# Patient Record
Sex: MALE | Race: WHITE | NOT HISPANIC OR LATINO | Employment: OTHER | URBAN - NONMETROPOLITAN AREA
[De-identification: names, ages, dates, MRNs, and addresses within clinical notes are randomized per-mention and may not be internally consistent; named-entity substitution may affect disease eponyms.]

---

## 2017-10-18 ENCOUNTER — APPOINTMENT (OUTPATIENT)
Dept: CT IMAGING | Facility: HOSPITAL | Age: 77
End: 2017-10-18

## 2017-10-18 ENCOUNTER — APPOINTMENT (OUTPATIENT)
Dept: GENERAL RADIOLOGY | Facility: HOSPITAL | Age: 77
End: 2017-10-18

## 2017-10-18 ENCOUNTER — HOSPITAL ENCOUNTER (EMERGENCY)
Facility: HOSPITAL | Age: 77
Discharge: HOME | End: 2017-10-18
Attending: EMERGENCY MEDICINE | Admitting: EMERGENCY MEDICINE

## 2017-10-18 VITALS
OXYGEN SATURATION: 97 % | DIASTOLIC BLOOD PRESSURE: 71 MMHG | HEIGHT: 71 IN | WEIGHT: 252 LBS | BODY MASS INDEX: 35.28 KG/M2 | RESPIRATION RATE: 20 BRPM | TEMPERATURE: 98.4 F | SYSTOLIC BLOOD PRESSURE: 128 MMHG | HEART RATE: 72 BPM

## 2017-10-18 DIAGNOSIS — M54.2 NECK PAIN: ICD-10-CM

## 2017-10-18 DIAGNOSIS — N39.0 ACUTE UTI (URINARY TRACT INFECTION): Primary | ICD-10-CM

## 2017-10-18 DIAGNOSIS — R10.10 PAIN OF UPPER ABDOMEN: ICD-10-CM

## 2017-10-18 LAB
ALBUMIN SERPL-MCNC: 3.2 G/DL (ref 3.5–5)
ALBUMIN/GLOB SERPL: 0.9 G/DL (ref 1.1–2.5)
ALP SERPL-CCNC: 108 U/L (ref 24–120)
ALT SERPL W P-5'-P-CCNC: 30 U/L (ref 0–54)
AMMONIA BLD-SCNC: <9 UMOL/L (ref 9–33)
AMYLASE SERPL-CCNC: 53 U/L (ref 30–110)
ANION GAP SERPL CALCULATED.3IONS-SCNC: 10 MMOL/L (ref 4–13)
AST SERPL-CCNC: 30 U/L (ref 7–45)
BACTERIA UR QL AUTO: ABNORMAL /HPF
BASOPHILS # BLD AUTO: 0.01 10*3/MM3 (ref 0–0.2)
BASOPHILS NFR BLD AUTO: 0.1 % (ref 0–2)
BILIRUB SERPL-MCNC: 2.8 MG/DL (ref 0.1–1)
BILIRUB UR QL STRIP: ABNORMAL
BUN BLD-MCNC: 13 MG/DL (ref 5–21)
BUN/CREAT SERPL: 11.6 (ref 7–25)
CALCIUM SPEC-SCNC: 8.7 MG/DL (ref 8.4–10.4)
CHLORIDE SERPL-SCNC: 105 MMOL/L (ref 98–110)
CLARITY UR: ABNORMAL
CO2 SERPL-SCNC: 24 MMOL/L (ref 24–31)
COLOR UR: ABNORMAL
CREAT BLD-MCNC: 1.12 MG/DL (ref 0.5–1.4)
DEPRECATED RDW RBC AUTO: 43.4 FL (ref 40–54)
EOSINOPHIL # BLD AUTO: 0.03 10*3/MM3 (ref 0–0.7)
EOSINOPHIL NFR BLD AUTO: 0.4 % (ref 0–4)
ERYTHROCYTE [DISTWIDTH] IN BLOOD BY AUTOMATED COUNT: 13.9 % (ref 12–15)
GFR SERPL CREATININE-BSD FRML MDRD: 64 ML/MIN/1.73
GLOBULIN UR ELPH-MCNC: 3.7 GM/DL
GLUCOSE BLD-MCNC: 124 MG/DL (ref 70–100)
GLUCOSE UR STRIP-MCNC: NEGATIVE MG/DL
HCT VFR BLD AUTO: 38.7 % (ref 40–52)
HGB BLD-MCNC: 13.3 G/DL (ref 14–18)
HGB UR QL STRIP.AUTO: ABNORMAL
IMM GRANULOCYTES # BLD: 0.02 10*3/MM3 (ref 0–0.03)
IMM GRANULOCYTES NFR BLD: 0.3 % (ref 0–5)
KETONES UR QL STRIP: NEGATIVE
LEUKOCYTE ESTERASE UR QL STRIP.AUTO: ABNORMAL
LIPASE SERPL-CCNC: 57 U/L (ref 23–203)
LYMPHOCYTES # BLD AUTO: 0.9 10*3/MM3 (ref 0.72–4.86)
LYMPHOCYTES NFR BLD AUTO: 11.9 % (ref 15–45)
MCH RBC QN AUTO: 29.6 PG (ref 28–32)
MCHC RBC AUTO-ENTMCNC: 34.4 G/DL (ref 33–36)
MCV RBC AUTO: 86.2 FL (ref 82–95)
MONOCYTES # BLD AUTO: 0.94 10*3/MM3 (ref 0.19–1.3)
MONOCYTES NFR BLD AUTO: 12.4 % (ref 4–12)
NEUTROPHILS # BLD AUTO: 5.69 10*3/MM3 (ref 1.87–8.4)
NEUTROPHILS NFR BLD AUTO: 74.9 % (ref 39–78)
NITRITE UR QL STRIP: POSITIVE
NRBC BLD MANUAL-RTO: 0 /100 WBC (ref 0–0)
PH UR STRIP.AUTO: <=5 [PH] (ref 5–8)
PLATELET # BLD AUTO: 101 10*3/MM3 (ref 130–400)
PMV BLD AUTO: 10.3 FL (ref 6–12)
POTASSIUM BLD-SCNC: 4 MMOL/L (ref 3.5–5.3)
PROT SERPL-MCNC: 6.9 G/DL (ref 6.3–8.7)
PROT UR QL STRIP: ABNORMAL
RBC # BLD AUTO: 4.49 10*6/MM3 (ref 4.8–5.9)
RBC # UR: ABNORMAL /HPF
REF LAB TEST METHOD: ABNORMAL
SODIUM BLD-SCNC: 139 MMOL/L (ref 135–145)
SP GR UR STRIP: 1.02 (ref 1–1.03)
SQUAMOUS #/AREA URNS HPF: ABNORMAL /HPF
UROBILINOGEN UR QL STRIP: ABNORMAL
WBC NRBC COR # BLD: 7.59 10*3/MM3 (ref 4.8–10.8)
WBC UR QL AUTO: ABNORMAL /HPF

## 2017-10-18 PROCEDURE — 72125 CT NECK SPINE W/O DYE: CPT

## 2017-10-18 PROCEDURE — 80053 COMPREHEN METABOLIC PANEL: CPT | Performed by: EMERGENCY MEDICINE

## 2017-10-18 PROCEDURE — 87086 URINE CULTURE/COLONY COUNT: CPT | Performed by: EMERGENCY MEDICINE

## 2017-10-18 PROCEDURE — 83690 ASSAY OF LIPASE: CPT | Performed by: EMERGENCY MEDICINE

## 2017-10-18 PROCEDURE — 25010000002 KETOROLAC TROMETHAMINE PER 15 MG: Performed by: EMERGENCY MEDICINE

## 2017-10-18 PROCEDURE — 82140 ASSAY OF AMMONIA: CPT | Performed by: EMERGENCY MEDICINE

## 2017-10-18 PROCEDURE — 96365 THER/PROPH/DIAG IV INF INIT: CPT

## 2017-10-18 PROCEDURE — 85025 COMPLETE CBC W/AUTO DIFF WBC: CPT | Performed by: EMERGENCY MEDICINE

## 2017-10-18 PROCEDURE — 70450 CT HEAD/BRAIN W/O DYE: CPT

## 2017-10-18 PROCEDURE — 74176 CT ABD & PELVIS W/O CONTRAST: CPT

## 2017-10-18 PROCEDURE — 25010000002 CEFTRIAXONE: Performed by: EMERGENCY MEDICINE

## 2017-10-18 PROCEDURE — 81001 URINALYSIS AUTO W/SCOPE: CPT | Performed by: EMERGENCY MEDICINE

## 2017-10-18 PROCEDURE — 96375 TX/PRO/DX INJ NEW DRUG ADDON: CPT

## 2017-10-18 PROCEDURE — 71010 HC CHEST PA OR AP: CPT

## 2017-10-18 PROCEDURE — 82150 ASSAY OF AMYLASE: CPT | Performed by: EMERGENCY MEDICINE

## 2017-10-18 PROCEDURE — 99284 EMERGENCY DEPT VISIT MOD MDM: CPT

## 2017-10-18 RX ORDER — SODIUM CHLORIDE 9 MG/ML
125 INJECTION, SOLUTION INTRAVENOUS CONTINUOUS
Status: DISCONTINUED | OUTPATIENT
Start: 2017-10-18 | End: 2017-10-18 | Stop reason: HOSPADM

## 2017-10-18 RX ORDER — SULFAMETHOXAZOLE AND TRIMETHOPRIM 800; 160 MG/1; MG/1
1 TABLET ORAL 2 TIMES DAILY
Qty: 20 TABLET | Refills: 0 | OUTPATIENT
Start: 2017-10-18 | End: 2017-11-08

## 2017-10-18 RX ORDER — KETOROLAC TROMETHAMINE 30 MG/ML
30 INJECTION, SOLUTION INTRAMUSCULAR; INTRAVENOUS EVERY 6 HOURS PRN
Status: DISCONTINUED | OUTPATIENT
Start: 2017-10-18 | End: 2017-10-18 | Stop reason: HOSPADM

## 2017-10-18 RX ORDER — SODIUM CHLORIDE 0.9 % (FLUSH) 0.9 %
10 SYRINGE (ML) INJECTION AS NEEDED
Status: DISCONTINUED | OUTPATIENT
Start: 2017-10-18 | End: 2017-10-18 | Stop reason: HOSPADM

## 2017-10-18 RX ORDER — HYDROCODONE BITARTRATE AND ACETAMINOPHEN 7.5; 325 MG/1; MG/1
1 TABLET ORAL EVERY 4 HOURS PRN
Qty: 20 TABLET | Refills: 0 | OUTPATIENT
Start: 2017-10-18 | End: 2017-11-08

## 2017-10-18 RX ADMIN — KETOROLAC TROMETHAMINE 30 MG: 30 INJECTION, SOLUTION INTRAMUSCULAR at 09:05

## 2017-10-18 RX ADMIN — CEFTRIAXONE 1 G: 1 INJECTION, POWDER, FOR SOLUTION INTRAMUSCULAR; INTRAVENOUS at 11:40

## 2017-10-18 NOTE — ED PROVIDER NOTES
Subjective   HPI Comments: Patient complains of diffuse pains for the past 3 days.  He specifically says the neck and low back.  He says is been unable to walk for the past 3 days or at least to walk well.  This AM got up to go to bathroom and could not walk due to pain.  Crawled to bathroom and then could not bet back and son found him there and called 911.  Patient denies fall or injury.  Says he hurts all over but then specifies neck and low back as worse spots.  Was in hospital few months ago for dehydration.    Patient is a 77 y.o. male presenting with general illness.   History provided by:  Patient   used: No    Illness   Location:  Diffuse  Quality:  Achy  Severity:  Severe  Onset quality:  Gradual  Duration:  3 days  Timing:  Constant  Progression:  Worsening  Chronicity:  New  Associated symptoms: abdominal pain, headaches and myalgias    Associated symptoms: no chest pain, no congestion, no cough, no diarrhea, no ear pain, no fatigue, no fever, no loss of consciousness, no nausea, no rash, no rhinorrhea, no shortness of breath, no sore throat, no vomiting and no wheezing        Review of Systems   Constitutional: Negative.  Negative for fatigue and fever.   HENT: Negative for congestion, ear pain, rhinorrhea and sore throat.    Respiratory: Negative.  Negative for cough, shortness of breath and wheezing.    Cardiovascular: Negative.  Negative for chest pain.   Gastrointestinal: Positive for abdominal pain. Negative for diarrhea, nausea and vomiting.   Genitourinary: Negative.    Musculoskeletal: Positive for myalgias.   Skin: Negative.  Negative for rash.   Neurological: Positive for headaches. Negative for loss of consciousness.   Hematological: Negative.    Psychiatric/Behavioral: Negative.    All other systems reviewed and are negative.      Past Medical History:   Diagnosis Date   • Dementia    • Liver disorder        Allergies   Allergen Reactions   • Contrast Dye    • Penicillins         History reviewed. No pertinent surgical history.    History reviewed. No pertinent family history.    Social History     Social History   • Marital status:      Spouse name: N/A   • Number of children: N/A   • Years of education: N/A     Social History Main Topics   • Smoking status: Never Smoker   • Smokeless tobacco: None   • Alcohol use No   • Drug use: No   • Sexual activity: No     Other Topics Concern   • None     Social History Narrative   • None       Prior to Admission medications    Not on File       Medications   sodium chloride 0.9 % flush 10 mL (not administered)   sodium chloride 0.9 % infusion (not administered)   ketorolac (TORADOL) injection 30 mg (30 mg Intravenous Given 10/18/17 0905)   cefTRIAXone (ROCEPHIN) 1 g/100 mL 0.9% NS (MBP) (1 g Intravenous New Bag 10/18/17 1140)       Vitals:    10/18/17 1140   BP:    Pulse:    Resp: 19   Temp: 98.8 °F (37.1 °C)   SpO2:          Objective   Physical Exam   Constitutional: He is oriented to person, place, and time. He appears well-developed and well-nourished.   HENT:   Head: Normocephalic and atraumatic.   Nose: Nose normal.   Mouth/Throat: Oropharynx is clear and moist.   Eyes: EOM are normal. Pupils are equal, round, and reactive to light.   Neck:   Screams with pain to any touch or movement of neck even to getting close to his neck.   Cardiovascular: Normal rate and regular rhythm.    Pulmonary/Chest: Effort normal and breath sounds normal.   Abdominal: Soft.   Tender to palpation along right side of abdomen but no rebound or percussion.   Musculoskeletal: Normal range of motion.   C/o pain with any movement of extremities or even touching but no limitation.   Neurological: He is alert and oriented to person, place, and time. He displays normal reflexes. No cranial nerve deficit. He exhibits normal muscle tone. Coordination normal.   Skin: Skin is warm and dry.   Nursing note and vitals reviewed.      Procedures         Lab Results  (last 24 hours)     Procedure Component Value Units Date/Time    CBC & Differential [[id number]] Collected:  10/18/17 0839    Specimen:  Blood Updated:  10/18/17 0904    Narrative:       The following orders were created for panel order CBC & Differential.  Procedure                               Abnormality         Status                     ---------                               -----------         ------                     CBC Auto Differential[[id number]]        Abnormal            Final result                 Please view results for these tests on the individual orders.    Comprehensive Metabolic Panel [[id number]]  (Abnormal) Collected:  10/18/17 0839    Specimen:  Blood Updated:  10/18/17 0906     Glucose 124 (H) mg/dL      BUN 13 mg/dL      Creatinine 1.12 mg/dL      Sodium 139 mmol/L      Potassium 4.0 mmol/L      Chloride 105 mmol/L      CO2 24.0 mmol/L      Calcium 8.7 mg/dL      Total Protein 6.9 g/dL      Albumin 3.20 (L) g/dL      ALT (SGPT) 30 U/L      AST (SGOT) 30 U/L      Alkaline Phosphatase 108 U/L      Total Bilirubin 2.8 (H) mg/dL      eGFR Non African Amer 64 mL/min/1.73      Globulin 3.7 gm/dL      A/G Ratio 0.9 (L) g/dL      BUN/Creatinine Ratio 11.6     Anion Gap 10.0 mmol/L     Narrative:       The MDRD GFR formula is only valid for adults with stable renal function between ages 18 and 70.    Lipase [[id number]]  (Normal) Collected:  10/18/17 0839    Specimen:  Blood Updated:  10/18/17 0906     Lipase 57 U/L     Amylase [[id number]]  (Normal) Collected:  10/18/17 0839    Specimen:  Blood Updated:  10/18/17 0906     Amylase 53 U/L     CBC Auto Differential [[id number]]  (Abnormal) Collected:  10/18/17 0839    Specimen:  Blood Updated:  10/18/17 0904     WBC 7.59 10*3/mm3      RBC 4.49 (L) 10*6/mm3      Hemoglobin 13.3 (L) g/dL      Hematocrit 38.7 (L) %      MCV 86.2 fL      MCH 29.6 pg      MCHC 34.4 g/dL      RDW 13.9 %      RDW-SD 43.4 fl      MPV 10.3 fL      Platelets 101 (L) 10*3/mm3       Neutrophil % 74.9 %      Lymphocyte % 11.9 (L) %      Monocyte % 12.4 (H) %      Eosinophil % 0.4 %      Basophil % 0.1 %      Immature Grans % 0.3 %      Neutrophils, Absolute 5.69 10*3/mm3      Lymphocytes, Absolute 0.90 10*3/mm3      Monocytes, Absolute 0.94 10*3/mm3      Eosinophils, Absolute 0.03 10*3/mm3      Basophils, Absolute 0.01 10*3/mm3      Immature Grans, Absolute 0.02 10*3/mm3      nRBC 0.0 /100 WBC     Ammonia [[id number]]  (Abnormal) Collected:  10/18/17 0839    Specimen:  Blood Updated:  10/18/17 0903     Ammonia <9 (L) umol/L     Urinalysis With / Culture If Indicated - Urine, Clean Catch [[id number]]  (Abnormal) Collected:  10/18/17 0902    Specimen:  Urine from Urine, Clean Catch Updated:  10/18/17 0949     Color, UA [place] (A)     Appearance, UA Turbid (A)     pH, UA <=5.0     Specific Gravity, UA 1.021     Glucose, UA Negative     Ketones, UA Negative     Bilirubin, UA Small (1+) (A)     Blood, UA Large (3+) (A)     Protein, UA 30 mg/dL (1+) (A)     Leuk Esterase, UA Large (3+) (A)     Nitrite, UA Positive (A)     Urobilinogen, UA 4.0 E.U./dL (A)    Narrative:       Dipstick results may be inaccurate due to color interference.    Urine Culture - Urine, Urine, Clean Catch [[id number]] Collected:  10/18/17 0902    Specimen:  Urine from Urine, Clean Catch Updated:  10/18/17 0918    Urinalysis, Microscopic Only - Urine, Clean Catch [[id number]]  (Abnormal) Collected:  10/18/17 0902    Specimen:  Urine from Urine, Clean Catch Updated:  10/18/17 0949     RBC, UA 6-12 (A) /HPF      WBC, UA Too Numerous to Count (A) /HPF      Bacteria, UA 2+ (A) /HPF      Squamous Epithelial Cells, UA 3-6 (A) /HPF      Methodology Automated Microscopy          CT Abdomen Pelvis Without Contrast   Final Result   1. No evidence of acute abdominopelvic process.    2. Cirrhosis and portal hypertension. Multiple varices.   3. Stable bladder stone.   4. Horseshoe kidney.   This report was finalized on 10/18/2017 10:38 by   Andrew Lauren MD.      CT Cervical Spine Without Contrast   Final Result   1. No evidence of acute osseous injury or malalignment in the cervical   spine.           This report was finalized on 10/18/2017 10:12 by Dr. Deonte Olivo MD.      CT Head Without Contrast   Final Result       1. Mild cerebral and cerebellar volume loss with chronic microvascular   disease but no evidence of acute intracranial process.   2. Left frontal sinus disease.           This report was finalized on 10/18/2017 10:02 by Dr. Deonte Olivo MD.      XR Chest 1 View   Final Result   1. Bibasilar atelectasis. There is elevation of the right hemidiaphragm.   2. No acute infiltrates.   This report was finalized on 10/18/2017 08:43 by Dr. Deonte Olivo MD.          ED Course  ED Course   Comment By Time   I told the family that the patient has a bad urinary tract infection but his other studies were all okay.  They ask about x-rays of his knees and I told him and he was moving them well so I did not x-ray him and he had not complained of specific pain in those to me.  There Was not getting x-rays right now.  We will treat him for the infection weeks it do see and felt like his pain is related to arthritis and the older and this infection.  He is discharged in stable condition. Jacky Ricks Jr., MD 10/18 1150          [facility]    Final diagnoses:   Acute UTI (urinary tract infection)   Neck pain   Pain of upper abdomen          Jacky Ricks Jr., MD  10/18/17 1150

## 2017-10-20 LAB
BACTERIA SPEC AEROBE CULT: ABNORMAL
BACTERIA SPEC AEROBE CULT: ABNORMAL

## 2017-11-04 ENCOUNTER — APPOINTMENT (OUTPATIENT)
Dept: GENERAL RADIOLOGY | Facility: HOSPITAL | Age: 77
End: 2017-11-04

## 2017-11-04 ENCOUNTER — HOSPITAL ENCOUNTER (EMERGENCY)
Facility: HOSPITAL | Age: 77
Discharge: HOME | End: 2017-11-04
Attending: EMERGENCY MEDICINE | Admitting: EMERGENCY MEDICINE

## 2017-11-04 VITALS
DIASTOLIC BLOOD PRESSURE: 72 MMHG | RESPIRATION RATE: 16 BRPM | TEMPERATURE: 98.7 F | OXYGEN SATURATION: 97 % | HEART RATE: 65 BPM | WEIGHT: 275 LBS | BODY MASS INDEX: 38.5 KG/M2 | HEIGHT: 71 IN | SYSTOLIC BLOOD PRESSURE: 147 MMHG

## 2017-11-04 DIAGNOSIS — M25.461 KNEE EFFUSION, RIGHT: Primary | ICD-10-CM

## 2017-11-04 LAB
ALBUMIN SERPL-MCNC: 3.2 G/DL (ref 3.5–5)
ALBUMIN/GLOB SERPL: 0.9 G/DL (ref 1.1–2.5)
ALP SERPL-CCNC: 103 U/L (ref 24–120)
ALT SERPL W P-5'-P-CCNC: 31 U/L (ref 0–54)
ANION GAP SERPL CALCULATED.3IONS-SCNC: 11 MMOL/L (ref 4–13)
AST SERPL-CCNC: 30 U/L (ref 7–45)
BASOPHILS # BLD AUTO: 0.01 10*3/MM3 (ref 0–0.2)
BASOPHILS NFR BLD AUTO: 0.2 % (ref 0–2)
BILIRUB SERPL-MCNC: 2 MG/DL (ref 0.1–1)
BUN BLD-MCNC: 11 MG/DL (ref 5–21)
BUN/CREAT SERPL: 8.8 (ref 7–25)
CALCIUM SPEC-SCNC: 8.8 MG/DL (ref 8.4–10.4)
CHLORIDE SERPL-SCNC: 107 MMOL/L (ref 98–110)
CO2 SERPL-SCNC: 24 MMOL/L (ref 24–31)
CREAT BLD-MCNC: 1.25 MG/DL (ref 0.5–1.4)
DEPRECATED RDW RBC AUTO: 45.7 FL (ref 40–54)
EOSINOPHIL # BLD AUTO: 0.08 10*3/MM3 (ref 0–0.7)
EOSINOPHIL NFR BLD AUTO: 1.8 % (ref 0–4)
ERYTHROCYTE [DISTWIDTH] IN BLOOD BY AUTOMATED COUNT: 14.4 % (ref 12–15)
GFR SERPL CREATININE-BSD FRML MDRD: 56 ML/MIN/1.73
GLOBULIN UR ELPH-MCNC: 3.5 GM/DL
GLUCOSE BLD-MCNC: 96 MG/DL (ref 70–100)
HCT VFR BLD AUTO: 37.6 % (ref 40–52)
HGB BLD-MCNC: 12.5 G/DL (ref 14–18)
IMM GRANULOCYTES # BLD: 0.01 10*3/MM3 (ref 0–0.03)
IMM GRANULOCYTES NFR BLD: 0.2 % (ref 0–5)
LYMPHOCYTES # BLD AUTO: 1.09 10*3/MM3 (ref 0.72–4.86)
LYMPHOCYTES NFR BLD AUTO: 24.5 % (ref 15–45)
MCH RBC QN AUTO: 29 PG (ref 28–32)
MCHC RBC AUTO-ENTMCNC: 33.2 G/DL (ref 33–36)
MCV RBC AUTO: 87.2 FL (ref 82–95)
MONOCYTES # BLD AUTO: 0.73 10*3/MM3 (ref 0.19–1.3)
MONOCYTES NFR BLD AUTO: 16.4 % (ref 4–12)
NEUTROPHILS # BLD AUTO: 2.52 10*3/MM3 (ref 1.87–8.4)
NEUTROPHILS NFR BLD AUTO: 56.9 % (ref 39–78)
PLATELET # BLD AUTO: 113 10*3/MM3 (ref 130–400)
PMV BLD AUTO: 10.2 FL (ref 6–12)
POTASSIUM BLD-SCNC: 4.1 MMOL/L (ref 3.5–5.3)
PROT SERPL-MCNC: 6.7 G/DL (ref 6.3–8.7)
RBC # BLD AUTO: 4.31 10*6/MM3 (ref 4.8–5.9)
SODIUM BLD-SCNC: 142 MMOL/L (ref 135–145)
URATE SERPL-MCNC: 4.9 MG/DL (ref 3.5–8.5)
WBC NRBC COR # BLD: 4.44 10*3/MM3 (ref 4.8–10.8)

## 2017-11-04 PROCEDURE — 99284 EMERGENCY DEPT VISIT MOD MDM: CPT

## 2017-11-04 PROCEDURE — 85025 COMPLETE CBC W/AUTO DIFF WBC: CPT | Performed by: EMERGENCY MEDICINE

## 2017-11-04 PROCEDURE — 96374 THER/PROPH/DIAG INJ IV PUSH: CPT

## 2017-11-04 PROCEDURE — 73560 X-RAY EXAM OF KNEE 1 OR 2: CPT

## 2017-11-04 PROCEDURE — 80053 COMPREHEN METABOLIC PANEL: CPT | Performed by: EMERGENCY MEDICINE

## 2017-11-04 PROCEDURE — 84550 ASSAY OF BLOOD/URIC ACID: CPT | Performed by: EMERGENCY MEDICINE

## 2017-11-04 PROCEDURE — 36415 COLL VENOUS BLD VENIPUNCTURE: CPT

## 2017-11-04 PROCEDURE — 25010000002 KETOROLAC TROMETHAMINE PER 15 MG: Performed by: EMERGENCY MEDICINE

## 2017-11-04 RX ORDER — INDOMETHACIN 50 MG/1
50 CAPSULE ORAL
Qty: 30 CAPSULE | Refills: 0 | Status: SHIPPED | OUTPATIENT
Start: 2017-11-04 | End: 2023-02-23

## 2017-11-04 RX ORDER — HYDROCODONE BITARTRATE AND ACETAMINOPHEN 7.5; 325 MG/1; MG/1
1 TABLET ORAL EVERY 4 HOURS PRN
Qty: 20 TABLET | Refills: 0 | Status: SHIPPED | OUTPATIENT
Start: 2017-11-04 | End: 2018-06-27

## 2017-11-04 RX ORDER — KETOROLAC TROMETHAMINE 15 MG/ML
15 INJECTION, SOLUTION INTRAMUSCULAR; INTRAVENOUS ONCE
Status: COMPLETED | OUTPATIENT
Start: 2017-11-04 | End: 2017-11-04

## 2017-11-04 RX ORDER — SODIUM CHLORIDE 0.9 % (FLUSH) 0.9 %
10 SYRINGE (ML) INJECTION AS NEEDED
Status: DISCONTINUED | OUTPATIENT
Start: 2017-11-04 | End: 2017-11-04 | Stop reason: HOSPADM

## 2017-11-04 RX ADMIN — KETOROLAC TROMETHAMINE 15 MG: 15 INJECTION, SOLUTION INTRAMUSCULAR; INTRAVENOUS at 09:34

## 2017-11-04 NOTE — ED PROVIDER NOTES
Subjective   HPI Comments: Patient complains of severe right knee pain that he says is been progressing for some time but he cannot give me an exact time frame.  He says is been going on for weeks.  He denies any injury or trauma.  He says become very painful he cannot walk on it cannot move it.  He was seen at some urgent care clinic but has not follow-up with family physician within he says he does not have one.    Patient is a 77 y.o. male presenting with knee pain.   History provided by:  Patient   used: No    Knee Pain   Location:  Knee  Injury: no    Knee location:  R knee  Pain details:     Quality:  Throbbing    Radiates to:  Does not radiate    Severity:  Severe    Onset quality:  Gradual    Timing:  Constant    Progression:  Worsening  Chronicity:  New  Dislocation: no    Foreign body present:  No foreign bodies  Tetanus status:  Unknown  Prior injury to area:  No  Relieved by:  Nothing  Worsened by:  Bearing weight  Ineffective treatments:  None tried  Associated symptoms: decreased ROM and swelling    Associated symptoms: no back pain, no fatigue, no fever, no itching, no muscle weakness, no neck pain, no numbness, no stiffness and no tingling    Risk factors: no concern for non-accidental trauma, no frequent fractures, no known bone disorder, no obesity and no recent illness        Review of Systems   Constitutional: Negative.  Negative for fatigue and fever.   Respiratory: Negative.    Cardiovascular: Negative.    Genitourinary: Negative.    Musculoskeletal: Positive for arthralgias and joint swelling. Negative for back pain, neck pain and stiffness.   Skin: Negative.  Negative for itching.   Neurological: Negative.    Hematological: Negative.    Psychiatric/Behavioral: Negative.    All other systems reviewed and are negative.      Past Medical History:   Diagnosis Date   • Dementia    • Liver disorder        Allergies   Allergen Reactions   • Contrast Dye    • Penicillins         History reviewed. No pertinent surgical history.    History reviewed. No pertinent family history.    Social History     Social History   • Marital status:      Spouse name: N/A   • Number of children: N/A   • Years of education: N/A     Social History Main Topics   • Smoking status: Never Smoker   • Smokeless tobacco: None   • Alcohol use No   • Drug use: No   • Sexual activity: No     Other Topics Concern   • None     Social History Narrative       Prior to Admission medications    Medication Sig Start Date End Date Taking? Authorizing Provider   HYDROcodone-acetaminophen (NORCO) 7.5-325 MG per tablet Take 1 tablet by mouth Every 4 (Four) Hours As Needed for Moderate Pain . 10/18/17   Jacky Ricks Jr., MD   sulfamethoxazole-trimethoprim (BACTRIM DS,SEPTRA DS) 800-160 MG per tablet Take 1 tablet by mouth 2 (Two) Times a Day. 10/18/17   Jacky Ricks Jr., MD       Medications   sodium chloride 0.9 % flush 10 mL (not administered)   ketorolac (TORADOL) injection 15 mg (15 mg Intravenous Given 11/4/17 0934)       Vitals:    11/04/17 0824   BP: 133/48   Pulse: 74   Resp: 16   Temp: 98 °F (36.7 °C)   SpO2: 97%         Objective   Physical Exam   Constitutional: He is oriented to person, place, and time. He appears well-developed and well-nourished.   HENT:   Head: Normocephalic and atraumatic.   Cardiovascular: Normal rate and regular rhythm.    Pulmonary/Chest: Effort normal and breath sounds normal.   Abdominal: Soft. Bowel sounds are normal.   Morbidly obese   Musculoskeletal: He exhibits tenderness. He exhibits no deformity.   There is significant tenderness with swelling in right knee.  No erythema but feels warm.   Neurological: He is alert and oriented to person, place, and time.   Skin: Skin is warm and dry.   Psychiatric: He has a normal mood and affect. His behavior is normal.   Nursing note and vitals reviewed.      Procedures         Lab Results (last 24 hours)     Procedure Component  Value Units Date/Time    CBC & Differential [[id number]] Collected:  11/04/17 0904    Specimen:  Blood Updated:  11/04/17 0914    Narrative:       The following orders were created for panel order CBC & Differential.  Procedure                               Abnormality         Status                     ---------                               -----------         ------                     CBC Auto Differential[[id number]]        Abnormal            Final result                 Please view results for these tests on the individual orders.    Comprehensive Metabolic Panel [[id number]]  (Abnormal) Collected:  11/04/17 0904    Specimen:  Blood Updated:  11/04/17 0926     Glucose 96 mg/dL      BUN 11 mg/dL      Creatinine 1.25 mg/dL      Sodium 142 mmol/L      Potassium 4.1 mmol/L      Chloride 107 mmol/L      CO2 24.0 mmol/L      Calcium 8.8 mg/dL      Total Protein 6.7 g/dL      Albumin 3.20 (L) g/dL      ALT (SGPT) 31 U/L      AST (SGOT) 30 U/L      Alkaline Phosphatase 103 U/L      Total Bilirubin 2.0 (H) mg/dL      eGFR Non African Amer 56 (L) mL/min/1.73      Globulin 3.5 gm/dL      A/G Ratio 0.9 (L) g/dL      BUN/Creatinine Ratio 8.8     Anion Gap 11.0 mmol/L     Narrative:       The MDRD GFR formula is only valid for adults with stable renal function between ages 18 and 70.    Uric Acid [[id number]]  (Normal) Collected:  11/04/17 0904    Specimen:  Blood Updated:  11/04/17 0926     Uric Acid 4.9 mg/dL     CBC Auto Differential [[id number]]  (Abnormal) Collected:  11/04/17 0904    Specimen:  Blood Updated:  11/04/17 0914     WBC 4.44 (L) 10*3/mm3      RBC 4.31 (L) 10*6/mm3      Hemoglobin 12.5 (L) g/dL      Hematocrit 37.6 (L) %      MCV 87.2 fL      MCH 29.0 pg      MCHC 33.2 g/dL      RDW 14.4 %      RDW-SD 45.7 fl      MPV 10.2 fL      Platelets 113 (L) 10*3/mm3      Neutrophil % 56.9 %      Lymphocyte % 24.5 %      Monocyte % 16.4 (H) %      Eosinophil % 1.8 %      Basophil % 0.2 %      Immature Grans % 0.2 %       Neutrophils, Absolute 2.52 10*3/mm3      Lymphocytes, Absolute 1.09 10*3/mm3      Monocytes, Absolute 0.73 10*3/mm3      Eosinophils, Absolute 0.08 10*3/mm3      Basophils, Absolute 0.01 10*3/mm3      Immature Grans, Absolute 0.01 10*3/mm3           XR Knee 1 or 2 View Right   Final Result   Large suprapatellar joint effusion with severe degenerative   changes of the medial compartment. No acute fractures identified.   This report was finalized on 11/04/2017 09:21 by Dr. Antonino Victor MD.          ED Course  ED Course   Comment By Time   I told the patient and his family that the x-rays revealed degenerative changes and a large effusion but no acute fractures or injury.  I told them that they need to follow-up with an orthopedist to get a clear-cut answer.  We will wrap at the day and get her some crutches and something for the pain.  He is discharged in stable condition. Jacky Ricks Jr., MD 11/04 0938   [facility] [id number] Jacky Ricks Jr., MD 11/04 0943          MDM  Number of Diagnoses or Management Options  Knee effusion, right: new and requires workup     Amount and/or Complexity of Data Reviewed  Tests in the radiology section of CPT®: ordered and reviewed    Risk of Complications, Morbidity, and/or Mortality  Presenting problems: moderate  Diagnostic procedures: moderate  Management options: moderate    Patient Progress  Patient progress: stable      Final diagnoses:   Knee effusion, right          Jacky Ricks Jr., MD  11/04/17 0932

## 2017-11-07 ENCOUNTER — TRANSCRIBE ORDERS (OUTPATIENT)
Dept: GENERAL RADIOLOGY | Facility: HOSPITAL | Age: 77
End: 2017-11-07

## 2017-11-07 ENCOUNTER — HOSPITAL ENCOUNTER (OUTPATIENT)
Dept: ULTRASOUND IMAGING | Facility: HOSPITAL | Age: 77
Discharge: HOME | End: 2017-11-07
Admitting: PHYSICIAN ASSISTANT

## 2017-11-07 DIAGNOSIS — R60.0 LOCALIZED EDEMA: Primary | ICD-10-CM

## 2017-11-07 DIAGNOSIS — R60.0 LOCALIZED EDEMA: ICD-10-CM

## 2017-11-07 PROCEDURE — 93971 EXTREMITY STUDY: CPT

## 2018-06-27 ENCOUNTER — APPOINTMENT (OUTPATIENT)
Dept: GENERAL RADIOLOGY | Facility: HOSPITAL | Age: 78
End: 2018-06-27

## 2018-06-27 ENCOUNTER — HOSPITAL ENCOUNTER (EMERGENCY)
Facility: HOSPITAL | Age: 78
Discharge: HOME | End: 2018-06-27
Admitting: EMERGENCY MEDICINE

## 2018-06-27 ENCOUNTER — APPOINTMENT (OUTPATIENT)
Dept: ULTRASOUND IMAGING | Facility: HOSPITAL | Age: 78
End: 2018-06-27

## 2018-06-27 VITALS
OXYGEN SATURATION: 98 % | HEART RATE: 70 BPM | WEIGHT: 242 LBS | RESPIRATION RATE: 18 BRPM | SYSTOLIC BLOOD PRESSURE: 144 MMHG | DIASTOLIC BLOOD PRESSURE: 76 MMHG | TEMPERATURE: 98.7 F | HEIGHT: 71 IN | BODY MASS INDEX: 33.88 KG/M2

## 2018-06-27 DIAGNOSIS — S99.921A INJURY OF RIGHT FOOT, INITIAL ENCOUNTER: ICD-10-CM

## 2018-06-27 DIAGNOSIS — S89.91XA INJURY OF RIGHT LOWER EXTREMITY, INITIAL ENCOUNTER: Primary | ICD-10-CM

## 2018-06-27 DIAGNOSIS — N39.0 ACUTE UTI: ICD-10-CM

## 2018-06-27 LAB
BACTERIA UR QL AUTO: ABNORMAL /HPF
BILIRUB UR QL STRIP: ABNORMAL
CLARITY UR: ABNORMAL
COLOR UR: ABNORMAL
GLUCOSE UR STRIP-MCNC: NEGATIVE MG/DL
HGB UR QL STRIP.AUTO: ABNORMAL
HYALINE CASTS UR QL AUTO: ABNORMAL /LPF
KETONES UR QL STRIP: ABNORMAL
LEUKOCYTE ESTERASE UR QL STRIP.AUTO: ABNORMAL
NITRITE UR QL STRIP: NEGATIVE
PH UR STRIP.AUTO: 7 [PH] (ref 5–8)
PROT UR QL STRIP: ABNORMAL
RBC # UR: ABNORMAL /HPF
REF LAB TEST METHOD: ABNORMAL
SP GR UR STRIP: 1.02 (ref 1–1.03)
SQUAMOUS #/AREA URNS HPF: ABNORMAL /HPF
STARCH GRANULES URNS QL MICRO: ABNORMAL /HPF
UROBILINOGEN UR QL STRIP: ABNORMAL
WBC UR QL AUTO: ABNORMAL /HPF

## 2018-06-27 PROCEDURE — 87186 SC STD MICRODIL/AGAR DIL: CPT | Performed by: PHYSICIAN ASSISTANT

## 2018-06-27 PROCEDURE — 93971 EXTREMITY STUDY: CPT

## 2018-06-27 PROCEDURE — 87181 SC STD AGAR DILUTION PER AGT: CPT | Performed by: PHYSICIAN ASSISTANT

## 2018-06-27 PROCEDURE — 73552 X-RAY EXAM OF FEMUR 2/>: CPT

## 2018-06-27 PROCEDURE — 96372 THER/PROPH/DIAG INJ SC/IM: CPT

## 2018-06-27 PROCEDURE — 73562 X-RAY EXAM OF KNEE 3: CPT

## 2018-06-27 PROCEDURE — 87077 CULTURE AEROBIC IDENTIFY: CPT | Performed by: PHYSICIAN ASSISTANT

## 2018-06-27 PROCEDURE — 73502 X-RAY EXAM HIP UNI 2-3 VIEWS: CPT

## 2018-06-27 PROCEDURE — 73610 X-RAY EXAM OF ANKLE: CPT

## 2018-06-27 PROCEDURE — 25010000002 CEFTRIAXONE PER 250 MG: Performed by: PHYSICIAN ASSISTANT

## 2018-06-27 PROCEDURE — 87086 URINE CULTURE/COLONY COUNT: CPT | Performed by: PHYSICIAN ASSISTANT

## 2018-06-27 PROCEDURE — 99283 EMERGENCY DEPT VISIT LOW MDM: CPT

## 2018-06-27 PROCEDURE — 93971 EXTREMITY STUDY: CPT | Performed by: SURGERY

## 2018-06-27 PROCEDURE — 81001 URINALYSIS AUTO W/SCOPE: CPT | Performed by: PHYSICIAN ASSISTANT

## 2018-06-27 PROCEDURE — 73590 X-RAY EXAM OF LOWER LEG: CPT

## 2018-06-27 PROCEDURE — 73630 X-RAY EXAM OF FOOT: CPT

## 2018-06-27 RX ORDER — LEVOFLOXACIN 500 MG/1
500 TABLET, FILM COATED ORAL DAILY
Qty: 7 TABLET | Refills: 0 | Status: SHIPPED | OUTPATIENT
Start: 2018-06-27 | End: 2023-02-23

## 2018-06-27 RX ORDER — HYDROCODONE BITARTRATE AND ACETAMINOPHEN 7.5; 325 MG/1; MG/1
1 TABLET ORAL EVERY 4 HOURS PRN
Qty: 12 TABLET | Refills: 0 | Status: SHIPPED | OUTPATIENT
Start: 2018-06-27 | End: 2023-02-23

## 2018-06-27 RX ADMIN — LIDOCAINE HYDROCHLORIDE 1000 MG: 10 INJECTION, SOLUTION INFILTRATION; PERINEURAL at 19:36

## 2018-07-02 LAB — BACTERIA SPEC AEROBE CULT: ABNORMAL

## 2019-03-26 ENCOUNTER — TRANSCRIBE ORDERS (OUTPATIENT)
Dept: ADMINISTRATIVE | Facility: HOSPITAL | Age: 79
End: 2019-03-26

## 2019-03-26 ENCOUNTER — HOSPITAL ENCOUNTER (OUTPATIENT)
Dept: GENERAL RADIOLOGY | Facility: HOSPITAL | Age: 79
Discharge: HOME | End: 2019-03-26
Admitting: NURSE PRACTITIONER

## 2019-03-26 DIAGNOSIS — R22.42 LOCALIZED SWELLING, MASS AND LUMP, LOWER LIMB, LEFT: ICD-10-CM

## 2019-03-26 DIAGNOSIS — M25.562 LEFT KNEE PAIN, UNSPECIFIED CHRONICITY: ICD-10-CM

## 2019-03-26 PROCEDURE — 73564 X-RAY EXAM KNEE 4 OR MORE: CPT

## 2019-03-29 ENCOUNTER — HOSPITAL ENCOUNTER (OUTPATIENT)
Dept: ULTRASOUND IMAGING | Facility: HOSPITAL | Age: 79
Discharge: HOME | End: 2019-03-29
Admitting: NURSE PRACTITIONER

## 2019-03-29 DIAGNOSIS — R22.42 LOCALIZED SWELLING, MASS AND LUMP, LOWER LIMB, LEFT: ICD-10-CM

## 2019-03-29 PROCEDURE — 76882 US LMTD JT/FCL EVL NVASC XTR: CPT

## 2019-04-15 ENCOUNTER — TRANSCRIBE ORDERS (OUTPATIENT)
Dept: ADMINISTRATIVE | Facility: HOSPITAL | Age: 79
End: 2019-04-15

## 2019-04-15 DIAGNOSIS — R22.42 MASS OF LOWER LEG, LEFT: Primary | ICD-10-CM

## 2019-04-19 ENCOUNTER — HOSPITAL ENCOUNTER (OUTPATIENT)
Dept: MRI IMAGING | Facility: HOSPITAL | Age: 79
End: 2019-04-19

## 2019-04-22 ENCOUNTER — HOSPITAL ENCOUNTER (OUTPATIENT)
Dept: MRI IMAGING | Facility: HOSPITAL | Age: 79
Discharge: HOME | End: 2019-04-22
Admitting: NURSE PRACTITIONER

## 2019-04-22 LAB — CREAT BLDA-MCNC: 1 MG/DL (ref 0.6–1.3)

## 2019-04-22 PROCEDURE — A9577 INJ MULTIHANCE: HCPCS | Performed by: NURSE PRACTITIONER

## 2019-04-22 PROCEDURE — C8914 MRA W/O FOL W/CONT, LWR EXT: HCPCS

## 2019-04-22 PROCEDURE — 0 GADOBENATE DIMEGLUMINE 529 MG/ML SOLUTION: Performed by: NURSE PRACTITIONER

## 2019-04-22 PROCEDURE — 82565 ASSAY OF CREATININE: CPT

## 2019-04-22 RX ADMIN — GADOBENATE DIMEGLUMINE 20 ML: 529 INJECTION, SOLUTION INTRAVENOUS at 13:46

## 2019-05-02 ENCOUNTER — HOSPITAL ENCOUNTER (OUTPATIENT)
Dept: GENERAL RADIOLOGY | Facility: HOSPITAL | Age: 79
Discharge: HOME | End: 2019-05-02
Admitting: NURSE PRACTITIONER

## 2019-05-02 ENCOUNTER — TRANSCRIBE ORDERS (OUTPATIENT)
Dept: ADMINISTRATIVE | Facility: HOSPITAL | Age: 79
End: 2019-05-02

## 2019-05-02 DIAGNOSIS — R05.9 COUGH: Primary | ICD-10-CM

## 2019-05-02 DIAGNOSIS — R06.02 SHORTNESS OF BREATH: ICD-10-CM

## 2019-05-02 PROCEDURE — 71046 X-RAY EXAM CHEST 2 VIEWS: CPT

## 2022-08-08 ENCOUNTER — APPOINTMENT (OUTPATIENT)
Dept: CT IMAGING | Facility: HOSPITAL | Age: 82
End: 2022-08-08

## 2022-08-08 ENCOUNTER — HOSPITAL ENCOUNTER (EMERGENCY)
Facility: HOSPITAL | Age: 82
Discharge: HOME | End: 2022-08-08
Attending: EMERGENCY MEDICINE | Admitting: EMERGENCY MEDICINE

## 2022-08-08 VITALS
WEIGHT: 272 LBS | OXYGEN SATURATION: 94 % | DIASTOLIC BLOOD PRESSURE: 92 MMHG | BODY MASS INDEX: 38.08 KG/M2 | HEART RATE: 68 BPM | SYSTOLIC BLOOD PRESSURE: 182 MMHG | RESPIRATION RATE: 16 BRPM | HEIGHT: 71 IN | TEMPERATURE: 98.2 F

## 2022-08-08 DIAGNOSIS — T14.8XXA ABRASION: ICD-10-CM

## 2022-08-08 DIAGNOSIS — H05.232 PERIORBITAL HEMATOMA OF LEFT EYE: Primary | ICD-10-CM

## 2022-08-08 PROCEDURE — 90715 TDAP VACCINE 7 YRS/> IM: CPT | Performed by: EMERGENCY MEDICINE

## 2022-08-08 PROCEDURE — 25010000002 TETANUS-DIPHTH-ACELL PERTUSSIS 5-2.5-18.5 LF-MCG/0.5 SUSPENSION PREFILLED SYRINGE: Performed by: EMERGENCY MEDICINE

## 2022-08-08 PROCEDURE — 99282 EMERGENCY DEPT VISIT SF MDM: CPT

## 2022-08-08 PROCEDURE — 70486 CT MAXILLOFACIAL W/O DYE: CPT

## 2022-08-08 PROCEDURE — 90471 IMMUNIZATION ADMIN: CPT | Performed by: EMERGENCY MEDICINE

## 2022-08-08 RX ADMIN — TETANUS TOXOID, REDUCED DIPHTHERIA TOXOID AND ACELLULAR PERTUSSIS VACCINE, ADSORBED 0.5 ML: 5; 2.5; 8; 8; 2.5 SUSPENSION INTRAMUSCULAR at 19:39

## 2022-08-09 NOTE — ED PROVIDER NOTES
Subjective   81yo male presents emergency department complaint of left periorbital swelling after fall out of his bed.  Patient states he fell, hit the left side of his head on a trash can.  Patient states trauma significant, feels like he nearly lost consciousness.  Complains of left periorbital pain and swelling.  No vision change.  Sustained abrasion to his nose.  Not up-to-date on tetanus.  No complaints of upper extremity pain, chest pain, abdominal pain, pelvis or lower extremity pain.  Rates pain as moderate severity with no aggravating elevating factors.      History provided by:  Patient      Review of Systems   All other systems reviewed and are negative.      Past Medical History:   Diagnosis Date   • Dementia (HCC)    • Liver disorder        Allergies   Allergen Reactions   • Contrast Dye    • Penicillins        History reviewed. No pertinent surgical history.    History reviewed. No pertinent family history.    Social History     Socioeconomic History   • Marital status:    Tobacco Use   • Smoking status: Never Smoker   Substance and Sexual Activity   • Alcohol use: No   • Drug use: No   • Sexual activity: Never           Objective   Physical Exam  Vitals and nursing note reviewed.   Constitutional:       General: He is not in acute distress.     Appearance: Normal appearance. He is normal weight.   HENT:      Head: Normocephalic.      Comments: Left periorbital swelling/tenderness     Nose:      Comments: Abrasion to nose     Mouth/Throat:      Mouth: Mucous membranes are moist.      Pharynx: Oropharynx is clear. No oropharyngeal exudate or posterior oropharyngeal erythema.   Eyes:      Extraocular Movements: Extraocular movements intact.      Conjunctiva/sclera: Conjunctivae normal.      Pupils: Pupils are equal, round, and reactive to light.   Cardiovascular:      Rate and Rhythm: Normal rate and regular rhythm.      Pulses: Normal pulses.      Heart sounds: Normal heart sounds.   Pulmonary:       Effort: Pulmonary effort is normal.      Breath sounds: Normal breath sounds. No wheezing, rhonchi or rales.   Abdominal:      General: Abdomen is flat. Bowel sounds are normal. There is no distension.      Palpations: Abdomen is soft.      Tenderness: There is no abdominal tenderness.   Musculoskeletal:         General: No tenderness. Normal range of motion.      Cervical back: Normal range of motion and neck supple. No rigidity. No muscular tenderness.      Right lower leg: No edema.      Left lower leg: No edema.   Skin:     General: Skin is warm and dry.      Capillary Refill: Capillary refill takes less than 2 seconds.      Findings: No rash.      Comments: Nose abrasion   Neurological:      General: No focal deficit present.      Mental Status: He is alert and oriented to person, place, and time. Mental status is at baseline.      Cranial Nerves: No cranial nerve deficit.      Sensory: No sensory deficit.      Motor: No weakness.   Psychiatric:         Mood and Affect: Mood normal.         Behavior: Behavior normal.         Thought Content: Thought content normal.         Procedures       Lab Results (last 24 hours)     ** No results found for the last 24 hours. **      CT Maxillofacial Without Contrast    Result Date: 8/8/2022  CT MAXILLOFACIAL WO CONT- 8/8/2022 8:20 PM CDT  HISTORY: fall, facial injury, left periorbital swelling  COMPARISON: None  DOSE LENGTH PRODUCT: 333 mGy cm. Automated exposure control was also utilized to decrease patient radiation dose.  TECHNIQUE: Serial helical tomographic images of the facial bones were obtained without contrast. Multiplanar reformatted images were provided for review.  FINDINGS: Orbits: Orbital contents are unremarkable.  Sinuses: Paranasal sinuses are clear.  Nasal septum: Left-sided spur. No fracture.  Temporal bones: No fractures. Mastoid air cells are clear.  Temporomandibular joints: Normal alignment.  Mandible: Intact.  Soft tissues: Small left  supraorbital contusion. Facial soft tissues are otherwise unremarkable. Pharyngeal airway is patent.      1. No acute facial bone fracture. Orbital contents are unremarkable.   This report was finalized on 08/08/2022 21:01 by Dr Natalio Cee, .      ED Course  ED Course as of 08/08/22 2119   Mon Aug 08, 2022   2117 CT negative for fracture.  Not on blood thinners, did not lose consciousness.  CT head not indicated. [AW]      ED Course User Index  [AW] Maykel Cr MD                                           [facility]    Final diagnoses:   Periorbital hematoma of left eye   Abrasion       ED Disposition  ED Disposition     ED Disposition   Discharge    Condition   Stable    Comment   --             Mariama Griggs, APRN  [address] [facility] [address] [phone]    Schedule an appointment as soon as possible for a visit   As needed         Medication List      No changes were made to your prescriptions during this visit.          Maykel Cr MD  08/08/22 2119

## 2023-02-23 ENCOUNTER — HOSPITAL ENCOUNTER (EMERGENCY)
Facility: HOSPITAL | Age: 83
Discharge: HOME | End: 2023-02-23
Admitting: STUDENT IN AN ORGANIZED HEALTH CARE EDUCATION/TRAINING PROGRAM
Payer: MEDICARE

## 2023-02-23 VITALS
SYSTOLIC BLOOD PRESSURE: 143 MMHG | DIASTOLIC BLOOD PRESSURE: 68 MMHG | WEIGHT: 234 LBS | OXYGEN SATURATION: 99 % | BODY MASS INDEX: 32.76 KG/M2 | TEMPERATURE: 97.7 F | RESPIRATION RATE: 19 BRPM | HEIGHT: 71 IN | HEART RATE: 70 BPM

## 2023-02-23 DIAGNOSIS — D69.3 CHRONIC IDIOPATHIC THROMBOCYTOPENIA: ICD-10-CM

## 2023-02-23 DIAGNOSIS — L03.115 CELLULITIS OF BOTH FEET: ICD-10-CM

## 2023-02-23 DIAGNOSIS — L25.3 CHEMICAL DERMATITIS: Primary | ICD-10-CM

## 2023-02-23 DIAGNOSIS — N39.0 ACUTE UTI: ICD-10-CM

## 2023-02-23 DIAGNOSIS — L03.116 CELLULITIS OF BOTH FEET: ICD-10-CM

## 2023-02-23 LAB
ANION GAP SERPL CALCULATED.3IONS-SCNC: 9 MMOL/L (ref 5–15)
BACTERIA UR QL AUTO: ABNORMAL /HPF
BASOPHILS # BLD AUTO: 0.02 10*3/MM3 (ref 0–0.2)
BASOPHILS NFR BLD AUTO: 0.5 % (ref 0–1.5)
BILIRUB UR QL STRIP: NEGATIVE
BUN SERPL-MCNC: 14 MG/DL (ref 8–23)
BUN/CREAT SERPL: 9.5 (ref 7–25)
CALCIUM SPEC-SCNC: 8.7 MG/DL (ref 8.6–10.5)
CHLORIDE SERPL-SCNC: 109 MMOL/L (ref 98–107)
CLARITY UR: ABNORMAL
CO2 SERPL-SCNC: 24 MMOL/L (ref 22–29)
COLOR UR: YELLOW
CREAT SERPL-MCNC: 1.48 MG/DL (ref 0.76–1.27)
DEPRECATED RDW RBC AUTO: 48.5 FL (ref 37–54)
EGFRCR SERPLBLD CKD-EPI 2021: 46.9 ML/MIN/1.73
EOSINOPHIL # BLD AUTO: 0.12 10*3/MM3 (ref 0–0.4)
EOSINOPHIL NFR BLD AUTO: 3.1 % (ref 0.3–6.2)
ERYTHROCYTE [DISTWIDTH] IN BLOOD BY AUTOMATED COUNT: 14.1 % (ref 12.3–15.4)
GLUCOSE SERPL-MCNC: 115 MG/DL (ref 65–99)
GLUCOSE UR STRIP-MCNC: NEGATIVE MG/DL
HCT VFR BLD AUTO: 33.9 % (ref 37.5–51)
HGB BLD-MCNC: 11.1 G/DL (ref 13–17.7)
HGB UR QL STRIP.AUTO: ABNORMAL
HOLD SPECIMEN: NORMAL
HOLD SPECIMEN: NORMAL
HYALINE CASTS UR QL AUTO: ABNORMAL /LPF
IMM GRANULOCYTES # BLD AUTO: 0.01 10*3/MM3 (ref 0–0.05)
IMM GRANULOCYTES NFR BLD AUTO: 0.3 % (ref 0–0.5)
INR PPP: 1.15 (ref 0.91–1.09)
KETONES UR QL STRIP: NEGATIVE
LEUKOCYTE ESTERASE UR QL STRIP.AUTO: ABNORMAL
LYMPHOCYTES # BLD AUTO: 1.15 10*3/MM3 (ref 0.7–3.1)
LYMPHOCYTES NFR BLD AUTO: 30.1 % (ref 19.6–45.3)
MCH RBC QN AUTO: 30.8 PG (ref 26.6–33)
MCHC RBC AUTO-ENTMCNC: 32.7 G/DL (ref 31.5–35.7)
MCV RBC AUTO: 94.2 FL (ref 79–97)
MONOCYTES # BLD AUTO: 0.24 10*3/MM3 (ref 0.1–0.9)
MONOCYTES NFR BLD AUTO: 6.3 % (ref 5–12)
NEUTROPHILS NFR BLD AUTO: 2.28 10*3/MM3 (ref 1.7–7)
NEUTROPHILS NFR BLD AUTO: 59.7 % (ref 42.7–76)
NITRITE UR QL STRIP: NEGATIVE
NRBC BLD AUTO-RTO: 0 /100 WBC (ref 0–0.2)
PH UR STRIP.AUTO: 7.5 [PH] (ref 5–8)
PLATELET # BLD AUTO: 108 10*3/MM3 (ref 140–450)
PMV BLD AUTO: 9.6 FL (ref 6–12)
POTASSIUM SERPL-SCNC: 3.9 MMOL/L (ref 3.5–5.2)
PROT UR QL STRIP: ABNORMAL
PROTHROMBIN TIME: 14.9 SECONDS (ref 11.8–14.8)
RBC # BLD AUTO: 3.6 10*6/MM3 (ref 4.14–5.8)
RBC # UR STRIP: ABNORMAL /HPF
REF LAB TEST METHOD: ABNORMAL
SODIUM SERPL-SCNC: 142 MMOL/L (ref 136–145)
SP GR UR STRIP: 1.01 (ref 1–1.03)
SQUAMOUS #/AREA URNS HPF: ABNORMAL /HPF
UROBILINOGEN UR QL STRIP: ABNORMAL
WBC # UR STRIP: ABNORMAL /HPF
WBC CLUMPS # UR AUTO: ABNORMAL /HPF
WBC NRBC COR # BLD: 3.82 10*3/MM3 (ref 3.4–10.8)
WHOLE BLOOD HOLD COAG: NORMAL
WHOLE BLOOD HOLD SPECIMEN: NORMAL

## 2023-02-23 PROCEDURE — 81001 URINALYSIS AUTO W/SCOPE: CPT | Performed by: STUDENT IN AN ORGANIZED HEALTH CARE EDUCATION/TRAINING PROGRAM

## 2023-02-23 PROCEDURE — 87086 URINE CULTURE/COLONY COUNT: CPT | Performed by: STUDENT IN AN ORGANIZED HEALTH CARE EDUCATION/TRAINING PROGRAM

## 2023-02-23 PROCEDURE — 36415 COLL VENOUS BLD VENIPUNCTURE: CPT

## 2023-02-23 PROCEDURE — 99283 EMERGENCY DEPT VISIT LOW MDM: CPT

## 2023-02-23 PROCEDURE — 85610 PROTHROMBIN TIME: CPT | Performed by: STUDENT IN AN ORGANIZED HEALTH CARE EDUCATION/TRAINING PROGRAM

## 2023-02-23 PROCEDURE — 85025 COMPLETE CBC W/AUTO DIFF WBC: CPT | Performed by: STUDENT IN AN ORGANIZED HEALTH CARE EDUCATION/TRAINING PROGRAM

## 2023-02-23 PROCEDURE — 80048 BASIC METABOLIC PNL TOTAL CA: CPT | Performed by: STUDENT IN AN ORGANIZED HEALTH CARE EDUCATION/TRAINING PROGRAM

## 2023-02-23 RX ORDER — CEFDINIR 300 MG/1
300 CAPSULE ORAL 2 TIMES DAILY
Qty: 14 CAPSULE | Refills: 0 | Status: ON HOLD | OUTPATIENT
Start: 2023-02-23 | End: 2023-03-09

## 2023-02-23 RX ORDER — SODIUM CHLORIDE 0.9 % (FLUSH) 0.9 %
10 SYRINGE (ML) INJECTION AS NEEDED
Status: DISCONTINUED | OUTPATIENT
Start: 2023-02-23 | End: 2023-02-23 | Stop reason: HOSPADM

## 2023-02-24 LAB
BACTERIA SPEC AEROBE CULT: ABNORMAL
BACTERIA SPEC AEROBE CULT: ABNORMAL

## 2023-02-25 ENCOUNTER — HOSPITAL ENCOUNTER (EMERGENCY)
Facility: HOSPITAL | Age: 83
Discharge: HOME | End: 2023-02-25
Attending: EMERGENCY MEDICINE | Admitting: EMERGENCY MEDICINE
Payer: MEDICARE

## 2023-02-25 VITALS
TEMPERATURE: 97.7 F | HEART RATE: 59 BPM | RESPIRATION RATE: 18 BRPM | OXYGEN SATURATION: 96 % | BODY MASS INDEX: 32.76 KG/M2 | DIASTOLIC BLOOD PRESSURE: 52 MMHG | SYSTOLIC BLOOD PRESSURE: 115 MMHG | WEIGHT: 234 LBS | HEIGHT: 71 IN

## 2023-02-25 DIAGNOSIS — L30.9 DERMATITIS OF BOTH FEET: Primary | ICD-10-CM

## 2023-02-25 LAB
ALBUMIN SERPL-MCNC: 2.8 G/DL (ref 3.5–5.2)
ALBUMIN/GLOB SERPL: 0.7 G/DL
ALP SERPL-CCNC: 106 U/L (ref 39–117)
ALT SERPL W P-5'-P-CCNC: 13 U/L (ref 1–41)
ANION GAP SERPL CALCULATED.3IONS-SCNC: 6 MMOL/L (ref 5–15)
AST SERPL-CCNC: 30 U/L (ref 1–40)
BASOPHILS # BLD AUTO: 0.01 10*3/MM3 (ref 0–0.2)
BASOPHILS NFR BLD AUTO: 0.3 % (ref 0–1.5)
BILIRUB SERPL-MCNC: 1.3 MG/DL (ref 0–1.2)
BUN SERPL-MCNC: 17 MG/DL (ref 8–23)
BUN/CREAT SERPL: 10.7 (ref 7–25)
CALCIUM SPEC-SCNC: 8.6 MG/DL (ref 8.6–10.5)
CHLORIDE SERPL-SCNC: 108 MMOL/L (ref 98–107)
CO2 SERPL-SCNC: 24 MMOL/L (ref 22–29)
CREAT SERPL-MCNC: 1.59 MG/DL (ref 0.76–1.27)
D-LACTATE SERPL-SCNC: 1.8 MMOL/L (ref 0.5–2)
DEPRECATED RDW RBC AUTO: 49.2 FL (ref 37–54)
EGFRCR SERPLBLD CKD-EPI 2021: 43.1 ML/MIN/1.73
EOSINOPHIL # BLD AUTO: 0.05 10*3/MM3 (ref 0–0.4)
EOSINOPHIL NFR BLD AUTO: 1.4 % (ref 0.3–6.2)
ERYTHROCYTE [DISTWIDTH] IN BLOOD BY AUTOMATED COUNT: 14.2 % (ref 12.3–15.4)
GLOBULIN UR ELPH-MCNC: 3.9 GM/DL
GLUCOSE SERPL-MCNC: 122 MG/DL (ref 65–99)
HCT VFR BLD AUTO: 32.1 % (ref 37.5–51)
HGB BLD-MCNC: 10.3 G/DL (ref 13–17.7)
IMM GRANULOCYTES # BLD AUTO: 0.02 10*3/MM3 (ref 0–0.05)
IMM GRANULOCYTES NFR BLD AUTO: 0.5 % (ref 0–0.5)
LYMPHOCYTES # BLD AUTO: 0.72 10*3/MM3 (ref 0.7–3.1)
LYMPHOCYTES NFR BLD AUTO: 19.7 % (ref 19.6–45.3)
MCH RBC QN AUTO: 30.6 PG (ref 26.6–33)
MCHC RBC AUTO-ENTMCNC: 32.1 G/DL (ref 31.5–35.7)
MCV RBC AUTO: 95.3 FL (ref 79–97)
MONOCYTES # BLD AUTO: 0.38 10*3/MM3 (ref 0.1–0.9)
MONOCYTES NFR BLD AUTO: 10.4 % (ref 5–12)
NEUTROPHILS NFR BLD AUTO: 2.48 10*3/MM3 (ref 1.7–7)
NEUTROPHILS NFR BLD AUTO: 67.7 % (ref 42.7–76)
NRBC BLD AUTO-RTO: 0 /100 WBC (ref 0–0.2)
PLATELET # BLD AUTO: 84 10*3/MM3 (ref 140–450)
PMV BLD AUTO: 10 FL (ref 6–12)
POTASSIUM SERPL-SCNC: 3.3 MMOL/L (ref 3.5–5.2)
PROT SERPL-MCNC: 6.7 G/DL (ref 6–8.5)
RBC # BLD AUTO: 3.37 10*6/MM3 (ref 4.14–5.8)
SODIUM SERPL-SCNC: 138 MMOL/L (ref 136–145)
WBC NRBC COR # BLD: 3.66 10*3/MM3 (ref 3.4–10.8)

## 2023-02-25 PROCEDURE — 80053 COMPREHEN METABOLIC PANEL: CPT | Performed by: EMERGENCY MEDICINE

## 2023-02-25 PROCEDURE — 85025 COMPLETE CBC W/AUTO DIFF WBC: CPT | Performed by: EMERGENCY MEDICINE

## 2023-02-25 PROCEDURE — 99283 EMERGENCY DEPT VISIT LOW MDM: CPT

## 2023-02-25 PROCEDURE — 83605 ASSAY OF LACTIC ACID: CPT | Performed by: EMERGENCY MEDICINE

## 2023-02-25 RX ORDER — SODIUM CHLORIDE 0.9 % (FLUSH) 0.9 %
10 SYRINGE (ML) INJECTION AS NEEDED
Status: DISCONTINUED | OUTPATIENT
Start: 2023-02-25 | End: 2023-02-25 | Stop reason: HOSPADM

## 2023-03-09 ENCOUNTER — APPOINTMENT (OUTPATIENT)
Dept: GENERAL RADIOLOGY | Facility: HOSPITAL | Age: 83
DRG: 683 | End: 2023-03-09
Payer: MEDICARE

## 2023-03-09 ENCOUNTER — HOSPITAL ENCOUNTER (INPATIENT)
Facility: HOSPITAL | Age: 83
LOS: 4 days | Discharge: HOME HEALTH | DRG: 683 | End: 2023-03-13
Attending: EMERGENCY MEDICINE | Admitting: INTERNAL MEDICINE
Payer: MEDICARE

## 2023-03-09 ENCOUNTER — APPOINTMENT (OUTPATIENT)
Dept: CT IMAGING | Facility: HOSPITAL | Age: 83
DRG: 683 | End: 2023-03-09
Payer: MEDICARE

## 2023-03-09 DIAGNOSIS — N17.9 AKI (ACUTE KIDNEY INJURY): Primary | ICD-10-CM

## 2023-03-09 DIAGNOSIS — T79.6XXA TRAUMATIC RHABDOMYOLYSIS, INITIAL ENCOUNTER: ICD-10-CM

## 2023-03-09 DIAGNOSIS — D69.6 THROMBOCYTOPENIA: ICD-10-CM

## 2023-03-09 DIAGNOSIS — Z74.09 IMPAIRED FUNCTIONAL MOBILITY AND ACTIVITY TOLERANCE: ICD-10-CM

## 2023-03-09 DIAGNOSIS — N39.0 ACUTE UTI: ICD-10-CM

## 2023-03-09 PROBLEM — N30.01 ACUTE CYSTITIS WITH HEMATURIA: Status: ACTIVE | Noted: 2023-03-09

## 2023-03-09 PROBLEM — G93.41 ENCEPHALOPATHY, METABOLIC: Status: ACTIVE | Noted: 2023-03-09

## 2023-03-09 LAB
ALBUMIN SERPL-MCNC: 3.2 G/DL (ref 3.5–5.2)
ALBUMIN/GLOB SERPL: 0.7 G/DL
ALP SERPL-CCNC: 112 U/L (ref 39–117)
ALT SERPL W P-5'-P-CCNC: 17 U/L (ref 1–41)
ANION GAP SERPL CALCULATED.3IONS-SCNC: 11 MMOL/L (ref 5–15)
AST SERPL-CCNC: 57 U/L (ref 1–40)
BACTERIA UR QL AUTO: ABNORMAL /HPF
BASOPHILS # BLD AUTO: 0.01 10*3/MM3 (ref 0–0.2)
BASOPHILS NFR BLD AUTO: 0.1 % (ref 0–1.5)
BILIRUB SERPL-MCNC: 2.2 MG/DL (ref 0–1.2)
BILIRUB UR QL STRIP: NEGATIVE
BUN SERPL-MCNC: 27 MG/DL (ref 8–23)
BUN/CREAT SERPL: 13 (ref 7–25)
CALCIUM SPEC-SCNC: 8.5 MG/DL (ref 8.6–10.5)
CHLORIDE SERPL-SCNC: 102 MMOL/L (ref 98–107)
CK SERPL-CCNC: 1560 U/L (ref 20–200)
CLARITY UR: ABNORMAL
CO2 SERPL-SCNC: 22 MMOL/L (ref 22–29)
COLOR UR: ABNORMAL
CREAT SERPL-MCNC: 2.08 MG/DL (ref 0.76–1.27)
D-LACTATE SERPL-SCNC: 1.7 MMOL/L (ref 0.5–2)
D-LACTATE SERPL-SCNC: 2.1 MMOL/L (ref 0.5–2)
DEPRECATED RDW RBC AUTO: 47.9 FL (ref 37–54)
EGFRCR SERPLBLD CKD-EPI 2021: 31.2 ML/MIN/1.73
EOSINOPHIL # BLD AUTO: 0 10*3/MM3 (ref 0–0.4)
EOSINOPHIL NFR BLD AUTO: 0 % (ref 0.3–6.2)
ERYTHROCYTE [DISTWIDTH] IN BLOOD BY AUTOMATED COUNT: 14.1 % (ref 12.3–15.4)
GLOBULIN UR ELPH-MCNC: 4.3 GM/DL
GLUCOSE SERPL-MCNC: 109 MG/DL (ref 65–99)
GLUCOSE UR STRIP-MCNC: NEGATIVE MG/DL
HCT VFR BLD AUTO: 36.2 % (ref 37.5–51)
HGB BLD-MCNC: 11.8 G/DL (ref 13–17.7)
HGB UR QL STRIP.AUTO: ABNORMAL
HYALINE CASTS UR QL AUTO: ABNORMAL /LPF
IMM GRANULOCYTES # BLD AUTO: 0.07 10*3/MM3 (ref 0–0.05)
IMM GRANULOCYTES NFR BLD AUTO: 0.7 % (ref 0–0.5)
KETONES UR QL STRIP: NEGATIVE
LEUKOCYTE ESTERASE UR QL STRIP.AUTO: ABNORMAL
LYMPHOCYTES # BLD AUTO: 0.58 10*3/MM3 (ref 0.7–3.1)
LYMPHOCYTES NFR BLD AUTO: 5.9 % (ref 19.6–45.3)
MCH RBC QN AUTO: 30.5 PG (ref 26.6–33)
MCHC RBC AUTO-ENTMCNC: 32.6 G/DL (ref 31.5–35.7)
MCV RBC AUTO: 93.5 FL (ref 79–97)
MONOCYTES # BLD AUTO: 0.57 10*3/MM3 (ref 0.1–0.9)
MONOCYTES NFR BLD AUTO: 5.8 % (ref 5–12)
NEUTROPHILS NFR BLD AUTO: 8.68 10*3/MM3 (ref 1.7–7)
NEUTROPHILS NFR BLD AUTO: 87.5 % (ref 42.7–76)
NITRITE UR QL STRIP: NEGATIVE
NRBC BLD AUTO-RTO: 0 /100 WBC (ref 0–0.2)
PH UR STRIP.AUTO: 6 [PH] (ref 5–8)
PLATELET # BLD AUTO: 72 10*3/MM3 (ref 140–450)
PMV BLD AUTO: 10.6 FL (ref 6–12)
POTASSIUM SERPL-SCNC: 3.6 MMOL/L (ref 3.5–5.2)
PROT SERPL-MCNC: 7.5 G/DL (ref 6–8.5)
PROT UR QL STRIP: ABNORMAL
RBC # BLD AUTO: 3.87 10*6/MM3 (ref 4.14–5.8)
RBC # UR STRIP: ABNORMAL /HPF
REF LAB TEST METHOD: ABNORMAL
SODIUM SERPL-SCNC: 135 MMOL/L (ref 136–145)
SP GR UR STRIP: 1.01 (ref 1–1.03)
SQUAMOUS #/AREA URNS HPF: ABNORMAL /HPF
UROBILINOGEN UR QL STRIP: ABNORMAL
WBC # UR STRIP: ABNORMAL /HPF
WBC NRBC COR # BLD: 9.91 10*3/MM3 (ref 3.4–10.8)

## 2023-03-09 PROCEDURE — 25010000002 MORPHINE PER 10 MG: Performed by: EMERGENCY MEDICINE

## 2023-03-09 PROCEDURE — 73502 X-RAY EXAM HIP UNI 2-3 VIEWS: CPT

## 2023-03-09 PROCEDURE — 87040 BLOOD CULTURE FOR BACTERIA: CPT | Performed by: EMERGENCY MEDICINE

## 2023-03-09 PROCEDURE — 82550 ASSAY OF CK (CPK): CPT | Performed by: EMERGENCY MEDICINE

## 2023-03-09 PROCEDURE — 36415 COLL VENOUS BLD VENIPUNCTURE: CPT | Performed by: EMERGENCY MEDICINE

## 2023-03-09 PROCEDURE — 81001 URINALYSIS AUTO W/SCOPE: CPT | Performed by: EMERGENCY MEDICINE

## 2023-03-09 PROCEDURE — 25010000002 ENOXAPARIN PER 10 MG: Performed by: FAMILY MEDICINE

## 2023-03-09 PROCEDURE — 73700 CT LOWER EXTREMITY W/O DYE: CPT

## 2023-03-09 PROCEDURE — 85025 COMPLETE CBC W/AUTO DIFF WBC: CPT | Performed by: EMERGENCY MEDICINE

## 2023-03-09 PROCEDURE — 99284 EMERGENCY DEPT VISIT MOD MDM: CPT

## 2023-03-09 PROCEDURE — 87077 CULTURE AEROBIC IDENTIFY: CPT | Performed by: EMERGENCY MEDICINE

## 2023-03-09 PROCEDURE — 87186 SC STD MICRODIL/AGAR DIL: CPT | Performed by: EMERGENCY MEDICINE

## 2023-03-09 PROCEDURE — 80053 COMPREHEN METABOLIC PANEL: CPT | Performed by: EMERGENCY MEDICINE

## 2023-03-09 PROCEDURE — 93005 ELECTROCARDIOGRAM TRACING: CPT | Performed by: EMERGENCY MEDICINE

## 2023-03-09 PROCEDURE — 25010000002 LEVOFLOXACIN PER 250 MG: Performed by: EMERGENCY MEDICINE

## 2023-03-09 PROCEDURE — 93010 ELECTROCARDIOGRAM REPORT: CPT | Performed by: INTERNAL MEDICINE

## 2023-03-09 PROCEDURE — 87150 DNA/RNA AMPLIFIED PROBE: CPT | Performed by: EMERGENCY MEDICINE

## 2023-03-09 PROCEDURE — 83605 ASSAY OF LACTIC ACID: CPT | Performed by: EMERGENCY MEDICINE

## 2023-03-09 PROCEDURE — 87086 URINE CULTURE/COLONY COUNT: CPT | Performed by: EMERGENCY MEDICINE

## 2023-03-09 RX ORDER — SODIUM CHLORIDE 9 MG/ML
40 INJECTION, SOLUTION INTRAVENOUS AS NEEDED
Status: DISCONTINUED | OUTPATIENT
Start: 2023-03-09 | End: 2023-03-13 | Stop reason: HOSPADM

## 2023-03-09 RX ORDER — BISACODYL 5 MG/1
5 TABLET, DELAYED RELEASE ORAL DAILY PRN
Status: DISCONTINUED | OUTPATIENT
Start: 2023-03-09 | End: 2023-03-13 | Stop reason: HOSPADM

## 2023-03-09 RX ORDER — LEVOFLOXACIN 5 MG/ML
500 INJECTION, SOLUTION INTRAVENOUS EVERY 24 HOURS
Status: DISCONTINUED | OUTPATIENT
Start: 2023-03-10 | End: 2023-03-09

## 2023-03-09 RX ORDER — ACETAMINOPHEN 325 MG/1
650 TABLET ORAL EVERY 4 HOURS PRN
Status: DISCONTINUED | OUTPATIENT
Start: 2023-03-09 | End: 2023-03-13 | Stop reason: HOSPADM

## 2023-03-09 RX ORDER — FAMOTIDINE 20 MG/1
20 TABLET, FILM COATED ORAL DAILY
Status: DISCONTINUED | OUTPATIENT
Start: 2023-03-09 | End: 2023-03-13 | Stop reason: HOSPADM

## 2023-03-09 RX ORDER — LEVOFLOXACIN 5 MG/ML
500 INJECTION, SOLUTION INTRAVENOUS ONCE
Status: COMPLETED | OUTPATIENT
Start: 2023-03-09 | End: 2023-03-09

## 2023-03-09 RX ORDER — ENOXAPARIN SODIUM 100 MG/ML
40 INJECTION SUBCUTANEOUS EVERY 24 HOURS
Status: DISCONTINUED | OUTPATIENT
Start: 2023-03-09 | End: 2023-03-09

## 2023-03-09 RX ORDER — LEVOFLOXACIN 5 MG/ML
250 INJECTION, SOLUTION INTRAVENOUS EVERY 24 HOURS
Status: DISCONTINUED | OUTPATIENT
Start: 2023-03-10 | End: 2023-03-11

## 2023-03-09 RX ORDER — ENOXAPARIN SODIUM 100 MG/ML
30 INJECTION SUBCUTANEOUS EVERY 24 HOURS
Status: DISCONTINUED | OUTPATIENT
Start: 2023-03-10 | End: 2023-03-13 | Stop reason: HOSPADM

## 2023-03-09 RX ORDER — SODIUM CHLORIDE 0.9 % (FLUSH) 0.9 %
10 SYRINGE (ML) INJECTION AS NEEDED
Status: DISCONTINUED | OUTPATIENT
Start: 2023-03-09 | End: 2023-03-13 | Stop reason: HOSPADM

## 2023-03-09 RX ORDER — SODIUM CHLORIDE 0.9 % (FLUSH) 0.9 %
10 SYRINGE (ML) INJECTION EVERY 12 HOURS SCHEDULED
Status: DISCONTINUED | OUTPATIENT
Start: 2023-03-09 | End: 2023-03-13 | Stop reason: HOSPADM

## 2023-03-09 RX ORDER — BISACODYL 5 MG/1
5 TABLET, DELAYED RELEASE ORAL DAILY PRN
COMMUNITY

## 2023-03-09 RX ORDER — FAMOTIDINE 20 MG/1
20 TABLET, FILM COATED ORAL
Status: DISCONTINUED | OUTPATIENT
Start: 2023-03-09 | End: 2023-03-09

## 2023-03-09 RX ORDER — SODIUM CHLORIDE 9 MG/ML
50 INJECTION, SOLUTION INTRAVENOUS CONTINUOUS
Status: DISCONTINUED | OUTPATIENT
Start: 2023-03-09 | End: 2023-03-11

## 2023-03-09 RX ADMIN — MORPHINE SULFATE 4 MG: 4 INJECTION, SOLUTION INTRAMUSCULAR; INTRAVENOUS at 11:48

## 2023-03-09 RX ADMIN — ENOXAPARIN SODIUM 40 MG: 100 INJECTION SUBCUTANEOUS at 14:36

## 2023-03-09 RX ADMIN — LEVOFLOXACIN 500 MG: 500 INJECTION, SOLUTION INTRAVENOUS at 11:36

## 2023-03-09 RX ADMIN — MUPIROCIN 1 APPLICATION: 20 OINTMENT TOPICAL at 22:06

## 2023-03-09 RX ADMIN — Medication 10 ML: at 14:36

## 2023-03-09 RX ADMIN — SODIUM CHLORIDE 500 ML: 9 INJECTION, SOLUTION INTRAVENOUS at 10:54

## 2023-03-09 RX ADMIN — SODIUM CHLORIDE 75 ML/HR: 9 INJECTION, SOLUTION INTRAVENOUS at 14:36

## 2023-03-09 NOTE — PROGRESS NOTES
"Pharmacy Renal Dose Conversion    Manohar Garcia is a 82 y.o. year old male  180.3 cm (71\") 106 kg (234 lb)    Estimated Creatinine Clearance: 33.9 mL/min (A) (by C-G formula based on SCr of 2.08 mg/dL (H)).   Creatinine   Date Value Ref Range Status   03/09/2023 2.08 (H) 0.76 - 1.27 mg/dL Final   02/25/2023 1.59 (H) 0.76 - 1.27 mg/dL Final   02/23/2023 1.48 (H) 0.76 - 1.27 mg/dL Final   04/22/2019 1.00 0.60 - 1.30 mg/dL Final     Comment:     Serial Number: [id number]:  [id number]       PLAN  Based on prescribing information provided by the drug , Famotidine 20mg po BIDac and Levaquin 500 mg iv q24h,  have been changed to Famotidine 20mg po daily and Levaquin 250 mg iv q24h.    Adjusted per the directives and guidelines established by [facility] Pharmacy and Therapeutics Committee and [facility] Medical Executive Committee.  Pharmacy will continue to monitor daily and make further adjustment(s) accordingly.    Armando Tomas, PharmD  03/09/2315:26 CST    "

## 2023-03-09 NOTE — PLAN OF CARE
Goal Outcome Evaluation:  Plan of Care Reviewed With: patient           Outcome Evaluation: pt admitted from ED; IVF; intermittently confused to situation; unsteady; bed check on; safety maintianed

## 2023-03-09 NOTE — H&P
[facility] Medicine Services  HISTORY AND PHYSICAL    Date of Admission: 3/9/2023  Primary Care Physician: Sunny, Nadine Known    Subjective   Primary Historian: Patient and daughter    Chief Complaint: Fall and weakness    History of Present Illness  82 year old male with PMH of dementia, GERD, liver disease, soft tissue tumor, that lives with his daughter and is able to care for himself and drive. Last night he fell and daughter found him this morning in the ground. He was to weak to get up. Blood work in the ER shows worsened renal function and UTI. He was treated recently for UTI and has just completed Omnicef. Apparently he was having urgency and frequency last night.    Review of Systems   Otherwise complete ROS reviewed and negative except as mentioned in the HPI.    Past Medical History:   Past Medical History:   Diagnosis Date   • Dementia (HCC)    • GERD (gastroesophageal reflux disease)    • Liver disorder      Past Surgical History:  Past Surgical History:   Procedure Laterality Date   • HEMORRHOIDECTOMY     • HERNIA REPAIR       Social History:  reports that he has never smoked. He does not have any smokeless tobacco history on file. He reports that he does not drink alcohol and does not use drugs.    Family History:   Patient can not provide.    Allergies:  Allergies   Allergen Reactions   • Contrast Dye (Echo Or Unknown Ct/Mr)    • Penicillins        Medications:  Prior to Admission medications    Medication Sig Start Date End Date Taking? Authorizing Provider   bisacodyl (DULCOLAX) 5 MG EC tablet Take 1 tablet by mouth Daily As Needed for Constipation.    Provider, MD Shu   cefdinir (OMNICEF) 300 MG capsule Take 1 capsule by mouth 2 (Two) Times a Day.  Patient not taking: Reported on 3/9/2023 2/23/23   Michelle Granados PA   cimetidine (TAGAMET) 200 MG tablet Take 1 tablet by mouth 2 (Two) Times a Day As Needed.    ProviderShu MD  "  mupirocin (BACTROBAN) 2 % ointment Apply 1 application topically to the appropriate area as directed 3 (Three) Times a Day. 2/23/23   Michelle Granados PA   Bisacodyl (LAXATIVE PO) Take 1 tablet by mouth Daily.  3/9/23  ProviderShu MD   Cimetidine (TAGAMET PO) Take  by mouth.  3/9/23  ProviderShu MD     I have utilized all available immediate resources to obtain, update, or review the patient's current medications (including all prescriptions, over-the-counter products, herbals, cannabis/cannabidiol products, and vitamin/mineral/dietary (nutritional) supplements).    Objective     Vital Signs: /68 (BP Location: Right arm, Patient Position: Lying)   Pulse 86   Temp 98.2 °F (36.8 °C) (Oral)   Resp 18   Ht 180.3 cm (71\")   Wt 106 kg (234 lb)   SpO2 97%   BMI 32.64 kg/m²   Physical Exam  Constitutional:       Appearance: He is well-developed. He is ill-appearing. He is not toxic-appearing or diaphoretic.   HENT:      Head: Normocephalic and atraumatic.      Right Ear: External ear normal.      Left Ear: External ear normal.      Nose: Nose normal.      Mouth/Throat:      Mouth: Mucous membranes are dry.   Eyes:      General:         Right eye: No discharge.         Left eye: No discharge.      Extraocular Movements: Extraocular movements intact.      Conjunctiva/sclera: Conjunctivae normal.      Pupils: Pupils are equal, round, and reactive to light.   Neck:      Vascular: No JVD.   Cardiovascular:      Rate and Rhythm: Regular rhythm. Tachycardia present.      Heart sounds: Normal heart sounds. No murmur heard.  Pulmonary:      Effort: Pulmonary effort is normal. No respiratory distress.      Breath sounds: Normal breath sounds. No wheezing or rales.   Chest:      Chest wall: No tenderness.   Abdominal:      General: Bowel sounds are normal. There is no distension.      Palpations: Abdomen is soft.      Tenderness: There is no abdominal tenderness. There is no guarding or " rebound.   Musculoskeletal:         General: No tenderness or deformity. Normal range of motion.      Cervical back: Normal range of motion and neck supple. No rigidity.      Right lower leg: No edema.      Left lower leg: No edema.   Skin:     General: Skin is warm and dry.      Findings: No rash.   Neurological:      General: No focal deficit present.      Mental Status: He is alert. Mental status is at baseline. He is disoriented.      Cranial Nerves: No cranial nerve deficit.      Sensory: No sensory deficit.      Motor: Weakness present. No abnormal muscle tone.      Deep Tendon Reflexes: Reflexes normal.   Psychiatric:         Mood and Affect: Mood normal.         Behavior: Behavior normal.     Results Reviewed:  Lab Results (last 24 hours)     Procedure Component Value Units Date/Time    Lactic Acid, Plasma [[id number]]  (Abnormal) Collected: 03/09/23 1134    Specimen: Blood Updated: 03/09/23 1215     Lactate 2.1 mmol/L     Blood Culture - Blood, Arm, Left [[id number]] Collected: 03/09/23 1134    Specimen: Blood from Arm, Left Updated: 03/09/23 1151    Blood Culture - Blood, Hand, Digit Right [[id number]] Collected: 03/09/23 1134    Specimen: Blood from Hand, Digit Right Updated: 03/09/23 1150    Urinalysis, Microscopic Only - Urine, Clean Catch [[id number]]  (Abnormal) Collected: 03/09/23 0941    Specimen: Urine, Clean Catch Updated: 03/09/23 1030     RBC, UA       Unable to determine due to loaded field     /HPF     WBC, UA Too Numerous to Count /HPF      Bacteria, UA 4+ /HPF      Squamous Epithelial Cells, UA 3-6 /HPF      Hyaline Casts, UA None Seen /LPF      Methodology Manual Light Microscopy    Urinalysis With Culture If Indicated - Urine, Clean Catch [[id number]]  (Abnormal) Collected: 03/09/23 0941    Specimen: Urine, Clean Catch Updated: 03/09/23 1030     Color, UA Dark Yellow     Appearance, UA Turbid     pH, UA 6.0     Specific Gravity, UA 1.014     Glucose, UA Negative     Ketones, UA Negative      Bilirubin, UA Negative     Blood, UA Large (3+)     Protein,  mg/dL (2+)     Leuk Esterase, UA Large (3+)     Nitrite, UA Negative     Urobilinogen, UA 1.0 E.U./dL    Narrative:      In absence of clinical symptoms, the presence of pyuria, bacteria, and/or nitrites on the urinalysis result does not correlate with infection.    Urine Culture - Urine, Urine, Clean Catch [[id number]] Collected: 03/09/23 0941    Specimen: Urine, Clean Catch Updated: 03/09/23 1030    Comprehensive Metabolic Panel [[id number]]  (Abnormal) Collected: 03/09/23 0900    Specimen: Blood Updated: 03/09/23 0952     Glucose 109 mg/dL      BUN 27 mg/dL      Creatinine 2.08 mg/dL      Sodium 135 mmol/L      Potassium 3.6 mmol/L      Chloride 102 mmol/L      CO2 22.0 mmol/L      Calcium 8.5 mg/dL      Total Protein 7.5 g/dL      Albumin 3.2 g/dL      ALT (SGPT) 17 U/L      AST (SGOT) 57 U/L      Alkaline Phosphatase 112 U/L      Total Bilirubin 2.2 mg/dL      Globulin 4.3 gm/dL      A/G Ratio 0.7 g/dL      BUN/Creatinine Ratio 13.0     Anion Gap 11.0 mmol/L      eGFR 31.2 mL/min/1.73     Narrative:      GFR Normal >60  Chronic Kidney Disease <60  Kidney Failure <15    The GFR formula is only valid for adults with stable renal function between ages 18 and 70.    CK [[id number]]  (Abnormal) Collected: 03/09/23 0900    Specimen: Blood Updated: 03/09/23 0951     Creatine Kinase 1,560 U/L     CBC & Differential [[id number]]  (Abnormal) Collected: 03/09/23 0900    Specimen: Blood Updated: 03/09/23 0915    Narrative:      The following orders were created for panel order CBC & Differential.  Procedure                               Abnormality         Status                     ---------                               -----------         ------                     CBC Auto Differential[[id number]]        Abnormal            Final result                 Please view results for these tests on the individual orders.    CBC Auto Differential [[id number]]   (Abnormal) Collected: 03/09/23 0900    Specimen: Blood Updated: 03/09/23 0915     WBC 9.91 10*3/mm3      RBC 3.87 10*6/mm3      Hemoglobin 11.8 g/dL      Hematocrit 36.2 %      MCV 93.5 fL      MCH 30.5 pg      MCHC 32.6 g/dL      RDW 14.1 %      RDW-SD 47.9 fl      MPV 10.6 fL      Platelets 72 10*3/mm3      Neutrophil % 87.5 %      Lymphocyte % 5.9 %      Monocyte % 5.8 %      Eosinophil % 0.0 %      Basophil % 0.1 %      Immature Grans % 0.7 %      Neutrophils, Absolute 8.68 10*3/mm3      Lymphocytes, Absolute 0.58 10*3/mm3      Monocytes, Absolute 0.57 10*3/mm3      Eosinophils, Absolute 0.00 10*3/mm3      Basophils, Absolute 0.01 10*3/mm3      Immature Grans, Absolute 0.07 10*3/mm3      nRBC 0.0 /100 WBC         Imaging Results (Last 24 Hours)     Procedure Component Value Units Date/Time    CT Lower Extremity Left Without Contrast [[id number]] Collected: 03/09/23 1123     Updated: 03/09/23 1131    Narrative:      EXAMINATION: CT of the left extremity without contrast 03/09/2023     HISTORY: Hip trauma. Fracture suspected.     DOSE: 270 mGycm. All CT scans are performed using dose optimization  techniques as appropriate to the performed exam and including at least  one of the following: Automated exposure control, adjustment of the mA  and/or kV according to size, and the use of the iterative reconstruction  technique..     FINDINGS: Multiple contiguous axial images are obtained through the left  hip per protocol without intravenous contrast-enhancement with  reformatted images obtained in the sagittal and coronal projections from  the original data set.     Facet hypertrophy as well as degenerative disc disease with disc  osteophyte complex at the lower 2 lumbar disc levels contributes to  central and foraminal stenosis. A bladder stone is noted within the  urinary bladder measuring 3.1 cm in size. No evidence of free fluid in  the pelvis. The prostate gland is mildly enlarged. No evidence of pelvic  sidewall  hematoma. A fat-containing left inguinal hernia is present.     There is mild stranding of subcutaneous fat overlying the left hip  suggesting some localized bruising. No discrete hematoma is identified.  There is arthritic change of the hip with narrowing of the joint space  and mild spurring. No fractures are present. There is no evidence of  AVN.       Impression:      1.. No evidence of acute fracture.  2. Bladder stone noted lying dependently within the bladder.  3. Disc osteophyte complex as well as facet overgrowth contributes to  central and foraminal stenosis in the lower lumbar spine.  This report was finalized on 03/09/2023 11:28 by Dr. Deonte Olivo MD.    XR Hip With or Without Pelvis 2 - 3 View Left [[id number]] Collected: 03/09/23 0919     Updated: 03/09/23 0925    Narrative:      EXAMINATION: XR HIP W OR WO PELVIS 2-3 VIEW LEFT- 3/9/2023 9:19 AM CST     HISTORY: Left hip pain.     REPORT: An AP view the pelvis, 2 separate views of the left hip were  obtained.     COMPARISON: X-rays of the pelvis and right hip 6/27/2018.     There is a 3.3 cm round bladder stone as before. Alignment of the hips  is normal. The hip joint spaces are preserved. There is narrowing of the  right SI joint with associated sclerosis, this appears stable. The right  SI joint may be partially fused. No fracture is identified. Dedicated  views of the left hip are unremarkable. The soft tissues are within  normal limits.       Impression:      No fracture, no acute osseous abnormality. 3.3 cm bladder  stone as before. Right-sided sacroiliitis, with sclerosis, this appears  stable.  This report was finalized on 03/09/2023 09:21 by Dr. Antonino Victor MD.        I have personally reviewed and interpreted the radiology studies and ECG obtained at time of admission.     Assessment / Plan   Assessment:   Active Hospital Problems    Diagnosis    • **FERNANDO (acute kidney injury) (HCC)    • Encephalopathy, metabolic    • Acute cystitis  with hematuria      Treatment Plan  The patient will be admitted to my service here at [facility].   Admit to medical floor  Vitals every 4 hours  Cardiac diet  IVF NS 75 cc/hour  Up with assistance, fall precautions  CBC/BMP daily  Continue Levaquin IVPB  Follow blood and urine cultures    Local wound care for feet dermatitis    DVT prophylaxis > Lovenox    Home medications reconciled    Medical Decision Making  Number and Complexity of problems: 3 complex problems  Differential Diagnosis: acute tubular necrosis    Conditions and Status        Condition is unchanged.     [facility] Data  External documents reviewed: Previous ER visits  Cardiac tracing (EKG, telemetry) interpretation: Sinus rhythm  Radiology interpretation: See above  Labs reviewed: See above  Any tests that were considered but not ordered: None     Decision rules/scores evaluated (example [id number], Wells, etc): N/A     Discussed with: Patient and daughter at bedside     Care Planning  Shared decision making: Patient and daughter  Code status and discussions: Full code per patient request    Disposition  Social Determinants of Health that impact treatment or disposition: Patient lives with daughter and remains independent  Estimated length of stay is over 2 midnights.     I confirmed that the patient's advanced care plan is present, code status is documented, and a surrogate decision maker is listed in the patient's medical record.     The patient's surrogate decision maker is daughter Miriam Lyle.     The patient was seen and examined by me on 03/09/2023 at 1250.    Electronically signed by Amandeep Nava MD, 03/09/23, 13:53 CST.

## 2023-03-09 NOTE — PROGRESS NOTES
"Pharmacy Dosing Service  Anticoagulant  Enoxaparin    Assessment/Action/Plan:  SCr = 2.08 estCrCl = 34 mL/min PLT = 72. Enoxaparin dose adjusted to 30 mg sq every 24h. Considering worsening renal function, advanced age and thrombocytopenia.      Subjective:  Manohar Garcia is a 82 y.o. male on Enoxaparin 30 mg SQ every 24 hours for indication of VTE prophylaxis.  Objective:  [Ht: 180.3 cm (71\"); Wt: 106 kg (234 lb); BMI: Body mass index is 32.64 kg/m².]  Estimated Creatinine Clearance: 33.9 mL/min (A) (by C-G formula based on SCr of 2.08 mg/dL (H)). No results found for: DDIMER   Lab Results   Component Value Date    INR 1.15 (H) 02/23/2023    INR 1.19 (H) 05/25/2016    PROTIME 14.9 (H) 02/23/2023    PROTIME 15.4 (H) 05/25/2016      Lab Results   Component Value Date    HGB 11.8 (L) 03/09/2023    HGB 10.3 (L) 02/25/2023    HGB 11.1 (L) 02/23/2023      Lab Results   Component Value Date    PLT 72 (L) 03/09/2023    PLT 84 (L) 02/25/2023     (L) 02/23/2023       Armando Tomas, PharmD  03/09/23 15:30 CST     "

## 2023-03-09 NOTE — ED PROVIDER NOTES
Subjective   History of Present Illness  Patient is a 82-year-old male with a history of dementia who presents to the ER with left hip pain.  Patient states he got out of his bed last night and then went to get back into the bed but could not get into the bed.  Patient states he is unsure why he could not get back into the bed.  He denied any weakness or pain.  Patient states he decided to just lay on the floor until someone found him this morning.  Patient does complain of left hip pain.  EMS states the patient fell but the patient denies falling.  He denies any fever, chest pain, shortness of air, abdominal pain, nausea vomiting diarrhea, urinary changes, neurologic changes, neck or back pain, other extremity pain, cough.        Review of Systems   Constitutional: Negative.    HENT: Negative.    Eyes: Negative.    Respiratory: Negative.    Cardiovascular: Negative.    Gastrointestinal: Negative.    Endocrine: Negative.    Genitourinary: Negative.    Musculoskeletal: Positive for arthralgias.   Skin: Negative.    Allergic/Immunologic: Negative.    Neurological: Negative.    Hematological: Negative.    Psychiatric/Behavioral: Negative.    All other systems reviewed and are negative.      Past Medical History:   Diagnosis Date   • Dementia (HCC)    • GERD (gastroesophageal reflux disease)    • Liver disorder        Allergies   Allergen Reactions   • Contrast Dye (Echo Or Unknown Ct/Mr)    • Penicillins        Past Surgical History:   Procedure Laterality Date   • HEMORRHOIDECTOMY     • HERNIA REPAIR         History reviewed. No pertinent family history.    Social History     Socioeconomic History   • Marital status:    Tobacco Use   • Smoking status: Never   Substance and Sexual Activity   • Alcohol use: No   • Drug use: No   • Sexual activity: Never           Objective   Physical Exam  Vitals and nursing note reviewed.   Constitutional:       Appearance: He is well-developed.   HENT:      Head: Normocephalic  and atraumatic.   Eyes:      Conjunctiva/sclera: Conjunctivae normal.      Pupils: Pupils are equal, round, and reactive to light.   Cardiovascular:      Rate and Rhythm: Normal rate and regular rhythm.      Heart sounds: Normal heart sounds.   Pulmonary:      Effort: Pulmonary effort is normal.      Breath sounds: Normal breath sounds.   Abdominal:      Palpations: Abdomen is soft.      Tenderness: There is no abdominal tenderness.   Musculoskeletal:         General: No deformity. Normal range of motion.      Cervical back: Normal range of motion.      Comments: Nontender to palpation throughout, normal range of motion, neurovascular intact, pulses 2+   Skin:     General: Skin is warm.   Neurological:      Mental Status: He is alert. Mental status is at baseline. He is confused.      Cranial Nerves: Cranial nerves 2-12 are intact.      Sensory: Sensation is intact.      Motor: Motor function is intact.      Coordination: Coordination is intact.   Psychiatric:         Behavior: Behavior normal.         Procedures           ED Course      EKG: Normal sinus rhythm with a rate of 70, incomplete right bundle branch block, no acute ischemia or infarction     Lab Results (last 24 hours)     Procedure Component Value Units Date/Time    CBC & Differential [[id number]]  (Abnormal) Collected: 03/09/23 0900    Specimen: Blood Updated: 03/09/23 0915    Narrative:      The following orders were created for panel order CBC & Differential.  Procedure                               Abnormality         Status                     ---------                               -----------         ------                     CBC Auto Differential[[id number]]        Abnormal            Final result                 Please view results for these tests on the individual orders.    Comprehensive Metabolic Panel [[id number]]  (Abnormal) Collected: 03/09/23 0900    Specimen: Blood Updated: 03/09/23 0952     Glucose 109 mg/dL      BUN 27 mg/dL       Creatinine 2.08 mg/dL      Sodium 135 mmol/L      Potassium 3.6 mmol/L      Chloride 102 mmol/L      CO2 22.0 mmol/L      Calcium 8.5 mg/dL      Total Protein 7.5 g/dL      Albumin 3.2 g/dL      ALT (SGPT) 17 U/L      AST (SGOT) 57 U/L      Alkaline Phosphatase 112 U/L      Total Bilirubin 2.2 mg/dL      Globulin 4.3 gm/dL      A/G Ratio 0.7 g/dL      BUN/Creatinine Ratio 13.0     Anion Gap 11.0 mmol/L      eGFR 31.2 mL/min/1.73     Narrative:      GFR Normal >60  Chronic Kidney Disease <60  Kidney Failure <15    The GFR formula is only valid for adults with stable renal function between ages 18 and 70.    CK [[id number]]  (Abnormal) Collected: 03/09/23 0900    Specimen: Blood Updated: 03/09/23 0951     Creatine Kinase 1,560 U/L     CBC Auto Differential [[id number]]  (Abnormal) Collected: 03/09/23 0900    Specimen: Blood Updated: 03/09/23 0915     WBC 9.91 10*3/mm3      RBC 3.87 10*6/mm3      Hemoglobin 11.8 g/dL      Hematocrit 36.2 %      MCV 93.5 fL      MCH 30.5 pg      MCHC 32.6 g/dL      RDW 14.1 %      RDW-SD 47.9 fl      MPV 10.6 fL      Platelets 72 10*3/mm3      Neutrophil % 87.5 %      Lymphocyte % 5.9 %      Monocyte % 5.8 %      Eosinophil % 0.0 %      Basophil % 0.1 %      Immature Grans % 0.7 %      Neutrophils, Absolute 8.68 10*3/mm3      Lymphocytes, Absolute 0.58 10*3/mm3      Monocytes, Absolute 0.57 10*3/mm3      Eosinophils, Absolute 0.00 10*3/mm3      Basophils, Absolute 0.01 10*3/mm3      Immature Grans, Absolute 0.07 10*3/mm3      nRBC 0.0 /100 WBC     Urinalysis With Culture If Indicated - Urine, Clean Catch [[id number]]  (Abnormal) Collected: 03/09/23 0941    Specimen: Urine, Clean Catch Updated: 03/09/23 1030     Color, UA Dark Yellow     Appearance, UA Turbid     pH, UA 6.0     Specific Gravity, UA 1.014     Glucose, UA Negative     Ketones, UA Negative     Bilirubin, UA Negative     Blood, UA Large (3+)     Protein,  mg/dL (2+)     Leuk Esterase, UA Large (3+)     Nitrite, UA  Negative     Urobilinogen, UA 1.0 E.U./dL    Narrative:      In absence of clinical symptoms, the presence of pyuria, bacteria, and/or nitrites on the urinalysis result does not correlate with infection.    Urinalysis, Microscopic Only - Urine, Clean Catch [[id number]]  (Abnormal) Collected: 03/09/23 0941    Specimen: Urine, Clean Catch Updated: 03/09/23 1030     RBC, UA       Unable to determine due to loaded field     /HPF     WBC, UA Too Numerous to Count /HPF      Bacteria, UA 4+ /HPF      Squamous Epithelial Cells, UA 3-6 /HPF      Hyaline Casts, UA None Seen /LPF      Methodology Manual Light Microscopy    Urine Culture - Urine, Urine, Clean Catch [[id number]] Collected: 03/09/23 0941    Specimen: Urine, Clean Catch Updated: 03/09/23 1030    Lactic Acid, Plasma [[id number]]  (Abnormal) Collected: 03/09/23 1134    Specimen: Blood Updated: 03/09/23 1215     Lactate 2.1 mmol/L     Blood Culture - Blood, Hand, Digit Right [[id number]] Collected: 03/09/23 1134    Specimen: Blood from Hand, Digit Right Updated: 03/09/23 1150    Blood Culture - Blood, Arm, Left [[id number]] Collected: 03/09/23 1134    Specimen: Blood from Arm, Left Updated: 03/09/23 1151        CT Lower Extremity Left Without Contrast   Final Result   1.. No evidence of acute fracture.   2. Bladder stone noted lying dependently within the bladder.   3. Disc osteophyte complex as well as facet overgrowth contributes to   central and foraminal stenosis in the lower lumbar spine.   This report was finalized on 03/09/2023 11:28 by Dr. Deonte Olivo MD.      XR Hip With or Without Pelvis 2 - 3 View Left   Final Result   No fracture, no acute osseous abnormality. 3.3 cm bladder   stone as before. Right-sided sacroiliitis, with sclerosis, this appears   stable.   This report was finalized on 03/09/2023 09:21 by Dr. Antonino Victor MD.                                      Medical Decision Making  Patient is a 82-year-old male with a history of dementia who  presents to the ER with left hip pain.  Patient states he got out of his bed last night and then went to get back into the bed but could not get into the bed.  Patient states he is unsure why he could not get back into the bed.  He denied any weakness or pain.  Patient states he decided to just lay on the floor until someone found him this morning.  Patient does complain of left hip pain.  EMS states the patient fell but the patient denies falling.  He denies any fever, chest pain, shortness of air, abdominal pain, nausea vomiting diarrhea, urinary changes, neurologic changes, neck or back pain, other extremity pain, cough.    Differential diagnosis: Hip fracture, hip contusion, rhabdomyolysis, sepsis    Labs showed an elevated BUN and creatinine consistent with acute kidney injury.  Also showed elevated bilirubin and AST as well as an elevated CK.  Labs also showed a UTI with hematuria and thrombocytopenia.  Cultures were obtained.  Patient was given a dose of Levaquin.  He does have a penicillin allergy.  Patient was also given IV fluids.  X-rays of the left hip showed no fracture.  Patient did have a 3.3 cm bladder stone and right-sided sacroiliitis with sclerosis.  CT of the left lower extremity was completed to rule out fracture.  There was no evidence of acute fracture.  Patient did have a bladder stone.  There is also disc osteophyte complex at the lower lumbar spine.  I then discussed the patient with Dr. Branch with the hospitalist group.  Patient will be admitted to the hospitalist group by Dr. Nava for acute kidney injury with a UTI and rhabdomyolysis.    Acute UTI: acute illness or injury  FERNANDO (acute kidney injury) (HCC): acute illness or injury  Thrombocytopenia (HCC): acute illness or injury  Traumatic rhabdomyolysis, initial encounter (HCC): acute illness or injury  Amount and/or Complexity of Data Reviewed  Labs: ordered. Decision-making details documented in ED Course.  Radiology: ordered.  Decision-making details documented in ED Course.  ECG/medicine tests: ordered. Decision-making details documented in ED Course.  Discussion of management or test interpretation with external provider(s): Dr Jose Carlos Patel  Prescription drug management.  Decision regarding hospitalization.          Final diagnoses:   FERNANDO (acute kidney injury) ([facility])   Acute UTI   Thrombocytopenia (HCC)   Traumatic rhabdomyolysis, initial encounter ([facility])       ED Disposition  ED Disposition     ED Disposition   Decision to Admit    Condition   --    Comment   Level of Care: Med/Surg [1]   Diagnosis: FERNANDO (acute kidney injury) ([facility]) [830424]   Admitting Physician: DUNCAN RING [[id number]]   Attending Physician: DUNCAN RING [[id number]]   Certification: I Certify That Inpatient Hospital Services Are Medically Necessary For Greater Than 2 Midnights               No follow-up provider specified.       Medication List      No changes were made to your prescriptions during this visit.          Erika Leary MD  03/09/23 6800

## 2023-03-10 ENCOUNTER — APPOINTMENT (OUTPATIENT)
Dept: GENERAL RADIOLOGY | Facility: HOSPITAL | Age: 83
DRG: 683 | End: 2023-03-10
Payer: MEDICARE

## 2023-03-10 ENCOUNTER — APPOINTMENT (OUTPATIENT)
Dept: CT IMAGING | Facility: HOSPITAL | Age: 83
DRG: 683 | End: 2023-03-10
Payer: MEDICARE

## 2023-03-10 PROBLEM — E87.20 HYPERLACTATEMIA: Status: ACTIVE | Noted: 2023-03-10

## 2023-03-10 PROBLEM — R78.81 GRAM-NEGATIVE BACTEREMIA: Status: ACTIVE | Noted: 2023-03-10

## 2023-03-10 PROBLEM — D69.6 THROMBOCYTOPENIA (HCC): Status: ACTIVE | Noted: 2023-03-10

## 2023-03-10 PROBLEM — K74.60 LIVER CIRRHOSIS SECONDARY TO NASH (HCC): Status: ACTIVE | Noted: 2023-03-10

## 2023-03-10 PROBLEM — N18.31 STAGE 3A CHRONIC KIDNEY DISEASE: Status: ACTIVE | Noted: 2023-03-10

## 2023-03-10 PROBLEM — E66.9 OBESITY (BMI 30-39.9): Status: ACTIVE | Noted: 2023-03-10

## 2023-03-10 PROBLEM — K75.81 LIVER CIRRHOSIS SECONDARY TO NASH: Status: ACTIVE | Noted: 2023-03-10

## 2023-03-10 LAB
ANION GAP SERPL CALCULATED.3IONS-SCNC: 10 MMOL/L (ref 5–15)
BACTERIA BLD CULT: ABNORMAL
BASOPHILS # BLD AUTO: 0.01 10*3/MM3 (ref 0–0.2)
BASOPHILS NFR BLD AUTO: 0.2 % (ref 0–1.5)
BOTTLE TYPE: ABNORMAL
BUN SERPL-MCNC: 36 MG/DL (ref 8–23)
BUN/CREAT SERPL: 16 (ref 7–25)
CALCIUM SPEC-SCNC: 8.1 MG/DL (ref 8.6–10.5)
CHLORIDE SERPL-SCNC: 102 MMOL/L (ref 98–107)
CK SERPL-CCNC: 1242 U/L (ref 20–200)
CO2 SERPL-SCNC: 20 MMOL/L (ref 22–29)
CREAT SERPL-MCNC: 2.25 MG/DL (ref 0.76–1.27)
DEPRECATED RDW RBC AUTO: 48.6 FL (ref 37–54)
EGFRCR SERPLBLD CKD-EPI 2021: 28.4 ML/MIN/1.73
EOSINOPHIL # BLD AUTO: 0.01 10*3/MM3 (ref 0–0.4)
EOSINOPHIL NFR BLD AUTO: 0.2 % (ref 0.3–6.2)
ERYTHROCYTE [DISTWIDTH] IN BLOOD BY AUTOMATED COUNT: 14.2 % (ref 12.3–15.4)
GLUCOSE SERPL-MCNC: 126 MG/DL (ref 65–99)
HCT VFR BLD AUTO: 31.7 % (ref 37.5–51)
HGB BLD-MCNC: 10.5 G/DL (ref 13–17.7)
IMM GRANULOCYTES # BLD AUTO: 0.03 10*3/MM3 (ref 0–0.05)
IMM GRANULOCYTES NFR BLD AUTO: 0.6 % (ref 0–0.5)
LYMPHOCYTES # BLD AUTO: 0.61 10*3/MM3 (ref 0.7–3.1)
LYMPHOCYTES NFR BLD AUTO: 11.4 % (ref 19.6–45.3)
MCH RBC QN AUTO: 30.7 PG (ref 26.6–33)
MCHC RBC AUTO-ENTMCNC: 33.1 G/DL (ref 31.5–35.7)
MCV RBC AUTO: 92.7 FL (ref 79–97)
MONOCYTES # BLD AUTO: 0.53 10*3/MM3 (ref 0.1–0.9)
MONOCYTES NFR BLD AUTO: 9.9 % (ref 5–12)
NEUTROPHILS NFR BLD AUTO: 4.15 10*3/MM3 (ref 1.7–7)
NEUTROPHILS NFR BLD AUTO: 77.7 % (ref 42.7–76)
NRBC BLD AUTO-RTO: 0 /100 WBC (ref 0–0.2)
PLATELET # BLD AUTO: 65 10*3/MM3 (ref 140–450)
PMV BLD AUTO: 10.7 FL (ref 6–12)
POTASSIUM SERPL-SCNC: 3.4 MMOL/L (ref 3.5–5.2)
RBC # BLD AUTO: 3.42 10*6/MM3 (ref 4.14–5.8)
SODIUM SERPL-SCNC: 132 MMOL/L (ref 136–145)
WBC NRBC COR # BLD: 5.34 10*3/MM3 (ref 3.4–10.8)

## 2023-03-10 PROCEDURE — 73560 X-RAY EXAM OF KNEE 1 OR 2: CPT

## 2023-03-10 PROCEDURE — 74176 CT ABD & PELVIS W/O CONTRAST: CPT

## 2023-03-10 PROCEDURE — 25010000002 ENOXAPARIN PER 10 MG: Performed by: FAMILY MEDICINE

## 2023-03-10 PROCEDURE — 80048 BASIC METABOLIC PNL TOTAL CA: CPT | Performed by: FAMILY MEDICINE

## 2023-03-10 PROCEDURE — 25010000002 LEVOFLOXACIN PER 250 MG: Performed by: FAMILY MEDICINE

## 2023-03-10 PROCEDURE — 85025 COMPLETE CBC W/AUTO DIFF WBC: CPT | Performed by: FAMILY MEDICINE

## 2023-03-10 PROCEDURE — 82550 ASSAY OF CK (CPK): CPT | Performed by: INTERNAL MEDICINE

## 2023-03-10 RX ORDER — ONDANSETRON 2 MG/ML
4 INJECTION INTRAMUSCULAR; INTRAVENOUS EVERY 6 HOURS PRN
Status: DISCONTINUED | OUTPATIENT
Start: 2023-03-10 | End: 2023-03-13 | Stop reason: HOSPADM

## 2023-03-10 RX ADMIN — SODIUM CHLORIDE 100 ML/HR: 9 INJECTION, SOLUTION INTRAVENOUS at 20:28

## 2023-03-10 RX ADMIN — MUPIROCIN 1 APPLICATION: 20 OINTMENT TOPICAL at 20:28

## 2023-03-10 RX ADMIN — ACETAMINOPHEN 325 MG: 325 TABLET, FILM COATED ORAL at 12:57

## 2023-03-10 RX ADMIN — LEVOFLOXACIN 250 MG: 250 INJECTION, SOLUTION INTRAVENOUS at 12:02

## 2023-03-10 RX ADMIN — FAMOTIDINE 20 MG: 20 TABLET, FILM COATED ORAL at 08:22

## 2023-03-10 RX ADMIN — ENOXAPARIN SODIUM 30 MG: 100 INJECTION SUBCUTANEOUS at 15:06

## 2023-03-10 RX ADMIN — MUPIROCIN 1 APPLICATION: 20 OINTMENT TOPICAL at 08:23

## 2023-03-10 NOTE — CASE MANAGEMENT/SOCIAL WORK
Discharge Planning Assessment  [facility]     Patient Name: Manohar Garcia  MRN: [id number]  Today's Date: 3/10/2023    Admit Date: 3/9/2023        Discharge Needs Assessment     Row Name 03/10/23 1551       Living Environment    People in Home child(hayley), adult    Name(s) of People in Home Miriam    Current Living Arrangements home    Primary Care Provided by self    Provides Primary Care For no one    Family Caregiver if Needed child(hayley), adult    Quality of Family Relationships helpful;involved;supportive       Resource/Environmental Concerns    Resource/Environmental Concerns none       Transition Planning    Patient/Family Anticipates Transition to home with help/services;inpatient rehabilitation facility    Patient/Family Anticipated Services at Transition home health care;skilled nursing    Transportation Anticipated family or friend will provide       Discharge Needs Assessment    Readmission Within the Last 30 Days no previous admission in last 30 days    Equipment Currently Used at Home none    Concerns to be Addressed adjustment to diagnosis/illness;care coordination/care conferences;discharge planning    Anticipated Changes Related to Illness inability to care for self    Outpatient/Agency/Support Group Needs homecare agency;skilled nursing facility    Discharge Facility/Level of Care Needs nursing facility, skilled;home with home health    Discharge Coordination/Progress Spoke with pt's daughter Miriam about d/c plans. Pt lives at home with her. She would like to take him home at d/c if possible. However, pt feels he will not be able to walk and take care of himself at home. Therapy has been ordered so will follow to see how he does with therapy to better determine if pt will need snf vs home health.               Discharge Plan    No documentation.               Continued Care and Services - Admitted Since 3/9/2023    Coordination has not been started for this encounter.       Expected Discharge  Date and Time     Expected Discharge Date Expected Discharge Time    Mar 12, 2023          Demographic Summary    No documentation.                Functional Status    No documentation.                Psychosocial    No documentation.                Abuse/Neglect    No documentation.                Legal    No documentation.                Substance Abuse    No documentation.                Patient Forms    No documentation.                   DIANNA Lr

## 2023-03-10 NOTE — PLAN OF CARE
Goal Outcome Evaluation:  Plan of Care Reviewed With: patient        Progress: no change  Outcome Evaluation: Pt c/o right knee pain, PRN meds given see MAR. IVF, IV abx, tolerating diet. Lovenox for VTE, voiding per urinal. VSS, safety maintained

## 2023-03-10 NOTE — PROGRESS NOTES
[facility] Medicine Services  INPATIENT PROGRESS NOTE    Patient Name: Manohar Garcia  Date of Admission: 3/9/2023  Today's Date: 03/10/23  Length of Stay: 1  Primary Care Physician: Provider, No Known    Subjective   Chief Complaint: Urinary tract infection.  HPI   He states that he does not have a regular doctor.  He states that he lives with his daughter.    He is admitted for a urinary tract infection and FERNANDO.  I am concerned for a possible urinary obstruction so going to do CT stone protocol this afternoon.    His main and only complaint is problems with right knee pain.  I asked him if he injured that in his recent fall and he told me that he does not think so.  Dr. Leary in the ER imaged his left lower extremity with CT imaging yesterday with no evidence of fracture.  No imaging has been done of his right knee thus far.    Review of Systems   All pertinent negatives and positives are as above. All other systems have been reviewed and are negative unless otherwise stated.     Objective    Temp:  [97.8 °F (36.6 °C)-99.4 °F (37.4 °C)] 98.4 °F (36.9 °C)  Heart Rate:  [67-87] 71  Resp:  [18] 18  BP: (104-151)/(54-93) 128/64  Physical Exam  Constitutional:       Comments: Up in bed.  No distress.  No family present.  Discussed with his nurse, Leana.   HENT:      Head: Normocephalic and atraumatic.   Eyes:      Conjunctiva/sclera: Conjunctivae normal.      Pupils: Pupils are equal, round, and reactive to light.   Neck:      Vascular: No JVD.   Cardiovascular:      Rate and Rhythm: Normal rate and regular rhythm.      Heart sounds: Normal heart sounds.   Pulmonary:      Effort: Pulmonary effort is normal. No respiratory distress.      Breath sounds: Normal breath sounds. No wheezing or rales.   Chest:      Chest wall: No tenderness.   Abdominal:      General: Bowel sounds are normal. There is distension.      Palpations: Abdomen is soft.      Tenderness: There is no abdominal  tenderness.   Musculoskeletal:         General: Tenderness (right knee) present. No deformity.      Cervical back: Neck supple.      Comments: Small abrasion to his right knee.  No effusion or discernible injury.   Skin:     General: Skin is warm and dry.      Findings: No rash.   Neurological:      General: No focal deficit present.      Mental Status: He is alert and oriented to person, place, and time.      Cranial Nerves: No cranial nerve deficit.      Motor: Weakness present. No abnormal muscle tone.      Deep Tendon Reflexes: Reflexes normal.      Comments: He is oriented, but also pleasantly confused.  Unclear baseline.  He makes some movements with his mouth and tongue that are reminiscent of tardive dyskinesia.   Psychiatric:      Comments: Flat affect, but conversational.       Results Review:  I have reviewed the labs, radiology results, and diagnostic studies.    Laboratory Data:   Results from last 7 days   Lab Units 03/10/23  0426 03/09/23  0900   WBC 10*3/mm3 5.34 9.91   HEMOGLOBIN g/dL 10.5* 11.8*   HEMATOCRIT % 31.7* 36.2*   PLATELETS 10*3/mm3 65* 72*        Results from last 7 days   Lab Units 03/10/23  0426 03/09/23  0900   SODIUM mmol/L 132* 135*   POTASSIUM mmol/L 3.4* 3.6   CHLORIDE mmol/L 102 102   CO2 mmol/L 20.0* 22.0   BUN mg/dL 36* 27*   CREATININE mg/dL 2.25* 2.08*   CALCIUM mg/dL 8.1* 8.5*   BILIRUBIN mg/dL  --  2.2*   ALK PHOS U/L  --  112   ALT (SGPT) U/L  --  17   AST (SGOT) U/L  --  57*   GLUCOSE mg/dL 126* 109*     Culture Data:   Blood Culture   Date Value Ref Range Status   03/09/2023 Abnormal Stain (C)  Preliminary   03/09/2023 No growth at 24 hours  Preliminary     Urine Culture   Date Value Ref Range Status   03/09/2023 >100,000 CFU/mL Gram Negative Bacilli (A)  Preliminary     I have reviewed the patient's current medications.     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 Nava.  He was evaluated in the emergency department after a fall, worsening weakness above his baseline, mild confusion, frequency/dysuria.  He lives with his daughter.  He typically is independent of ADLs.  Work-up in the emergency department was consistent with a urinary tract infection.  He had a recent diagnosis of a urinary tract infection as an outpatient and recently completed a course of Omnicef.    His outpatient urine culture from 2/23 grew alphahemolytic Streptococcus.  A urine culture done in the emergency department on 3/9 is growing gram-negative bacilli.  He also has gram-negative bacilli on 1/4 bottles of blood cultures drawn in the ER.  He is currently receiving levofloxacin.    He had a slight hyperlactatemia that resolved.    He has a large blood on his urinalysis.  He may have a urinary obstruction. His renal function is worse today.  Creatinine has gone from 2.08 at presentation and is now 2.25.  He does have a history of chronic kidney disease, stage IIIa.  His baseline serum creatinine appears to be on the order of 1.4.  I would like to obtain a CT of the abdomen and pelvis without contrast today.  Continue IV fluids and supportive care.  Avoid nephrotoxins.    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    Reconciled and resumed home medications as appropriate.  He does not routinely see a physician and has multiple chronic medical problems which are likely not managed appropriately at this point in time.    We will image his right knee and then consider PT/OT.    Lovenox for DVT prophylaxis.    Medical Decision Making  Number and Complexity of problems: 1 acute, moderately complex problem in the  form of his acute cystitis with hematuria.  He also has an FERNANDO superimposed on CKD.  Concerned that these issues are linked with a possible urinary obstruction.  Differential Diagnosis: Possible urinary obstruction.    Conditions and Status        Condition is worsening.     [facility] Data  External documents reviewed: Reviewed prior [facility] records.  Cardiac tracing (EKG, telemetry) interpretation: NSR.  Radiology interpretation: Already interpreted by radiology.  Labs reviewed: As above.  Any tests that were considered but not ordered: None considered at present.     Decision rules/scores evaluated (example [id number], Wells, etc): None considered at present.     Discussed with: The patient and his nurse, Leana.     Care Planning  Shared decision making: Patient consents to ongoing admission for further work-up and treatment.  Code status and discussions: Full with full interventions.     Disposition  Social Determinants of Health that impact treatment or disposition: Nothing negative identified at present.   I expect the patient to be discharged to home in 2-3 days.     Electronically signed by Javier Paulson DO, 03/10/23, 12:17 CST.

## 2023-03-10 NOTE — PLAN OF CARE
Goal Outcome Evaluation:           Progress: no change       IVF. Bladder scan 437, in and out cath drained 450 mls. R foot abrasion MAMADOU. No distress noted.

## 2023-03-11 PROBLEM — B96.5 BACTEREMIA DUE TO PSEUDOMONAS: Status: ACTIVE | Noted: 2023-03-10

## 2023-03-11 LAB
ALBUMIN SERPL-MCNC: 2.4 G/DL (ref 3.5–5.2)
ALBUMIN/GLOB SERPL: 0.8 G/DL
ALP SERPL-CCNC: 85 U/L (ref 39–117)
ALT SERPL W P-5'-P-CCNC: 22 U/L (ref 1–41)
AMMONIA BLD-SCNC: 52 UMOL/L (ref 16–60)
ANION GAP SERPL CALCULATED.3IONS-SCNC: 9 MMOL/L (ref 5–15)
AST SERPL-CCNC: 72 U/L (ref 1–40)
BACTERIA SPEC AEROBE CULT: ABNORMAL
BILIRUB SERPL-MCNC: 1.2 MG/DL (ref 0–1.2)
BUN SERPL-MCNC: 32 MG/DL (ref 8–23)
BUN/CREAT SERPL: 18.1 (ref 7–25)
CALCIUM SPEC-SCNC: 7.7 MG/DL (ref 8.6–10.5)
CHLORIDE SERPL-SCNC: 107 MMOL/L (ref 98–107)
CO2 SERPL-SCNC: 19 MMOL/L (ref 22–29)
CREAT SERPL-MCNC: 1.77 MG/DL (ref 0.76–1.27)
DEPRECATED RDW RBC AUTO: 48.5 FL (ref 37–54)
EGFRCR SERPLBLD CKD-EPI 2021: 37.9 ML/MIN/1.73
ERYTHROCYTE [DISTWIDTH] IN BLOOD BY AUTOMATED COUNT: 14 % (ref 12.3–15.4)
GLOBULIN UR ELPH-MCNC: 2.9 GM/DL
GLUCOSE SERPL-MCNC: 100 MG/DL (ref 65–99)
HCT VFR BLD AUTO: 31.9 % (ref 37.5–51)
HGB BLD-MCNC: 10.1 G/DL (ref 13–17.7)
MCH RBC QN AUTO: 29.9 PG (ref 26.6–33)
MCHC RBC AUTO-ENTMCNC: 31.7 G/DL (ref 31.5–35.7)
MCV RBC AUTO: 94.4 FL (ref 79–97)
PLATELET # BLD AUTO: 65 10*3/MM3 (ref 140–450)
PMV BLD AUTO: 10.9 FL (ref 6–12)
POTASSIUM SERPL-SCNC: 3.3 MMOL/L (ref 3.5–5.2)
PROT SERPL-MCNC: 5.3 G/DL (ref 6–8.5)
QT INTERVAL: 446 MS
QTC INTERVAL: 481 MS
RBC # BLD AUTO: 3.38 10*6/MM3 (ref 4.14–5.8)
SODIUM SERPL-SCNC: 135 MMOL/L (ref 136–145)
WBC NRBC COR # BLD: 3.7 10*3/MM3 (ref 3.4–10.8)

## 2023-03-11 PROCEDURE — 82140 ASSAY OF AMMONIA: CPT | Performed by: INTERNAL MEDICINE

## 2023-03-11 PROCEDURE — 25010000002 ENOXAPARIN PER 10 MG: Performed by: FAMILY MEDICINE

## 2023-03-11 PROCEDURE — 25010000002 LEVOFLOXACIN PER 250 MG: Performed by: INTERNAL MEDICINE

## 2023-03-11 PROCEDURE — 80053 COMPREHEN METABOLIC PANEL: CPT | Performed by: INTERNAL MEDICINE

## 2023-03-11 PROCEDURE — 85027 COMPLETE CBC AUTOMATED: CPT | Performed by: INTERNAL MEDICINE

## 2023-03-11 RX ORDER — LEVOFLOXACIN 250 MG/1
250 TABLET ORAL EVERY 24 HOURS
Status: DISCONTINUED | OUTPATIENT
Start: 2023-03-12 | End: 2023-03-13 | Stop reason: HOSPADM

## 2023-03-11 RX ORDER — TAMSULOSIN HYDROCHLORIDE 0.4 MG/1
0.4 CAPSULE ORAL DAILY
Status: DISCONTINUED | OUTPATIENT
Start: 2023-03-11 | End: 2023-03-13 | Stop reason: HOSPADM

## 2023-03-11 RX ORDER — POTASSIUM CHLORIDE 750 MG/1
40 CAPSULE, EXTENDED RELEASE ORAL ONCE
Status: COMPLETED | OUTPATIENT
Start: 2023-03-11 | End: 2023-03-11

## 2023-03-11 RX ADMIN — LEVOFLOXACIN 250 MG: 250 INJECTION, SOLUTION INTRAVENOUS at 11:30

## 2023-03-11 RX ADMIN — ENOXAPARIN SODIUM 30 MG: 100 INJECTION SUBCUTANEOUS at 13:29

## 2023-03-11 RX ADMIN — TAMSULOSIN HYDROCHLORIDE 0.4 MG: 0.4 CAPSULE ORAL at 11:31

## 2023-03-11 RX ADMIN — POTASSIUM CHLORIDE 40 MEQ: 10 CAPSULE, COATED, EXTENDED RELEASE ORAL at 10:27

## 2023-03-11 RX ADMIN — SODIUM CHLORIDE 100 ML/HR: 9 INJECTION, SOLUTION INTRAVENOUS at 08:31

## 2023-03-11 RX ADMIN — FAMOTIDINE 20 MG: 20 TABLET, FILM COATED ORAL at 08:32

## 2023-03-11 RX ADMIN — SODIUM BICARBONATE: 84 INJECTION INTRAVENOUS at 10:27

## 2023-03-11 RX ADMIN — MUPIROCIN 1 APPLICATION: 20 OINTMENT TOPICAL at 08:32

## 2023-03-11 RX ADMIN — MUPIROCIN 1 APPLICATION: 20 OINTMENT TOPICAL at 22:27

## 2023-03-11 NOTE — PLAN OF CARE
Goal Outcome Evaluation:  Plan of Care Reviewed With: patient        Progress: no change  Outcome Evaluation: Pt resting well. IVF changed to sodium bicarb, IV abx changed to PO. Tolerating diet. Voiding per urinal. VSS, safety maintained

## 2023-03-11 NOTE — PROGRESS NOTES
Pharmacy Dosing Service  Automatic IV to PO Conversion  Levofloxacin    Assessment/Action/Plan:  Patient meets criteria listed below. levofloxacin 250 mg IV every 24 hours has been changed to 250 mg PO every 24 hours.     Subjective:  Manohar Garcia is a 82 y.o. male who meets the following criteria for IV to PO therapy conversion     Policy Criteria:  Tolerating oral fluids or 40ml/hour of enteral nutrition and oral route not otherwise compromised  Receiving other oral medications on a scheduled basis  Afebrile (temperature less than 100.4 degrees F) for at least 24 hours  WBC within/decreasing toward normal range    Additional Factors Considered:  Anti-emetic usage  Patient disposition per documentation  Disease states or conditions contraindicating oral conversion    Objective:  Lab Results   Component Value Date    WBC 3.70 03/11/2023     Temp Readings from Last 1 Encounters:   03/11/23 98.1 °F (36.7 °C) (Oral)     Diet Order   Procedures    Diet: Cardiac Diets; Healthy Heart (2-3 Na+); Texture: Regular Texture (IDDSI 7); Fluid Consistency: Thin (IDDSI 0)       Active Medications    Current Facility-Administered Medications:     acetaminophen (TYLENOL) tablet 650 mg, 650 mg, Oral, Q4H PRN, Amandeep Nava MD, 325 mg at 03/10/23 1257    bisacodyl (DULCOLAX) EC tablet 5 mg, 5 mg, Oral, Daily PRN, Amandeep Nava MD    Enoxaparin Sodium (LOVENOX) syringe 30 mg, 30 mg, Subcutaneous, Q24H, Amandeep Nava MD, 30 mg at 03/11/23 1329    famotidine (PEPCID) tablet 20 mg, 20 mg, Oral, Daily, Amandeep Nava MD, 20 mg at 03/11/23 0832    [START ON 3/12/2023] levoFLOXacin (LEVAQUIN) tablet 250 mg, 250 mg, Oral, Q24H, Javier Paulson DO    mupirocin (BACTROBAN) 2 % ointment 1 application, 1 application, Topical, BID, Amandeep Nava MD, 1 application at 03/11/23 0832    ondansetron (ZOFRAN) injection 4 mg, 4 mg, Intravenous, Q6H PRN, Paulson, Javier C, DO    sodium chloride  0.45 % 950 mL with sodium bicarbonate 8.4 % 50 mEq infusion, , Intravenous, Continuous, Javier Paulson DO, Last Rate: 75 mL/hr at 03/11/23 1027, New Bag at 03/11/23 1027    sodium chloride 0.9 % flush 10 mL, 10 mL, Intravenous, Q12H, Amandeep Nvaa MD, 10 mL at 03/09/23 1436    sodium chloride 0.9 % flush 10 mL, 10 mL, Intravenous, PRN, Amandeep Nava MD    sodium chloride 0.9 % infusion 40 mL, 40 mL, Intravenous, PRN, Amandeep Nava MD    tamsulosin (FLOMAX) 24 hr capsule 0.4 mg, 0.4 mg, Oral, Daily, Javier Paulson DO, 0.4 mg at 03/11/23 1131    Carlos Jim, PharmD  03/11/2316:23 CST

## 2023-03-11 NOTE — PLAN OF CARE
Problem: Adult Inpatient Plan of Care  Goal: Plan of Care Review  Outcome: Ongoing, Progressing  Flowsheets (Taken 3/11/2023 0625)  Progress: no change  Plan of Care Reviewed With: patient  Goal: Patient-Specific Goal (Individualized)  Outcome: Ongoing, Progressing  Goal: Absence of Hospital-Acquired Illness or Injury  Outcome: Ongoing, Progressing  Intervention: Identify and Manage Fall Risk  Recent Flowsheet Documentation  Taken 3/11/2023 0600 by Andrew Aguilar RN  Safety Promotion/Fall Prevention: safety round/check completed  Taken 3/11/2023 0400 by Andrew Aguilar RN  Safety Promotion/Fall Prevention: safety round/check completed  Taken 3/11/2023 0200 by Andrew Aguilar RN  Safety Promotion/Fall Prevention: safety round/check completed  Taken 3/11/2023 0000 by Andrew Aguilar RN  Safety Promotion/Fall Prevention: safety round/check completed  Taken 3/10/2023 2200 by Andrew Aguilar RN  Safety Promotion/Fall Prevention: safety round/check completed  Taken 3/10/2023 2000 by Andrew Aguilar RN  Safety Promotion/Fall Prevention: safety round/check completed  Intervention: Prevent Skin Injury  Recent Flowsheet Documentation  Taken 3/11/2023 0600 by Andrew Aguilar RN  Body Position: position changed independently  Taken 3/11/2023 0400 by Andrew Aguilar RN  Body Position: position changed independently  Taken 3/11/2023 0200 by Andrew Aguilar RN  Body Position: position changed independently  Taken 3/11/2023 0000 by Andrew Aguilar RN  Body Position: position changed independently  Taken 3/10/2023 2200 by Andrew Aguilar RN  Body Position: position changed independently  Taken 3/10/2023 2000 by Andrew Aguilar RN  Body Position: position changed independently  Intervention: Prevent and Manage VTE (Venous Thromboembolism) Risk  Recent Flowsheet Documentation  Taken 3/10/2023 2000 by Andrew Aguilar RN  Activity Management: activity adjusted per tolerance  VTE Prevention/Management: (Rx prophylaxis)  other (see comments)  Range of Motion: active ROM (range of motion) encouraged  Intervention: Prevent Infection  Recent Flowsheet Documentation  Taken 3/10/2023 2000 by Andrew Aguilar RN  Infection Prevention: rest/sleep promoted  Goal: Optimal Comfort and Wellbeing  Outcome: Ongoing, Progressing  Intervention: Monitor Pain and Promote Comfort  Recent Flowsheet Documentation  Taken 3/10/2023 2000 by Andrew Aguilar RN  Pain Management Interventions:   pain management plan reviewed with patient/caregiver   position adjusted   relaxation techniques promoted  Intervention: Provide Person-Centered Care  Recent Flowsheet Documentation  Taken 3/10/2023 2000 by Andrew Aguilar RN  Trust Relationship/Rapport:   questions encouraged   questions answered   care explained  Goal: Readiness for Transition of Care  Outcome: Ongoing, Progressing     Problem: Fall Injury Risk  Goal: Absence of Fall and Fall-Related Injury  Outcome: Ongoing, Progressing  Intervention: Identify and Manage Contributors  Recent Flowsheet Documentation  Taken 3/10/2023 2000 by Andrew Aguilar RN  Medication Review/Management: medications reviewed  Intervention: Promote Injury-Free Environment  Recent Flowsheet Documentation  Taken 3/11/2023 0600 by Andrew Aguilar RN  Safety Promotion/Fall Prevention: safety round/check completed  Taken 3/11/2023 0400 by Andrew Aguilar RN  Safety Promotion/Fall Prevention: safety round/check completed  Taken 3/11/2023 0200 by Andrew Aguilar RN  Safety Promotion/Fall Prevention: safety round/check completed  Taken 3/11/2023 0000 by Andrew Aguilar RN  Safety Promotion/Fall Prevention: safety round/check completed  Taken 3/10/2023 2200 by Andrew Aguilar RN  Safety Promotion/Fall Prevention: safety round/check completed  Taken 3/10/2023 2000 by Andrew Aguilar RN  Safety Promotion/Fall Prevention: safety round/check completed     Problem: Renal Function Impairment (Acute Kidney Injury/Impairment)  Goal:  Effective Renal Function  Outcome: Ongoing, Progressing  Intervention: Monitor and Support Renal Function  Recent Flowsheet Documentation  Taken 3/10/2023 2000 by Andrew Aguilar RN  Medication Review/Management: medications reviewed     Problem: Urinary Retention  Goal: Effective Urinary Elimination  Outcome: Ongoing, Progressing   Goal Outcome Evaluation:  Plan of Care Reviewed With: patient        Progress: no change

## 2023-03-11 NOTE — PROGRESS NOTES
[facility] Medicine Services  INPATIENT PROGRESS NOTE    Patient Name: Manohar Garcia  Date of Admission: 3/9/2023  Today's Date: 03/11/23  Length of Stay: 2  Primary Care Physician: Provider, No Known    Subjective   Chief Complaint: Urinary tract infection.  HPI   Ordered a Perkins catheter be placed yesterday evening based on the results of his CT.  However, the patient refused to have this placed.  He urinated better last night according to the nursing staff.  His serum creatinine is 1.77 this morning after being 2.25 yesterday.    Urine and blood cultures identify the pathogen as pan susceptible Pseudomonas.  He is already on levofloxacin.    No acute findings on plain films of the right knee.  Severe osteoarthritis.  No nonsteroidals given his renal problems.    He ate all of his breakfast this morning.  He is inquiring about when he might go home.  He wants a walker to take home.    Review of Systems   All pertinent negatives and positives are as above. All other systems have been reviewed and are negative unless otherwise stated.     Objective    Temp:  [98 °F (36.7 °C)-98.4 °F (36.9 °C)] 98.2 °F (36.8 °C)  Heart Rate:  [70-75] 73  Resp:  [18] 18  BP: (110-130)/(53-70) 130/70  Physical Exam  Constitutional:       Comments: Up in bed.  No distress.  No family present.  Discussed with his nurse, Leana.   HENT:      Head: Normocephalic and atraumatic.   Eyes:      Conjunctiva/sclera: Conjunctivae normal.      Pupils: Pupils are equal, round, and reactive to light.   Neck:      Vascular: No JVD.   Cardiovascular:      Rate and Rhythm: Normal rate and regular rhythm.      Heart sounds: Normal heart sounds.   Pulmonary:      Effort: Pulmonary effort is normal. No respiratory distress.      Breath sounds: Normal breath sounds. No wheezing or rales.   Chest:      Chest wall: No tenderness.   Abdominal:      General: Bowel sounds are normal. There is distension.       Palpations: Abdomen is soft.      Tenderness: There is no abdominal tenderness.   Musculoskeletal:         General: Tenderness (right knee) present. No deformity.      Cervical back: Neck supple.      Comments: Small abrasion to his right knee.  No effusion or discernible injury.   Skin:     General: Skin is warm and dry.      Findings: No rash.   Neurological:      General: No focal deficit present.      Mental Status: He is alert and oriented to person, place, and time.      Cranial Nerves: No cranial nerve deficit.      Motor: Weakness present. No abnormal muscle tone.      Deep Tendon Reflexes: Reflexes normal.      Comments: He is oriented, but also pleasantly confused.  Unclear baseline.  He makes some movements with his mouth and tongue that are reminiscent of tardive dyskinesia.   Psychiatric:      Comments: Flat affect, but conversational.         Intake/Output Summary (Last 24 hours) at 3/11/2023 0905  Last data filed at 3/11/2023 0831  Gross per 24 hour   Intake 3373.5 ml   Output 2095 ml   Net 1278.5 ml     Results Review:  I have reviewed the labs, radiology results, and diagnostic studies.    Laboratory Data:   Results from last 7 days   Lab Units 03/11/23  0504 03/10/23  0426 03/09/23  0900   WBC 10*3/mm3 3.70 5.34 9.91   HEMOGLOBIN g/dL 10.1* 10.5* 11.8*   HEMATOCRIT % 31.9* 31.7* 36.2*   PLATELETS 10*3/mm3 65* 65* 72*      Results from last 7 days   Lab Units 03/11/23  0504 03/10/23  0426 03/09/23  0900   SODIUM mmol/L 135* 132* 135*   POTASSIUM mmol/L 3.3* 3.4* 3.6   CHLORIDE mmol/L 107 102 102   CO2 mmol/L 19.0* 20.0* 22.0   BUN mg/dL 32* 36* 27*   CREATININE mg/dL 1.77* 2.25* 2.08*   CALCIUM mg/dL 7.7* 8.1* 8.5*   BILIRUBIN mg/dL 1.2  --  2.2*   ALK PHOS U/L 85  --  112   ALT (SGPT) U/L 22  --  17   AST (SGOT) U/L 72*  --  57*   GLUCOSE mg/dL 100* 126* 109*     Culture Data:   Blood Culture   Date Value Ref Range Status   03/09/2023 Pseudomonas species (C)  Preliminary   03/09/2023 No growth at  24 hours  Preliminary     Urine Culture   Date Value Ref Range Status   03/09/2023 >100,000 CFU/mL Pseudomonas aeruginosa (A)  Final     Imaging Results (Last 24 Hours)     Procedure Component Value Units Date/Time    CT Abdomen Pelvis Stone Protocol [[id number]] Collected: 03/10/23 1520     Updated: 03/10/23 1549    Narrative:      EXAMINATION: CT ABDOMEN PELVIS STONE PROTOCOL- 3/10/2023 3:15 PM CST     HISTORY: Sepsis due to UTI, hematuria, rising creatinine; N17.9-Acute  kidney failure, unspecified; N39.0-Urinary tract infection, site not  specified; D69.6-Thrombocytopenia, unspecified; T79.6XXA-Traumatic  ischemia of muscle, initial encounter     DOSE: 641 mGycm (Automatic exposure control technique was implemented in  an effort to keep the radiation dose as low as possible without  compromising image quality)     REPORT: Spiral CT of the abdomen and pelvis was performed without  contrast from the lung bases through the pubic symphysis. Reconstructed  coronal and sagittal images are also reviewed.     Comparison: CT abdomen pelvis without contrast 10/18/2017.     There is bilateral gynecomastia, which may be asymmetric and greater on  the right, though the asymmetry may be due to positioning and incomplete  visualization of the left breast. Review of lung windows demonstrates  mild respiratory motion artifact, there is moderate bibasilar  atelectasis, greater on the right. No pneumothorax is identified. There  appear to be trace bilateral pleural effusions, greater on the right.  Focal calcified plaque is noted within the proximal LAD coronary artery.  The liver has a macronodular surface as before compatible with  cirrhosis. The spleen is enlarged measuring 15 x 12 cm, compatible with  portal hypertension and there are collateral vessels in the central  abdomen, left upper quadrant. There is accessory splenule medial to the  spleen that measures 35 mm. The stomach is distended with fluid, without  obvious wall  thickening. This mildly increased attenuation of central  mesenteric fat planes and fat planes around the pancreas, which may be  due to vascular congestion versus acute pancreatitis. The adrenal glands  appear within normal limits. 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 There is mild  distention of the urinary bladder with associated mild wall thickening.  There is a bladder stone dependently that measures 3 cm. Moderate  prostate enlargement is present. No evidence of ascites. There is mild  sigmoid diverticulosis without diverticulitis. The transverse colon and  hepatic flexure are filled with gas, there appears to be mild mucosal  thickening involving the hepatic flexure along the posterior wall. The  adjacent pericolonic fat planes are preserved in this region and this  may be an artifact of under distention. There are fat-containing  bilateral inguinal hernias. There is increased attenuation of fat planes  over the flanks and linear regions compatible with dependent edema and  mild volume overload. There is atrophy of the abdominal wall  musculature. Review of bone windows shows no acute abnormality.       Impression:      1. Findings compatible with cirrhosis and portal hypertension, there is  vascular congestion within the central mesentery and mildly increased  attenuation of fat planes around the pancreas, which could be due to  either vascular congestion or mild acute pancreatitis. Clinical  correlation is recommended.  2. Mild bilateral hydronephrosis and hydroureter to the level of the  bladder, with evidence of urinary retention. Mild bladder wall  thickening is noted probably due to chronic bladder outlet obstruction  and prostamegaly. There is a 3 cm bladder stone, which currently rests  at the left UVJ level.  3. No intra-abdominal free fluid, free air or abscess. Mild sigmoid  diverticulosis. Mild mucosal prominence involving the posterior wall  of  the hepatic flexure of colon probably due to underdistention artifact.  This report was finalized on 03/10/2023 15:46 by Dr. Antonino Victor MD.    XR Knee 1 or 2 View Right [[id number]] Collected: 03/10/23 1351     Updated: 03/10/23 1356    Narrative:      EXAMINATION: XR KNEE 1 OR 2 VW RIGHT- 3/10/2023 1:51 PM CST     HISTORY: Knee pain; N17.9-Acute kidney failure, unspecified;  N39.0-Urinary tract infection, site not specified;  D69.6-Thrombocytopenia, unspecified; T79.6XXA-Traumatic ischemia of  muscle, initial encounter     REPORT: AP and lateral views of the right knee were obtained.     COMPARISON: Right knee x-rays 6/27/2018.     There is severe narrowing of the medial compartment with a bone-on-bone  appearance, mild articular surface sclerosis and joint line spurring.  There is mild increase in chondrocalcinosis within the lateral  compartment. No fracture is identified. There is moderate narrowing of  the patellofemoral compartment with associated patellar spurring. No  definite joint effusion is identified. There appear to be small loose  bodies within the anterior joint on the lateral view.       Impression:      Severe medial compartment osteoarthritis with a bone-on-bone  appearance, increased chondrocalcinosis of the lateral compartment  moderate narrowing of the patellofemoral compartment with patellar  spurring. Small loose bodies within the anterior joint, the largest  measures approximately 5 mm.  This report was finalized on 03/10/2023 13:53 by Dr. Antonino Victor MD.        I have reviewed the patient's current medications.     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 Nava.  He was evaluated in the emergency department after a fall, worsening weakness above his baseline, mild confusion, frequency/dysuria.  He lives with his daughter.  He typically is independent of ADLs.  Work-up in the emergency department was consistent with a urinary tract infection.  He had a recent diagnosis of a urinary tract infection as an outpatient and recently completed a course of Omnicef.    His outpatient urine culture from 2/23 grew alpha-hemolytic Streptococcus.  A urine culture done in the emergency department on 3/9 is growing pan susceptible Pseudomonas.  He also has Pseudomonas in 1/4 bottles of blood cultures drawn in the ER.  He is allergic to penicillins.  He was started on levofloxacin by Dr. Nava when he first presented.      He had a slight hyperlactatemia that resolved.    He had large blood on his urinalysis.  I became worried about a possible urinary obstruction.  I obtained a CT of the abdomen and pelvis stone protocol on 3/10.  This showed mild bilateral hydronephrosis and hydroureter to the level of the bladder with evidence of urinary retention.  Mild bladder wall thickening noted probably due to chronic bladder outlet obstruction and prostatomegaly.  3 cm bladder stone which currently at rest at the level of the left UVJ.  After seeing this, I ordered a Perkins catheter to be placed.  However, the patient refused this.  He has voided better overnight according to the nursing staff.  His serum creatinine has improved to 1.77 this morning after being 2.25 yesterday.  He does have a history of chronic kidney disease, stage IIIa.  His baseline serum creatinine appears to be on the order of 1.4.  Start Flomax.  Consider urology consultation if his renal function does not continue to improve or he has further difficulty urinating.    Change the composition of his IV fluids today.  Continue supportive  care.  Avoid nephrotoxins.    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    Reconciled and resumed home medications as appropriate.  He does not routinely see a physician and has multiple chronic medical problems which are likely not managed appropriately at this point in time.    Nothing acute on imaging of his right knee. PT/OT to be consulted.    Lovenox for DVT prophylaxis.    Medical Decision Making  Number and Complexity of problems: 1 acute, moderately complex problem in the form of his acute cystitis with hematuria.  He also has an FERNANDO superimposed on CKD.  Concerned that these issues are linked with a possible urinary obstruction.  Differential Diagnosis: Possible urinary obstruction.    Conditions and Status        Condition is improving.     [facility] Data  External documents reviewed: Reviewed prior Epic records.  Cardiac tracing (EKG, telemetry) interpretation: NSR.  Radiology interpretation: Already interpreted by radiology.  Labs reviewed: As above.  Any tests that were considered but not ordered: None considered at present.     Decision rules/scores evaluated (example [id number], Wells, etc): None considered at present.     Discussed with: The patient and his nurse, Leana.     Care Planning  Shared decision making: Patient consents to ongoing admission for further work-up and treatment.  Code status and discussions: Full with full interventions.     Disposition  Social Determinants of Health that impact treatment or disposition: Nothing negative identified at present.   I expect the patient to be discharged to home with home health versus SNF in 2-3 days.     Electronically signed by Javier Paulson DO, 03/11/23, 08:54 CST.

## 2023-03-12 LAB
ANION GAP SERPL CALCULATED.3IONS-SCNC: 8 MMOL/L (ref 5–15)
BACTERIA SPEC AEROBE CULT: ABNORMAL
BUN SERPL-MCNC: 25 MG/DL (ref 8–23)
BUN/CREAT SERPL: 17.5 (ref 7–25)
CALCIUM SPEC-SCNC: 7.7 MG/DL (ref 8.6–10.5)
CHLORIDE SERPL-SCNC: 110 MMOL/L (ref 98–107)
CO2 SERPL-SCNC: 21 MMOL/L (ref 22–29)
CREAT SERPL-MCNC: 1.43 MG/DL (ref 0.76–1.27)
DEPRECATED RDW RBC AUTO: 47.8 FL (ref 37–54)
EGFRCR SERPLBLD CKD-EPI 2021: 48.9 ML/MIN/1.73
ERYTHROCYTE [DISTWIDTH] IN BLOOD BY AUTOMATED COUNT: 13.9 % (ref 12.3–15.4)
GLUCOSE SERPL-MCNC: 95 MG/DL (ref 65–99)
GRAM STN SPEC: ABNORMAL
HCT VFR BLD AUTO: 29.2 % (ref 37.5–51)
HGB BLD-MCNC: 9.4 G/DL (ref 13–17.7)
ISOLATED FROM: ABNORMAL
MCH RBC QN AUTO: 29.8 PG (ref 26.6–33)
MCHC RBC AUTO-ENTMCNC: 32.2 G/DL (ref 31.5–35.7)
MCV RBC AUTO: 92.7 FL (ref 79–97)
PLATELET # BLD AUTO: 64 10*3/MM3 (ref 140–450)
PMV BLD AUTO: 10.6 FL (ref 6–12)
POTASSIUM SERPL-SCNC: 3.4 MMOL/L (ref 3.5–5.2)
RBC # BLD AUTO: 3.15 10*6/MM3 (ref 4.14–5.8)
SODIUM SERPL-SCNC: 139 MMOL/L (ref 136–145)
WBC NRBC COR # BLD: 3.43 10*3/MM3 (ref 3.4–10.8)

## 2023-03-12 PROCEDURE — 97165 OT EVAL LOW COMPLEX 30 MIN: CPT | Performed by: OCCUPATIONAL THERAPIST

## 2023-03-12 PROCEDURE — 85027 COMPLETE CBC AUTOMATED: CPT | Performed by: INTERNAL MEDICINE

## 2023-03-12 PROCEDURE — 97161 PT EVAL LOW COMPLEX 20 MIN: CPT

## 2023-03-12 PROCEDURE — 25010000002 ENOXAPARIN PER 10 MG: Performed by: FAMILY MEDICINE

## 2023-03-12 PROCEDURE — 80048 BASIC METABOLIC PNL TOTAL CA: CPT | Performed by: INTERNAL MEDICINE

## 2023-03-12 RX ADMIN — ENOXAPARIN SODIUM 30 MG: 100 INJECTION SUBCUTANEOUS at 13:51

## 2023-03-12 RX ADMIN — SODIUM BICARBONATE: 84 INJECTION INTRAVENOUS at 04:17

## 2023-03-12 RX ADMIN — TAMSULOSIN HYDROCHLORIDE 0.4 MG: 0.4 CAPSULE ORAL at 08:27

## 2023-03-12 RX ADMIN — LEVOFLOXACIN 250 MG: 250 TABLET, FILM COATED ORAL at 08:27

## 2023-03-12 RX ADMIN — MUPIROCIN 1 APPLICATION: 20 OINTMENT TOPICAL at 08:28

## 2023-03-12 RX ADMIN — FAMOTIDINE 20 MG: 20 TABLET, FILM COATED ORAL at 08:27

## 2023-03-12 NOTE — PLAN OF CARE
Goal Outcome Evaluation:  Plan of Care Reviewed With: patient           Outcome Evaluation: OT eval completed. Pt presents alert and oriented x3 with vcs for year. He reports at baseline being independent with all adls and mobility, residing at home with daughter. Today he was able to bring self to sitting at EOB with SBA. Donned socks while seated at EOB with set-up and SBA. Sup/CGA for sit <> stand t/f and all functional mobility in hallway with use of rwx. He was able to complete all aspects of toileting with SBA. He would benefit from skilled OT services to address these deficits. Recommend d/c home with daughter and HH, if needed.

## 2023-03-12 NOTE — THERAPY EVALUATION
Patient Name: Manohar Garcia  : 1940    MRN: [id number]                              Today's Date: 3/12/2023       Admit Date: 3/9/2023    Visit Dx:     ICD-10-CM ICD-9-CM   1. FERNANDO (acute kidney injury) (HCC)  N17.9 584.9   2. Acute UTI  N39.0 599.0   3. Thrombocytopenia (HCC)  D69.6 287.5   4. 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 Dx: acute cystits with hematuria, bacteremia d/t pseudomonas, metabolic encephalopathy.  -JJ     Mode of Treatment occupational therapy  -     Row Name 23 1112          General Information    Patient Profile Reviewed yes  -JJ     Prior Level of Function independent:;all household mobility;transfer;bed mobility;ADL's  per pt report  -JJ     Existing Precautions/Restrictions fall  -J     Barriers to Rehab cognitive status;medically complex  -J     Row Name 23 1112          Living Environment    People in Home child(hayley), adult  with daughter  -     Row Name 23 1112          Home Main Entrance    Number of Stairs, Main Entrance none  -JJ     Row Name 23 1112          Stairs Within Home, Primary    Stair Railings, Within Home, Primary none  -     Row Name 23 1112          Cognition    Orientation Status  (Cognition) oriented to;person;place;time;verbal cues/prompts needed for orientation  -     Row Name 03/12/23 1112          Safety Issues, Functional Mobility    Safety Issues Affecting Function (Mobility) awareness of need for assistance;insight into deficits/self-awareness  -     Impairments Affecting Function (Mobility) balance;strength  -           User Key  (r) = Recorded By, (t) = Taken By, (c) = Cosigned By    Initials Name Provider Type    Hien Guadalupe OTR/L, CSRS Occupational Therapist                 Mobility/ADL's     Row Name 03/12/23 1112          Bed Mobility    Bed Mobility supine-sit  -     Supine-Sit [place] (Bed Mobility) contact guard  -[facility] Name 03/12/23 1112          Transfers    Transfers sit-stand transfer;stand-sit transfer  -[facility] Name 03/12/23 1112          Sit-Stand Transfer    Sit-Stand [place] (Transfers) contact guard  -[facility] Name 03/12/23 1112          Stand-Sit Transfer    Stand-Sit [place] (Transfers) contact guard  -[facility] Name 03/12/23 1112          Functional Mobility    Functional Mobility- Ind. Level supervision required  -     Functional Mobility- Device walker, front-wheeled  -     Functional Mobility- Comment in hallway  -[facility] Name 03/12/23 1112          Activities of Daily Living    BADL Assessment/Intervention lower body dressing;toileting  -JJ     Row Name 03/12/23 [facility]          Lower Body Dressing Assessment/Training    [place] Level (Lower Body Dressing) don;socks;set up;standby assist  -     Position (Lower Body Dressing) edge of bed sitting;unsupported sitting  -JJ     Row Name 03/12/23 1112          Toileting Assessment/Training    [place] Level (Toileting) adjust/manage clothing;standby assist  -     Position (Toileting) supported standing  -           User Key  (r) = Recorded By, (t) = Taken By, (c) = Cosigned By    Initials Name Provider Type    Hien Guadalupe OTR/TUCKER, AKASHS  Occupational Therapist               Obj/Interventions     Row Name 03/12/23 1112          Sensory Assessment (Somatosensory)    Sensory Assessment (Somatosensory) UE sensation intact  -     Row Name 03/12/23 1112          Vision Assessment/Intervention    Visual Impairment/Limitations WFL  -     Row Name 03/12/23 1112          Range of Motion Comprehensive    General Range of Motion bilateral upper extremity ROM L  -[facility] Name 03/12/23 1112          Strength Comprehensive (MMT)    Comment, General Manual Muscle Testing (MMT) Assessment B UE strength grossly 4/5  -     Row Name 03/12/23 1112          Balance    Balance Assessment sitting static balance;sitting dynamic balance;standing dynamic balance;standing static balance  -     Static Sitting Balance independent  -     Dynamic Sitting Balance standby assist  -     Position, Sitting Balance unsupported;sitting edge of bed;supported  -     Static Standing Balance supervision  -     Dynamic Standing Balance supervision  -     Position/Device Used, Standing Balance supported;walker, front-wheeled  -           User Key  (r) = Recorded By, (t) = Taken By, (c) = Cosigned By    Initials Name Provider Type    Hien Guadalupe, OTR/L, CSRS Occupational Therapist               Goals/Plan     Row Name 03/12/23 1112          Bathing Goal 1 (OT)    Activity/Device (Bathing Goal 1, OT) bathing skills, all  -JJ     [place] Level/Cues Needed (Bathing Goal 1, OT) independent  -JJ     Time Frame (Bathing Goal 1, OT) long term goal (LTG);by discharge  -JJ     Progress/Outcomes (Bathing Goal 1, OT) new goal  -     Row Name 03/12/23 1112          Dressing Goal 1 (OT)    Activity/Device (Dressing Goal 1, OT) dressing skills, all  -JJ     [place]/Cues Needed (Dressing Goal 1, OT) independent  -JJ     Time Frame (Dressing Goal 1, OT) long term goal (LTG);by discharge  -JJ     Progress/Outcome (Dressing Goal 1, OT) new goal  -     Row Name  03/12/23 1112          Toileting Goal 1 (OT)    Activity/Device (Toileting Goal 1, OT) toileting skills, all  -JJ     [place] Level/Cues Needed (Toileting Goal 1, OT) independent  -JJ     Time Frame (Toileting Goal 1, OT) long term goal (LTG);by discharge  -JJ     Progress/Outcome (Toileting Goal 1, OT) new goal  -JJ     Row Name 03/12/23 1112          Therapy Assessment/Plan (OT)    Planned Therapy Interventions (OT) activity tolerance training;adaptive equipment training;BADL retraining;functional balance retraining;transfer/mobility retraining;strengthening exercise;occupation/activity based interventions;patient/caregiver education/training  -JJ           User Key  (r) = Recorded By, (t) = Taken By, (c) = Cosigned By    Initials Name Provider Type    Hien Guadalupe, OTR/L, CSRS Occupational Therapist               Clinical Impression     Row Name 03/12/23 1112          Pain Assessment    Pretreatment Pain Rating 0/10 - no pain  -JJ     Posttreatment Pain Rating 0/10 - no pain  -JJ     Row Name 03/12/23 1112          Plan of Care Review    Plan of Care Reviewed With patient  -JJ     Outcome Evaluation OT eval completed. Pt presents alert and oriented x3 with vcs for year. He reports at baseline being independent with all adls and mobility, residing at home with daughter. Today he was able to bring self to sitting at EOB with SBA. Donned socks while seated at EOB with set-up and SBA. Sup/CGA for sit <> stand t/f and all functional mobility in hallway with use of rwx. He was able to complete all aspects of toileting with SBA. He would benefit from skilled OT services to address these deficits. Recommend d/c home with daughter and HH, if needed.  -JJ     Row Name 03/12/23 1112          Therapy Assessment/Plan (OT)    Rehab Potential (OT) good, to achieve stated therapy goals  -JJ     Criteria for Skilled Therapeutic Interventions Met (OT) yes;skilled treatment is necessary  -JJ     Therapy Frequency (OT)  3 times/wk  -AMRIK     Predicted Duration of Therapy Intervention (OT) 10 days  -JC     Row Name 03/12/23 1112          Therapy Plan Review/Discharge Plan (OT)    Anticipated Discharge Disposition (OT) home with assist;home with home health  -     Row Name 03/12/23 1112          Positioning and Restraints    Pre-Treatment Position in bed  -     Post Treatment Position chair  -     In Chair notified nsg;sitting;call light within reach;encouraged to call for assist  -           User Key  (r) = Recorded By, (t) = Taken By, (c) = Cosigned By    Initials Name Provider Type    Hien Guadalupe OTR/L, MIREYA Occupational Therapist               Outcome Measures     Row Name 03/12/23 1112          How much help from another is currently needed...    Putting on and taking off regular lower body clothing? 3  -JJ     Bathing (including washing, rinsing, and drying) 3  -JJ     Toileting (which includes using toilet bed pan or urinal) 3  -JJ     Putting on and taking off regular upper body clothing 4  -JJ     Taking care of personal grooming (such as brushing teeth) 4  -JJ     Eating meals 4  -     AM-PAC 6 Clicks Score (OT) 21  -     Row Name 03/12/23 1112          Functional Assessment    Outcome Measure Options AM-PAC 6 Clicks Daily Activity (OT)  -           User Key  (r) = Recorded By, (t) = Taken By, (c) = Cosigned By    Initials Name Provider Type    Hien Guadalupe OTR/L, MIREYA Occupational Therapist                Occupational Therapy Education     Title: PT OT SLP Therapies (In Progress)     Topic: Occupational Therapy (In Progress)     Point: ADL training (Done)     Description:   Instruct learner(s) on proper safety adaptation and remediation techniques during self care or transfers.   Instruct in proper use of assistive devices.              Learning Progress Summary           Patient Acceptance, E, VU by AMRIK at 3/12/2023 1152                   Point: Home exercise program (Not Started)      Description:   Instruct learner(s) on appropriate technique for monitoring, assisting and/or progressing therapeutic exercises/activities.              Learner Progress:  Not documented in this visit.          Point: Precautions (Done)     Description:   Instruct learner(s) on prescribed precautions during self-care and functional transfers.              Learning Progress Summary           Patient Cal, LANEY, VU by AMRIK at 3/12/2023 1152                   Point: Body mechanics (Not Started)     Description:   Instruct learner(s) on proper positioning and spine alignment during self-care, functional mobility activities and/or exercises.              Learner Progress:  Not documented in this visit.                      User Key     Initials Effective Dates Name Provider Type Discipline    AMRIK 11/10/21 -  Hien Panchal, OTR/L, CSRS Occupational Therapist OT              OT Recommendation and Plan  Planned Therapy Interventions (OT): activity tolerance training, adaptive equipment training, BADL retraining, functional balance retraining, transfer/mobility retraining, strengthening exercise, occupation/activity based interventions, patient/caregiver education/training  Therapy Frequency (OT): 3 times/wk  Plan of Care Review  Plan of Care Reviewed With: patient  Outcome Evaluation: OT eval completed. Pt presents alert and oriented x3 with vcs for year. He reports at baseline being independent with all adls and mobility, residing at home with daughter. Today he was able to bring self to sitting at EOB with SBA. Donned socks while seated at EOB with set-up and SBA. Sup/CGA for sit <> stand t/f and all functional mobility in hallway with use of rwx. He was able to complete all aspects of toileting with SBA. He would benefit from skilled OT services to address these deficits. Recommend d/c home with daughter and HH, if needed.     Time Calculation:    Time Calculation- OT     Row Name 03/12/23 1112             Time  Calculation- OT    OT Start Time 1112  add 7 minutes for chart review  -      OT Stop Time 1145  -      OT Time Calculation (min) 33 min  -      OT Received On 03/12/23  -      OT Goal Re-Cert Due Date 03/22/23  -            User Key  (r) = Recorded By, (t) = Taken By, (c) = Cosigned By    Initials Name Provider Type    Hien Guadalupe OTR/L, CSRS Occupational Therapist              Therapy Charges for Today     Code Description Service Date Service Provider Modifiers Qty    [id number] HC OT EVAL LOW COMPLEXITY 3 3/12/2023 Hien Panchal OTR/L, CSRS GO 1               Hien Panchal, OTR/L, AKASHS  3/12/2023

## 2023-03-12 NOTE — THERAPY EVALUATION
Patient Name: Manohar Garcia  : 1940    MRN: [id number]                              Today's Date: 3/12/2023       Admit Date: 3/9/2023    Visit Dx:     ICD-10-CM ICD-9-CM   1. FERNANDO (acute kidney injury) (HCC)  N17.9 584.9   2. Acute UTI  N39.0 599.0   3. Thrombocytopenia (HCC)  D69.6 287.5   4. Traumatic rhabdomyolysis, initial encounter (HCC)  T79.6XXA 958.6   5. 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)   • Thrombocytopenia (HCC)   • Hyperlactatemia     Past Medical History:   Diagnosis Date   • Dementia (HCC)    • GERD (gastroesophageal reflux disease)    • Liver disorder      Past Surgical History:   Procedure Laterality Date   • HEMORRHOIDECTOMY     • HERNIA REPAIR        General Information     Row Name 23          Physical Therapy Time and Intention    Document Type evaluation;other (see comments)  see MAR  -JE     Mode of Treatment physical therapy  -     Row Name 23          General Information    Patient Profile Reviewed yes  -JE     Prior Level of Function independent:;all household mobility;community mobility;driving;using stairs  -JE     Existing Precautions/Restrictions fall  -JE     Barriers to Rehab medically complex;hearing deficit  -JE     Row Name 23          Living Environment    People in Home child(hayley), adult  -JE     Name(s) of People in Home dtr - Larenda  -     Row Name 23 170          Home Main Entrance    Number of Stairs, Main Entrance five  -JE     Stair Railings, Main Entrance other (see comments)  unclear  -JE     Row Name 23 170          Stairs Within Home, Primary    Stairs, Within Home, Primary stairs to the  basement, reports this is wear his laundry is and he does go down there  -     Row Name 03/12/23 1702          Cognition    Orientation Status (Cognition) oriented to;person;place;time;situation  -     Row Name 03/12/23 1702          Safety Issues, Functional Mobility    Safety Issues Affecting Function (Mobility) safety precaution awareness  -     Impairments Affecting Function (Mobility) endurance/activity tolerance;balance  -           User Key  (r) = Recorded By, (t) = Taken By, (c) = Cosigned By    Initials Name Provider Type    Kennedi Brito, PT Physical Therapist               Mobility     Row Name 03/12/23 1702          Bed Mobility    Bed Mobility supine-sit  -     Supine-Sit Bascom (Bed Mobility) independent  -     Row Name 03/12/23 1702          Sit-Stand Transfer    Sit-Stand Bascom (Transfers) contact guard  -     Row Name 03/12/23 1702          Gait/Stairs (Locomotion)    [place] Level (Gait) contact guard  -     Assistive Device (Gait) walker, front-wheeled  -     Distance in Feet (Gait) ~ 200 ft  -     Bilateral Gait Deviations forward flexed posture  -           User Key  (r) = Recorded By, (t) = Taken By, (c) = Cosigned By    Initials Name Provider Type    Kennedi Brito, PT Physical Therapist               Obj/Interventions     Row Name 03/12/23 1702          Range of Motion Comprehensive    Comment, General Range of Motion AROM all 4 extremities WFLS  -     Row Name 03/12/23 1702          Strength Comprehensive (MMT)    Comment, General Manual Muscle Testing (MMT) Assessment Grossly 4+/5 throughout B U/LEs  -     Row Name 03/12/23 1702          Motor Skills    Motor Skills other (see comments)  intact to finger opposition  -     Row Name 03/12/23 1702          Balance    Balance Assessment sitting static balance;sitting dynamic balance;sit to stand dynamic balance;standing static balance;standing dynamic balance  -     Static Sitting  Balance independent  -     Dynamic Sitting Balance independent  -     Position, Sitting Balance supported;unsupported;sitting edge of bed  -     Sit to Stand Dynamic Balance contact guard;verbal cues  -     Static Standing Balance contact guard  -     Dynamic Standing Balance contact guard  -     Position/Device Used, Standing Balance supported;walker, rolling  -VALARIE     Row Name 03/12/23 1702          Sensory Assessment (Somatosensory)    Sensory Assessment (Somatosensory) sensation intact  -           User Key  (r) = Recorded By, (t) = Taken By, (c) = Cosigned By    Initials Name Provider Type    Kennedi Brito, PT Physical Therapist               Goals/Plan     Row Name 03/12/23 1702          Bed Mobility Goal 1 (PT)    Activity/Assistive Device (Bed Mobility Goal 1, PT) bed mobility activities, all  -     [place] Level/Cues Needed (Bed Mobility Goal 1, PT) independent  -     Time Frame (Bed Mobility Goal 1, PT) long term goal (LTG);10 days  -     Progress/Outcomes (Bed Mobility Goal 1, PT) goal ongoing  -     Row Name 03/12/23 1702          Transfer Goal 1 (PT)    Activity/Assistive Device (Transfer Goal 1, PT) sit-to-stand/stand-to-sit;bed-to-chair/chair-to-bed  -     [place] Level/Cues Needed (Transfer Goal 1, PT) standby assist;modified independence  -JE     Time Frame (Transfer Goal 1, PT) long term goal (LTG);10 days  -JE     Progress/Outcome (Transfer Goal 1, PT) goal ongoing  -     Row Name 03/12/23 1702          Gait Training Goal 1 (PT)    Activity/Assistive Device (Gait Training Goal 1, PT) gait (walking locomotion);assistive device use;decrease fall risk;improve balance and speed;increase endurance/gait distance;increase energy conservation;walker, rolling  -     [place] Level (Gait Training Goal 1, PT) standby assist;modified independence  -     Distance (Gait Training Goal 1, PT) 250+ ft demonstrating awareness of energy conservation techniques  -      Time Frame (Gait Training Goal 1, PT) long term goal (LTG);10 days  -     Progress/Outcome (Gait Training Goal 1, PT) goal ongoing  -     Row Name 03/12/23 1702          Stairs Goal 1 (PT)    Activity/Assistive Device (Stairs Goal 1, PT) ascending stairs;descending stairs;step-to-step;decrease fall risk;improve balance and speed  -     [place] Level/Cues Needed (Stairs Goal 1, PT) contact guard required  -     Number of Stairs (Stairs Goal 1, PT) 5  -JE     Time Frame (Stairs Goal 1, PT) long term goal (LTG);10 days  -     Progress/Outcome (Stairs Goal 1, PT) goal ongoing  -     Row Name 03/12/23 1702          Patient Education Goal (PT)    Activity (Patient Education Goal, PT) HEP for strengthening, postural and breathing ex  -     [place]/Cues/Accuracy (Memory Goal 2, PT) demonstrates adequately;independent;verbalizes understanding  -     Time Frame (Patient Education Goal, PT) long term goal (LTG);10 days  -     Progress/Outcome (Patient Education Goal, PT) goal ongoing  -     Row Name 03/12/23 1702          Therapy Assessment/Plan (PT)    Planned Therapy Interventions (PT) balance training;bed mobility training;gait training;home exercise program;postural re-education;patient/family education;ROM (range of motion);strengthening;stair training;transfer training;other (see comments)  safety/falls prevention, breathing ex, energy conservation training  -           User Key  (r) = Recorded By, (t) = Taken By, (c) = Cosigned By    Initials Name Provider Type    Kennedi Brito, PT Physical Therapist               Clinical Impression     Row Name 03/12/23 1702          Pain    Pretreatment Pain Rating 0/10 - no pain  -     Posttreatment Pain Rating 0/10 - no pain  -     Pre/Posttreatment Pain Comment noted cyst on L upper leg just superior to knee - reports chronic maybe 5 yrs  -     Row Name 03/12/23 1702          Plan of Care Review    Plan of Care Reviewed With patient   -     Progress improving  -     Outcome Evaluation PT eval completed.  Pt pleasant and motivated, agreeable to therapy and oriented X 4.  Pt is [place] requiring staff to repeat themselves often.  Pt performs supine to sit I, tfers sit to/from standing w/ CGA, gait ~ 200 ft w/ rwx w/ CGA w/ noted flexed posture.  Pt w/ noted shortness of breath w/ education to pace himself encouraging pt to rest w/ mobility.  Pt reported more than once he was more tired due to just returning from the bathroom prior to therapist arriving to room.  However, denied need to stop and rest multiple times w/ gait activity.  Pt will benefit from continued PT services to improve activity tolerance, safety awareness, to improve overall strength, knowledge of energy conservation, to improve balance, knowledge of HEP for breathing ex, postural and strengthening ex and to improve functional I w/ out of bed activity reducing fall risk.  Recommend home w/ assist from dtr and HH.  Will follow for progress and needs.  -     Row Name 03/12/23 1702          Therapy Assessment/Plan (PT)    Patient/Family Therapy Goals Statement (PT) return home  -     Rehab Potential (PT) good, to achieve stated therapy goals  -     Criteria for Skilled Interventions Met (PT) yes;meets criteria;skilled treatment is necessary  -     Therapy Frequency (PT) 2 times/day  -     Predicted Duration of Therapy Intervention (PT) until discharge or goals achieved  -     Row Name 03/12/23 1702          Vital Signs    O2 Delivery Pre Treatment room air  -     O2 Delivery Intra Treatment room air  -     O2 Delivery Post Treatment room air  -     Pre Patient Position Supine  -     Intra Patient Position Standing  -     Post Patient Position Sitting  -     Row Name 03/12/23 1702          Positioning and Restraints    Pre-Treatment Position in bed  -JE     Post Treatment Position bed  -     In Bed sitting EOB;call light within reach;encouraged to call for  assist;side rails up x2  eating supper  -           User Key  (r) = Recorded By, (t) = Taken By, (c) = Cosigned By    Initials Name Provider Type    Kennedi Brito, PT Physical Therapist               Outcome Measures     Row Name 03/12/23 1702          How much help from another person do you currently need...    Turning from your back to your side while in flat bed without using bedrails? 4  -JE     Moving from lying on back to sitting on the side of a flat bed without bedrails? 4  -JE     Moving to and from a bed to a chair (including a wheelchair)? 3  -JE     Standing up from a chair using your arms (e.g., wheelchair, bedside chair)? 3  -JE     Climbing 3-5 steps with a railing? 3  -JE     To walk in hospital room? 3  -     AM-PAC 6 Clicks Score (PT) 20  -     Highest level of mobility 6 --> Walked 10 steps or more  -     Row Name 03/12/23 1702 03/12/23 1112       Functional Assessment    Outcome Measure Options AM-PAC 6 Clicks Basic Mobility (PT)  - AM-PAC 6 Clicks Daily Activity (OT)  -          User Key  (r) = Recorded By, (t) = Taken By, (c) = Cosigned By    Initials Name Provider Type    Kennedi Brito, PT Physical Therapist    Hien Guadalupe, OTR/L, CSRS Occupational Therapist                             Physical Therapy Education     Title: PT OT SLP Therapies (In Progress)     Topic: Physical Therapy (In Progress)     Point: Mobility training (Done)     Learning Progress Summary           Patient Acceptance, E,TB,D, ARIEL,CONNIE,NR by  at 3/12/2023 1731    Comment: Education re: purpose of PT/importance of activity, for safety/falls prevention, to call for assist to be up and to pace his activity improving tolerance to out of bed tasks                   Point: Home exercise program (Not Started)     Learner Progress:  Not documented in this visit.          Point: Precautions (Done)     Learning Progress Summary           Patient Acceptance, E,TB,D, ARIEL,CONNIE,NR by  at 3/12/2023 1731     Comment: Education re: purpose of PT/importance of activity, for safety/falls prevention, to call for assist to be up and to pace his activity improving tolerance to out of bed tasks                               User Key     Initials Effective Dates Name Provider Type Discipline     08/02/18 -  Kennedi Crane, PT Physical Therapist PT              PT Recommendation and Plan  Planned Therapy Interventions (PT): balance training, bed mobility training, gait training, home exercise program, postural re-education, patient/family education, ROM (range of motion), strengthening, stair training, transfer training, other (see comments) (safety/falls prevention, breathing ex, energy conservation training)  Plan of Care Reviewed With: patient  Progress: improving  Outcome Evaluation: PT eval completed.  Pt pleasant and motivated, agreeable to therapy and oriented X 4.  Pt is [place] requiring staff to repeat themselves often.  Pt performs supine to sit I, tfers sit to/from standing w/ CGA, gait ~ 200 ft w/ rwx w/ CGA w/ noted flexed posture.  Pt w/ noted shortness of breath w/ education to pace himself encouraging pt to rest w/ mobility.  Pt reported more than once he was more tired due to just returning from the bathroom prior to therapist arriving to room.  However, denied need to stop and rest multiple times w/ gait activity.  Pt will benefit from continued PT services to improve activity tolerance, safety awareness, to improve overall strength, knowledge of energy conservation, to improve balance, knowledge of HEP for breathing ex, postural and strengthening ex and to improve functional I w/ out of bed activity reducing fall risk.  Recommend home w/ assist from dtr and HH.  Will follow for progress and needs.     Time Calculation:    PT Charges     Row Name 03/12/23 3253             Time Calculation    Start Time 1702  -      Stop Time 1729  -      Time Calculation (min) 27 min  -      PT Received On 03/12/23   -VALARIE      PT Goal Re-Cert Due Date 03/22/23  -VALARIE            User Key  (r) = Recorded By, (t) = Taken By, (c) = Cosigned By    Initials Name Provider Type    Kennedi Brito, PT Physical Therapist              Therapy Charges for Today     Code Description Service Date Service Provider Modifiers Qty    [id number] HC PT EVAL LOW COMPLEXITY 2 3/12/2023 Kennedi Crane, PT GP 1          PT G-Codes  Outcome Measure Options: AM-PAC 6 Clicks Basic Mobility (PT)  AM-PAC 6 Clicks Score (PT): 20  AM-PAC 6 Clicks Score (OT): 21  PT Discharge Summary  Anticipated Discharge Disposition (PT): home with assist    Kennedi Crane, PT  3/12/2023

## 2023-03-12 NOTE — PLAN OF CARE
Goal Outcome Evaluation:  Plan of Care Reviewed With: patient           Outcome Evaluation: Patient has rested well thoughout the night. Tolerating IV fluids well. Patient continues to void spontaneously and without complication. VSS

## 2023-03-12 NOTE — PLAN OF CARE
Goal Outcome Evaluation:  Plan of Care Reviewed With: patient        Progress: improving  Outcome Evaluation: PT eval completed.  Pt pleasant and motivated, agreeable to therapy and oriented X 4.  Pt is [place] requiring staff to repeat themselves often.  Pt performs supine to sit I, tfers sit to/from standing w/ CGA, gait ~ 200 ft w/ rwx w/ CGA w/ noted flexed posture.  Pt w/ noted shortness of breath w/ education to pace himself encouraging pt to rest w/ mobility.  Pt reported more than once he was more tired due to just returning from the bathroom prior to therapist arriving to room.  However, denied need to stop and rest multiple times w/ gait activity.  Pt will benefit from continued PT services to improve activity tolerance, safety awareness, to improve overall strength, knowledge of energy conservation, to improve balance, knowledge of HEP for breathing ex, postural and strengthening ex and to improve functional I w/ out of bed activity reducing fall risk.  Recommend home w/ assist from dtr and HH.  Will follow for progress and needs.

## 2023-03-12 NOTE — PROGRESS NOTES
[facility]  INPATIENT PROGRESS NOTE    Patient Name: Manohar Garcia  Date of Admission: 3/9/2023  Today's Date: 03/12/23  Length of Stay: 3  Primary Care Physician: Provider, No Known    Subjective   Chief Complaint: Urinary tract infection.  HPI  No events overnight.    Remains pleasantly confused, which is likely his baseline.  He is also very hard of hearing, which may make him seem more confused at times and what he really is.  I think he just lacks insight into his medical problems in general.    Serum creatinine continues to improve and he continues to void spontaneously.  Stop fluids today.     Still in need of therapy evaluations.    Review of Systems   All pertinent negatives and positives are as above. All other systems have been reviewed and are negative unless otherwise stated.     Objective    Temp:  [97.7 °F (36.5 °C)-98.6 °F (37 °C)] 98 °F (36.7 °C)  Heart Rate:  [71-81] 75  Resp:  [18] 18  BP: (106-140)/(47-75) 124/54  Physical Exam  Constitutional:       Comments: Up in bed.  No distress.  No family present.  Discussed with his nurse, Sue.   HENT:      Head: Normocephalic and atraumatic.      Ears:      Comments: [place].  Eyes:      Conjunctiva/sclera: Conjunctivae normal.      Pupils: Pupils are equal, round, and reactive to light.   Neck:      Vascular: No JVD.   Cardiovascular:      Rate and Rhythm: Normal rate and regular rhythm.      Heart sounds: Normal heart sounds.   Pulmonary:      Effort: Pulmonary effort is normal. No respiratory distress.      Breath sounds: Normal breath sounds. No wheezing or rales.   Chest:      Chest wall: No tenderness.   Abdominal:      General: Bowel sounds are normal. There is distension.      Palpations: Abdomen is soft.      Tenderness: There is no abdominal tenderness.   Musculoskeletal:         General: Tenderness (right knee) present. No deformity.      Cervical back: Neck supple.      Comments: Small  abrasion to his right knee.  No effusion or discernible injury.   Skin:     General: Skin is warm and dry.      Findings: No rash.   Neurological:      General: No focal deficit present.      Mental Status: He is alert and oriented to person, place, and time.      Cranial Nerves: No cranial nerve deficit.      Motor: Weakness present. No abnormal muscle tone.      Deep Tendon Reflexes: Reflexes normal.      Comments: He is oriented, but also pleasantly confused.  Unclear baseline.  He makes some movements with his mouth and tongue that are reminiscent of tardive dyskinesia.   Psychiatric:      Comments: Flat affect, but conversational.         Intake/Output Summary (Last 24 hours) at 3/12/2023 0923  Last data filed at 3/12/2023 0816  Gross per 24 hour   Intake 360 ml   Output 3000 ml   Net -2640 ml     Results Review:  I have reviewed the labs, radiology results, and diagnostic studies.    Laboratory Data:   Results from last 7 days   Lab Units 03/12/23  0520 03/11/23  0504 03/10/23  0426   WBC 10*3/mm3 3.43 3.70 5.34   HEMOGLOBIN g/dL 9.4* 10.1* 10.5*   HEMATOCRIT % 29.2* 31.9* 31.7*   PLATELETS 10*3/mm3 64* 65* 65*      Results from last 7 days   Lab Units 03/12/23  0520 03/11/23  0504 03/10/23  0426 03/09/23  0900   SODIUM mmol/L 139 135* 132* 135*   POTASSIUM mmol/L 3.4* 3.3* 3.4* 3.6   CHLORIDE mmol/L 110* 107 102 102   CO2 mmol/L 21.0* 19.0* 20.0* 22.0   BUN mg/dL 25* 32* 36* 27*   CREATININE mg/dL 1.43* 1.77* 2.25* 2.08*   CALCIUM mg/dL 7.7* 7.7* 8.1* 8.5*   BILIRUBIN mg/dL  --  1.2  --  2.2*   ALK PHOS U/L  --  85  --  112   ALT (SGPT) U/L  --  22  --  17   AST (SGOT) U/L  --  72*  --  57*   GLUCOSE mg/dL 95 100* 126* 109*     Culture Data:   Blood Culture   Date Value Ref Range Status   03/09/2023 Pseudomonas aeruginosa (C)  Final   03/09/2023 No growth at 2 days  Preliminary     Urine Culture   Date Value Ref Range Status   03/09/2023 >100,000 CFU/mL Pseudomonas aeruginosa (A)  Final     I have reviewed  the patient's current medications.     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 Nava.  He was evaluated in the emergency department after a fall, worsening weakness above his baseline, mild confusion, frequency/dysuria.  He lives with his daughter.  He typically is independent of ADLs.  Work-up in the emergency department was consistent with a urinary tract infection.  He had a recent diagnosis of a urinary tract infection as an outpatient and recently completed a course of Omnicef.    His outpatient urine culture from 2/23 grew alpha-hemolytic Streptococcus.  A urine culture done in the emergency department on 3/9 is growing pan susceptible Pseudomonas.  He also has Pseudomonas in 1/4 bottles of blood cultures drawn in the ER.  He is allergic to penicillins.  He was started on levofloxacin by Dr. Nava when he first presented.      He had a slight hyperlactatemia that resolved.    He had large blood on his urinalysis.  I became worried about a possible urinary obstruction.  I obtained a CT of the abdomen and pelvis stone protocol on 3/10.  This showed mild bilateral hydronephrosis and hydroureter to the level of the bladder with evidence of urinary retention.  Mild bladder wall thickening noted probably due to chronic bladder outlet obstruction and prostatomegaly.  3 cm bladder stone which currently at rest at the level of the left UVJ.  After seeing this, I ordered a Perkins catheter to be placed.  However, the patient refused this.  He has voided better overnight according to the nursing staff.  His serum creatinine has  improved to 1.43 (1.77) this morning after being 2.25 on 3/10.  He does have a history of chronic kidney disease, stage IIIa.  His baseline serum creatinine appears to be on the order of 1.4.  Started Flomax on 3/11.  Consider outpatient urology consultation if he has recurrent issues.     Changed the composition of his IV fluids on 3/11 and will discontinue today.  Continue supportive care.  Avoid nephrotoxins.    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    Reconciled and resumed home medications as appropriate.  He does not routinely see a physician and has multiple chronic medical problems which are likely not managed appropriately at this point in time.    Nothing acute on imaging of his right knee. PT/OT consulted.    Lovenox for DVT prophylaxis.    Medical Decision Making  Number and Complexity of problems: 1 acute, moderately complex problem in the form of his acute cystitis with hematuria.  He also has an FERNANDO superimposed on CKD.  Concerned that these issues are linked with a possible urinary obstruction.  Differential Diagnosis: Possible urinary obstruction.    Conditions and Status        Condition is improving.     MDM Data  External documents reviewed: Reviewed prior [facility] records.  Cardiac tracing (EKG, telemetry) interpretation: NSR.  Radiology interpretation: Already interpreted by radiology.  Labs reviewed: As above.  Any tests that were considered but not ordered: None considered at present.     Decision rules/scores evaluated (example [id number], [place], etc): None considered at present.     Discussed with: The patient and his nurse, Sue.     Care Planning  Shared decision making: Patient consents to ongoing admission for further work-up and treatment.  Code status and discussions: Full with full interventions.     Disposition  Social Determinants of Health that impact treatment or disposition: Nothing negative identified at present.   I expect the patient to be  discharged to home with home health versus SNF in 1-2 days.     Electronically signed by Javier Paulson DO, 03/12/23, 09:23 CDT.

## 2023-03-13 ENCOUNTER — READMISSION MANAGEMENT (OUTPATIENT)
Dept: CALL CENTER | Facility: HOSPITAL | Age: 83
End: 2023-03-13
Payer: MEDICARE

## 2023-03-13 VITALS
TEMPERATURE: 97.9 F | WEIGHT: 234 LBS | BODY MASS INDEX: 32.76 KG/M2 | OXYGEN SATURATION: 95 % | HEIGHT: 71 IN | RESPIRATION RATE: 18 BRPM | DIASTOLIC BLOOD PRESSURE: 62 MMHG | HEART RATE: 74 BPM | SYSTOLIC BLOOD PRESSURE: 123 MMHG

## 2023-03-13 LAB
ANION GAP SERPL CALCULATED.3IONS-SCNC: 7 MMOL/L (ref 5–15)
BUN SERPL-MCNC: 23 MG/DL (ref 8–23)
BUN/CREAT SERPL: 15.6 (ref 7–25)
CALCIUM SPEC-SCNC: 8.2 MG/DL (ref 8.6–10.5)
CHLORIDE SERPL-SCNC: 108 MMOL/L (ref 98–107)
CO2 SERPL-SCNC: 24 MMOL/L (ref 22–29)
CREAT SERPL-MCNC: 1.47 MG/DL (ref 0.76–1.27)
EGFRCR SERPLBLD CKD-EPI 2021: 47.3 ML/MIN/1.73
GLUCOSE SERPL-MCNC: 100 MG/DL (ref 65–99)
POTASSIUM SERPL-SCNC: 3.3 MMOL/L (ref 3.5–5.2)
SODIUM SERPL-SCNC: 139 MMOL/L (ref 136–145)

## 2023-03-13 PROCEDURE — 97116 GAIT TRAINING THERAPY: CPT

## 2023-03-13 PROCEDURE — 80048 BASIC METABOLIC PNL TOTAL CA: CPT | Performed by: INTERNAL MEDICINE

## 2023-03-13 RX ORDER — POTASSIUM CHLORIDE 750 MG/1
40 CAPSULE, EXTENDED RELEASE ORAL ONCE
Status: COMPLETED | OUTPATIENT
Start: 2023-03-13 | End: 2023-03-13

## 2023-03-13 RX ORDER — TAMSULOSIN HYDROCHLORIDE 0.4 MG/1
0.4 CAPSULE ORAL DAILY
Qty: 90 CAPSULE | Refills: 0 | Status: SHIPPED | OUTPATIENT
Start: 2023-03-14 | End: 2023-06-12

## 2023-03-13 RX ORDER — LEVOFLOXACIN 250 MG/1
250 TABLET ORAL DAILY
Qty: 9 TABLET | Refills: 0 | Status: SHIPPED | OUTPATIENT
Start: 2023-03-13 | End: 2023-03-22

## 2023-03-13 RX ORDER — POTASSIUM CHLORIDE 750 MG/1
10 TABLET, FILM COATED, EXTENDED RELEASE ORAL DAILY
Qty: 30 TABLET | Refills: 0 | Status: SHIPPED | OUTPATIENT
Start: 2023-03-13 | End: 2023-04-12

## 2023-03-13 RX ADMIN — TAMSULOSIN HYDROCHLORIDE 0.4 MG: 0.4 CAPSULE ORAL at 08:25

## 2023-03-13 RX ADMIN — FAMOTIDINE 20 MG: 20 TABLET, FILM COATED ORAL at 08:25

## 2023-03-13 RX ADMIN — POTASSIUM CHLORIDE 40 MEQ: 10 CAPSULE, COATED, EXTENDED RELEASE ORAL at 11:36

## 2023-03-13 RX ADMIN — MUPIROCIN 1 APPLICATION: 20 OINTMENT TOPICAL at 08:25

## 2023-03-13 RX ADMIN — LEVOFLOXACIN 250 MG: 250 TABLET, FILM COATED ORAL at 08:25

## 2023-03-13 NOTE — THERAPY DISCHARGE NOTE
Acute Care - Occupational Therapy Discharge Summary  [facility]     Patient Name: Manohar Garcia  : 1940  MRN: [id number]    Today's Date: 3/13/2023                 Admit Date: 3/9/2023        OT Recommendation and Plan    Visit Dx:    ICD-10-CM ICD-9-CM   1. FERNANDO (acute kidney injury) (Formerly [facility])  N17.9 584.9   2. Acute UTI  N39.0 599.0   3. Thrombocytopenia (Formerly [facility])  D69.6 287.5   4. Traumatic rhabdomyolysis, initial encounter (Formerly [facility])  T79.6XXA 958.6   5. Impaired functional mobility and activity tolerance  Z74.09 V49.89          Time Calculation- OT     Row Name 23 0852             Timed Charges    50566 - Gait Training Minutes  14  -LY         Total Minutes    Timed Charges Total Minutes 14  -LY       Total Minutes 14  -LY            User Key  (r) = Recorded By, (t) = Taken By, (c) = Cosigned By    Initials Name Provider Type    Lauren De La Cruz, PTA Physical Therapist Assistant                   OT Rehab Goals     Row Name 23 1300             Bathing Goal 1 (OT)    Activity/Device (Bathing Goal 1, OT) bathing skills, all  -CH      [place] Level/Cues Needed (Bathing Goal 1, OT) independent  -CH      Time Frame (Bathing Goal 1, OT) long term goal (LTG);by discharge  -CH      Progress/Outcomes (Bathing Goal 1, OT) goal not met  -CH         Dressing Goal 1 (OT)    Activity/Device (Dressing Goal 1, OT) dressing skills, all  -CH      [place]/Cues Needed (Dressing Goal 1, OT) independent  -CH      Time Frame (Dressing Goal 1, OT) long term goal (LTG);by discharge  -CH      Progress/Outcome (Dressing Goal 1, OT) goal not met  -CH         Toileting Goal 1 (OT)    Activity/Device (Toileting Goal 1, OT) toileting skills, all  -CH      [place] Level/Cues Needed (Toileting Goal 1, OT) independent  -CH      Time Frame (Toileting Goal 1, OT) long term goal (LTG);by discharge  -CH      Progress/Outcome (Toileting Goal 1, OT) goal not met  -CH            User Key  (r) = Recorded By, (t) = Taken By,  (c) = Cosigned By    Initials Name Provider Type Discipline    CH Erika Mann, OTR/L Occupational Therapist OT                 Outcome Measures     Row Name 03/13/23 0808             How much help from another person do you currently need...    Turning from your back to your side while in flat bed without using bedrails? 4  -LY      Moving from lying on back to sitting on the side of a flat bed without bedrails? 4  -LY      Moving to and from a bed to a chair (including a wheelchair)? 3  -LY      Standing up from a chair using your arms (e.g., wheelchair, bedside chair)? 3  -LY      Climbing 3-5 steps with a railing? 3  -LY      To walk in hospital room? 3  -LY      AM-PAC 6 Clicks Score (PT) 20  -LY         Functional Assessment    Outcome Measure Options AM-PAC 6 Clicks Basic Mobility (PT)  -LY            User Key  (r) = Recorded By, (t) = Taken By, (c) = Cosigned By    Initials Name Provider Type    LY Lauren Auguste, PTA Physical Therapist Assistant                        OT Discharge Summary  Anticipated Discharge Disposition (OT): home with assist, home with home health  Reason for Discharge: Discharge from facility  Outcomes Achieved: Refer to plan of care for updates on goals achieved  Discharge Destination: Home with assist, Home with home health      Erika Mann, CARMENR/L  3/13/2023

## 2023-03-13 NOTE — PLAN OF CARE
Goal Outcome Evaluation:  Plan of Care Reviewed With: patient        Progress: improving  Outcome Evaluation: PT tx completed. Pt in bed, t/f to EOB I. Stands with RWX and SBA. Able to amb up to 200' with RWX and CGA. Occasional cues for posture and safe gait mechanics. Pt encouraged to sit up for meals. Pt agreeable. Will cont to follow for improved safety and strength.

## 2023-03-13 NOTE — DISCHARGE SUMMARY
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          Procedures Performed:                 Pertinent Test Results:   Procedure Component Value Units Date/Time    CT Abdomen Pelvis Stone Protocol [[id number]] Marin as Reviewed   Order Status: Completed Collected: 03/10/23 1520    Updated: 03/10/23 1549   Narrative:     EXAMINATION: CT ABDOMEN PELVIS STONE PROTOCOL- 3/10/2023 3:15 PM CST       HISTORY: Sepsis due to UTI, hematuria, rising creatinine; N17.9-Acute   kidney failure, unspecified; N39.0-Urinary tract infection, site not   specified; D69.6-Thrombocytopenia, unspecified; T79.6XXA-Traumatic   ischemia of muscle, initial encounter       DOSE: 641 mGycm (Automatic exposure control technique was implemented in   an effort to keep the radiation dose as low as possible without   compromising image quality)       REPORT: Spiral CT of the abdomen and pelvis was performed without   contrast from the lung bases through the pubic symphysis. Reconstructed   coronal and sagittal images are also reviewed.       Comparison: CT abdomen pelvis without contrast 10/18/2017.       There is bilateral gynecomastia, which may be asymmetric and greater on   the right, though the asymmetry may be due to positioning and incomplete   visualization of the left  breast. Review of lung windows demonstrates   mild respiratory motion artifact, there is moderate bibasilar   atelectasis, greater on the right. No pneumothorax is identified. There   appear to be trace bilateral pleural effusions, greater on the right.   Focal calcified plaque is noted within the proximal LAD coronary artery.   The liver has a macronodular surface as before compatible with   cirrhosis. The spleen is enlarged measuring 15 x 12 cm, compatible with   portal hypertension and there are collateral vessels in the central   abdomen, left upper quadrant. There is accessory splenule medial to the   spleen that measures 35 mm. The stomach is distended with fluid, without   obvious wall thickening. This mildly increased attenuation of central   mesenteric fat planes and fat planes around the pancreas, which may be   due to vascular congestion versus acute pancreatitis. The adrenal glands   appear within normal limits. 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 There is mild   distention of the urinary bladder with associated mild wall thickening.   There is a bladder stone dependently that measures 3 cm. Moderate   prostate enlargement is present. No evidence of ascites. There is mild   sigmoid diverticulosis without diverticulitis. The transverse colon and   hepatic flexure are filled with gas, there appears to be mild mucosal   thickening involving the hepatic flexure along the posterior wall. The   adjacent pericolonic fat planes are preserved in this region and this   may be an artifact of under distention. There are fat-containing   bilateral inguinal hernias. There is increased attenuation of fat planes   over the flanks and linear regions compatible with dependent edema and   mild volume overload. There is atrophy of the abdominal wall   musculature. Review of bone windows shows no acute abnormality.       Impression:     1. Findings compatible with  cirrhosis and portal hypertension, there is   vascular congestion within the central mesentery and mildly increased   attenuation of fat planes around the pancreas, which could be due to   either vascular congestion or mild acute pancreatitis. Clinical   correlation is recommended.   2. Mild bilateral hydronephrosis and hydroureter to the level of the   bladder, with evidence of urinary retention. Mild bladder wall   thickening is noted probably due to chronic bladder outlet obstruction   and prostamegaly. There is a 3 cm bladder stone, which currently rests   at the left UVJ level.   3. No intra-abdominal free fluid, free air or abscess. Mild sigmoid   diverticulosis. Mild mucosal prominence involving the posterior wall of   the hepatic flexure of colon probably due to underdistention artifact.   This report was finalized on 03/10/2023 15:46 by Dr. Antoinno Victor MD.         Chief Complaint on Day of Discharge: None    Hospital Course:  The patient is a 82 y.o. male who presented to [facility] after complaining of a fall and worsening weakness above his baseline, mild confusion, frequency/dysuria. He lives with his daughter.  He typically is independent of ADLs.  Work-up in the emergency department was consistent with a urinary tract infection.  He had a recent diagnosis of a urinary tract infection as an outpatient and recently completed a course of Omnicef.     His outpatient urine culture from 2/23 grew alpha-hemolytic Streptococcus.  A urine culture done in the emergency department on 3/9 grew pan susceptible Pseudomonas.  He also has Pseudomonas in 1/4 bottles of blood cultures drawn in the ER. He is allergic to penicillins.  He was started on levofloxacin. He had large blood on his urinalysis. A CT of the abdomen and pelvis stone protocol was obtained on 3/10. This showed mild bilateral hydronephrosis and hydroureter to the level of the bladder with evidence of urinary retention.  Mild bladder  "wall thickening noted probably due to chronic bladder outlet obstruction and prostatomegaly. There was a 3 cm bladder stone which currently at rest at the level of the left UVJ.  A Perkins catheter was ordered, but the patient refused this.  He is voiding improved and his serum creatinine also improved from 2.25 on 3/10-1.43 prior to discharge.  He was started on Flomax and given a referral to see urologist on discharge.  He was eventually discharged in stable condition home with home health for physical and Occupational Therapy.    Condition on Discharge: Stable    Physical Exam on Discharge:  /62 (BP Location: Right arm, Patient Position: Lying)   Pulse 74   Temp 97.9 °F (36.6 °C) (Oral)   Resp 18   Ht 180.3 cm (71\")   Wt 106 kg (234 lb)   SpO2 95%   BMI 32.64 kg/m²   Physical Exam    Constitutional:       Comments: Up in bed.  No distress.  No family present.  Discussed with his nurse, Sue.   HENT:      Head: Normocephalic and atraumatic.      Ears:      Comments: Elim IRA.  Eyes:      Conjunctiva/sclera: Conjunctivae normal.      Pupils: Pupils are equal, round, and reactive to light.   Neck:      Vascular: No JVD.   Cardiovascular:      Rate and Rhythm: Normal rate and regular rhythm.      Heart sounds: Normal heart sounds.   Pulmonary:      Effort: Pulmonary effort is normal. No respiratory distress.      Breath sounds: Normal breath sounds. No wheezing or rales.   Chest:      Chest wall: No tenderness.   Abdominal:      General: Bowel sounds are normal. There is  mild distension.      Palpations: Abdomen is soft.      Tenderness: There is no abdominal tenderness.   Musculoskeletal:         General: Tenderness (right knee) present. No deformity.      Cervical back: Neck supple.      Comments: Small abrasion to his right knee.  No effusion or discernible injury.   Skin:     General: Skin is warm and dry.      Findings: No rash.   Neurological:      General: No focal deficit present.      Mental Status: " He is alert and oriented to person, place, and time.      Cranial Nerves: No cranial nerve deficit.      Motor: Weakness present. No abnormal muscle tone.      Deep Tendon Reflexes: Reflexes normal.      Comments: He is oriented, but also pleasantly confused-appears to be baseline.   Psychiatric:      Comments: Flat affect, but conversational.     Discharge Disposition:  [place] [facility]    Discharge Medications:     Discharge Medications      New Medications      Instructions Start Date   levoFLOXacin 250 MG tablet  Commonly known as: Levaquin   250 mg, Oral, Daily      potassium chloride 10 MEQ CR tablet   10 mEq, Oral, Daily      tamsulosin 0.4 MG capsule 24 hr capsule  Commonly known as: FLOMAX   0.4 mg, Oral, Daily   Start Date: March 14, 2023        Continue These Medications      Instructions Start Date   bisacodyl 5 MG EC tablet  Commonly known as: DULCOLAX   5 mg, Oral, Daily PRN      cimetidine 200 MG tablet  Commonly known as: TAGAMET   200 mg, Oral, 2 Times Daily PRN      mupirocin 2 % ointment  Commonly known as: BACTROBAN   1 application, Topical, 3 Times Daily             Discharge Diet:   Diet Instructions     Diet: Cardiac      Discharge Diet: Cardiac          Activity at Discharge:   Activity Instructions     Activity as Tolerated            Discharge Care Plan/Instructions:   1.  Establish care with a primary care provider and follow-up within 1 week of discharge.   2.  Follow-up with urologist Dr. Arias within 2 weeks of discharge.    Follow-up Appointments:   No future appointments.    Test Results Pending at Discharge:   Pending Labs     Order Current Status    Blood Culture - Blood, Arm, Left Preliminary result          Mark Parker MD  03/13/23  11:38 CDT    Time: 35 minutes of time was spent evaluating patient and planning discharge.      Part of this note may be an electronic transcription/translation of spoken language to printed text using the Dragon Dictation  system.

## 2023-03-13 NOTE — THERAPY TREATMENT NOTE
Acute Care - Physical Therapy Treatment Note  [facility]     Patient Name: Manohar Garcia  : 1940  MRN: [id number]  Today's Date: 3/13/2023      Visit Dx:     ICD-10-CM ICD-9-CM   1. FERNANDO (acute kidney injury) (HCC)  N17.9 584.9   2. Acute UTI  N39.0 599.0   3. Thrombocytopenia (HCC)  D69.6 287.5   4. Traumatic rhabdomyolysis, initial encounter (HCC)  T79.6XXA 958.6   5. 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)   • Thrombocytopenia (HCC)   • Hyperlactatemia     Past Medical History:   Diagnosis Date   • Dementia (HCC)    • GERD (gastroesophageal reflux disease)    • Liver disorder      Past Surgical History:   Procedure Laterality Date   • HEMORRHOIDECTOMY     • HERNIA REPAIR       PT Assessment (last 12 hours)     PT Evaluation and Treatment     Row Name 23 0808          Physical Therapy Time and Intention    Subjective Information no complaints  -LY     Document Type therapy note (daily note)  -LY     Mode of Treatment physical therapy  -LY     Row Name 23 0808          General Information    Existing Precautions/Restrictions fall  -LY     Row Name 23 0808          Pain    Pretreatment Pain Rating 0/10 - no pain  -LY     Posttreatment Pain Rating 0/10 - no pain  -LY     Row Name 23 0808          Bed Mobility    Supine-Sit [place] (Bed Mobility) independent  -LY     Row Name 23 0808          Sit-Stand Transfer    Sit-Stand [place] (Transfers) standby assist  -LY     Row Name 23 0808          Stand-Sit Transfer    Stand-Sit [place] (Transfers) contact guard  -LY     Row Name 23 0808          Gait/Stairs (Locomotion)    [place] Level  (Gait) verbal cues;contact guard  -LY     Assistive Device (Gait) walker, front-wheeled  -LY     Distance in Feet (Gait) 200'  -LY     Pattern (Gait) step-through  -LY     Deviations/Abnormal Patterns (Gait) gait speed decreased;stride length decreased  -LY     Bilateral Gait Deviations forward flexed posture  -LY     Row Name             Wound 03/09/23 2207 Left anterior plantar Abrasion    Wound - Properties Group Placement Date: 03/09/23 -LL Placement Time: 2207 -LL Side: Left  -LL Orientation: anterior  -LL Location: plantar  -LL Primary Wound Type: Abrasion  -LL    Retired Wound - Properties Group Placement Date: 03/09/23 -LL Placement Time: 2207 -LL Side: Left  -LL Orientation: anterior  -LL Location: plantar  -LL Primary Wound Type: Abrasion  -LL    Retired Wound - Properties Group Date first assessed: 03/09/23 -LL Time first assessed: 2207 -LL Side: Left  -LL Location: plantar  -LL Primary Wound Type: Abrasion  -LL    Row Name 03/13/23 0808          Plan of Care Review    Plan of Care Reviewed With patient  -LY     Progress improving  -LY     Outcome Evaluation PT tx completed. Pt in bed, t/f to EOB I. Stands with RWX and SBA. Able to amb up to 200' with RWX and CGA. Occasional cues for posture and safe gait mechanics. Pt encouraged to sit up for meals. Pt agreeable. Will cont to follow for improved safety and strength.  -LY     Row Name 03/13/23 0808          Positioning and Restraints    Pre-Treatment Position in bed  -LY     Post Treatment Position chair  -LY     In Chair notified nsg;sitting;call light within reach;encouraged to call for assist  -LY           User Key  (r) = Recorded By, (t) = Taken By, (c) = Cosigned By    Initials Name Provider Type    LY Lauren Auguste, PTA Physical Therapist Assistant    Brisa Crum, RN Registered Nurse                Physical Therapy Education     Title: PT OT SLP Therapies (In Progress)     Topic: Physical Therapy (In Progress)     Point: Mobility  training (Done)     Learning Progress Summary           Patient Acceptance, E,TB,D, VU,DU,NR by  at 3/12/2023 1731    Comment: Education re: purpose of PT/importance of activity, for safety/falls prevention, to call for assist to be up and to pace his activity improving tolerance to out of bed tasks                   Point: Home exercise program (Not Started)     Learner Progress:  Not documented in this visit.          Point: Precautions (Done)     Learning Progress Summary           Patient Acceptance, E,TB,D, VU,DU,NR by  at 3/12/2023 1731    Comment: Education re: purpose of PT/importance of activity, for safety/falls prevention, to call for assist to be up and to pace his activity improving tolerance to out of bed tasks                               User Key     Initials Effective Dates Name Provider Type Discipline     08/02/18 -  Kennedi Crane, PT Physical Therapist PT              PT Recommendation and Plan     Plan of Care Reviewed With: patient  Progress: improving  Outcome Evaluation: PT tx completed. Pt in bed, t/f to EOB I. Stands with RWX and SBA. Able to amb up to 200' with RWX and CGA. Occasional cues for posture and safe gait mechanics. Pt encouraged to sit up for meals. Pt agreeable. Will cont to follow for improved safety and strength.   Outcome Measures     Row Name 03/13/23 0808             How much help from another person do you currently need...    Turning from your back to your side while in flat bed without using bedrails? 4  -LY      Moving from lying on back to sitting on the side of a flat bed without bedrails? 4  -LY      Moving to and from a bed to a chair (including a wheelchair)? 3  -LY      Standing up from a chair using your arms (e.g., wheelchair, bedside chair)? 3  -LY      Climbing 3-5 steps with a railing? 3  -LY      To walk in hospital room? 3  -LY      AM-PAC 6 Clicks Score (PT) 20  -LY         Functional Assessment    Outcome Measure Options AM-PAC 6 Clicks Basic  Mobility (PT)  -LY            User Key  (r) = Recorded By, (t) = Taken By, (c) = Cosigned By    Initials Name Provider Type    Lauren De La Cruz PTA Physical Therapist Assistant                 Time Calculation:    PT Charges     Row Name 03/13/23 0852             Time Calculation    Start Time 0808  -LY      Stop Time 0822  -LY      Time Calculation (min) 14 min  -LY      PT Received On 03/13/23  -LY         Time Calculation- PT    Total Timed Code Minutes- PT 14 minute(s)  -LY         Timed Charges    40801 - Gait Training Minutes  14  -LY         Total Minutes    Timed Charges Total Minutes 14  -LY       Total Minutes 14  -LY            User Key  (r) = Recorded By, (t) = Taken By, (c) = Cosigned By    Initials Name Provider Type    Lauren De La Cruz PTA Physical Therapist Assistant              Therapy Charges for Today     Code Description Service Date Service Provider Modifiers Qty    [id number] HC GAIT TRAINING EA 15 MIN 3/13/2023 Lauren Auguste PTA GP 1          PT G-Codes  Outcome Measure Options: AM-PAC 6 Clicks Basic Mobility (PT)  AM-PAC 6 Clicks Score (PT): 20  AM-PAC 6 Clicks Score (OT): 21    Lauren Auguste PTA  3/13/2023

## 2023-03-13 NOTE — PLAN OF CARE
Goal Outcome Evaluation:  Plan of Care Reviewed With: patient        Progress: no change  Outcome Evaluation: Patient rested well throughout the shift. He did accidentally remove IV in his sleep, tip was intact. VSS.

## 2023-03-13 NOTE — THERAPY DISCHARGE NOTE
Acute Care - Physical Therapy Discharge Summary  [facility]       Patient Name: Manohar Garcia  : 1940  MRN: [id number]    Today's Date: 3/13/2023                 Admit Date: 3/9/2023      PT Recommendation and Plan    Visit Dx:    ICD-10-CM ICD-9-CM   1. FERNANDO (acute kidney injury) ([facility])  N17.9 584.9   2. Acute UTI  N39.0 599.0   3. Thrombocytopenia ([facility])  D69.6 287.5   4. Traumatic rhabdomyolysis, initial encounter ([facility])  T79.6XXA 958.6   5. Impaired functional mobility and activity tolerance  Z74.09 V49.89        Outcome Measures     Row Name 23 0808             How much help from another person do you currently need...    Turning from your back to your side while in flat bed without using bedrails? 4  -LY      Moving from lying on back to sitting on the side of a flat bed without bedrails? 4  -LY      Moving to and from a bed to a chair (including a wheelchair)? 3  -LY      Standing up from a chair using your arms (e.g., wheelchair, bedside chair)? 3  -LY      Climbing 3-5 steps with a railing? 3  -LY      To walk in hospital room? 3  -LY      AM-PAC 6 Clicks Score (PT) 20  -LY         Functional Assessment    Outcome Measure Options AM-PAC 6 Clicks Basic Mobility (PT)  -LY            User Key  (r) = Recorded By, (t) = Taken By, (c) = Cosigned By    Initials Name Provider Type    Lauren De La Cruz PTA Physical Therapist Assistant                 PT Charges     Row Name 23 0852             Time Calculation    Start Time 0808  -LY      Stop Time 0822  -LY      Time Calculation (min) 14 min  -LY      PT Received On 23  -LY         Time Calculation- PT    Total Timed Code Minutes- PT 14 minute(s)  -LY         Timed Charges    04417 - Gait Training Minutes  14  -LY         Total Minutes    Timed Charges Total Minutes 14  -LY       Total Minutes 14  -LY            User Key  (r) = Recorded By, (t) = Taken By, (c) = Cosigned By    Initials Name Provider Type    Lauren De La Cruz PTA  Physical Therapist Assistant                 PT Rehab Goals     Row Name 03/13/23 1300             Bed Mobility Goal 1 (PT)    Activity/Assistive Device (Bed Mobility Goal 1, PT) bed mobility activities, all  -AB      [place] Level/Cues Needed (Bed Mobility Goal 1, PT) independent  -AB      Time Frame (Bed Mobility Goal 1, PT) long term goal (LTG);10 days  -AB      Progress/Outcomes (Bed Mobility Goal 1, PT) goal met  -AB         Transfer Goal 1 (PT)    Activity/Assistive Device (Transfer Goal 1, PT) sit-to-stand/stand-to-sit;bed-to-chair/chair-to-bed  -AB      [place] Level/Cues Needed (Transfer Goal 1, PT) standby assist;modified independence  -AB      Time Frame (Transfer Goal 1, PT) long term goal (LTG);10 days  -AB      Progress/Outcome (Transfer Goal 1, PT) goal not met  -AB         Gait Training Goal 1 (PT)    Activity/Assistive Device (Gait Training Goal 1, PT) gait (walking locomotion);assistive device use;decrease fall risk;improve balance and speed;increase endurance/gait distance;increase energy conservation;walker, rolling  -AB      [place] Level (Gait Training Goal 1, PT) standby assist;modified independence  -AB      Distance (Gait Training Goal 1, PT) 250+ ft demonstrating awareness of energy conservation techniques  -AB      Time Frame (Gait Training Goal 1, PT) long term goal (LTG);10 days  -AB      Progress/Outcome (Gait Training Goal 1, PT) goal partially met  -AB         Stairs Goal 1 (PT)    Activity/Assistive Device (Stairs Goal 1, PT) ascending stairs;descending stairs;step-to-step;decrease fall risk;improve balance and speed  -AB      [place] Level/Cues Needed (Stairs Goal 1, PT) contact guard required  -AB      Number of Stairs (Stairs Goal 1, PT) 5  -AB      Time Frame (Stairs Goal 1, PT) long term goal (LTG);10 days  -AB      Progress/Outcome (Stairs Goal 1, PT) goal not met  -AB         Patient Education Goal (PT)    Activity (Patient Education Goal, PT) HEP for  strengthening, postural and breathing ex  -AB      [place]/Cues/Accuracy (Memory Goal 2, PT) demonstrates adequately;independent;verbalizes understanding  -AB      Time Frame (Patient Education Goal, PT) long term goal (LTG);10 days  -AB      Progress/Outcome (Patient Education Goal, PT) goal partially met  -AB            User Key  (r) = Recorded By, (t) = Taken By, (c) = Cosigned By    Initials Name Provider Type Discipline    Cait Villalba, PTA Physical Therapist Assistant PT                    PT Discharge Summary  Anticipated Discharge Disposition (PT): home with assist  Reason for Discharge: Discharge from facility  Outcomes Achieved: Refer to plan of care for updates on goals achieved  Discharge Destination: Home with assist      Cait Montenegro PTA   3/13/2023

## 2023-03-14 ENCOUNTER — TRANSITIONAL CARE MANAGEMENT TELEPHONE ENCOUNTER (OUTPATIENT)
Dept: CALL CENTER | Facility: HOSPITAL | Age: 83
End: 2023-03-14
Payer: MEDICARE

## 2023-03-14 LAB — BACTERIA SPEC AEROBE CULT: NORMAL

## 2023-03-14 NOTE — OUTREACH NOTE
Prep Survey    Flowsheet Row Responses   [facility] facility patient discharged from? [place]   Is LACE score < 7 ? No   Eligibility [facility]   Date of Admission 03/09/23   Date of Discharge 03/13/23   Discharge Disposition Home-Health Care Sv   Discharge diagnosis Cystitis    Does the patient have one of the following disease processes/diagnoses(primary or secondary)? Other   Does the patient have Home health ordered? No   Is there a DME ordered? No   Comments regarding appointments no PCP appt.   Prep survey completed? Yes          CARMEN COOPER - Registered Nurse

## 2023-03-14 NOTE — PROGRESS NOTES
Enter Query Response Below      Query Response:     No    Electronically signed by Javier Paulson, DO, 23, 7:05 PM CDT.         If applicable, please update the problem list.   Patient: Manohar Garcia        : 1940  Account: [id number]           Admit Date: 3/9/23        How to Respond to this query:       a. Click New Note     b. Answer query within the yellow box.                c. Update the Problem List, if applicable.      If you have any questions about this query contact me at: [email or web address]    Dr. Paulson,  Patient has a diagnosis of metabolic encephalopathy and also has a history of dementia. He was described as oriented x3 throughout the admission. Progress notes stated Remains pleasantly confused, which is likely his baseline.    After study, was metabolic encephalopathy clinically supported during this admission?    -Yes, please include additional clinical indicators:____________  -No  -Other- specify________  -Unable to determine      By submitting this query, we are merely seeking further clarification of documentation to accurately reflect all conditions that you are monitoring, evaluating, treating or that extend the hospitalization or utilize additional resources of care. Please utilize your independent clinical judgment when addressing the question(s) above.     This query and your response, once completed, will be entered into the legal medical record.    Sincerely,  Kathy Foster  Clinical Documentation Integrity Program

## 2023-03-14 NOTE — OUTREACH NOTE
Call Center TCM Note    Flowsheet Row Responses   [facility] [place] [facility] from? [place]   Does the patient have one of the following disease processes/diagnoses(primary or secondary)? Other   TCM attempt successful? No   Unsuccessful attempts Attempt 1          Fani Lyle RN    3/14/2023, 12:01 CDT

## 2023-03-14 NOTE — OUTREACH NOTE
Call Center TCM Note    Flowsheet Row Responses   [facility] patient discharged from? [place]   Does the patient have one of the following disease processes/diagnoses(primary or secondary)? Other   TCM attempt successful? No   Unsuccessful attempts Attempt 2          Fani Lyle RN    3/14/2023, 12:45 CDT

## 2023-03-15 ENCOUNTER — TRANSITIONAL CARE MANAGEMENT TELEPHONE ENCOUNTER (OUTPATIENT)
Dept: CALL CENTER | Facility: HOSPITAL | Age: 83
End: 2023-03-15
Payer: MEDICARE

## 2023-03-15 NOTE — OUTREACH NOTE
Call Center TCM Note    Flowsheet Row Responses   [facility] patient discharged from? [place]   Does the patient have one of the following disease processes/diagnoses(primary or secondary)? Other   TCM attempt successful? No   Unsuccessful attempts Attempt 3          Em Jones RN    3/15/2023, 11:41 CDT

## 2023-03-27 ENCOUNTER — HOME HEALTH ADMISSION (OUTPATIENT)
Dept: HOME HEALTH SERVICES | Facility: HOME HEALTHCARE | Age: 83
End: 2023-03-27
Payer: MEDICARE

## 2024-08-29 ENCOUNTER — APPOINTMENT (OUTPATIENT)
Dept: GENERAL RADIOLOGY | Facility: HOSPITAL | Age: 84
End: 2024-08-29
Payer: MEDICARE

## 2024-08-29 ENCOUNTER — APPOINTMENT (OUTPATIENT)
Dept: CT IMAGING | Facility: HOSPITAL | Age: 84
End: 2024-08-29
Payer: MEDICARE

## 2024-08-29 ENCOUNTER — HOSPITAL ENCOUNTER (INPATIENT)
Facility: HOSPITAL | Age: 84
LOS: 6 days | Discharge: NURSING, REHAB OR LONG-TERM CARE | End: 2024-09-04
Attending: EMERGENCY MEDICINE | Admitting: FAMILY MEDICINE
Payer: MEDICARE

## 2024-08-29 DIAGNOSIS — Z74.09 IMPAIRED MOBILITY: ICD-10-CM

## 2024-08-29 DIAGNOSIS — S72.001A CLOSED FRACTURE OF RIGHT HIP, INITIAL ENCOUNTER: ICD-10-CM

## 2024-08-29 DIAGNOSIS — N21.0 BLADDER STONE: ICD-10-CM

## 2024-08-29 DIAGNOSIS — N18.9 ACUTE ON CHRONIC RENAL INSUFFICIENCY: ICD-10-CM

## 2024-08-29 DIAGNOSIS — N13.30 HYDRONEPHROSIS, UNSPECIFIED HYDRONEPHROSIS TYPE: ICD-10-CM

## 2024-08-29 DIAGNOSIS — N28.9 ACUTE ON CHRONIC RENAL INSUFFICIENCY: ICD-10-CM

## 2024-08-29 DIAGNOSIS — S51.012A SKIN TEAR OF LEFT ELBOW WITHOUT COMPLICATION, INITIAL ENCOUNTER: ICD-10-CM

## 2024-08-29 DIAGNOSIS — S72.141A CLOSED 2-PART INTERTROCHANTERIC FRACTURE OF RIGHT FEMUR, INITIAL ENCOUNTER: ICD-10-CM
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
## 2024-08-29 PROBLEM — S72.009A HIP FRACTURE: Status: ACTIVE | Noted: 2024-08-29

## 2024-08-29 LAB
ALBUMIN SERPL-MCNC: 3.1 G/DL (ref 3.5–5.2)
ALBUMIN/GLOB SERPL: 0.9 G/DL
ALP SERPL-CCNC: 110 U/L (ref 39–117)
ALT SERPL W P-5'-P-CCNC: 14 U/L (ref 1–41)
ANION GAP SERPL CALCULATED.3IONS-SCNC: 9 MMOL/L (ref 5–15)
APTT PPP: 33.5 SECONDS (ref 24.5–36)
AST SERPL-CCNC: 25 U/L (ref 1–40)
BACTERIA UR QL AUTO: ABNORMAL /HPF
BASOPHILS # BLD AUTO: 0.01 10*3/MM3 (ref 0–0.2)
BASOPHILS NFR BLD AUTO: 0.2 % (ref 0–1.5)
BILIRUB SERPL-MCNC: 1 MG/DL (ref 0–1.2)
BILIRUB UR QL STRIP: NEGATIVE
BUN SERPL-MCNC: 26 MG/DL (ref 8–23)
BUN/CREAT SERPL: 11.1 (ref 7–25)
CALCIUM SPEC-SCNC: 8.8 MG/DL (ref 8.6–10.5)
CHLORIDE SERPL-SCNC: 108 MMOL/L (ref 98–107)
CLARITY UR: ABNORMAL
CO2 SERPL-SCNC: 22 MMOL/L (ref 22–29)
COD CRY URNS QL: ABNORMAL /HPF
COLOR UR: ABNORMAL
CREAT SERPL-MCNC: 2.34 MG/DL (ref 0.76–1.27)
D-LACTATE SERPL-SCNC: 2.1 MMOL/L (ref 0.5–2)
DEPRECATED RDW RBC AUTO: 46.9 FL (ref 37–54)
EGFRCR SERPLBLD CKD-EPI 2021: 26.8 ML/MIN/1.73
EOSINOPHIL # BLD AUTO: 0.19 10*3/MM3 (ref 0–0.4)
EOSINOPHIL NFR BLD AUTO: 3.9 % (ref 0.3–6.2)
ERYTHROCYTE [DISTWIDTH] IN BLOOD BY AUTOMATED COUNT: 14.2 % (ref 12.3–15.4)
GLOBULIN UR ELPH-MCNC: 3.3 GM/DL
GLUCOSE SERPL-MCNC: 102 MG/DL (ref 65–99)
GLUCOSE UR STRIP-MCNC: NEGATIVE MG/DL
HCT VFR BLD AUTO: 36.8 % (ref 37.5–51)
HGB BLD-MCNC: 12.4 G/DL (ref 13–17.7)
HGB UR QL STRIP.AUTO: ABNORMAL
IMM GRANULOCYTES # BLD AUTO: 0.02 10*3/MM3 (ref 0–0.05)
IMM GRANULOCYTES NFR BLD AUTO: 0.4 % (ref 0–0.5)
INR PPP: 1.12 (ref 0.91–1.09)
KETONES UR QL STRIP: NEGATIVE
LEUKOCYTE ESTERASE UR QL STRIP.AUTO: ABNORMAL
LYMPHOCYTES # BLD AUTO: 1.89 10*3/MM3 (ref 0.7–3.1)
LYMPHOCYTES NFR BLD AUTO: 38.7 % (ref 19.6–45.3)
MCH RBC QN AUTO: 30.4 PG (ref 26.6–33)
MCHC RBC AUTO-ENTMCNC: 33.7 G/DL (ref 31.5–35.7)
MCV RBC AUTO: 90.2 FL (ref 79–97)
MONOCYTES # BLD AUTO: 0.47 10*3/MM3 (ref 0.1–0.9)
MONOCYTES NFR BLD AUTO: 9.6 % (ref 5–12)
NEUTROPHILS NFR BLD AUTO: 2.3 10*3/MM3 (ref 1.7–7)
NEUTROPHILS NFR BLD AUTO: 47.2 % (ref 42.7–76)
NITRITE UR QL STRIP: NEGATIVE
PH UR STRIP.AUTO: 7.5 [PH] (ref 5–8)
PLATELET # BLD AUTO: 121 10*3/MM3 (ref 140–450)
PMV BLD AUTO: 10.5 FL (ref 6–12)
POTASSIUM SERPL-SCNC: 4.1 MMOL/L (ref 3.5–5.2)
PROT SERPL-MCNC: 6.4 G/DL (ref 6–8.5)
PROT UR QL STRIP: ABNORMAL
PROTHROMBIN TIME: 14.9 SECONDS (ref 11.8–14.8)
RBC # BLD AUTO: 4.08 10*6/MM3 (ref 4.14–5.8)
RBC # UR STRIP: ABNORMAL /HPF
REF LAB TEST METHOD: ABNORMAL
SODIUM SERPL-SCNC: 139 MMOL/L (ref 136–145)
SP GR UR STRIP: 1.02 (ref 1–1.03)
SQUAMOUS #/AREA URNS HPF: ABNORMAL /HPF
UROBILINOGEN UR QL STRIP: ABNORMAL
WBC # UR STRIP: ABNORMAL /HPF
WBC NRBC COR # BLD AUTO: 4.88 10*3/MM3 (ref 3.4–10.8)

## 2024-08-29 PROCEDURE — 25010000002 MORPHINE PER 10 MG: Performed by: EMERGENCY MEDICINE

## 2024-08-29 PROCEDURE — 85610 PROTHROMBIN TIME: CPT | Performed by: EMERGENCY MEDICINE

## 2024-08-29 PROCEDURE — 25810000003 LACTATED RINGERS PER 1000 ML: Performed by: INTERNAL MEDICINE

## 2024-08-29 PROCEDURE — 73502 X-RAY EXAM HIP UNI 2-3 VIEWS: CPT

## 2024-08-29 PROCEDURE — 90715 TDAP VACCINE 7 YRS/> IM: CPT | Performed by: EMERGENCY MEDICINE

## 2024-08-29 PROCEDURE — 80053 COMPREHEN METABOLIC PANEL: CPT | Performed by: EMERGENCY MEDICINE

## 2024-08-29 PROCEDURE — 85025 COMPLETE CBC W/AUTO DIFF WBC: CPT | Performed by: EMERGENCY MEDICINE

## 2024-08-29 PROCEDURE — 73700 CT LOWER EXTREMITY W/O DYE: CPT

## 2024-08-29 PROCEDURE — 70450 CT HEAD/BRAIN W/O DYE: CPT

## 2024-08-29 PROCEDURE — 71045 X-RAY EXAM CHEST 1 VIEW: CPT

## 2024-08-29 PROCEDURE — 81001 URINALYSIS AUTO W/SCOPE: CPT | Performed by: EMERGENCY MEDICINE

## 2024-08-29 PROCEDURE — 99285 EMERGENCY DEPT VISIT HI MDM: CPT

## 2024-08-29 PROCEDURE — 85730 THROMBOPLASTIN TIME PARTIAL: CPT | Performed by: EMERGENCY MEDICINE

## 2024-08-29 PROCEDURE — 72128 CT CHEST SPINE W/O DYE: CPT

## 2024-08-29 PROCEDURE — 36415 COLL VENOUS BLD VENIPUNCTURE: CPT | Performed by: INTERNAL MEDICINE

## 2024-08-29 PROCEDURE — 90471 IMMUNIZATION ADMIN: CPT | Performed by: EMERGENCY MEDICINE

## 2024-08-29 PROCEDURE — 72131 CT LUMBAR SPINE W/O DYE: CPT

## 2024-08-29 PROCEDURE — 25010000002 ONDANSETRON PER 1 MG: Performed by: EMERGENCY MEDICINE

## 2024-08-29 PROCEDURE — 25010000002 CEFTRIAXONE PER 250 MG: Performed by: INTERNAL MEDICINE

## 2024-08-29 PROCEDURE — 83605 ASSAY OF LACTIC ACID: CPT | Performed by: INTERNAL MEDICINE

## 2024-08-29 PROCEDURE — 72125 CT NECK SPINE W/O DYE: CPT

## 2024-08-29 PROCEDURE — 25010000002 TETANUS-DIPHTH-ACELL PERTUSSIS 5-2.5-18.5 LF-MCG/0.5 SUSPENSION PREFILLED SYRINGE: Performed by: EMERGENCY MEDICINE

## 2024-08-29 RX ORDER — SODIUM CHLORIDE 0.9 % (FLUSH) 0.9 %
10 SYRINGE (ML) INJECTION AS NEEDED
Status: DISCONTINUED | OUTPATIENT
Start: 2024-08-29 | End: 2024-09-04 | Stop reason: HOSPADM

## 2024-08-29 RX ORDER — BISACODYL 5 MG/1
5 TABLET, DELAYED RELEASE ORAL DAILY PRN
Status: DISCONTINUED | OUTPATIENT
Start: 2024-08-29 | End: 2024-09-04 | Stop reason: HOSPADM

## 2024-08-29 RX ORDER — FAMOTIDINE 10 MG
10 TABLET ORAL 2 TIMES DAILY
COMMUNITY
End: 2024-08-29

## 2024-08-29 RX ORDER — OXYCODONE AND ACETAMINOPHEN 7.5; 325 MG/1; MG/1
1 TABLET ORAL EVERY 6 HOURS PRN
Status: DISCONTINUED | OUTPATIENT
Start: 2024-08-29 | End: 2024-09-03 | Stop reason: SDUPTHER

## 2024-08-29 RX ORDER — ONDANSETRON 2 MG/ML
4 INJECTION INTRAMUSCULAR; INTRAVENOUS EVERY 6 HOURS PRN
Status: DISCONTINUED | OUTPATIENT
Start: 2024-08-29 | End: 2024-09-04 | Stop reason: HOSPADM

## 2024-08-29 RX ORDER — FAMOTIDINE 10 MG/ML
20 INJECTION, SOLUTION INTRAVENOUS DAILY
Status: DISCONTINUED | OUTPATIENT
Start: 2024-08-30 | End: 2024-08-31

## 2024-08-29 RX ORDER — NYSTATIN 100000/ML
5 SUSPENSION, ORAL (FINAL DOSE FORM) ORAL 4 TIMES DAILY
Status: DISPENSED | OUTPATIENT
Start: 2024-08-29 | End: 2024-08-31

## 2024-08-29 RX ORDER — BISACODYL 10 MG
10 SUPPOSITORY, RECTAL RECTAL DAILY PRN
Status: DISCONTINUED | OUTPATIENT
Start: 2024-08-29 | End: 2024-09-04 | Stop reason: HOSPADM

## 2024-08-29 RX ORDER — HYDROMORPHONE HYDROCHLORIDE 1 MG/ML
0.5 INJECTION, SOLUTION INTRAMUSCULAR; INTRAVENOUS; SUBCUTANEOUS
Status: DISCONTINUED | OUTPATIENT
Start: 2024-08-29 | End: 2024-09-03 | Stop reason: ALTCHOICE

## 2024-08-29 RX ORDER — SODIUM CHLORIDE 0.9 % (FLUSH) 0.9 %
10 SYRINGE (ML) INJECTION EVERY 12 HOURS SCHEDULED
Status: DISCONTINUED | OUTPATIENT
Start: 2024-08-29 | End: 2024-09-04 | Stop reason: HOSPADM

## 2024-08-29 RX ORDER — SODIUM CHLORIDE, SODIUM LACTATE, POTASSIUM CHLORIDE, CALCIUM CHLORIDE 600; 310; 30; 20 MG/100ML; MG/100ML; MG/100ML; MG/100ML
75 INJECTION, SOLUTION INTRAVENOUS CONTINUOUS
Status: DISCONTINUED | OUTPATIENT
Start: 2024-08-29 | End: 2024-08-31

## 2024-08-29 RX ORDER — POLYETHYLENE GLYCOL 3350 17 G/17G
17 POWDER, FOR SOLUTION ORAL DAILY PRN
Status: DISCONTINUED | OUTPATIENT
Start: 2024-08-29 | End: 2024-09-04 | Stop reason: HOSPADM

## 2024-08-29 RX ORDER — MULTIPLE VITAMINS W/ MINERALS TAB 9MG-400MCG
1 TAB ORAL DAILY
COMMUNITY
End: 2024-08-29

## 2024-08-29 RX ORDER — ONDANSETRON 2 MG/ML
4 INJECTION INTRAMUSCULAR; INTRAVENOUS ONCE
Status: COMPLETED | OUTPATIENT
Start: 2024-08-29 | End: 2024-08-29

## 2024-08-29 RX ORDER — SODIUM CHLORIDE 9 MG/ML
40 INJECTION, SOLUTION INTRAVENOUS AS NEEDED
Status: DISCONTINUED | OUTPATIENT
Start: 2024-08-29 | End: 2024-09-04 | Stop reason: HOSPADM

## 2024-08-29 RX ORDER — AMOXICILLIN 250 MG
2 CAPSULE ORAL 2 TIMES DAILY PRN
Status: DISCONTINUED | OUTPATIENT
Start: 2024-08-29 | End: 2024-09-04 | Stop reason: HOSPADM

## 2024-08-29 RX ADMIN — Medication 10 ML: at 22:33

## 2024-08-29 RX ADMIN — MORPHINE SULFATE 4 MG: 4 INJECTION, SOLUTION INTRAMUSCULAR; INTRAVENOUS at 21:12

## 2024-08-29 RX ADMIN — SODIUM CHLORIDE, POTASSIUM CHLORIDE, SODIUM LACTATE AND CALCIUM CHLORIDE 75 ML/HR: 600; 310; 30; 20 INJECTION, SOLUTION INTRAVENOUS at 22:33

## 2024-08-29 RX ADMIN — NYSTATIN 500000 UNITS: 100000 SUSPENSION ORAL at 22:33

## 2024-08-29 RX ADMIN — TETANUS TOXOID, REDUCED DIPHTHERIA TOXOID AND ACELLULAR PERTUSSIS VACCINE, ADSORBED 0.5 ML: 5; 2.5; 8; 8; 2.5 SUSPENSION INTRAMUSCULAR at 18:31

## 2024-08-29 RX ADMIN — MORPHINE SULFATE 4 MG: 4 INJECTION, SOLUTION INTRAMUSCULAR; INTRAVENOUS at 18:31

## 2024-08-29 RX ADMIN — SODIUM CHLORIDE 1000 MG: 900 INJECTION INTRAVENOUS at 21:18

## 2024-08-29 RX ADMIN — ONDANSETRON 4 MG: 2 INJECTION INTRAMUSCULAR; INTRAVENOUS at 18:31

## 2024-08-29 NOTE — ED PROVIDER NOTES
Subjective   History of Present Illness  Patient is an 84-year-old male with a history of GERD who presents to the ER status post fall.  Patient states he tripped going down some stairs and landed on his right hip.  He denies hitting his head or have any loss of consciousness.  He does not take blood thinners.  Patient complains of right hip pain.  He is unsure of his last tetanus immunization.  He denies any fever, chest pain, shortness of air, abdominal pain, nausea vomiting diarrhea, urinary changes, neurologic changes, neck or back pain, other extremity pain.      Review of Systems   Constitutional: Negative.    HENT: Negative.     Eyes: Negative.    Respiratory: Negative.     Cardiovascular: Negative.    Gastrointestinal: Negative.    Endocrine: Negative.    Genitourinary: Negative.    Musculoskeletal:  Positive for arthralgias and myalgias.   Skin: Negative.    Allergic/Immunologic: Negative.    Neurological: Negative.    Hematological: Negative.    Psychiatric/Behavioral: Negative.     All other systems reviewed and are negative.      Past Medical History:   Diagnosis Date    Dementia     GERD (gastroesophageal reflux disease)     Liver disorder        Allergies   Allergen Reactions    Contrast Dye (Echo Or Unknown Ct/Mr)     Penicillins        Past Surgical History:   Procedure Laterality Date    HEMORRHOIDECTOMY      HERNIA REPAIR         History reviewed. No pertinent family history.    Social History     Socioeconomic History    Marital status:    Tobacco Use    Smoking status: Never   Vaping Use    Vaping status: Unknown   Substance and Sexual Activity    Alcohol use: No    Drug use: No    Sexual activity: Never           Objective   Physical Exam  Vitals and nursing note reviewed.   Constitutional:       Appearance: He is well-developed.   HENT:      Head: Normocephalic and atraumatic.      Nose: Nose normal.   Eyes:      Conjunctiva/sclera: Conjunctivae normal.      Pupils: Pupils are equal,  round, and reactive to light.   Cardiovascular:      Rate and Rhythm: Normal rate and regular rhythm.      Heart sounds: Normal heart sounds.   Pulmonary:      Effort: Pulmonary effort is normal.      Breath sounds: Normal breath sounds.   Abdominal:      Palpations: Abdomen is soft.      Tenderness: There is no abdominal tenderness.   Musculoskeletal:         General: No deformity. Normal range of motion.      Cervical back: Normal range of motion.      Comments: Right lower extremity is shortened and externally rotated, tender to palpation right lateral hip with decreased range of motion, nontender to palpation elsewhere including CT and L-spine's and other extremities, normal range of motion elsewhere, neurovascular intact, pulses 2+   Skin:     General: Skin is warm.      Comments: Skin tears to the bilateral elbows   Neurological:      Mental Status: He is alert and oriented to person, place, and time.      Cranial Nerves: Cranial nerves 2-12 are intact.      Sensory: Sensation is intact.      Motor: Motor function is intact.   Psychiatric:         Behavior: Behavior normal.         Procedures           ED Course                               Lab Results (last 24 hours)       Procedure Component Value Units Date/Time    CBC & Differential [[id number]]  (Abnormal) Collected: 08/29/24 1754    Specimen: Blood Updated: 08/29/24 1812    Narrative:      The following orders were created for panel order CBC & Differential.  Procedure                               Abnormality         Status                     ---------                               -----------         ------                     CBC Auto Differential[[id number]]        Abnormal            Final result                 Please view results for these tests on the individual orders.    Comprehensive Metabolic Panel [[id number]]  (Abnormal) Collected: 08/29/24 1754    Specimen: Blood Updated: 08/29/24 1824     Glucose 102 mg/dL      BUN 26 mg/dL      Creatinine  2.34 mg/dL      Sodium 139 mmol/L      Potassium 4.1 mmol/L      Chloride 108 mmol/L      CO2 22.0 mmol/L      Calcium 8.8 mg/dL      Total Protein 6.4 g/dL      Albumin 3.1 g/dL      ALT (SGPT) 14 U/L      AST (SGOT) 25 U/L      Alkaline Phosphatase 110 U/L      Total Bilirubin 1.0 mg/dL      Globulin 3.3 gm/dL      A/G Ratio 0.9 g/dL      BUN/Creatinine Ratio 11.1     Anion Gap 9.0 mmol/L      eGFR 26.8 mL/min/1.73     Narrative:      GFR Normal >60  Chronic Kidney Disease <60  Kidney Failure <15    The GFR formula is only valid for adults with stable renal function between ages 18 and 70.    Protime-INR [[id number]]  (Abnormal) Collected: 08/29/24 1754    Specimen: Blood Updated: 08/29/24 1815     Protime 14.9 Seconds      INR 1.12    aPTT [[id number]]  (Normal) Collected: 08/29/24 1754    Specimen: Blood Updated: 08/29/24 1815     PTT 33.5 seconds     CBC Auto Differential [[id number]]  (Abnormal) Collected: 08/29/24 1754    Specimen: Blood Updated: 08/29/24 1812     WBC 4.88 10*3/mm3      RBC 4.08 10*6/mm3      Hemoglobin 12.4 g/dL      Hematocrit 36.8 %      MCV 90.2 fL      MCH 30.4 pg      MCHC 33.7 g/dL      RDW 14.2 %      RDW-SD 46.9 fl      MPV 10.5 fL      Platelets 121 10*3/mm3      Neutrophil % 47.2 %      Lymphocyte % 38.7 %      Monocyte % 9.6 %      Eosinophil % 3.9 %      Basophil % 0.2 %      Immature Grans % 0.4 %      Neutrophils, Absolute 2.30 10*3/mm3      Lymphocytes, Absolute 1.89 10*3/mm3      Monocytes, Absolute 0.47 10*3/mm3      Eosinophils, Absolute 0.19 10*3/mm3      Basophils, Absolute 0.01 10*3/mm3      Immature Grans, Absolute 0.02 10*3/mm3     Urinalysis With Microscopic If Indicated (No Culture) - Urine, Clean Catch [[id number]] Collected: 08/29/24 2037    Specimen: Urine, Clean Catch Updated: 08/29/24 2045    Urinalysis, Microscopic Only - Urine, Clean Catch [[id number]] Collected: 08/29/24 2037    Specimen: Urine, Clean Catch Updated: 08/29/24 2050           CT Lower Extremity Right  Without Contrast   Final Result       1.  Acute comminuted mildly displaced and varus angulated fracture   involving the base of the right femoral neck with fracture extension   into the greater trochanter.       2.  2.9 cm bladder stone located near the right ureterovesical junction.   Partially imaged right hydronephrosis.       This report was signed and finalized on 8/29/2024 8:20 PM by Dr. Armando Gimenez MD.          CT Head Without Contrast   Final Result   1.  No acute intracranial findings.   2.  Global cerebral volume loss and presumed chronic microvascular   ischemic white matter change.               This report was signed and finalized on 8/29/2024 7:13 PM by Dr. Armando Gimenez MD.          CT Cervical Spine Without Contrast   Final Result   No acute fracture or subluxation.               This report was signed and finalized on 8/29/2024 7:06 PM by Dr. Armando Gimenez MD.          CT Thoracic Spine Without Contrast   Final Result   1.  No acute fracture or subluxation.   2.  Mild multilevel thoracic spine degenerative change.   3.  Partially imaged right-sided hydronephrosis.           This report was signed and finalized on 8/29/2024 7:10 PM by Dr. Armando Gimenez MD.          CT Lumbar Spine Without Contrast   Final Result   1.  No acute fracture or subluxation.   2.  Multilevel degenerative change, as described above.   3.  Horseshoe kidney with severe right-sided hydronephrosis.       This report was signed and finalized on 8/29/2024 7:17 PM by Dr. Armando Gimenez MD.          XR Chest 1 View   Final Result       Chronic mild right hemidiaphragm elevation. Bibasilar atelectasis.       This report was signed and finalized on 8/29/2024 5:49 PM by Dr. Armando Gimenez MD.          XR Hip With or Without Pelvis 2 - 3 View Right   Final Result       Acute mildly displaced fracture involving the right femur greater   trochanter. Extent of fracture is not well delineated and extension  into   the femoral neck is not excluded. Recommend CT to better characterize   fracture.       This report was signed and finalized on 8/29/2024 5:51 PM by Dr. Armando Gimenez MD.                       Medical Decision Making  Patient is an 84-year-old male with a history of GERD who presents to the ER status post fall.  Patient states he tripped going down some stairs and landed on his right hip.  He denies hitting his head or have any loss of consciousness.  He does not take blood thinners.  Patient complains of right hip pain.  He is unsure of his last tetanus immunization.  He denies any fever, chest pain, shortness of air, abdominal pain, nausea vomiting diarrhea, urinary changes, neurologic changes, neck or back pain, other extremity pain.    Differential diagnosis: Right hip fracture, right hip contusion    Patient was given morphine, Zofran and a Tdap.  Labs showed an elevated BUN and creatinine consistent with acute on chronic renal insufficiency.  CT scan of the head showed no acute findings.  CT scan of the C-spine showed no fracture.  CT scan of the T-spine showed no acute findings.  Patient did have some degenerative changes.  He also had some partially imaged right-sided hydronephrosis.  CT scan of the L-spine showed no acute fractures or dislocations.  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  Patient also has some degenerative changes.  Chest x-ray showed bibasilar atelectasis and chronic mild right hemidiaphragm elevation but no acute findings.  X-rays of the right hip showed acute mildly displaced fracture involving the right femur greater trochanter.  CT scan of the right hip was then ordered and showed an acute comminuted mildly displaced and varus angulated fracture involving the base of the right femoral neck with fracture extension into the greater trochanter.  Patient also had a 2.9 cm your bladder stone located near the right UVJ with right  hydronephrosis.  Dr. Chandra with orthopedic surgery was consulted.  He recommended the patient be n.p.o. after midnight and will take the patient to the OR tomorrow.  Dr. Posey with the hospitalist service was then consulted and has admitted the patient to her team for further treatment.      Problems Addressed:  Acute on chronic renal insufficiency: complicated acute illness or injury  Bladder stone: complicated acute illness or injury  Closed fracture of right hip, initial encounter: complicated acute illness or injury  Fall, initial encounter: complicated acute illness or injury  Hydronephrosis, unspecified hydronephrosis type: complicated acute illness or injury  Skin tear of left elbow without complication, initial encounter: complicated acute illness or injury    Amount and/or Complexity of Data Reviewed  Labs: ordered. Decision-making details documented in ED Course.  Radiology: ordered. Decision-making details documented in ED Course.  Discussion of management or test interpretation with external provider(s): Dr. Chandra with orthopedic surgery  Dr. Posey with the hospitalist service    Risk  Prescription drug management.  Decision regarding hospitalization.        Final diagnoses:   Fall, initial encounter   Closed fracture of right hip, initial encounter   Acute on chronic renal insufficiency   Skin tear of left elbow without complication, initial encounter   Bladder stone   Hydronephrosis, unspecified hydronephrosis type       ED Disposition  ED Disposition       ED Disposition   Decision to Admit    Condition   --    Comment   Level of Care: Med/Surg [1]   Diagnosis: Hip fracture [051792]   Admitting Physician: TELMA GOMEZ [[id number]]   Attending Physician: TELMA GOMEZ [[id number]]   Certification: I Certify That Inpatient Hospital Services Are Medically Necessary For Greater Than 2 Midnights                 No follow-up provider specified.       Medication List      No changes were made to your  prescriptions during this visit.            Erika Leary MD  08/29/24 0063

## 2024-08-30 ENCOUNTER — APPOINTMENT (OUTPATIENT)
Dept: GENERAL RADIOLOGY | Facility: HOSPITAL | Age: 84
End: 2024-08-30
Payer: MEDICARE

## 2024-08-30 ENCOUNTER — ANESTHESIA (OUTPATIENT)
Dept: PERIOP | Facility: HOSPITAL | Age: 84
End: 2024-08-30
Payer: MEDICARE

## 2024-08-30 ENCOUNTER — ANESTHESIA EVENT (OUTPATIENT)
Dept: PERIOP | Facility: HOSPITAL | Age: 84
End: 2024-08-30
Payer: MEDICARE

## 2024-08-30 PROBLEM — S72.141A CLOSED 2-PART INTERTROCHANTERIC FRACTURE OF PROXIMAL END OF RIGHT FEMUR: Status: ACTIVE | Noted: 2024-08-30

## 2024-08-30 LAB
ALBUMIN SERPL-MCNC: 3.1 G/DL (ref 3.5–5.2)
ALBUMIN/GLOB SERPL: 1 G/DL
ALP SERPL-CCNC: 102 U/L (ref 39–117)
ALT SERPL W P-5'-P-CCNC: 11 U/L (ref 1–41)
ANION GAP SERPL CALCULATED.3IONS-SCNC: 8 MMOL/L (ref 5–15)
AST SERPL-CCNC: 24 U/L (ref 1–40)
BASOPHILS # BLD AUTO: 0.01 10*3/MM3 (ref 0–0.2)
BASOPHILS NFR BLD AUTO: 0.2 % (ref 0–1.5)
BILIRUB SERPL-MCNC: 1.2 MG/DL (ref 0–1.2)
BUN SERPL-MCNC: 25 MG/DL (ref 8–23)
BUN/CREAT SERPL: 11 (ref 7–25)
CALCIUM SPEC-SCNC: 8.2 MG/DL (ref 8.6–10.5)
CHLORIDE SERPL-SCNC: 110 MMOL/L (ref 98–107)
CO2 SERPL-SCNC: 20 MMOL/L (ref 22–29)
CREAT SERPL-MCNC: 2.27 MG/DL (ref 0.76–1.27)
D-LACTATE SERPL-SCNC: 1.4 MMOL/L (ref 0.5–2)
DEPRECATED RDW RBC AUTO: 48.2 FL (ref 37–54)
EGFRCR SERPLBLD CKD-EPI 2021: 27.8 ML/MIN/1.73
EOSINOPHIL # BLD AUTO: 0.12 10*3/MM3 (ref 0–0.4)
EOSINOPHIL NFR BLD AUTO: 2 % (ref 0.3–6.2)
ERYTHROCYTE [DISTWIDTH] IN BLOOD BY AUTOMATED COUNT: 13.9 % (ref 12.3–15.4)
GLOBULIN UR ELPH-MCNC: 3.2 GM/DL
GLUCOSE SERPL-MCNC: 115 MG/DL (ref 65–99)
HCT VFR BLD AUTO: 37.8 % (ref 37.5–51)
HGB BLD-MCNC: 12.3 G/DL (ref 13–17.7)
IMM GRANULOCYTES # BLD AUTO: 0.03 10*3/MM3 (ref 0–0.05)
IMM GRANULOCYTES NFR BLD AUTO: 0.5 % (ref 0–0.5)
LYMPHOCYTES # BLD AUTO: 1.02 10*3/MM3 (ref 0.7–3.1)
LYMPHOCYTES NFR BLD AUTO: 17.1 % (ref 19.6–45.3)
MCH RBC QN AUTO: 30.1 PG (ref 26.6–33)
MCHC RBC AUTO-ENTMCNC: 32.5 G/DL (ref 31.5–35.7)
MCV RBC AUTO: 92.6 FL (ref 79–97)
MONOCYTES # BLD AUTO: 0.54 10*3/MM3 (ref 0.1–0.9)
MONOCYTES NFR BLD AUTO: 9 % (ref 5–12)
NEUTROPHILS NFR BLD AUTO: 4.25 10*3/MM3 (ref 1.7–7)
NEUTROPHILS NFR BLD AUTO: 71.2 % (ref 42.7–76)
PLATELET # BLD AUTO: 86 10*3/MM3 (ref 140–450)
PMV BLD AUTO: 10.8 FL (ref 6–12)
POTASSIUM SERPL-SCNC: 4.7 MMOL/L (ref 3.5–5.2)
PROT SERPL-MCNC: 6.3 G/DL (ref 6–8.5)
RBC # BLD AUTO: 4.08 10*6/MM3 (ref 4.14–5.8)
SODIUM SERPL-SCNC: 138 MMOL/L (ref 136–145)
WBC NRBC COR # BLD AUTO: 5.97 10*3/MM3 (ref 3.4–10.8)

## 2024-08-30 PROCEDURE — C1713 ANCHOR/SCREW BN/BN,TIS/BN: HCPCS | Performed by: ORTHOPAEDIC SURGERY

## 2024-08-30 PROCEDURE — 25010000002 FENTANYL CITRATE (PF) 50 MCG/ML SOLUTION: Performed by: ANESTHESIOLOGY

## 2024-08-30 PROCEDURE — 25810000003 LACTATED RINGERS PER 1000 ML: Performed by: ANESTHESIOLOGY

## 2024-08-30 PROCEDURE — 25010000002 ONDANSETRON PER 1 MG

## 2024-08-30 PROCEDURE — 25010000002 HYDROMORPHONE PER 4 MG: Performed by: INTERNAL MEDICINE

## 2024-08-30 PROCEDURE — 73502 X-RAY EXAM HIP UNI 2-3 VIEWS: CPT

## 2024-08-30 PROCEDURE — 25010000002 CEFAZOLIN PER 500 MG: Performed by: ORTHOPAEDIC SURGERY

## 2024-08-30 PROCEDURE — 25810000003 SODIUM CHLORIDE 0.9 % SOLUTION: Performed by: ORTHOPAEDIC SURGERY

## 2024-08-30 PROCEDURE — 25010000002 FENTANYL CITRATE (PF) 100 MCG/2ML SOLUTION

## 2024-08-30 PROCEDURE — 80053 COMPREHEN METABOLIC PANEL: CPT | Performed by: INTERNAL MEDICINE

## 2024-08-30 PROCEDURE — 25010000002 SUGAMMADEX 200 MG/2ML SOLUTION

## 2024-08-30 PROCEDURE — 25010000002 DEXAMETHASONE PER 1 MG

## 2024-08-30 PROCEDURE — 85025 COMPLETE CBC W/AUTO DIFF WBC: CPT | Performed by: INTERNAL MEDICINE

## 2024-08-30 PROCEDURE — 25010000002 PROPOFOL 10 MG/ML EMULSION

## 2024-08-30 PROCEDURE — 76000 FLUOROSCOPY <1 HR PHYS/QHP: CPT

## 2024-08-30 PROCEDURE — 0QS636Z REPOSITION RIGHT UPPER FEMUR WITH INTRAMEDULLARY INTERNAL FIXATION DEVICE, PERCUTANEOUS APPROACH: ICD-10-PCS | Performed by: ORTHOPAEDIC SURGERY

## 2024-08-30 PROCEDURE — C1769 GUIDE WIRE: HCPCS | Performed by: ORTHOPAEDIC SURGERY

## 2024-08-30 PROCEDURE — 94799 UNLISTED PULMONARY SVC/PX: CPT

## 2024-08-30 PROCEDURE — 25010000002 CEFAZOLIN PER 500 MG

## 2024-08-30 PROCEDURE — 25010000002 CEFTRIAXONE PER 250 MG: Performed by: INTERNAL MEDICINE

## 2024-08-30 PROCEDURE — 25010000002 VASOPRESSIN 20 UNIT/ML SOLUTION

## 2024-08-30 DEVICE — SCRW LK STRDRV TI 5X38M STRL: Type: IMPLANTABLE DEVICE | Site: HIP | Status: FUNCTIONAL

## 2024-08-30 DEVICE — NAIL FEM TFN ADV PROX 130D SHT 11X170MM STRL: Type: IMPLANTABLE DEVICE | Site: HIP | Status: FUNCTIONAL

## 2024-08-30 DEVICE — BLD FEM FIX HELI TFN ADV PERF 95MM STRL: Type: IMPLANTABLE DEVICE | Site: HIP | Status: FUNCTIONAL

## 2024-08-30 RX ORDER — ONDANSETRON 2 MG/ML
4 INJECTION INTRAMUSCULAR; INTRAVENOUS EVERY 6 HOURS PRN
Status: DISCONTINUED | OUTPATIENT
Start: 2024-08-30 | End: 2024-09-04 | Stop reason: HOSPADM

## 2024-08-30 RX ORDER — ONDANSETRON 2 MG/ML
INJECTION INTRAMUSCULAR; INTRAVENOUS AS NEEDED
Status: DISCONTINUED | OUTPATIENT
Start: 2024-08-30 | End: 2024-08-30 | Stop reason: SURG

## 2024-08-30 RX ORDER — FENTANYL CITRATE 50 UG/ML
50 INJECTION, SOLUTION INTRAMUSCULAR; INTRAVENOUS ONCE
Status: COMPLETED | OUTPATIENT
Start: 2024-08-30 | End: 2024-08-30

## 2024-08-30 RX ORDER — OXYCODONE AND ACETAMINOPHEN 10; 325 MG/1; MG/1
1 TABLET ORAL EVERY 4 HOURS PRN
Status: DISCONTINUED | OUTPATIENT
Start: 2024-08-30 | End: 2024-08-30 | Stop reason: HOSPADM

## 2024-08-30 RX ORDER — ACETAMINOPHEN 325 MG/1
650 TABLET ORAL EVERY 4 HOURS PRN
Status: DISCONTINUED | OUTPATIENT
Start: 2024-08-30 | End: 2024-09-04 | Stop reason: HOSPADM

## 2024-08-30 RX ORDER — BISACODYL 10 MG
10 SUPPOSITORY, RECTAL RECTAL DAILY PRN
Status: DISCONTINUED | OUTPATIENT
Start: 2024-08-30 | End: 2024-09-04 | Stop reason: HOSPADM

## 2024-08-30 RX ORDER — DROPERIDOL 2.5 MG/ML
0.62 INJECTION, SOLUTION INTRAMUSCULAR; INTRAVENOUS ONCE AS NEEDED
Status: DISCONTINUED | OUTPATIENT
Start: 2024-08-30 | End: 2024-08-30 | Stop reason: HOSPADM

## 2024-08-30 RX ORDER — SODIUM CHLORIDE 0.9 % (FLUSH) 0.9 %
3 SYRINGE (ML) INJECTION EVERY 12 HOURS SCHEDULED
Status: DISCONTINUED | OUTPATIENT
Start: 2024-08-30 | End: 2024-08-30 | Stop reason: HOSPADM

## 2024-08-30 RX ORDER — FENTANYL CITRATE 50 UG/ML
50 INJECTION, SOLUTION INTRAMUSCULAR; INTRAVENOUS
Status: DISCONTINUED | OUTPATIENT
Start: 2024-08-30 | End: 2024-08-30 | Stop reason: HOSPADM

## 2024-08-30 RX ORDER — SODIUM CHLORIDE 0.9 % (FLUSH) 0.9 %
3-10 SYRINGE (ML) INJECTION AS NEEDED
Status: DISCONTINUED | OUTPATIENT
Start: 2024-08-30 | End: 2024-08-30 | Stop reason: HOSPADM

## 2024-08-30 RX ORDER — SODIUM CHLORIDE 9 MG/ML
100 INJECTION, SOLUTION INTRAVENOUS CONTINUOUS
Status: DISCONTINUED | OUTPATIENT
Start: 2024-08-30 | End: 2024-08-31

## 2024-08-30 RX ORDER — NALOXONE HCL 0.4 MG/ML
0.04 VIAL (ML) INJECTION AS NEEDED
Status: DISCONTINUED | OUTPATIENT
Start: 2024-08-30 | End: 2024-08-30 | Stop reason: HOSPADM

## 2024-08-30 RX ORDER — LABETALOL HYDROCHLORIDE 5 MG/ML
5 INJECTION, SOLUTION INTRAVENOUS
Status: DISCONTINUED | OUTPATIENT
Start: 2024-08-30 | End: 2024-08-30 | Stop reason: HOSPADM

## 2024-08-30 RX ORDER — ROCURONIUM BROMIDE 10 MG/ML
INJECTION, SOLUTION INTRAVENOUS AS NEEDED
Status: DISCONTINUED | OUTPATIENT
Start: 2024-08-30 | End: 2024-08-30 | Stop reason: SURG

## 2024-08-30 RX ORDER — ONDANSETRON 2 MG/ML
4 INJECTION INTRAMUSCULAR; INTRAVENOUS
Status: DISCONTINUED | OUTPATIENT
Start: 2024-08-30 | End: 2024-08-30 | Stop reason: HOSPADM

## 2024-08-30 RX ORDER — FLUMAZENIL 0.1 MG/ML
0.2 INJECTION INTRAVENOUS AS NEEDED
Status: DISCONTINUED | OUTPATIENT
Start: 2024-08-30 | End: 2024-08-30 | Stop reason: HOSPADM

## 2024-08-30 RX ORDER — HYDROCODONE BITARTRATE AND ACETAMINOPHEN 7.5; 325 MG/1; MG/1
2 TABLET ORAL EVERY 4 HOURS PRN
Status: DISCONTINUED | OUTPATIENT
Start: 2024-08-30 | End: 2024-09-04 | Stop reason: HOSPADM

## 2024-08-30 RX ORDER — FENTANYL CITRATE 50 UG/ML
INJECTION, SOLUTION INTRAMUSCULAR; INTRAVENOUS AS NEEDED
Status: DISCONTINUED | OUTPATIENT
Start: 2024-08-30 | End: 2024-08-30 | Stop reason: SURG

## 2024-08-30 RX ORDER — MORPHINE SULFATE 2 MG/ML
1 INJECTION, SOLUTION INTRAMUSCULAR; INTRAVENOUS EVERY 4 HOURS PRN
Status: DISCONTINUED | OUTPATIENT
Start: 2024-08-30 | End: 2024-09-03 | Stop reason: ALTCHOICE

## 2024-08-30 RX ORDER — HYDROCODONE BITARTRATE AND ACETAMINOPHEN 7.5; 325 MG/1; MG/1
1 TABLET ORAL EVERY 4 HOURS PRN
Status: DISCONTINUED | OUTPATIENT
Start: 2024-08-30 | End: 2024-09-04 | Stop reason: HOSPADM

## 2024-08-30 RX ORDER — POLYETHYLENE GLYCOL 3350 17 G/17G
17 POWDER, FOR SOLUTION ORAL DAILY
Status: DISCONTINUED | OUTPATIENT
Start: 2024-08-30 | End: 2024-09-04 | Stop reason: HOSPADM

## 2024-08-30 RX ORDER — NALOXONE HCL 0.4 MG/ML
0.4 VIAL (ML) INJECTION
Status: DISCONTINUED | OUTPATIENT
Start: 2024-08-30 | End: 2024-09-03 | Stop reason: ALTCHOICE

## 2024-08-30 RX ORDER — LIDOCAINE HYDROCHLORIDE 20 MG/ML
INJECTION, SOLUTION EPIDURAL; INFILTRATION; INTRACAUDAL; PERINEURAL AS NEEDED
Status: DISCONTINUED | OUTPATIENT
Start: 2024-08-30 | End: 2024-08-30 | Stop reason: SURG

## 2024-08-30 RX ORDER — PROPOFOL 10 MG/ML
VIAL (ML) INTRAVENOUS AS NEEDED
Status: DISCONTINUED | OUTPATIENT
Start: 2024-08-30 | End: 2024-08-30 | Stop reason: SURG

## 2024-08-30 RX ORDER — ONDANSETRON 4 MG/1
4 TABLET, ORALLY DISINTEGRATING ORAL EVERY 6 HOURS PRN
Status: DISCONTINUED | OUTPATIENT
Start: 2024-08-30 | End: 2024-09-04 | Stop reason: HOSPADM

## 2024-08-30 RX ORDER — SODIUM CHLORIDE 9 MG/ML
40 INJECTION, SOLUTION INTRAVENOUS AS NEEDED
Status: DISCONTINUED | OUTPATIENT
Start: 2024-08-30 | End: 2024-08-30 | Stop reason: HOSPADM

## 2024-08-30 RX ORDER — BUPIVACAINE HCL/0.9 % NACL/PF 0.125 %
PLASTIC BAG, INJECTION (ML) EPIDURAL AS NEEDED
Status: DISCONTINUED | OUTPATIENT
Start: 2024-08-30 | End: 2024-08-30 | Stop reason: SURG

## 2024-08-30 RX ORDER — SODIUM CHLORIDE, SODIUM LACTATE, POTASSIUM CHLORIDE, CALCIUM CHLORIDE 600; 310; 30; 20 MG/100ML; MG/100ML; MG/100ML; MG/100ML
100 INJECTION, SOLUTION INTRAVENOUS CONTINUOUS
Status: DISCONTINUED | OUTPATIENT
Start: 2024-08-30 | End: 2024-08-31

## 2024-08-30 RX ORDER — CEFAZOLIN SODIUM 1 G/3ML
INJECTION, POWDER, FOR SOLUTION INTRAMUSCULAR; INTRAVENOUS AS NEEDED
Status: DISCONTINUED | OUTPATIENT
Start: 2024-08-30 | End: 2024-08-30 | Stop reason: SURG

## 2024-08-30 RX ORDER — HYDROMORPHONE HYDROCHLORIDE 1 MG/ML
0.5 INJECTION, SOLUTION INTRAMUSCULAR; INTRAVENOUS; SUBCUTANEOUS
Status: DISCONTINUED | OUTPATIENT
Start: 2024-08-30 | End: 2024-08-30 | Stop reason: HOSPADM

## 2024-08-30 RX ORDER — DEXAMETHASONE SODIUM PHOSPHATE 4 MG/ML
INJECTION, SOLUTION INTRA-ARTICULAR; INTRALESIONAL; INTRAMUSCULAR; INTRAVENOUS; SOFT TISSUE AS NEEDED
Status: DISCONTINUED | OUTPATIENT
Start: 2024-08-30 | End: 2024-08-30 | Stop reason: SURG

## 2024-08-30 RX ADMIN — Medication 100 MCG: at 14:53

## 2024-08-30 RX ADMIN — NYSTATIN 500000 UNITS: 100000 SUSPENSION ORAL at 20:11

## 2024-08-30 RX ADMIN — Medication 100 MCG: at 14:01

## 2024-08-30 RX ADMIN — Medication 200 MCG: at 14:13

## 2024-08-30 RX ADMIN — Medication 150 MCG: at 14:04

## 2024-08-30 RX ADMIN — SUGAMMADEX 200 MG: 100 INJECTION, SOLUTION INTRAVENOUS at 14:57

## 2024-08-30 RX ADMIN — CEFAZOLIN 2 G: 330 INJECTION, POWDER, FOR SOLUTION INTRAMUSCULAR; INTRAVENOUS at 14:22

## 2024-08-30 RX ADMIN — ONDANSETRON 4 MG: 2 INJECTION INTRAMUSCULAR; INTRAVENOUS at 14:51

## 2024-08-30 RX ADMIN — OXYCODONE HYDROCHLORIDE AND ACETAMINOPHEN 1 TABLET: 7.5; 325 TABLET ORAL at 16:14

## 2024-08-30 RX ADMIN — SODIUM CHLORIDE, POTASSIUM CHLORIDE, SODIUM LACTATE AND CALCIUM CHLORIDE 100 ML/HR: 600; 310; 30; 20 INJECTION, SOLUTION INTRAVENOUS at 12:49

## 2024-08-30 RX ADMIN — FENTANYL CITRATE 50 MCG: 50 INJECTION, SOLUTION INTRAMUSCULAR; INTRAVENOUS at 12:54

## 2024-08-30 RX ADMIN — FENTANYL CITRATE 100 MCG: 50 INJECTION, SOLUTION INTRAMUSCULAR; INTRAVENOUS at 14:00

## 2024-08-30 RX ADMIN — ROCURONIUM 50 MG: 50 INJECTION, SOLUTION INTRAVENOUS at 14:01

## 2024-08-30 RX ADMIN — PROPOFOL 80 MG: 10 INJECTION, EMULSION INTRAVENOUS at 14:01

## 2024-08-30 RX ADMIN — HYDROMORPHONE HYDROCHLORIDE 0.5 MG: 1 INJECTION, SOLUTION INTRAMUSCULAR; INTRAVENOUS; SUBCUTANEOUS at 02:51

## 2024-08-30 RX ADMIN — SODIUM CHLORIDE 100 ML/HR: 9 INJECTION, SOLUTION INTRAVENOUS at 16:21

## 2024-08-30 RX ADMIN — HYDROMORPHONE HYDROCHLORIDE 0.5 MG: 1 INJECTION, SOLUTION INTRAMUSCULAR; INTRAVENOUS; SUBCUTANEOUS at 08:04

## 2024-08-30 RX ADMIN — LIDOCAINE HYDROCHLORIDE 100 MG: 20 INJECTION, SOLUTION EPIDURAL; INFILTRATION; INTRACAUDAL; PERINEURAL at 14:01

## 2024-08-30 RX ADMIN — OXYCODONE HYDROCHLORIDE AND ACETAMINOPHEN 1 TABLET: 7.5; 325 TABLET ORAL at 10:29

## 2024-08-30 RX ADMIN — Medication 150 MCG: at 14:25

## 2024-08-30 RX ADMIN — CEFAZOLIN 2000 MG: 2 INJECTION, POWDER, FOR SOLUTION INTRAMUSCULAR; INTRAVENOUS at 17:24

## 2024-08-30 RX ADMIN — DEXAMETHASONE SODIUM PHOSPHATE 4 MG: 4 INJECTION, SOLUTION INTRA-ARTICULAR; INTRALESIONAL; INTRAMUSCULAR; INTRAVENOUS; SOFT TISSUE at 14:22

## 2024-08-30 RX ADMIN — Medication 150 MCG: at 14:35

## 2024-08-30 RX ADMIN — SODIUM CHLORIDE, POTASSIUM CHLORIDE, SODIUM LACTATE AND CALCIUM CHLORIDE: 600; 310; 30; 20 INJECTION, SOLUTION INTRAVENOUS at 14:57

## 2024-08-30 RX ADMIN — FAMOTIDINE 20 MG: 10 INJECTION INTRAVENOUS at 08:04

## 2024-08-30 RX ADMIN — Medication 150 MCG: at 14:08

## 2024-08-30 RX ADMIN — SODIUM CHLORIDE 1000 MG: 900 INJECTION INTRAVENOUS at 20:11

## 2024-08-30 RX ADMIN — Medication 10 ML: at 08:05

## 2024-08-30 NOTE — OP NOTE
Patient Name: Deejay  : 1940  MRN: [id number]    DATE of SURGERY: 2024     SURGEON: Arcenio Chandra MD     ASSISTANT: NONE      PREOPERATIVE DIAGNOSIS: Acute traumatic minimally-displaced basocervical/intertrochanteric fracture of the right femur, initial encounter for closed fracture     POSTOPERATIVE DIAGNOSIS: Acute traumatic minimally-displaced basocervical/intertrochanteric fracture of the right femur, initial encounter for closed fracture     PROCEDURE PERFORMED: Cephalomedullary Nailing right closed minimally-displaced basicervical/intertrochanteric hip fracture       IMPLANTS: Synthes TFN size 11 mm diameter     ANESTHESIA USED: General endotrachial anesthesia     OPERATIVE INDICATIONS:  84 y.o. male status post fall with the above named diagnosis.  Surgical indications include fracture displacement, stabilization of fracture, and mobilization of the patient.  Risks include, but are not limited to, anesthesia, bleeding, infection, pain, damage to local structures, need for further surgery, malunion, nonunion, fracture displacement, failure of hardware, intraoperative death.  Risks, benefits, and alternative were discussed and the patient wishes to proceed with surgery.     ESTIMATED BLOOD LOSS: < 50 mL     DRAINS: none     COMPLICATIONS: none     SPECIMENS: none     FINDINGS: see op note     PROCEDURE in DETAIL:  The patient was seen in the preoperative holding room, the informed consent was reviewed and signed, and the correct operative extremity marked with the patient's agreement.  The patient was transported to the operating room, where a timeout was performed identifying the correct patient and operative site.  Perioperative antibiotics were administered prior to incision.     Once anesthetized, the operative foot was placed into a well-padded boot and the well leg was placed in a well leg aplomo with both the hip and knee each flexed to approximately 90 degrees as the patient was  positioned on the [place] table.  Traction and internal rotation were applied to the operative extremity and the fracture was noted to adequately align. A sterile prep and drape was then performed.     A guidepin was placed at the tip of the greater trochanter on the AP plane and in line with the intramedullary canal on the lateral view.  The wire was advanced to the level of the lesser trochanter followed by introduction of the starting reamer.       The nail of choice was then placed into the intramedullary canal.  Utilizing the attachment jig, a guidepin was then placed into the central aspect of the femoral head on both the AP and lateral views.  Length was measured for the spiral blade and the path of the screw was drilled to the appropriate depth.  The spiral blade was placed without complication and the set screw was placed proximally, loosened a 1/4 turn to allow for compression of the fracture.     Again utilizing the attachment jig, a distal interlocking screw was placed into the static portion of the nail gaining excellent purchase. C-arm images were used in multiple planes showing adequate alignment of the fracture without hardware complication.     Incisions were irrigated, closed in layers, and the skin was sealed with an adhesive skin dressing.  A sterile dressing was applied, the patient awakened, transported the recovery room in stable condition.     POSTOPERATIVE PLAN:  1) TTWB RLE x 6 weeks  2) DVT prophylaxis x 6 weeks  3) PT/OT    Arcenio Chandra MD     Date: 8/30/2024  Time: 14:10 CDT    Please note that portions of this note were completed with a voice recognition program.

## 2024-08-30 NOTE — BRIEF OP NOTE
HIP TROCHANTERIC NAILING SHORT WITH INTRAMEDULLARY HIP SCREW  Progress Note    Manohar Spencer Jose  8/30/2024    Pre-op Diagnosis:   Minimally displaced basocervical/intertrochanteric right femur fracture, initial encounter for closed fracture       Post-Op Diagnosis Codes:  Same    Procedure/CPT® Codes:      Procedure(s):  HIP TROCHANTERIC NAILING SHORT WITH INTRAMEDULLARY HIP SCREW      Surgeon(s):  Arcenio Chandra MD    Anesthesia: Choice    Staff:   Circulator: Andrew Alan RN  Scrub Person: Abram Armijo Trystyn L  Vendor Representative: Unique Clinton       Estimated Blood Loss:  < 50 mL    Urine Voided: * No values recorded between 8/30/2024  1:57 PM and 8/30/2024  3:00 PM *    Specimens:                None      Drains:   Urethral Catheter 16 Fr. (Active)   Output (mL) 250 mL 08/30/24 0600       Findings: Minimally displaced basicervical/intertrochanteric right femur fracture      Complications: None    Arcenio Chandra MD     Date: 8/30/2024  Time: 15:01 CDT

## 2024-08-30 NOTE — DISCHARGE INSTRUCTIONS
Lower Extremity Post-op Instructions    Dr. Chandra      POST-OP CARE: Please follow these instructions closely!    Weight Bearing: Your surgery requires that you have the following weight bearing restrictions:   __X__ Touch-Toe Weight-bearing     IMPORTANT PHONE NUMBERS:  For emergencies, please call [phone]  You may reach Dr. Chandra or his this medical staff at [phone] EXT: [phone]  M-F 8:00am-5:00pm  After 5pm or on the weekends, please call [phone] to reach the doctor on call.  Call immediately if you have any of the following symptoms:  Elevated temperature above 101 degrees for more than 48hours after surgery  Persistent drainage from wound  Severe pain around surgical site  Calf pain  Any signs of infection    Dressings:   Do NOT remove dressing/splint unless noted below. SOME DRAINAGE IS NORMAL!  DO NOT touch, remove, or apply ointment to the incision and/or steri strips  Signs of infection that warrant a phone call to our clinical line:  Excessive drainage or redness  Red streaking coming away from the incision  Increased pain  Increased temperature above 101 degrees    Sutures:  If your physician uses sutures in your knee, they will dissolve on their own and will not need to be removed.  Some black sutures occasionally used will need to be removed 10-14 days after surgery.    Bathing:    _X__ Keep your adhesive dressing in place until follow up.  You begin showering on the 3rd day following surgery.  Please check the border of the dressing(s) to ensure there is a good seal before each showering session.  Water may cascade over your dressing(s), but do not submerge your dressing(s) and incision(s) in water.    Elevate: Place 2 pillows under your ankle to get the incision area above the level of the heart to help in swelling (a recliner is not elevated!!!)    Ice: Ice your surgery site 5-6 times per day for 20 minutes at a time with dressing in place. You should wait at least 30 minutes before icing again to  avoid ice irritation. It may be difficult at first to ice the surgery area due to the amount of dressing, but continue to be diligent with icing.  Your dressings will be taken down at your first post-op appointment.     Range of motion:   For the knee- It is important to keep the knee as straight as possible following surgery. NO PILLOWS UNDER THE KNEE, ONLY under the ankle  For the foot/ankle- range of motion restrictions will be given to you at your first post-op appointment. Until that time, avoid any unnecessary range o f motion  Physical therapy- Your physical therapy status will be discussed with you at your first post-op appointment.   **Achieving range of motion goals and decreasing swelling/inflammation are the primary focus for the first two (2) weeks following surgery. **    Medications: You will be discharged with the appropriate medications following your surgery. Fill these at the pharmacy and take them as directed on the label.   Possible medications that will be prescribed are below.  You may or may not receive all of these. Occasionally, additional medications may be given with specific instructions.    Percocet/Lortab (oxycodone/hydrocodone with tylenol) - Pain Medication.  Take one tablet every 4-6 hours. DO NOT EXCEED 4,000mg of Tylenol in 24 hours.  **DO NOT MIX WITH ALCOHOL, DRIVE WHILE TAKING, OR TAKE EXTRA TYLENOL*     Zofran - Anti-nausea medication to help prevent nausea and vomiting after surgery.     Eliquis 2.5 mg - all patients with lower extremity surgery should take one 2.5 mg tablet every 12 hours (twice daily) for 42 days after surgery    **If you are running low on pain medications, please notify us if you need a refill 24-48 hours prior to when you run out, so we can make arrangements to refill the prescription for you if we determine it is necessary**           Avoid nephrotoxic medications such as NSAIDS

## 2024-08-30 NOTE — H&P
[facility] Medicine Services  HISTORY AND PHYSICAL    Date of Admission: 8/29/2024  Primary Care Physician: Provider, No Known    Subjective   Primary Historian: Patient    Chief Complaint: Fall    Fall    Hip Pain     84-year-old male presents emergency department status post fall.  He states that he fell down some steps.  He was able to give his own history.  He states he was home at his daughter's house and he missed a step inside the basement.  Unsure of how many steps he fell down.  The patient has chronic urinary difficulty/prostate trouble.  He had a bowel movement prior to arrival.  He states he has a tendency towards constipation.  He does not currently wear oxygen.  On my exam he is 94% on room air.  He has a left elbow abrasion status post fall.  He is missing his right index finger.  The patient states that he tripped on the stairs, missed a step.  He had no dizziness or chest pain.  He has no shortness of breath.  The patient does have dementia noted on past medical history.        Review of Systems   Musculoskeletal:  Positive for arthralgias.      Otherwise complete ROS reviewed and negative except as mentioned in the HPI.    Past Medical History:   Past Medical History:   Diagnosis Date    Dementia     GERD (gastroesophageal reflux disease)     Liver disorder      Past Surgical History:  Past Surgical History:   Procedure Laterality Date    HEMORRHOIDECTOMY      HERNIA REPAIR       Social History:  reports that he has never smoked. He does not have any smokeless tobacco history on file. He reports that he does not drink alcohol and does not use drugs.    Family History: None    Allergies:  Allergies   Allergen Reactions    Contrast Dye (Echo Or Unknown Ct/Mr)     Penicillins        Medications:  Prior to Admission medications    Medication Sig Start Date End Date Taking? Authorizing Provider   bisacodyl (DULCOLAX) 5 MG EC tablet Take 1 tablet by mouth Daily As  "Needed for Constipation.    Shu Correa MD   cimetidine (TAGAMET) 200 MG tablet Take 1 tablet by mouth 2 (Two) Times a Day As Needed (heartburn).    Shu Correa MD   famotidine (Pepcid AC) 10 MG tablet Take 1 tablet by mouth 2 (Two) Times a Day.    Shu Correa MD   multivitamin with minerals tablet tablet Take 1 tablet by mouth Daily.    Shu Correa MD   mupirocin (BACTROBAN) 2 % ointment Apply 1 application topically to the appropriate area as directed 3 (Three) Times a Day. 2/23/23   Michelle Granados PA     I have utilized all available immediate resources to obtain, update, or review the patient's current medications (including all prescriptions, over-the-counter products, herbals, cannabis/cannabidiol products, and vitamin/mineral/dietary (nutritional) supplements).    Objective     Vital Signs: /78   Pulse 78   Temp 97.8 °F (36.6 °C) (Oral)   Resp 18   Ht 180.3 cm (71\")   Wt 111 kg (245 lb 11.2 oz)   SpO2 95%   BMI 34.27 kg/m²   Physical Exam  Vitals reviewed.   Constitutional:       Appearance: Normal appearance.   HENT:      Head: Normocephalic and atraumatic.      Right Ear: External ear normal.      Left Ear: External ear normal.      Nose: Nose normal.      Mouth/Throat:      Comments: Thrush  Eyes:      General: No scleral icterus.     Conjunctiva/sclera: Conjunctivae normal.   Cardiovascular:      Rate and Rhythm: Normal rate and regular rhythm.      Heart sounds: Normal heart sounds.   Pulmonary:      Effort: Pulmonary effort is normal.      Breath sounds: Normal breath sounds.   Abdominal:      General: There is no distension.      Palpations: Abdomen is soft.      Tenderness: There is abdominal tenderness.   Musculoskeletal:         General: Tenderness present.      Cervical back: Normal range of motion and neck supple.      Comments: Left elbow wound. Hip pain.    Skin:     General: Skin is warm and dry.   Neurological:      General: No " focal deficit present.      Mental Status: He is alert.      Cranial Nerves: No cranial nerve deficit.   Psychiatric:         Mood and Affect: Mood normal.         Behavior: Behavior normal.          Results Reviewed:  Lab Results (last 24 hours)       Procedure Component Value Units Date/Time    Urinalysis With Microscopic If Indicated (No Culture) - Urine, Clean Catch [[id number]]  (Abnormal) Collected: 08/29/24 2037    Specimen: Urine, Clean Catch Updated: 08/29/24 2103     Color, UA Dark Yellow     Appearance, UA Turbid     pH, UA 7.5     Specific Gravity, UA 1.017     Glucose, UA Negative     Ketones, UA Negative     Bilirubin, UA Negative     Blood, UA Small (1+)     Protein,  mg/dL (2+)     Leuk Esterase, UA Large (3+)     Nitrite, UA Negative     Urobilinogen, UA 1.0 E.U./dL    Urinalysis, Microscopic Only - Urine, Clean Catch [[id number]]  (Abnormal) Collected: 08/29/24 2037    Specimen: Urine, Clean Catch Updated: 08/29/24 2103     RBC, UA 3-5 /HPF      WBC, UA 21-50 /HPF      Bacteria, UA 2+ /HPF      Squamous Epithelial Cells, UA 3-6 /HPF      Calcium Oxalate Crystals, UA Small/1+ /HPF      Methodology Manual Light Microscopy    Comprehensive Metabolic Panel [[id number]]  (Abnormal) Collected: 08/29/24 1754    Specimen: Blood Updated: 08/29/24 1824     Glucose 102 mg/dL      BUN 26 mg/dL      Creatinine 2.34 mg/dL      Sodium 139 mmol/L      Potassium 4.1 mmol/L      Chloride 108 mmol/L      CO2 22.0 mmol/L      Calcium 8.8 mg/dL      Total Protein 6.4 g/dL      Albumin 3.1 g/dL      ALT (SGPT) 14 U/L      AST (SGOT) 25 U/L      Alkaline Phosphatase 110 U/L      Total Bilirubin 1.0 mg/dL      Globulin 3.3 gm/dL      A/G Ratio 0.9 g/dL      BUN/Creatinine Ratio 11.1     Anion Gap 9.0 mmol/L      eGFR 26.8 mL/min/1.73     Narrative:      GFR Normal >60  Chronic Kidney Disease <60  Kidney Failure <15    The GFR formula is only valid for adults with stable renal function between ages 18 and 70.     Protime-INR [[id number]]  (Abnormal) Collected: 08/29/24 1754    Specimen: Blood Updated: 08/29/24 1815     Protime 14.9 Seconds      INR 1.12    aPTT [[id number]]  (Normal) Collected: 08/29/24 1754    Specimen: Blood Updated: 08/29/24 1815     PTT 33.5 seconds     CBC & Differential [[id number]]  (Abnormal) Collected: 08/29/24 1754    Specimen: Blood Updated: 08/29/24 1812    Narrative:      The following orders were created for panel order CBC & Differential.  Procedure                               Abnormality         Status                     ---------                               -----------         ------                     CBC Auto Differential[[id number]]        Abnormal            Final result                 Please view results for these tests on the individual orders.    CBC Auto Differential [[id number]]  (Abnormal) Collected: 08/29/24 1754    Specimen: Blood Updated: 08/29/24 1812     WBC 4.88 10*3/mm3      RBC 4.08 10*6/mm3      Hemoglobin 12.4 g/dL      Hematocrit 36.8 %      MCV 90.2 fL      MCH 30.4 pg      MCHC 33.7 g/dL      RDW 14.2 %      RDW-SD 46.9 fl      MPV 10.5 fL      Platelets 121 10*3/mm3      Neutrophil % 47.2 %      Lymphocyte % 38.7 %      Monocyte % 9.6 %      Eosinophil % 3.9 %      Basophil % 0.2 %      Immature Grans % 0.4 %      Neutrophils, Absolute 2.30 10*3/mm3      Lymphocytes, Absolute 1.89 10*3/mm3      Monocytes, Absolute 0.47 10*3/mm3      Eosinophils, Absolute 0.19 10*3/mm3      Basophils, Absolute 0.01 10*3/mm3      Immature Grans, Absolute 0.02 10*3/mm3           Imaging Results (Last 24 Hours)       Procedure Component Value Units Date/Time    CT Lower Extremity Right Without Contrast [[id number]] Collected: 08/29/24 2016     Updated: 08/29/24 2023    Narrative:      EXAM/TECHNIQUE: CT of the right hip without contrast     INDICATION: Pain, fall     COMPARISON: None available.     FINDINGS:     Acute comminuted mildly displaced and varus angulated fracture extending  to  the base of the right femoral neck, with fracture extension into the  intertrochanteric region and greater trochanter. No hip dislocation. No  pelvic fracture in the visualized portion of the right pelvis.     2.9 cm bladder stone. Partially imaged right-sided hydroureteronephrosis  extending to the ureterovesical junction where the large bladder  calculus is present. Prostate is enlarged. Visualized portion of the  colon and small bowel are unremarkable.       Impression:         1.  Acute comminuted mildly displaced and varus angulated fracture  involving the base of the right femoral neck with fracture extension  into the greater trochanter.     2.  2.9 cm bladder stone located near the right ureterovesical junction.  Partially imaged right hydronephrosis.     This report was signed and finalized on 8/29/2024 8:20 PM by Dr. Armando Gimenez MD.       CT Lumbar Spine Without Contrast [[id number]] Collected: 08/29/24 1913     Updated: 08/29/24 1920    Narrative:      EXAM/TECHNIQUE: CT lumbar spine without contrast     INDICATION: fall     COMPARISON: None available.     DLP: 5276.45 mGy.cm. Automated exposure control was also utilized to  decrease patient radiation dose.     FINDINGS:     Mild lumbar spine levocurvature. Lumbar lordosis is maintained. No  subluxations. Vertebral body heights are maintained. No acute fracture.  Multilevel degenerative changes described level by level below.  Horseshoe kidney with severe right-sided hydronephrosis.     L1-L2: Central canal and neural foramina are widely patent.     L2-L3: Facet arthropathy mildly narrows the central canal and bilateral  neural foramina.     L3-L4: Disc and facet arthropathy causes moderate central canal  stenosis, moderate right neural foraminal stenosis, and mild left neural  foraminal stenosis.     L4-L5: Disc bulge and facet arthropathy causes moderate to severe  central canal stenosis. There is also moderate bilateral neural  foraminal  stenosis.     L5-S1: Disc bulge and facet arthropathy causes mild central canal  stenosis. There is moderate to severe left-sided neural foraminal  stenosis and moderate right-sided neuroforaminal stenosis.       Impression:      1.  No acute fracture or subluxation.  2.  Multilevel degenerative change, as described above.  3.  Horseshoe kidney with severe right-sided hydronephrosis.     This report was signed and finalized on 8/29/2024 7:17 PM by Dr. Armando Gimenez MD.       CT Head Without Contrast [[id number]] Collected: 08/29/24 1858     Updated: 08/29/24 1916    Narrative:      EXAM/TECHNIQUE: CT head without contrast     INDICATION: fall     COMPARISON: 10/18/2017     DLP: 5276.45 mGy.cm. Automated exposure control was also utilized to  decrease patient radiation dose.     FINDINGS:     No evidence of intracranial hemorrhage. Gray-white differentiation is  maintained. No midline shift or mass effect. Lateral ventricles are  nondilated. Basilar cisterns are patent. Mild presumed chronic  microvascular ischemic white matter change. Moderate global cerebral  volume loss. No acute orbital finding. Mastoid air cells are clear.  Visualized portion of the paranasal sinuses are clear. No acute osseous  finding.       Impression:      1.  No acute intracranial findings.  2.  Global cerebral volume loss and presumed chronic microvascular  ischemic white matter change.           This report was signed and finalized on 8/29/2024 7:13 PM by Dr. Armando Gimenez MD.       CT Thoracic Spine Without Contrast [[id number]] Collected: 08/29/24 1906     Updated: 08/29/24 1913    Narrative:      EXAM/TECHNIQUE: CT thoracic spine without contrast     INDICATION: fall     COMPARISON: None available.     DLP: 5276.45 mGy.cm. Automated exposure control was also utilized to  decrease patient radiation dose.     FINDINGS:     Thoracic kyphosis and alignment are maintained. Vertebral body heights  are maintained. No acute fracture or  subluxation. Mild multilevel  thoracic spine degenerative change without evidence of high-grade  central canal or neural foraminal stenosis. Bibasilar atelectasis. No  visible pneumothorax. Partially imaged right-sided hydronephrosis. Right  hemidiaphragm elevation is noted.       Impression:      1.  No acute fracture or subluxation.  2.  Mild multilevel thoracic spine degenerative change.  3.  Partially imaged right-sided hydronephrosis.        This report was signed and finalized on 8/29/2024 7:10 PM by Dr. Armando Gimenez MD.       CT Cervical Spine Without Contrast [[id number]] Collected: 08/29/24 1902     Updated: 08/29/24 1909    Narrative:      EXAM/TECHNIQUE: CT cervical spine without contrast     INDICATION: fall     COMPARISON: None available.     DLP: 5276.45 mGy.cm. Automated exposure control was also utilized to  decrease patient radiation dose.     FINDINGS:     Craniocervical relationships are maintained. The odontoid process is  intact. Cervical spine alignment is anatomic. Vertebral body heights are  maintained. No acute fracture or subluxation. Mild multilevel cervical  spine degenerative change.       Impression:      No acute fracture or subluxation.           This report was signed and finalized on 8/29/2024 7:06 PM by Dr. Armando Gimenez MD.       XR Hip With or Without Pelvis 2 - 3 View Right [[id number]] Collected: 08/29/24 1749     Updated: 08/29/24 1754    Narrative:      EXAM/TECHNIQUE: XR HIP W OR WO PELVIS 2-3 VIEW RIGHT-     INDICATION: fall     COMPARISON: None available.     FINDINGS:     Acute mild displaced fracture involving the right femur greater  trochanter. Extent of fracture is not well delineated and extension into  the femoral neck is not excluded. No hip dislocation.     No acute pelvic fracture. Pubic symphysis and SI joints are intact.  Dense radiopaque foreign body projects over the midline pelvis. No acute  fracture involving the left hip on single AP view.        Impression:         Acute mildly displaced fracture involving the right femur greater  trochanter. Extent of fracture is not well delineated and extension into  the femoral neck is not excluded. Recommend CT to better characterize  fracture.     This report was signed and finalized on 8/29/2024 5:51 PM by Dr. Armando Gimenez MD.       XR Chest 1 View [[id number]] Collected: 08/29/24 1747     Updated: 08/29/24 1752    Narrative:      EXAM/TECHNIQUE: XR CHEST 1 VW-     INDICATION: fall     COMPARISON: 5/2/2019     FINDINGS:     Cardiac silhouette is within normal limits and stable.     Chronic mild right hemidiaphragm elevation. Mild bibasilar atelectasis.  Mild diffuse interstitial coarsening, likely chronic change. No focal  consolidation. No pleural effusion or visible pneumothorax.     No acute osseous finding.       Impression:         Chronic mild right hemidiaphragm elevation. Bibasilar atelectasis.     This report was signed and finalized on 8/29/2024 5:49 PM by Dr. Armando Gimenez MD.             I have personally reviewed and interpreted the radiology studies and ECG obtained at time of admission.     Assessment / Plan   Assessment:   Active Hospital Problems    Diagnosis     **Hip fracture      Impression:  Right hip fracture  FERNANDO with previous creatinine was 1.47  Thrush  GERD  UTI  Horseshoe kidney    Treatment Plan  Admit to the hospital  Nystatin swish and swallow  Pepcid IV daily  Rocephin  Neurovascular checks  02 support with capnography  Pain control  Bowel program PRN  IVF  FU labs in the am    The patient will be admitted to my service here at [facility].  Primary team to take over the morning    Medical Decision Making  Number and Complexity of problems: 4, complex  Differential Diagnosis: Musculoskeletal strain    Conditions and Status        Condition is unchanged.     [facility] Data  External documents reviewed: None  Cardiac tracing (EKG, telemetry) interpretation: None  Radiology  interpretation: None  Labs reviewed: Reviewed  Any tests that were considered but not ordered: None      Decision rules/scores evaluated (example [id number], [place], etc): None     Discussed with: Patient and family at bedside     Care Planning  Shared decision making: Patient, family, and ED staff  Code status and discussions: Full    Disposition  Social Determinants of Health that impact treatment or disposition: None  Estimated length of stay is 2 to 3 days.     I confirmed that the patient's advanced care plan is present, code status is documented, and a surrogate decision maker is listed in the patient's medical record.     The patient's surrogate decision maker is family.     The patient was seen and examined by me on 8/29/2024 at 8.    Electronically signed by Fernando Rehman DO, 08/29/24, 21:09 CDT.

## 2024-08-30 NOTE — CONSULTS
Orthopaedic Surgery Consult Note      8/30/2024   14:05 CDT    Name:  Manohar Garcia  MRN:    [id number]     Acct:     [id number]   Room:  hospitals OR/MAIN OR  IP Day: 1     Admit Date: 8/29/2024  PCP: Provider, No Known        Subjective:     C/C: Right hip fracture. We are seeing this patient in consultation for an orthopaedic evaluation.  The patient is a pleasant 84-year-old gentleman who fell down steps and due to the acute onset of right hip pain was evaluated at [facility] emergency department.  X-rays were initially obtained demonstrating a greater trochanteric fracture.  Based on the mechanism of injury, I ultimately recommended CT evaluation of the right hip which did demonstrate a nondisplaced basocervical/intertrochanteric right femur fracture.  Orthopedics was formally consulted thereafter.    Pain: 7 /10    Code Status:    Code Status and Medical Interventions: CPR (Attempt to Resuscitate); Full Support   Ordered at: 08/29/24 2109     Level Of Support Discussed With:    Patient     Code Status (Patient has no pulse and is not breathing):    CPR (Attempt to Resuscitate)     Medical Interventions (Patient has pulse or is breathing):    Full Support         Past Medical History:    Past Medical History:   Diagnosis Date    Dementia     GERD (gastroesophageal reflux disease)     Liver disorder        Past Surgical History:    Past Surgical History:   Procedure Laterality Date    HEMORRHOIDECTOMY      HERNIA REPAIR         Current Medications:   Prior to Admission medications    Medication Sig Start Date End Date Taking? Authorizing Provider   bisacodyl (DULCOLAX) 5 MG EC tablet Take 1 tablet by mouth Daily As Needed for Constipation.   Yes ProviderShu MD   cimetidine (TAGAMET) 200 MG tablet Take 1 tablet by mouth 2 (Two) Times a Day As Needed (heartburn).   Yes Provider, MD Shu       Allergies:  Contrast dye (echo or unknown ct/mr) and Penicillins    Social History:   Social History  "    Socioeconomic History    Marital status:    Tobacco Use    Smoking status: Former     Types: Cigarettes, Pipe    Smokeless tobacco: Never   Vaping Use    Vaping status: Never Used   Substance and Sexual Activity    Alcohol use: No    Drug use: Yes     Types: Amphetamines     Comment: Quit 40 years ago    Sexual activity: Defer       Family History:   History reviewed. No pertinent family history.        REVIEW OF SYSTEMS:    Review of systems from admission H&P are discussed with the patient and his family member at bedside and noted to be unchanged.    Vitals:  /55 (BP Location: Right arm, Patient Position: Lying)   Pulse 80   Temp 98 °F (36.7 °C) (Oral)   Resp 18   Ht 180.3 cm (71\")   Wt 107 kg (235 lb)   SpO2 91%   BMI 32.78 kg/m²   Temp (24hrs), Av.1 °F (36.7 °C), Min:97.8 °F (36.6 °C), Max:98.6 °F (37 °C)      I/O (24Hr):    Intake/Output Summary (Last 24 hours) at 2024 1405  Last data filed at 2024 0600  Gross per 24 hour   Intake --   Output 400 ml   Net -400 ml       Labs:  Lab Results (last 72 hours)       Procedure Component Value Units Date/Time    Comprehensive Metabolic Panel [[id number]]  (Abnormal) Collected: 24 0423    Specimen: Blood Updated: 24     Glucose 115 mg/dL      BUN 25 mg/dL      Creatinine 2.27 mg/dL      Sodium 138 mmol/L      Potassium 4.7 mmol/L      Comment: Slight hemolysis detected by analyzer. Result may be falsely elevated.        Chloride 110 mmol/L      CO2 20.0 mmol/L      Calcium 8.2 mg/dL      Total Protein 6.3 g/dL      Albumin 3.1 g/dL      ALT (SGPT) 11 U/L      AST (SGOT) 24 U/L      Alkaline Phosphatase 102 U/L      Total Bilirubin 1.2 mg/dL      Globulin 3.2 gm/dL      A/G Ratio 1.0 g/dL      BUN/Creatinine Ratio 11.0     Anion Gap 8.0 mmol/L      eGFR 27.8 mL/min/1.73     Narrative:      GFR Normal >60  Chronic Kidney Disease <60  Kidney Failure <15    The GFR formula is only valid for adults with stable renal " function between ages 18 and 70.    CBC Auto Differential [[id number]]  (Abnormal) Collected: 08/30/24 0423    Specimen: Blood Updated: 08/30/24 0526     WBC 5.97 10*3/mm3      RBC 4.08 10*6/mm3      Hemoglobin 12.3 g/dL      Hematocrit 37.8 %      MCV 92.6 fL      MCH 30.1 pg      MCHC 32.5 g/dL      RDW 13.9 %      RDW-SD 48.2 fl      MPV 10.8 fL      Platelets 86 10*3/mm3      Neutrophil % 71.2 %      Lymphocyte % 17.1 %      Monocyte % 9.0 %      Eosinophil % 2.0 %      Basophil % 0.2 %      Immature Grans % 0.5 %      Neutrophils, Absolute 4.25 10*3/mm3      Lymphocytes, Absolute 1.02 10*3/mm3      Monocytes, Absolute 0.54 10*3/mm3      Eosinophils, Absolute 0.12 10*3/mm3      Basophils, Absolute 0.01 10*3/mm3      Immature Grans, Absolute 0.03 10*3/mm3     STAT Lactic Acid, Reflex [[id number]]  (Normal) Collected: 08/29/24 2357    Specimen: Blood Updated: 08/30/24 0017     Lactate 1.4 mmol/L     Lactic Acid, Plasma [[id number]]  (Abnormal) Collected: 08/29/24 2116    Specimen: Blood Updated: 08/29/24 2140     Lactate 2.1 mmol/L     Urinalysis With Microscopic If Indicated (No Culture) - Urine, Clean Catch [[id number]]  (Abnormal) Collected: 08/29/24 2037    Specimen: Urine, Clean Catch Updated: 08/29/24 2103     Color, UA Dark Yellow     Appearance, UA Turbid     pH, UA 7.5     Specific Gravity, UA 1.017     Glucose, UA Negative     Ketones, UA Negative     Bilirubin, UA Negative     Blood, UA Small (1+)     Protein,  mg/dL (2+)     Leuk Esterase, UA Large (3+)     Nitrite, UA Negative     Urobilinogen, UA 1.0 E.U./dL    Urinalysis, Microscopic Only - Urine, Clean Catch [[id number]]  (Abnormal) Collected: 08/29/24 2037    Specimen: Urine, Clean Catch Updated: 08/29/24 2103     RBC, UA 3-5 /HPF      WBC, UA 21-50 /HPF      Bacteria, UA 2+ /HPF      Squamous Epithelial Cells, UA 3-6 /HPF      Calcium Oxalate Crystals, UA Small/1+ /HPF      Methodology Manual Light Microscopy    Comprehensive Metabolic Panel  [[id number]]  (Abnormal) Collected: 08/29/24 1754    Specimen: Blood Updated: 08/29/24 1824     Glucose 102 mg/dL      BUN 26 mg/dL      Creatinine 2.34 mg/dL      Sodium 139 mmol/L      Potassium 4.1 mmol/L      Chloride 108 mmol/L      CO2 22.0 mmol/L      Calcium 8.8 mg/dL      Total Protein 6.4 g/dL      Albumin 3.1 g/dL      ALT (SGPT) 14 U/L      AST (SGOT) 25 U/L      Alkaline Phosphatase 110 U/L      Total Bilirubin 1.0 mg/dL      Globulin 3.3 gm/dL      A/G Ratio 0.9 g/dL      BUN/Creatinine Ratio 11.1     Anion Gap 9.0 mmol/L      eGFR 26.8 mL/min/1.73     Narrative:      GFR Normal >60  Chronic Kidney Disease <60  Kidney Failure <15    The GFR formula is only valid for adults with stable renal function between ages 18 and 70.    Protime-INR [[id number]]  (Abnormal) Collected: 08/29/24 1754    Specimen: Blood Updated: 08/29/24 1815     Protime 14.9 Seconds      INR 1.12    aPTT [[id number]]  (Normal) Collected: 08/29/24 1754    Specimen: Blood Updated: 08/29/24 1815     PTT 33.5 seconds     CBC & Differential [[id number]]  (Abnormal) Collected: 08/29/24 1754    Specimen: Blood Updated: 08/29/24 1812    Narrative:      The following orders were created for panel order CBC & Differential.  Procedure                               Abnormality         Status                     ---------                               -----------         ------                     CBC Auto Differential[[id number]]        Abnormal            Final result                 Please view results for these tests on the individual orders.    CBC Auto Differential [[id number]]  (Abnormal) Collected: 08/29/24 1754    Specimen: Blood Updated: 08/29/24 1812     WBC 4.88 10*3/mm3      RBC 4.08 10*6/mm3      Hemoglobin 12.4 g/dL      Hematocrit 36.8 %      MCV 90.2 fL      MCH 30.4 pg      MCHC 33.7 g/dL      RDW 14.2 %      RDW-SD 46.9 fl      MPV 10.5 fL      Platelets 121 10*3/mm3      Neutrophil % 47.2 %      Lymphocyte % 38.7 %      Monocyte %  9.6 %      Eosinophil % 3.9 %      Basophil % 0.2 %      Immature Grans % 0.4 %      Neutrophils, Absolute 2.30 10*3/mm3      Lymphocytes, Absolute 1.89 10*3/mm3      Monocytes, Absolute 0.47 10*3/mm3      Eosinophils, Absolute 0.19 10*3/mm3      Basophils, Absolute 0.01 10*3/mm3      Immature Grans, Absolute 0.02 10*3/mm3             Radiology:  Imaging Results (Last 72 Hours)       Procedure Component Value Units Date/Time    CT Lower Extremity Right Without Contrast [[id number]] Collected: 08/29/24 2016     Updated: 08/29/24 2023    Narrative:      EXAM/TECHNIQUE: CT of the right hip without contrast     INDICATION: Pain, fall     COMPARISON: None available.     FINDINGS:     Acute comminuted mildly displaced and varus angulated fracture extending  to the base of the right femoral neck, with fracture extension into the  intertrochanteric region and greater trochanter. No hip dislocation. No  pelvic fracture in the visualized portion of the right pelvis.     2.9 cm bladder stone. Partially imaged right-sided hydroureteronephrosis  extending to the ureterovesical junction where the large bladder  calculus is present. Prostate is enlarged. Visualized portion of the  colon and small bowel are unremarkable.       Impression:         1.  Acute comminuted mildly displaced and varus angulated fracture  involving the base of the right femoral neck with fracture extension  into the greater trochanter.     2.  2.9 cm bladder stone located near the right ureterovesical junction.  Partially imaged right hydronephrosis.     This report was signed and finalized on 8/29/2024 8:20 PM by Dr. Armando Gimenez MD.       CT Lumbar Spine Without Contrast [[id number]] Collected: 08/29/24 1913     Updated: 08/29/24 1920    Narrative:      EXAM/TECHNIQUE: CT lumbar spine without contrast     INDICATION: fall     COMPARISON: None available.     DLP: 5276.45 mGy.cm. Automated exposure control was also utilized to  decrease patient radiation  dose.     FINDINGS:     Mild lumbar spine levocurvature. Lumbar lordosis is maintained. No  subluxations. Vertebral body heights are maintained. No acute fracture.  Multilevel degenerative changes described level by level below.  Horseshoe kidney with severe right-sided hydronephrosis.     L1-L2: Central canal and neural foramina are widely patent.     L2-L3: Facet arthropathy mildly narrows the central canal and bilateral  neural foramina.     L3-L4: Disc and facet arthropathy causes moderate central canal  stenosis, moderate right neural foraminal stenosis, and mild left neural  foraminal stenosis.     L4-L5: Disc bulge and facet arthropathy causes moderate to severe  central canal stenosis. There is also moderate bilateral neural  foraminal stenosis.     L5-S1: Disc bulge and facet arthropathy causes mild central canal  stenosis. There is moderate to severe left-sided neural foraminal  stenosis and moderate right-sided neuroforaminal stenosis.       Impression:      1.  No acute fracture or subluxation.  2.  Multilevel degenerative change, as described above.  3.  Horseshoe kidney with severe right-sided hydronephrosis.     This report was signed and finalized on 8/29/2024 7:17 PM by Dr. Armando Gimenez MD.       CT Head Without Contrast [[id number]] Collected: 08/29/24 1858     Updated: 08/29/24 1916    Narrative:      EXAM/TECHNIQUE: CT head without contrast     INDICATION: fall     COMPARISON: 10/18/2017     DLP: 5276.45 mGy.cm. Automated exposure control was also utilized to  decrease patient radiation dose.     FINDINGS:     No evidence of intracranial hemorrhage. Gray-white differentiation is  maintained. No midline shift or mass effect. Lateral ventricles are  nondilated. Basilar cisterns are patent. Mild presumed chronic  microvascular ischemic white matter change. Moderate global cerebral  volume loss. No acute orbital finding. Mastoid air cells are clear.  Visualized portion of the paranasal sinuses  are clear. No acute osseous  finding.       Impression:      1.  No acute intracranial findings.  2.  Global cerebral volume loss and presumed chronic microvascular  ischemic white matter change.           This report was signed and finalized on 8/29/2024 7:13 PM by Dr. Armando Gimenez MD.       CT Thoracic Spine Without Contrast [[id number]] Collected: 08/29/24 1906     Updated: 08/29/24 1913    Narrative:      EXAM/TECHNIQUE: CT thoracic spine without contrast     INDICATION: fall     COMPARISON: None available.     DLP: 5276.45 mGy.cm. Automated exposure control was also utilized to  decrease patient radiation dose.     FINDINGS:     Thoracic kyphosis and alignment are maintained. Vertebral body heights  are maintained. No acute fracture or subluxation. Mild multilevel  thoracic spine degenerative change without evidence of high-grade  central canal or neural foraminal stenosis. Bibasilar atelectasis. No  visible pneumothorax. Partially imaged right-sided hydronephrosis. Right  hemidiaphragm elevation is noted.       Impression:      1.  No acute fracture or subluxation.  2.  Mild multilevel thoracic spine degenerative change.  3.  Partially imaged right-sided hydronephrosis.        This report was signed and finalized on 8/29/2024 7:10 PM by Dr. Armando Gimenez MD.       CT Cervical Spine Without Contrast [[id number]] Collected: 08/29/24 1902     Updated: 08/29/24 1909    Narrative:      EXAM/TECHNIQUE: CT cervical spine without contrast     INDICATION: fall     COMPARISON: None available.     DLP: 5276.45 mGy.cm. Automated exposure control was also utilized to  decrease patient radiation dose.     FINDINGS:     Craniocervical relationships are maintained. The odontoid process is  intact. Cervical spine alignment is anatomic. Vertebral body heights are  maintained. No acute fracture or subluxation. Mild multilevel cervical  spine degenerative change.       Impression:      No acute fracture or subluxation.            This report was signed and finalized on 8/29/2024 7:06 PM by Dr. Armando Gimenez MD.       XR Hip With or Without Pelvis 2 - 3 View Right [[id number]] Collected: 08/29/24 1749     Updated: 08/29/24 1754    Narrative:      EXAM/TECHNIQUE: XR HIP W OR WO PELVIS 2-3 VIEW RIGHT-     INDICATION: fall     COMPARISON: None available.     FINDINGS:     Acute mild displaced fracture involving the right femur greater  trochanter. Extent of fracture is not well delineated and extension into  the femoral neck is not excluded. No hip dislocation.     No acute pelvic fracture. Pubic symphysis and SI joints are intact.  Dense radiopaque foreign body projects over the midline pelvis. No acute  fracture involving the left hip on single AP view.       Impression:         Acute mildly displaced fracture involving the right femur greater  trochanter. Extent of fracture is not well delineated and extension into  the femoral neck is not excluded. Recommend CT to better characterize  fracture.     This report was signed and finalized on 8/29/2024 5:51 PM by Dr. Armando Gimenez MD.       XR Chest 1 View [[id number]] Collected: 08/29/24 1747     Updated: 08/29/24 1752    Narrative:      EXAM/TECHNIQUE: XR CHEST 1 VW-     INDICATION: fall     COMPARISON: 5/2/2019     FINDINGS:     Cardiac silhouette is within normal limits and stable.     Chronic mild right hemidiaphragm elevation. Mild bibasilar atelectasis.  Mild diffuse interstitial coarsening, likely chronic change. No focal  consolidation. No pleural effusion or visible pneumothorax.     No acute osseous finding.       Impression:         Chronic mild right hemidiaphragm elevation. Bibasilar atelectasis.     This report was signed and finalized on 8/29/2024 5:49 PM by Dr. Armando Gimenez MD.               Physical Exam:     PHYSICAL EXAM:      Physical Examination:  Vitals:   Vitals:    08/30/24 0259 08/30/24 0735 08/30/24 1144 08/30/24 1224   BP: 136/78 121/96 118/55    BP  Location: Right arm Right arm Right arm    Patient Position: Lying Lying Lying    Pulse: 82 77 74 80   Resp: 18 18 18    Temp: 98.6 °F (37 °C) 98.4 °F (36.9 °C) 98 °F (36.7 °C)    TempSrc: Oral Oral Oral    SpO2: 91% 92% 90% 91%   Weight:       Height:         General:  Appears stated age, no distress.  Orientation:  Alert and oriented to time, place, and person.  Mood and Affect:  Cooperative and pleasant.  Gait:  Resting comfortably in bed.  Cardiovascular:  Symmetric 1-2 plus pulses in upper and lower extremities.  Lymph:  No cervical or inguinal lymphadenopathy noted.  Sensation:  Grossly intact to light touch.  DTR:  Normal, no pathologic reflexes.  Coordination/balance:  Normal    Musculoskeletal:  Right lower extremity exam:  There is no tenderness to palpation about the knee, ankle or foot, but there is notable pain with palpation of the right hip.  The right lower extremity is not obviously shortened or malrotated.  The overlying skin is intact.  Muscle compartments are soft and compressible.  Sensation is intact distally to his baseline.     Radiology Review  My review of patient's x-rays and CT shows the patient to have a nondisplaced right proximal femur basocervical/intertrochanteric femur fracture    Assessment:     Primary Problem  Hip fracture - a nondisplaced right proximal femur basocervical/intertrochanteric femur fracture    Plan:   A prolonged conversations were had with the patient and family members at bedside regarding the fracture location and morphology.  After reviewing treatment options, they have ultimately elected to move forward with a right hip trochanteric injury nailing due to the nondisplaced basocervical fracture morphology.  The risks and benefits of the procedure were discussed with the patient and family including the risk for infection, nerve and artery damage, continued pain, continued decreased range of motion, paresthesias, paralysis, loss of limb, loss of life, fracture  malunion, fracture nonunion, hardware failure, screw cut out, leg length inequality, blood clot and the need for further surgery.  They conveyed their understanding and provided written and verbal consent to proceed.  2.   Patient has been n.p.o. since midnight.  3.   Patient will be toe-touch weightbearing to the operative right lower extremity postoperatively.  4.   Eliquis 2.5 mg p.o. twice daily x 6 weeks postoperatively for DVT prophylaxis

## 2024-08-30 NOTE — PLAN OF CARE
"Goal Outcome Evaluation:  Plan of Care Reviewed With: family           Outcome Evaluation: Nutrition assessment for difficulty chewing and swallowing completed. Pt was asleep, but pt’s son and daughter answered most of the questions. Pt is reported to be edentulous, which sometimes causes problems with chewing, but he is reported to eat \"everything\" and has a good appetite at home. Diet advice was provided for heartburn, as pt has a past medical history of this condition. The dietetic graduate encouraged pt's family to follow up with any questions or food preferences once the pt's diet is advanced. Will monitor.                               "

## 2024-08-30 NOTE — ANESTHESIA PROCEDURE NOTES
Airway  Urgency: elective    Date/Time: 8/30/2024 2:03 PM  Airway not difficult    General Information and Staff    Patient location during procedure: OR  CRNA/CAA: Ramon Sanchez CRNA    Indications and Patient Condition  Indications for airway management: airway protection    Preoxygenated: yes  Mask difficulty assessment: 2 - vent by mask + OA or adjuvant +/- NMBA    Final Airway Details  Final airway type: endotracheal airway      Successful airway: ETT  Cuffed: yes   Successful intubation technique: video laryngoscopy  Facilitating devices/methods: intubating stylet  Endotracheal tube insertion site: oral  Blade: Bourne  Blade size: 4  ETT size (mm): 7.5  Cormack-Lehane Classification: grade I - full view of glottis  Placement verified by: capnometry   Cuff volume (mL): 10  Measured from: lips  ETT/EBT  to lips (cm): 22  Number of attempts at approach: 1  Assessment: lips, teeth, and gum same as pre-op and atraumatic intubation

## 2024-08-30 NOTE — PROGRESS NOTES
1         [facility]  INPATIENT PROGRESS NOTE    Patient Name: Manohar Garcia  Date of Admission: 8/29/2024  Today's Date: 08/30/24  Length of Stay: 1  Primary Care Physician: Provider, No Known    Subjective   Chief Complaint: f/u  HPI   84-year-old man admitted yesterday by Dr. Posey status post fall.  Patient reportedly missed the step inside the basement.  Patient sustained comminuted mildly displaced and varus angulated fracture of the base of right femoral neck with fracture extension into the greater trochanter.  Patient underwent cephalomedullary nailing today by Dr. Chandra    Patient is hemodynamically stable    Daughter at bedside  Patient very hard of hearing    Review of Systems   All pertinent negatives and positives are as above. All other systems have been reviewed and are negative unless otherwise stated.     Objective    Temp:  [97.8 °F (36.6 °C)-98.6 °F (37 °C)] 97.8 °F (36.6 °C)  Heart Rate:  [64-82] 80  Resp:  [14-18] 18  BP: ()/() 127/61  Physical Exam  Had supper.  Ate probably at least 50% of served meal  Open mouth breathing  O2 saturation is 92% with mouth closed at the oxygen  GEN: Awake, alert, interactive, in NAD  HEENT: Atraumatic, PERRLA, EOMI, Anicteric, Trachea midline  Lungs: CTAB, no wheezing/rales/rhonchi  Heart: RRR, +S1/s2, no rub  ABD: soft, protuberant, nt/nd, +BS, no guarding/rebound  Extremities: atraumatic, no cyanosis, no edema  Skin: no rashes or lesions  Neuro: AAOx3, no focal deficits  Psych: normal mood & affect      Results Review:  I have reviewed the labs, radiology results, and diagnostic studies.    Laboratory Data:   Results from last 7 days   Lab Units 08/30/24  0423 08/29/24  1754   WBC 10*3/mm3 5.97 4.88   HEMOGLOBIN g/dL 12.3* 12.4*   HEMATOCRIT % 37.8 36.8*   PLATELETS 10*3/mm3 86* 121*        Results from last 7 days   Lab Units 08/30/24  0423 08/29/24  1754   SODIUM mmol/L 138 139   POTASSIUM  "mmol/L 4.7 4.1   CHLORIDE mmol/L 110* 108*   CO2 mmol/L 20.0* 22.0   BUN mg/dL 25* 26*   CREATININE mg/dL 2.27* 2.34*   CALCIUM mg/dL 8.2* 8.8   BILIRUBIN mg/dL 1.2 1.0   ALK PHOS U/L 102 110   ALT (SGPT) U/L 11 14   AST (SGOT) U/L 24 25   GLUCOSE mg/dL 115* 102*       Culture Data:   No results found for: \"BLOODCX\", \"URINECX\", \"WOUNDCX\", \"MRSACX\", \"RESPCX\", \"STOOLCX\"    Radiology Data:   Imaging Results (Last 24 Hours)       Procedure Component Value Units Date/Time    XR Hip With or Without Pelvis 2 - 3 View Right [[id number]] Collected: 08/30/24 1535     Updated: 08/30/24 1540    Narrative:      XR HIP W OR WO PELVIS 2-3 VIEW RIGHT- 8/30/2024 1:07 PM     HISTORY: nailing; W19.XXXA-Unspecified fall, initial encounter;  S72.001A-Fracture of unspecified part of neck of right femur, initial  encounter for closed fracture; N28.9-Disorder of kidney and ureter,  unspecified; N18.9-Chronic kidney disease, unspecified;  S51.012A-Laceration without foreign body of left elbow, initial  encounter; N21.0-Calculus in bladder; N13.30-Unspecified hydronephrosis     COMPARISON: None     FLUOROSCOPY TIME: 0.478 minutes     FLUOROSCOPY DOSE: 12.278 mGy     NUMBER OF IMAGES: 5.0       Impression:         Intraoperative fluoroscopic images during ORIF proximal right femur.     Please refer to the operative note for more details.     This report was signed and finalized on 8/30/2024 3:37 PM by Nima Villatoro.       FL C Arm During Surgery [[id number]] Resulted: 08/30/24 1510     Updated: 08/30/24 1510    Narrative:      This procedure was auto-finalized with no dictation required.    CT Lower Extremity Right Without Contrast [[id number]] Collected: 08/29/24 2016     Updated: 08/29/24 2023    Narrative:      EXAM/TECHNIQUE: CT of the right hip without contrast     INDICATION: Pain, fall     COMPARISON: None available.     FINDINGS:     Acute comminuted mildly displaced and varus angulated fracture extending  to the base of the right " femoral neck, with fracture extension into the  intertrochanteric region and greater trochanter. No hip dislocation. No  pelvic fracture in the visualized portion of the right pelvis.     2.9 cm bladder stone. Partially imaged right-sided hydroureteronephrosis  extending to the ureterovesical junction where the large bladder  calculus is present. Prostate is enlarged. Visualized portion of the  colon and small bowel are unremarkable.       Impression:         1.  Acute comminuted mildly displaced and varus angulated fracture  involving the base of the right femoral neck with fracture extension  into the greater trochanter.     2.  2.9 cm bladder stone located near the right ureterovesical junction.  Partially imaged right hydronephrosis.     This report was signed and finalized on 8/29/2024 8:20 PM by Dr. Armando Gimenez MD.       CT Lumbar Spine Without Contrast [[id number]] Collected: 08/29/24 1913     Updated: 08/29/24 1920    Narrative:      EXAM/TECHNIQUE: CT lumbar spine without contrast     INDICATION: fall     COMPARISON: None available.     DLP: 5276.45 mGy.cm. Automated exposure control was also utilized to  decrease patient radiation dose.     FINDINGS:     Mild lumbar spine levocurvature. Lumbar lordosis is maintained. No  subluxations. Vertebral body heights are maintained. No acute fracture.  Multilevel degenerative changes described level by level below.  Horseshoe kidney with severe right-sided hydronephrosis.     L1-L2: Central canal and neural foramina are widely patent.     L2-L3: Facet arthropathy mildly narrows the central canal and bilateral  neural foramina.     L3-L4: Disc and facet arthropathy causes moderate central canal  stenosis, moderate right neural foraminal stenosis, and mild left neural  foraminal stenosis.     L4-L5: Disc bulge and facet arthropathy causes moderate to severe  central canal stenosis. There is also moderate bilateral neural  foraminal stenosis.     L5-S1: Disc bulge  and facet arthropathy causes mild central canal  stenosis. There is moderate to severe left-sided neural foraminal  stenosis and moderate right-sided neuroforaminal stenosis.       Impression:      1.  No acute fracture or subluxation.  2.  Multilevel degenerative change, as described above.  3.  Horseshoe kidney with severe right-sided hydronephrosis.     This report was signed and finalized on 8/29/2024 7:17 PM by Dr. Armando Gimenez MD.       CT Head Without Contrast [[id number]] Collected: 08/29/24 1858     Updated: 08/29/24 1916    Narrative:      EXAM/TECHNIQUE: CT head without contrast     INDICATION: fall     COMPARISON: 10/18/2017     DLP: 5276.45 mGy.cm. Automated exposure control was also utilized to  decrease patient radiation dose.     FINDINGS:     No evidence of intracranial hemorrhage. Gray-white differentiation is  maintained. No midline shift or mass effect. Lateral ventricles are  nondilated. Basilar cisterns are patent. Mild presumed chronic  microvascular ischemic white matter change. Moderate global cerebral  volume loss. No acute orbital finding. Mastoid air cells are clear.  Visualized portion of the paranasal sinuses are clear. No acute osseous  finding.       Impression:      1.  No acute intracranial findings.  2.  Global cerebral volume loss and presumed chronic microvascular  ischemic white matter change.           This report was signed and finalized on 8/29/2024 7:13 PM by Dr. Armando Gimenez MD.       CT Thoracic Spine Without Contrast [[id number]] Collected: 08/29/24 1906     Updated: 08/29/24 1913    Narrative:      EXAM/TECHNIQUE: CT thoracic spine without contrast     INDICATION: fall     COMPARISON: None available.     DLP: 5276.45 mGy.cm. Automated exposure control was also utilized to  decrease patient radiation dose.     FINDINGS:     Thoracic kyphosis and alignment are maintained. Vertebral body heights  are maintained. No acute fracture or subluxation. Mild  multilevel  thoracic spine degenerative change without evidence of high-grade  central canal or neural foraminal stenosis. Bibasilar atelectasis. No  visible pneumothorax. Partially imaged right-sided hydronephrosis. Right  hemidiaphragm elevation is noted.       Impression:      1.  No acute fracture or subluxation.  2.  Mild multilevel thoracic spine degenerative change.  3.  Partially imaged right-sided hydronephrosis.        This report was signed and finalized on 8/29/2024 7:10 PM by Dr. Armando Gimenez MD.       CT Cervical Spine Without Contrast [[id number]] Collected: 08/29/24 1902     Updated: 08/29/24 1909    Narrative:      EXAM/TECHNIQUE: CT cervical spine without contrast     INDICATION: fall     COMPARISON: None available.     DLP: 5276.45 mGy.cm. Automated exposure control was also utilized to  decrease patient radiation dose.     FINDINGS:     Craniocervical relationships are maintained. The odontoid process is  intact. Cervical spine alignment is anatomic. Vertebral body heights are  maintained. No acute fracture or subluxation. Mild multilevel cervical  spine degenerative change.       Impression:      No acute fracture or subluxation.           This report was signed and finalized on 8/29/2024 7:06 PM by Dr. Armando Gimenez MD.       XR Hip With or Without Pelvis 2 - 3 View Right [[id number]] Collected: 08/29/24 1749     Updated: 08/29/24 1754    Narrative:      EXAM/TECHNIQUE: XR HIP W OR WO PELVIS 2-3 VIEW RIGHT-     INDICATION: fall     COMPARISON: None available.     FINDINGS:     Acute mild displaced fracture involving the right femur greater  trochanter. Extent of fracture is not well delineated and extension into  the femoral neck is not excluded. No hip dislocation.     No acute pelvic fracture. Pubic symphysis and SI joints are intact.  Dense radiopaque foreign body projects over the midline pelvis. No acute  fracture involving the left hip on single AP view.       Impression:          Acute mildly displaced fracture involving the right femur greater  trochanter. Extent of fracture is not well delineated and extension into  the femoral neck is not excluded. Recommend CT to better characterize  fracture.     This report was signed and finalized on 8/29/2024 5:51 PM by Dr. Armando Gimenez MD.       XR Chest 1 View [[id number]] Collected: 08/29/24 1747     Updated: 08/29/24 1752    Narrative:      EXAM/TECHNIQUE: XR CHEST 1 VW-     INDICATION: fall     COMPARISON: 5/2/2019     FINDINGS:     Cardiac silhouette is within normal limits and stable.     Chronic mild right hemidiaphragm elevation. Mild bibasilar atelectasis.  Mild diffuse interstitial coarsening, likely chronic change. No focal  consolidation. No pleural effusion or visible pneumothorax.     No acute osseous finding.       Impression:         Chronic mild right hemidiaphragm elevation. Bibasilar atelectasis.     This report was signed and finalized on 8/29/2024 5:49 PM by Dr. Armando Gimenez MD.               I have reviewed the patient's current medications.     Assessment/Plan   Assessment  Active Hospital Problems    Diagnosis     **Hip fracture     Closed 2-part intertrochanteric fracture of proximal end of right femur          Medical Decision Making  Number and Complexity of problems:   Problem list:  Right hip fracture status post cephalomedullary nailing August 30  Acute kidney injury on chronic kidney disease stage IIIa.  Baseline creatinine 1.47.  Came in with creatinine of 2.34  Thrush  Concern for urinary tract inspection  Thrombocytopenia    Treatment Plan  Continue empiric antibiotic  Follow culture  Follow renal function recovery  Continue nystatin when able  Postoperative care per Ortho  DVT pharmacologic prophylaxis when appropriate from Ortho service  Recheck labs in the morning -Eliquis to be started tomorrow  Continue IV fluid.      [START ON 8/31/2024] apixaban, 2.5 mg, Oral, Q12H  ceFAZolin, 2,000 mg,  Intravenous, Q8H  cefTRIAXone, 1,000 mg, Intravenous, Q24H  famotidine, 20 mg, Intravenous, Daily  [Transfer Hold] nystatin, 5 mL, Oral, 4x Daily  polyethylene glycol, 17 g, Oral, Daily  sodium chloride, 10 mL, Intravenous, Q12H        Conditions and Status        Fair     [facility] Data  External documents reviewed: Reviewed echocardiogram  Cardiac tracing (EKG, telemetry) interpretation: Sinus rhythm on telemetry  Radiology interpretation: Reviewed  Labs reviewed: Yes  Any tests that were considered but not ordered: None     Decision rules/scores evaluated (example [id number], Wells, etc): None     Discussed with: Nursing staff, daughter     Care Planning  Shared decision making: Patient and family/consultant  Code status and discussions: Full code    Disposition  Social Determinants of Health that impact treatment or disposition: None identified at this time  I expect the patient to be discharged to (TBD).     Electronically signed by Nicolas Brooke MD, 08/30/24, 17:39 CDT.

## 2024-08-30 NOTE — CASE MANAGEMENT/SOCIAL WORK
Discharge Planning Assessment  [facility]     Patient Name: Manohar Garcia  MRN: [id number]  Today's Date: 8/30/2024    Admit Date: 8/29/2024        Discharge Needs Assessment       Row Name 08/30/24 1055       Living Environment    People in Home child(hayley), adult    Current Living Arrangements home    Potentially Unsafe Housing Conditions none    In the past 12 months has the electric, gas, oil, or water company threatened to shut off services in your home? No    Primary Care Provided by self    Provides Primary Care For no one    Family Caregiver if Needed child(hayley), adult    Quality of Family Relationships helpful;involved;supportive    Able to Return to Prior Arrangements yes       Resource/Environmental Concerns    Resource/Environmental Concerns none    Transportation Concerns none       Transportation Needs    In the past 12 months, has lack of transportation kept you from medical appointments or from getting medications? no    In the past 12 months, has lack of transportation kept you from meetings, work, or from getting things needed for daily living? No       Food Insecurity    Within the past 12 months, you worried that your food would run out before you got the money to buy more. Never true    Within the past 12 months, the food you bought just didn't last and you didn't have money to get more. Never true       Transition Planning    Patient/Family Anticipates Transition to home with family    Patient/Family Anticipated Services at Transition home health care;    Transportation Anticipated car, drives self;family or friend will provide       Discharge Needs Assessment    Equipment Currently Used at Home walker, standard;cane, straight;cane, quad tip    Concerns to be Addressed no discharge needs identified    Anticipated Changes Related to Illness none    Equipment Needed After Discharge none    Provided Post Acute Provider List? N/A    Provided Post Acute Provider Quality & Resource  List? N/A    Discharge Coordination/Progress Pt was slightly drowsy R/T pain medication at time of assessment. Information obtained from pt and daughter who was at bedside. Lives in home with his daughter. Drives some but daughter provides most of his transportation. Has a walker and cane at home but doesnt use them often. Has PCP and Rx coverage. Daughter states she hopes to take pt home at discharge. Instructed pt and daughter to let staff know if D/C needs arise.                   Discharge Plan    No documentation.                 Continued Care and Services - Admitted Since 8/29/2024    No active coordination exists for this encounter.          Demographic Summary    No documentation.                  Functional Status    No documentation.                  Psychosocial    No documentation.                  Abuse/Neglect    No documentation.                  Legal    No documentation.                  Substance Abuse    No documentation.                  Patient Forms    No documentation.                     Martinez Hoyt RN

## 2024-08-30 NOTE — PLAN OF CARE
Goal Outcome Evaluation:  Plan of Care Reviewed With: patient        Progress: no change  Outcome Evaluation: Pt was admitted to our floor last night. He was given pain medication before he got to the floor, but he was still having 5/10 pain in his right hip. He stated he was okay and did not need more pain medication. NPO at midnight. Genna contacted Dr. Rehman last night about placing a Perkins because he was in so much pain when we changed his gown and chucks. Dr. Rehman said we could place one, so I placed a 16 Fr Perkins in the pt. S 75-84 w/ BBB, 4 m 45 sec Afl, HR up to 170s w/ PVC. I gave him 0.5 mg Dilaudid at 0251 because he stated he was having pain, and his HR was in the 160s. Pt had 5 m 21 sec run Afl at 0630. Safety maintained.

## 2024-08-30 NOTE — ED NOTES
"Nursing report ED to floor  Manohar Garcia  84 y.o.  male    HPI:   Chief Complaint   Patient presents with    Fall    Hip Pain       Admitting doctor:   Fernando Rehman DO    Consulting provider(s):  Consults       Date and Time Order Name Status Description    8/29/2024  9:09 PM Inpatient Orthopedic Surgery Consult               Admitting diagnosis:   The primary encounter diagnosis was Fall, initial encounter. Diagnoses of Closed fracture of right hip, initial encounter, Acute on chronic renal insufficiency, Skin tear of left elbow without complication, initial encounter, Bladder stone, and Hydronephrosis, unspecified hydronephrosis type were also pertinent to this visit.    Code status:   Current Code Status       Date Active Code Status Order ID Comments User Context       8/29/2024 2109 CPR (Attempt to Resuscitate) [id number]  Fernando Rehman DO ED        Question Answer    Code Status (Patient has no pulse and is not breathing) CPR (Attempt to Resuscitate)    Medical Interventions (Patient has pulse or is breathing) Full Support    Level Of Support Discussed With Patient                    Allergies:   Contrast dye (echo or unknown ct/mr) and Penicillins    Intake and Output  No intake or output data in the 24 hours ending 08/29/24 2120    Weight:       08/29/24 1756   Weight: 111 kg (245 lb 11.2 oz)       Most recent vitals:   Vitals:    08/29/24 1756 08/29/24 1830 08/29/24 2032 08/29/24 2034   BP: 132/70 152/82 (!) 154/129 127/78   BP Location: Right arm Right arm     Patient Position: Lying Lying     Pulse: 65 68 81 78   Resp: 18 18  18   Temp:  97.8 °F (36.6 °C)     TempSrc:  Oral     SpO2: 93% 94% 94% 95%   Weight: 111 kg (245 lb 11.2 oz)      Height: 180.3 cm (71\")        Oxygen Therapy: .    Active LDAs/IV Access:   Lines, Drains & Airways       Active LDAs       Name Placement date Placement time Site Days    Peripheral IV 08/29/24 1737 Left;Posterior Hand 08/29/24 1737  Hand  less than 1 "    Peripheral IV 08/29/24 1829 Posterior;Right Hand 08/29/24  1829  Hand  less than 1                    Labs (abnormal labs have a star):   Labs Reviewed   COMPREHENSIVE METABOLIC PANEL - Abnormal; Notable for the following components:       Result Value    Glucose 102 (*)     BUN 26 (*)     Creatinine 2.34 (*)     Chloride 108 (*)     Albumin 3.1 (*)     eGFR 26.8 (*)     All other components within normal limits    Narrative:     GFR Normal >60  Chronic Kidney Disease <60  Kidney Failure <15    The GFR formula is only valid for adults with stable renal function between ages 18 and 70.   PROTIME-INR - Abnormal; Notable for the following components:    Protime 14.9 (*)     INR 1.12 (*)     All other components within normal limits   CBC WITH AUTO DIFFERENTIAL - Abnormal; Notable for the following components:    RBC 4.08 (*)     Hemoglobin 12.4 (*)     Hematocrit 36.8 (*)     Platelets 121 (*)     All other components within normal limits   URINALYSIS W/ MICROSCOPIC IF INDICATED (NO CULTURE) - Abnormal; Notable for the following components:    Color, UA Dark Yellow (*)     Appearance, UA Turbid (*)     Blood, UA Small (1+) (*)     Protein,  mg/dL (2+) (*)     Leuk Esterase, UA Large (3+) (*)     All other components within normal limits   URINALYSIS, MICROSCOPIC ONLY - Abnormal; Notable for the following components:    RBC, UA 3-5 (*)     WBC, UA 21-50 (*)     Bacteria, UA 2+ (*)     Squamous Epithelial Cells, UA 3-6 (*)     All other components within normal limits   APTT - Normal   LACTIC ACID, PLASMA   CBC AND DIFFERENTIAL    Narrative:     The following orders were created for panel order CBC & Differential.  Procedure                               Abnormality         Status                     ---------                               -----------         ------                     CBC Auto Differential[[id number]]        Abnormal            Final result                 Please view results for these tests on  the individual orders.       Meds given in ED:   Medications   sodium chloride 0.9 % flush 10 mL (has no administration in time range)   sodium chloride 0.9 % flush 10 mL (has no administration in time range)   sodium chloride 0.9 % flush 10 mL (has no administration in time range)   sodium chloride 0.9 % infusion 40 mL (has no administration in time range)   HYDROmorphone (DILAUDID) injection 0.5 mg (has no administration in time range)   lactated ringers infusion (has no administration in time range)   sennosides-docusate (PERICOLACE) 8.6-50 MG per tablet 2 tablet (has no administration in time range)     And   polyethylene glycol (MIRALAX) packet 17 g (has no administration in time range)     And   bisacodyl (DULCOLAX) EC tablet 5 mg (has no administration in time range)     And   bisacodyl (DULCOLAX) suppository 10 mg (has no administration in time range)   ondansetron (ZOFRAN) injection 4 mg (has no administration in time range)   oxyCODONE-acetaminophen (PERCOCET) 7.5-325 MG per tablet 1 tablet (has no administration in time range)   famotidine (PEPCID) injection 20 mg (has no administration in time range)   nystatin (MYCOSTATIN) 100,000 unit/mL suspension 500,000 Units (has no administration in time range)   cefTRIAXone (ROCEPHIN) 1,000 mg in sodium chloride 0.9 % 100 mL MBP (1,000 mg Intravenous New Bag 8/29/24 2118)   morphine injection 4 mg (4 mg Intravenous Given 8/29/24 1831)   ondansetron (ZOFRAN) injection 4 mg (4 mg Intravenous Given 8/29/24 1831)   Tetanus-Diphth-Acell Pertussis (BOOSTRIX) injection 0.5 mL (0.5 mL Intramuscular Given 8/29/24 1831)   morphine injection 4 mg (4 mg Intravenous Given 8/29/24 2112)     lactated ringers, 75 mL/hr         NIH Stroke Scale:       Isolation/Infection(s):  No active isolations   No active infections     COVID Testing  Collected .  Resulted .    Nursing report ED to floor:  Mental status: .a/ox4  Ambulatory status: .bedrest  Precautions: fall.    ED nurse phone  extentsion- ..

## 2024-08-30 NOTE — ANESTHESIA PREPROCEDURE EVALUATION
Anesthesia Evaluation     Patient summary reviewed and Nursing notes reviewed                Airway   Dental    (+) edentulous    Pulmonary    (+) a smoker Former,  Cardiovascular   Exercise tolerance: poor (<4 METS)        Neuro/Psych  GI/Hepatic/Renal/Endo    (+) obesity, GERD, liver disease cirrhosis, renal disease- CRI and ARF    Musculoskeletal     Abdominal    Substance History   (+) drug use     OB/GYN          Other                          Anesthesia Plan    ASA 3 - emergent     general     (Discussed with daughter )  intravenous induction     Anesthetic plan, risks, benefits, and alternatives have been provided, discussed and informed consent has been obtained with: patient.        CODE STATUS:    Level Of Support Discussed With: Patient  Code Status (Patient has no pulse and is not breathing): CPR (Attempt to Resuscitate)  Medical Interventions (Patient has pulse or is breathing): Full Support

## 2024-08-31 LAB
ANION GAP SERPL CALCULATED.3IONS-SCNC: 9 MMOL/L (ref 5–15)
BUN SERPL-MCNC: 33 MG/DL (ref 8–23)
BUN/CREAT SERPL: 14.8 (ref 7–25)
CALCIUM SPEC-SCNC: 8.4 MG/DL (ref 8.6–10.5)
CHLORIDE SERPL-SCNC: 107 MMOL/L (ref 98–107)
CO2 SERPL-SCNC: 22 MMOL/L (ref 22–29)
CREAT SERPL-MCNC: 2.23 MG/DL (ref 0.76–1.27)
DEPRECATED RDW RBC AUTO: 47.6 FL (ref 37–54)
EGFRCR SERPLBLD CKD-EPI 2021: 28.3 ML/MIN/1.73
ERYTHROCYTE [DISTWIDTH] IN BLOOD BY AUTOMATED COUNT: 13.9 % (ref 12.3–15.4)
GLUCOSE SERPL-MCNC: 138 MG/DL (ref 65–99)
HCT VFR BLD AUTO: 33.7 % (ref 37.5–51)
HGB BLD-MCNC: 11.1 G/DL (ref 13–17.7)
MCH RBC QN AUTO: 30.6 PG (ref 26.6–33)
MCHC RBC AUTO-ENTMCNC: 32.9 G/DL (ref 31.5–35.7)
MCV RBC AUTO: 92.8 FL (ref 79–97)
PLATELET # BLD AUTO: 90 10*3/MM3 (ref 140–450)
PMV BLD AUTO: 10.9 FL (ref 6–12)
POTASSIUM SERPL-SCNC: 4.9 MMOL/L (ref 3.5–5.2)
RBC # BLD AUTO: 3.63 10*6/MM3 (ref 4.14–5.8)
SODIUM SERPL-SCNC: 138 MMOL/L (ref 136–145)
WBC NRBC COR # BLD AUTO: 6.78 10*3/MM3 (ref 3.4–10.8)

## 2024-08-31 PROCEDURE — 25010000002 CEFAZOLIN PER 500 MG: Performed by: ORTHOPAEDIC SURGERY

## 2024-08-31 PROCEDURE — 25010000002 HYDROMORPHONE PER 4 MG: Performed by: ORTHOPAEDIC SURGERY

## 2024-08-31 PROCEDURE — 25010000002 CEFTRIAXONE PER 250 MG: Performed by: INTERNAL MEDICINE

## 2024-08-31 PROCEDURE — 97110 THERAPEUTIC EXERCISES: CPT

## 2024-08-31 PROCEDURE — 85027 COMPLETE CBC AUTOMATED: CPT | Performed by: INTERNAL MEDICINE

## 2024-08-31 PROCEDURE — 25810000003 SODIUM CHLORIDE 0.9 % SOLUTION: Performed by: ORTHOPAEDIC SURGERY

## 2024-08-31 PROCEDURE — 97166 OT EVAL MOD COMPLEX 45 MIN: CPT | Performed by: OCCUPATIONAL THERAPIST

## 2024-08-31 PROCEDURE — 25810000003 LACTATED RINGERS PER 1000 ML: Performed by: INTERNAL MEDICINE

## 2024-08-31 PROCEDURE — 97162 PT EVAL MOD COMPLEX 30 MIN: CPT | Performed by: PHYSICAL THERAPIST

## 2024-08-31 PROCEDURE — 80048 BASIC METABOLIC PNL TOTAL CA: CPT | Performed by: ORTHOPAEDIC SURGERY

## 2024-08-31 RX ORDER — SODIUM CHLORIDE, SODIUM LACTATE, POTASSIUM CHLORIDE, CALCIUM CHLORIDE 600; 310; 30; 20 MG/100ML; MG/100ML; MG/100ML; MG/100ML
75 INJECTION, SOLUTION INTRAVENOUS CONTINUOUS
Status: DISCONTINUED | OUTPATIENT
Start: 2024-08-31 | End: 2024-09-04 | Stop reason: HOSPADM

## 2024-08-31 RX ORDER — FAMOTIDINE 20 MG/1
20 TABLET, FILM COATED ORAL DAILY
Status: DISCONTINUED | OUTPATIENT
Start: 2024-09-01 | End: 2024-09-04 | Stop reason: HOSPADM

## 2024-08-31 RX ADMIN — APIXABAN 2.5 MG: 2.5 TABLET, FILM COATED ORAL at 08:34

## 2024-08-31 RX ADMIN — HYDROMORPHONE HYDROCHLORIDE 0.5 MG: 1 INJECTION, SOLUTION INTRAMUSCULAR; INTRAVENOUS; SUBCUTANEOUS at 13:24

## 2024-08-31 RX ADMIN — FAMOTIDINE 20 MG: 10 INJECTION INTRAVENOUS at 08:34

## 2024-08-31 RX ADMIN — NYSTATIN 500000 UNITS: 100000 SUSPENSION ORAL at 08:34

## 2024-08-31 RX ADMIN — SODIUM CHLORIDE 1000 MG: 900 INJECTION INTRAVENOUS at 21:39

## 2024-08-31 RX ADMIN — POLYETHYLENE GLYCOL 3350 17 G: 17 POWDER, FOR SOLUTION ORAL at 08:34

## 2024-08-31 RX ADMIN — SODIUM CHLORIDE, POTASSIUM CHLORIDE, SODIUM LACTATE AND CALCIUM CHLORIDE 100 ML/HR: 600; 310; 30; 20 INJECTION, SOLUTION INTRAVENOUS at 15:39

## 2024-08-31 RX ADMIN — SODIUM CHLORIDE 100 ML/HR: 9 INJECTION, SOLUTION INTRAVENOUS at 03:29

## 2024-08-31 RX ADMIN — CEFAZOLIN 2000 MG: 2 INJECTION, POWDER, FOR SOLUTION INTRAMUSCULAR; INTRAVENOUS at 00:33

## 2024-08-31 RX ADMIN — Medication 10 ML: at 21:40

## 2024-08-31 RX ADMIN — APIXABAN 2.5 MG: 2.5 TABLET, FILM COATED ORAL at 21:39

## 2024-08-31 NOTE — PROGRESS NOTES
Manohar Garcia is a 84 y.o. male who qualifies for IV to PO therapy conversion.    Famotidine 20 mg daily has been changed from IV to PO per System Policy.       Neri Waldrop, PharmD

## 2024-08-31 NOTE — PLAN OF CARE
Goal Outcome Evaluation:  Plan of Care Reviewed With: patient        Progress: no change  Outcome Evaluation: VSS. C/o right hip pain, medicated per MAR with relief obtained. LR @ 100mL/hr infusing. NSR 75-89 on tele. PT/OT worked with pt this shift. Safety maintained. Plan of care ongoing.

## 2024-08-31 NOTE — PROGRESS NOTES
1         [facility]  INPATIENT PROGRESS NOTE    Patient Name: Manohar Garcia  Date of Admission: 8/29/2024  Today's Date: 08/31/24  Length of Stay: 2  Primary Care Physician: Provider, No Known    Subjective   Chief Complaint: f/u  HPI   84-year-old man admitted on August 29 by Dr. Posey status post fall.  Patient reportedly missed the step inside the basement.  Patient sustained comminuted mildly displaced and varus angulated fracture of the base of right femoral neck with fracture extension into the greater trochanter.  Patient underwent cephalomedullary nailing on August 30 by Dr. Chandra    He remains hemodynamically stable  He worked with therapist who recommends skilled nursing facility placement.    Daughter at bedside  Patient very hard of hearing    Review of Systems   All pertinent negatives and positives are as above. All other systems have been reviewed and are negative unless otherwise stated.     Objective    Temp:  [97.5 °F (36.4 °C)-98 °F (36.7 °C)] 98 °F (36.7 °C)  Heart Rate:  [68-87] 82  Resp:  [14-18] 16  BP: ()/(61-84) 130/66  Physical Exam      He is not on any oxygen at time of visit.  His oxygen saturation was in the mid 90s  GEN: Awake, alert, interactive, in NAD  HEENT: Atraumatic, PERRLA, EOMI, Anicteric, Trachea midline  Lungs: CTAB, no wheezing/rales/rhonchi  Heart: RRR, +S1/s2,   ABD: soft, protuberant, nt/nd, +BS, no guarding/rebound  Extremities: atraumatic, no cyanosis, no edema  Skin: no rashes or lesions  Neuro: AAOx3, no focal deficits  Psych: normal mood & affect  No significant change from yesterday's exam    Results Review:  I have reviewed the labs, radiology results, and diagnostic studies.    Laboratory Data:   Results from last 7 days   Lab Units 08/31/24  0415 08/30/24  0423 08/29/24  1754   WBC 10*3/mm3 6.78 5.97 4.88   HEMOGLOBIN g/dL 11.1* 12.3* 12.4*   HEMATOCRIT % 33.7* 37.8 36.8*   PLATELETS 10*3/mm3 90* 86*  "121*        Results from last 7 days   Lab Units 08/31/24  0415 08/30/24  0423 08/29/24  1754   SODIUM mmol/L 138 138 139   POTASSIUM mmol/L 4.9 4.7 4.1   CHLORIDE mmol/L 107 110* 108*   CO2 mmol/L 22.0 20.0* 22.0   BUN mg/dL 33* 25* 26*   CREATININE mg/dL 2.23* 2.27* 2.34*   CALCIUM mg/dL 8.4* 8.2* 8.8   BILIRUBIN mg/dL  --  1.2 1.0   ALK PHOS U/L  --  102 110   ALT (SGPT) U/L  --  11 14   AST (SGOT) U/L  --  24 25   GLUCOSE mg/dL 138* 115* 102*       Culture Data:   No results found for: \"BLOODCX\", \"URINECX\", \"WOUNDCX\", \"MRSACX\", \"RESPCX\", \"STOOLCX\"    Radiology Data:   Imaging Results (Last 24 Hours)       Procedure Component Value Units Date/Time    XR Hip With or Without Pelvis 2 - 3 View Right [[id number]] Collected: 08/30/24 1535     Updated: 08/30/24 1540    Narrative:      XR HIP W OR WO PELVIS 2-3 VIEW RIGHT- 8/30/2024 1:07 PM     HISTORY: nailing; W19.XXXA-Unspecified fall, initial encounter;  S72.001A-Fracture of unspecified part of neck of right femur, initial  encounter for closed fracture; N28.9-Disorder of kidney and ureter,  unspecified; N18.9-Chronic kidney disease, unspecified;  S51.012A-Laceration without foreign body of left elbow, initial  encounter; N21.0-Calculus in bladder; N13.30-Unspecified hydronephrosis     COMPARISON: None     FLUOROSCOPY TIME: 0.478 minutes     FLUOROSCOPY DOSE: 12.278 mGy     NUMBER OF IMAGES: 5.0       Impression:         Intraoperative fluoroscopic images during ORIF proximal right femur.     Please refer to the operative note for more details.     This report was signed and finalized on 8/30/2024 3:37 PM by Nima Villatoro.               I have reviewed the patient's current medications.     Assessment/Plan   Assessment  Active Hospital Problems    Diagnosis     **Hip fracture     Closed 2-part intertrochanteric fracture of proximal end of right femur          Medical Decision Making  Number and Complexity of problems:   Problem list:  Right hip fracture status " post cephalomedullary nailing August 30  Acute kidney injury on chronic kidney disease stage IIIa.  Baseline creatinine 1.47.  Came in with creatinine of 2.34.  No significant change in creatinine  Thrush-oral cavity looks somewhat better compared to yesterday  Concern for urinary tract inspection  Thrombocytopenia    Treatment Plan  Continue empiric antibiotic  Follow culture pending, I had not seen any urine culture  Follow renal function recovery  Continue nystatin when able  Postoperative care per Ortho  DVT pharmacologic prophylaxis   Recheck labs in the morning   Continue IV fluid.      Medications reviewed  apixaban, 2.5 mg, Oral, Q12H  cefTRIAXone, 1,000 mg, Intravenous, Q24H  [START ON 9/1/2024] famotidine, 20 mg, Oral, Daily  polyethylene glycol, 17 g, Oral, Daily  sodium chloride, 10 mL, Intravenous, Q12H        Conditions and Status        Fair     MDM Data  External documents reviewed: Reviewed echocardiogram  Cardiac tracing (EKG, telemetry) interpretation: Sinus rhythm on telemetry  Radiology interpretation: Reviewed  Labs reviewed: Yes  Any tests that were considered but not ordered: None     Decision rules/scores evaluated (example [id number], Wells, etc): None     Discussed with: Nursing staff, daughter and patient  Care Planning  Shared decision making: Patient and family/consultant  Code status and discussions: Full code    Disposition  Social Determinants of Health that impact treatment or disposition: None identified at this time  I expect the patient to be discharged to (TBD).     Electronically signed by Nicolas Brooke MD, 08/31/24, 15:23 CDT.

## 2024-08-31 NOTE — THERAPY TREATMENT NOTE
Acute Care - Physical Therapy Treatment Note  [facility]     Patient Name: Manohar Garcia  : 1940  MRN: [id number]  Today's Date: 2024      Visit Dx:     ICD-10-CM ICD-9-CM   1. Fall, initial encounter  W19.XXXA E888.9   2. Closed fracture of right hip, initial encounter  S72.001A 820.8   3. Acute on chronic renal insufficiency  N28.9 593.9    N18.9 585.9   4. Skin tear of left elbow without complication, initial encounter  S51.012A 881.01   5. Bladder stone  N21.0 594.1   6. Hydronephrosis, unspecified hydronephrosis type  N13.30 591   7. 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    Thrombocytopenia    Hyperlactatemia    Hip fracture    Closed 2-part intertrochanteric fracture of proximal end of right femur     Past Medical History:   Diagnosis Date    Dementia     GERD (gastroesophageal reflux disease)     Liver disorder      Past Surgical History:   Procedure Laterality Date    HEMORRHOIDECTOMY      HERNIA REPAIR       PT Assessment (Last 12 Hours)       PT Evaluation and Treatment       Row Name 24 1352          Physical Therapy Time and Intention    Subjective Information complains of;pain  -WK     Document Type therapy note (daily note)  -WK     Mode of Treatment physical therapy  -WK       Row Name 24 1355          General Information    Existing Precautions/Restrictions fall;partial weight bearing;other (see comments)  TTWB RLE  -WK       Row Name 24 1351          Pain    Pretreatment Pain Rating 3/10  -WK     Posttreatment Pain Rating 2/10  -WK     Pain Location - Side/Orientation Right  -WK     Pain Location lower  -WK     Pain Location - extremity  -WK       Row Name 24 1353          Motor Skills     Comments, Therapeutic Exercise AROM LLE and AAROM RLE 20 reps with rest breaks. Educated on POC and safety.  -WK     Additional Documentation Comments, Therapeutic Exercise (Row)  -WK       Row Name             Wound 08/29/24 2200 Left posterior elbow Abrasion    Wound - Properties Group Placement Date: 08/29/24  -BR Placement Time: 2200 -BR Present on Original Admission: Y  -BR Side: Left  -BR Orientation: posterior  -BR Location: elbow  -BR Primary Wound Type: Abrasion  -BR    Retired Wound - Properties Group Placement Date: 08/29/24  -BR Placement Time: 2200  -BR Present on Original Admission: Y  -BR Side: Left  -BR Orientation: posterior  -BR Location: elbow  -BR Primary Wound Type: Abrasion  -BR    Retired Wound - Properties Group Date first assessed: 08/29/24  -BR Time first assessed: 2200  -BR Present on Original Admission: Y  -BR Side: Left  -BR Location: elbow  -BR Primary Wound Type: Abrasion  -BR      Row Name             Wound 08/29/24 2200 Right posterior elbow Abrasion    Wound - Properties Group Placement Date: 08/29/24  -BR Placement Time: 2200  -BR Present on Original Admission: Y  -BR Side: Right  -BR Orientation: posterior  -BR Location: elbow  -BR Primary Wound Type: Abrasion  -BR    Retired Wound - Properties Group Placement Date: 08/29/24  -BR Placement Time: 2200 -BR Present on Original Admission: Y  -BR Side: Right  -BR Orientation: posterior  -BR Location: elbow  -BR Primary Wound Type: Abrasion  -BR    Retired Wound - Properties Group Date first assessed: 08/29/24  -BR Time first assessed: 2200  -BR Present on Original Admission: Y  -BR Side: Right  -BR Location: elbow  -BR Primary Wound Type: Abrasion  -BR      Row Name             Wound 08/30/24 1437 Right anterior hip Incision    Wound - Properties Group Placement Date: 08/30/24  -EP Placement Time: 1437  -EP Present on Original Admission: N  -EP Side: Right  -EP Orientation: anterior  -EP Location: hip  -EP Primary Wound Type:  Incision  -EP    Retired Wound - Properties Group Placement Date: 08/30/24  -EP Placement Time: 1437  -EP Present on Original Admission: N  -EP Side: Right  -EP Orientation: anterior  -EP Location: hip  -EP Primary Wound Type: Incision  -EP    Retired Wound - Properties Group Date first assessed: 08/30/24  -EP Time first assessed: 1437  -EP Present on Original Admission: N  -EP Side: Right  -EP Location: hip  -EP Primary Wound Type: Incision  -EP      Row Name             [REMOVED] Wound 03/09/23 2207 Left anterior plantar Abrasion    Wound - Properties Group Placement Date: 03/09/23  -LL Placement Time: 2207 -LL Side: Left  -LL Orientation: anterior  -LL Location: plantar  -LL Primary Wound Type: Abrasion  -LL Removal Date: 08/31/24  -AC Removal Time: 1207 -AC Wound Outcome: Healed  -AC    Retired Wound - Properties Group Placement Date: 03/09/23  -LL Placement Time: 2207  -LL Side: Left  -LL Orientation: anterior  -LL Location: plantar  -LL Primary Wound Type: Abrasion  -LL Removal Date: 08/31/24  -AC Removal Time: 1207  -AC Wound Outcome: Healed  -AC    Retired Wound - Properties Group Date first assessed: 03/09/23  -LL Time first assessed: 2207  -LL Side: Left  -LL Location: plantar  -LL Primary Wound Type: Abrasion  -LL Resolution Date: 08/31/24  -AC Resolution Time: 1207  -AC Wound Outcome: Healed  -AC      Row Name 08/31/24 1357          Positioning and Restraints    Pre-Treatment Position in bed  -WK     Post Treatment Position bed  -WK     In Bed fowlers;call light within reach;encouraged to call for assist;with other staff  -WK               User Key  (r) = Recorded By, (t) = Taken By, (c) = Cosigned By      Initials Name Provider Type    AC Foreign Castaneda RN Registered Nurse    Kellen Palomino PTA Physical Therapist Assistant    Genna Mendez RN Registered Nurse    Brisa Crum RN Registered Nurse    Andrew Garcia, RN Registered Nurse                    Physical Therapy Education        Title: PT OT SLP Therapies (In Progress)       Topic: Physical Therapy (In Progress)       Point: Mobility training (In Progress)       Learning Progress Summary             Patient Acceptance, E, NR by SB at 8/31/2024 0913    Comment: pt edu on POC, benefits of act, wt bearing status, use of RW, d/c plans                         Point: Home exercise program (Not Started)       Learner Progress:  Not documented in this visit.              Point: Body mechanics (In Progress)       Learning Progress Summary             Patient Acceptance, E, NR by SB at 8/31/2024 0913    Comment: pt edu on POC, benefits of act, wt bearing status, use of RW, d/c plans                         Point: Precautions (In Progress)       Learning Progress Summary             Patient Acceptance, E, NR by SB at 8/31/2024 0913    Comment: pt edu on POC, benefits of act, wt bearing status, use of RW, d/c plans                                         User Key       Initials Effective Dates Name Provider Type Discipline    SB 07/11/23 -  Agustina Glover, PT DPT Physical Therapist PT                  PT Recommendation and Plan             Time Calculation:    PT Charges       Row Name 08/31/24 1411 08/31/24 1019          Time Calculation    Start Time 1357  -WK 0903  -SB     Stop Time 1409  -WK 0945  -SB     Time Calculation (min) 12 min  -WK 42 min  -SB     PT Received On 08/31/24  -WK 08/31/24  -SB     PT Goal Re-Cert Due Date -- 09/10/24  -SB        Time Calculation- PT    Total Timed Code Minutes- PT 12 minute(s)  -WK --        Timed Charges    76899 - PT Therapeutic Exercise Minutes 12  -WK --        Untimed Charges    PT Eval/Re-eval Minutes -- 42  -SB        Total Minutes    Timed Charges Total Minutes 12  -WK --     Untimed Charges Total Minutes -- 42  -SB      Total Minutes 12  -WK 42  -SB               User Key  (r) = Recorded By, (t) = Taken By, (c) = Cosigned By      Initials Name Provider Type    Kellen Palomino PTA Physical  Therapist Assistant    Agustina Wilson, PT DPT Physical Therapist                  Therapy Charges for Today       Code Description Service Date Service Provider Modifiers Qty    90413388937 HC PT THER PROC EA 15 MIN 8/31/2024 Kellen Block, PTA GP 1            PT G-Codes  Outcome Measure Options: AM-PAC 6 Clicks Basic Mobility (PT)  AM-PAC 6 Clicks Score (PT): 12  AM-PAC 6 Clicks Score (OT): 15    Kellen Block, [place]  8/31/2024

## 2024-08-31 NOTE — PLAN OF CARE
Problem: Adult Inpatient Plan of Care  Goal: Plan of Care Review  Outcome: Ongoing, Progressing  Goal: Patient-Specific Goal (Individualized)  Outcome: Ongoing, Progressing  Goal: Absence of Hospital-Acquired Illness or Injury  Outcome: Ongoing, Progressing  Intervention: Identify and Manage Fall Risk  Recent Flowsheet Documentation  Taken 8/31/2024 0200 by Alfredo David RN  Safety Promotion/Fall Prevention: safety round/check completed  Taken 8/31/2024 0000 by Alfredo David RN  Safety Promotion/Fall Prevention: safety round/check completed  Taken 8/30/2024 2200 by Alfredo David RN  Safety Promotion/Fall Prevention: safety round/check completed  Taken 8/30/2024 2100 by Alfredo David RN  Safety Promotion/Fall Prevention: safety round/check completed  Taken 8/30/2024 2000 by Alfredo David RN  Safety Promotion/Fall Prevention: safety round/check completed  Intervention: Prevent Skin Injury  Recent Flowsheet Documentation  Taken 8/30/2024 2000 by Alfredo David RN  Body Position: position changed independently  Intervention: Prevent and Manage VTE (Venous Thromboembolism) Risk  Recent Flowsheet Documentation  Taken 8/30/2024 2000 by Alfredo David RN  VTE Prevention/Management: (see mar) other (see comments)  Goal: Optimal Comfort and Wellbeing  Outcome: Ongoing, Progressing  Goal: Readiness for Transition of Care  Outcome: Ongoing, Progressing     Problem: Pain Acute  Goal: Acceptable Pain Control and Functional Ability  Outcome: Ongoing, Progressing     Problem: Bleeding (Orthopaedic Fracture)  Goal: Absence of Bleeding  Outcome: Ongoing, Progressing     Problem: Embolism (Orthopaedic Fracture)  Goal: Absence of Embolism Signs and Symptoms  Outcome: Ongoing, Progressing  Intervention: Prevent or Manage Embolism Risk  Recent Flowsheet Documentation  Taken 8/30/2024 2000 by Alfredo David RN  VTE Prevention/Management: (see mar) other (see comments)     Problem: Fracture Stabilization and Management (Orthopaedic  Fracture)  Goal: Fracture Stability  Outcome: Ongoing, Progressing     Problem: Functional Ability Impaired (Orthopaedic Fracture)  Goal: Optimal Functional Ability  Outcome: Ongoing, Progressing     Problem: Infection (Orthopaedic Fracture)  Goal: Absence of Infection Signs and Symptoms  Outcome: Ongoing, Progressing     Problem: Neurovascular Compromise (Orthopaedic Fracture)  Goal: Effective Tissue Perfusion  Outcome: Ongoing, Progressing     Problem: Pain (Orthopaedic Fracture)  Goal: Acceptable Pain Control  Outcome: Ongoing, Progressing     Problem: Respiratory Compromise (Orthopaedic Fracture)  Goal: Effective Oxygenation and Ventilation  Outcome: Ongoing, Progressing  Intervention: Promote Airway Secretion Clearance  Recent Flowsheet Documentation  Taken 8/30/2024 2000 by Alfredo David RN  Cough And Deep Breathing: done with encouragement     Problem: Fall Injury Risk  Goal: Absence of Fall and Fall-Related Injury  Outcome: Ongoing, Progressing  Intervention: Promote Injury-Free Environment  Recent Flowsheet Documentation  Taken 8/31/2024 0200 by Alfredo David RN  Safety Promotion/Fall Prevention: safety round/check completed  Taken 8/31/2024 0000 by Alfredo David RN  Safety Promotion/Fall Prevention: safety round/check completed  Taken 8/30/2024 2200 by Alfredo David RN  Safety Promotion/Fall Prevention: safety round/check completed  Taken 8/30/2024 2100 by Alfredo David RN  Safety Promotion/Fall Prevention: safety round/check completed  Taken 8/30/2024 2000 by Alfredo David RN  Safety Promotion/Fall Prevention: safety round/check completed   Goal Outcome Evaluation:            S 65-75 Big, quad, pvcs, fpvcs per tele. No c/o pain. Vss. Will continue to monitor.

## 2024-08-31 NOTE — THERAPY EVALUATION
Patient Name: Manohar Garcia  : 1940    MRN: [id number]                              Today's Date: 2024       Admit Date: 2024    Visit Dx:     ICD-10-CM ICD-9-CM   1. Fall, initial encounter  W19.XXXA E888.9   2. Closed fracture of right hip, initial encounter  S72.001A 820.8   3. Acute on chronic renal insufficiency  N28.9 593.9    N18.9 585.9   4. Skin tear of left elbow without complication, initial encounter  S51.012A 881.01   5. Bladder stone  N21.0 594.1   6. Hydronephrosis, unspecified hydronephrosis type  N13.30 591   7. 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    Thrombocytopenia    Hyperlactatemia    Hip fracture    Closed 2-part intertrochanteric fracture of proximal end of right femur     Past Medical History:   Diagnosis Date    Dementia     GERD (gastroesophageal reflux disease)     Liver disorder      Past Surgical History:   Procedure Laterality Date    HEMORRHOIDECTOMY      HERNIA REPAIR        General Information       Row Name 24 0903          Physical Therapy Time and Intention    Document Type evaluation  presents after fall down stairs; s/p Cephalomedullary Nailing right closed minimally-displaced basicervical/intertrochanteric hip fracture   -SB     Mode of Treatment physical therapy  -SB       Row Name 24 0903          General Information    Patient Profile Reviewed yes  -SB     Prior Level of Function independent:;all household mobility;bed mobility;driving;bathing  RW  -SB     Existing Precautions/Restrictions fall;other (see comments)  TTWB RLE  -SB     Barriers to Rehab medically complex;hearing deficit  -SB       Row Name 24 0956          Living Environment    People in Home  child(hayley), adult;grandchild(hayley)  -SB       Row Name 08/31/24 0903          Home Main Entrance    Number of Stairs, Main Entrance two  -SB     Stair Railings, Main Entrance none  -SB       Row Name 08/31/24 0903          Stairs Within Home, Primary    Stairs, Within Home, Primary has basement but doesn't have to use it  -SB     Number of Stairs, Within Home, Primary none  -SB       Row Name 08/31/24 0903          Cognition    Orientation Status (Cognition) oriented x 4  -SB       Row Name 08/31/24 0903          Safety Issues, Functional Mobility    Safety Issues Affecting Function (Mobility) safety precaution awareness;safety precautions follow-through/compliance;friction/shear risk;at risk behavior observed;awareness of need for assistance;insight into deficits/self-awareness;judgment  -SB     Impairments Affecting Function (Mobility) pain;endurance/activity tolerance;strength;range of motion (ROM);balance;cognition  -SB     Cognitive Impairments, Mobility Safety/Performance awareness, need for assistance;insight into deficits/self-awareness;judgment;safety precaution awareness;safety precaution follow-through  -SB               User Key  (r) = Recorded By, (t) = Taken By, (c) = Cosigned By      Initials Name Provider Type    Agustina Wilson PT DPT Physical Therapist                   Mobility       Row Name 08/31/24 0903          Bed Mobility    Bed Mobility supine-sit;sit-supine;scooting/bridging  -SB     Scooting/Bridging [place] (Bed Mobility) verbal cues;minimum assist (75% patient effort)  -SB     Supine-Sit [place] (Bed Mobility) minimum assist (75% patient effort);2 person assist;verbal cues  -SB     Sit-Supine [place] (Bed Mobility) minimum assist (75% patient effort);2 person assist;verbal cues  -SB     Assistive Device (Bed Mobility) bed rails;head of bed elevated;draw sheet  -SB       Row Name 08/31/24 0903          Transfers    Comment, (Transfers) attempted x4 with success on last  rep but patient unable to obtain full upright position  -SB       Row Name 08/31/24 0903          Sit-Stand Transfer    Sit-Stand [place] (Transfers) moderate assist (50% patient effort);maximum assist (25% patient effort);2 person assist;verbal cues  -SB     Assistive Device (Sit-Stand Transfers) walker, front-wheeled  -SB               User Key  (r) = Recorded By, (t) = Taken By, (c) = Cosigned By      Initials Name Provider Type    Agustina Wilson PT DPT Physical Therapist                   Obj/Interventions       Row Name 08/31/24 0903          Range of Motion Comprehensive    General Range of Motion lower extremity range of motion deficits identified  -SB     Comment, General Range of Motion R hip and knee imp 50%  -SB       Row Name 08/31/24 0903          Strength Comprehensive (MMT)    General Manual Muscle Testing (MMT) Assessment lower extremity strength deficits identified  -SB     Comment, General Manual Muscle Testing (MMT) Assessment LLE 4/5; RLE hip flex and knee ext 3-/5, DF 4/5  -SB       Row Name 08/31/24 0903          Balance    Balance Assessment sitting static balance;sitting dynamic balance;standing static balance  -SB     Static Sitting Balance standby assist  -SB     Dynamic Sitting Balance contact guard  -SB     Position, Sitting Balance sitting edge of bed;unsupported  -SB     Static Standing Balance minimal assist;moderate assist;verbal cues;2-person assist  -SB     Position/Device Used, Standing Balance supported;walker, front-wheeled  -SB       Row Name 08/31/24 0903          Sensory Assessment (Somatosensory)    Sensory Assessment (Somatosensory) LE sensation intact  -SB               User Key  (r) = Recorded By, (t) = Taken By, (c) = Cosigned By      Initials Name Provider Type    Agustina Wilson PT DPT Physical Therapist                   Goals/Plan       Row Name 08/31/24 0903          Bed Mobility Goal 1 (PT)    Activity/Assistive Device (Bed Mobility Goal 1, PT) sit to  supine;supine to sit;rolling to left;rolling to right  -SB     [place] Level/Cues Needed (Bed Mobility Goal 1, PT) contact guard required  -SB     Time Frame (Bed Mobility Goal 1, PT) long term goal (LTG)  -SB     Progress/Outcomes (Bed Mobility Goal 1, PT) new goal  -SB       Row Name 08/31/24 0903          Transfer Goal 1 (PT)    Activity/Assistive Device (Transfer Goal 1, PT) sit-to-stand/stand-to-sit;bed-to-chair/chair-to-bed;walker, rolling  -SB     [place] Level/Cues Needed (Transfer Goal 1, PT) minimum assist (75% or more patient effort)  -SB     Time Frame (Transfer Goal 1, PT) long term goal (LTG)  -SB     Progress/Outcome (Transfer Goal 1, PT) new goal  -SB       Row Name 08/31/24 0903          Gait Training Goal 1 (PT)    Activity/Assistive Device (Gait Training Goal 1, PT) gait (walking locomotion);decrease fall risk;maintain weight-bearing status;walker, rolling  -SB     [place] Level (Gait Training Goal 1, PT) minimum assist (75% or more patient effort)  -SB     Distance (Gait Training Goal 1, PT) 10  -SB     Time Frame (Gait Training Goal 1, PT) long term goal (LTG)  -SB     Progress/Outcome (Gait Training Goal 1, PT) new goal  -SB       Row Name 08/31/24 0903          Therapy Assessment/Plan (PT)    Planned Therapy Interventions (PT) balance training;strengthening;bed mobility training;gait training;patient/family education;transfer training;ROM (range of motion);wheelchair management/propulsion training  -SB               User Key  (r) = Recorded By, (t) = Taken By, (c) = Cosigned By      Initials Name Provider Type    Agustina Wilson, PT DPT Physical Therapist                   Clinical Impression       Row Name 08/31/24 0903          Pain    Pretreatment Pain Rating --  -SB     Posttreatment Pain Rating 10/10  -SB     Pain Location - Side/Orientation Right;Bilateral  -SB     Pain Location - hip;knee  -SB     Pre/Posttreatment Pain Comment B knee and R hip  -SB     Pain  Intervention(s) Medication (See MAR);Repositioned;Ambulation/increased activity  -SB     Additional Documentation Pain Scale: Numbers Pre/Post-Treatment (Group);Pain Scale: FACES Pre/Post-Treatment (Group)  -SB       Row Name 08/31/24 0903          Pain Scale: FACES Pre/Post-Treatment    Pain: FACES Scale, Pretreatment 4-->hurts little more  -SB     Pain Location - Side/Orientation Right  -SB     Pain Location - hip  -SB       Row Name 08/31/24 0903          Plan of Care Review    Plan of Care Reviewed With patient  -SB     Progress no change  -SB     Outcome Evaluation PT eval completed. Pt alert and oriented x4 with c/o R hip pain and B knee pain. Pt edu on TTWB RLE status. Pt reports independence at home prior to arrival with use of RW. Today, pt performs sup to sit with min Ax2 and demos decreased strength and ROM in RLE due to pain and weakness. Pt performs sit to stand t/f attempts x4 reps and eventually stands with mod-max Ax2, but unable to gain upright posture. Pt reutrns to supine with min A x2 and scoots up in bed with min A and use of BUE and LLE. Pt will benefit from skilled PT to improve fxl mob and strength. Rec d/c SNF  -SB       Row Name 08/31/24 0903          Therapy Assessment/Plan (PT)    Patient/Family Therapy Goals Statement (PT) improve function  -SB     Rehab Potential (PT) good, to achieve stated therapy goals  -SB     Criteria for Skilled Interventions Met (PT) yes;meets criteria;skilled treatment is necessary  -SB     Therapy Frequency (PT) 2 times/day  -SB     Predicted Duration of Therapy Intervention (PT) until d/c or goals met  -SB       Row Name 08/31/24 0903          Vital Signs    Pre SpO2 (%) 94  -SB     O2 Delivery Pre Treatment room air  -SB       Row Name 08/31/24 0903          Positioning and Restraints    Pre-Treatment Position in bed  -SB     Post Treatment Position bed  -SB     In Bed notified nsg;fowlers;call light within reach;encouraged to call for assist;exit alarm  on;side rails up x3;RLE elevated;with OT  -SB               User Key  (r) = Recorded By, (t) = Taken By, (c) = Cosigned By      Initials Name Provider Type    Agustina Wilson PT DPT Physical Therapist                   Outcome Measures       Row Name 08/31/24 0903          How much help from another person do you currently need...    Turning from your back to your side while in flat bed without using bedrails? 3  -SB     Moving from lying on back to sitting on the side of a flat bed without bedrails? 2  -SB     Moving to and from a bed to a chair (including a wheelchair)? 2  -SB     Standing up from a chair using your arms (e.g., wheelchair, bedside chair)? 2  -SB     Climbing 3-5 steps with a railing? 1  -SB     To walk in hospital room? 2  -SB     AM-PAC 6 Clicks Score (PT) 12  -SB     Highest Level of Mobility Goal 4 --> Transfer to chair/commode  -SB       Row Name 08/31/24 0903 08/31/24 0902       Functional Assessment    Outcome Measure Options AM-PAC 6 Clicks Basic Mobility (PT)  -SB AM-PAC 6 Clicks Daily Activity (OT)  -MM              User Key  (r) = Recorded By, (t) = Taken By, (c) = Cosigned By      Initials Name Provider Type    Edis Villafuerte, OTR/L Occupational Therapist    Agustina Wilson PT DPT Physical Therapist                                 Physical Therapy Education       Title: PT OT SLP Therapies (In Progress)       Topic: Physical Therapy (In Progress)       Point: Mobility training (In Progress)       Learning Progress Summary             Patient Acceptance, E, NR by SB at 8/31/2024 0913    Comment: pt edu on POC, benefits of act, wt bearing status, use of RW, d/c plans                         Point: Home exercise program (Not Started)       Learner Progress:  Not documented in this visit.              Point: Body mechanics (In Progress)       Learning Progress Summary             Patient Acceptance, E, NR by SB at 8/31/2024 0913    Comment: pt edu on POC, benefits of act, wt  bearing status, use of RW, d/c plans                         Point: Precautions (In Progress)       Learning Progress Summary             Patient Acceptance, E, NR by SB at 8/31/2024 0913    Comment: pt edu on POC, benefits of act, wt bearing status, use of RW, d/c plans                                         User Key       Initials Effective Dates Name Provider Type Discipline    SB 07/11/23 -  Agustina Glover PT DPT Physical Therapist PT                  PT Recommendation and Plan  Planned Therapy Interventions (PT): balance training, strengthening, bed mobility training, gait training, patient/family education, transfer training, ROM (range of motion), wheelchair management/propulsion training  Plan of Care Reviewed With: patient  Progress: no change  Outcome Evaluation: PT eval completed. Pt alert and oriented x4 with c/o R hip pain and B knee pain. Pt edu on TTWB RLE status. Pt reports independence at home prior to arrival with use of RW. Today, pt performs sup to sit with min Ax2 and demos decreased strength and ROM in RLE due to pain and weakness. Pt performs sit to stand t/f attempts x4 reps and eventually stands with mod-max Ax2, but unable to gain upright posture. Pt reutrns to supine with min A x2 and scoots up in bed with min A and use of BUE and LLE. Pt will benefit from skilled PT to improve fxl mob and strength. Rec d/c SNF     Time Calculation:         PT Charges       Row Name 08/31/24 1019             Time Calculation    Start Time 0903  -SB      Stop Time 0945  -SB      Time Calculation (min) 42 min  -SB      PT Received On 08/31/24  -SB      PT Goal Re-Cert Due Date 09/10/24  -SB         Untimed Charges    PT Eval/Re-eval Minutes 42  -SB         Total Minutes    Untimed Charges Total Minutes 42  -SB       Total Minutes 42  -SB                User Key  (r) = Recorded By, (t) = Taken By, (c) = Cosigned By      Initials Name Provider Type    Agustina Wilson PT DPT Physical Therapist                   Therapy Charges for Today       Code Description Service Date Service Provider Modifiers Qty    55763942006 HC PT EVAL MOD COMPLEXITY 3 8/31/2024 Agustina Glover PT DPT GP 1            PT G-Codes  Outcome Measure Options: AM-PAC 6 Clicks Basic Mobility (PT)  AM-PAC 6 Clicks Score (PT): 12  AM-PAC 6 Clicks Score (OT): 15  PT Discharge Summary  Anticipated Discharge Disposition (PT): skilled nursing facility    Agustina Glover PT DPT  8/31/2024

## 2024-08-31 NOTE — PLAN OF CARE
Goal Outcome Evaluation:  Plan of Care Reviewed With: patient        Progress: no change  Outcome Evaluation: OT evaluation completed. Pt is A&Ox4, conversationally confused. Pt is [place]. Pt reports pain in B knees and R hip. Pt with difficulty rating pain pre tx doherty hdez 4, pt reports pain as 10/10 post tx. Pt vocalizes understanding of TTWB RLE precautions but requires verbal and tactile cues to maintain. Pt was min A x2 for bed mobility. Pt was mod-max A x2 for sit to stand t/f with RW. T/f attempted 4x, pt able to stand mostly upright on 4th attempt only. Pt was dependent for donning socks. Pt was mod I with urinal for toileting but unable to void. Skilled OT recommended to address adls, functional mobility and education. Recommended d/c SNF.      Anticipated Discharge Disposition (OT): skilled nursing facility

## 2024-08-31 NOTE — PLAN OF CARE
Goal Outcome Evaluation:  Plan of Care Reviewed With: patient        Progress: no change  Outcome Evaluation: PT eval completed. Pt alert and oriented x4 with c/o R hip pain and B knee pain. Pt edu on TTWB RLE status. Pt reports independence at home prior to arrival with use of RW. Today, pt performs sup to sit with min Ax2 and demos decreased strength and ROM in RLE due to pain and weakness. Pt performs sit to stand t/f attempts x4 reps and eventually stands with mod-max Ax2, but unable to gain upright posture. Pt reutrns to supine with min A x2 and scoots up in bed with min A and use of BUE and LLE. Pt will benefit from skilled PT to improve fxl mob and strength. Rec d/c SNF      Anticipated Discharge Disposition (PT): skilled nursing facility

## 2024-08-31 NOTE — THERAPY EVALUATION
Patient Name: Manohar Garcia  : 1940    MRN: [id number]                              Today's Date: 2024       Admit Date: 2024    Visit Dx:     ICD-10-CM ICD-9-CM   1. Fall, initial encounter  W19.XXXA E888.9   2. Closed fracture of right hip, initial encounter  S72.001A 820.8   3. Acute on chronic renal insufficiency  N28.9 593.9    N18.9 585.9   4. Skin tear of left elbow without complication, initial encounter  S51.012A 881.01   5. Bladder stone  N21.0 594.1   6. 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 Dx: A/C renal insufficiency, skin tear L elbow, hydronephrosis, bladder stone.  Cephalomedullary Nailing right closed minimally-displaced basicervical/intertrochanteric hip fracture. H/o dementia.  -MM     Mode of Treatment occupational therapy  -MM       Row Name 24 0912          General Information    Patient Profile Reviewed yes  -MM     Prior Level of Function independent:;all household mobility;community mobility;ADL's;driving  -MM     Existing Precautions/Restrictions fall;partial weight bearing;other (see comments)  TTWB RLE  -MM     Barriers to Rehab medically complex;hearing deficit  -MM       Row Name  08/31/24 0912          Living Environment    People in Home child(hayley), adult;grandchild(hayley)  tub/shower combo with shower chair.  -MM       Row Name 08/31/24 0912          Home Main Entrance    Number of Stairs, Main Entrance two  at back entrance that pt primarily uses.  -MM     Stair Railings, Main Entrance none  -MM       Row Name 08/31/24 0912          Stairs Within Home, Primary    Stairs, Within Home, Primary has basement but does not have to use, these are the steps the pt reports he fell on.  -MM     Number of Stairs, Within Home, Primary none  -MM       Row Name 08/31/24 0912          Cognition    Orientation Status (Cognition) oriented x 4  -MM       Row Name 08/31/24 0912          Safety Issues, Functional Mobility    Safety Issues Affecting Function (Mobility) at risk behavior observed;awareness of need for assistance;insight into deficits/self-awareness;judgment;safety precaution awareness;safety precautions follow-through/compliance  -MM     Impairments Affecting Function (Mobility) pain;endurance/activity tolerance;strength;range of motion (ROM);balance;cognition  -MM     Cognitive Impairments, Mobility Safety/Performance awareness, need for assistance;insight into deficits/self-awareness;judgment;safety precaution awareness;safety precaution follow-through  -MM               User Key  (r) = Recorded By, (t) = Taken By, (c) = Cosigned By      Initials Name Provider Type    MM Edis Quintanilla, OTR/L Occupational Therapist                     Mobility/ADL's       Row Name 08/31/24 0912          Bed Mobility    Bed Mobility supine-sit;sit-supine;scooting/bridging  -MM     Scooting/Bridging [place] (Bed Mobility) minimum assist (75% patient effort);verbal cues;1 person assist  -MM     Supine-Sit [place] (Bed Mobility) minimum assist (75% patient effort);2 person assist;verbal cues  -MM     Sit-Supine [place] (Bed Mobility) minimum assist (75% patient effort);2 person assist;verbal cues   -MM     Assistive Device (Bed Mobility) head of bed elevated;bed rails;draw sheet  -MM       Row Name 08/31/24 0912          Transfers    Transfers sit-stand transfer;stand-sit transfer  -MM     Comment, (Transfers) attempted 4x, stood mostly upright on 4th attempt. further functional mobility deferred d/t pt assist level and weakness.  -MM       Row Name 08/31/24 0912          Sit-Stand Transfer    Sit-Stand [place] (Transfers) moderate assist (50% patient effort);maximum assist (25% patient effort);2 person assist;verbal cues  -MM     Assistive Device (Sit-Stand Transfers) walker, front-wheeled  -MM       Row Name 08/31/24 0912          Stand-Sit Transfer    Stand-Sit Esmeralda (Transfers) moderate assist (50% patient effort);verbal cues;2 person assist  -MM     Assistive Device (Stand-Sit Transfers) walker, front-wheeled  -MM       Row Name 08/31/24 0912          Activities of Daily Living    BADL Assessment/Intervention lower body dressing;toileting  -MM       Row Name 08/31/24 0912          Lower Body Dressing Assessment/Training    [place] Level (Lower Body Dressing) don;socks;dependent (less than 25% patient effort);verbal cues  -MM     Position (Lower Body Dressing) edge of bed sitting  -MM       Row Name 08/31/24 0912          Toileting Assessment/Training    [place] Level (Toileting) toileting skills;modified independence  pt unable to void  -MM     Assistive Devices (Toileting) urinal  -MM     Position (Toileting) sitting up in bed  -MM               User Key  (r) = Recorded By, (t) = Taken By, (c) = Cosigned By      Initials Name Provider Type    Edis Villafuerte, OTR/L Occupational Therapist                   Obj/Interventions       Row Name 08/31/24 0912          Sensory Assessment (Somatosensory)    Sensory Assessment (Somatosensory) UE sensation intact  -MM       Row Name 08/31/24 0912          Range of Motion Comprehensive    General Range of Motion bilateral upper extremity  ROM WFL  -MM       Row Name 08/31/24 0912          Strength Comprehensive (MMT)    Comment, General Manual Muscle Testing (MMT) Assessment BUE strength: 4/5  -MM       Row Name 08/31/24 0912          Motor Skills    Motor Skills coordination  -MM     Coordination WFL;bilateral;upper extremity  -MM       Row Name 08/31/24 0912          Balance    Balance Assessment sitting static balance;sitting dynamic balance;standing static balance;standing dynamic balance  -MM     Static Sitting Balance standby assist  -MM     Dynamic Sitting Balance contact guard;verbal cues  -MM     Position, Sitting Balance sitting edge of bed;unsupported  -MM     Static Standing Balance minimal assist;moderate assist;verbal cues;2-person assist  -MM     Position/Device Used, Standing Balance supported;walker, front-wheeled  -MM               User Key  (r) = Recorded By, (t) = Taken By, (c) = Cosigned By      Initials Name Provider Type    MM Edis Quintanilla, OTR/L Occupational Therapist                   Goals/Plan       [facility] Name 08/31/24 0912          Transfer Goal 1 (OT)    Activity/Assistive Device (Transfer Goal 1, OT) toilet;bed-to-chair/chair-to-bed;shower chair;tub  -MM     [place] Level/Cues Needed (Transfer Goal 1, OT) minimum assist (75% or more patient effort);verbal cues required;set-up required  -MM     Time Frame (Transfer Goal 1, OT) long term goal (LTG);by discharge  -MM     Progress/Outcome (Transfer Goal 1, OT) new goal  -MM       Row Name 08/31/24 0912          Dressing Goal 1 (OT)    Activity/Device (Dressing Goal 1, OT) dressing skills, all  -MM     Lane/Cues Needed (Dressing Goal 1, OT) minimum assist (75% or more patient effort);verbal cues required;set-up required  -MM     Time Frame (Dressing Goal 1, OT) long term goal (LTG);by discharge  -MM     Progress/Outcome (Dressing Goal 1, OT) new goal  -MM       Row Name 08/31/24 0912          Toileting Goal 1 (OT)    Activity/Device (Toileting Goal 1, OT)  toileting skills, all  -MM     [place] Level/Cues Needed (Toileting Goal 1, OT) minimum assist (75% or more patient effort);verbal cues required;set-up required  -MM     Time Frame (Toileting Goal 1, OT) long term goal (LTG);by discharge  -MM     Progress/Outcome (Toileting Goal 1, OT) new goal  -MM       Row Name 08/31/24 0912          Problem Specific Goal 1 (OT)    Problem Specific Goal 1 (OT) Pt will independently implement one pain management technique to decrease pain and improve functional performance.  -MM     Time Frame (Problem Specific Goal 1, OT) long term goal (LTG);by discharge  -MM     Progress/Outcome (Problem Specific Goal 1, OT) new goal  -MM       Row Name 08/31/24 0912          Therapy Assessment/Plan (OT)    Planned Therapy Interventions (OT) activity tolerance training;BADL retraining;functional balance retraining;occupation/activity based interventions;patient/caregiver education/training;ROM/therapeutic exercise;strengthening exercise;transfer/mobility retraining;adaptive equipment training;cognitive/visual perception retraining;passive ROM/stretching  -MM               User Key  (r) = Recorded By, (t) = Taken By, (c) = Cosigned By      Initials Name Provider Type    MM Edis Quintanilla, OTR/L Occupational Therapist                   Clinical Impression       Row Name 08/31/24 0912          Pain Assessment    Posttreatment Pain Rating 10/10  -MM     Pain Location - Side/Orientation Right;Bilateral  -MM     Pain Location lower  -MM     Pain Location - knee  -MM     Pre/Posttreatment Pain Comment pt reports pain in B knees and R hip.  -MM     Pain Intervention(s) Medication (See MAR);Repositioned;Ambulation/increased activity  -MM     Additional Documentation Pain Scale: FACES Pre/Post-Treatment (Group)  -MM       Row Name 08/31/24 0912          Pain Scale: FACES Pre/Post-Treatment    Pain: FACES Scale, Pretreatment 4-->hurts little more  pt unable to rate pre tx  -MM     Pain Location -  Side/Orientation Right;Bilateral  -MM     Pain Location - hip;knee  -MM     Pre/Posttreatment Pain Comment pt reports pain in B knees and R hip.  -MM       Row Name 08/31/24 0912          Plan of Care Review    Plan of Care Reviewed With patient  -MM     Progress no change  -MM     Outcome Evaluation OT evaluation completed. Pt is A&Ox4, conversationally confused. Pt is [place]. Pt reports pain in B knees and R hip. Pt with difficulty rating pain pre tx doherty hdez 4, pt reports pain as 10/10 post tx. Pt vocalizes understanding of TTWB RLE precautions but requires verbal and tactile cues to maintain. Pt was min A x2 for bed mobility. Pt was mod-max A x2 for sit to stand t/f with RW. T/f attempted 4x, pt able to stand mostly upright on 4th attempt only. Pt was dependent for donning socks. Pt was mod I with urinal for toileting but unable to void. Skilled OT recommended to address adls, functional mobility and education. Recommended d/c SNF.  -MM       Row Name 08/31/24 0912          Therapy Assessment/Plan (OT)    Patient/Family Therapy Goal Statement (OT) to go home  -MM     Rehab Potential (OT) good, to achieve stated therapy goals  -MM     Criteria for Skilled Therapeutic Interventions Met (OT) yes;meets criteria;skilled treatment is necessary  -MM     Therapy Frequency (OT) 5 times/wk  -MM     Predicted Duration of Therapy Intervention (OT) until hospital discharge  -MM       Row Name 08/31/24 0912          Therapy Plan Review/Discharge Plan (OT)    Anticipated Discharge Disposition (OT) skilled nursing facility  -MM       Row Name 08/31/24 0912          Vital Signs    Pre SpO2 (%) 94  -MM     O2 Delivery Pre Treatment room air  -MM     O2 Delivery Intra Treatment room air  -MM     Post SpO2 (%) 94  -MM     O2 Delivery Post Treatment room air  -MM     Pre Patient Position Supine  -MM     Intra Patient Position Sitting  -MM     Post Patient Position Supine  -MM       Row Name 08/31/24 0912          Positioning and  Restraints    Pre-Treatment Position in bed  -MM     Post Treatment Position bed  -MM     In Bed notified nsg;fowlers;call light within reach;encouraged to call for assist;exit alarm on;side rails up x3;RLE elevated;R heel elevated  -MM               User Key  (r) = Recorded By, (t) = Taken By, (c) = Cosigned By      Initials Name Provider Type    MM Edis Quintanilla, OTR/L Occupational Therapist                   Outcome Measures       Row Name 08/31/24 0902          How much help from another is currently needed...    Putting on and taking off regular lower body clothing? 1  -MM     Bathing (including washing, rinsing, and drying) 2  -MM     Toileting (which includes using toilet bed pan or urinal) 4  hand held urinal  -MM     Putting on and taking off regular upper body clothing 2  -MM     Taking care of personal grooming (such as brushing teeth) 3  -MM     Eating meals 3  -MM     AM-PAC 6 Clicks Score (OT) 15  -MM       Row Name 08/31/24 0903 08/31/24 0902       Functional Assessment    Outcome Measure Options AM-PAC 6 Clicks Basic Mobility (PT)  -SB AM-PAC 6 Clicks Daily Activity (OT)  -MM              User Key  (r) = Recorded By, (t) = Taken By, (c) = Cosigned By      Initials Name Provider Type    Edis Villafuerte, OTR/L Occupational Therapist    SB Agustina Glover, PT DPT Physical Therapist                    Occupational Therapy Education       Title: PT OT SLP Therapies (In Progress)       Topic: Occupational Therapy (In Progress)       Point: ADL training (Done)       Description:   Instruct learner(s) on proper safety adaptation and remediation techniques during self care or transfers.   Instruct in proper use of assistive devices.                  Learning Progress Summary             Patient Acceptance, E, VU,NR by DEMETRIUS at 8/31/2024 1012                         Point: Home exercise program (Not Started)       Description:   Instruct learner(s) on appropriate technique for monitoring, assisting and/or  progressing therapeutic exercises/activities.                  Learner Progress:  Not documented in this visit.              Point: Precautions (Done)       Description:   Instruct learner(s) on prescribed precautions during self-care and functional transfers.                  Learning Progress Summary             Patient Acceptance, E, VU,NR by  at 8/31/2024 1012                         Point: Body mechanics (Done)       Description:   Instruct learner(s) on proper positioning and spine alignment during self-care, functional mobility activities and/or exercises.                  Learning Progress Summary             Patient Acceptance, E, VU,NR by  at 8/31/2024 1012                                         User Key       Initials Effective Dates Name Provider Type Discipline     07/11/23 -  Edis Quintanilla E, OTR/L Occupational Therapist OT                  OT Recommendation and Plan  Planned Therapy Interventions (OT): activity tolerance training, BADL retraining, functional balance retraining, occupation/activity based interventions, patient/caregiver education/training, ROM/therapeutic exercise, strengthening exercise, transfer/mobility retraining, adaptive equipment training, cognitive/visual perception retraining, passive ROM/stretching  Therapy Frequency (OT): 5 times/wk  Plan of Care Review  Plan of Care Reviewed With: patient  Progress: no change  Outcome Evaluation: OT evaluation completed. Pt is A&Ox4, conversationally confused. Pt is [place]. Pt reports pain in B knees and R hip. Pt with difficulty rating pain pre tx doherty hdez 4, pt reports pain as 10/10 post tx. Pt vocalizes understanding of TTWB RLE precautions but requires verbal and tactile cues to maintain. Pt was min A x2 for bed mobility. Pt was mod-max A x2 for sit to stand t/f with RW. T/f attempted 4x, pt able to stand mostly upright on 4th attempt only. Pt was dependent for donning socks. Pt was mod I with urinal for toileting but unable to  void. Skilled OT recommended to address adls, functional mobility and education. Recommended d/c SNF.     Time Calculation:         Time Calculation- OT       Row Name 08/31/24 0902             Time Calculation- OT    OT Start Time 0902  +9 minutes chart review  -MM      OT Stop Time 0952  -MM      OT Time Calculation (min) 50 min  -MM      OT Received On 08/31/24  -MM      OT Goal Re-Cert Due Date 09/10/24  -MM                User Key  (r) = Recorded By, (t) = Taken By, (c) = Cosigned By      Initials Name Provider Type    Edis Villafuerte, OTR/L Occupational Therapist                  Therapy Charges for Today       Code Description Service Date Service Provider Modifiers Qty    55688933605 HC OT EVAL MOD COMPLEXITY 4 8/31/2024 Edis Quintanilla OTR/L GO 1                 LINUS Serrano/TUCKER  8/31/2024

## 2024-09-01 LAB
ANION GAP SERPL CALCULATED.3IONS-SCNC: 7 MMOL/L (ref 5–15)
BUN SERPL-MCNC: 44 MG/DL (ref 8–23)
BUN/CREAT SERPL: 20.6 (ref 7–25)
CALCIUM SPEC-SCNC: 7.8 MG/DL (ref 8.6–10.5)
CHLORIDE SERPL-SCNC: 106 MMOL/L (ref 98–107)
CO2 SERPL-SCNC: 20 MMOL/L (ref 22–29)
CREAT SERPL-MCNC: 2.14 MG/DL (ref 0.76–1.27)
DEPRECATED RDW RBC AUTO: 47.1 FL (ref 37–54)
EGFRCR SERPLBLD CKD-EPI 2021: 29.8 ML/MIN/1.73
ERYTHROCYTE [DISTWIDTH] IN BLOOD BY AUTOMATED COUNT: 14.1 % (ref 12.3–15.4)
GLUCOSE SERPL-MCNC: 114 MG/DL (ref 65–99)
HCT VFR BLD AUTO: 28.3 % (ref 37.5–51)
HGB BLD-MCNC: 9.4 G/DL (ref 13–17.7)
MCH RBC QN AUTO: 30.3 PG (ref 26.6–33)
MCHC RBC AUTO-ENTMCNC: 33.2 G/DL (ref 31.5–35.7)
MCV RBC AUTO: 91.3 FL (ref 79–97)
PLATELET # BLD AUTO: 78 10*3/MM3 (ref 140–450)
PMV BLD AUTO: 10.7 FL (ref 6–12)
POTASSIUM SERPL-SCNC: 4.6 MMOL/L (ref 3.5–5.2)
RBC # BLD AUTO: 3.1 10*6/MM3 (ref 4.14–5.8)
SODIUM SERPL-SCNC: 133 MMOL/L (ref 136–145)
WBC NRBC COR # BLD AUTO: 3.46 10*3/MM3 (ref 3.4–10.8)

## 2024-09-01 PROCEDURE — 97530 THERAPEUTIC ACTIVITIES: CPT

## 2024-09-01 PROCEDURE — 25810000003 LACTATED RINGERS PER 1000 ML: Performed by: INTERNAL MEDICINE

## 2024-09-01 PROCEDURE — 97110 THERAPEUTIC EXERCISES: CPT

## 2024-09-01 PROCEDURE — 80048 BASIC METABOLIC PNL TOTAL CA: CPT | Performed by: INTERNAL MEDICINE

## 2024-09-01 PROCEDURE — 85027 COMPLETE CBC AUTOMATED: CPT | Performed by: INTERNAL MEDICINE

## 2024-09-01 RX ADMIN — APIXABAN 2.5 MG: 2.5 TABLET, FILM COATED ORAL at 20:56

## 2024-09-01 RX ADMIN — APIXABAN 2.5 MG: 2.5 TABLET, FILM COATED ORAL at 08:54

## 2024-09-01 RX ADMIN — FAMOTIDINE 20 MG: 20 TABLET, FILM COATED ORAL at 08:54

## 2024-09-01 RX ADMIN — SODIUM CHLORIDE, POTASSIUM CHLORIDE, SODIUM LACTATE AND CALCIUM CHLORIDE 100 ML/HR: 600; 310; 30; 20 INJECTION, SOLUTION INTRAVENOUS at 12:12

## 2024-09-01 RX ADMIN — POLYETHYLENE GLYCOL 3350 17 G: 17 POWDER, FOR SOLUTION ORAL at 08:54

## 2024-09-01 RX ADMIN — HYDROCODONE BITARTRATE AND ACETAMINOPHEN 2 TABLET: 7.5; 325 TABLET ORAL at 20:55

## 2024-09-01 RX ADMIN — HYDROCODONE BITARTRATE AND ACETAMINOPHEN 2 TABLET: 7.5; 325 TABLET ORAL at 16:08

## 2024-09-01 RX ADMIN — HYDROCODONE BITARTRATE AND ACETAMINOPHEN 2 TABLET: 7.5; 325 TABLET ORAL at 02:06

## 2024-09-01 RX ADMIN — SODIUM CHLORIDE, POTASSIUM CHLORIDE, SODIUM LACTATE AND CALCIUM CHLORIDE 75 ML/HR: 600; 310; 30; 20 INJECTION, SOLUTION INTRAVENOUS at 23:15

## 2024-09-01 NOTE — PLAN OF CARE
Problem: Adult Inpatient Plan of Care  Goal: Plan of Care Review  Outcome: Ongoing, Not Progressing  Goal: Patient-Specific Goal (Individualized)  Outcome: Ongoing, Not Progressing  Goal: Absence of Hospital-Acquired Illness or Injury  Outcome: Ongoing, Not Progressing  Intervention: Identify and Manage Fall Risk  Recent Flowsheet Documentation  Taken 9/1/2024 0224 by Mala Villatoro RN  Safety Promotion/Fall Prevention: safety round/check completed  Taken 9/1/2024 0000 by Mala Villatoro RN  Safety Promotion/Fall Prevention: safety round/check completed  Taken 8/31/2024 2100 by Mala Villatoro RN  Safety Promotion/Fall Prevention: safety round/check completed  Goal: Optimal Comfort and Wellbeing  Outcome: Ongoing, Not Progressing  Goal: Readiness for Transition of Care  Outcome: Ongoing, Not Progressing     Problem: Pain Acute  Goal: Acceptable Pain Control and Functional Ability  Outcome: Ongoing, Not Progressing     Problem: Bleeding (Orthopaedic Fracture)  Goal: Absence of Bleeding  Outcome: Ongoing, Not Progressing     Problem: Embolism (Orthopaedic Fracture)  Goal: Absence of Embolism Signs and Symptoms  Outcome: Ongoing, Not Progressing     Problem: Fracture Stabilization and Management (Orthopaedic Fracture)  Goal: Fracture Stability  Outcome: Ongoing, Not Progressing     Problem: Functional Ability Impaired (Orthopaedic Fracture)  Goal: Optimal Functional Ability  Outcome: Ongoing, Not Progressing     Problem: Infection (Orthopaedic Fracture)  Goal: Absence of Infection Signs and Symptoms  Outcome: Ongoing, Not Progressing     Problem: Neurovascular Compromise (Orthopaedic Fracture)  Goal: Effective Tissue Perfusion  Outcome: Ongoing, Not Progressing     Problem: Pain (Orthopaedic Fracture)  Goal: Acceptable Pain Control  Outcome: Ongoing, Not Progressing     Problem: Respiratory Compromise (Orthopaedic Fracture)  Goal: Effective Oxygenation and Ventilation  Outcome: Ongoing, Not  Progressing  Intervention: Promote Airway Secretion Clearance  Recent Flowsheet Documentation  Taken 8/31/2024 2100 by Mala Villatoro, RN  Cough And Deep Breathing: done independently per patient     Problem: Fall Injury Risk  Goal: Absence of Fall and Fall-Related Injury  Outcome: Ongoing, Not Progressing  Intervention: Promote Injury-Free Environment  Recent Flowsheet Documentation  Taken 9/1/2024 0224 by Mala Villatoro, RN  Safety Promotion/Fall Prevention: safety round/check completed  Taken 9/1/2024 0000 by Mala Villatoro, RN  Safety Promotion/Fall Prevention: safety round/check completed  Taken 8/31/2024 2100 by Mala Villatoro, RN  Safety Promotion/Fall Prevention: safety round/check completed   Goal Outcome Evaluation:

## 2024-09-01 NOTE — PLAN OF CARE
Goal Outcome Evaluation:  Plan of Care Reviewed With: patient        Progress: improving  Outcome Evaluation: VSS. C/o right hip incisional pain this shift, medicated per MAR with relief obtained. LR decreased to 75cc/hr. NSR 69-73. DC planning cont, plans to DC to SNF. Safety maintained. Plan of care ongoing.

## 2024-09-01 NOTE — PROGRESS NOTES
1         [facility] Medicine Services  INPATIENT PROGRESS NOTE    Patient Name: Manohar Garcia  Date of Admission: 8/29/2024  Today's Date: 09/01/24  Length of Stay: 3  Primary Care Physician: Provider, No Known    Subjective   Chief Complaint: Follow-up  HPI   No new complaints.  No new events  Discussed with nursing staff.  Doing well.  Looking towards placement.        Review of Systems   All pertinent negatives and positives are as above. All other systems have been reviewed and are negative unless otherwise stated.     Objective    Temp:  [97.9 °F (36.6 °C)-98.3 °F (36.8 °C)] 98.1 °F (36.7 °C)  Heart Rate:  [69-82] 69  Resp:  [16-18] 18  BP: (112-138)/(50-67) 125/61  Physical Exam  He is not on any oxygen at time of visit.  His oxygen saturation was in the mid 90s  Hard of hearing  GEN: Awake, alert, interactive, in NAD  HEENT: Atraumatic, PERRLA, EOMI, Anicteric, Trachea midline  Lungs: CTAB, no wheezing/rales/rhonchi  Heart: RRR, +S1/s2,   ABD: soft, protuberant, nt/nd, +BS, no guarding/rebound  Extremities: atraumatic, no cyanosis, no edema  Skin: no rashes or lesions  Neuro: AAOx3, no focal deficits  Psych: normal mood & affect  No significant change from yesterday's exam    Results Review:  I have reviewed the labs, radiology results, and diagnostic studies.    Laboratory Data:   Results from last 7 days   Lab Units 09/01/24 0444 08/31/24 0415 08/30/24 0423   WBC 10*3/mm3 3.46 6.78 5.97   HEMOGLOBIN g/dL 9.4* 11.1* 12.3*   HEMATOCRIT % 28.3* 33.7* 37.8   PLATELETS 10*3/mm3 78* 90* 86*        Results from last 7 days   Lab Units 09/01/24 0444 08/31/24 0415 08/30/24 0423 08/29/24  1754   SODIUM mmol/L 133* 138 138 139   POTASSIUM mmol/L 4.6 4.9 4.7 4.1   CHLORIDE mmol/L 106 107 110* 108*   CO2 mmol/L 20.0* 22.0 20.0* 22.0   BUN mg/dL 44* 33* 25* 26*   CREATININE mg/dL 2.14* 2.23* 2.27* 2.34*   CALCIUM mg/dL 7.8* 8.4* 8.2* 8.8   BILIRUBIN mg/dL  --   --  1.2 1.0  "  ALK PHOS U/L  --   --  102 110   ALT (SGPT) U/L  --   --  11 14   AST (SGOT) U/L  --   --  24 25   GLUCOSE mg/dL 114* 138* 115* 102*       Culture Data:   No results found for: \"BLOODCX\", \"URINECX\", \"WOUNDCX\", \"MRSACX\", \"RESPCX\", \"STOOLCX\"    Radiology Data:   Imaging Results (Last 24 Hours)       ** No results found for the last 24 hours. **            I have reviewed the patient's current medications.     Assessment/Plan   Assessment  Active Hospital Problems    Diagnosis     **Hip fracture     Closed 2-part intertrochanteric fracture of proximal end of right femur        Medical Decision Making  Number and Complexity of problems:   Problem list:  Right hip fracture status post cephalomedullary nailing August 30  Acute kidney injury on chronic kidney disease stage IIIa.  Baseline creatinine 1.47 (this was in March 2023).  Came in with creatinine of 2.34.  No significant change in creatinine  Thrush-oral cavity looks somewhat better compared to yesterday  Concern for urinary tract inspection-completed antibiotic.  Unfortunately there was no culture that was sent from sample drawn  Thrombocytopenia-no bleeding.  Continue to monitor; transfuse as needed if platelet count less than 10 or platelet count less than 20 with bleeding.       Treatment Plan      Follow renal function recovery.  Patient may be closer to baseline on creatinine.  No prior creatinine available for comparison besides that of 13 of 2023    Postoperative care per Ortho  DVT pharmacologic prophylaxis   Recheck labs in the morning   Continue IV fluid.        Medications reviewed    Scheduled Medication   apixaban, 2.5 mg, Oral, Q12H  famotidine, 20 mg, Oral, Daily  polyethylene glycol, 17 g, Oral, Daily  sodium chloride, 10 mL, Intravenous, Q12H                  Conditions and Status        Fair     [facility] Data  External documents reviewed: Reviewed echocardiogram  Cardiac tracing (EKG, telemetry) interpretation: Sinus rhythm on telemetry  Radiology " interpretation: Reviewed  Labs reviewed: Yes  Any tests that were considered but not ordered: None     Decision rules/scores evaluated (example [id number], Wells, etc): None     Discussed with: Nursing staff, daughter and patient  Care Planning  Shared decision making: Patient and family/consultant  Code status and discussions: Full code     Disposition  Social Determinants of Health that impact treatment or disposition: None identified at this time  I expect the patient to be discharged to (TBD).   Awaiting placement    Electronically signed by Nicolas Brooke MD, 09/01/24, 14:35 CDT.

## 2024-09-01 NOTE — PLAN OF CARE
Goal Outcome Evaluation:  Plan of Care Reviewed With: patient        Progress: improving  Outcome Evaluation: Bed mobility min A. Sit to stand mod A x 2 RW with cues for TTWB on LLE. Pt required cues  for posture. Pt very forward flexed while in standing. Unable to attempt again due fatigue. Pt performed scooting to HOB while sitting EOB with SBA and cues.

## 2024-09-01 NOTE — THERAPY TREATMENT NOTE
Acute Care - Physical Therapy Treatment Note  [facility]     Patient Name: Manohar Garcia  : 1940  MRN: [id number]  Today's Date: 2024      Visit Dx:     ICD-10-CM ICD-9-CM   1. Fall, initial encounter  W19.XXXA E888.9   2. Closed fracture of right hip, initial encounter  S72.001A 820.8   3. Acute on chronic renal insufficiency  N28.9 593.9    N18.9 585.9   4. Skin tear of left elbow without complication, initial encounter  S51.012A 881.01   5. Bladder stone  N21.0 594.1   6. Hydronephrosis, unspecified hydronephrosis type  N13.30 591   7. 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    Thrombocytopenia    Hyperlactatemia    Hip fracture    Closed 2-part intertrochanteric fracture of proximal end of right femur     Past Medical History:   Diagnosis Date    Dementia     GERD (gastroesophageal reflux disease)     Liver disorder      Past Surgical History:   Procedure Laterality Date    HEMORRHOIDECTOMY      HERNIA REPAIR       PT Assessment (Last 12 Hours)       PT Evaluation and Treatment       Row Name 24 1542          Physical Therapy Time and Intention    Subjective Information complains of;pain  -WK     Document Type therapy note (daily note)  -WK     Mode of Treatment physical therapy  -WK       Row Name 24 1542          General Information    Existing Precautions/Restrictions fall;partial weight bearing;other (see comments)  TTWB RLE  -WK     Limitations/Impairments hearing  -WK       Row Name 24 1542          Pain    Pretreatment Pain Rating 8/10  -WK     Posttreatment Pain Rating 8/10  -WK     Pain Location - Side/Orientation Right  -WK     Pain Location lower  -WK     Pain Location - extremity  -WK     Pain  Intervention(s) Medication (See MAR);Repositioned  notified nsg pt requested pain medicine  -WK       Row Name 09/01/24 1542          Bed Mobility    Supine-Sit [place] (Bed Mobility) verbal cues;minimum assist (75% patient effort)  -WK     Sit-Supine [place] (Bed Mobility) verbal cues;minimum assist (75% patient effort)  -WK       Row Name 09/01/24 1542          Sit-Stand Transfer    Sit-Stand [place] (Transfers) moderate assist (50% patient effort);2 person assist  cues for weight bearing  -WK     Assistive Device (Sit-Stand Transfers) walker, front-wheeled  -WK       Row Name 09/01/24 1542          Stand-Sit Transfer    Stand-Sit [place] (Transfers) verbal cues;moderate assist (50% patient effort)  -WK     Assistive Device (Stand-Sit Transfers) walker, front-wheeled  -WK       Row Name 09/01/24 1542          Balance    Static Sitting Balance verbal cues;moderate assist;minimal assist  -WK     Dynamic Sitting Balance verbal cues;moderate assist  -WK       Row Name 09/01/24 1542          Motor Skills    Comments, Therapeutic Exercise AROM BLE 10-15 reps  -WK       Row Name             Wound 08/29/24 2200 Left posterior elbow Abrasion    Wound - Properties Group Placement Date: 08/29/24  -BR Placement Time: 2200  -BR Present on Original Admission: Y  -BR Side: Left  -BR Orientation: posterior  -BR Location: elbow  -BR Primary Wound Type: Abrasion  -BR    Retired Wound - Properties Group Placement Date: 08/29/24  -BR Placement Time: 2200  -BR Present on Original Admission: Y  -BR Side: Left  -BR Orientation: posterior  -BR Location: elbow  -BR Primary Wound Type: Abrasion  -BR    Retired Wound - Properties Group Date first assessed: 08/29/24  -BR Time first assessed: 2200  -BR Present on Original Admission: Y  -BR Side: Left  -BR Location: elbow  -BR Primary Wound Type: Abrasion  -BR      Row Name             Wound 08/29/24 2200 Right posterior elbow Abrasion    Wound - Properties Group Placement  Date: 08/29/24  -BR Placement Time: 2200 -BR Present on Original Admission: Y  -BR Side: Right  -BR Orientation: posterior  -BR Location: elbow  -BR Primary Wound Type: Abrasion  -BR    Retired Wound - Properties Group Placement Date: 08/29/24  -BR Placement Time: 2200  -BR Present on Original Admission: Y  -BR Side: Right  -BR Orientation: posterior  -BR Location: elbow  -BR Primary Wound Type: Abrasion  -BR    Retired Wound - Properties Group Date first assessed: 08/29/24  -BR Time first assessed: 2200  -BR Present on Original Admission: Y  -BR Side: Right  -BR Location: elbow  -BR Primary Wound Type: Abrasion  -BR      Row Name             Wound 08/30/24 1437 Right anterior hip Incision    Wound - Properties Group Placement Date: 08/30/24  -EP Placement Time: 1437  -EP Present on Original Admission: N  -EP Side: Right  -EP Orientation: anterior  -EP Location: hip  -EP Primary Wound Type: Incision  -EP    Retired Wound - Properties Group Placement Date: 08/30/24  -EP Placement Time: 1437  -EP Present on Original Admission: N  -EP Side: Right  -EP Orientation: anterior  -EP Location: hip  -EP Primary Wound Type: Incision  -EP    Retired Wound - Properties Group Date first assessed: 08/30/24  -EP Time first assessed: 1437  -EP Present on Original Admission: N  -EP Side: Right  -EP Location: hip  -EP Primary Wound Type: Incision  -EP      Row Name 09/01/24 1542          Plan of Care Review    Plan of Care Reviewed With patient  -WK     Progress improving  -WK     Outcome Evaluation Bed mobility min A. Sit to stand mod A x 2 RW with cues for TTWB on LLE. Pt required cues  for posture. Pt very forward flexed while in standing. Unable to attempt again due fatigue. Pt performed scooting to HOB while sitting EOB with SBA and cues.  -WK       Row Name 09/01/24 1542          Positioning and Restraints    Pre-Treatment Position in bed  -WK     Post Treatment Position bed  -WK     In Bed side lying left;call light within  reach;encouraged to call for assist;with nsg;with family/caregiver;exit alarm on  -WK               User Key  (r) = Recorded By, (t) = Taken By, (c) = Cosigned By      Initials Name Provider Type    WK Kellen Block PTA Physical Therapist Assistant    Genna Mendez, RN Registered Nurse    Andrew Garcia, RN Registered Nurse                    Physical Therapy Education       Title: PT OT SLP Therapies (In Progress)       Topic: Physical Therapy (In Progress)       Point: Mobility training (In Progress)       Learning Progress Summary             Patient Acceptance, E, NR by SB at 8/31/2024 0913    Comment: pt edu on POC, benefits of act, wt bearing status, use of RW, d/c plans                         Point: Home exercise program (Not Started)       Learner Progress:  Not documented in this visit.              Point: Body mechanics (In Progress)       Learning Progress Summary             Patient Acceptance, E, NR by SB at 8/31/2024 0913    Comment: pt edu on POC, benefits of act, wt bearing status, use of RW, d/c plans                         Point: Precautions (In Progress)       Learning Progress Summary             Patient Acceptance, E, NR by SB at 8/31/2024 0913    Comment: pt edu on POC, benefits of act, wt bearing status, use of RW, d/c plans                                         User Key       Initials Effective Dates Name Provider Type Discipline    SB 07/11/23 -  Agustina Glover, PT DPT Physical Therapist PT                  PT Recommendation and Plan     Plan of Care Reviewed With: patient  Progress: improving  Outcome Evaluation: Bed mobility min A. Sit to stand mod A x 2 RW with cues for TTWB on LLE. Pt required cues  for posture. Pt very forward flexed while in standing. Unable to attempt again due fatigue. Pt performed scooting to HOB while sitting EOB with SBA and cues.       Time Calculation:    PT Charges       Row Name 09/01/24 1613             Time Calculation    Start Time 1542  -WK       Stop Time 1609  -WK      Time Calculation (min) 27 min  -WK      PT Received On 09/01/24  -WK         Time Calculation- PT    Total Timed Code Minutes- PT 27 minute(s)  -WK         Timed Charges    74170 - PT Therapeutic Exercise Minutes 10  -WK      69196 - PT Therapeutic Activity Minutes 17  -WK         Total Minutes    Timed Charges Total Minutes 27  -WK       Total Minutes 27  -WK                User Key  (r) = Recorded By, (t) = Taken By, (c) = Cosigned By      Initials Name Provider Type    WK Kellen Block PTA Physical Therapist Assistant                  Therapy Charges for Today       Code Description Service Date Service Provider Modifiers Qty    16322865391 HC PT THER PROC EA 15 MIN 8/31/2024 Kellen Block PTA GP 1    32988603344 HC PT THER PROC EA 15 MIN 9/1/2024 Kellen Block, [place] GP 1    77537009530 HC PT THERAPEUTIC ACT EA 15 MIN 9/1/2024 Kellen Block PTA GP 1            PT G-Codes  Outcome Measure Options: AM-PAC 6 Clicks Basic Mobility (PT)  AM-PAC 6 Clicks Score (PT): 12  AM-PAC 6 Clicks Score (OT): 15    Kellen Block PTA  9/1/2024

## 2024-09-01 NOTE — PLAN OF CARE
Problem: Adult Inpatient Plan of Care  Goal: Plan of Care Review  9/1/2024 0629 by Mala Villatoro RN  Outcome: Ongoing, Progressing  9/1/2024 0620 by Mala Villatoro RN  Outcome: Ongoing, Not Progressing  Goal: Patient-Specific Goal (Individualized)  9/1/2024 0629 by Mala Villatoro RN  Outcome: Ongoing, Progressing  9/1/2024 0620 by Mala Villatoro RN  Outcome: Ongoing, Not Progressing  Goal: Absence of Hospital-Acquired Illness or Injury  9/1/2024 0629 by Mala Villatoro RN  Outcome: Ongoing, Progressing  9/1/2024 0620 by Mala Villatoro RN  Outcome: Ongoing, Not Progressing  Intervention: Identify and Manage Fall Risk  Recent Flowsheet Documentation  Taken 9/1/2024 0224 by Mala Villatoro RN  Safety Promotion/Fall Prevention: safety round/check completed  Taken 9/1/2024 0000 by Mala Villatoro RN  Safety Promotion/Fall Prevention: safety round/check completed  Taken 8/31/2024 2100 by Mala Villatoro RN  Safety Promotion/Fall Prevention: safety round/check completed  Goal: Optimal Comfort and Wellbeing  9/1/2024 0629 by Mala Villatoro RN  Outcome: Ongoing, Progressing  9/1/2024 0620 by Mala Villatoro RN  Outcome: Ongoing, Not Progressing  Goal: Readiness for Transition of Care  9/1/2024 0629 by Mala Villatoro RN  Outcome: Ongoing, Progressing  9/1/2024 0620 by Mala Villatoro RN  Outcome: Ongoing, Not Progressing     Problem: Pain Acute  Goal: Acceptable Pain Control and Functional Ability  9/1/2024 0629 by Mala Villatoro RN  Outcome: Ongoing, Progressing  9/1/2024 0620 by Mala Villatoro RN  Outcome: Ongoing, Not Progressing     Problem: Bleeding (Orthopaedic Fracture)  Goal: Absence of Bleeding  9/1/2024 0629 by Mala Villatoro RN  Outcome: Ongoing, Progressing  9/1/2024 0620 by Mala Villatoro RN  Outcome: Ongoing, Not Progressing     Problem: Embolism (Orthopaedic Fracture)  Goal:  Absence of Embolism Signs and Symptoms  9/1/2024 0629 by Mala Villatoro RN  Outcome: Ongoing, Progressing  9/1/2024 0620 by Mala Villatoro RN  Outcome: Ongoing, Not Progressing     Problem: Fracture Stabilization and Management (Orthopaedic Fracture)  Goal: Fracture Stability  9/1/2024 0629 by Mala Villatoro RN  Outcome: Ongoing, Progressing  9/1/2024 0620 by Mala Villatoro RN  Outcome: Ongoing, Not Progressing     Problem: Functional Ability Impaired (Orthopaedic Fracture)  Goal: Optimal Functional Ability  9/1/2024 0629 by Mala Villatoro RN  Outcome: Ongoing, Progressing  9/1/2024 0620 by Mala Villatoro RN  Outcome: Ongoing, Not Progressing     Problem: Infection (Orthopaedic Fracture)  Goal: Absence of Infection Signs and Symptoms  9/1/2024 0629 by Mala Villatoro RN  Outcome: Ongoing, Progressing  9/1/2024 0620 by Mala Villatoro RN  Outcome: Ongoing, Not Progressing     Problem: Neurovascular Compromise (Orthopaedic Fracture)  Goal: Effective Tissue Perfusion  9/1/2024 0629 by Mala Villatoro RN  Outcome: Ongoing, Progressing  9/1/2024 0620 by Mala Villatoro RN  Outcome: Ongoing, Not Progressing     Problem: Pain (Orthopaedic Fracture)  Goal: Acceptable Pain Control  9/1/2024 0629 by Mala Villatoro RN  Outcome: Ongoing, Progressing  9/1/2024 0620 by Mala Villatoro RN  Outcome: Ongoing, Not Progressing     Problem: Respiratory Compromise (Orthopaedic Fracture)  Goal: Effective Oxygenation and Ventilation  9/1/2024 0629 by Mala Villatoro RN  Outcome: Ongoing, Progressing  9/1/2024 0620 by Mala Villatoro RN  Outcome: Ongoing, Not Progressing  Intervention: Promote Airway Secretion Clearance  Recent Flowsheet Documentation  Taken 8/31/2024 2100 by Maal Villatoro RN  Cough And Deep Breathing: done independently per patient     Problem: Fall Injury Risk  Goal: Absence of Fall and Fall-Related  Injury  9/1/2024 0629 by Mala Villatoro, RN  Outcome: Ongoing, Progressing  9/1/2024 0620 by Mala Villatoro, ELEANOR  Outcome: Ongoing, Not Progressing  Intervention: Promote Injury-Free Environment  Recent Flowsheet Documentation  Taken 9/1/2024 0224 by Mala Villatoro, RN  Safety Promotion/Fall Prevention: safety round/check completed  Taken 9/1/2024 0000 by Mala Villatoro, RN  Safety Promotion/Fall Prevention: safety round/check completed  Taken 8/31/2024 2100 by Mala Villatoro, RN  Safety Promotion/Fall Prevention: safety round/check completed   Goal Outcome Evaluation:

## 2024-09-02 PROBLEM — D62 ACUTE BLOOD LOSS ANEMIA: Status: ACTIVE | Noted: 2024-09-02

## 2024-09-02 LAB
ANION GAP SERPL CALCULATED.3IONS-SCNC: 8 MMOL/L (ref 5–15)
BUN SERPL-MCNC: 41 MG/DL (ref 8–23)
BUN/CREAT SERPL: 22.2 (ref 7–25)
CALCIUM SPEC-SCNC: 8.2 MG/DL (ref 8.6–10.5)
CHLORIDE SERPL-SCNC: 108 MMOL/L (ref 98–107)
CO2 SERPL-SCNC: 22 MMOL/L (ref 22–29)
CREAT SERPL-MCNC: 1.85 MG/DL (ref 0.76–1.27)
DEPRECATED RDW RBC AUTO: 47.3 FL (ref 37–54)
EGFRCR SERPLBLD CKD-EPI 2021: 35.5 ML/MIN/1.73
ERYTHROCYTE [DISTWIDTH] IN BLOOD BY AUTOMATED COUNT: 14.1 % (ref 12.3–15.4)
GLUCOSE SERPL-MCNC: 102 MG/DL (ref 65–99)
HCT VFR BLD AUTO: 29 % (ref 37.5–51)
HGB BLD-MCNC: 9.5 G/DL (ref 13–17.7)
MCH RBC QN AUTO: 30.4 PG (ref 26.6–33)
MCHC RBC AUTO-ENTMCNC: 32.8 G/DL (ref 31.5–35.7)
MCV RBC AUTO: 92.7 FL (ref 79–97)
PLATELET # BLD AUTO: 78 10*3/MM3 (ref 140–450)
PMV BLD AUTO: 10.6 FL (ref 6–12)
POTASSIUM SERPL-SCNC: 4.4 MMOL/L (ref 3.5–5.2)
RBC # BLD AUTO: 3.13 10*6/MM3 (ref 4.14–5.8)
SODIUM SERPL-SCNC: 138 MMOL/L (ref 136–145)
WBC NRBC COR # BLD AUTO: 4.38 10*3/MM3 (ref 3.4–10.8)

## 2024-09-02 PROCEDURE — 97530 THERAPEUTIC ACTIVITIES: CPT

## 2024-09-02 PROCEDURE — 97110 THERAPEUTIC EXERCISES: CPT

## 2024-09-02 PROCEDURE — 85027 COMPLETE CBC AUTOMATED: CPT | Performed by: INTERNAL MEDICINE

## 2024-09-02 PROCEDURE — 80048 BASIC METABOLIC PNL TOTAL CA: CPT | Performed by: INTERNAL MEDICINE

## 2024-09-02 RX ADMIN — FAMOTIDINE 20 MG: 20 TABLET, FILM COATED ORAL at 08:40

## 2024-09-02 RX ADMIN — HYDROCODONE BITARTRATE AND ACETAMINOPHEN 1 TABLET: 7.5; 325 TABLET ORAL at 16:58

## 2024-09-02 RX ADMIN — Medication 10 ML: at 08:41

## 2024-09-02 RX ADMIN — APIXABAN 2.5 MG: 2.5 TABLET, FILM COATED ORAL at 20:25

## 2024-09-02 RX ADMIN — POLYETHYLENE GLYCOL 3350 17 G: 17 POWDER, FOR SOLUTION ORAL at 08:40

## 2024-09-02 RX ADMIN — APIXABAN 2.5 MG: 2.5 TABLET, FILM COATED ORAL at 08:40

## 2024-09-02 RX ADMIN — Medication 10 ML: at 20:25

## 2024-09-02 RX ADMIN — HYDROCODONE BITARTRATE AND ACETAMINOPHEN 1 TABLET: 7.5; 325 TABLET ORAL at 09:07

## 2024-09-02 NOTE — PROGRESS NOTES
[facility] Medicine Services  INPATIENT PROGRESS NOTE    Patient Name: Manohar Garcia  Date of Admission: 8/29/2024  Today's Date: 09/02/24  Length of Stay: 4  Primary Care Physician: Provider, No Known    Subjective   Chief Complaint: right hip fracture   POD 3    HPI   Patient feeling ok except for pain in hip.  Has been working with therapy.   No new problems.  Denies shortness of breath.         Review of Systems   All pertinent negatives and positives are as above. All other systems have been reviewed and are negative unless otherwise stated.     Objective    Temp:  [97.9 °F (36.6 °C)-98.4 °F (36.9 °C)] 98.4 °F (36.9 °C)  Heart Rate:  [69-77] 77  Resp:  [18] 18  BP: (119-142)/(55-66) 128/57  Physical Exam  Vitals and nursing note reviewed.   Constitutional:       Appearance: Normal appearance.   HENT:      Head: Normocephalic and atraumatic.      Right Ear: External ear normal.      Left Ear: External ear normal.      Nose: Nose normal.      Mouth/Throat:      Mouth: Mucous membranes are moist.   Eyes:      Extraocular Movements: Extraocular movements intact.      Conjunctiva/sclera: Conjunctivae normal.      Pupils: Pupils are equal, round, and reactive to light.   Cardiovascular:      Rate and Rhythm: Normal rate and regular rhythm.      Pulses: Normal pulses.      Heart sounds: No murmur heard.     No friction rub. No gallop.   Pulmonary:      Effort: Pulmonary effort is normal.      Breath sounds: Normal breath sounds.   Abdominal:      General: Bowel sounds are normal.      Palpations: Abdomen is soft.   Musculoskeletal:      Cervical back: Normal range of motion and neck supple.      Comments: Incision clean and dry  Bruising noted right hip.   Skin:     General: Skin is warm and dry.      Capillary Refill: Capillary refill takes less than 2 seconds.   Neurological:      General: No focal deficit present.      Mental Status: He is alert and oriented to person,  "place, and time.      Cranial Nerves: No cranial nerve deficit.   Psychiatric:         Mood and Affect: Mood normal.         Behavior: Behavior normal.             Results Review:  I have reviewed the labs, radiology results, and diagnostic studies.    Laboratory Data:   Results from last 7 days   Lab Units 09/02/24 0349 09/01/24 0444 08/31/24 0415   WBC 10*3/mm3 4.38 3.46 6.78   HEMOGLOBIN g/dL 9.5* 9.4* 11.1*   HEMATOCRIT % 29.0* 28.3* 33.7*   PLATELETS 10*3/mm3 78* 78* 90*        Results from last 7 days   Lab Units 09/02/24 0349 09/01/24 0444 08/31/24 0415 08/30/24 0423 08/29/24  1754   SODIUM mmol/L 138 133* 138 138 139   POTASSIUM mmol/L 4.4 4.6 4.9 4.7 4.1   CHLORIDE mmol/L 108* 106 107 110* 108*   CO2 mmol/L 22.0 20.0* 22.0 20.0* 22.0   BUN mg/dL 41* 44* 33* 25* 26*   CREATININE mg/dL 1.85* 2.14* 2.23* 2.27* 2.34*   CALCIUM mg/dL 8.2* 7.8* 8.4* 8.2* 8.8   BILIRUBIN mg/dL  --   --   --  1.2 1.0   ALK PHOS U/L  --   --   --  102 110   ALT (SGPT) U/L  --   --   --  11 14   AST (SGOT) U/L  --   --   --  24 25   GLUCOSE mg/dL 102* 114* 138* 115* 102*       Culture Data:   No results found for: \"BLOODCX\", \"URINECX\", \"WOUNDCX\", \"MRSACX\", \"RESPCX\", \"STOOLCX\"    Radiology Data:   Imaging Results (Last 24 Hours)       ** No results found for the last 24 hours. **            I have reviewed the patient's current medications.     Assessment/Plan   Assessment  Active Hospital Problems    Diagnosis     **Closed 2-part intertrochanteric fracture of proximal end of right femur     Acute blood loss anemia     Stage 3a chronic kidney disease        Treatment Plan  Pain control  Continue PT OT  CBC BMP in AM  Social service for placement    Medical Decision Making  Number and Complexity of problems:   Closed 2 part intertrochanteric fracture of proximal end of femur high complexity  Acute blood loss anemia moderate complexity  Stage IIIa chronic kidney disease moderate complexity    Differential Diagnosis: "     Conditions and Status        Condition is improving.     [facility] Data  External documents reviewed: Reviewed  Cardiac tracing (EKG, telemetry) interpretation: Reviewed  Radiology interpretation: Reviewed  Labs reviewed: Reviewed  Any tests that were considered but not ordered: None     Decision rules/scores evaluated (example [id number], Wells, etc): None     Discussed with: Patient     Care Planning  Shared decision making: Orthopedics  Code status and discussions: Full    Disposition  Social Determinants of Health that impact treatment or disposition: None  I expect the patient to be discharged to SNF in 1-3 days.     Electronically signed by Mirta Branch DO, 09/02/24, 10:07 CDT.

## 2024-09-02 NOTE — PLAN OF CARE
Problem: Adult Inpatient Plan of Care  Goal: Plan of Care Review  Outcome: Ongoing, Progressing  Goal: Patient-Specific Goal (Individualized)  Outcome: Ongoing, Progressing  Goal: Absence of Hospital-Acquired Illness or Injury  Outcome: Ongoing, Progressing  Intervention: Identify and Manage Fall Risk  Recent Flowsheet Documentation  Taken 9/2/2024 0200 by Alfredo David RN  Safety Promotion/Fall Prevention: safety round/check completed  Taken 9/2/2024 0000 by Alfredo David RN  Safety Promotion/Fall Prevention: safety round/check completed  Taken 9/1/2024 2200 by Alfredo David RN  Safety Promotion/Fall Prevention: safety round/check completed  Taken 9/1/2024 2100 by Alfredo David RN  Safety Promotion/Fall Prevention: safety round/check completed  Taken 9/1/2024 2000 by Alfredo David RN  Safety Promotion/Fall Prevention: safety round/check completed  Intervention: Prevent Skin Injury  Recent Flowsheet Documentation  Taken 9/1/2024 2200 by Alfredo David RN  Body Position:   turned   right   side-lying  Taken 9/1/2024 2020 by Alfredo David RN  Body Position:   turned   left   side-lying  Intervention: Prevent and Manage VTE (Venous Thromboembolism) Risk  Recent Flowsheet Documentation  Taken 9/1/2024 2000 by Alfredo David RN  VTE Prevention/Management: (see mar) other (see comments)  Goal: Optimal Comfort and Wellbeing  Outcome: Ongoing, Progressing  Intervention: Monitor Pain and Promote Comfort  Recent Flowsheet Documentation  Taken 9/1/2024 2055 by Alfredo David RN  Pain Management Interventions: see MAR  Goal: Readiness for Transition of Care  Outcome: Ongoing, Progressing     Problem: Pain Acute  Goal: Acceptable Pain Control and Functional Ability  Outcome: Ongoing, Progressing  Intervention: Develop Pain Management Plan  Recent Flowsheet Documentation  Taken 9/1/2024 2055 by Alfredo David RN  Pain Management Interventions: see MAR     Problem: Bleeding (Orthopaedic Fracture)  Goal: Absence of  Bleeding  Outcome: Ongoing, Progressing     Problem: Embolism (Orthopaedic Fracture)  Goal: Absence of Embolism Signs and Symptoms  Outcome: Ongoing, Progressing  Intervention: Prevent or Manage Embolism Risk  Recent Flowsheet Documentation  Taken 9/1/2024 2000 by Alfredo David RN  VTE Prevention/Management: (see mar) other (see comments)     Problem: Fracture Stabilization and Management (Orthopaedic Fracture)  Goal: Fracture Stability  Outcome: Ongoing, Progressing     Problem: Functional Ability Impaired (Orthopaedic Fracture)  Goal: Optimal Functional Ability  Outcome: Ongoing, Progressing  Intervention: Optimize Functional Ability  Recent Flowsheet Documentation  Taken 9/1/2024 2200 by Alfredo David, RN  Positioning/Transfer Devices:   in use   pillows  Taken 9/1/2024 2020 by Alfredo David, ELEANOR  Positioning/Transfer Devices:   pillows   in use     Problem: Infection (Orthopaedic Fracture)  Goal: Absence of Infection Signs and Symptoms  Outcome: Ongoing, Progressing     Problem: Neurovascular Compromise (Orthopaedic Fracture)  Goal: Effective Tissue Perfusion  Outcome: Ongoing, Progressing     Problem: Pain (Orthopaedic Fracture)  Goal: Acceptable Pain Control  Outcome: Ongoing, Progressing  Intervention: Manage Acute Orthopaedic-Related Pain  Recent Flowsheet Documentation  Taken 9/1/2024 2055 by Alfredo David, RN  Pain Management Interventions: see MAR     Problem: Respiratory Compromise (Orthopaedic Fracture)  Goal: Effective Oxygenation and Ventilation  Outcome: Ongoing, Progressing  Intervention: Promote Airway Secretion Clearance  Recent Flowsheet Documentation  Taken 9/1/2024 2000 by Alfredo David RN  Cough And Deep Breathing: done independently per patient     Problem: Fall Injury Risk  Goal: Absence of Fall and Fall-Related Injury  Outcome: Ongoing, Progressing  Intervention: Promote Injury-Free Environment  Recent Flowsheet Documentation  Taken 9/2/2024 0200 by Alfredo David, RN  Safety Promotion/Fall  Prevention: safety round/check completed  Taken 9/2/2024 0000 by Alfredo David, RN  Safety Promotion/Fall Prevention: safety round/check completed  Taken 9/1/2024 2200 by Alfredo David RN  Safety Promotion/Fall Prevention: safety round/check completed  Taken 9/1/2024 2100 by Alfredo David RN  Safety Promotion/Fall Prevention: safety round/check completed  Taken 9/1/2024 2000 by Alfredo David RN  Safety Promotion/Fall Prevention: safety round/check completed     Problem: Skin Injury Risk Increased  Goal: Skin Health and Integrity  Outcome: Ongoing, Progressing  Intervention: Optimize Skin Protection  Recent Flowsheet Documentation  Taken 9/1/2024 2200 by Alfredo David RN  Head of Bed (HOB) Positioning: HOB at 30 degrees  Taken 9/1/2024 2020 by Alfredo David RN  Head of Bed (HOB) Positioning: HOB at 30 degrees   Goal Outcome Evaluation:

## 2024-09-02 NOTE — THERAPY TREATMENT NOTE
"Acute Care - Physical Therapy Treatment Note  [facility]     Patient Name: Manohar Garcia  : 1940  MRN: [id number]  Today's Date: 2024      Visit Dx:     ICD-10-CM ICD-9-CM   1. Fall, initial encounter  W19.XXXA E888.9   2. Closed fracture of right hip, initial encounter  S72.001A 820.8   3. Acute on chronic renal insufficiency  N28.9 593.9    N18.9 585.9   4. Skin tear of left elbow without complication, initial encounter  S51.012A 881.01   5. Bladder stone  N21.0 594.1   6. Hydronephrosis, unspecified hydronephrosis type  N13.30 591   7. 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    Thrombocytopenia    Hyperlactatemia    Hip fracture    Closed 2-part intertrochanteric fracture of proximal end of right femur    Acute blood loss anemia     Past Medical History:   Diagnosis Date    Dementia     GERD (gastroesophageal reflux disease)     Liver disorder      Past Surgical History:   Procedure Laterality Date    HEMORRHOIDECTOMY      HERNIA REPAIR       PT Assessment (Last 12 Hours)       PT Evaluation and Treatment       Row Name 24 1023          Physical Therapy Time and Intention    Subjective Information complains of;pain  -WK     Document Type therapy note (daily note)  -WK     Mode of Treatment physical therapy  -WK       Row Name 24 1023          General Information    Existing Precautions/Restrictions fall;partial weight bearing;other (see comments)  TTWB RLE  -WK       Row Name 24 1023          Pain    Pretreatment Pain Rating --  \"i don't know it just hurts a little\"  -WK     Pain Intervention(s) Medication (See MAR);Repositioned;Ambulation/increased activity  -WK       Row Name 24 1023          Bed Mobility    " Supine-Sit [place] (Bed Mobility) verbal cues;minimum assist (75% patient effort)  -WK     Sit-Supine [place] (Bed Mobility) verbal cues;minimum assist (75% patient effort);moderate assist (50% patient effort)  -WK       Row Name 09/02/24 1023          Sit-Stand Transfer    Sit-Stand [place] (Transfers) verbal cues;moderate assist (50% patient effort);2 person assist  cues for Weight bearing and position for RLE  -WK     Assistive Device (Sit-Stand Transfers) walker, front-wheeled  stood x 3 able to stand 75-80% upright posture  -WK       Row Name 09/02/24 1023          Stand-Sit Transfer    Stand-Sit [place] (Transfers) verbal cues;moderate assist (50% patient effort)  -WK     Assistive Device (Stand-Sit Transfers) walker, front-wheeled  -WK       Row Name 09/02/24 1023          Balance    Static Sitting Balance verbal cues;minimal assist;standby assist  -WK     Dynamic Sitting Balance contact guard  -WK     Static Standing Balance verbal cues;minimal assist  -WK     Position/Device Used, Standing Balance supported;walker, front-wheeled  -WK       Row Name 09/02/24 1023          Motor Skills    Comments, Therapeutic Exercise AROM-AAROM RLE, AROM LLE 10-15 reps  -WK       Row Name             Wound 08/29/24 2200 Left posterior elbow Abrasion    Wound - Properties Group Placement Date: 08/29/24  -BR Placement Time: 2200  -BR Present on Original Admission: Y  -BR Side: Left  -BR Orientation: posterior  -BR Location: elbow  -BR Primary Wound Type: Abrasion  -BR    Retired Wound - Properties Group Placement Date: 08/29/24  -BR Placement Time: 2200  -BR Present on Original Admission: Y  -BR Side: Left  -BR Orientation: posterior  -BR Location: elbow  -BR Primary Wound Type: Abrasion  -BR    Retired Wound - Properties Group Date first assessed: 08/29/24  -BR Time first assessed: 2200  -BR Present on Original Admission: Y  -BR Side: Left  -BR Location: elbow  -BR Primary Wound Type: Abrasion  -BR       Row Name             Wound 08/29/24 2200 Right posterior elbow Abrasion    Wound - Properties Group Placement Date: 08/29/24  -BR Placement Time: 2200  -BR Present on Original Admission: Y  -BR Side: Right  -BR Orientation: posterior  -BR Location: elbow  -BR Primary Wound Type: Abrasion  -BR    Retired Wound - Properties Group Placement Date: 08/29/24  -BR Placement Time: 2200  -BR Present on Original Admission: Y  -BR Side: Right  -BR Orientation: posterior  -BR Location: elbow  -BR Primary Wound Type: Abrasion  -BR    Retired Wound - Properties Group Date first assessed: 08/29/24  -BR Time first assessed: 2200  -BR Present on Original Admission: Y  -BR Side: Right  -BR Location: elbow  -BR Primary Wound Type: Abrasion  -BR      Row Name             Wound 08/30/24 1437 Right anterior hip Incision    Wound - Properties Group Placement Date: 08/30/24  -EP Placement Time: 1437  -EP Present on Original Admission: N  -EP Side: Right  -EP Orientation: anterior  -EP Location: hip  -EP Primary Wound Type: Incision  -EP    Retired Wound - Properties Group Placement Date: 08/30/24  -EP Placement Time: 1437  -EP Present on Original Admission: N  -EP Side: Right  -EP Orientation: anterior  -EP Location: hip  -EP Primary Wound Type: Incision  -EP    Retired Wound - Properties Group Date first assessed: 08/30/24  -EP Time first assessed: 1437  -EP Present on Original Admission: N  -EP Side: Right  -EP Location: hip  -EP Primary Wound Type: Incision  -EP      Row Name             Wound 09/01/24 1816 coccyx Pressure Injury    Wound - Properties Group Placement Date: 09/01/24  -AC Placement Time: 1816  -AC Present on Original Admission: N  -AC Location: coccyx  -AC Primary Wound Type: Pressure inj  -AC    Retired Wound - Properties Group Placement Date: 09/01/24  -AC Placement Time: 1816  -AC Present on Original Admission: N  -AC Location: coccyx  -AC Primary Wound Type: Pressure inj  -AC    Retired Wound - Properties Group Date  first assessed: 09/01/24  -AC Time first assessed: 1816  -AC Present on Original Admission: N  -AC Location: coccyx  -AC Primary Wound Type: Pressure inj  -AC      Row Name 09/02/24 1023          Plan of Care Review    Plan of Care Reviewed With patient  -WK     Progress improving  -WK     Outcome Evaluation Supine to sit min A and sit to supine min- A -mod A.  Sitting EOB CGA-SBA. Pt performed sit to stand mod a x2 with cues for TTWB RLE. Pt required cues for weight bearing each time he was going to stand. Pt was able to improve his posture today with verbal/tactile cues. Educated on POC and safety.  -WK       Row Name 09/02/24 1023          Positioning and Restraints    Pre-Treatment Position in bed  -WK     Post Treatment Position bed  -WK     In Bed side lying left;call light within reach;encouraged to call for assist;with nsg;exit alarm on  -WK               User Key  (r) = Recorded By, (t) = Taken By, (c) = Cosigned By      Initials Name Provider Type    AC Foreign Castaneda RN Registered Nurse    Kellen Palomino PTA Physical Therapist Assistant    Genna Mendez RN Registered Nurse    Andrew Garcia, RN Registered Nurse                    Physical Therapy Education       Title: PT OT SLP Therapies (In Progress)       Topic: Physical Therapy (In Progress)       Point: Mobility training (In Progress)       Learning Progress Summary             Patient Acceptance, E, NR by SB at 8/31/2024 0913    Comment: pt edu on POC, benefits of act, wt bearing status, use of RW, d/c plans                         Point: Home exercise program (Not Started)       Learner Progress:  Not documented in this visit.              Point: Body mechanics (In Progress)       Learning Progress Summary             Patient Acceptance, E, NR by SB at 8/31/2024 0913    Comment: pt edu on POC, benefits of act, wt bearing status, use of RW, d/c plans                         Point: Precautions (In Progress)       Learning Progress  Summary             Patient Acceptance, E, NR by SB at 8/31/2024 0913    Comment: pt edu on POC, benefits of act, wt bearing status, use of RW, d/c plans                                         User Key       Initials Effective Dates Name Provider Type Discipline    SB 07/11/23 -  Agustina Glover, PT DPT Physical Therapist PT                  PT Recommendation and Plan     Plan of Care Reviewed With: patient  Progress: improving  Outcome Evaluation: Supine to sit min A and sit to supine min- A -mod A.  Sitting EOB CGA-SBA. Pt performed sit to stand mod a x2 with cues for TTWB RLE. Pt required cues for weight bearing each time he was going to stand. Pt was able to improve his posture today with verbal/tactile cues. Educated on POC and safety.   Outcome Measures       Row Name 09/02/24 1023             How much help from another person do you currently need...    Turning from your back to your side while in flat bed without using bedrails? 2  -WK      Moving from lying on back to sitting on the side of a flat bed without bedrails? 2  -WK      Moving to and from a bed to a chair (including a wheelchair)? 2  -WK      Standing up from a chair using your arms (e.g., wheelchair, bedside chair)? 2  -WK      Climbing 3-5 steps with a railing? 1  -WK      To walk in hospital room? 1  -WK      AM-PAC 6 Clicks Score (PT) 10  -WK      Highest Level of Mobility Goal 4 --> Transfer to chair/commode  -WK         Functional Assessment    Outcome Measure Options AM-PAC 6 Clicks Basic Mobility (PT)  -WK                User Key  (r) = Recorded By, (t) = Taken By, (c) = Cosigned By      Initials Name Provider Type    WK Kellen Block PTA Physical Therapist Assistant                     Time Calculation:    PT Charges       Row Name 09/02/24 1135             Time Calculation    Start Time 1023  -WK      Stop Time 1046  -WK      Time Calculation (min) 23 min  -WK      PT Received On 09/02/24  -WK         Time Calculation- PT    Total  Timed Code Minutes- PT 23 minute(s)  -WK         Timed Charges    68693 - PT Therapeutic Exercise Minutes 10  -WK      96767 - PT Therapeutic Activity Minutes 13  -WK         Total Minutes    Timed Charges Total Minutes 23  -WK       Total Minutes 23  -WK                User Key  (r) = Recorded By, (t) = Taken By, (c) = Cosigned By      Initials Name Provider Type    WK Kellen Block PTA Physical Therapist Assistant                  Therapy Charges for Today       Code Description Service Date Service Provider Modifiers Qty    36640848240 HC PT THER PROC EA 15 MIN 9/1/2024 Kellen Block, PTA GP 1    03696718690 HC PT THERAPEUTIC ACT EA 15 MIN 9/1/2024 Kellen Block, PTA GP 1    23022557803 HC PT THER PROC EA 15 MIN 9/2/2024 Kellen Block, [place] GP 1    56157370744 HC PT THERAPEUTIC ACT EA 15 MIN 9/2/2024 Kellen Block, PTA GP 1            PT G-Codes  Outcome Measure Options: AM-PAC 6 Clicks Basic Mobility (PT)  AM-PAC 6 Clicks Score (PT): 10  AM-PAC 6 Clicks Score (OT): 15    Kellen Block PTA  9/2/2024

## 2024-09-02 NOTE — DISCHARGE PLACEMENT REQUEST
"From:  Queta Flowers, CHANDAN  [phone]    Manohar Lazaro (84 y.o. Male)       Date of Birth   1940    Social Security Number       Address   [address] [address]    Home Phone   [phone]    MRN   [id number]       Jew   Non-Tenriism    Marital Status                               Admission Date   8/29/24    Admission Type   Emergency    Admitting Provider   Mirta Branch DO    Attending Provider   Mirta Branch DO    Department, Room/Bed   [facility] 4B, 442/1       Discharge Date       Discharge Disposition       Discharge Destination                                 Attending Provider: Mirta Branch DO    Allergies: Contrast Dye (Echo Or Unknown Ct/mr), Penicillins    Isolation: None   Infection: None   Code Status: CPR    Ht: 180.3 cm (71\")   Wt: 107 kg (235 lb)    Admission Cmt: None   Principal Problem: Closed 2-part intertrochanteric fracture of proximal end of right femur [S72.141A]                   Active Insurance as of 8/29/2024       Primary Coverage       Payor Plan Insurance Group Employer/Plan Group    MEDICARE MEDICARE A & B        Payor Plan Address Payor Plan Phone Number Payor Plan Fax Number Effective Dates    [address] [phone]  5/1/1987 - None Entered    [place] [address]         Subscriber Name Subscriber Birth Date Member ID       MANOHAR LAZARO 1940 [id number]                     Emergency Contacts        (Rel.) Home Phone Work Phone Mobile Phone    Miriam Lyle (Daughter) [phone] -- --                "

## 2024-09-02 NOTE — PROGRESS NOTES
"  Orthopedic Surgery Right [facility] Progress Note        Manohar Garcia  2024  POD# 3 Days Post-Op    Subjective:     Interval: Patient resting comfortably in bed this afternoon at time of exam.  Meal at bedside.  Currently without complaints.    Objective:     General: A&O x3, NAD, No signs or symptoms of PE.   Wound: clean, dry, intact             Dressing: Clean, Dry, Intact   Extremity: Distal NVI, Soft throughout           DVT Exam: No evidence of DVT seen on physical exam.                   Data Review:  Vitals:  /51 (BP Location: Left arm, Patient Position: Lying)   Pulse 80   Temp 98.9 °F (37.2 °C) (Axillary)   Resp 18   Ht 180.3 cm (71\")   Wt 107 kg (235 lb)   SpO2 94%   BMI 32.78 kg/m²   Temp (24hrs), Av.2 °F (36.8 °C), Min:97.9 °F (36.6 °C), Max:98.9 °F (37.2 °C)      I/O (24Hr):    Intake/Output Summary (Last 24 hours) at 2024 1759  Last data filed at 2024 2346  Gross per 24 hour   Intake --   Output 450 ml   Net -450 ml       Labs:  Lab Results (last 72 hours)       Procedure Component Value Units Date/Time    Basic Metabolic Panel [[id number]]  (Abnormal) Collected: 24    Specimen: Blood Updated: 24     Glucose 102 mg/dL      BUN 41 mg/dL      Creatinine 1.85 mg/dL      Sodium 138 mmol/L      Potassium 4.4 mmol/L      Chloride 108 mmol/L      CO2 22.0 mmol/L      Calcium 8.2 mg/dL      BUN/Creatinine Ratio 22.2     Anion Gap 8.0 mmol/L      eGFR 35.5 mL/min/1.73     Narrative:      GFR Normal >60  Chronic Kidney Disease <60  Kidney Failure <15    The GFR formula is only valid for adults with stable renal function between ages 18 and 70.    CBC (No Diff) [[id number]]  (Abnormal) Collected: 24    Specimen: Blood Updated: 24 0432     WBC 4.38 10*3/mm3      RBC 3.13 10*6/mm3      Hemoglobin 9.5 g/dL      Hematocrit 29.0 %      MCV 92.7 fL      MCH 30.4 pg      MCHC 32.8 g/dL      RDW 14.1 %      RDW-SD 47.3 fl      MPV 10.6 fL      Platelets " 78 10*3/mm3     Basic Metabolic Panel [[id number]]  (Abnormal) Collected: 09/01/24 0444    Specimen: Blood Updated: 09/01/24 0530     Glucose 114 mg/dL      BUN 44 mg/dL      Creatinine 2.14 mg/dL      Sodium 133 mmol/L      Potassium 4.6 mmol/L      Chloride 106 mmol/L      CO2 20.0 mmol/L      Calcium 7.8 mg/dL      BUN/Creatinine Ratio 20.6     Anion Gap 7.0 mmol/L      eGFR 29.8 mL/min/1.73     Narrative:      GFR Normal >60  Chronic Kidney Disease <60  Kidney Failure <15    The GFR formula is only valid for adults with stable renal function between ages 18 and 70.    CBC (No Diff) [[id number]]  (Abnormal) Collected: 09/01/24 0444    Specimen: Blood Updated: 09/01/24 0514     WBC 3.46 10*3/mm3      RBC 3.10 10*6/mm3      Hemoglobin 9.4 g/dL      Hematocrit 28.3 %      MCV 91.3 fL      MCH 30.3 pg      MCHC 33.2 g/dL      RDW 14.1 %      RDW-SD 47.1 fl      MPV 10.7 fL      Platelets 78 10*3/mm3     Basic Metabolic Panel [[id number]]  (Abnormal) Collected: 08/31/24 0415    Specimen: Blood Updated: 08/31/24 0504     Glucose 138 mg/dL      BUN 33 mg/dL      Creatinine 2.23 mg/dL      Sodium 138 mmol/L      Potassium 4.9 mmol/L      Chloride 107 mmol/L      CO2 22.0 mmol/L      Calcium 8.4 mg/dL      BUN/Creatinine Ratio 14.8     Anion Gap 9.0 mmol/L      eGFR 28.3 mL/min/1.73     Narrative:      GFR Normal >60  Chronic Kidney Disease <60  Kidney Failure <15    The GFR formula is only valid for adults with stable renal function between ages 18 and 70.    CBC (No Diff) [[id number]]  (Abnormal) Collected: 08/31/24 0415    Specimen: Blood Updated: 08/31/24 0448     WBC 6.78 10*3/mm3      RBC 3.63 10*6/mm3      Hemoglobin 11.1 g/dL      Hematocrit 33.7 %      MCV 92.8 fL      MCH 30.6 pg      MCHC 32.9 g/dL      RDW 13.9 %      RDW-SD 47.6 fl      MPV 10.9 fL      Platelets 90 10*3/mm3             Assessment:     Closed 2-part intertrochanteric fracture of proximal end of right femur  POD 3 Days Post-Op HIP TROCHANTERIC  NAILING SHORT WITH INTRAMEDULLARY HIP SCREW (Right)    Plan:      1:  DVT prophylaxis, ICE, elevate  2:  Pain control  3:  Physical therapy/Occupation therapy  4:  Anticipate discharge once discharge arrangements confirmed and if pain well controlled  5:  TTWB operative RL extremity    Electronically signed by Arcenio Chandra MD on 9/2/2024 at 17:59 CDT

## 2024-09-02 NOTE — CASE MANAGEMENT/SOCIAL WORK
Continued Stay Note  [facility]     Patient Name: Manohar Garcia  MRN: [id number]  Today's Date: 9/2/2024    Admit Date: 8/29/2024    Plan: SNF/Rehab   Discharge Plan       Row Name 09/02/24 1111       Plan    Plan SNF/Rehab    Patient/Family in Agreement with Plan yes    Provided Post Acute Provider List? Yes    Post Acute Provider List Nursing Home;Inpatient Rehab    Delivered To Support Person    Support Person Spouse- Larenda    Method of Delivery Telephone    Plan Comments SW spoke to pt's spouse, Miriam at [phone].  She has requested referrals be made to [facility], [facility], [facility] and [facility].  SW will await responses.                   Discharge Codes    No documentation.                       WESLEY FitzgeraldW

## 2024-09-02 NOTE — PLAN OF CARE
Goal Outcome Evaluation:  Plan of Care Reviewed With: patient        Progress: improving  Outcome Evaluation: Supine to sit min A and sit to supine min- A -mod A.  Sitting EOB CGA-SBA. Pt performed sit to stand mod a x2 with cues for TTWB RLE. Pt required cues for weight bearing each time he was going to stand. Pt was able to improve his posture today with verbal/tactile cues. Educated on POC and safety.

## 2024-09-02 NOTE — PLAN OF CARE
Goal Outcome Evaluation:  Plan of Care Reviewed With: patient           Outcome Evaluation: VSS A&OX4 Medicated for pain X2 today. Worked with therapy. Right hip with drsg's intact some old drainage noted. Mepilex to coccyx. Turned pt when tolerated. Very stiff with movement. Encouraged pt to take pain med for comfort with movement. Verbalized understanding. Incont. of urine. Sinus 67-83 f/pvc's

## 2024-09-03 PROCEDURE — 97530 THERAPEUTIC ACTIVITIES: CPT

## 2024-09-03 PROCEDURE — 97110 THERAPEUTIC EXERCISES: CPT

## 2024-09-03 RX ADMIN — APIXABAN 2.5 MG: 2.5 TABLET, FILM COATED ORAL at 20:35

## 2024-09-03 RX ADMIN — HYDROCODONE BITARTRATE AND ACETAMINOPHEN 2 TABLET: 7.5; 325 TABLET ORAL at 03:23

## 2024-09-03 RX ADMIN — Medication 10 ML: at 20:35

## 2024-09-03 RX ADMIN — APIXABAN 2.5 MG: 2.5 TABLET, FILM COATED ORAL at 08:45

## 2024-09-03 RX ADMIN — Medication 10 ML: at 08:45

## 2024-09-03 RX ADMIN — POLYETHYLENE GLYCOL 3350 17 G: 17 POWDER, FOR SOLUTION ORAL at 08:45

## 2024-09-03 RX ADMIN — HYDROCODONE BITARTRATE AND ACETAMINOPHEN 1 TABLET: 7.5; 325 TABLET ORAL at 08:45

## 2024-09-03 RX ADMIN — FAMOTIDINE 20 MG: 20 TABLET, FILM COATED ORAL at 08:45

## 2024-09-03 RX ADMIN — HYDROCODONE BITARTRATE AND ACETAMINOPHEN 1 TABLET: 7.5; 325 TABLET ORAL at 15:18

## 2024-09-03 NOTE — PLAN OF CARE
Problem: Adult Inpatient Plan of Care  Goal: Plan of Care Review  Outcome: Ongoing, Progressing  Goal: Patient-Specific Goal (Individualized)  Outcome: Ongoing, Progressing  Goal: Absence of Hospital-Acquired Illness or Injury  Outcome: Ongoing, Progressing  Intervention: Identify and Manage Fall Risk  Recent Flowsheet Documentation  Taken 9/3/2024 0200 by Alfredo David RN  Safety Promotion/Fall Prevention: safety round/check completed  Taken 9/3/2024 0000 by Alfredo David RN  Safety Promotion/Fall Prevention: safety round/check completed  Taken 9/2/2024 2200 by Alfredo David RN  Safety Promotion/Fall Prevention: safety round/check completed  Taken 9/2/2024 2100 by Alfredo David RN  Safety Promotion/Fall Prevention: safety round/check completed  Taken 9/2/2024 2000 by Alfredo David RN  Safety Promotion/Fall Prevention: safety round/check completed  Intervention: Prevent Skin Injury  Recent Flowsheet Documentation  Taken 9/3/2024 0323 by Alfredo David RN  Body Position:   turned   left  Taken 9/3/2024 0000 by Alfredo David RN  Body Position: patient/family refused  Taken 9/2/2024 2200 by Alfredo David RN  Body Position: patient/family refused  Taken 9/2/2024 2000 by Alfredo David RN  Body Position:   turned   right  Intervention: Prevent and Manage VTE (Venous Thromboembolism) Risk  Recent Flowsheet Documentation  Taken 9/2/2024 2000 by Alfredo David RN  VTE Prevention/Management: (see mar) other (see comments)  Goal: Optimal Comfort and Wellbeing  Outcome: Ongoing, Progressing  Goal: Readiness for Transition of Care  Outcome: Ongoing, Progressing     Problem: Pain Acute  Goal: Acceptable Pain Control and Functional Ability  Outcome: Ongoing, Progressing     Problem: Bleeding (Orthopaedic Fracture)  Goal: Absence of Bleeding  Outcome: Ongoing, Progressing     Problem: Embolism (Orthopaedic Fracture)  Goal: Absence of Embolism Signs and Symptoms  Outcome: Ongoing, Progressing  Intervention: Prevent or  Manage Embolism Risk  Recent Flowsheet Documentation  Taken 9/2/2024 2000 by Alfredo David RN  VTE Prevention/Management: (see mar) other (see comments)     Problem: Fracture Stabilization and Management (Orthopaedic Fracture)  Goal: Fracture Stability  Outcome: Ongoing, Progressing     Problem: Functional Ability Impaired (Orthopaedic Fracture)  Goal: Optimal Functional Ability  Outcome: Ongoing, Progressing  Intervention: Optimize Functional Ability  Recent Flowsheet Documentation  Taken 9/3/2024 0323 by Alfredo David RN  Positioning/Transfer Devices:   pillows   in use   wedge  Taken 9/2/2024 2000 by Alfredo David RN  Positioning/Transfer Devices:   in use   pillows     Problem: Infection (Orthopaedic Fracture)  Goal: Absence of Infection Signs and Symptoms  Outcome: Ongoing, Progressing     Problem: Neurovascular Compromise (Orthopaedic Fracture)  Goal: Effective Tissue Perfusion  Outcome: Ongoing, Progressing     Problem: Pain (Orthopaedic Fracture)  Goal: Acceptable Pain Control  Outcome: Ongoing, Progressing     Problem: Respiratory Compromise (Orthopaedic Fracture)  Goal: Effective Oxygenation and Ventilation  Outcome: Ongoing, Progressing     Problem: Fall Injury Risk  Goal: Absence of Fall and Fall-Related Injury  Outcome: Ongoing, Progressing  Intervention: Promote Injury-Free Environment  Recent Flowsheet Documentation  Taken 9/3/2024 0200 by Alfredo David RN  Safety Promotion/Fall Prevention: safety round/check completed  Taken 9/3/2024 0000 by Alfredo David RN  Safety Promotion/Fall Prevention: safety round/check completed  Taken 9/2/2024 2200 by Alfredo David RN  Safety Promotion/Fall Prevention: safety round/check completed  Taken 9/2/2024 2100 by Alfredo David RN  Safety Promotion/Fall Prevention: safety round/check completed  Taken 9/2/2024 2000 by Alfredo aDvid RN  Safety Promotion/Fall Prevention: safety round/check completed     Problem: Skin Injury Risk Increased  Goal: Skin Health and  Integrity  Outcome: Ongoing, Progressing  Intervention: Optimize Skin Protection  Recent Flowsheet Documentation  Taken 9/3/2024 0323 by Alfredo David, RN  Head of Bed (HOB) Positioning: HOB at 30 degrees  Taken 9/2/2024 2000 by Alfredo David, RN  Head of Bed (HOB) Positioning: HOB at 30 degrees   Goal Outcome Evaluation:

## 2024-09-03 NOTE — ANESTHESIA POSTPROCEDURE EVALUATION
"Patient: Manohar Garcia    Procedure Summary       Date: 08/30/24 Room / Location:  [facility] 09 /  [facility]    Anesthesia Start: 1357 Anesthesia Stop: 1514    Procedure: HIP TROCHANTERIC NAILING SHORT WITH INTRAMEDULLARY HIP SCREW (Right: Hip) Diagnosis:     Surgeons: Arcenio Chandra MD Provider: Ramon Sanchez CRNA    Anesthesia Type: general ASA Status: 3 - Emergent            Anesthesia Type: general    Vitals  Vitals Value Taken Time   /84 08/30/24 1530   Temp 98 °F (36.7 °C) 08/30/24 1535   Pulse 75 08/30/24 1540   Resp 18 08/30/24 1540   SpO2 95 % 08/30/24 1540           Post Anesthesia Care and Evaluation    Patient location during evaluation: PACU  Patient participation: complete - patient participated  Level of consciousness: awake and alert  Pain management: adequate    Airway patency: patent  Anesthetic complications: No anesthetic complications    Cardiovascular status: acceptable  Respiratory status: acceptable  Hydration status: acceptable    Comments: Blood pressure 138/75, pulse 79, temperature 98.2 °F (36.8 °C), temperature source Oral, resp. rate 19, height 180.3 cm (71\"), weight 107 kg (235 lb), SpO2 95%.    Pt discharged from PACU based on fredy score >8    "

## 2024-09-03 NOTE — PLAN OF CARE
Goal Outcome Evaluation:  Plan of Care Reviewed With: patient           Outcome Evaluation: VSS A&OX4 Medicated for right hip pain. Drsg's intact. Foam drsg's intact to RA and LA. Foam drsg to coccyx. Incont. of urine at times. Up with therapy today to SOB and stood up short period. Sinus 67-94 pvc's per tele.

## 2024-09-03 NOTE — CASE MANAGEMENT/SOCIAL WORK
Continued Stay Note  [facility]     Patient Name: Manohar Garcia  MRN: [id number]  Today's Date: 9/3/2024    Admit Date: 8/29/2024    Plan: SNF   Discharge Plan       Row Name 09/03/24 0842       Plan    Plan SNF    Patient/Family in Agreement with Plan yes    Plan Comments [facility] cannot accept pt.  Awaiting responses from [facility] and [facility].                   Discharge Codes    No documentation.                 Expected Discharge Date and Time       Expected Discharge Date Expected Discharge Time    Sep 4, 2024               CHANDAN Fitzgerald

## 2024-09-03 NOTE — THERAPY TREATMENT NOTE
Acute Care - Physical Therapy Treatment Note  [facility]     Patient Name: Manohar Garcia  : 1940  MRN: [id number]  Today's Date: 9/3/2024      Visit Dx:     ICD-10-CM ICD-9-CM   1. Fall, initial encounter  W19.XXXA E888.9   2. Closed fracture of right hip, initial encounter  S72.001A 820.8   3. Acute on chronic renal insufficiency  N28.9 593.9    N18.9 585.9   4. Skin tear of left elbow without complication, initial encounter  S51.012A 881.01   5. Bladder stone  N21.0 594.1   6. Hydronephrosis, unspecified hydronephrosis type  N13.30 591   7. 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    Thrombocytopenia    Hyperlactatemia    Hip fracture    Closed 2-part intertrochanteric fracture of proximal end of right femur    Acute blood loss anemia     Past Medical History:   Diagnosis Date    Dementia     GERD (gastroesophageal reflux disease)     Liver disorder      Past Surgical History:   Procedure Laterality Date    HEMORRHOIDECTOMY      HERNIA REPAIR      HIP TROCHANTERIC NAILING WITH INTRAMEDULLARY HIP SCREW Right 2024    Procedure: HIP TROCHANTERIC NAILING SHORT WITH INTRAMEDULLARY HIP SCREW;  Surgeon: Arcenio Chandra MD;  Location: [facility];  Service: Orthopedics;  Laterality: Right;     PT Assessment (Last 12 Hours)       PT Evaluation and Treatment       Row Name 24 1120          Physical Therapy Time and Intention    Subjective Information complains of;pain  -AE     Document Type therapy note (daily note)  -AE     Mode of Treatment physical therapy  -AE       Row Name 24 1120          General Information    Existing Precautions/Restrictions fall;partial weight bearing  TTWB R LE  -AE     Limitations/Impairments hearing   -AE       Row Name 09/03/24 1120          Pain    Pretreatment Pain Rating 0/10 - no pain  -AE     Posttreatment Pain Rating 8/10  WITH MOVEMENT  -AE     Pain Location - Side/Orientation Right  -AE     Pain Location lower  -AE     Pain Location - extremity  -AE     Pain Intervention(s) Medication (See MAR)  -AE       Row Name 09/03/24 1120          Bed Mobility    Scooting/Bridging [place] (Bed Mobility) maximum assist (25% patient effort);2 person assist  -AE     Supine-Sit [place] (Bed Mobility) moderate assist (50% patient effort);verbal cues  -AE     Sit-Supine [place] (Bed Mobility) moderate assist (50% patient effort);2 person assist  -AE       Row Name 09/03/24 1120          Sit-Stand Transfer    Sit-Stand [place] (Transfers) minimum assist (75% patient effort);2 person assist  cues for partial weight bearing  -AE       Row Name 09/03/24 1120          Stand-Sit Transfer    Stand-Sit [place] (Transfers) moderate assist (50% patient effort);verbal cues  -AE       Row Name 09/03/24 1120          Balance    Static Sitting Balance standby assist  -AE       Row Name             Wound 08/29/24 2200 Left posterior elbow Abrasion    Wound - Properties Group Placement Date: 08/29/24  -BR Placement Time: 2200  -BR Present on Original Admission: Y  -BR Side: Left  -BR Orientation: posterior  -BR Location: elbow  -BR Primary Wound Type: Abrasion  -BR    Retired Wound - Properties Group Placement Date: 08/29/24  -BR Placement Time: 2200  -BR Present on Original Admission: Y  -BR Side: Left  -BR Orientation: posterior  -BR Location: elbow  -BR Primary Wound Type: Abrasion  -BR    Retired Wound - Properties Group Date first assessed: 08/29/24  -BR Time first assessed: 2200  -BR Present on Original Admission: Y  -BR Side: Left  -BR Location: elbow  -BR Primary Wound Type: Abrasion  -BR      Row Name             Wound 08/29/24 2200 Right posterior elbow Abrasion    Wound - Properties Group Placement  Date: 08/29/24  -BR Placement Time: 2200  -BR Present on Original Admission: Y  -BR Side: Right  -BR Orientation: posterior  -BR Location: elbow  -BR Primary Wound Type: Abrasion  -BR    Retired Wound - Properties Group Placement Date: 08/29/24  -BR Placement Time: 2200 -BR Present on Original Admission: Y  -BR Side: Right  -BR Orientation: posterior  -BR Location: elbow  -BR Primary Wound Type: Abrasion  -BR    Retired Wound - Properties Group Date first assessed: 08/29/24  -BR Time first assessed: 2200  -BR Present on Original Admission: Y  -BR Side: Right  -BR Location: elbow  -BR Primary Wound Type: Abrasion  -BR      Row Name             Wound 08/30/24 1437 Right anterior hip Incision    Wound - Properties Group Placement Date: 08/30/24  -EP Placement Time: 1437  -EP Present on Original Admission: N  -EP Side: Right  -EP Orientation: anterior  -EP Location: hip  -EP Primary Wound Type: Incision  -EP    Retired Wound - Properties Group Placement Date: 08/30/24  -EP Placement Time: 1437  -EP Present on Original Admission: N  -EP Side: Right  -EP Orientation: anterior  -EP Location: hip  -EP Primary Wound Type: Incision  -EP    Retired Wound - Properties Group Date first assessed: 08/30/24  -EP Time first assessed: 1437  -EP Present on Original Admission: N  -EP Side: Right  -EP Location: hip  -EP Primary Wound Type: Incision  -EP      Row Name             Wound 09/01/24 1816 coccyx Pressure Injury    Wound - Properties Group Placement Date: 09/01/24  -AC Placement Time: 1816  -AC Present on Original Admission: N  -AC Location: coccyx  -AC Primary Wound Type: Pressure inj  -AC    Retired Wound - Properties Group Placement Date: 09/01/24  -AC Placement Time: 1816  -AC Present on Original Admission: N  -AC Location: coccyx  -AC Primary Wound Type: Pressure inj  -AC    Retired Wound - Properties Group Date first assessed: 09/01/24  -AC Time first assessed: 1816  -AC Present on Original Admission: N  -AC Location:  coccyx  -AC Primary Wound Type: Pressure inj  -AC      Row Name 09/03/24 1120          Plan of Care Review    Plan of Care Reviewed With patient  -AE     Progress improving  -AE     Outcome Evaluation Pt needed time to wake up and was pre-medicated before tx.He cried out in pain when being assisted to EOB mod x 1.Pt was SBA for seated balance and with vc's stood min x2 with RW and cues for PWB R LE.Pt would benefit from continued PT at SNF.  -AE       Row Name 09/03/24 1120          Vital Signs    O2 Delivery Pre Treatment room air  -AE     O2 Delivery Intra Treatment room air  -AE     Post SpO2 (%) 95  -AE     O2 Delivery Post Treatment room air  -AE       Row Name 09/03/24 1120          Positioning and Restraints    Pre-Treatment Position in bed  -AE     Post Treatment Position bed  -AE     In Bed fowlers;call light within reach;exit alarm on  -AE               User Key  (r) = Recorded By, (t) = Taken By, (c) = Cosigned By      Initials Name Provider Type    AE Kathia Hansen, PTA Physical Therapist Assistant    Foreign Salas, RN Registered Nurse    Genna Mendez, RN Registered Nurse    Andrew Garcia, RN Registered Nurse                    Physical Therapy Education       Title: PT OT SLP Therapies (In Progress)       Topic: Physical Therapy (In Progress)       Point: Mobility training (In Progress)       Learning Progress Summary             Patient Acceptance, E, NR by SB at 8/31/2024 0913    Comment: pt edu on POC, benefits of act, wt bearing status, use of RW, d/c plans                         Point: Home exercise program (Not Started)       Learner Progress:  Not documented in this visit.              Point: Body mechanics (In Progress)       Learning Progress Summary             Patient Acceptance, E, NR by SB at 8/31/2024 0913    Comment: pt edu on POC, benefits of act, wt bearing status, use of RW, d/c plans                         Point: Precautions (Done)       Learning Progress Summary              Patient Acceptance, E, VU,NR by AE at 9/3/2024 1209    Comment: PWB R LE    Acceptance, E, NR by SB at 8/31/2024 0913    Comment: pt edu on POC, benefits of act, wt bearing status, use of RW, d/c plans                                         User Key       Initials Effective Dates Name Provider Type Discipline    AE 02/03/23 -  Kathia Hansen PTA Physical Therapist Assistant PT    SB 07/11/23 -  Agustina Glover, PT DPT Physical Therapist PT                  PT Recommendation and Plan     Plan of Care Reviewed With: patient  Progress: improving  Outcome Evaluation: Pt needed time to wake up and was pre-medicated before tx.He cried out in pain when being assisted to EOB mod x 1.Pt was SBA for seated balance and with vc's stood min x2 with RW and cues for PWB R LE.Pt would benefit from continued PT at SNF.   Outcome Measures       Row Name 09/03/24 1200 09/02/24 1023          How much help from another person do you currently need...    Turning from your back to your side while in flat bed without using bedrails? 2  -AE 2  -WK     Moving from lying on back to sitting on the side of a flat bed without bedrails? 2  -AE 2  -WK     Moving to and from a bed to a chair (including a wheelchair)? 2  -AE 2  -WK     Standing up from a chair using your arms (e.g., wheelchair, bedside chair)? 2  -AE 2  -WK     Climbing 3-5 steps with a railing? 1  -AE 1  -WK     To walk in hospital room? 1  -AE 1  -WK     AM-PAC 6 Clicks Score (PT) 10  -AE 10  -WK     Highest Level of Mobility Goal 4 --> Transfer to chair/commode  -AE 4 --> Transfer to chair/commode  -WK        Functional Assessment    Outcome Measure Options AM-PAC 6 Clicks Basic Mobility (PT)  -AE AM-PAC 6 Clicks Basic Mobility (PT)  -WK               User Key  (r) = Recorded By, (t) = Taken By, (c) = Cosigned By      Initials Name Provider Type    AE Kathia Hansen PTA Physical Therapist Assistant    Kellen Palomino PTA Physical Therapist Assistant                      Time Calculation:    PT Charges       Row Name 09/03/24 1210             Time Calculation    Start Time 1120  -AE      Stop Time 1200  -AE      Time Calculation (min) 40 min  -AE      PT Received On 09/03/24  -AE      PT Goal Re-Cert Due Date 09/10/24  -AE         Time Calculation- PT    Total Timed Code Minutes- PT 40 minute(s)  -AE         Timed Charges    14439 - PT Therapeutic Activity Minutes 40  -AE         Total Minutes    Timed Charges Total Minutes 40  -AE       Total Minutes 40  -AE                User Key  (r) = Recorded By, (t) = Taken By, (c) = Cosigned By      Initials Name Provider Type    AE Kathia Hansen PTA Physical Therapist Assistant                  Therapy Charges for Today       Code Description Service Date Service Provider Modifiers Qty    71356178257 HC PT THERAPEUTIC ACT EA 15 MIN 9/3/2024 Kathia Hansen PTA GP 3            PT G-Codes  Outcome Measure Options: AM-PAC 6 Clicks Basic Mobility (PT)  AM-PAC 6 Clicks Score (PT): 10  AM-PAC 6 Clicks Score (OT): 14    Kathia Hansen PTA  9/3/2024

## 2024-09-03 NOTE — NURSING NOTE
[facility]  INPATIENT WOUND & OSTOMY CARE    Today's Date: 09/03/24    Patient Name: Manohar Garcia  MRN: [id number]  [facility]: [id number]  PCP: Provider, No Known  Attending Provider: Mirta Branch DO  Length of Stay: 5    Rounded with ANAT Soto for initial consult. See her consult note for detail. No family at bedside.     This patient presents with a hospital-acquired pressure injury.  The admit date is 8/29/2024 and the wound was first assessed on 9/1/2024.  The pressure injury is a stage 2 located on the sacral spine. Updated picture of wound taken and uploaded to the chart. Wound care orders placed.     Pressure injury prevention measure ordered per protocol due to patient being at risk for skin breakdown.    Apply silicone foam border dressing per protocol to sacral spine/bilateral heels for protection.  Nursing to change dressing every 3 days and PRN if soiled. Nursing is to peel back dressing with every assessment to assess skin underneath dressing. No barrier cream under dressing.     Patient educated on importance of turning and repositioning at frequent intervals to prevent skin breakdown.     Inpatient wound care will continue to follow during hospital stay.  Please contact if any issues or concerns arise.     This document has been electronically signed by Olive De La Garza RN on 9/3/2024 14:49 CDT

## 2024-09-03 NOTE — PROGRESS NOTES
[facility]  INPATIENT PROGRESS NOTE    Patient Name: Manohar Garcia  Date of Admission: 8/29/2024  Today's Date: 09/03/24  Length of Stay: 5  Primary Care Physician: Provider, No Known    Subjective   Chief Complaint: Right femur fracture  HPI   Patient is sleeping at time of evaluation.  He was aroused from sleep with gentle touch and vocal command to awake.  Patient notes he slept well last night.  He notes his pain is controlled.  Family member at bedside notes that they would like patient to go to acute rehab at [facility].        Review of Systems   All pertinent negatives and positives are as above. All other systems have been reviewed and are negative unless otherwise stated.     Objective    Temp:  [97.5 °F (36.4 °C)-98.9 °F (37.2 °C)] 97.6 °F (36.4 °C)  Heart Rate:  [72-89] 74  Resp:  [18-19] 18  BP: (125-138)/(51-75) 127/60  Physical Exam  Vitals and nursing note reviewed.   Constitutional:       Appearance: Normal appearance.   HENT:      Head: Normocephalic and atraumatic.      Right Ear: External ear normal.      Left Ear: External ear normal.      Ears:      Comments: Hard of hearing     Nose: Nose normal.      Mouth/Throat:      Mouth: Mucous membranes are moist.   Eyes:      Extraocular Movements: Extraocular movements intact.      Conjunctiva/sclera: Conjunctivae normal.      Pupils: Pupils are equal, round, and reactive to light.   Cardiovascular:      Rate and Rhythm: Normal rate and regular rhythm.      Pulses: Normal pulses.      Heart sounds: Normal heart sounds.      No friction rub. No gallop.   Pulmonary:      Effort: Pulmonary effort is normal.      Breath sounds: Normal breath sounds.   Abdominal:      General: Bowel sounds are normal.      Palpations: Abdomen is soft.   Musculoskeletal:      Cervical back: Normal range of motion and neck supple.      Comments: Patient moves extremities to command   Skin:     General: Skin is warm and  "dry.      Capillary Refill: Capillary refill takes less than 2 seconds.      Comments: Dressing right hip intact     Neurological:      General: No focal deficit present.      Mental Status: He is oriented to person, place, and time.      Cranial Nerves: No cranial nerve deficit.   Psychiatric:         Mood and Affect: Mood normal.         Behavior: Behavior normal.             Results Review:  I have reviewed the labs, radiology results, and diagnostic studies.    Laboratory Data:   Results from last 7 days   Lab Units 09/02/24 0349 09/01/24 0444 08/31/24  0415   WBC 10*3/mm3 4.38 3.46 6.78   HEMOGLOBIN g/dL 9.5* 9.4* 11.1*   HEMATOCRIT % 29.0* 28.3* 33.7*   PLATELETS 10*3/mm3 78* 78* 90*        Results from last 7 days   Lab Units 09/02/24 0349 09/01/24 0444 08/31/24 0415 08/30/24 0423 08/29/24  1754   SODIUM mmol/L 138 133* 138 138 139   POTASSIUM mmol/L 4.4 4.6 4.9 4.7 4.1   CHLORIDE mmol/L 108* 106 107 110* 108*   CO2 mmol/L 22.0 20.0* 22.0 20.0* 22.0   BUN mg/dL 41* 44* 33* 25* 26*   CREATININE mg/dL 1.85* 2.14* 2.23* 2.27* 2.34*   CALCIUM mg/dL 8.2* 7.8* 8.4* 8.2* 8.8   BILIRUBIN mg/dL  --   --   --  1.2 1.0   ALK PHOS U/L  --   --   --  102 110   ALT (SGPT) U/L  --   --   --  11 14   AST (SGOT) U/L  --   --   --  24 25   GLUCOSE mg/dL 102* 114* 138* 115* 102*       Culture Data:   No results found for: \"BLOODCX\", \"URINECX\", \"WOUNDCX\", \"MRSACX\", \"RESPCX\", \"STOOLCX\"    Radiology Data:   Imaging Results (Last 24 Hours)       ** No results found for the last 24 hours. **            I have reviewed the patient's current medications.     Assessment/Plan   Assessment  Active Hospital Problems    Diagnosis     **Closed 2-part intertrochanteric fracture of proximal end of right femur     Acute blood loss anemia     Stage 3a chronic kidney disease     Thrombocytopenia        Treatment Plan  Continue PT and OT  Social service consult for discharge placement  Chair for meals  Encourage oral fluids    Medical " Decision Making  Number and Complexity of problems:   Number and Complexity of problems:   Closed 2 part intertrochanteric fracture of proximal end of femur high complexity  Acute blood loss anemia moderate complexity  Stage IIIa chronic kidney disease moderate complexity  Chronic thrombocytopenia moderate complexity    Differential Diagnosis:      Conditions and Status        Condition is improving.     [facility] Data  External documents reviewed: Reviewed  Cardiac tracing (EKG, telemetry) interpretation: Reviewed  Radiology interpretation: Reviewed  Labs reviewed: Reviewed  Any tests that were considered but not ordered: None     Decision rules/scores evaluated (example [id number], [place], etc): None     Discussed with: Patient     Care Planning  Shared decision making: Orthopedics  Code status and discussions: Full     Disposition  Social Determinants of Health that impact treatment or disposition: None  I expect the patient to be discharged to Rehab v SNF in 1-2 days.         Electronically signed by Mirta Branch DO, 09/03/24, 09:05 CDT.

## 2024-09-03 NOTE — CONSULTS
[facility]  INPATIENT WOUND & OSTOMY CONSULTATION    Today's Date: 09/03/24    Patient Name: Manohar Garcia  MRN: [id number]  [facility]: [id number]  PCP: Provider, No Known  Referring Provider:   Consulting Provider (From admission, onward)      Start Ordered     Status Ordering Provider    09/01/24 1857 09/01/24 1857  Inpatient Wound Care MD Consult Pressure Injury  Once        Specialty:  Wound Care  Provider:  Angela Salcedo APRN Acknowledged PUERTOLLANO, GLENN RIEGO           Attending Provider: Mirta Branch DO  Length of Stay: 5    SUBJECTIVE   Chief Complaint: ***    HPI: Manohar Garcia, a 84 y.o.male, presents with a past medical history of ***.  A full past medical history is listed below.  Inpatient wound care consulted due to ***      Visit Dx:    ICD-10-CM ICD-9-CM   1. Fall, initial encounter  W19.XXXA E888.9   2. Closed fracture of right hip, initial encounter  S72.001A 820.8   3. Acute on chronic renal insufficiency  N28.9 593.9    N18.9 585.9   4. Skin tear of left elbow without complication, initial encounter  S51.012A 881.01   5. Bladder stone  N21.0 594.1   6. Hydronephrosis, unspecified hydronephrosis type  N13.30 591   7. Impaired mobility [Z74.09]  Z74.09 799.89       Hospital Problem List:     Closed 2-part intertrochanteric fracture of proximal end of right femur    Stage 3a chronic kidney disease    Thrombocytopenia    Acute blood loss anemia      History:   Past Medical History:   Diagnosis Date    Dementia     GERD (gastroesophageal reflux disease)     Liver disorder      Past Surgical History:   Procedure Laterality Date    HEMORRHOIDECTOMY      HERNIA REPAIR      HIP TROCHANTERIC NAILING WITH INTRAMEDULLARY HIP SCREW Right 8/30/2024    Procedure: HIP TROCHANTERIC NAILING SHORT WITH INTRAMEDULLARY HIP SCREW;  Surgeon: Arcenio Chandra MD;  Location: [facility];  Service: Orthopedics;  Laterality: Right;     Social History     Socioeconomic History     Marital status:    Tobacco Use    Smoking status: Former     Types: Cigarettes, Pipe    Smokeless tobacco: Never   Vaping Use    Vaping status: Never Used   Substance and Sexual Activity    Alcohol use: No    Drug use: Yes     Types: Amphetamines     Comment: Quit 40 years ago    Sexual activity: Defer     History reviewed. No pertinent family history.    Allergies:  Allergies   Allergen Reactions    Contrast Dye (Echo Or Unknown Ct/Mr)     Penicillins        Medications:    Current Facility-Administered Medications:     acetaminophen (TYLENOL) tablet 650 mg, 650 mg, Oral, Q4H PRN **OR** acetaminophen (TYLENOL) suppository 505 mg, 505 mg, Rectal, Q4H PRN, Arcenio Chandra MD    apixaban (ELIQUIS) tablet 2.5 mg, 2.5 mg, Oral, Q12H, Arcenio Chandra MD, 2.5 mg at 09/03/24 0845    sennosides-docusate (PERICOLACE) 8.6-50 MG per tablet 2 tablet, 2 tablet, Oral, BID PRN **AND** polyethylene glycol (MIRALAX) packet 17 g, 17 g, Oral, Daily PRN **AND** bisacodyl (DULCOLAX) EC tablet 5 mg, 5 mg, Oral, Daily PRN **AND** bisacodyl (DULCOLAX) suppository 10 mg, 10 mg, Rectal, Daily PRN, Arcenio Chandra MD    bisacodyl (DULCOLAX) suppository 10 mg, 10 mg, Rectal, Daily PRN, Arcenio Chandra MD    famotidine (PEPCID) tablet 20 mg, 20 mg, Oral, Daily, Nicolas Brooke MD, 20 mg at 09/03/24 0845    HYDROcodone-acetaminophen (NORCO) 7.5-325 MG per tablet 1 tablet, 1 tablet, Oral, Q4H PRN, Arcenio Chandra MD, 1 tablet at 09/03/24 0845    HYDROcodone-acetaminophen (NORCO) 7.5-325 MG per tablet 2 tablet, 2 tablet, Oral, Q4H PRN, Arcenio Chandra MD, 2 tablet at 09/03/24 0323    HYDROmorphone (DILAUDID) injection 0.5 mg, 0.5 mg, Intravenous, Q3H PRN, Arcenio Chandra MD, 0.5 mg at 08/31/24 1324    lactated ringers infusion, 75 mL/hr, Intravenous, Continuous, Nicolas Brooke MD, Last Rate: 75 mL/hr at 09/01/24 2315, 75 mL/hr at 09/01/24 2315    magnesium hydroxide (MILK OF MAGNESIA) suspension 10 mL, 10  mL, Oral, Daily PRN, Arcenio Chandra MD    Morphine sulfate (PF) injection 1 mg, 1 mg, Intravenous, Q4H PRN **AND** naloxone (NARCAN) injection 0.4 mg, 0.4 mg, Intravenous, Q5 Min PRN, Arcenio Chandra MD    ondansetron (ZOFRAN) injection 4 mg, 4 mg, Intravenous, Q6H PRN, Arcenio Chandra MD    ondansetron (ZOFRAN) injection 4 mg, 4 mg, Intravenous, Q6H PRN, Arcenio Chandra MD    ondansetron ODT (ZOFRAN-ODT) disintegrating tablet 4 mg, 4 mg, Oral, Q6H PRN, Arcenio Chandra MD    oxyCODONE-acetaminophen (PERCOCET) 7.5-325 MG per tablet 1 tablet, 1 tablet, Oral, Q6H PRN, Arcenio Chandra MD, 1 tablet at 08/30/24 1614    polyethylene glycol (MIRALAX) packet 17 g, 17 g, Oral, Daily, Arcenio Chandra MD, 17 g at 09/03/24 0845    [COMPLETED] Insert Peripheral IV, , , Once **AND** sodium chloride 0.9 % flush 10 mL, 10 mL, Intravenous, PRN, Arcenio Chandra MD    sodium chloride 0.9 % flush 10 mL, 10 mL, Intravenous, Q12H, Arcenio Chandra MD, 10 mL at 09/03/24 0845    sodium chloride 0.9 % flush 10 mL, 10 mL, Intravenous, PRN, Arcenio Chandra MD    sodium chloride 0.9 % infusion 40 mL, 40 mL, Intravenous, PRN, Arcenio Chandra MD    OBJECTIVE     Vitals:    09/03/24 1115   BP: 122/56   Pulse: 70   Resp: 18   Temp: 97.8 °F (36.6 °C)   SpO2: 94%       PHYSICAL EXAM: ***  Physical Exam               Results Review:  Lab Results (last 48 hours)       Procedure Component Value Units Date/Time    Basic Metabolic Panel [[id number]]  (Abnormal) Collected: 09/02/24 0349    Specimen: Blood Updated: 09/02/24 0447     Glucose 102 mg/dL      BUN 41 mg/dL      Creatinine 1.85 mg/dL      Sodium 138 mmol/L      Potassium 4.4 mmol/L      Chloride 108 mmol/L      CO2 22.0 mmol/L      Calcium 8.2 mg/dL      BUN/Creatinine Ratio 22.2     Anion Gap 8.0 mmol/L      eGFR 35.5 mL/min/1.73     Narrative:      GFR Normal >60  Chronic Kidney Disease <60  Kidney Failure <15    The GFR formula is only valid for adults with stable renal  function between ages 18 and 70.    CBC (No Diff) [[id number]]  (Abnormal) Collected: 09/02/24 0349    Specimen: Blood Updated: 09/02/24 0432     WBC 4.38 10*3/mm3      RBC 3.13 10*6/mm3      Hemoglobin 9.5 g/dL      Hematocrit 29.0 %      MCV 92.7 fL      MCH 30.4 pg      MCHC 32.8 g/dL      RDW 14.1 %      RDW-SD 47.3 fl      MPV 10.6 fL      Platelets 78 10*3/mm3           Imaging Results (Last 72 Hours)       ** No results found for the last 72 hours. **               ASSESSMENT/PLAN       Examination and evaluation of wound(s) was performed.    DIAGNOSIS:   Pressure injury of sacral region, stage 2  Type 3 Skin Tear of right elbow  Hospital acquired pressure injury      Closed 2-part intertrochanteric fracture of proximal end of right femur    Stage 3a chronic kidney disease    Thrombocytopenia    Acute blood loss anemia       PLAN:   Orders placed for wound care and pressure/moisture management as listed below.       Start     Ordered    09/03/24 1400  Wound Care Pressure Injury  Daily      Question Answer Comment   Wound Locations Sacrococcygeal region    Wound Care Instructions Clean with NS.  Apply Vaseline gauze over wound. Cover with silicone border foam dressing.    Cleanse Normal Saline    Intervention Vaseline Gauze    Dressing: Silicone Border Dressing        09/03/24 1359    09/03/24 1358  Wound Care  Daily      Question Answer Comment   Wound Locations Bilateral elbows    Wound Care Instructions Clean with NS.  Apply thin layer of therahoney to wound and cover with Vaseline gauze. Secure with silicone border foam dressing. Change daily.    Cleanse Normal Saline    Intervention Thera Honey    Intervention Vaseline Gauze    Dressing: Silicone Border Dressing        09/03/24 1357    09/03/24 0845  Turn Patient  Now Then Every 2 Hours         09/03/24 0844    09/03/24 0845  Elevate Heels Off of Bed  Until Discontinued         09/03/24 0844    09/03/24 0845  Use Seat Cushion When Up In Chair  Continuous          09/03/24 0844    09/03/24 0845  Silicone Border Dressing to Bony Prominences  Per Order Details        Comments: Apply silicone foam border dressing per protocol to sacral spine/bilateral heels for protection.  Nursing to change dressing every 3 days and PRN if soiled. Nursing is to peel back dressing with every assessment to assess skin underneath dressing. No barrier cream under dressing.    09/03/24 0844    09/03/24 0845  Use Repositioning Wedge to Position Patient  Continuous        Comments: Use Comfort Glide repositioning sheet and wedges to position patient.    09/03/24 0844    Unscheduled  Wound Care  As Needed      Question:  Wound Care Instructions  Answer:  Apply Moisture Barrier After Any Incontinence    09/03/24 0844           Discussed findings and treatment plan including risks, benefits, and treatment options with *** in detail. Patient agreed with treatment plan.      This document has been electronically signed by ANAT Hudson on 9/3/2024 13:51 CDT     spine/bilateral heels for protection.  Nursing to change dressing every 3 days and PRN if soiled. Nursing is to peel back dressing with every assessment to assess skin underneath dressing. No barrier cream under dressing.    09/03/24 0844 09/03/24 0845  Use Repositioning Wedge to Position Patient  Continuous        Comments: Use Comfort Glide repositioning sheet and wedges to position patient.    09/03/24 0844    Unscheduled  Wound Care  As Needed      Question:  Wound Care Instructions  Answer:  Apply Moisture Barrier After Any Incontinence    09/03/24 0844            This document has been electronically signed by ANAT Hudson on 9/3/2024 13:51 CDT

## 2024-09-03 NOTE — CASE MANAGEMENT/SOCIAL WORK
Continued Stay Note  [facility]     Patient Name: Manohar Garcia  MRN: [id number]  Today's Date: 9/3/2024    Admit Date: 8/29/2024    Plan: [facility]   Discharge Plan       Row Name 09/03/24 1431       Plan    Plan Comments [facility] and [facility] do not have any openings.  [facility] has offered a bed.  JEMIMA spoke to pt's daughter, Miriam at [phone]; she has accepted the bed offer.  SW has informed Dr. Branch; planned dc for tomorrow.  Phone:  [phone] Fax: [phone].    Final Discharge Disposition Code 03 - skilled nursing facility (SNF)      Row Name 09/03/24 1422       [facility]    Plan Comments [facility] not have any openings.  [facility] has offered a bed.  JEMIMA spoke to pt's daughter,Miriam at [phone] she has accepted the bed offer.  Pt can dc tomorrow; Dr. Branch aware.  Phone:      Row Name 09/03/24 3663       Plan    Plan [facility]    Patient/Family in Agreement with Plan yes    Plan Comments SW has left a voicemail for pt's spouse, Miriam at [phone].  [facility] are both full and cannot accept pt.  The only bed offer is from [facility].                   Discharge Codes    No documentation.                 Expected Discharge Date and Time       Expected Discharge Date Expected Discharge Time    Sep 4, 2024               CHANDAN Fitzgerald

## 2024-09-03 NOTE — NURSING NOTE
[facility]  INPATIENT WOUND & OSTOMY CARE    Today's Date: 09/03/24    Patient Name: Manohar Garcia  MRN: [id number]  [facility]: [id number]  PCP: Provider, No Known  Attending Provider: Mirta Branch DO  Length of Stay: 5    Pressure injury prevention measure ordered per protocol due to patient being at risk for skin breakdown.    Apply silicone foam border dressing per protocol to sacral spine/bilateral heels for protection.  Nursing to change dressing every 3 days and PRN if soiled. Nursing is to peel back dressing with every assessment to assess skin underneath dressing. No barrier cream under dressing.     Please reach out to wound care nurse if any skin issues arise.     This document has been electronically signed by Olive De La Garza RN on 9/3/2024 08:44 CDT

## 2024-09-03 NOTE — THERAPY TREATMENT NOTE
Patient Name: Manohar Garcia  : 1940    MRN: [id number]                              Today's Date: 9/3/2024       Admit Date: 2024    Visit Dx:     ICD-10-CM ICD-9-CM   1. Fall, initial encounter  W19.XXXA E888.9   2. Closed fracture of right hip, initial encounter  S72.001A 820.8   3. Acute on chronic renal insufficiency  N28.9 593.9    N18.9 585.9   4. Skin tear of left elbow without complication, initial encounter  S51.012A 881.01   5. Bladder stone  N21.0 594.1   6. Hydronephrosis, unspecified hydronephrosis type  N13.30 591   7. 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    Thrombocytopenia    Hyperlactatemia    Hip fracture    Closed 2-part intertrochanteric fracture of proximal end of right femur    Acute blood loss anemia     Past Medical History:   Diagnosis Date    Dementia     GERD (gastroesophageal reflux disease)     Liver disorder      Past Surgical History:   Procedure Laterality Date    HEMORRHOIDECTOMY      HERNIA REPAIR      HIP TROCHANTERIC NAILING WITH INTRAMEDULLARY HIP SCREW Right 2024    Procedure: HIP TROCHANTERIC NAILING SHORT WITH INTRAMEDULLARY HIP SCREW;  Surgeon: Arcenio Chandra MD;  Location: [facility];  Service: Orthopedics;  Laterality: Right;      General Information       Row Name 24 1005          OT Time and Intention    Document Type therapy note (daily note)  -MT       Row Name 24 1005          General Information    Patient Profile Reviewed yes  -MT     Existing Precautions/Restrictions fall;partial weight bearing;other (see comments)  TTWB RLE  -MT       Row Name 24 1005          Safety Issues, Functional Mobility    Impairments Affecting Function (Mobility)  "pain;endurance/activity tolerance;strength;range of motion (ROM);balance;cognition  -MT     Comment, Safety Issues/Impairments (Mobility) patient very sleepy during OT tx. Was initially easy to arrouse and keep on task but while EOB with decreased attention to task and eyes closing. For pt safety assisted to supine Nyla. Pt had pain meds at 8:45, notified NSG of pt drowsiness  -MT               User Key  (r) = Recorded By, (t) = Taken By, (c) = Cosigned By      Initials Name Provider Type    Unique Rincon COTA Occupational Therapist Assistant                     Mobility/ADL's       Row Name 09/03/24 1005          Bed Mobility    Supine-Sit [place] (Bed Mobility) moderate assist (50% patient effort)  -MT     Sit-Supine [place] (Bed Mobility) minimum assist (75% patient effort)  -MT     Assistive Device (Bed Mobility) head of bed elevated;bed rails;draw sheet  -MT               User Key  (r) = Recorded By, (t) = Taken By, (c) = Cosigned By      Initials Name Provider Type    Unique Rincon COTA Occupational Therapist Assistant                   Obj/Interventions    No documentation.                  Goals/Plan    No documentation.                  Clinical Impression       Row Name 09/03/24 1005          Pain Scale: FACES Pre/Post-Treatment    Pain: FACES Scale, Pretreatment 0-->no hurt  \"I'm doing alright\"  -MT     Posttreatment Pain Rating 6-->hurts even more  with RLE movement  -MT       Row Name 09/03/24 1005          Therapy Plan Review/Discharge Plan (OT)    Anticipated Discharge Disposition (OT) skilled nursing facility  -MT       Row Name 09/03/24 1005          Positioning and Restraints    Pre-Treatment Position in bed  -MT     Post Treatment Position bed  -MT     In Bed fowlers;call light within reach;encouraged to call for assist;exit alarm on;LUE elevated  -MT               User Key  (r) = Recorded By, (t) = Taken By, (c) = Cosigned By      Initials Name Provider Type    KYARA Christie" EMERALD Calhoun Occupational Therapist Assistant                   Outcome Measures       Row Name 09/03/24 1005          How much help from another is currently needed...    Putting on and taking off regular lower body clothing? 1  -MT     Bathing (including washing, rinsing, and drying) 2  -MT     Toileting (which includes using toilet bed pan or urinal) 2  -MT     Putting on and taking off regular upper body clothing 3  -MT     Taking care of personal grooming (such as brushing teeth) 3  -MT     Eating meals 3  -MT     AM-PAC 6 Clicks Score (OT) 14  -MT       Row Name 09/03/24 0845          How much help from another person do you currently need...    Turning from your back to your side while in flat bed without using bedrails? 2  -CH     Moving from lying on back to sitting on the side of a flat bed without bedrails? 2  -CH     Moving to and from a bed to a chair (including a wheelchair)? 2  -CH     Standing up from a chair using your arms (e.g., wheelchair, bedside chair)? 2  -CH     Climbing 3-5 steps with a railing? 1  -CH     To walk in hospital room? 2  -CH     AM-PAC 6 Clicks Score (PT) 11  -CH     Highest Level of Mobility Goal 4 --> Transfer to chair/commode  -CH               User Key  (r) = Recorded By, (t) = Taken By, (c) = Cosigned By      Initials Name Provider Type    CH Jazmin Barajas LPN Licensed Nurse    Unique Rincon COTA Occupational Therapist Assistant                    Occupational Therapy Education       Title: PT OT SLP Therapies (In Progress)       Topic: Occupational Therapy (In Progress)       Point: ADL training (Done)       Description:   Instruct learner(s) on proper safety adaptation and remediation techniques during self care or transfers.   Instruct in proper use of assistive devices.                  Learning Progress Summary             Patient Acceptance, E, VU,NR by MM at 8/31/2024 1012                         Point: Home exercise program (Not Started)        Description:   Instruct learner(s) on appropriate technique for monitoring, assisting and/or progressing therapeutic exercises/activities.                  Learner Progress:  Not documented in this visit.              Point: Precautions (Done)       Description:   Instruct learner(s) on prescribed precautions during self-care and functional transfers.                  Learning Progress Summary             Patient Acceptance, E, VU,NR by MM at 8/31/2024 1012                         Point: Body mechanics (Done)       Description:   Instruct learner(s) on proper positioning and spine alignment during self-care, functional mobility activities and/or exercises.                  Learning Progress Summary             Patient Acceptance, E, VU,NR by MM at 8/31/2024 1012                                         User Key       Initials Effective Dates Name Provider Type Discipline     07/11/23 -  Edis Quintanilla, OTR/L Occupational Therapist OT                  OT Recommendation and Plan     Plan of Care Review  Plan of Care Reviewed With: patient  Progress: no change  Outcome Evaluation: Pt bed mobility Pt needed ModA for bed mobility to assist RLE advancement and then bringing trunk upright. Reports increased pain and slightly yells out with RLE movement. Pt EOB sit balance SBA. Able to scoot self forward and feet on floor. Performed multiple planes/ranges x10 reps for UE strengthening and dynamic sit balance for t/f advancement. Pt EOB approx 20 mins. Patient very sleepy during OT tx. Was initially easy to arrouse and keep on task but while EOB mid UE ther ex pt with decreased attention to task and eyes closing. For pt safety assisted to supine Nyla and did not attempt stand. Utilized glide pad depx2 scooting up in bed. Pt also very [place] needing cues repeated. Pt fell asleep mid repositioning UEs for comfort. Continue OT POC recommend rehab at d/c.     Time Calculation:         Time Calculation- OT       Row Name  09/03/24 1005             Time Calculation- OT    OT Start Time 1005  -MT      OT Stop Time 1044  -MT      OT Time Calculation (min) 39 min  -MT      Total Timed Code Minutes- OT 39 minute(s)  -MT      OT Received On 09/03/24  -MT         Timed Charges    54977 - OT Therapeutic Exercise Minutes 20  -MT      29243 - OT Therapeutic Activity Minutes 19  -MT         Total Minutes    Timed Charges Total Minutes 39  -MT       Total Minutes 39  -MT                User Key  (r) = Recorded By, (t) = Taken By, (c) = Cosigned By      Initials Name Provider Type    MT Unique Christie COTA Occupational Therapist Assistant                  Therapy Charges for Today       Code Description Service Date Service Provider Modifiers Qty    93891564272 HC OT THER PROC EA 15 MIN 9/3/2024 Unique Christie COTA GO 2    08600298477 HC OT THERAPEUTIC ACT EA 15 MIN 9/3/2024 Unique Christie COTA GO 1                 EMERALD Deluca  9/3/2024

## 2024-09-03 NOTE — PLAN OF CARE
Goal Outcome Evaluation:  Plan of Care Reviewed With: patient        Progress: no change  Outcome Evaluation: Pt bed mobility Pt needed ModA for bed mobility to assist RLE advancement and then bringing trunk upright. Reports increased pain and slightly yells out with RLE movement. Pt EOB sit balance SBA. Able to scoot self forward and feet on floor. Performed multiple planes/ranges x10 reps for UE strengthening and dynamic sit balance for t/f advancement. Pt EOB approx 20 mins. Patient very sleepy during OT tx. Was initially easy to arrouse and keep on task but while EOB mid UE ther ex pt with decreased attention to task and eyes closing. For pt safety assisted to supine Nyla and did not attempt stand. Utilized glide pad depx2 scooting up in bed. Pt also very [place] needing cues repeated. Pt fell asleep mid repositioning UEs for comfort. Continue OT POC recommend rehab at d/c.

## 2024-09-04 VITALS
HEIGHT: 71 IN | TEMPERATURE: 98.3 F | SYSTOLIC BLOOD PRESSURE: 122 MMHG | WEIGHT: 235 LBS | OXYGEN SATURATION: 96 % | BODY MASS INDEX: 32.9 KG/M2 | HEART RATE: 87 BPM | DIASTOLIC BLOOD PRESSURE: 46 MMHG | RESPIRATION RATE: 18 BRPM

## 2024-09-04 PROCEDURE — 97530 THERAPEUTIC ACTIVITIES: CPT

## 2024-09-04 RX ORDER — BISACODYL 10 MG
10 SUPPOSITORY, RECTAL RECTAL DAILY PRN
Start: 2024-09-04

## 2024-09-04 RX ORDER — HYDROCODONE BITARTRATE AND ACETAMINOPHEN 7.5; 325 MG/1; MG/1
1 TABLET ORAL EVERY 4 HOURS PRN
Qty: 18 TABLET | Refills: 0 | Status: SHIPPED | OUTPATIENT
Start: 2024-09-04

## 2024-09-04 RX ORDER — FAMOTIDINE 20 MG/1
20 TABLET, FILM COATED ORAL DAILY
Start: 2024-09-05

## 2024-09-04 RX ORDER — ACETAMINOPHEN 325 MG/1
650 TABLET ORAL EVERY 4 HOURS PRN
Start: 2024-09-04

## 2024-09-04 RX ADMIN — HYDROCODONE BITARTRATE AND ACETAMINOPHEN 1 TABLET: 7.5; 325 TABLET ORAL at 00:13

## 2024-09-04 RX ADMIN — HYDROCODONE BITARTRATE AND ACETAMINOPHEN 1 TABLET: 7.5; 325 TABLET ORAL at 15:12

## 2024-09-04 RX ADMIN — Medication 10 ML: at 09:10

## 2024-09-04 RX ADMIN — APIXABAN 2.5 MG: 2.5 TABLET, FILM COATED ORAL at 09:09

## 2024-09-04 RX ADMIN — FAMOTIDINE 20 MG: 20 TABLET, FILM COATED ORAL at 09:09

## 2024-09-04 NOTE — THERAPY DISCHARGE NOTE
Acute Care - Occupational Therapy Discharge Summary  [facility]     Patient Name: Manohar Garcia  : 1940  MRN: [id number]    Today's Date: 2024                 Admit Date: 2024        OT Recommendation and Plan    Visit Dx:    ICD-10-CM ICD-9-CM   1. Fall, initial encounter  W19.XXXA E888.9   2. Closed fracture of right hip, initial encounter  S72.001A 820.8   3. Acute on chronic renal insufficiency  N28.9 593.9    N18.9 585.9   4. Skin tear of left elbow without complication, initial encounter  S51.012A 881.01   5. Bladder stone  N21.0 594.1   6. Hydronephrosis, unspecified hydronephrosis type  N13.30 591   7. Impaired mobility [Z74.09]  Z74.09 799.89   8. Closed 2-part intertrochanteric fracture of right femur, initial encounter  S72.141A 820.21                OT Rehab Goals       Row Name 24 1500             Transfer Goal 1 (OT)    Activity/Assistive Device (Transfer Goal 1, OT) toilet;bed-to-chair/chair-to-bed;shower chair;tub  -JJ      [place] Level/Cues Needed (Transfer Goal 1, OT) minimum assist (75% or more patient effort);verbal cues required;set-up required  -JJ      Time Frame (Transfer Goal 1, OT) long term goal (LTG);by discharge  -JJ      Progress/Outcome (Transfer Goal 1, OT) goal not met;discharged from facility  -JJ         Dressing Goal 1 (OT)    Activity/Device (Dressing Goal 1, OT) dressing skills, all  -JJ      [place]/Cues Needed (Dressing Goal 1, OT) minimum assist (75% or more patient effort);verbal cues required;set-up required  -JJ      Time Frame (Dressing Goal 1, OT) long term goal (LTG);by discharge  -JJ      Progress/Outcome (Dressing Goal 1, OT) goal not met;discharged from facility  -JJ         Toileting Goal 1 (OT)    Activity/Device (Toileting Goal 1, OT) toileting skills, all  -JJ      [place] Level/Cues Needed (Toileting Goal 1, OT) minimum assist (75% or more patient effort);verbal cues required;set-up required  -JJ      Time Frame  (Toileting Goal 1, OT) long term goal (LTG);by discharge  -      Progress/Outcome (Toileting Goal 1, OT) goal not met;discharged from facility  -         Problem Specific Goal 1 (OT)    Problem Specific Goal 1 (OT) Pt will independently implement one pain management technique to decrease pain and improve functional performance.  -      Time Frame (Problem Specific Goal 1, OT) long term goal (LTG);by discharge  -      Progress/Outcome (Problem Specific Goal 1, OT) goal not met;discharged from facility  -                User Key  (r) = Recorded By, (t) = Taken By, (c) = Cosigned By      Initials Name Provider Type Discipline     Hien Panchal, OTR/L, CSRS Occupational Therapist OT                     Outcome Measures       Row Name 09/03/24 1200 09/02/24 1023          How much help from another person do you currently need...    Turning from your back to your side while in flat bed without using bedrails? 2  -AE 2  -WK     Moving from lying on back to sitting on the side of a flat bed without bedrails? 2  -AE 2  -WK     Moving to and from a bed to a chair (including a wheelchair)? 2  -AE 2  -WK     Standing up from a chair using your arms (e.g., wheelchair, bedside chair)? 2  -AE 2  -WK     Climbing 3-5 steps with a railing? 1  -AE 1  -WK     To walk in hospital room? 1  -AE 1  -WK     AM-PAC 6 Clicks Score (PT) 10  -AE 10  -WK     Highest Level of Mobility Goal 4 --> Transfer to chair/commode  -AE 4 --> Transfer to chair/commode  -WK        Functional Assessment    Outcome Measure Options AM-PAC 6 Clicks Basic Mobility (PT)  -AE AM-PAC 6 Clicks Basic Mobility (PT)  -WK               User Key  (r) = Recorded By, (t) = Taken By, (c) = Cosigned By      Initials Name Provider Type    AE Kathia Hansen PTA Physical Therapist Assistant    WK Kellen Block PTA Physical Therapist Assistant                    Timed Therapy Charges  Total Units: 3      Suggested Charges  Total Units: 3      Procedure  Name Documented Minutes Units Code    HC OT THER PROC EA 15 MIN 20 2   89735 (CPT®)      HC OT THERAPEUTIC ACT EA 15 MIN 19 1   07266 (CPT®)                 Documented Minutes  Total Minutes: 39      Therapy Provided Minutes    82553 - OT Therapeutic Exercise Minutes 20    54139 - OT Therapeutic Activity Minutes 19                        OT Discharge Summary  Anticipated Discharge Disposition (OT): skilled nursing facility  Reason for Discharge: Discharge from facility  Outcomes Achieved: Refer to plan of care for updates on goals achieved  Discharge Destination: SNF      LINUS Wylie/L, CSRS  9/4/2024

## 2024-09-04 NOTE — THERAPY TREATMENT NOTE
Acute Care - Physical Therapy Treatment Note  [facility]     Patient Name: Manohar Garcia  : 1940  MRN: [id number]  Today's Date: 2024      Visit Dx:     ICD-10-CM ICD-9-CM   1. Fall, initial encounter  W19.XXXA E888.9   2. Closed fracture of right hip, initial encounter  S72.001A 820.8   3. Acute on chronic renal insufficiency  N28.9 593.9    N18.9 585.9   4. Skin tear of left elbow without complication, initial encounter  S51.012A 881.01   5. Bladder stone  N21.0 594.1   6. Hydronephrosis, unspecified hydronephrosis type  N13.30 591   7. Impaired mobility [Z74.09]  Z74.09 799.89   8. 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    Thrombocytopenia    Hyperlactatemia    Hip fracture    Closed 2-part intertrochanteric fracture of proximal end of right femur    Acute blood loss anemia     Past Medical History:   Diagnosis Date    Dementia     GERD (gastroesophageal reflux disease)     Liver disorder      Past Surgical History:   Procedure Laterality Date    HEMORRHOIDECTOMY      HERNIA REPAIR      HIP TROCHANTERIC NAILING WITH INTRAMEDULLARY HIP SCREW Right 2024    Procedure: HIP TROCHANTERIC NAILING SHORT WITH INTRAMEDULLARY HIP SCREW;  Surgeon: Arcenio Chandra MD;  Location: [facility];  Service: Orthopedics;  Laterality: Right;     PT Assessment (Last 12 Hours)       PT Evaluation and Treatment       Row Name 24 0934          Physical Therapy Time and Intention    Subjective Information complains of;pain  -AE     Document Type therapy note (daily note)  -AE     Mode of Treatment physical therapy  -AE       Row Name 24 0934          General Information    Existing  Precautions/Restrictions fall;weight bearing  TTWB R LE  -AE       Row Name 09/04/24 0934          Pain    Pretreatment Pain Rating 0/10 - no pain  -AE     Posttreatment Pain Rating 7/10  -AE     Pain Location - Side/Orientation Right  -AE     Pain Location lower  -AE     Pain Location - extremity  -AE     Pain Intervention(s) Medication (See MAR)  -AE       Row Name 09/04/24 0934          Bed Mobility    Scooting/Bridging [place] (Bed Mobility) minimum assist (75% patient effort);verbal cues  -AE     Supine-Sit [place] (Bed Mobility) minimum assist (75% patient effort);verbal cues  -AE     Sit-Supine [place] (Bed Mobility) minimum assist (75% patient effort);verbal cues  -AE     Assistive Device (Bed Mobility) head of bed elevated;bed rails  -AE       Row Name 09/04/24 0934          Sit-Stand Transfer    Sit-Stand [place] (Transfers) minimum assist (75% patient effort);2 person assist  stood x 2  -AE       Row Name 09/04/24 0934          Balance    Dynamic Sitting Balance standby assist  pt able to scoot to HOB in seated position  -AE       Row Name 09/04/24 0934          Hip (Therapeutic Exercise)    Hip (Therapeutic Exercise) AROM (active range of motion)  -AE     Hip AROM (Therapeutic Exercise) left;20 repititions;flexion  -AE       Row Name 09/04/24 0934          Knee (Therapeutic Exercise)    Knee (Therapeutic Exercise) AROM (active range of motion)  -AE     Knee AROM (Therapeutic Exercise) left;LAQ (long arc quad)  -AE     Knee AAROM (Therapeutic Exercise) right  LAQ's  -AE       Row Name 09/04/24 0934          Ankle (Therapeutic Exercise)    Ankle (Therapeutic Exercise) AROM (active range of motion)  -AE     Ankle AROM (Therapeutic Exercise) 20 repititions;dorsiflexion;plantarflexion  -AE       Row Name             Wound 08/29/24 2200 Left posterior elbow Abrasion    Wound - Properties Group Placement Date: 08/29/24  -BR Placement Time: 2200  -BR Present on Original Admission: Y  -BR  Side: Left  -BR Orientation: posterior  -BR Location: elbow  -BR Primary Wound Type: Abrasion  -BR    Retired Wound - Properties Group Placement Date: 08/29/24  -BR Placement Time: 2200  -BR Present on Original Admission: Y  -BR Side: Left  -BR Orientation: posterior  -BR Location: elbow  -BR Primary Wound Type: Abrasion  -BR    Retired Wound - Properties Group Date first assessed: 08/29/24  -BR Time first assessed: 2200  -BR Present on Original Admission: Y  -BR Side: Left  -BR Location: elbow  -BR Primary Wound Type: Abrasion  -BR      Row Name             Wound 08/29/24 2200 Right posterior elbow Abrasion    Wound - Properties Group Placement Date: 08/29/24  -BR Placement Time: 2200  -BR Present on Original Admission: Y  -BR Side: Right  -BR Orientation: posterior  -BR Location: elbow  -BR Primary Wound Type: Abrasion  -BR    Retired Wound - Properties Group Placement Date: 08/29/24  -BR Placement Time: 2200  -BR Present on Original Admission: Y  -BR Side: Right  -BR Orientation: posterior  -BR Location: elbow  -BR Primary Wound Type: Abrasion  -BR    Retired Wound - Properties Group Date first assessed: 08/29/24  -BR Time first assessed: 2200  -BR Present on Original Admission: Y  -BR Side: Right  -BR Location: elbow  -BR Primary Wound Type: Abrasion  -BR      Row Name             Wound 08/30/24 1437 Right anterior hip Incision    Wound - Properties Group Placement Date: 08/30/24  -EP Placement Time: 1437  -EP Present on Original Admission: N  -EP Side: Right  -EP Orientation: anterior  -EP Location: hip  -EP Primary Wound Type: Incision  -EP    Retired Wound - Properties Group Placement Date: 08/30/24  -EP Placement Time: 1437  -EP Present on Original Admission: N  -EP Side: Right  -EP Orientation: anterior  -EP Location: hip  -EP Primary Wound Type: Incision  -EP    Retired Wound - Properties Group Date first assessed: 08/30/24  -EP Time first assessed: 1437  -EP Present on Original Admission: N  -EP Side:  Right  -EP Location: hip  -EP Primary Wound Type: Incision  -EP      Row Name             Wound 09/01/24 1816 coccyx Pressure Injury    Wound - Properties Group Placement Date: 09/01/24  -AC Placement Time: 1816 -AC Present on Original Admission: N  -AC Location: coccyx  -AC Primary Wound Type: Pressure inj  -AC    Retired Wound - Properties Group Placement Date: 09/01/24  -AC Placement Time: 1816 -AC Present on Original Admission: N  -AC Location: coccyx  -AC Primary Wound Type: Pressure inj  -AC    Retired Wound - Properties Group Date first assessed: 09/01/24  -AC Time first assessed: 1816  -AC Present on Original Admission: N  -AC Location: coccyx  -AC Primary Wound Type: Pressure inj  -AC      Row Name 09/04/24 0934          Positioning and Restraints    Pre-Treatment Position in bed  -AE     Post Treatment Position bed  -AE     In Bed fowlers;call light within reach;with family/caregiver  -AE               User Key  (r) = Recorded By, (t) = Taken By, (c) = Cosigned By      Initials Name Provider Type    AE Kathia Hansen, PTA Physical Therapist Assistant    Foreign Salas, RN Registered Nurse    Genna Mendez, RN Registered Nurse    Andrew Garcia, RN Registered Nurse                    Physical Therapy Education       Title: PT OT SLP Therapies (In Progress)       Topic: Physical Therapy (In Progress)       Point: Mobility training (In Progress)       Learning Progress Summary             Patient Acceptance, E, NR by SB at 8/31/2024 0913    Comment: pt edu on POC, benefits of act, wt bearing status, use of RW, d/c plans                         Point: Home exercise program (Not Started)       Learner Progress:  Not documented in this visit.              Point: Body mechanics (In Progress)       Learning Progress Summary             Patient Acceptance, E, NR by SB at 8/31/2024 0913    Comment: pt edu on POC, benefits of act, wt bearing status, use of RW, d/c plans                         Point:  Precautions (Done)       Learning Progress Summary             Patient Acceptance, E, VU,NR by AE at 9/3/2024 1209    Comment: PWB R LE    Acceptance, E, NR by SB at 8/31/2024 0913    Comment: pt edu on POC, benefits of act, wt bearing status, use of RW, d/c plans                                         User Key       Initials Effective Dates Name Provider Type Discipline    AE 02/03/23 -  Kathia Hansen PTA Physical Therapist Assistant PT    SB 07/11/23 -  Agustina Glover, PT DPT Physical Therapist PT                  PT Recommendation and Plan     Plan of Care Reviewed With: patient  Progress: improving  Outcome Evaluation: He moaned in pain when being assisted to EOB min x 1.Pt was SBA for seated balance and with vc's stood min x2 with RW and cues for PWB R LE x 2 reps.He was able to scoot in seated positon towards HOBwith vc's for PWB.Pt would benefit from continued PT at SNF.   Outcome Measures       Row Name 09/03/24 1200 09/02/24 1023          How much help from another person do you currently need...    Turning from your back to your side while in flat bed without using bedrails? 2  -AE 2  -WK     Moving from lying on back to sitting on the side of a flat bed without bedrails? 2  -AE 2  -WK     Moving to and from a bed to a chair (including a wheelchair)? 2  -AE 2  -WK     Standing up from a chair using your arms (e.g., wheelchair, bedside chair)? 2  -AE 2  -WK     Climbing 3-5 steps with a railing? 1  -AE 1  -WK     To walk in hospital room? 1  -AE 1  -WK     AM-PAC 6 Clicks Score (PT) 10  -AE 10  -WK     Highest Level of Mobility Goal 4 --> Transfer to chair/commode  -AE 4 --> Transfer to chair/commode  -WK        Functional Assessment    Outcome Measure Options AM-PAC 6 Clicks Basic Mobility (PT)  -AE AM-PAC 6 Clicks Basic Mobility (PT)  -WK               User Key  (r) = Recorded By, (t) = Taken By, (c) = Cosigned By      Initials Name Provider Type    AE Kathia Hansen PTA Physical Therapist Assistant     Kellen Palomino PTA Physical Therapist Assistant                     Time Calculation:    PT Charges       Row Name 09/04/24 1029             Time Calculation    Start Time 0934  -AE      Stop Time 1014  -AE      Time Calculation (min) 40 min  -AE      PT Received On 09/04/24  -AE      PT Goal Re-Cert Due Date 09/10/24  -AE         Time Calculation- PT    Total Timed Code Minutes- PT 40 minute(s)  -AE         Timed Charges    53965 - PT Therapeutic Activity Minutes 40  -AE         Total Minutes    Timed Charges Total Minutes 40  -AE       Total Minutes 40  -AE                User Key  (r) = Recorded By, (t) = Taken By, (c) = Cosigned By      Initials Name Provider Type    AE Kathia Hansen PTA Physical Therapist Assistant                  Therapy Charges for Today       Code Description Service Date Service Provider Modifiers Qty    08808148106 HC PT THERAPEUTIC ACT EA 15 MIN 9/3/2024 Kathia Hansen PTA GP 3    32228033993 HC PT THERAPEUTIC ACT EA 15 MIN 9/4/2024 Kathia Hansen PTA GP 3            PT G-Codes  Outcome Measure Options: AM-PAC 6 Clicks Basic Mobility (PT)  AM-PAC 6 Clicks Score (PT): 10  AM-PAC 6 Clicks Score (OT): 14    Kathia Hansen PTA  9/4/2024

## 2024-09-04 NOTE — DISCHARGE SUMMARY
[facility] Medicine Services  DISCHARGE SUMMARY       Date of Admission: 8/29/2024  Date of Discharge:  9/4/2024  Primary Care Physician: Provider, No Known    Presenting Problem/History of Present Illness:  Fall at home    Final Discharge Diagnoses:  Closed 2 part intertrochanteric fracture of the proximal end of the right femur  Acute blood loss anemia  Chronic kidney disease 3A  Thrombocytopenia    Consults:   Orthopedics Dr. Chandra    Procedures Performed:   Cephalomedullary Nailing right closed minimally-displaced basicervical/intertrochanteric hip fracture       Pertinent Test Results:   Imaging Results (Last 7 Days)       Procedure Component Value Units Date/Time    XR Hip With or Without Pelvis 2 - 3 View Right [[id number]] Collected: 08/30/24 1535     Updated: 08/30/24 1540    Narrative:      XR HIP W OR WO PELVIS 2-3 VIEW RIGHT- 8/30/2024 1:07 PM     HISTORY: nailing; W19.XXXA-Unspecified fall, initial encounter;  S72.001A-Fracture of unspecified part of neck of right femur, initial  encounter for closed fracture; N28.9-Disorder of kidney and ureter,  unspecified; N18.9-Chronic kidney disease, unspecified;  S51.012A-Laceration without foreign body of left elbow, initial  encounter; N21.0-Calculus in bladder; N13.30-Unspecified hydronephrosis     COMPARISON: None     FLUOROSCOPY TIME: 0.478 minutes     FLUOROSCOPY DOSE: 12.278 mGy     NUMBER OF IMAGES: 5.0       Impression:         Intraoperative fluoroscopic images during ORIF proximal right femur.     Please refer to the operative note for more details.     This report was signed and finalized on 8/30/2024 3:37 PM by Nima Vilaltoro.       FL C Arm During Surgery [[id number]] Resulted: 08/30/24 1510     Updated: 08/30/24 1510    Narrative:      This procedure was auto-finalized with no dictation required.    CT Lower Extremity Right Without Contrast [[id number]] Collected: 08/29/24 2016     Updated: 08/29/24 2023     Narrative:      EXAM/TECHNIQUE: CT of the right hip without contrast     INDICATION: Pain, fall     COMPARISON: None available.     FINDINGS:     Acute comminuted mildly displaced and varus angulated fracture extending  to the base of the right femoral neck, with fracture extension into the  intertrochanteric region and greater trochanter. No hip dislocation. No  pelvic fracture in the visualized portion of the right pelvis.     2.9 cm bladder stone. Partially imaged right-sided hydroureteronephrosis  extending to the ureterovesical junction where the large bladder  calculus is present. Prostate is enlarged. Visualized portion of the  colon and small bowel are unremarkable.       Impression:         1.  Acute comminuted mildly displaced and varus angulated fracture  involving the base of the right femoral neck with fracture extension  into the greater trochanter.     2.  2.9 cm bladder stone located near the right ureterovesical junction.  Partially imaged right hydronephrosis.     This report was signed and finalized on 8/29/2024 8:20 PM by Dr. Armando Gimenez MD.       CT Lumbar Spine Without Contrast [[id number]] Collected: 08/29/24 1913     Updated: 08/29/24 1920    Narrative:      EXAM/TECHNIQUE: CT lumbar spine without contrast     INDICATION: fall     COMPARISON: None available.     DLP: 5276.45 mGy.cm. Automated exposure control was also utilized to  decrease patient radiation dose.     FINDINGS:     Mild lumbar spine levocurvature. Lumbar lordosis is maintained. No  subluxations. Vertebral body heights are maintained. No acute fracture.  Multilevel degenerative changes described level by level below.  Horseshoe kidney with severe right-sided hydronephrosis.     L1-L2: Central canal and neural foramina are widely patent.     L2-L3: Facet arthropathy mildly narrows the central canal and bilateral  neural foramina.     L3-L4: Disc and facet arthropathy causes moderate central canal  stenosis, moderate right  neural foraminal stenosis, and mild left neural  foraminal stenosis.     L4-L5: Disc bulge and facet arthropathy causes moderate to severe  central canal stenosis. There is also moderate bilateral neural  foraminal stenosis.     L5-S1: Disc bulge and facet arthropathy causes mild central canal  stenosis. There is moderate to severe left-sided neural foraminal  stenosis and moderate right-sided neuroforaminal stenosis.       Impression:      1.  No acute fracture or subluxation.  2.  Multilevel degenerative change, as described above.  3.  Horseshoe kidney with severe right-sided hydronephrosis.     This report was signed and finalized on 8/29/2024 7:17 PM by Dr. Armando Gimenez MD.       CT Head Without Contrast [[id number]] Collected: 08/29/24 1858     Updated: 08/29/24 1916    Narrative:      EXAM/TECHNIQUE: CT head without contrast     INDICATION: fall     COMPARISON: 10/18/2017     DLP: 5276.45 mGy.cm. Automated exposure control was also utilized to  decrease patient radiation dose.     FINDINGS:     No evidence of intracranial hemorrhage. Gray-white differentiation is  maintained. No midline shift or mass effect. Lateral ventricles are  nondilated. Basilar cisterns are patent. Mild presumed chronic  microvascular ischemic white matter change. Moderate global cerebral  volume loss. No acute orbital finding. Mastoid air cells are clear.  Visualized portion of the paranasal sinuses are clear. No acute osseous  finding.       Impression:      1.  No acute intracranial findings.  2.  Global cerebral volume loss and presumed chronic microvascular  ischemic white matter change.           This report was signed and finalized on 8/29/2024 7:13 PM by Dr. Armando Gimenez MD.       CT Thoracic Spine Without Contrast [[id number]] Collected: 08/29/24 1906     Updated: 08/29/24 1913    Narrative:      EXAM/TECHNIQUE: CT thoracic spine without contrast     INDICATION: fall     COMPARISON: None available.     DLP: 5276.45  mGy.cm. Automated exposure control was also utilized to  decrease patient radiation dose.     FINDINGS:     Thoracic kyphosis and alignment are maintained. Vertebral body heights  are maintained. No acute fracture or subluxation. Mild multilevel  thoracic spine degenerative change without evidence of high-grade  central canal or neural foraminal stenosis. Bibasilar atelectasis. No  visible pneumothorax. Partially imaged right-sided hydronephrosis. Right  hemidiaphragm elevation is noted.       Impression:      1.  No acute fracture or subluxation.  2.  Mild multilevel thoracic spine degenerative change.  3.  Partially imaged right-sided hydronephrosis.        This report was signed and finalized on 8/29/2024 7:10 PM by Dr. Armando Gimenez MD.       CT Cervical Spine Without Contrast [[id number]] Collected: 08/29/24 1902     Updated: 08/29/24 1909    Narrative:      EXAM/TECHNIQUE: CT cervical spine without contrast     INDICATION: fall     COMPARISON: None available.     DLP: 5276.45 mGy.cm. Automated exposure control was also utilized to  decrease patient radiation dose.     FINDINGS:     Craniocervical relationships are maintained. The odontoid process is  intact. Cervical spine alignment is anatomic. Vertebral body heights are  maintained. No acute fracture or subluxation. Mild multilevel cervical  spine degenerative change.       Impression:      No acute fracture or subluxation.           This report was signed and finalized on 8/29/2024 7:06 PM by Dr. Armando Gimenez MD.       XR Hip With or Without Pelvis 2 - 3 View Right [[id number]] Collected: 08/29/24 1749     Updated: 08/29/24 6724    Narrative:      EXAM/TECHNIQUE: XR HIP W OR WO PELVIS 2-3 VIEW RIGHT-     INDICATION: fall     COMPARISON: None available.     FINDINGS:     Acute mild displaced fracture involving the right femur greater  trochanter. Extent of fracture is not well delineated and extension into  the femoral neck is not excluded. No hip  dislocation.     No acute pelvic fracture. Pubic symphysis and SI joints are intact.  Dense radiopaque foreign body projects over the midline pelvis. No acute  fracture involving the left hip on single AP view.       Impression:         Acute mildly displaced fracture involving the right femur greater  trochanter. Extent of fracture is not well delineated and extension into  the femoral neck is not excluded. Recommend CT to better characterize  fracture.     This report was signed and finalized on 8/29/2024 5:51 PM by Dr. Armando Gimenez MD.       XR Chest 1 View [[id number]] Collected: 08/29/24 1747     Updated: 08/29/24 1752    Narrative:      EXAM/TECHNIQUE: XR CHEST 1 VW-     INDICATION: fall     COMPARISON: 5/2/2019     FINDINGS:     Cardiac silhouette is within normal limits and stable.     Chronic mild right hemidiaphragm elevation. Mild bibasilar atelectasis.  Mild diffuse interstitial coarsening, likely chronic change. No focal  consolidation. No pleural effusion or visible pneumothorax.     No acute osseous finding.       Impression:         Chronic mild right hemidiaphragm elevation. Bibasilar atelectasis.     This report was signed and finalized on 8/29/2024 5:49 PM by Dr. Armando Gimenez MD.             Lab Results (last 7 days)       Procedure Component Value Units Date/Time    Basic Metabolic Panel [[id number]]  (Abnormal) Collected: 09/02/24 0349    Specimen: Blood Updated: 09/02/24 0447     Glucose 102 mg/dL      BUN 41 mg/dL      Creatinine 1.85 mg/dL      Sodium 138 mmol/L      Potassium 4.4 mmol/L      Chloride 108 mmol/L      CO2 22.0 mmol/L      Calcium 8.2 mg/dL      BUN/Creatinine Ratio 22.2     Anion Gap 8.0 mmol/L      eGFR 35.5 mL/min/1.73     Narrative:      GFR Normal >60  Chronic Kidney Disease <60  Kidney Failure <15    The GFR formula is only valid for adults with stable renal function between ages 18 and 70.    CBC (No Diff) [[id number]]  (Abnormal) Collected: 09/02/24 0349     Specimen: Blood Updated: 09/02/24 0432     WBC 4.38 10*3/mm3      RBC 3.13 10*6/mm3      Hemoglobin 9.5 g/dL      Hematocrit 29.0 %      MCV 92.7 fL      MCH 30.4 pg      MCHC 32.8 g/dL      RDW 14.1 %      RDW-SD 47.3 fl      MPV 10.6 fL      Platelets 78 10*3/mm3     Basic Metabolic Panel [[id number]]  (Abnormal) Collected: 09/01/24 0444    Specimen: Blood Updated: 09/01/24 0530     Glucose 114 mg/dL      BUN 44 mg/dL      Creatinine 2.14 mg/dL      Sodium 133 mmol/L      Potassium 4.6 mmol/L      Chloride 106 mmol/L      CO2 20.0 mmol/L      Calcium 7.8 mg/dL      BUN/Creatinine Ratio 20.6     Anion Gap 7.0 mmol/L      eGFR 29.8 mL/min/1.73     Narrative:      GFR Normal >60  Chronic Kidney Disease <60  Kidney Failure <15    The GFR formula is only valid for adults with stable renal function between ages 18 and 70.    CBC (No Diff) [[id number]]  (Abnormal) Collected: 09/01/24 0444    Specimen: Blood Updated: 09/01/24 0514     WBC 3.46 10*3/mm3      RBC 3.10 10*6/mm3      Hemoglobin 9.4 g/dL      Hematocrit 28.3 %      MCV 91.3 fL      MCH 30.3 pg      MCHC 33.2 g/dL      RDW 14.1 %      RDW-SD 47.1 fl      MPV 10.7 fL      Platelets 78 10*3/mm3     Basic Metabolic Panel [[id number]]  (Abnormal) Collected: 08/31/24 0415    Specimen: Blood Updated: 08/31/24 0504     Glucose 138 mg/dL      BUN 33 mg/dL      Creatinine 2.23 mg/dL      Sodium 138 mmol/L      Potassium 4.9 mmol/L      Chloride 107 mmol/L      CO2 22.0 mmol/L      Calcium 8.4 mg/dL      BUN/Creatinine Ratio 14.8     Anion Gap 9.0 mmol/L      eGFR 28.3 mL/min/1.73     Narrative:      GFR Normal >60  Chronic Kidney Disease <60  Kidney Failure <15    The GFR formula is only valid for adults with stable renal function between ages 18 and 70.    CBC (No Diff) [[id number]]  (Abnormal) Collected: 08/31/24 0415    Specimen: Blood Updated: 08/31/24 0448     WBC 6.78 10*3/mm3      RBC 3.63 10*6/mm3      Hemoglobin 11.1 g/dL      Hematocrit 33.7 %      MCV 92.8 fL       MCH 30.6 pg      MCHC 32.9 g/dL      RDW 13.9 %      RDW-SD 47.6 fl      MPV 10.9 fL      Platelets 90 10*3/mm3     Comprehensive Metabolic Panel [[id number]]  (Abnormal) Collected: 08/30/24 0423    Specimen: Blood Updated: 08/30/24 0543     Glucose 115 mg/dL      BUN 25 mg/dL      Creatinine 2.27 mg/dL      Sodium 138 mmol/L      Potassium 4.7 mmol/L      Comment: Slight hemolysis detected by analyzer. Result may be falsely elevated.        Chloride 110 mmol/L      CO2 20.0 mmol/L      Calcium 8.2 mg/dL      Total Protein 6.3 g/dL      Albumin 3.1 g/dL      ALT (SGPT) 11 U/L      AST (SGOT) 24 U/L      Alkaline Phosphatase 102 U/L      Total Bilirubin 1.2 mg/dL      Globulin 3.2 gm/dL      A/G Ratio 1.0 g/dL      BUN/Creatinine Ratio 11.0     Anion Gap 8.0 mmol/L      eGFR 27.8 mL/min/1.73     Narrative:      GFR Normal >60  Chronic Kidney Disease <60  Kidney Failure <15    The GFR formula is only valid for adults with stable renal function between ages 18 and 70.    CBC Auto Differential [[id number]]  (Abnormal) Collected: 08/30/24 0423    Specimen: Blood Updated: 08/30/24 0526     WBC 5.97 10*3/mm3      RBC 4.08 10*6/mm3      Hemoglobin 12.3 g/dL      Hematocrit 37.8 %      MCV 92.6 fL      MCH 30.1 pg      MCHC 32.5 g/dL      RDW 13.9 %      RDW-SD 48.2 fl      MPV 10.8 fL      Platelets 86 10*3/mm3      Neutrophil % 71.2 %      Lymphocyte % 17.1 %      Monocyte % 9.0 %      Eosinophil % 2.0 %      Basophil % 0.2 %      Immature Grans % 0.5 %      Neutrophils, Absolute 4.25 10*3/mm3      Lymphocytes, Absolute 1.02 10*3/mm3      Monocytes, Absolute 0.54 10*3/mm3      Eosinophils, Absolute 0.12 10*3/mm3      Basophils, Absolute 0.01 10*3/mm3      Immature Grans, Absolute 0.03 10*3/mm3     STAT Lactic Acid, Reflex [[id number]]  (Normal) Collected: 08/29/24 0227    Specimen: Blood Updated: 08/30/24 0017     Lactate 1.4 mmol/L     Lactic Acid, Plasma [[id number]]  (Abnormal) Collected: 08/29/24 4606    Specimen: Blood  Updated: 08/29/24 2140     Lactate 2.1 mmol/L     Urinalysis With Microscopic If Indicated (No Culture) - Urine, Clean Catch [[id number]]  (Abnormal) Collected: 08/29/24 2037    Specimen: Urine, Clean Catch Updated: 08/29/24 2103     Color, UA Dark Yellow     Appearance, UA Turbid     pH, UA 7.5     Specific Gravity, UA 1.017     Glucose, UA Negative     Ketones, UA Negative     Bilirubin, UA Negative     Blood, UA Small (1+)     Protein,  mg/dL (2+)     Leuk Esterase, UA Large (3+)     Nitrite, UA Negative     Urobilinogen, UA 1.0 E.U./dL    Urinalysis, Microscopic Only - Urine, Clean Catch [[id number]]  (Abnormal) Collected: 08/29/24 2037    Specimen: Urine, Clean Catch Updated: 08/29/24 2103     RBC, UA 3-5 /HPF      WBC, UA 21-50 /HPF      Bacteria, UA 2+ /HPF      Squamous Epithelial Cells, UA 3-6 /HPF      Calcium Oxalate Crystals, UA Small/1+ /HPF      Methodology Manual Light Microscopy    Comprehensive Metabolic Panel [[id number]]  (Abnormal) Collected: 08/29/24 1754    Specimen: Blood Updated: 08/29/24 1824     Glucose 102 mg/dL      BUN 26 mg/dL      Creatinine 2.34 mg/dL      Sodium 139 mmol/L      Potassium 4.1 mmol/L      Chloride 108 mmol/L      CO2 22.0 mmol/L      Calcium 8.8 mg/dL      Total Protein 6.4 g/dL      Albumin 3.1 g/dL      ALT (SGPT) 14 U/L      AST (SGOT) 25 U/L      Alkaline Phosphatase 110 U/L      Total Bilirubin 1.0 mg/dL      Globulin 3.3 gm/dL      A/G Ratio 0.9 g/dL      BUN/Creatinine Ratio 11.1     Anion Gap 9.0 mmol/L      eGFR 26.8 mL/min/1.73     Narrative:      GFR Normal >60  Chronic Kidney Disease <60  Kidney Failure <15    The GFR formula is only valid for adults with stable renal function between ages 18 and 70.    Protime-INR [[id number]]  (Abnormal) Collected: 08/29/24 1754    Specimen: Blood Updated: 08/29/24 1815     Protime 14.9 Seconds      INR 1.12    aPTT [[id number]]  (Normal) Collected: 08/29/24 1754    Specimen: Blood Updated: 08/29/24 1815     PTT 33.5  seconds     CBC & Differential [[id number]]  (Abnormal) Collected: 08/29/24 1754    Specimen: Blood Updated: 08/29/24 1812    Narrative:      The following orders were created for panel order CBC & Differential.  Procedure                               Abnormality         Status                     ---------                               -----------         ------                     CBC Auto Differential[[id number]]        Abnormal            Final result                 Please view results for these tests on the individual orders.    CBC Auto Differential [[id number]]  (Abnormal) Collected: 08/29/24 1754    Specimen: Blood Updated: 08/29/24 1812     WBC 4.88 10*3/mm3      RBC 4.08 10*6/mm3      Hemoglobin 12.4 g/dL      Hematocrit 36.8 %      MCV 90.2 fL      MCH 30.4 pg      MCHC 33.7 g/dL      RDW 14.2 %      RDW-SD 46.9 fl      MPV 10.5 fL      Platelets 121 10*3/mm3      Neutrophil % 47.2 %      Lymphocyte % 38.7 %      Monocyte % 9.6 %      Eosinophil % 3.9 %      Basophil % 0.2 %      Immature Grans % 0.4 %      Neutrophils, Absolute 2.30 10*3/mm3      Lymphocytes, Absolute 1.89 10*3/mm3      Monocytes, Absolute 0.47 10*3/mm3      Eosinophils, Absolute 0.19 10*3/mm3      Basophils, Absolute 0.01 10*3/mm3      Immature Grans, Absolute 0.02 10*3/mm3           Hospital Course:  The patient is a 84 y.o. male who presented to [facility] after a fall at home.  He was found to have a fracture of the right proximal femur.  Patient admitted to the hospital.  Dr. Chandra was consulted.  Patient was taken to the OR for Cephalomedullary Nailing right closed minimally-displaced basicervical/intertrochanteric hip fracture.  Patient tolerated the procedure well.  PT and OT were consulted.  Patient's lab data was followed while he was in the hospital.  Patient does have an underlying history of anemia.  His initial hemoglobin 12.3 with a decrease postoperatively to 9.5.  Patient also has a chronic thrombocytopenia.   "He remains stable in the 78-90 range.  Patient also has chronic kidney disease stage IIIa.  His creatinine was initially a little higher.  But it did come down to his chronic wounds.  Patient's daughter works in this lab.  She did discuss her father's creatinines with ANAT Francois who works with nephrology.    Physical Exam on Discharge:  /46 (BP Location: Left arm, Patient Position: Lying) Comment: RN notified  Pulse 87   Temp 98.3 °F (36.8 °C) (Oral)   Resp 18   Ht 180.3 cm (71\")   Wt 107 kg (235 lb)   SpO2 96%   BMI 32.78 kg/m²   Physical Exam  Vitals and nursing note reviewed.   Constitutional:       Appearance: Normal appearance.   HENT:      Head: Normocephalic and atraumatic.      Right Ear: External ear normal.      Left Ear: External ear normal.      Ears:      Comments: Hard of hearing     Nose: Nose normal.      Mouth/Throat:      Mouth: Mucous membranes are moist.   Eyes:      Extraocular Movements: Extraocular movements intact.      Pupils: Pupils are equal, round, and reactive to light.   Cardiovascular:      Rate and Rhythm: Normal rate and regular rhythm.      Pulses: Normal pulses.      Heart sounds: Normal heart sounds.   Pulmonary:      Effort: Pulmonary effort is normal.      Breath sounds: Normal breath sounds.   Abdominal:      General: Bowel sounds are normal. There is no distension.      Palpations: Abdomen is soft.      Tenderness: There is no abdominal tenderness.   Musculoskeletal:      Cervical back: No rigidity or tenderness.      Right lower leg: No edema.      Left lower leg: No edema.   Skin:     General: Skin is warm and dry.      Capillary Refill: Capillary refill takes less than 2 seconds.   Neurological:      General: No focal deficit present.      Mental Status: He is alert and oriented to person, place, and time.   Psychiatric:         Mood and Affect: Mood normal.         Behavior: Behavior normal.           Condition on Discharge:   Stable " improved    Discharge Disposition:  Skilled Nursing Facility (DC - External)    Discharge Medications:     Discharge Medications        New Medications        Instructions Start Date   acetaminophen 325 MG tablet  Commonly known as: TYLENOL   650 mg, Oral, Every 4 Hours PRN      apixaban 2.5 MG tablet tablet  Commonly known as: ELIQUIS   2.5 mg, Oral, Every 12 Hours Scheduled      famotidine 20 MG tablet  Commonly known as: PEPCID   20 mg, Oral, Daily   Start Date: September 5, 2024     HYDROcodone-acetaminophen 7.5-325 MG per tablet  Commonly known as: NORCO   1 tablet, Oral, Every 4 Hours PRN             Changes to Medications        Instructions Start Date   bisacodyl 5 MG EC tablet  Commonly known as: DULCOLAX  What changed: Another medication with the same name was added. Make sure you understand how and when to take each.   5 mg, Oral, Daily PRN      bisacodyl 10 MG suppository  Commonly known as: DULCOLAX  What changed: You were already taking a medication with the same name, and this prescription was added. Make sure you understand how and when to take each.   10 mg, Rectal, Daily PRN             Stop These Medications      cimetidine 200 MG tablet  Commonly known as: TAGAMET            Discharge Diet:   Regular      Activity at Discharge:   As tolerated toe-touch allowed by orthopedics    Follow-up Appointments:   Orthopedics 4 weeks  PCP 5 to 7 days    Test Results Pending at Discharge: None    Mirta Branch DO  09/04/24  09:54 CDT    Time: 35 minutes.

## 2024-09-04 NOTE — THERAPY DISCHARGE NOTE
Acute Care - Physical Therapy Discharge Summary  [facility]       Patient Name: Manohar Garcia  : 1940  MRN: [id number]    Today's Date: 2024                 Admit Date: 2024      PT Recommendation and Plan    Visit Dx:    ICD-10-CM ICD-9-CM   1. Fall, initial encounter  W19.XXXA E888.9   2. Closed fracture of right hip, initial encounter  S72.001A 820.8   3. Acute on chronic renal insufficiency  N28.9 593.9    N18.9 585.9   4. Skin tear of left elbow without complication, initial encounter  S51.012A 881.01   5. Bladder stone  N21.0 594.1   6. Hydronephrosis, unspecified hydronephrosis type  N13.30 591   7. Impaired mobility [Z74.09]  Z74.09 799.89   8. Closed 2-part intertrochanteric fracture of right femur, initial encounter  S72.141A 820.21        Outcome Measures       Row Name 24 1200 24 1023          How much help from another person do you currently need...    Turning from your back to your side while in flat bed without using bedrails? 2  -AE 2  -WK     Moving from lying on back to sitting on the side of a flat bed without bedrails? 2  -AE 2  -WK     Moving to and from a bed to a chair (including a wheelchair)? 2  -AE 2  -WK     Standing up from a chair using your arms (e.g., wheelchair, bedside chair)? 2  -AE 2  -WK     Climbing 3-5 steps with a railing? 1  -AE 1  -WK     To walk in hospital room? 1  -AE 1  -WK     AM-PAC 6 Clicks Score (PT) 10  -AE 10  -WK     Highest Level of Mobility Goal 4 --> Transfer to chair/commode  -AE 4 --> Transfer to chair/commode  -WK        Functional Assessment    Outcome Measure Options AM-PAC 6 Clicks Basic Mobility (PT)  -AE AM-PAC 6 Clicks Basic Mobility (PT)  -WK               User Key  (r) = Recorded By, (t) = Taken By, (c) = Cosigned By      Initials Name Provider Type    Kathia Arboleda, [place] Physical Therapist Assistant    WK Mckayla, Kellen A, PTA Physical Therapist Assistant                     PT Charges       Row Name 24  1029             Time Calculation    Start Time 0934  -AE      Stop Time 1014  -AE      Time Calculation (min) 40 min  -AE      PT Received On 09/04/24  -AE      PT Goal Re-Cert Due Date 09/10/24  -AE         Time Calculation- PT    Total Timed Code Minutes- PT 40 minute(s)  -AE         Timed Charges    06950 - PT Therapeutic Activity Minutes 40  -AE         Total Minutes    Timed Charges Total Minutes 40  -AE       Total Minutes 40  -AE                User Key  (r) = Recorded By, (t) = Taken By, (c) = Cosigned By      Initials Name Provider Type    AE Kathia Hansen, PTA Physical Therapist Assistant                     PT Rehab Goals       Row Name 09/04/24 1539             Bed Mobility Goal 1 (PT)    Activity/Assistive Device (Bed Mobility Goal 1, PT) sit to supine;supine to sit;rolling to left;rolling to right  -AE      [place] Level/Cues Needed (Bed Mobility Goal 1, PT) contact guard required  -AE      Time Frame (Bed Mobility Goal 1, PT) long term goal (LTG)  -AE      Progress/Outcomes (Bed Mobility Goal 1, PT) goal met  -AE         Transfer Goal 1 (PT)    Activity/Assistive Device (Transfer Goal 1, PT) sit-to-stand/stand-to-sit;bed-to-chair/chair-to-bed;walker, rolling  -AE      [place] Level/Cues Needed (Transfer Goal 1, PT) minimum assist (75% or more patient effort)  -AE      Time Frame (Transfer Goal 1, PT) long term goal (LTG)  -AE      Progress/Outcome (Transfer Goal 1, PT) goal not met  -AE         Gait Training Goal 1 (PT)    Activity/Assistive Device (Gait Training Goal 1, PT) gait (walking locomotion);decrease fall risk;maintain weight-bearing status;walker, rolling  -AE      [place] Level (Gait Training Goal 1, PT) minimum assist (75% or more patient effort)  -AE      Distance (Gait Training Goal 1, PT) 10  -AE      Time Frame (Gait Training Goal 1, PT) long term goal (LTG)  -AE      Progress/Outcome (Gait Training Goal 1, PT) goal not met  -AE                User Key  (r) = Recorded  By, (t) = Taken By, (c) = Cosigned By      Initials Name Provider Type Discipline    AE Kathia Hansen PTA Physical Therapist Assistant PT                    Therapy Charges for Today       Code Description Service Date Service Provider Modifiers Qty    04552597825 HC PT THERAPEUTIC ACT EA 15 MIN 9/3/2024 Kathia Hansen PTA GP 3    89771491931 HC PT THERAPEUTIC ACT EA 15 MIN 9/4/2024 Kathia Hansen PTA GP 3            PT Discharge Summary  Anticipated Discharge Disposition (PT): skilled nursing facility  Reason for Discharge: Discharge from facility  Outcomes Achieved: Patient able to partially acheive established goals  Discharge Destination: SNF      Kathia Hansen PTA   9/4/2024

## 2024-09-04 NOTE — PLAN OF CARE
Goal Outcome Evaluation:        Problem: Adult Inpatient Plan of Care  Goal: Plan of Care Review  Outcome: Ongoing, Progressing  Flowsheets (Taken 9/4/2024 1017)  Progress: improving  Outcome Evaluation: AAOx4, VSS, breathing unlabored on RA, drgs intact to elbows, heels, coccyx. incontinent or urine and bowel, Pt medicated for pain with relief, pt turned q 2, SR 72-99 with pvc's on tele. safety maintained, care plan ongoing.

## 2024-09-04 NOTE — PLAN OF CARE
Problem: Adult Inpatient Plan of Care  Goal: Plan of Care Review  Outcome: Met  Goal: Patient-Specific Goal (Individualized)  Outcome: Met  Goal: Absence of Hospital-Acquired Illness or Injury  Outcome: Met  Intervention: Identify and Manage Fall Risk  Recent Flowsheet Documentation  Taken 9/4/2024 1200 by Toma Holland RN  Safety Promotion/Fall Prevention: safety round/check completed  Taken 9/4/2024 1100 by Toma Holland RN  Safety Promotion/Fall Prevention: safety round/check completed  Taken 9/4/2024 1000 by Toma Holland RN  Safety Promotion/Fall Prevention: safety round/check completed  Taken 9/4/2024 0900 by Toma Holland RN  Safety Promotion/Fall Prevention: safety round/check completed  Taken 9/4/2024 0800 by Toma Holland RN  Safety Promotion/Fall Prevention: safety round/check completed  Goal: Optimal Comfort and Wellbeing  Outcome: Met  Goal: Readiness for Transition of Care  Outcome: Met   Goal Outcome Evaluation:

## 2024-09-04 NOTE — PLAN OF CARE
Goal Outcome Evaluation:  Plan of Care Reviewed With: patient        Progress: improving  Outcome Evaluation: He moaned in pain when being assisted to EOB min x 1.Pt was SBA for seated balance and with vc's stood min x2 with RW and cues for PWB R LE x 2 reps.He was able to scoot in seated positon towards HOBwith vc's for PWB.Pt would benefit from continued PT at SNF.

## 2024-09-28 DIAGNOSIS — M89.8X5 PAIN IN RIGHT FEMUR: Primary | ICD-10-CM

## 2024-10-01 ENCOUNTER — OFFICE VISIT (OUTPATIENT)
Age: 84
End: 2024-10-01

## 2024-10-01 VITALS — WEIGHT: 240 LBS | HEIGHT: 69 IN | BODY MASS INDEX: 35.55 KG/M2

## 2024-10-01 DIAGNOSIS — S72.141D DISPLACED INTERTROCHANTERIC FRACTURE OF RIGHT FEMUR, SUBSEQUENT ENCOUNTER FOR CLOSED FRACTURE WITH ROUTINE HEALING: Primary | ICD-10-CM

## 2024-10-01 PROBLEM — S72.141A CLOSED 2-PART INTERTROCHANTERIC FRACTURE OF PROXIMAL END OF RIGHT FEMUR (HCC): Status: ACTIVE | Noted: 2024-08-30

## 2024-10-01 PROBLEM — D69.6 THROMBOCYTOPENIA (HCC): Status: ACTIVE | Noted: 2023-03-10

## 2024-10-01 PROBLEM — E87.20 HYPERLACTATEMIA: Status: ACTIVE | Noted: 2023-03-10

## 2024-10-01 PROBLEM — N18.31 STAGE 3A CHRONIC KIDNEY DISEASE (HCC): Status: ACTIVE | Noted: 2023-03-10

## 2024-10-01 PROBLEM — R78.81 BACTEREMIA DUE TO PSEUDOMONAS: Status: ACTIVE | Noted: 2023-03-10

## 2024-10-01 PROBLEM — K74.60 LIVER CIRRHOSIS SECONDARY TO NASH (HCC): Status: ACTIVE | Noted: 2023-03-10

## 2024-10-01 PROBLEM — B96.5 BACTEREMIA DUE TO PSEUDOMONAS: Status: ACTIVE | Noted: 2023-03-10

## 2024-10-01 PROBLEM — K75.81 LIVER CIRRHOSIS SECONDARY TO NASH (HCC): Status: ACTIVE | Noted: 2023-03-10

## 2024-10-01 PROBLEM — S72.009A HIP FRACTURE (HCC): Status: ACTIVE | Noted: 2024-08-29

## 2024-10-01 PROBLEM — N17.9 AKI (ACUTE KIDNEY INJURY) (HCC): Status: ACTIVE | Noted: 2023-03-09

## 2024-10-01 PROCEDURE — 99024 POSTOP FOLLOW-UP VISIT: CPT | Performed by: ORTHOPAEDIC SURGERY

## 2024-10-01 RX ORDER — BISACODYL 10 MG
10 SUPPOSITORY, RECTAL RECTAL DAILY PRN
COMMUNITY
Start: 2024-09-04

## 2024-10-01 RX ORDER — ACETAMINOPHEN 325 MG/1
650 TABLET ORAL EVERY 4 HOURS PRN
COMMUNITY
Start: 2024-09-04

## 2024-10-01 RX ORDER — FAMOTIDINE 20 MG/1
20 TABLET, FILM COATED ORAL DAILY
COMMUNITY
Start: 2024-09-05

## 2024-10-01 RX ORDER — BISACODYL 5 MG/1
5 TABLET, DELAYED RELEASE ORAL DAILY PRN
COMMUNITY

## 2024-10-01 ASSESSMENT — ENCOUNTER SYMPTOMS
ALLERGIC/IMMUNOLOGIC NEGATIVE: 1
EYES NEGATIVE: 1
GASTROINTESTINAL NEGATIVE: 1
RESPIRATORY NEGATIVE: 1

## 2024-10-01 NOTE — PROGRESS NOTES
Ammon Abad (:  1940) is a 84 y.o. male,Established patient, here for evaluation of the following chief complaint(s):  Hip Pain (Po Rt femur nailing)         Assessment & Plan  Displaced intertrochanteric fracture of right femur, subsequent encounter for closed fracture with routine healing     At this point, I encouraged the patient to continue protecting his weightbearing to his right lower extremity limiting it to toe-touch.  He will continue working with formal physical therapy in his skilled nursing facility.    PLAN:  I will see the patient back in 4 weeks.  At that time we will likely allow him to advance his weightbearing as he tolerates.         Return in about 4 weeks (around 10/29/2024).       Subjective   The patient is a pleasant 84-year-old gentleman who presents for his first postoperative visit 2 weeks following cephalomedullary nailing of a right intertrochanteric/basicervical femoral neck fracture.  He states that he is doing \"very well\" and denies any discomfort currently.  He has been reportedly limiting his weightbearing to toe-touch per his family's report who accompanies him at today's visit.  He is currently residing in a skilled nursing facility.      Review of Systems   Constitutional: Negative.    HENT: Negative.     Eyes: Negative.    Respiratory: Negative.     Cardiovascular: Negative.    Gastrointestinal: Negative.    Skin: Negative.    Allergic/Immunologic: Negative.    Neurological: Negative.    Psychiatric/Behavioral: Negative.            Objective   Physical Exam   On physical examination, the patient's postoperative dressings are removed.  His incisions are inspected and noted to be benign in appearance and well-approximated with no associated redness, streaking, dehiscence or discharge.  Muscle compartments are soft and compressible.  Sensation is intact distally.  Brisk capillary refill is noted.  The right lower extremity is warm and well-perfused.          An

## 2024-10-02 ENCOUNTER — TELEPHONE (OUTPATIENT)
Age: 84
End: 2024-10-02

## 2024-10-02 NOTE — TELEPHONE ENCOUNTER
Courtney with [facility] called to request results of xrays performed yesterday at patient's visit. She stated the patient reported for physical therapy today and is unable to stand, has pitting edema in his legs and is experiencing a lot of pain with palpation. She asked for a copy of the xray report to be faxed to [phone]. Report faxed to ATTN: Courtney. Please advise.

## 2024-10-28 ENCOUNTER — LAB REQUISITION (OUTPATIENT)
Dept: LAB | Facility: HOSPITAL | Age: 84
End: 2024-10-28
Payer: MEDICARE

## 2024-10-28 DIAGNOSIS — M25.551 PAIN IN RIGHT HIP: Primary | ICD-10-CM

## 2024-10-28 DIAGNOSIS — N39.0 URINARY TRACT INFECTION, SITE NOT SPECIFIED: ICD-10-CM

## 2024-10-28 LAB
BACTERIA UR QL AUTO: ABNORMAL /HPF
BILIRUB UR QL STRIP: NEGATIVE
CLARITY UR: ABNORMAL
COLOR UR: YELLOW
GLUCOSE UR STRIP-MCNC: NEGATIVE MG/DL
HGB UR QL STRIP.AUTO: ABNORMAL
HYALINE CASTS UR QL AUTO: ABNORMAL /LPF
KETONES UR QL STRIP: NEGATIVE
LEUKOCYTE ESTERASE UR QL STRIP.AUTO: ABNORMAL
NITRITE UR QL STRIP: NEGATIVE
PH UR STRIP.AUTO: 8.5 [PH] (ref 5–8)
PROT UR QL STRIP: ABNORMAL
RBC # UR STRIP: ABNORMAL /HPF
REF LAB TEST METHOD: ABNORMAL
SP GR UR STRIP: 1.02 (ref 1–1.03)
SQUAMOUS #/AREA URNS HPF: ABNORMAL /HPF
TRI-PHOS CRY URNS QL MICRO: ABNORMAL /HPF
UROBILINOGEN UR QL STRIP: ABNORMAL
WBC # UR STRIP: ABNORMAL /HPF

## 2024-10-28 PROCEDURE — 81001 URINALYSIS AUTO W/SCOPE: CPT | Performed by: GENERAL PRACTICE

## 2024-10-28 PROCEDURE — 87086 URINE CULTURE/COLONY COUNT: CPT | Performed by: GENERAL PRACTICE

## 2024-10-28 ASSESSMENT — ENCOUNTER SYMPTOMS
EYES NEGATIVE: 1
GASTROINTESTINAL NEGATIVE: 1
ALLERGIC/IMMUNOLOGIC NEGATIVE: 1
RESPIRATORY NEGATIVE: 1

## 2024-10-28 NOTE — PROGRESS NOTES
Ammon Abad (:  1940) is a 84 y.o. male,Established patient, here for evaluation of the following chief complaint(s):  rt hip         Assessment & Plan  Displaced intertrochanteric fracture of right femur, subsequent encounter for closed fracture with routine healing     The patient is doing well from a rehab standpoint per his family members discussion.  I would like him to continue advancing his activity now advancing his weightbearing at this point.  He will work with physical therapy within his current skilled nursing facility.     PLAN:  I will see the patient back after the first of the year for interval evaluation    Return in about 10 weeks (around 2025).       Subjective   The patient is a pleasant 84-year-old gentleman who presents 6 weeks following trochanteric injury right hip nailing secondary to a displaced intertrochanteric right femur fracture.  He states that he has no hip discomfort currently        Review of Systems   Constitutional: Negative.    HENT: Negative.     Eyes: Negative.    Respiratory: Negative.     Cardiovascular: Negative.    Gastrointestinal: Negative.    Skin: Negative.    Allergic/Immunologic: Negative.    Neurological: Negative.    Psychiatric/Behavioral: Negative.            Objective   Physical Exam   On physical examination, the patient's incisions are benign in appearance and well-healed.  Muscle arms are soft compressible.  He demonstrates intact digit range of motion with intact knee and hip range of motion.  He has preserved sensation distally to his baseline.  The right lower extremity is warm well-perfused.          An electronic signature was used to authenticate this note.    --Polo Smith MD

## 2024-10-29 ENCOUNTER — OFFICE VISIT (OUTPATIENT)
Age: 84
End: 2024-10-29

## 2024-10-29 ENCOUNTER — TELEPHONE (OUTPATIENT)
Age: 84
End: 2024-10-29

## 2024-10-29 VITALS — HEIGHT: 71 IN | BODY MASS INDEX: 29.54 KG/M2 | WEIGHT: 211 LBS

## 2024-10-29 DIAGNOSIS — S72.141D DISPLACED INTERTROCHANTERIC FRACTURE OF RIGHT FEMUR, SUBSEQUENT ENCOUNTER FOR CLOSED FRACTURE WITH ROUTINE HEALING: Primary | ICD-10-CM

## 2024-10-29 PROBLEM — D62 ACUTE BLOOD LOSS ANEMIA: Status: ACTIVE | Noted: 2024-09-02

## 2024-10-29 PROBLEM — G93.41 ENCEPHALOPATHY, METABOLIC: Status: ACTIVE | Noted: 2023-03-09

## 2024-10-29 PROBLEM — E66.9 OBESITY (BMI 30-39.9): Status: ACTIVE | Noted: 2023-03-10

## 2024-10-29 PROBLEM — N30.01 ACUTE CYSTITIS WITH HEMATURIA: Status: ACTIVE | Noted: 2023-03-09

## 2024-10-29 LAB — BACTERIA SPEC AEROBE CULT: NORMAL

## 2024-10-29 PROCEDURE — 99024 POSTOP FOLLOW-UP VISIT: CPT | Performed by: ORTHOPAEDIC SURGERY

## 2024-10-29 NOTE — ASSESSMENT & PLAN NOTE
The patient is doing well from a rehab standpoint per his family members discussion.  I would like him to continue advancing his activity now advancing his weightbearing at this point.  He will work with physical therapy within his current skilled nursing facility.     PLAN:  I will see the patient back after the first of the year for interval evaluation

## 2024-10-29 NOTE — TELEPHONE ENCOUNTER
[facility] is calling on behalf of pt, they need appt notes and orders faxed to them.   Fax: [phone]

## 2024-10-30 ENCOUNTER — TELEPHONE (OUTPATIENT)
Age: 84
End: 2024-10-30

## 2024-11-01 ENCOUNTER — TELEPHONE (OUTPATIENT)
Age: 84
End: 2024-11-01

## 2024-11-01 DIAGNOSIS — S72.141D DISPLACED INTERTROCHANTERIC FRACTURE OF RIGHT FEMUR, SUBSEQUENT ENCOUNTER FOR CLOSED FRACTURE WITH ROUTINE HEALING: Primary | ICD-10-CM

## 2024-11-07 ENCOUNTER — TELEPHONE (OUTPATIENT)
Age: 84
End: 2024-11-07

## 2024-12-26 NOTE — PROGRESS NOTES
Subjective    Mr. Garcia is 84 y.o. male    Chief Complaint: Frequent UTI    History of Present Illness  Patient is a 34-year-old gentleman accompanied by his son patient is a resident of nursing home he has had recent recurrent urinary tract infections.  He is not currently on an antibiotic.  He has some burning with urination which she currently has states that his primary UTI symptoms.  He also has a feeling he does not empty his bladder well.  He is not on any urologic medications.  States he does have a history of kidney stones although he is not had any recent imaging.  He does not have a catheter in place    The following portions of the patient's history were reviewed and updated as appropriate: allergies, current medications, past family history, past medical history, past social history, past surgical history and problem list.    Review of Systems   Genitourinary:  Positive for difficulty urinating, dysuria, frequency, hematuria and urgency.         Current Outpatient Medications:     acetaminophen (TYLENOL) 325 MG tablet, Take 2 tablets by mouth Every 4 (Four) Hours As Needed for Mild Pain., Disp: , Rfl:     apixaban (ELIQUIS) 2.5 MG tablet tablet, Take 1 tablet by mouth Every 12 (Twelve) Hours. Indications: Prevention of Unwanted Clot in Veins, Disp: , Rfl:     bisacodyl (DULCOLAX) 10 MG suppository, Insert 1 suppository into the rectum Daily As Needed for Constipation., Disp: , Rfl:     bisacodyl (DULCOLAX) 5 MG EC tablet, Take 1 tablet by mouth Daily As Needed for Constipation., Disp: , Rfl:     famotidine (PEPCID) 20 MG tablet, Take 1 tablet by mouth Daily., Disp: , Rfl:     HYDROcodone-acetaminophen (NORCO) 7.5-325 MG per tablet, Take 1 tablet by mouth Every 4 (Four) Hours As Needed for Moderate Pain., Disp: 18 tablet, Rfl: 0    tamsulosin (FLOMAX) 0.4 MG capsule 24 hr capsule, Take 1 capsule by mouth Every Night for 30 days., Disp: , Rfl:     Past Medical History:   Diagnosis Date    Dementia      "GERD (gastroesophageal reflux disease)     Liver disorder        Past Surgical History:   Procedure Laterality Date    HEMORRHOIDECTOMY      HERNIA REPAIR      HIP TROCHANTERIC NAILING WITH INTRAMEDULLARY HIP SCREW Right 8/30/2024    Procedure: HIP TROCHANTERIC NAILING SHORT WITH INTRAMEDULLARY HIP SCREW;  Surgeon: Arcenio Chandra MD;  Location: [facility] OR;  Service: Orthopedics;  Laterality: Right;       Social History     Socioeconomic History    Marital status:    Tobacco Use    Smoking status: Former     Types: Cigarettes, Pipe    Smokeless tobacco: Never   Vaping Use    Vaping status: Never Used   Substance and Sexual Activity    Alcohol use: No    Drug use: Yes     Types: Amphetamines     Comment: Quit 40 years ago    Sexual activity: Defer       History reviewed. No pertinent family history.    Objective    Temp 97 °F (36.1 °C)   Ht 180.3 cm (71\")   Wt 97.5 kg (215 lb)   BMI 29.99 kg/m²     Physical Exam  Constitutional:       Appearance: Normal appearance.   HENT:      Head: Normocephalic and atraumatic.   Pulmonary:      Effort: Pulmonary effort is normal.   Neurological:      Mental Status: He is alert.   Psychiatric:         Mood and Affect: Mood normal.         Behavior: Behavior normal.             Estimation of residual urine via abdominal ultrasound  Residual Urine: 105 ml  Indication: Recurrent UTI  Position: Supine  Examination: Incremental scanning of the suprapubic area using 3 MHz transducer using copious amounts of acoustic gel.   Findings: An anechoic area was demonstrated which represented the bladder, with measurement of residual urine as noted. I inspected this myself. In that the residual urine was stable or insignificant, no treatment will be necessary at this time.       Assessment and Plan    Diagnoses and all orders for this visit:    1. Frequent UTI (Primary)  -     Cancel: POC Urinalysis Dipstick, Multipro  -     CT Abdomen Pelvis Without Contrast; Future  -     tamsulosin " (FLOMAX) 0.4 MG capsule 24 hr capsule; Take 1 capsule by mouth Every Night for 30 days.    2. History of kidney stones  -     CT Abdomen Pelvis Without Contrast; Future      Patient with frequent UTIs last few months.  Will send his urine for culture and sensitivity I sent his urine to [facility].     Also I feel he may not be emptying his bladder well which could be a risk factor for developing UTIs I started him on tamsulosin 1 daily.    Also needs CT scan to evaluate for recurrent infections and also for any evidence of kidney stones.    Return in 2 weeks with CT.

## 2025-01-02 ENCOUNTER — PATIENT ROUNDING (BHMG ONLY) (OUTPATIENT)
Dept: UROLOGY | Facility: CLINIC | Age: 85
End: 2025-01-02
Payer: COMMERCIAL

## 2025-01-02 ENCOUNTER — OFFICE VISIT (OUTPATIENT)
Dept: UROLOGY | Facility: CLINIC | Age: 85
End: 2025-01-02
Payer: MEDICARE

## 2025-01-02 VITALS — HEIGHT: 71 IN | TEMPERATURE: 97 F | WEIGHT: 215 LBS | BODY MASS INDEX: 30.1 KG/M2

## 2025-01-02 DIAGNOSIS — N39.0 FREQUENT UTI: Primary | ICD-10-CM

## 2025-01-02 DIAGNOSIS — Z87.442 HISTORY OF KIDNEY STONES: ICD-10-CM

## 2025-01-02 RX ORDER — TAMSULOSIN HYDROCHLORIDE 0.4 MG/1
1 CAPSULE ORAL NIGHTLY
Start: 2025-01-02 | End: 2025-02-01

## 2025-01-02 NOTE — PROGRESS NOTES
January 2, 2025    Hello, may I speak with Manohar Garcia?    My name is Liliana    I am  with [facility]  [address], [address]  [place] [id number] [phone].    Before we get started may I verify your date of birth? 1940    I am calling to officially welcome you to our practice and ask about your recent visit. Is this a good time to talk? yes    Tell me about your visit with us. What things went well?  It was a good first visit       We're always looking for ways to make our patients' experiences even better. Do you have recommendations on ways we may improve?  no    Overall were you satisfied with your first visit to our practice? yes       I appreciate you taking the time to speak with me today. Is there anything else I can do for you? no      Thank you, and have a great day.

## 2025-01-06 ENCOUNTER — TELEPHONE (OUTPATIENT)
Dept: UROLOGY | Facility: CLINIC | Age: 85
End: 2025-01-06
Payer: COMMERCIAL

## 2025-01-06 DIAGNOSIS — N39.0 URINARY TRACT INFECTION ASSOCIATED WITH CATHETERIZATION OF URINARY TRACT, UNSPECIFIED INDWELLING URINARY CATHETER TYPE, SEQUELA: Primary | ICD-10-CM

## 2025-01-06 DIAGNOSIS — T83.511S URINARY TRACT INFECTION ASSOCIATED WITH CATHETERIZATION OF URINARY TRACT, UNSPECIFIED INDWELLING URINARY CATHETER TYPE, SEQUELA: Primary | ICD-10-CM

## 2025-01-06 RX ORDER — CEFDINIR 300 MG/1
300 CAPSULE ORAL 2 TIMES DAILY
Qty: 20 CAPSULE | Refills: 0 | Status: SHIPPED | OUTPATIENT
Start: 2025-01-06 | End: 2025-01-16

## 2025-01-06 NOTE — TELEPHONE ENCOUNTER
----- Message from Abelardo Panchal sent at 1/6/2025 12:19 PM CST -----  Regarding: Urine culture  Urine culture was positive for E. coli based on the sensitivities I will place him on [employer]mn[employer].  ----- Message -----  From: Iris, Jensen Incoming  Sent: 1/6/2025  10:10 AM CST  To: KEARA Finney

## 2025-01-08 ENCOUNTER — OFFICE VISIT (OUTPATIENT)
Age: 85
End: 2025-01-08

## 2025-01-08 VITALS — BODY MASS INDEX: 29.43 KG/M2 | HEIGHT: 71 IN

## 2025-01-08 DIAGNOSIS — S72.141D DISPLACED INTERTROCHANTERIC FRACTURE OF RIGHT FEMUR, SUBSEQUENT ENCOUNTER FOR CLOSED FRACTURE WITH ROUTINE HEALING: ICD-10-CM

## 2025-01-08 DIAGNOSIS — M17.11 PRIMARY OSTEOARTHRITIS OF RIGHT KNEE: Primary | ICD-10-CM

## 2025-01-08 RX ORDER — BUPIVACAINE HYDROCHLORIDE 5 MG/ML
4 INJECTION, SOLUTION PERINEURAL ONCE
Status: SHIPPED | OUTPATIENT
Start: 2025-01-08

## 2025-01-08 RX ORDER — CEFDINIR 300 MG/1
300 CAPSULE ORAL 2 TIMES DAILY
COMMUNITY
Start: 2025-01-06 | End: 2025-01-17

## 2025-01-08 RX ORDER — TRIAMCINOLONE ACETONIDE 40 MG/ML
40 INJECTION, SUSPENSION INTRA-ARTICULAR; INTRAMUSCULAR ONCE
Status: SHIPPED | OUTPATIENT
Start: 2025-01-08

## 2025-01-08 ASSESSMENT — ENCOUNTER SYMPTOMS
EYES NEGATIVE: 1
RESPIRATORY NEGATIVE: 1
ALLERGIC/IMMUNOLOGIC NEGATIVE: 1
GASTROINTESTINAL NEGATIVE: 1

## 2025-01-08 NOTE — PROGRESS NOTES
Ammon Abad (:  1940) is a 84 y.o. male,Established patient, here for evaluation of the following chief complaint(s):  Follow-up (Right hip)         Assessment & Plan  Primary osteoarthritis of right knee     The patient states at today's visit that his single most prevalent limiting factor is his right knee pain.  He does have degenerative changes in his knee based on imaging from an outside facility.  As a result, we discussed treatment options and he would like to pursue injection therapy of his right knee.  I think that is reasonable.  The risks and benefits of the procedure were explained to the patient including the risk for infection, nerve and artery damage, continued pain, continued decreased range of motion, paresthesias, paralysis, loss of limb, loss of life and the need for surgery, and the patient conveyed their understanding and willingness to proceed.  Under aseptic technique, the right knee was injected using a 21-gauge needle with a solution of 40 mg / 1 cc of Kenalog corticosteroid and 4 cc of half percent bupivacaine local anesthetic.  The patient tolerated the procedure very well.  It is explained to the patient that this injection may be repeated as frequently as every 3 months or less frequently if their symptoms will allow.  They convey their understanding.  Orders:    BUPivacaine (MARCAINE) 0.5 % injection 20 mg    triamcinolone acetonide (KENALOG-40) injection 40 mg    DRAIN/INJECT LARGE JOINT/BURSA    Displaced intertrochanteric fracture of right femur, subsequent encounter for closed fracture with routine healing     With respect to the patient's right intertrochanteric femur fracture, he is encouraged to continue advancing his activity and weightbearing as he tolerates.     PLAN:  The patient will follow-up on an as-needed basis.    Return in about 3 months (around 2025).       Subjective   The patient is a pleasant 84-year-old gentleman who presents 16 weeks following

## 2025-01-08 NOTE — ASSESSMENT & PLAN NOTE
The patient states at today's visit that his single most prevalent limiting factor is his right knee pain.  He does have degenerative changes in his knee based on imaging from an outside facility.  As a result, we discussed treatment options and he would like to pursue injection therapy of his right knee.  I think that is reasonable.  The risks and benefits of the procedure were explained to the patient including the risk for infection, nerve and artery damage, continued pain, continued decreased range of motion, paresthesias, paralysis, loss of limb, loss of life and the need for surgery, and the patient conveyed their understanding and willingness to proceed.  Under aseptic technique, the right knee was injected using a 21-gauge needle with a solution of 40 mg / 1 cc of Kenalog corticosteroid and 4 cc of half percent bupivacaine local anesthetic.  The patient tolerated the procedure very well.  It is explained to the patient that this injection may be repeated as frequently as every 3 months or less frequently if their symptoms will allow.  They convey their understanding.  Orders:    BUPivacaine (MARCAINE) 0.5 % injection 20 mg    triamcinolone acetonide (KENALOG-40) injection 40 mg    DRAIN/INJECT LARGE JOINT/BURSA

## 2025-01-09 NOTE — PROGRESS NOTES
Subjective    Mr. Garcia is 84 y.o. male    Chief Complaint: Renal stone    History of Present Illness  Patient presents for follow-up to discuss CT scan abdomen pelvis without contrast.  Patient has been having recurrent urinary tract infections and has burning with urination and frequency.  Patient also had history of kidney stones.  CT scan shows horseshoe kidney he has two 4 mm left-sided renal calculi that are nonobstructing.  There is moderate right sided hydronephrosis down to the UVJ but no stone in the right ureter.  There is a large 3.3 cm circular stone within the bladder.  Prostate is enlarged.  Bladder scan at last visit was 105 ml.  The following portions of the patient's history were reviewed and updated as appropriate: allergies, current medications, past family history, past medical history, past social history, past surgical history and problem list.    Review of Systems      Current Outpatient Medications:     acetaminophen (TYLENOL) 325 MG tablet, Take 2 tablets by mouth Every 4 (Four) Hours As Needed for Mild Pain., Disp: , Rfl:     apixaban (ELIQUIS) 2.5 MG tablet tablet, Take 1 tablet by mouth Every 12 (Twelve) Hours. Indications: Prevention of Unwanted Clot in Veins, Disp: , Rfl:     bisacodyl (DULCOLAX) 10 MG suppository, Insert 1 suppository into the rectum Daily As Needed for Constipation., Disp: , Rfl:     bisacodyl (DULCOLAX) 5 MG EC tablet, Take 1 tablet by mouth Daily As Needed for Constipation., Disp: , Rfl:     famotidine (PEPCID) 20 MG tablet, Take 1 tablet by mouth Daily., Disp: , Rfl:     HYDROcodone-acetaminophen (NORCO) 7.5-325 MG per tablet, Take 1 tablet by mouth Every 4 (Four) Hours As Needed for Moderate Pain., Disp: 18 tablet, Rfl: 0    tamsulosin (FLOMAX) 0.4 MG capsule 24 hr capsule, Take 1 capsule by mouth Every Night for 30 days., Disp: , Rfl:     phenazopyridine (PYRIDIUM) 100 MG tablet, Take 1 tablet by mouth 3 (Three) Times a Day As Needed for Bladder Spasms., Disp:  "20 tablet, Rfl: 0    Vibegron (Gemtesa) 75 MG tablet, Take 1 tablet by mouth Daily for 42 days., Disp: 42 tablet, Rfl: 0    Past Medical History:   Diagnosis Date    Dementia     GERD (gastroesophageal reflux disease)     Liver disorder        Past Surgical History:   Procedure Laterality Date    HEMORRHOIDECTOMY      HERNIA REPAIR      HIP TROCHANTERIC NAILING WITH INTRAMEDULLARY HIP SCREW Right 8/30/2024    Procedure: HIP TROCHANTERIC NAILING SHORT WITH INTRAMEDULLARY HIP SCREW;  Surgeon: Arcenio Chandra MD;  Location: [facility] OR;  Service: Orthopedics;  Laterality: Right;       Social History     Socioeconomic History    Marital status:    Tobacco Use    Smoking status: Former     Types: Cigarettes, Pipe    Smokeless tobacco: Never   Vaping Use    Vaping status: Never Used   Substance and Sexual Activity    Alcohol use: No    Drug use: Yes     Types: Amphetamines     Comment: Quit 40 years ago    Sexual activity: Defer       History reviewed. No pertinent family history.    Objective    Temp 98.2 °F (36.8 °C)   Ht 180.3 cm (71\")   Wt 97.5 kg (215 lb)   BMI 29.99 kg/m²     Physical Exam        Assessment and Plan    Diagnoses and all orders for this visit:    1. Renal stone (Primary)  -     Cancel: POC Urinalysis Dipstick, Multipro  -     CT Abdomen Pelvis Without Contrast; Future    2. Bladder stone  -     CT Abdomen Pelvis Without Contrast; Future    3. Recurrent UTI  -     CT Abdomen Pelvis Without Contrast; Future  -     phenazopyridine (PYRIDIUM) 100 MG tablet; Take 1 tablet by mouth 3 (Three) Times a Day As Needed for Bladder Spasms.  Dispense: 20 tablet; Refill: 0    4. Frequency of urination  -     Vibegron (Gemtesa) 75 MG tablet; Take 1 tablet by mouth Daily for 42 days.  Dispense: 42 tablet; Refill: 0      Voided in the lobby prior to being seen so was not able to give his urine specimen.  Treat burning with Pyridium.  He can take as needed.    Also has worsening urgency and frequency I gave " him samples of Gemtesa if they are effective he will call we can call in a prescription.    Regarding the CT scan findings of the large bladder stone I discussed with Dr. Ramírez and given the fact he has had the acute right femur fracture that required surgery.  This would make treating the bladder stone risk for further injury of the right femur this close to surgery as he would have to be put in lithotomy position.    Also he is obese and it makes access to into the bladder difficult.    For now we will just hold off on any intervention he will follow-up in 3 months with CT scan to assess for any interval change.

## 2025-01-16 ENCOUNTER — HOSPITAL ENCOUNTER (OUTPATIENT)
Dept: CT IMAGING | Facility: HOSPITAL | Age: 85
Discharge: HOME | End: 2025-01-16
Admitting: PHYSICIAN ASSISTANT
Payer: MEDICARE

## 2025-01-16 DIAGNOSIS — Z87.442 HISTORY OF KIDNEY STONES: ICD-10-CM

## 2025-01-16 DIAGNOSIS — N39.0 FREQUENT UTI: ICD-10-CM

## 2025-01-16 PROCEDURE — 74176 CT ABD & PELVIS W/O CONTRAST: CPT

## 2025-01-17 ENCOUNTER — OFFICE VISIT (OUTPATIENT)
Dept: UROLOGY | Facility: CLINIC | Age: 85
End: 2025-01-17
Payer: MEDICARE

## 2025-01-17 VITALS — HEIGHT: 71 IN | BODY MASS INDEX: 30.1 KG/M2 | TEMPERATURE: 98.2 F | WEIGHT: 215 LBS

## 2025-01-17 DIAGNOSIS — N21.0 BLADDER STONE: ICD-10-CM

## 2025-01-17 DIAGNOSIS — N39.0 RECURRENT UTI: ICD-10-CM

## 2025-01-17 DIAGNOSIS — N20.0 RENAL STONE: Primary | ICD-10-CM

## 2025-01-17 DIAGNOSIS — R35.0 FREQUENCY OF URINATION: ICD-10-CM

## 2025-01-17 RX ORDER — VIBEGRON 75 MG/1
1 TABLET, FILM COATED ORAL DAILY
Qty: 42 TABLET | Refills: 0 | COMMUNITY
Start: 2025-01-17 | End: 2025-02-28

## 2025-01-17 RX ORDER — PHENAZOPYRIDINE HYDROCHLORIDE 100 MG/1
100 TABLET, FILM COATED ORAL 3 TIMES DAILY PRN
Qty: 20 TABLET | Refills: 0 | Status: SHIPPED | OUTPATIENT
Start: 2025-01-17

## 2025-01-22 ENCOUNTER — APPOINTMENT (OUTPATIENT)
Dept: GENERAL RADIOLOGY | Facility: HOSPITAL | Age: 85
DRG: 178 | End: 2025-01-22
Payer: MEDICARE

## 2025-01-22 ENCOUNTER — HOSPITAL ENCOUNTER (INPATIENT)
Facility: HOSPITAL | Age: 85
LOS: 6 days | Discharge: NURSING, REHAB OR LONG-TERM CARE | DRG: 178 | End: 2025-01-28
Attending: FAMILY MEDICINE | Admitting: FAMILY MEDICINE
Payer: MEDICARE

## 2025-01-22 DIAGNOSIS — R06.02 SHORTNESS OF BREATH: Primary | ICD-10-CM

## 2025-01-22 DIAGNOSIS — J11.1 INFLUENZA: ICD-10-CM

## 2025-01-22 DIAGNOSIS — N39.0 FREQUENT UTI: ICD-10-CM

## 2025-01-22 DIAGNOSIS — R68.89 DECREASED FUNCTIONAL ACTIVITY TOLERANCE: ICD-10-CM

## 2025-01-22 DIAGNOSIS — U07.1 COVID-19: ICD-10-CM

## 2025-01-22 PROBLEM — N18.32 STAGE 3B CHRONIC KIDNEY DISEASE: Status: ACTIVE | Noted: 2025-01-22

## 2025-01-22 PROBLEM — J10.1 INFLUENZA A: Status: ACTIVE | Noted: 2025-01-22

## 2025-01-22 PROBLEM — R09.02 HYPOXIA: Status: ACTIVE | Noted: 2025-01-22

## 2025-01-22 LAB
ANION GAP SERPL CALCULATED.3IONS-SCNC: 14 MMOL/L (ref 5–15)
ARTERIAL PATENCY WRIST A: POSITIVE
ATMOSPHERIC PRESS: 758 MMHG
B PARAPERT DNA SPEC QL NAA+PROBE: NOT DETECTED
B PERT DNA SPEC QL NAA+PROBE: NOT DETECTED
BASE EXCESS BLDA CALC-SCNC: -7.8 MMOL/L (ref 0–2)
BASOPHILS # BLD MANUAL: 0 10*3/MM3 (ref 0–0.2)
BASOPHILS NFR BLD MANUAL: 0 % (ref 0–1.5)
BDY SITE: ABNORMAL
BODY TEMPERATURE: 37
BUN SERPL-MCNC: 31 MG/DL (ref 8–23)
BUN/CREAT SERPL: 17.3 (ref 7–25)
BURR CELLS BLD QL SMEAR: ABNORMAL
C PNEUM DNA NPH QL NAA+NON-PROBE: NOT DETECTED
CALCIUM SPEC-SCNC: 8.6 MG/DL (ref 8.6–10.5)
CHLORIDE SERPL-SCNC: 108 MMOL/L (ref 98–107)
CO2 SERPL-SCNC: 17 MMOL/L (ref 22–29)
COHGB MFR BLD: 0.7 % (ref 0–5)
CREAT SERPL-MCNC: 1.79 MG/DL (ref 0.76–1.27)
CRP SERPL-MCNC: 11.77 MG/DL (ref 0–0.5)
D-LACTATE SERPL-SCNC: 2 MMOL/L (ref 0.5–2)
D-LACTATE SERPL-SCNC: 2.9 MMOL/L (ref 0.5–2)
DEPRECATED RDW RBC AUTO: 49.3 FL (ref 37–54)
EGFRCR SERPLBLD CKD-EPI 2021: 36.9 ML/MIN/1.73
EOSINOPHIL # BLD MANUAL: 0 10*3/MM3 (ref 0–0.4)
EOSINOPHIL NFR BLD MANUAL: 0 % (ref 0.3–6.2)
ERYTHROCYTE [DISTWIDTH] IN BLOOD BY AUTOMATED COUNT: 14.6 % (ref 12.3–15.4)
FLUAV H3 RNA NPH QL NAA+PROBE: DETECTED
FLUBV RNA ISLT QL NAA+PROBE: NOT DETECTED
GAS FLOW AIRWAY: 1 LPM
GEN 5 1HR TROPONIN T REFLEX: 26 NG/L
GLUCOSE SERPL-MCNC: 146 MG/DL (ref 65–99)
HADV DNA SPEC NAA+PROBE: NOT DETECTED
HCO3 BLDA-SCNC: 16.7 MMOL/L (ref 20–26)
HCOV 229E RNA SPEC QL NAA+PROBE: NOT DETECTED
HCOV HKU1 RNA SPEC QL NAA+PROBE: NOT DETECTED
HCOV NL63 RNA SPEC QL NAA+PROBE: NOT DETECTED
HCOV OC43 RNA SPEC QL NAA+PROBE: NOT DETECTED
HCT VFR BLD AUTO: 33.9 % (ref 37.5–51)
HCT VFR BLD CALC: 31 % (ref 38–51)
HGB BLD-MCNC: 10.9 G/DL (ref 13–17.7)
HGB BLDA-MCNC: 10.1 G/DL (ref 14–18)
HMPV RNA NPH QL NAA+NON-PROBE: NOT DETECTED
HPIV1 RNA ISLT QL NAA+PROBE: NOT DETECTED
HPIV2 RNA SPEC QL NAA+PROBE: NOT DETECTED
HPIV3 RNA NPH QL NAA+PROBE: NOT DETECTED
HPIV4 P GENE NPH QL NAA+PROBE: NOT DETECTED
L PNEUMO1 AG UR QL IA: NEGATIVE
LYMPHOCYTES # BLD MANUAL: 0.18 10*3/MM3 (ref 0.7–3.1)
LYMPHOCYTES NFR BLD MANUAL: 3 % (ref 5–12)
Lab: ABNORMAL
M PNEUMO IGG SER IA-ACNC: NOT DETECTED
MAGNESIUM SERPL-MCNC: 2 MG/DL (ref 1.6–2.4)
MCH RBC QN AUTO: 29.4 PG (ref 26.6–33)
MCHC RBC AUTO-ENTMCNC: 32.2 G/DL (ref 31.5–35.7)
MCV RBC AUTO: 91.4 FL (ref 79–97)
METHGB BLD QL: 0.4 % (ref 0–3)
MODALITY: ABNORMAL
MONOCYTES # BLD: 0.11 10*3/MM3 (ref 0.1–0.9)
NEUTROPHILS # BLD AUTO: 3.37 10*3/MM3 (ref 1.7–7)
NEUTROPHILS NFR BLD MANUAL: 71.7 % (ref 42.7–76)
NEUTS BAND NFR BLD MANUAL: 20.2 % (ref 0–5)
NEUTS VAC BLD QL SMEAR: ABNORMAL
NT-PROBNP SERPL-MCNC: 483.7 PG/ML (ref 0–1800)
OVALOCYTES BLD QL SMEAR: ABNORMAL
OXYHGB MFR BLDV: 97.1 % (ref 94–99)
PCO2 BLDA: 30 MM HG (ref 35–45)
PCO2 TEMP ADJ BLD: 30 MM HG (ref 35–45)
PH BLDA: 7.36 PH UNITS (ref 7.35–7.45)
PH, TEMP CORRECTED: 7.36 PH UNITS (ref 7.35–7.45)
PLATELET # BLD AUTO: 99 10*3/MM3 (ref 140–450)
PMV BLD AUTO: 10.7 FL (ref 6–12)
PO2 BLDA: 102 MM HG (ref 83–108)
PO2 TEMP ADJ BLD: 102 MM HG (ref 83–108)
POIKILOCYTOSIS BLD QL SMEAR: ABNORMAL
POLYCHROMASIA BLD QL SMEAR: ABNORMAL
POTASSIUM BLDA-SCNC: 3.5 MMOL/L (ref 3.5–5.2)
POTASSIUM SERPL-SCNC: 3.6 MMOL/L (ref 3.5–5.2)
PROCALCITONIN SERPL-MCNC: 0.28 NG/ML (ref 0–0.25)
RBC # BLD AUTO: 3.71 10*6/MM3 (ref 4.14–5.8)
RHINOVIRUS RNA SPEC NAA+PROBE: NOT DETECTED
RSV RNA NPH QL NAA+NON-PROBE: NOT DETECTED
S PNEUM AG SPEC QL LA: NEGATIVE
SAO2 % BLDCOA: 98.2 % (ref 94–99)
SARS-COV-2 RNA RESP QL NAA+PROBE: DETECTED
SCHISTOCYTES BLD QL SMEAR: ABNORMAL
SMALL PLATELETS BLD QL SMEAR: ABNORMAL
SODIUM BLDA-SCNC: 141 MMOL/L (ref 136–145)
SODIUM SERPL-SCNC: 139 MMOL/L (ref 136–145)
TROPONIN T % DELTA: -4
TROPONIN T NUMERIC DELTA: -1 NG/L
TROPONIN T SERPL HS-MCNC: 27 NG/L
VARIANT LYMPHS NFR BLD MANUAL: 1 % (ref 0–5)
VARIANT LYMPHS NFR BLD MANUAL: 4 % (ref 19.6–45.3)
VENTILATOR MODE: ABNORMAL
WBC NRBC COR # BLD AUTO: 3.67 10*3/MM3 (ref 3.4–10.8)

## 2025-01-22 PROCEDURE — 94761 N-INVAS EAR/PLS OXIMETRY MLT: CPT

## 2025-01-22 PROCEDURE — 36600 WITHDRAWAL OF ARTERIAL BLOOD: CPT

## 2025-01-22 PROCEDURE — 84145 PROCALCITONIN (PCT): CPT | Performed by: NURSE PRACTITIONER

## 2025-01-22 PROCEDURE — 94799 UNLISTED PULMONARY SVC/PX: CPT

## 2025-01-22 PROCEDURE — 82805 BLOOD GASES W/O2 SATURATION: CPT

## 2025-01-22 PROCEDURE — 71045 X-RAY EXAM CHEST 1 VIEW: CPT

## 2025-01-22 PROCEDURE — 94640 AIRWAY INHALATION TREATMENT: CPT

## 2025-01-22 PROCEDURE — 93010 ELECTROCARDIOGRAM REPORT: CPT | Performed by: INTERNAL MEDICINE

## 2025-01-22 PROCEDURE — 99285 EMERGENCY DEPT VISIT HI MDM: CPT

## 2025-01-22 PROCEDURE — 25010000002 DEXAMETHASONE PER 1 MG: Performed by: NURSE PRACTITIONER

## 2025-01-22 PROCEDURE — 87205 SMEAR GRAM STAIN: CPT | Performed by: NURSE PRACTITIONER

## 2025-01-22 PROCEDURE — 83880 ASSAY OF NATRIURETIC PEPTIDE: CPT | Performed by: FAMILY MEDICINE

## 2025-01-22 PROCEDURE — 87070 CULTURE OTHR SPECIMN AEROBIC: CPT | Performed by: NURSE PRACTITIONER

## 2025-01-22 PROCEDURE — 84484 ASSAY OF TROPONIN QUANT: CPT | Performed by: FAMILY MEDICINE

## 2025-01-22 PROCEDURE — 25010000002 METHYLPREDNISOLONE PER 125 MG: Performed by: FAMILY MEDICINE

## 2025-01-22 PROCEDURE — 25010000002 ENOXAPARIN PER 10 MG: Performed by: NURSE PRACTITIONER

## 2025-01-22 PROCEDURE — 36415 COLL VENOUS BLD VENIPUNCTURE: CPT | Performed by: FAMILY MEDICINE

## 2025-01-22 PROCEDURE — 83735 ASSAY OF MAGNESIUM: CPT | Performed by: FAMILY MEDICINE

## 2025-01-22 PROCEDURE — 80048 BASIC METABOLIC PNL TOTAL CA: CPT | Performed by: FAMILY MEDICINE

## 2025-01-22 PROCEDURE — 93005 ELECTROCARDIOGRAM TRACING: CPT | Performed by: FAMILY MEDICINE

## 2025-01-22 PROCEDURE — 87449 NOS EACH ORGANISM AG IA: CPT | Performed by: NURSE PRACTITIONER

## 2025-01-22 PROCEDURE — 85025 COMPLETE CBC W/AUTO DIFF WBC: CPT | Performed by: FAMILY MEDICINE

## 2025-01-22 PROCEDURE — 25010000002 CEFTRIAXONE PER 250 MG: Performed by: STUDENT IN AN ORGANIZED HEALTH CARE EDUCATION/TRAINING PROGRAM

## 2025-01-22 PROCEDURE — 86140 C-REACTIVE PROTEIN: CPT | Performed by: NURSE PRACTITIONER

## 2025-01-22 PROCEDURE — 85007 BL SMEAR W/DIFF WBC COUNT: CPT | Performed by: FAMILY MEDICINE

## 2025-01-22 PROCEDURE — 82375 ASSAY CARBOXYHB QUANT: CPT

## 2025-01-22 PROCEDURE — 0202U NFCT DS 22 TRGT SARS-COV-2: CPT | Performed by: FAMILY MEDICINE

## 2025-01-22 PROCEDURE — 94664 DEMO&/EVAL PT USE INHALER: CPT

## 2025-01-22 PROCEDURE — 83050 HGB METHEMOGLOBIN QUAN: CPT

## 2025-01-22 PROCEDURE — 83605 ASSAY OF LACTIC ACID: CPT | Performed by: FAMILY MEDICINE

## 2025-01-22 PROCEDURE — 87641 MR-STAPH DNA AMP PROBE: CPT | Performed by: STUDENT IN AN ORGANIZED HEALTH CARE EDUCATION/TRAINING PROGRAM

## 2025-01-22 RX ORDER — ACETAMINOPHEN 160 MG/5ML
650 SOLUTION ORAL EVERY 4 HOURS PRN
Status: DISCONTINUED | OUTPATIENT
Start: 2025-01-22 | End: 2025-01-28 | Stop reason: HOSPADM

## 2025-01-22 RX ORDER — IPRATROPIUM BROMIDE AND ALBUTEROL SULFATE 2.5; .5 MG/3ML; MG/3ML
3 SOLUTION RESPIRATORY (INHALATION) ONCE
Status: COMPLETED | OUTPATIENT
Start: 2025-01-22 | End: 2025-01-22

## 2025-01-22 RX ORDER — SODIUM CHLORIDE 0.9 % (FLUSH) 0.9 %
10 SYRINGE (ML) INJECTION EVERY 12 HOURS SCHEDULED
Status: DISCONTINUED | OUTPATIENT
Start: 2025-01-22 | End: 2025-01-28 | Stop reason: HOSPADM

## 2025-01-22 RX ORDER — IPRATROPIUM BROMIDE AND ALBUTEROL SULFATE 2.5; .5 MG/3ML; MG/3ML
3 SOLUTION RESPIRATORY (INHALATION) 4 TIMES DAILY
COMMUNITY
End: 2025-01-28 | Stop reason: HOSPADM

## 2025-01-22 RX ORDER — DEXAMETHASONE SODIUM PHOSPHATE 10 MG/ML
6 INJECTION INTRAMUSCULAR; INTRAVENOUS DAILY
Status: DISCONTINUED | OUTPATIENT
Start: 2025-01-22 | End: 2025-01-28 | Stop reason: HOSPADM

## 2025-01-22 RX ORDER — SODIUM CHLORIDE 9 MG/ML
40 INJECTION, SOLUTION INTRAVENOUS AS NEEDED
Status: DISCONTINUED | OUTPATIENT
Start: 2025-01-22 | End: 2025-01-28 | Stop reason: HOSPADM

## 2025-01-22 RX ORDER — BISACODYL 10 MG
10 SUPPOSITORY, RECTAL RECTAL DAILY PRN
Status: DISCONTINUED | OUTPATIENT
Start: 2025-01-22 | End: 2025-01-28 | Stop reason: HOSPADM

## 2025-01-22 RX ORDER — ONDANSETRON 2 MG/ML
4 INJECTION INTRAMUSCULAR; INTRAVENOUS EVERY 6 HOURS PRN
Status: DISCONTINUED | OUTPATIENT
Start: 2025-01-22 | End: 2025-01-28 | Stop reason: HOSPADM

## 2025-01-22 RX ORDER — CARVEDILOL 3.12 MG/1
3.12 TABLET ORAL 2 TIMES DAILY WITH MEALS
Status: DISCONTINUED | OUTPATIENT
Start: 2025-01-22 | End: 2025-01-28 | Stop reason: HOSPADM

## 2025-01-22 RX ORDER — POLYETHYLENE GLYCOL 3350 17 G/17G
17 POWDER, FOR SOLUTION ORAL DAILY PRN
Status: DISCONTINUED | OUTPATIENT
Start: 2025-01-22 | End: 2025-01-28 | Stop reason: HOSPADM

## 2025-01-22 RX ORDER — TAMSULOSIN HYDROCHLORIDE 0.4 MG/1
0.4 CAPSULE ORAL NIGHTLY
Status: DISCONTINUED | OUTPATIENT
Start: 2025-01-22 | End: 2025-01-28 | Stop reason: HOSPADM

## 2025-01-22 RX ORDER — ENOXAPARIN SODIUM 100 MG/ML
40 INJECTION SUBCUTANEOUS EVERY 24 HOURS
Status: DISCONTINUED | OUTPATIENT
Start: 2025-01-22 | End: 2025-01-28 | Stop reason: HOSPADM

## 2025-01-22 RX ORDER — SODIUM CHLORIDE 0.9 % (FLUSH) 0.9 %
10 SYRINGE (ML) INJECTION AS NEEDED
Status: DISCONTINUED | OUTPATIENT
Start: 2025-01-22 | End: 2025-01-28 | Stop reason: HOSPADM

## 2025-01-22 RX ORDER — AZITHROMYCIN 250 MG/1
500 TABLET, FILM COATED ORAL
Status: COMPLETED | OUTPATIENT
Start: 2025-01-22 | End: 2025-01-24

## 2025-01-22 RX ORDER — NYSTATIN 100000 [USP'U]/G
100000 POWDER TOPICAL 3 TIMES DAILY
Status: ON HOLD | COMMUNITY
End: 2025-01-28

## 2025-01-22 RX ORDER — ACETAMINOPHEN 325 MG/1
650 TABLET ORAL EVERY 4 HOURS PRN
Status: DISCONTINUED | OUTPATIENT
Start: 2025-01-22 | End: 2025-01-28 | Stop reason: HOSPADM

## 2025-01-22 RX ORDER — AMOXICILLIN 250 MG
2 CAPSULE ORAL 2 TIMES DAILY PRN
Status: DISCONTINUED | OUTPATIENT
Start: 2025-01-22 | End: 2025-01-28 | Stop reason: HOSPADM

## 2025-01-22 RX ORDER — SODIUM CHLORIDE 0.9 % (FLUSH) 0.9 %
10 SYRINGE (ML) INJECTION AS NEEDED
Status: DISCONTINUED | OUTPATIENT
Start: 2025-01-22 | End: 2025-01-23

## 2025-01-22 RX ORDER — ONDANSETRON 4 MG/1
4 TABLET, ORALLY DISINTEGRATING ORAL EVERY 6 HOURS PRN
Status: DISCONTINUED | OUTPATIENT
Start: 2025-01-22 | End: 2025-01-28 | Stop reason: HOSPADM

## 2025-01-22 RX ORDER — METHYLPREDNISOLONE SODIUM SUCCINATE 125 MG/2ML
125 INJECTION, POWDER, LYOPHILIZED, FOR SOLUTION INTRAMUSCULAR; INTRAVENOUS ONCE
Status: COMPLETED | OUTPATIENT
Start: 2025-01-22 | End: 2025-01-22

## 2025-01-22 RX ORDER — BISACODYL 5 MG/1
5 TABLET, DELAYED RELEASE ORAL DAILY PRN
Status: DISCONTINUED | OUTPATIENT
Start: 2025-01-22 | End: 2025-01-28 | Stop reason: HOSPADM

## 2025-01-22 RX ADMIN — AZITHROMYCIN 500 MG: 250 TABLET, FILM COATED ORAL at 22:24

## 2025-01-22 RX ADMIN — ACETAMINOPHEN 650 MG: 325 TABLET ORAL at 18:17

## 2025-01-22 RX ADMIN — SODIUM CHLORIDE 1000 MG: 900 INJECTION INTRAVENOUS at 22:24

## 2025-01-22 RX ADMIN — DEXAMETHASONE SODIUM PHOSPHATE 6 MG: 10 INJECTION INTRAMUSCULAR; INTRAVENOUS at 18:12

## 2025-01-22 RX ADMIN — CARVEDILOL 3.12 MG: 3.12 TABLET, FILM COATED ORAL at 18:12

## 2025-01-22 RX ADMIN — Medication 10 ML: at 21:21

## 2025-01-22 RX ADMIN — TAMSULOSIN HYDROCHLORIDE 0.4 MG: 0.4 CAPSULE ORAL at 21:21

## 2025-01-22 RX ADMIN — IPRATROPIUM BROMIDE 0.5 MG: 0.5 SOLUTION RESPIRATORY (INHALATION) at 20:07

## 2025-01-22 RX ADMIN — ENOXAPARIN SODIUM 40 MG: 100 INJECTION SUBCUTANEOUS at 18:12

## 2025-01-22 RX ADMIN — DOCUSATE SODIUM 50 MG AND SENNOSIDES 8.6 MG 2 TABLET: 8.6; 5 TABLET, FILM COATED ORAL at 18:12

## 2025-01-22 RX ADMIN — METHYLPREDNISOLONE SODIUM SUCCINATE 125 MG: 125 INJECTION, POWDER, FOR SOLUTION INTRAMUSCULAR; INTRAVENOUS at 12:09

## 2025-01-22 RX ADMIN — IPRATROPIUM BROMIDE AND ALBUTEROL SULFATE 3 ML: .5; 3 SOLUTION RESPIRATORY (INHALATION) at 11:37

## 2025-01-22 NOTE — H&P
[facility] Medicine Services  HISTORY AND PHYSICAL    Date of Admission: 1/22/2025  Primary Care Physician: Dani Farfan MD    Subjective   Primary Historian: patient    Chief Complaint: shortness of breath    History of Present Illness  Mr. Garcia is an 84-year-old male who presented to [facility] with 1 week history of worsening shortness of breath and cough.  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  He has resided at [place] [facility] since September 2024.  He is wheelchair-bound at baseline and can transfer.  His daughter and batool are at bedside and assisted in history of present illness.  They tell me that sometime last week he began feeling unwell with cough, shortness of breath and some nausea. He has had a productive cough with yellow sputum. No fever or chills.  He does not wear oxygen normally.  He has had some nausea and lower generalized abdominal pain. In the ED, chest x-ray revealed rounded area of consolidation in the medial right lung base may represent pneumonia or round atelectasis. This appears similar to the previous abdominal CT dated on 1/16.  WBC normal.  Comprehensive respiratory panel positive for COVID-19 and influenza A.  He was given breathing treatment and Solu-Medrol 125 mg IV x 1.  He will be admitted to the hospitalist for further evaluation and management.    Review of Systems   Otherwise complete ROS reviewed and negative except as mentioned in the HPI.    Past Medical History:   Past Medical History:   Diagnosis Date    Dementia     GERD (gastroesophageal reflux disease)     Liver disorder      Past Surgical History:  Past Surgical History:   Procedure Laterality Date    HEMORRHOIDECTOMY      HERNIA REPAIR      HIP TROCHANTERIC NAILING WITH INTRAMEDULLARY HIP SCREW Right 8/30/2024    Procedure: HIP TROCHANTERIC NAILING SHORT WITH INTRAMEDULLARY HIP  ROMERO;  Surgeon: Arcenio Chandra MD;  Location: [facility];  Service: Orthopedics;  Laterality: Right;     Social History:  reports that he has quit smoking. His smoking use included cigarettes and pipe. He has never used smokeless tobacco. He reports that he does not currently use drugs after having used the following drugs: Amphetamines. He reports that he does not drink alcohol.    Family History: family history is not on file.       Allergies:  Allergies   Allergen Reactions    Contrast Dye (Echo Or Unknown Ct/Mr)     Penicillins        Medications:  Prior to Admission medications    Medication Sig Start Date End Date Taking? Authorizing Provider   apixaban (ELIQUIS) 2.5 MG tablet tablet Take 1 tablet by mouth Every 12 (Twelve) Hours. Indications: Prevention of Unwanted Clot in Veins 9/4/24  Yes Mirta Branch DO   bisacodyl (DULCOLAX) 10 MG suppository Insert 1 suppository into the rectum Daily As Needed for Constipation. 9/4/24  Yes Mirta Branch DO   bisacodyl (DULCOLAX) 5 MG EC tablet Take 1 tablet by mouth Daily As Needed for Constipation.   Yes Shu Correa MD   famotidine (PEPCID) 20 MG tablet Take 1 tablet by mouth Daily. 9/5/24  Yes Mirta Branch DO   HYDROcodone-acetaminophen (NORCO) 7.5-325 MG per tablet Take 1 tablet by mouth Every 4 (Four) Hours As Needed for Moderate Pain. 9/4/24  Yes Mirta Branch DO   ipratropium-albuterol (DUO-NEB) 0.5-2.5 mg/3 ml nebulizer Take 3 mL by nebulization Every 6 (Six) Hours As Needed for Wheezing or Shortness of Air.   Yes ProviderShu MD   nystatin (MYCOSTATIN) 374251 UNIT/GM powder Apply 100,000 Applications topically to the appropriate area as directed 3 (Three) Times a Day.   Yes Shu Correa MD   tamsulosin (FLOMAX) 0.4 MG capsule 24 hr capsule Take 1 capsule by mouth Every Night for 30 days. 1/2/25 2/1/25 Yes Abelardo Panchal PA   Vibegron (Gemtesa) 75 MG tablet Take 1 tablet by mouth Daily for 42 days.  "1/17/25 2/28/25 Yes Abelardo Panchal PA   acetaminophen (TYLENOL) 325 MG tablet Take 2 tablets by mouth Every 4 (Four) Hours As Needed for Mild Pain. 9/4/24   Mirta Branch,    phenazopyridine (PYRIDIUM) 100 MG tablet Take 1 tablet by mouth 3 (Three) Times a Day As Needed for Bladder Spasms. 1/17/25   Abelardo Panchal PA     I have utilized all available immediate resources to obtain, update, or review the patient's current medications (including all prescriptions, over-the-counter products, herbals, cannabis/cannabidiol products, and vitamin/mineral/dietary (nutritional) supplements).    Objective     Vital Signs: /72   Pulse 118   Temp 98 °F (36.7 °C) (Oral)   Resp (!) 31   Ht 180.3 cm (70.98\")   Wt 87.2 kg (192 lb 3.2 oz)   SpO2 97%   BMI 26.82 kg/m²   Physical Exam  Vitals reviewed.   Constitutional:       General: He is not in acute distress.     Appearance: He is ill-appearing. He is not toxic-appearing.   HENT:      Head: Normocephalic and atraumatic.      Mouth/Throat:      Mouth: Mucous membranes are moist.      Pharynx: Oropharynx is clear.   Eyes:      Extraocular Movements: Extraocular movements intact.      Conjunctiva/sclera: Conjunctivae normal.      Pupils: Pupils are equal, round, and reactive to light.   Cardiovascular:      Rate and Rhythm: Regular rhythm. Tachycardia present.      Pulses: Normal pulses.   Pulmonary:      Effort: Pulmonary effort is normal. No respiratory distress.      Breath sounds: Normal breath sounds.   Abdominal:      General: Bowel sounds are normal. There is no distension.      Palpations: Abdomen is soft.      Tenderness: There is no abdominal tenderness.   Musculoskeletal:         General: No swelling or tenderness. Normal range of motion.      Cervical back: Normal range of motion and neck supple. No muscular tenderness.   Skin:     General: Skin is warm and dry.      Findings: No erythema or rash.   Neurological:      General: No focal deficit " "present.      Mental Status: He is alert and oriented to person, place, and time.      Cranial Nerves: No cranial nerve deficit.      Motor: No weakness.   Psychiatric:         Mood and Affect: Mood normal.         Behavior: Behavior normal.        Results Reviewed:  Lab Results (last 24 hours)       Procedure Component Value Units Date/Time    Procalcitonin [[id number]]  (Abnormal) Collected: 01/22/25 1247    Specimen: Blood Updated: 01/22/25 1442     Procalcitonin 0.28 ng/mL     Narrative:      As a Marker for Sepsis (Non-Neonates):    1. <0.5 ng/mL represents a low risk of severe sepsis and/or septic shock.  2. >2 ng/mL represents a high risk of severe sepsis and/or septic shock.    As a Marker for Lower Respiratory Tract Infections that require antibiotic therapy:    PCT on Admission    Antibiotic Therapy       6-12 Hrs later    >0.5                Strongly Recommended  >0.25 - <0.5        Recommended   0.1 - 0.25          Discouraged              Remeasure/reassess PCT  <0.1                Strongly Discouraged     Remeasure/reassess PCT    As 28 day mortality risk marker: \"Change in Procalcitonin Result\" (>80% or <=80%) if Day 0 (or Day 1) and Day 4 values are available. Refer to http://www.[employer]-pct-calculator.com    Change in PCT <=80%  A decrease of PCT levels below or equal to 80% defines a positive change in PCT test result representing a higher risk for 28-day all-cause mortality of patients diagnosed with severe sepsis for septic shock.    Change in PCT >80%  A decrease of PCT levels of more than 80% defines a negative change in PCT result representing a lower risk for 28-day all-cause mortality of patients diagnosed with severe sepsis or septic shock.       High Sensitivity Troponin T 1Hr [[id number]]  (Abnormal) Collected: 01/22/25 1247    Specimen: Blood Updated: 01/22/25 1309     HS Troponin T 26 ng/L      Troponin T Numeric Delta -1 ng/L      Troponin T % Delta -4    Narrative:      High Sensitive " Troponin T Reference Range:  <14.0 ng/L- Negative Female for AMI  <22.0 ng/L- Negative Male for AMI  >=14 - Abnormal Female indicating possible myocardial injury.  >=22 - Abnormal Male indicating possible myocardial injury.   Clinicians would have to utilize clinical acumen, EKG, Troponin, and serial changes to determine if it is an Acute Myocardial Infarction or myocardial injury due to an underlying chronic condition.         Respiratory Panel PCR w/COVID-19(SARS-CoV-2) EDU/EDITH/YUDELKA/PAD/COR/DEJON In-House, NP Swab in UTM/VTM, 2 HR TAT - Swab, Nasopharynx [[id number]]  (Abnormal) Collected: 01/22/25 1136    Specimen: Swab from Nasopharynx Updated: 01/22/25 1258     ADENOVIRUS, PCR Not Detected     Coronavirus 229E Not Detected     Coronavirus HKU1 Not Detected     Coronavirus NL63 Not Detected     Coronavirus OC43 Not Detected     COVID19 Detected     Human Metapneumovirus Not Detected     Human Rhinovirus/Enterovirus Not Detected     Influenza A H3 Detected     Influenza B PCR Not Detected     Parainfluenza Virus 1 Not Detected     Parainfluenza Virus 2 Not Detected     Parainfluenza Virus 3 Not Detected     Parainfluenza Virus 4 Not Detected     RSV, PCR Not Detected     Bordetella pertussis pcr Not Detected     Bordetella parapertussis PCR Not Detected     Chlamydophila pneumoniae PCR Not Detected     Mycoplasma pneumo by PCR Not Detected    Narrative:      In the setting of a positive respiratory panel with a viral infection PLUS a negative procalcitonin without other underlying concern for bacterial infection, consider observing off antibiotics or discontinuation of antibiotics and continue supportive care. If the respiratory panel is positive for atypical bacterial infection (Bordetella pertussis, Chlamydophila pneumoniae, or Mycoplasma pneumoniae), consider antibiotic de-escalation to target atypical bacterial infection.    CBC & Differential [[id number]]  (Abnormal) Collected: 01/22/25 1145    Specimen: Blood  Updated: 01/22/25 1222    Narrative:      The following orders were created for panel order CBC & Differential.  Procedure                               Abnormality         Status                     ---------                               -----------         ------                     CBC Auto Differential[[id number]]        Abnormal            Final result                 Please view results for these tests on the individual orders.    CBC Auto Differential [[id number]]  (Abnormal) Collected: 01/22/25 1145    Specimen: Blood Updated: 01/22/25 1222     WBC 3.67 10*3/mm3      RBC 3.71 10*6/mm3      Hemoglobin 10.9 g/dL      Hematocrit 33.9 %      MCV 91.4 fL      MCH 29.4 pg      MCHC 32.2 g/dL      RDW 14.6 %      RDW-SD 49.3 fl      MPV 10.7 fL      Platelets 99 10*3/mm3     Manual Differential [[id number]]  (Abnormal) Collected: 01/22/25 1145    Specimen: Blood Updated: 01/22/25 1222     Neutrophil % 71.7 %      Lymphocyte % 4.0 %      Monocyte % 3.0 %      Eosinophil % 0.0 %      Basophil % 0.0 %      Bands %  20.2 %      Atypical Lymphocyte % 1.0 %      Neutrophils Absolute 3.37 10*3/mm3      Lymphocytes Absolute 0.18 10*3/mm3      Monocytes Absolute 0.11 10*3/mm3      Eosinophils Absolute 0.00 10*3/mm3      Basophils Absolute 0.00 10*3/mm3      Crenated RBC's Mod/2+     Ovalocytes Slight/1+     Poikilocytes Mod/2+     Polychromasia Slight/1+     Schistocytes Slight/1+     Vacuolated Neutrophils Slight/1+     Platelet Estimate Decreased    Basic Metabolic Panel [[id number]]  (Abnormal) Collected: 01/22/25 1145    Specimen: Blood Updated: 01/22/25 1219     Glucose 146 mg/dL      BUN 31 mg/dL      Creatinine 1.79 mg/dL      Sodium 139 mmol/L      Potassium 3.6 mmol/L      Chloride 108 mmol/L      CO2 17.0 mmol/L      Calcium 8.6 mg/dL      BUN/Creatinine Ratio 17.3     Anion Gap 14.0 mmol/L      eGFR 36.9 mL/min/1.73     Narrative:      GFR Categories in Chronic Kidney Disease (CKD)      GFR Category          GFR  (mL/min/1.73)    Interpretation  G1                     90 or greater         Normal or high (1)  G2                      60-89                Mild decrease (1)  G3a                   45-59                Mild to moderate decrease  G3b                   30-44                Moderate to severe decrease  G4                    15-29                Severe decrease  G5                    14 or less           Kidney failure          (1)In the absence of evidence of kidney disease, neither GFR category G1 or G2 fulfill the criteria for CKD.    eGFR calculation 2021 CKD-EPI creatinine equation, which does not include race as a factor    Magnesium [[id number]]  (Normal) Collected: 01/22/25 1145    Specimen: Blood Updated: 01/22/25 1219     Magnesium 2.0 mg/dL     Lactic Acid, Plasma [[id number]]  (Abnormal) Collected: 01/22/25 1145    Specimen: Blood Updated: 01/22/25 1218     Lactate 2.9 mmol/L     BNP [[id number]]  (Normal) Collected: 01/22/25 1145    Specimen: Blood Updated: 01/22/25 1216     proBNP 483.7 pg/mL     Narrative:      This assay is used as an aid in the diagnosis of individuals suspected of having heart failure. It can be used as an aid in the diagnosis of acute decompensated heart failure (ADHF) in patients presenting with signs and symptoms of ADHF to the emergency department (ED). In addition, NT-proBNP of <300 pg/mL indicates ADHF is not likely.    Age Range Result Interpretation  NT-proBNP Concentration (pg/mL:      <50             Positive            >450                   Gray                 300-450                    Negative             <300    50-75           Positive            >900                  Gray                300-900                  Negative            <300      >75             Positive            >1800                  Gray                300-1800                  Negative            <300    High Sensitivity Troponin T [[id number]]  (Abnormal) Collected: 01/22/25 1145    Specimen: Blood  Updated: 01/22/25 1216     HS Troponin T 27 ng/L     Narrative:      High Sensitive Troponin T Reference Range:  <14.0 ng/L- Negative Female for AMI  <22.0 ng/L- Negative Male for AMI  >=14 - Abnormal Female indicating possible myocardial injury.  >=22 - Abnormal Male indicating possible myocardial injury.   Clinicians would have to utilize clinical acumen, EKG, Troponin, and serial changes to determine if it is an Acute Myocardial Infarction or myocardial injury due to an underlying chronic condition.               Imaging Results (Last 24 Hours)       Procedure Component Value Units Date/Time    XR Chest 1 View [[id number]] Collected: 01/22/25 1150     Updated: 01/22/25 1157    Narrative:      EXAMINATION: XR CHEST 1 VW- 1/22/2025 11:50 AM     HISTORY: Shortness of breath.     REPORT: A frontal view of the chest was obtained.     COMPARISON: Chest x-ray 8/29/2024. CT abdomen and pelvis 1/16/2025.     The lungs are hypoaerated, there is mild central basilar vascular  crowding, normal heart size. Thoracic aortic ectasia is present as  before. There is mild elevation of the right hemidiaphragm. Rounded area  of consolidation in the medial right lung base persists, may represent  rounded atelectasis or pneumonia. There is no pneumothorax or pleural  effusion. No acute osseous abnormality.       Impression:      Rounded area of consolidation in the medial right lung base  may represent pneumonia or round atelectasis. This appears similar to  the previous abdominal CT. Clinical correlation is needed. The lungs are  hypoaerated.     This report was signed and finalized on 1/22/2025 11:54 AM by Dr. Antonino Victor MD.             I have personally reviewed and interpreted the radiology studies and ECG obtained at time of admission.     Assessment / Plan   Assessment:   Active Hospital Problems    Diagnosis     **Hypoxia     Stage 3b chronic kidney disease     COVID-19 virus detected     Influenza A      Treatment  Plan  The patient will be admitted to Dr. Trujillo's service at [facility].     Hypoxia in the setting of COVID-19/influenza A. Chest x-ray revealed rounded area of consolidation in the medial right lung base may represent pneumonia or round atelectasis. This appears similar to the previous abdominal CT dated on 1/16.  WBC normal. Procalcitonin 0.28. Supportive care. IV decadron. Atrovent. Obtain CT chest.     Consult PT/OT.    Lovenox for VTE prophylaxis.    Will need to review and resume home medications as appropriate once medication list updated.    Workup ongoing.    Medical Decision Making  Number and Complexity of problems: 3 acute problems  Differential Diagnosis: As above    Conditions and Status        Condition is worsening.     [facility] Data  External documents reviewed: Prior epic records  Cardiac tracing (EKG, telemetry) interpretation: EKG ordered  Radiology interpretation: interpreted by radiology  Labs reviewed: As above  Any tests that were considered but not ordered: none considered at present     Decision rules/scores evaluated (example [id number], Wells, etc): none considered at present     Discussed with: patient, Dr. Kahn     Care Planning  Shared decision making: Patient is agreeable to ongoing workup and treatment  Code status and discussions: Full code with full intervention    Disposition  Social Determinants of Health that impact treatment or disposition: None  Estimated length of stay is 2-3 days.     I confirmed that the patient's advanced care plan is present, code status is documented, and a surrogate decision maker is listed in the patient's medical record.     The patient's surrogate decision maker is her Miriam Lyle.     The patient was seen and examined by me on 1/22/2025 at 14:30.    Electronically signed by ANAT Barillas, 01/22/25, 15:33 CST.

## 2025-01-22 NOTE — Clinical Note
Level of Care: Remote Telemetry [26]   Diagnosis: Shortness of breath [786.05.ICD-9-CM]   Admitting Physician: MELIDA MYERS [[id number]]   Attending Physician: MELIDA MYERS [[id number]]  
The patient is a 33y Male complaining of nausea.

## 2025-01-22 NOTE — ED PROVIDER NOTES
Subjective   History of Present Illness  84-year-old male presents emergency room complaints of shortness of breath and cough.  Is been going on for few days now.  Patient denies any fevers.  Patient denies any other symptoms at this time.  Patient was sent here from the nursing home.      Review of Systems   Respiratory:  Positive for cough and shortness of breath.    All other systems reviewed and are negative.      Past Medical History:   Diagnosis Date    Dementia     GERD (gastroesophageal reflux disease)     Liver disorder        Allergies   Allergen Reactions    Contrast Dye (Echo Or Unknown Ct/Mr)     Penicillins        Past Surgical History:   Procedure Laterality Date    HEMORRHOIDECTOMY      HERNIA REPAIR      HIP TROCHANTERIC NAILING WITH INTRAMEDULLARY HIP SCREW Right 8/30/2024    Procedure: HIP TROCHANTERIC NAILING SHORT WITH INTRAMEDULLARY HIP SCREW;  Surgeon: Arcenio Chandra MD;  Location: [facility] OR;  Service: Orthopedics;  Laterality: Right;       History reviewed. No pertinent family history.    Social History     Socioeconomic History    Marital status:    Tobacco Use    Smoking status: Former     Types: Cigarettes, Pipe    Smokeless tobacco: Never   Vaping Use    Vaping status: Never Used   Substance and Sexual Activity    Alcohol use: No    Drug use: Not Currently     Types: Amphetamines     Comment: Quit 40 years ago    Sexual activity: Defer           Objective   Physical Exam  Vitals and nursing note reviewed.   Constitutional:       Appearance: He is well-developed.   HENT:      Head: Normocephalic and atraumatic.      Mouth/Throat:      Mouth: Mucous membranes are moist.   Eyes:      Extraocular Movements: Extraocular movements intact.      Pupils: Pupils are equal, round, and reactive to light.   Cardiovascular:      Rate and Rhythm: Normal rate and regular rhythm.      Heart sounds: Normal heart sounds.   Pulmonary:      Effort: Pulmonary effort is normal.      Breath sounds:  Rhonchi present.   Skin:     General: Skin is warm and dry.   Neurological:      General: No focal deficit present.      Mental Status: He is alert and oriented to person, place, and time.   Psychiatric:         Mood and Affect: Mood normal.         Behavior: Behavior normal.         Procedures           ED Course  ED Course as of 01/22/25 1427   Wed Jan 22, 2025   1426 EKG rate 109  Sinus tachycardia [RP]      ED Course User Index  [RP] Bryson Mcnulty MD                                                       Lab Results (last 24 hours)       Procedure Component Value Units Date/Time    Respiratory Panel PCR w/COVID-19(SARS-CoV-2) EDU/EDITH/YUDELKA/PAD/COR/DEJON In-House, NP Swab in UTM/VTM, 2 HR TAT - Swab, Nasopharynx [[id number]]  (Abnormal) Collected: 01/22/25 1136    Specimen: Swab from Nasopharynx Updated: 01/22/25 1258     ADENOVIRUS, PCR Not Detected     Coronavirus 229E Not Detected     Coronavirus HKU1 Not Detected     Coronavirus NL63 Not Detected     Coronavirus OC43 Not Detected     COVID19 Detected     Human Metapneumovirus Not Detected     Human Rhinovirus/Enterovirus Not Detected     Influenza A H3 Detected     Influenza B PCR Not Detected     Parainfluenza Virus 1 Not Detected     Parainfluenza Virus 2 Not Detected     Parainfluenza Virus 3 Not Detected     Parainfluenza Virus 4 Not Detected     RSV, PCR Not Detected     Bordetella pertussis pcr Not Detected     Bordetella parapertussis PCR Not Detected     Chlamydophila pneumoniae PCR Not Detected     Mycoplasma pneumo by PCR Not Detected    Narrative:      In the setting of a positive respiratory panel with a viral infection PLUS a negative procalcitonin without other underlying concern for bacterial infection, consider observing off antibiotics or discontinuation of antibiotics and continue supportive care. If the respiratory panel is positive for atypical bacterial infection (Bordetella pertussis, Chlamydophila pneumoniae, or Mycoplasma pneumoniae),  consider antibiotic de-escalation to target atypical bacterial infection.    CBC & Differential [[id number]]  (Abnormal) Collected: 01/22/25 1145    Specimen: Blood Updated: 01/22/25 1222    Narrative:      The following orders were created for panel order CBC & Differential.  Procedure                               Abnormality         Status                     ---------                               -----------         ------                     CBC Auto Differential[[id number]]        Abnormal            Final result                 Please view results for these tests on the individual orders.    Basic Metabolic Panel [[id number]]  (Abnormal) Collected: 01/22/25 1145    Specimen: Blood Updated: 01/22/25 1219     Glucose 146 mg/dL      BUN 31 mg/dL      Creatinine 1.79 mg/dL      Sodium 139 mmol/L      Potassium 3.6 mmol/L      Chloride 108 mmol/L      CO2 17.0 mmol/L      Calcium 8.6 mg/dL      BUN/Creatinine Ratio 17.3     Anion Gap 14.0 mmol/L      eGFR 36.9 mL/min/1.73     Narrative:      GFR Categories in Chronic Kidney Disease (CKD)      GFR Category          GFR (mL/min/1.73)    Interpretation  G1                     90 or greater         Normal or high (1)  G2                      60-89                Mild decrease (1)  G3a                   45-59                Mild to moderate decrease  G3b                   30-44                Moderate to severe decrease  G4                    15-29                Severe decrease  G5                    14 or less           Kidney failure          (1)In the absence of evidence of kidney disease, neither GFR category G1 or G2 fulfill the criteria for CKD.    eGFR calculation 2021 CKD-EPI creatinine equation, which does not include race as a factor    BNP [[id number]]  (Normal) Collected: 01/22/25 1145    Specimen: Blood Updated: 01/22/25 1216     proBNP 483.7 pg/mL     Narrative:      This assay is used as an aid in the diagnosis of individuals suspected of having heart  failure. It can be used as an aid in the diagnosis of acute decompensated heart failure (ADHF) in patients presenting with signs and symptoms of ADHF to the emergency department (ED). In addition, NT-proBNP of <300 pg/mL indicates ADHF is not likely.    Age Range Result Interpretation  NT-proBNP Concentration (pg/mL:      <50             Positive            >450                   Gray                 300-450                    Negative             <300    50-75           Positive            >900                  Gray                300-900                  Negative            <300      >75             Positive            >1800                  Gray                300-1800                  Negative            <300    High Sensitivity Troponin T [[id number]]  (Abnormal) Collected: 01/22/25 1145    Specimen: Blood Updated: 01/22/25 1216     HS Troponin T 27 ng/L     Narrative:      High Sensitive Troponin T Reference Range:  <14.0 ng/L- Negative Female for AMI  <22.0 ng/L- Negative Male for AMI  >=14 - Abnormal Female indicating possible myocardial injury.  >=22 - Abnormal Male indicating possible myocardial injury.   Clinicians would have to utilize clinical acumen, EKG, Troponin, and serial changes to determine if it is an Acute Myocardial Infarction or myocardial injury due to an underlying chronic condition.         Lactic Acid, Plasma [[id number]]  (Abnormal) Collected: 01/22/25 1145    Specimen: Blood Updated: 01/22/25 1218     Lactate 2.9 mmol/L     Magnesium [[id number]]  (Normal) Collected: 01/22/25 1145    Specimen: Blood Updated: 01/22/25 1219     Magnesium 2.0 mg/dL     CBC Auto Differential [[id number]]  (Abnormal) Collected: 01/22/25 1145    Specimen: Blood Updated: 01/22/25 1222     WBC 3.67 10*3/mm3      RBC 3.71 10*6/mm3      Hemoglobin 10.9 g/dL      Hematocrit 33.9 %      MCV 91.4 fL      MCH 29.4 pg      MCHC 32.2 g/dL      RDW 14.6 %      RDW-SD 49.3 fl      MPV 10.7 fL      Platelets 99 10*3/mm3      Manual Differential [[id number]]  (Abnormal) Collected: 01/22/25 1145    Specimen: Blood Updated: 01/22/25 1222     Neutrophil % 71.7 %      Lymphocyte % 4.0 %      Monocyte % 3.0 %      Eosinophil % 0.0 %      Basophil % 0.0 %      Bands %  20.2 %      Atypical Lymphocyte % 1.0 %      Neutrophils Absolute 3.37 10*3/mm3      Lymphocytes Absolute 0.18 10*3/mm3      Monocytes Absolute 0.11 10*3/mm3      Eosinophils Absolute 0.00 10*3/mm3      Basophils Absolute 0.00 10*3/mm3      Crenated RBC's Mod/2+     Ovalocytes Slight/1+     Poikilocytes Mod/2+     Polychromasia Slight/1+     Schistocytes Slight/1+     Vacuolated Neutrophils Slight/1+     Platelet Estimate Decreased    High Sensitivity Troponin T 1Hr [[id number]]  (Abnormal) Collected: 01/22/25 1247    Specimen: Blood Updated: 01/22/25 1309     HS Troponin T 26 ng/L      Troponin T Numeric Delta -1 ng/L      Troponin T % Delta -4    Narrative:      High Sensitive Troponin T Reference Range:  <14.0 ng/L- Negative Female for AMI  <22.0 ng/L- Negative Male for AMI  >=14 - Abnormal Female indicating possible myocardial injury.  >=22 - Abnormal Male indicating possible myocardial injury.   Clinicians would have to utilize clinical acumen, EKG, Troponin, and serial changes to determine if it is an Acute Myocardial Infarction or myocardial injury due to an underlying chronic condition.         Procalcitonin [[id number]] Collected: 01/22/25 1247    Specimen: Blood Updated: 01/22/25 1420          XR Chest 1 View   Final Result   Rounded area of consolidation in the medial right lung base   may represent pneumonia or round atelectasis. This appears similar to   the previous abdominal CT. Clinical correlation is needed. The lungs are   hypoaerated.       This report was signed and finalized on 1/22/2025 11:54 AM by Dr. Antonino Victor MD.            Medications   sodium chloride 0.9 % flush 10 mL (has no administration in time range)   ipratropium-albuterol (DUO-NEB)  nebulizer solution 3 mL (3 mL Nebulization Given 1/22/25 1450)   methylPREDNISolone sodium succinate (SOLU-Medrol) injection 125 mg (125 mg Intravenous Given 1/22/25 1209)       Medical Decision Making  84-year-old male came in with shortness of breath.  Found positive for COVID-19 and influenza.  Was requiring oxygen.  Case was discussed with [facility] hospitalist on-call.  She has accepted patient under their groups service.    Problems Addressed:  COVID-19: complicated acute illness or injury  Influenza: complicated acute illness or injury  Shortness of breath: complicated acute illness or injury    Amount and/or Complexity of Data Reviewed  Labs: ordered.  Radiology: ordered.  ECG/medicine tests: ordered.    Risk  Prescription drug management.  Decision regarding hospitalization.        Final diagnoses:   Shortness of breath   COVID-19   Influenza       ED Disposition  ED Disposition       ED Disposition   Decision to Admit    Condition   --    Comment   Level of Care: Remote Telemetry [26]   Diagnosis: Shortness of breath [786.05.ICD-9-CM]   Admitting Physician: MELIDA MYERS [[id number]]   Attending Physician: MELIDA MYERS [[id number]]   Certification: I Certify That Inpatient Hospital Services Are Medically Necessary For Greater Than 2 Midnights                 No follow-up provider specified.       Medication List      No changes were made to your prescriptions during this visit.            Bryson Mcnulty MD  01/22/25 1745

## 2025-01-23 ENCOUNTER — APPOINTMENT (OUTPATIENT)
Dept: CT IMAGING | Facility: HOSPITAL | Age: 85
DRG: 178 | End: 2025-01-23
Payer: MEDICARE

## 2025-01-23 LAB
ALBUMIN SERPL-MCNC: 2.4 G/DL (ref 3.5–5.2)
ALBUMIN/GLOB SERPL: 0.7 G/DL
ALP SERPL-CCNC: 116 U/L (ref 39–117)
ALT SERPL W P-5'-P-CCNC: 10 U/L (ref 1–41)
ANION GAP SERPL CALCULATED.3IONS-SCNC: 9 MMOL/L (ref 5–15)
AST SERPL-CCNC: 19 U/L (ref 1–40)
BASOPHILS # BLD MANUAL: 0 10*3/MM3 (ref 0–0.2)
BASOPHILS NFR BLD MANUAL: 0 % (ref 0–1.5)
BILIRUB SERPL-MCNC: 0.4 MG/DL (ref 0–1.2)
BUN SERPL-MCNC: 34 MG/DL (ref 8–23)
BUN/CREAT SERPL: 21.7 (ref 7–25)
BURR CELLS BLD QL SMEAR: ABNORMAL
CALCIUM SPEC-SCNC: 8.2 MG/DL (ref 8.6–10.5)
CHLORIDE SERPL-SCNC: 113 MMOL/L (ref 98–107)
CO2 SERPL-SCNC: 17 MMOL/L (ref 22–29)
CREAT SERPL-MCNC: 1.57 MG/DL (ref 0.76–1.27)
DACRYOCYTES BLD QL SMEAR: ABNORMAL
DEPRECATED RDW RBC AUTO: 49.3 FL (ref 37–54)
EGFRCR SERPLBLD CKD-EPI 2021: 43.2 ML/MIN/1.73
EOSINOPHIL # BLD MANUAL: 0 10*3/MM3 (ref 0–0.4)
EOSINOPHIL NFR BLD MANUAL: 0 % (ref 0.3–6.2)
ERYTHROCYTE [DISTWIDTH] IN BLOOD BY AUTOMATED COUNT: 14.6 % (ref 12.3–15.4)
GLOBULIN UR ELPH-MCNC: 3.5 GM/DL
GLUCOSE SERPL-MCNC: 130 MG/DL (ref 65–99)
HCT VFR BLD AUTO: 28.8 % (ref 37.5–51)
HGB BLD-MCNC: 9 G/DL (ref 13–17.7)
LYMPHOCYTES # BLD MANUAL: 0.08 10*3/MM3 (ref 0.7–3.1)
LYMPHOCYTES NFR BLD MANUAL: 0 % (ref 5–12)
MCH RBC QN AUTO: 28.9 PG (ref 26.6–33)
MCHC RBC AUTO-ENTMCNC: 31.3 G/DL (ref 31.5–35.7)
MCV RBC AUTO: 92.6 FL (ref 79–97)
METAMYELOCYTES NFR BLD MANUAL: 1 % (ref 0–0)
MONOCYTES # BLD: 0 10*3/MM3 (ref 0.1–0.9)
MRSA DNA SPEC QL NAA+PROBE: NORMAL
NEUTROPHILS # BLD AUTO: 2.72 10*3/MM3 (ref 1.7–7)
NEUTROPHILS NFR BLD MANUAL: 93.9 % (ref 42.7–76)
NEUTS BAND NFR BLD MANUAL: 2 % (ref 0–5)
OVALOCYTES BLD QL SMEAR: ABNORMAL
PLATELET # BLD AUTO: 79 10*3/MM3 (ref 140–450)
PMV BLD AUTO: 10.8 FL (ref 6–12)
POIKILOCYTOSIS BLD QL SMEAR: ABNORMAL
POTASSIUM SERPL-SCNC: 3.9 MMOL/L (ref 3.5–5.2)
PROT SERPL-MCNC: 5.9 G/DL (ref 6–8.5)
QT INTERVAL: 360 MS
QTC INTERVAL: 484 MS
RBC # BLD AUTO: 3.11 10*6/MM3 (ref 4.14–5.8)
SMALL PLATELETS BLD QL SMEAR: ABNORMAL
SODIUM SERPL-SCNC: 139 MMOL/L (ref 136–145)
VARIANT LYMPHS NFR BLD MANUAL: 1 % (ref 0–5)
VARIANT LYMPHS NFR BLD MANUAL: 2 % (ref 19.6–45.3)
WBC MORPH BLD: NORMAL
WBC NRBC COR # BLD AUTO: 2.83 10*3/MM3 (ref 3.4–10.8)

## 2025-01-23 PROCEDURE — 85007 BL SMEAR W/DIFF WBC COUNT: CPT | Performed by: NURSE PRACTITIONER

## 2025-01-23 PROCEDURE — 25010000002 DEXAMETHASONE PER 1 MG: Performed by: NURSE PRACTITIONER

## 2025-01-23 PROCEDURE — 80053 COMPREHEN METABOLIC PANEL: CPT | Performed by: NURSE PRACTITIONER

## 2025-01-23 PROCEDURE — 97165 OT EVAL LOW COMPLEX 30 MIN: CPT | Performed by: OCCUPATIONAL THERAPIST

## 2025-01-23 PROCEDURE — 94664 DEMO&/EVAL PT USE INHALER: CPT

## 2025-01-23 PROCEDURE — 94799 UNLISTED PULMONARY SVC/PX: CPT

## 2025-01-23 PROCEDURE — 25010000002 ENOXAPARIN PER 10 MG: Performed by: NURSE PRACTITIONER

## 2025-01-23 PROCEDURE — 71250 CT THORAX DX C-: CPT

## 2025-01-23 PROCEDURE — 97161 PT EVAL LOW COMPLEX 20 MIN: CPT | Performed by: PHYSICAL THERAPIST

## 2025-01-23 PROCEDURE — 25010000002 CEFTRIAXONE PER 250 MG: Performed by: STUDENT IN AN ORGANIZED HEALTH CARE EDUCATION/TRAINING PROGRAM

## 2025-01-23 PROCEDURE — 85025 COMPLETE CBC W/AUTO DIFF WBC: CPT | Performed by: NURSE PRACTITIONER

## 2025-01-23 PROCEDURE — 25010000002 ONDANSETRON PER 1 MG: Performed by: NURSE PRACTITIONER

## 2025-01-23 RX ORDER — HYDROCODONE BITARTRATE AND ACETAMINOPHEN 5; 325 MG/1; MG/1
1 TABLET ORAL EVERY 4 HOURS PRN
COMMUNITY
End: 2025-01-28 | Stop reason: HOSPADM

## 2025-01-23 RX ORDER — BUDESONIDE AND FORMOTEROL FUMARATE DIHYDRATE 160; 4.5 UG/1; UG/1
2 AEROSOL RESPIRATORY (INHALATION)
Status: DISCONTINUED | OUTPATIENT
Start: 2025-01-23 | End: 2025-01-28 | Stop reason: HOSPADM

## 2025-01-23 RX ORDER — ACETYLCYSTEINE 200 MG/ML
1.5 SOLUTION ORAL; RESPIRATORY (INHALATION)
Status: DISCONTINUED | OUTPATIENT
Start: 2025-01-23 | End: 2025-01-23

## 2025-01-23 RX ORDER — GUAIFENESIN 200 MG/10ML
200 LIQUID ORAL EVERY 4 HOURS PRN
Status: DISCONTINUED | OUTPATIENT
Start: 2025-01-23 | End: 2025-01-28 | Stop reason: HOSPADM

## 2025-01-23 RX ADMIN — ONDANSETRON 4 MG: 2 INJECTION INTRAMUSCULAR; INTRAVENOUS at 20:02

## 2025-01-23 RX ADMIN — IPRATROPIUM BROMIDE 2 PUFF: 17 AEROSOL, METERED RESPIRATORY (INHALATION) at 11:27

## 2025-01-23 RX ADMIN — GUAIFENESIN 200 MG: 200 SOLUTION ORAL at 17:46

## 2025-01-23 RX ADMIN — BUDESONIDE AND FORMOTEROL FUMARATE DIHYDRATE 2 PUFF: 160; 4.5 AEROSOL RESPIRATORY (INHALATION) at 20:25

## 2025-01-23 RX ADMIN — CARVEDILOL 3.12 MG: 3.12 TABLET, FILM COATED ORAL at 09:38

## 2025-01-23 RX ADMIN — Medication 10 ML: at 22:18

## 2025-01-23 RX ADMIN — IPRATROPIUM BROMIDE 2 PUFF: 17 AEROSOL, METERED RESPIRATORY (INHALATION) at 20:25

## 2025-01-23 RX ADMIN — CARVEDILOL 3.12 MG: 3.12 TABLET, FILM COATED ORAL at 17:46

## 2025-01-23 RX ADMIN — AZITHROMYCIN 500 MG: 250 TABLET, FILM COATED ORAL at 20:02

## 2025-01-23 RX ADMIN — ENOXAPARIN SODIUM 40 MG: 100 INJECTION SUBCUTANEOUS at 17:46

## 2025-01-23 RX ADMIN — IPRATROPIUM BROMIDE 2 PUFF: 17 AEROSOL, METERED RESPIRATORY (INHALATION) at 15:16

## 2025-01-23 RX ADMIN — TAMSULOSIN HYDROCHLORIDE 0.4 MG: 0.4 CAPSULE ORAL at 20:02

## 2025-01-23 RX ADMIN — SODIUM CHLORIDE 1000 MG: 900 INJECTION INTRAVENOUS at 20:02

## 2025-01-23 RX ADMIN — DEXAMETHASONE SODIUM PHOSPHATE 6 MG: 10 INJECTION INTRAMUSCULAR; INTRAVENOUS at 09:38

## 2025-01-23 RX ADMIN — ACETAMINOPHEN 650 MG: 325 TABLET ORAL at 01:51

## 2025-01-23 RX ADMIN — Medication 10 ML: at 09:48

## 2025-01-23 NOTE — THERAPY EVALUATION
Patient Name: Manohar Garcia  : 1940    MRN: [id number]                              Today's Date: 2025       Admit Date: 2025    Visit Dx:     ICD-10-CM ICD-9-CM   1. Shortness of breath  R06.02 786.05   2. COVID-19  U07.1 079.89   3. Influenza  J11.1 487.1   4. 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    Thrombocytopenia    Hyperlactatemia    Hip fracture    Closed 2-part intertrochanteric fracture of proximal end of right femur    Acute blood loss anemia    Stage 3b chronic kidney disease    COVID-19 virus detected    Influenza A    Hypoxia     Past Medical History:   Diagnosis Date    Dementia     GERD (gastroesophageal reflux disease)     Liver disorder      Past Surgical History:   Procedure Laterality Date    HEMORRHOIDECTOMY      HERNIA REPAIR      HIP TROCHANTERIC NAILING WITH INTRAMEDULLARY HIP SCREW Right 2024    Procedure: HIP TROCHANTERIC NAILING SHORT WITH INTRAMEDULLARY HIP SCREW;  Surgeon: Arcenio Chandra MD;  Location: [facility] OR;  Service: Orthopedics;  Laterality: Right;      General Information       Row Name 25          OT Time and Intention    Document Type evaluation  -CH     Mode of Treatment occupational therapy  -CH     Patient Effort excellent  -CH       Row Name 25          General Information    Patient Profile Reviewed yes  -CH     Prior Level of Function --  pt not able to provide PLOF  -CH     Existing Precautions/Restrictions fall  -CH     Barriers to Rehab medically complex;previous functional deficit;hearing deficit  -       Row Name 25          Occupational Profile    Reason for Services/Referral (Occupational Profile) Flu, Covid  -CH        Row Name 01/23/25 0920          Living Environment    People in Home facility resident  -       Row Name 01/23/25 0920          Home Main Entrance    Number of Stairs, Main Entrance none  -     Stair Railings, Main Entrance none  -       Row Name 01/23/25 0920          Stairs Within Home, Primary    Number of Stairs, Within Home, Primary none  -     Stair Railings, Within Home, Primary none  -       Row Name 01/23/25 0920          Cognition    Orientation Status (Cognition) oriented to;person;place;situation  -       Row Name 01/23/25 0920          Safety Issues/Impairments Affecting Functional Mobility    Safety Issues Affecting Function (Mobility) at risk behavior observed;friction/shear risk;insight into deficits/self-awareness  -     Impairments Affecting Function (Mobility) endurance/activity tolerance;shortness of breath;balance;strength  -               User Key  (r) = Recorded By, (t) = Taken By, (c) = Cosigned By      Initials Name Provider Type     Erika Mann, OTR/L Occupational Therapist                     Mobility/ADL's       Row Name 01/23/25 0920          Bed Mobility    Bed Mobility supine-sit;sit-supine  -     Supine-Sit Soso (Bed Mobility) contact guard  -     Sit-Supine Soso (Bed Mobility) contact guard  -       Row Name 01/23/25 0920          Transfers    Transfers sit-stand transfer;stand-sit transfer  -       Row Name 01/23/25 0920          Sit-Stand Transfer    Sit-Stand Soso (Transfers) contact guard;minimum assist (75% patient effort)  -       Row Name 01/23/25 0920          Stand-Sit Transfer    Stand-Sit Soso (Transfers) contact guard  -       Row Name 01/23/25 0920          Functional Mobility    Functional Mobility- Ind. Level contact guard assist;minimum assist (75% patient effort)  -     Functional Mobility- Comment side steps up to HOB  -       Row Name 01/23/25 0920          Activities of Daily Living    BADL  Assessment/Intervention toileting;upper body dressing  -       Row Name 01/23/25 0920          Toileting Assessment/Training    [place] Level (Toileting) adjust/manage clothing;change pad/brief;perform perineal hygiene  -     Position (Toileting) unsupported sitting;supported standing  -     Comment, (Toileting) REBEKAH brief - Mod A, Max A for hygiene with CGA while in standing, CGA to REJI new brief  -       Row Name 01/23/25 0920          Upper Body Dressing Assessment/Training    [place] Level (Upper Body Dressing) moderate assist (50% patient effort);don  -     Comment, (Upper Body Dressing) [facility] gown  -               User Key  (r) = Recorded By, (t) = Taken By, (c) = Cosigned By      Initials Name Provider Type    Erika Garvin, OTR/L Occupational Therapist                   Obj/Interventions       Row Name 01/23/25 0920          Vision Assessment/Intervention    Visual Impairment/Limitations WFL  -       Row Name 01/23/25 0920          Range of Motion Comprehensive    General Range of Motion bilateral upper extremity ROM WFL  -       Row Name 01/23/25 0920          Strength Comprehensive (MMT)    Comment, General Manual Muscle Testing (MMT) Assessment grossly 4/5  -       Row Name 01/23/25 0920          Balance    Balance Assessment sitting static balance;sitting dynamic balance;sit to stand dynamic balance;standing static balance;standing dynamic balance  -     Static Sitting Balance supervision  -     Dynamic Sitting Balance contact guard  -     Position, Sitting Balance unsupported  -     Sit to Stand Dynamic Balance contact guard;minimal assist  -     Static Standing Balance contact guard  -CH     Dynamic Standing Balance contact guard  -               User Key  (r) = Recorded By, (t) = Taken By, (c) = Cosigned By      Initials Name Provider Type    Erika Garvin, OTR/L Occupational Therapist                   Goals/Plan       Row Name 01/23/25 0920           Transfer Goal 1 (OT)    Activity/Assistive Device (Transfer Goal 1, OT) sit-to-stand/stand-to-sit;bed-to-chair/chair-to-bed;commode, 3-in-1  -     [place] Level/Cues Needed (Transfer Goal 1, OT) supervision required  -     Time Frame (Transfer Goal 1, OT) long term goal (LTG);by discharge  -     Progress/Outcome (Transfer Goal 1, OT) new goal  -       Row Name 01/23/25 0920          Dressing Goal 1 (OT)    Activity/Device (Dressing Goal 1, OT) lower body dressing  -     [place]/Cues Needed (Dressing Goal 1, OT) minimum assist (75% or more patient effort)  -     Time Frame (Dressing Goal 1, OT) long term goal (LTG);by discharge  -     Progress/Outcome (Dressing Goal 1, OT) new goal  -       Row Name 01/23/25 0920          Toileting Goal 1 (OT)    Activity/Device (Toileting Goal 1, OT) toileting skills, all;adjust/manage clothing;perform perineal hygiene;commode, 3-in-1  -CH     [place] Level/Cues Needed (Toileting Goal 1, OT) moderate assist (50-74% patient effort)  -     Time Frame (Toileting Goal 1, OT) long term goal (LTG);by discharge  -     Progress/Outcome (Toileting Goal 1, OT) new [facility]  -       Row Name 01/23/25 0920          Therapy Assessment/Plan (OT)    Planned Therapy Interventions (OT) activity tolerance training;adaptive equipment training;BADL retraining;functional balance retraining;occupation/activity based interventions;patient/caregiver education/training;strengthening exercise;transfer/mobility retraining;wheelchair assessment/training  -               User Key  (r) = Recorded By, (t) = Taken By, (c) = Cosigned By      Initials Name Provider Type    CH Erika Mann, OTR/L Occupational Therapist                   Clinical Impression       Row Name 01/23/25 0920          Plan of Care Review    Plan of Care Reviewed With patient  -     Progress no change  -     Outcome Evaluation OT eval complete.  Pt. is pleasant and [place].  Mr. Garcia is a res of SNF  per the chart.  The pt is participatory and able to follow commands when he is able to hear.  He came to EOB at S and sat EOB with good balance.  CGA to stand to remove soiled brief at Max A.  Max A for toilet hygiene 2' to SOA and decreased act leonid.  Pt. attempted hgyiene but SOB increased with effort during standing task.  Min A to REJI new clean brief.  Mr. Garcia is quickly fatigued and would benefit from cont'd OT tx to improve his fxl capacity.  He is very pleasant and a good rehab candidate.  Anticipate DC back to SNF.  -       Row Name 01/23/25 0920          Therapy Assessment/Plan (OT)    Patient/Family Therapy Goal Statement (OT) improve fxn  -     Rehab Potential (OT) good  -     Criteria for Skilled Therapeutic Interventions Met (OT) yes;skilled treatment is necessary  -     Therapy Frequency (OT) 5 times/wk  -     Predicted Duration of Therapy Intervention (OT) 10 days  -       Row Name 01/23/25 0920          Therapy Plan Review/Discharge Plan (OT)    Anticipated Discharge Disposition (OT) skilled nursing facility  -       Row Name 01/23/25 0920          Positioning and Restraints    Pre-Treatment Position in bed  -     Post Treatment Position bed  -     In Bed fowlers;call light within reach;encouraged to call for assist;exit alarm on;with nsg;side rails up x3  -               User Key  (r) = Recorded By, (t) = Taken By, (c) = Cosigned By      Initials Name Provider Type     Erika Mann, OTR/L Occupational Therapist                   Outcome Measures       Row Name 01/23/25 0920          How much help from another is currently needed...    Putting on and taking off regular lower body clothing? 3  -CH     Bathing (including washing, rinsing, and drying) 3  -CH     Toileting (which includes using toilet bed pan or urinal) 2  -CH     Putting on and taking off regular upper body clothing 3  -CH     Taking care of personal grooming (such as brushing teeth) 3  -CH     Eating meals 4   -     AM-PAC 6 Clicks Score (OT) 18  -CH       Row Name 01/23/25 1400 01/23/25 0850       How much help from another person do you currently need...    Turning from your back to your side while in flat bed without using bedrails? 3  -MS 3  -CC    Moving from lying on back to sitting on the side of a flat bed without bedrails? 3  -MS 3  -CC    Moving to and from a bed to a chair (including a wheelchair)? 3  -MS 2  -CC    Standing up from a chair using your arms (e.g., wheelchair, bedside chair)? 3  -MS 2  -CC    Climbing 3-5 steps with a railing? 3  -MS 2  -CC    To walk in hospital room? 3  -MS 2  -CC    AM-PAC 6 Clicks Score (PT) 18  -MS 14  -CC    Highest Level of Mobility Goal 6 --> Walk 10 steps or more  -MS 4 --> Transfer to chair/commode  -CC      Row Name 01/23/25 1400 01/23/25 0920       Functional Assessment    Outcome Measure Options AM-PAC 6 Clicks Basic Mobility (PT)  -MS AM-PAC 6 Clicks Daily Activity (OT)  -              User Key  (r) = Recorded By, (t) = Taken By, (c) = Cosigned By      Initials Name Provider Type     Erika Mann, OTR/L Occupational Therapist    Tamra Rodriges, PT, DPT, NCS Physical Therapist    CC Arlet Figueroa, RN Registered Nurse                    Occupational Therapy Education       Title: PT OT SLP Therapies (In Progress)       Topic: Occupational Therapy (In Progress)       Point: ADL training (Done)       Description:   Instruct learner(s) on proper safety adaptation and remediation techniques during self care or transfers.   Instruct in proper use of assistive devices.                  Learning Progress Summary            Patient Acceptance, E, VU by  at 1/23/2025 1510                      Point: Home exercise program (Not Started)       Description:   Instruct learner(s) on appropriate technique for monitoring, assisting and/or progressing therapeutic exercises/activities.                  Learner Progress:  Not documented in this visit.               Point: Precautions (Done)       Description:   Instruct learner(s) on prescribed precautions during self-care and functional transfers.                  Learning Progress Summary            Patient Acceptance, E, VU by  at 1/23/2025 1510                      Point: Body mechanics (Done)       Description:   Instruct learner(s) on proper positioning and spine alignment during self-care, functional mobility activities and/or exercises.                  Learning Progress Summary            Patient Acceptance, E, VU by  at 1/23/2025 1510                                      User Key       Initials Effective Dates Name Provider Type Discipline     07/11/23 -  Erika Mann, OTR/L Occupational Therapist OT                  OT Recommendation and Plan  Planned Therapy Interventions (OT): activity tolerance training, adaptive equipment training, BADL retraining, functional balance retraining, occupation/activity based interventions, patient/caregiver education/training, strengthening exercise, transfer/mobility retraining, wheelchair assessment/training  Therapy Frequency (OT): 5 times/wk  Plan of Care Review  Plan of Care Reviewed With: patient  Progress: no change  Outcome Evaluation: OT eval complete.  Pt. is pleasant and [place].  Mr. Garcia is a res of SNF per the chart.  The pt is participatory and able to follow commands when he is able to hear.  He came to EOB at S and sat EOB with good balance.  CGA to stand to remove soiled brief at Max A.  Max A for toilet hygiene 2' to SOA and decreased act leonid.  Pt. attempted hgyiene but SOB increased with effort during standing task.  Min A to REJI new clean brief.  Mr. Garcia is quickly fatigued and would benefit from cont'd OT tx to improve his fxl capacity.  He is very pleasant and a good rehab candidate.  Anticipate DC back to SNF.     Time Calculation:         Time Calculation- OT       Row Name 01/23/25 0920             Time Calculation- OT    OT Start Time 0920  -       OT Stop Time 0958  -      OT Time Calculation (min) 38 min  -CH      OT Received On 01/23/25  -      OT Goal Re-Cert Due Date 02/02/25  -         Untimed Charges    OT Eval/Re-eval Minutes 38  -CH         Total Minutes    Untimed Charges Total Minutes 38  -CH       Total Minutes 38  -CH                User Key  (r) = Recorded By, (t) = Taken By, (c) = Cosigned By      Initials Name Provider Type     Erika Mann, OTR/L Occupational Therapist                  Therapy Charges for Today       Code Description Service Date Service Provider Modifiers Qty    [id number]  OT EVAL LOW COMPLEXITY 3 1/23/2025 Erika Mann OTR/L GO 1                 Erika Mann OTR/TUCKER  1/23/2025

## 2025-01-23 NOTE — CASE MANAGEMENT/SOCIAL WORK
Discharge Planning Assessment  [facility]     Patient Name: Manohar Garcia  MRN: [id number]  Today's Date: 1/23/2025    Admit Date: 1/22/2025        Discharge Needs Assessment       Row Name 01/23/25 0913       Living Environment    People in Home facility resident    Current Living Arrangements extended care facility    Potentially Unsafe Housing Conditions none    In the past 12 months has the electric, gas, oil, or water company threatened to shut off services in your home? No    Provides Primary Care For no one    Quality of Family Relationships supportive    Able to Return to Prior Arrangements yes       Resource/Environmental Concerns    Resource/Environmental Concerns none    Transportation Concerns none       Transportation Needs    In the past 12 months, has lack of transportation kept you from medical appointments or from getting medications? no    In the past 12 months, has lack of transportation kept you from meetings, work, or from getting things needed for daily living? No       Food Insecurity    Within the past 12 months, you worried that your food would run out before you got the money to buy more. Never true    Within the past 12 months, the food you bought just didn't last and you didn't have money to get more. Never true       Transition Planning    Patient/Family Anticipates Transition to long-term care facility    Patient/Family Anticipated Services at Transition skilled nursing    Transportation Anticipated family or friend will provide;health plan transportation       Discharge Needs Assessment    Readmission Within the Last 30 Days no previous admission in last 30 days    Current Outpatient/Agency/Support Group skilled nursing facility    Concerns to be Addressed care coordination/care conferences;discharge planning    Do you want help finding or keeping work or a job? I do not need or want help    Do you want help with school or training? For example, starting or completing job training  or getting a high school diploma, GED or equivalent No    Anticipated Changes Related to Illness none    Equipment Needed After Discharge none    Outpatient/Agency/Support Group Needs skilled nursing facility    Discharge Facility/Level of Care Needs nursing facility, skilled    Current Discharge Risk chronically ill    Discharge Coordination/Progress Patient was admitted from [facility].  Tentative plan is for patient to return to [facility] upon discharge.  [facility] has advised patient does have a bed hold.  Will plan on patient returning to [facility] upon discharge [phone].                   Discharge Plan    No documentation.                 Continued Care and Services - Admitted Since 1/22/2025    No active coordination exists for this encounter.          Demographic Summary    No documentation.                  Functional Status    No documentation.                  Psychosocial    No documentation.                  Abuse/Neglect    No documentation.                  Legal    No documentation.                  Substance Abuse    No documentation.                  Patient Forms    No documentation.                     CHANDAN Mancilla

## 2025-01-23 NOTE — THERAPY EVALUATION
Patient Name: Manohar Garcia  : 1940    MRN: [id number]                              Today's Date: 2025       Admit Date: 2025    Visit Dx:     ICD-10-CM ICD-9-CM   1. Shortness of breath  R06.02 786.05   2. COVID-19  U07.1 079.89   3. Influenza  J11.1 487.1   4. 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    Thrombocytopenia    Hyperlactatemia    Hip fracture    Closed 2-part intertrochanteric fracture of proximal end of right femur    Acute blood loss anemia    Stage 3b chronic kidney disease    COVID-19 virus detected    Influenza A    Hypoxia     Past Medical History:   Diagnosis Date    Dementia     GERD (gastroesophageal reflux disease)     Liver disorder      Past Surgical History:   Procedure Laterality Date    HEMORRHOIDECTOMY      HERNIA REPAIR      HIP TROCHANTERIC NAILING WITH INTRAMEDULLARY HIP SCREW Right 2024    Procedure: HIP TROCHANTERIC NAILING SHORT WITH INTRAMEDULLARY HIP SCREW;  Surgeon: Arcenio Chandra MD;  Location: [facility];  Service: Orthopedics;  Laterality: Right;      General Information       Row Name 25 1400          Physical Therapy Time and Intention    Document Type evaluation  COVID 19 and Flu  -MS     Mode of Treatment physical therapy;individual therapy  -MS       Row Name 25 1400          General Information    Patient Profile Reviewed yes  -MS     Prior Level of Function min assist:;all household mobility  -MS     Existing Precautions/Restrictions fall  -MS     Barriers to Rehab hearing deficit  pt can hear better out of L ear  -MS       Row Name 25 1400          Cognition    Orientation Status (Cognition) oriented x 4  -MS       Row Name 25 1400           Safety Issues/Impairments Affecting Functional Mobility    Impairments Affecting Function (Mobility) endurance/activity tolerance;shortness of breath  -MS               User Key  (r) = Recorded By, (t) = Taken By, (c) = Cosigned By      Initials Name Provider Type    MS RobertsTamra, PT, DPT, NCS Physical Therapist                   Mobility       Row Name 01/23/25 1400          Bed Mobility    Bed Mobility supine-sit;sit-supine  -MS     Supine-Sit [place] (Bed Mobility) standby assist  -MS     Sit-Supine [place] (Bed Mobility) standby assist  -MS     Assistive Device (Bed Mobility) head of bed elevated;bed rails  -MS       Row Name 01/23/25 1400          Sit-Stand Transfer    Sit-Stand [place] (Transfers) contact guard  -MS     Assistive Device (Sit-Stand Transfers) walker, front-wheeled  -MS       Row Name 01/23/25 1400          Gait/Stairs (Locomotion)    [place] Level (Gait) contact guard  -MS     Assistive Device (Gait) walker, front-wheeled  -MS     Distance in Feet (Gait) 45  -MS     Comment, (Gait/Stairs) multiple turns with ambulating in the room, forward flexed posture  -MS               User Key  (r) = Recorded By, (t) = Taken By, (c) = Cosigned By      Initials Name Provider Type    MS Tamra Roberts, PT, DPT, NCS Physical Therapist                   Obj/Interventions       Row Name 01/23/25 1400          Range of Motion Comprehensive    General Range of Motion bilateral upper extremity ROM WFL;bilateral lower extremity ROM WFL  -MS       Row Name 01/23/25 1400          Strength Comprehensive (MMT)    Comment, General Manual Muscle Testing (MMT) Assessment grossly 4/5  -MS       Row Name 01/23/25 1400          Balance    Balance Assessment sitting static balance;sitting dynamic balance;standing static balance;standing dynamic balance  -MS     Static Sitting Balance supervision  -MS     Dynamic Sitting Balance contact guard  -MS     Position, Sitting Balance unsupported   -MS     Static Standing Balance contact guard  -MS     Dynamic Standing Balance contact guard  -MS     Position/Device Used, Standing Balance walker, rolling;supported  -MS               User Key  (r) = Recorded By, (t) = Taken By, (c) = Cosigned By      Initials Name Provider Type    MS Tamra Roberts NATHAN, PT, DPT, NCS Physical Therapist                   Goals/Plan       Row Name 01/23/25 1400          Bed Mobility Goal 1 (PT)    Activity/Assistive Device (Bed Mobility Goal 1, PT) bed mobility activities, all  -MS     [place] Level/Cues Needed (Bed Mobility Goal 1, PT) independent  -MS     Time Frame (Bed Mobility Goal 1, PT) long term goal (LTG);by discharge  -MS     Progress/Outcomes (Bed Mobility Goal 1, PT) goal ongoing  -MS       Row Name 01/23/25 1400          Transfer Goal 1 (PT)    Activity/Assistive Device (Transfer Goal 1, PT) sit-to-stand/stand-to-sit;bed-to-chair/chair-to-bed;walker, rolling  -MS     [place] Level/Cues Needed (Transfer Goal 1, PT) modified independence  -MS     Time Frame (Transfer Goal 1, PT) long term goal (LTG);by discharge  -MS     Progress/Outcome (Transfer Goal 1, PT) goal ongoing  -MS       Row Name 01/23/25 1400          Gait Training Goal 1 (PT)    Activity/Assistive Device (Gait Training Goal 1, PT) gait (walking locomotion);assistive device use;decrease fall risk;increase endurance/gait distance;improve balance and speed;walker, rolling  -MS     [place] Level (Gait Training Goal 1, PT) modified independence  -MS     Distance (Gait Training Goal 1, PT) 75ft maintaining SpO2 at or above 90%  -MS     Time Frame (Gait Training Goal 1, PT) long term goal (LTG);by discharge  -MS     Progress/Outcome (Gait Training Goal 1, PT) goal ongoing  -MS       Row Name 01/23/25 1400          Therapy Assessment/Plan (PT)    Planned Therapy Interventions (PT) balance training;bed mobility training;gait training;patient/family education;strengthening;transfer training  -MS                User Key  (r) = Recorded By, (t) = Taken By, (c) = Cosigned By      Initials Name Provider Type    MS Tamra Roberts R, PT, DPT, NCS Physical Therapist                   Clinical Impression       Row Name 01/23/25 1400          Pain    Additional Documentation Pain Scale: FACES Pre/Post-Treatment (Group)  -MS       Row Name 01/23/25 1400          Pain Scale: FACES Pre/Post-Treatment    Pain: FACES Scale, Pretreatment 0-->no hurt  -MS     Posttreatment Pain Rating 0-->no hurt  -MS       Row Name 01/23/25 1400          Plan of Care Review    Plan of Care Reviewed With patient  -MS     Progress improving  -MS     Outcome Evaluation The patient presents alert and oriented x4 lying in bed. He is hard of hearing and hears out of his L ear better. He lives at a SNF and his PLOF is difficulty to obtain. He is currently on RA. He was able to perform bed mobility, sit to stand, and ambulate around the room multiple times with use of RW with little to no assist. He was able to maintain his SpO2 at or above 90% on RA the entire time. He will benefit from continued PT to work on increasing his activity tolerance and recommend discharge back to SNF.  -MS       Row Name 01/23/25 1400          Therapy Assessment/Plan (PT)    Patient/Family Therapy Goals Statement (PT) get stronger  -MS     Rehab Potential (PT) good  -MS     Criteria for Skilled Interventions Met (PT) yes;meets criteria;skilled treatment is necessary  -MS     Therapy Frequency (PT) 2 times/day  -MS     Predicted Duration of Therapy Intervention (PT) until discharge  -MS       Row Name 01/23/25 1400          Vital Signs    Pre SpO2 (%) 96  -MS     O2 Delivery Pre Treatment room air  -MS     Post SpO2 (%) 92  -MS     O2 Delivery Post Treatment room air  -MS     Pre Patient Position Supine  -MS     Post Patient Position Supine  -MS       Row Name 01/23/25 1400          Positioning and Restraints    Post Treatment Position bed  -MS     In Bed fowlers;call light  within reach;encouraged to call for assist;side rails up x2;exit alarm on  -MS               User Key  (r) = Recorded By, (t) = Taken By, (c) = Cosigned By      Initials Name Provider Type    Tamra Rodriges, PT, DPT, NCS Physical Therapist                   Outcome Measures       Row Name 01/23/25 1400 01/23/25 0850       How much help from another person do you currently need...    Turning from your back to your side while in flat bed without using bedrails? 3  -MS 3  -CC    Moving from lying on back to sitting on the side of a flat bed without bedrails? 3  -MS 3  -CC    Moving to and from a bed to a chair (including a wheelchair)? 3  -MS 2  -CC    Standing up from a chair using your arms (e.g., wheelchair, bedside chair)? 3  -MS 2  -CC    Climbing 3-5 steps with a railing? 3  -MS 2  -CC    To walk in hospital room? 3  -MS 2  -CC    AM-PAC 6 Clicks Score (PT) 18  -MS 14  -CC    Highest Level of Mobility Goal 6 --> Walk 10 steps or more  -MS 4 --> Transfer to chair/commode  -CC      Row Name 01/23/25 1400          Functional Assessment    Outcome Measure Options AM-PAC 6 Clicks Basic Mobility (PT)  -MS               User Key  (r) = Recorded By, (t) = Taken By, (c) = Cosigned By      Initials Name Provider Type    Tamra Rodriges NATHAN, PT, DPT, NCS Physical Therapist    CC Arlet Figueroa, RN Registered Nurse                                 Physical Therapy Education       Title: PT OT SLP Therapies (In Progress)       Topic: Physical Therapy (In Progress)       Point: Mobility training (Done)       Learning Progress Summary            Patient Acceptance, E, VU by MS at 1/23/2025 1502    Comment: role of PT in his care                      Point: Home exercise program (Not Started)       Learner Progress:  Not documented in this visit.              Point: Body mechanics (Not Started)       Learner Progress:  Not documented in this visit.              Point: Precautions (Not Started)       Learner Progress:  Not  documented in this visit.                              User Key       Initials Effective Dates Name Provider Type Discipline    MS 07/11/23 -  Tamra Roberts, PT, DPT, NCS Physical Therapist PT                  PT Recommendation and Plan  Planned Therapy Interventions (PT): balance training, bed mobility training, gait training, patient/family education, strengthening, transfer training  Progress: improving  Outcome Evaluation: The patient presents alert and oriented x4 lying in bed. He is hard of hearing and hears out of his L ear better. He lives at a SNF and his PLOF is difficulty to obtain. He is currently on RA. He was able to perform bed mobility, sit to stand, and ambulate around the room multiple times with use of RW with little to no assist. He was able to maintain his SpO2 at or above 90% on RA the entire time. He will benefit from continued PT to work on increasing his activity tolerance and recommend discharge back to SNF.     Time Calculation:         PT Charges       Row Name 01/23/25 1400             Time Calculation    Start Time 1400  -MS      Stop Time 1442  -MS      Time Calculation (min) 42 min  -MS      PT Received On 01/23/25  -MS      PT Goal Re-Cert Due Date 02/02/25  -MS         Untimed Charges    PT Eval/Re-eval Minutes 42  -MS         Total Minutes    Untimed Charges Total Minutes 42  -MS       Total Minutes 42  -MS                User Key  (r) = Recorded By, (t) = Taken By, (c) = Cosigned By      Initials Name Provider Type    MS Armando Tamra EVANS, PT, DPT, NCS Physical Therapist                      PT G-Codes  Outcome Measure Options: AM-PAC 6 Clicks Basic Mobility (PT)  AM-PAC 6 Clicks Score (PT): 18  PT Discharge Summary  Anticipated Discharge Disposition (PT): skilled nursing facility    Tamra Roberts, PT, DPT, NCS  1/23/2025

## 2025-01-23 NOTE — PROGRESS NOTES
[facility] Medicine Services  INPATIENT PROGRESS NOTE    Patient Name: Manohar Garcia  Date of Admission: 1/22/2025  Today's Date: 01/23/25  Length of Stay: 1  Primary Care Physician: Dani Farfan MD    Subjective   Chief Complaint: cough    HPI   No acute events overnight. He is seen in resting in bed comfortably on 2L.      Review of Systems   All pertinent negatives and positives are as above. All other systems have been reviewed and are negative unless otherwise stated.     Objective    Temp:  [97.5 °F (36.4 °C)-98.8 °F (37.1 °C)] 97.9 °F (36.6 °C)  Heart Rate:  [] 74  Resp:  [16-31] 18  BP: (104-127)/(56-78) 122/68  Physical Exam  GEN: Awake, alert, interactive, in NAD, very hard of hearing   HEENT: Atraumatic, EOMI, Anicteric  Lungs: audible secretions, diffuse rhonchi   Heart: RRR, +S1/s2, no rub  ABD: soft, nt/nd, +BS, no guarding/rebound  Extremities: atraumatic, no cyanosis, no edema  Skin: no rashes or lesions  Neuro: AAOx3, no focal deficits  Psych: normal mood & affect    Results Review:  I have reviewed the labs, radiology results, and diagnostic studies.    Laboratory Data:   Results from last 7 days   Lab Units 01/23/25  0404 01/22/25  1145   WBC 10*3/mm3 2.83* 3.67   HEMOGLOBIN g/dL 9.0* 10.9*   HEMATOCRIT % 28.8* 33.9*   PLATELETS 10*3/mm3 79* 99*        Results from last 7 days   Lab Units 01/23/25  0404 01/22/25  1556 01/22/25  1145   SODIUM mmol/L 139  --  139   SODIUM, ARTERIAL mmol/L  --  141  --    POTASSIUM mmol/L 3.9  --  3.6   CHLORIDE mmol/L 113*  --  108*   CO2 mmol/L 17.0*  --  17.0*   BUN mg/dL 34*  --  31*   CREATININE mg/dL 1.57*  --  1.79*   CALCIUM mg/dL 8.2*  --  8.6   BILIRUBIN mg/dL 0.4  --   --    ALK PHOS U/L 116  --   --    ALT (SGPT) U/L 10  --   --    AST (SGOT) U/L 19  --   --    GLUCOSE mg/dL 130*  --  146*       Culture Data:   [unfilled]    Radiology Data:   Imaging Results (Last 24 Hours)       Procedure Component  Value Units Date/Time    XR Chest 1 View [[id number]] Collected: 01/22/25 1150     Updated: 01/22/25 1157    Narrative:      EXAMINATION: XR CHEST 1 VW- 1/22/2025 11:50 AM     HISTORY: Shortness of breath.     REPORT: A frontal view of the chest was obtained.     COMPARISON: Chest x-ray 8/29/2024. CT abdomen and pelvis 1/16/2025.     The lungs are hypoaerated, there is mild central basilar vascular  crowding, normal heart size. Thoracic aortic ectasia is present as  before. There is mild elevation of the right hemidiaphragm. Rounded area  of consolidation in the medial right lung base persists, may represent  rounded atelectasis or pneumonia. There is no pneumothorax or pleural  effusion. No acute osseous abnormality.       Impression:      Rounded area of consolidation in the medial right lung base  may represent pneumonia or round atelectasis. This appears similar to  the previous abdominal CT. Clinical correlation is needed. The lungs are  hypoaerated.     This report was signed and finalized on 1/22/2025 11:54 AM by Dr. Antonino Victor MD.               I have reviewed the patient's current medications.     Assessment/Plan   Assessment  Active Hospital Problems    Diagnosis     **Hypoxia     Stage 3b chronic kidney disease     COVID-19 virus detected     Influenza A        Treatment Plan:    # Covid-19  # Influenza  # Hypoxia - on 2L NC  CXR showing RL consolidation that is somewhat similar as previously seen on abdominal CT 1/16   Currently afebrile, normal white count   Given advanced age, elevated procalcitonin (which could be 2/2 CKD), lactic acidosis and hypoxia/tachypnea at presentation, started antibiotics  - complete 10 days of Decadron for covid hypoxia  - continue Ceftriaxone ans Azithromycin  - adding mucomyst nebulizers bid  - wean off oxygen as tolerated   - CT w/o contrast pending     # Liver Cirrhosis   - starting Coreg   - euvolemic will not me adding any diuretics at this time     # CKD 3B  At  baseline  Avoid nephrotoxins       Medical Decision Making  Number and Complexity of problems: 3 Acute moderate complexity  Differential Diagnosis: as above     Conditions and Status        Condition is improving.     [facility] Data  External documents reviewed: reviewed  Cardiac tracing (EKG, telemetry) interpretation: reviewed  Radiology interpretation: reviewed  Labs reviewed: as above   Any tests that were considered but not ordered: none     Decision rules/scores evaluated (example [id number], [place], etc): none     Discussed with: Patient and bedside RN     Care Planning  Shared decision making: Patient apprised of current labs, vitals, imaging and treatment plan.  They are agreeable with proceeding with plans as discussed.   Code status and discussions: full code     Disposition  Social Determinants of Health that impact treatment or disposition: none   I expect the patient to be discharged back to [facility] in 3-4 days.         Electronically signed by Dominick Kahn MD, 01/23/25, 09:50 CST.

## 2025-01-24 LAB
ANION GAP SERPL CALCULATED.3IONS-SCNC: 11 MMOL/L (ref 5–15)
BUN SERPL-MCNC: 44 MG/DL (ref 8–23)
BUN/CREAT SERPL: 26.8 (ref 7–25)
CALCIUM SPEC-SCNC: 9.1 MG/DL (ref 8.6–10.5)
CHLORIDE SERPL-SCNC: 112 MMOL/L (ref 98–107)
CO2 SERPL-SCNC: 20 MMOL/L (ref 22–29)
CREAT SERPL-MCNC: 1.64 MG/DL (ref 0.76–1.27)
CRP SERPL-MCNC: 5.71 MG/DL (ref 0–0.5)
DEPRECATED RDW RBC AUTO: 49.4 FL (ref 37–54)
EGFRCR SERPLBLD CKD-EPI 2021: 41 ML/MIN/1.73
ERYTHROCYTE [DISTWIDTH] IN BLOOD BY AUTOMATED COUNT: 14.6 % (ref 12.3–15.4)
ERYTHROCYTE [SEDIMENTATION RATE] IN BLOOD: 52 MM/HR (ref 0–20)
GLUCOSE SERPL-MCNC: 149 MG/DL (ref 65–99)
HCT VFR BLD AUTO: 33.2 % (ref 37.5–51)
HGB BLD-MCNC: 10.7 G/DL (ref 13–17.7)
MCH RBC QN AUTO: 29.7 PG (ref 26.6–33)
MCHC RBC AUTO-ENTMCNC: 32.2 G/DL (ref 31.5–35.7)
MCV RBC AUTO: 92.2 FL (ref 79–97)
PLATELET # BLD AUTO: 105 10*3/MM3 (ref 140–450)
PMV BLD AUTO: 10.7 FL (ref 6–12)
POTASSIUM SERPL-SCNC: 4.1 MMOL/L (ref 3.5–5.2)
RBC # BLD AUTO: 3.6 10*6/MM3 (ref 4.14–5.8)
SODIUM SERPL-SCNC: 143 MMOL/L (ref 136–145)
WBC NRBC COR # BLD AUTO: 7.84 10*3/MM3 (ref 3.4–10.8)

## 2025-01-24 PROCEDURE — 25010000002 CEFTRIAXONE PER 250 MG: Performed by: STUDENT IN AN ORGANIZED HEALTH CARE EDUCATION/TRAINING PROGRAM

## 2025-01-24 PROCEDURE — 97116 GAIT TRAINING THERAPY: CPT

## 2025-01-24 PROCEDURE — 94799 UNLISTED PULMONARY SVC/PX: CPT

## 2025-01-24 PROCEDURE — 86140 C-REACTIVE PROTEIN: CPT | Performed by: STUDENT IN AN ORGANIZED HEALTH CARE EDUCATION/TRAINING PROGRAM

## 2025-01-24 PROCEDURE — 25010000002 DEXAMETHASONE PER 1 MG: Performed by: NURSE PRACTITIONER

## 2025-01-24 PROCEDURE — 85027 COMPLETE CBC AUTOMATED: CPT | Performed by: STUDENT IN AN ORGANIZED HEALTH CARE EDUCATION/TRAINING PROGRAM

## 2025-01-24 PROCEDURE — 25010000002 ENOXAPARIN PER 10 MG: Performed by: NURSE PRACTITIONER

## 2025-01-24 PROCEDURE — 97110 THERAPEUTIC EXERCISES: CPT

## 2025-01-24 PROCEDURE — 80048 BASIC METABOLIC PNL TOTAL CA: CPT | Performed by: STUDENT IN AN ORGANIZED HEALTH CARE EDUCATION/TRAINING PROGRAM

## 2025-01-24 PROCEDURE — 97530 THERAPEUTIC ACTIVITIES: CPT

## 2025-01-24 PROCEDURE — 97535 SELF CARE MNGMENT TRAINING: CPT

## 2025-01-24 PROCEDURE — 85652 RBC SED RATE AUTOMATED: CPT | Performed by: STUDENT IN AN ORGANIZED HEALTH CARE EDUCATION/TRAINING PROGRAM

## 2025-01-24 PROCEDURE — 94667 MNPJ CHEST WALL 1ST: CPT

## 2025-01-24 PROCEDURE — 94664 DEMO&/EVAL PT USE INHALER: CPT

## 2025-01-24 PROCEDURE — 94761 N-INVAS EAR/PLS OXIMETRY MLT: CPT

## 2025-01-24 RX ORDER — ALBUTEROL SULFATE 90 UG/1
2 INHALANT RESPIRATORY (INHALATION)
Status: DISCONTINUED | OUTPATIENT
Start: 2025-01-24 | End: 2025-01-28 | Stop reason: HOSPADM

## 2025-01-24 RX ADMIN — BENZOCAINE 6 MG-MENTHOL 10 MG LOZENGES 1 EACH: at 17:32

## 2025-01-24 RX ADMIN — IPRATROPIUM BROMIDE 2 PUFF: 17 AEROSOL, METERED RESPIRATORY (INHALATION) at 18:53

## 2025-01-24 RX ADMIN — IPRATROPIUM BROMIDE 2 PUFF: 17 AEROSOL, METERED RESPIRATORY (INHALATION) at 06:03

## 2025-01-24 RX ADMIN — BENZOCAINE 6 MG-MENTHOL 10 MG LOZENGES 1 EACH: at 03:15

## 2025-01-24 RX ADMIN — DEXAMETHASONE SODIUM PHOSPHATE 6 MG: 10 INJECTION INTRAMUSCULAR; INTRAVENOUS at 09:09

## 2025-01-24 RX ADMIN — CARVEDILOL 3.12 MG: 3.12 TABLET, FILM COATED ORAL at 09:09

## 2025-01-24 RX ADMIN — BUDESONIDE AND FORMOTEROL FUMARATE DIHYDRATE 2 PUFF: 160; 4.5 AEROSOL RESPIRATORY (INHALATION) at 18:54

## 2025-01-24 RX ADMIN — IPRATROPIUM BROMIDE 2 PUFF: 17 AEROSOL, METERED RESPIRATORY (INHALATION) at 10:37

## 2025-01-24 RX ADMIN — ENOXAPARIN SODIUM 40 MG: 100 INJECTION SUBCUTANEOUS at 17:32

## 2025-01-24 RX ADMIN — ALBUTEROL SULFATE 2 PUFF: 108 INHALANT RESPIRATORY (INHALATION) at 18:54

## 2025-01-24 RX ADMIN — Medication 10 ML: at 09:09

## 2025-01-24 RX ADMIN — IPRATROPIUM BROMIDE 2 PUFF: 17 AEROSOL, METERED RESPIRATORY (INHALATION) at 14:08

## 2025-01-24 RX ADMIN — CARVEDILOL 3.12 MG: 3.12 TABLET, FILM COATED ORAL at 17:32

## 2025-01-24 RX ADMIN — TAMSULOSIN HYDROCHLORIDE 0.4 MG: 0.4 CAPSULE ORAL at 21:33

## 2025-01-24 RX ADMIN — BUDESONIDE AND FORMOTEROL FUMARATE DIHYDRATE 2 PUFF: 160; 4.5 AEROSOL RESPIRATORY (INHALATION) at 06:05

## 2025-01-24 RX ADMIN — SODIUM CHLORIDE 1000 MG: 900 INJECTION INTRAVENOUS at 21:33

## 2025-01-24 RX ADMIN — ALBUTEROL SULFATE 2 PUFF: 108 INHALANT RESPIRATORY (INHALATION) at 16:39

## 2025-01-24 RX ADMIN — AZITHROMYCIN 500 MG: 250 TABLET, FILM COATED ORAL at 21:33

## 2025-01-24 RX ADMIN — Medication 10 ML: at 22:02

## 2025-01-24 RX ADMIN — BENZOCAINE 6 MG-MENTHOL 10 MG LOZENGES 1 EACH: at 09:09

## 2025-01-24 NOTE — THERAPY TREATMENT NOTE
Acute Care - Occupational Therapy Treatment Note  [facility]     Patient Name: Manohar Garcia  : 1940  MRN: [id number]  Today's Date: 2025             Admit Date: 2025       ICD-10-CM ICD-9-CM   1. Shortness of breath  R06.02 786.05   2. COVID-19  U07.1 079.89   3. Influenza  J11.1 487.1   4. 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    Thrombocytopenia    Hyperlactatemia    Hip fracture    Closed 2-part intertrochanteric fracture of proximal end of right femur    Acute blood loss anemia    Stage 3b chronic kidney disease    COVID-19 virus detected    Influenza A    Hypoxia     Past Medical History:   Diagnosis Date    Dementia     GERD (gastroesophageal reflux disease)     Liver disorder      Past Surgical History:   Procedure Laterality Date    HEMORRHOIDECTOMY      HERNIA REPAIR      HIP TROCHANTERIC NAILING WITH INTRAMEDULLARY HIP SCREW Right 2024    Procedure: HIP TROCHANTERIC NAILING SHORT WITH INTRAMEDULLARY HIP SCREW;  Surgeon: Arcenio Chandra MD;  Location: [facility];  Service: Orthopedics;  Laterality: Right;         OT ASSESSMENT FLOWSHEET (Last 12 Hours)       OT Evaluation and Treatment       Row Name 25 0840                   OT Time and Intention    Subjective Information complains of;fatigue  -TS        Document Type therapy note (daily note)  -TS        Mode of Treatment occupational therapy  -TS        Patient Effort excellent  -TS           General Information    Existing Precautions/Restrictions fall  -TS           Cognition    Orientation Status (Cognition) oriented x 4  -TS        Personal Safety Interventions fall prevention program maintained;gait belt;nonskid shoes/slippers  when out of bed  -TS           Activities of Daily Living    BADL Assessment/Intervention bathing;upper body dressing;lower body dressing;toileting  -TS           Bathing Assessment/Intervention    [place] Level (Bathing) chest/trunk;standby assist;perineal area;set up  -TS        Position (Bathing) unsupported sitting  -TS           Upper Body Dressing Assessment/Training    [place] Level (Upper Body Dressing) don;doff;set up;contact guard assist  -TS        Position (Upper Body Dressing) unsupported sitting  -TS           Lower Body Dressing Assessment/Training    [place] Level (Lower Body Dressing) don;doff;pants/bottoms;minimum assist (75% patient effort)  -TS        Position (Lower Body Dressing) unsupported sitting;supported standing  -TS           Toileting Assessment/Training    [place] Level (Toileting) perform perineal hygiene;standby assist;adjust/manage clothing;contact guard assist  -TS        Assistive Devices (Toileting) commode, bedside without drop arms  -TS        Position (Toileting) unsupported sitting;supported standing  -TS           Bed Mobility    Supine-Sit [place] (Bed Mobility) supervision  -TS        Assistive Device (Bed Mobility) head of bed elevated  -TS           Functional Mobility    Functional Mobility- Ind. Level standby assist  -TS        Functional Mobility- Device walker, front-wheeled  -TS        Functional Mobility- Comment in room  -TS           Transfer Assessment/Treatment    Transfers sit-stand transfer;stand-sit transfer  -TS           Sit-Stand Transfer    Sit-Stand [place] (Transfers) standby assist  -TS        Assistive Device (Sit-Stand Transfers) walker, front-wheeled  -TS           Stand-Sit Transfer    Stand-Sit [place] (Transfers) standby assist  -TS        Assistive Device (Stand-Sit Transfers) walker, front-wheeled  -TS           Plan of Care Review    Plan of Care Reviewed With patient  -TS        Progress improving  -TS         Outcome Evaluation Pt completed tx on room air and O2 remained in mid to upper 90's. Pt min A with LB dressing and SBA for toileting task on BSC. Pt ambulated with RW with SBA. Pt would benefit from continued rehab. Continue OT POC  -TS           Positioning and Restraints    Pre-Treatment Position in bed  -TS        Post Treatment Position chair  -TS        In Chair reclined;call light within reach;encouraged to call for assist;exit alarm on;waffle cushion  -TS           Therapy Plan Review/Discharge Plan (OT)    Anticipated Discharge Disposition (OT) skilled nursing facility  -                  User Key  (r) = Recorded By, (t) = Taken By, (c) = Cosigned By      Initials Name Effective Dates     Aleksandra Yi, EMERALD 02/03/23 -                      Occupational Therapy Education       Title: PT OT SLP Therapies (In Progress)       Topic: Occupational Therapy (In Progress)       Point: ADL training (Done)       Description:   Instruct learner(s) on proper safety adaptation and remediation techniques during self care or transfers.   Instruct in proper use of assistive devices.                  Learning Progress Summary            Patient Acceptance, E, VU by TS at 1/24/2025 1221    Acceptance, E, VU by  at 1/23/2025 1510                      Point: Home exercise program (Not Started)       Description:   Instruct learner(s) on appropriate technique for monitoring, assisting and/or progressing therapeutic exercises/activities.                  Learner Progress:  Not documented in this visit.              Point: Precautions (Done)       Description:   Instruct learner(s) on prescribed precautions during self-care and functional transfers.                  Learning Progress Summary            Patient Acceptance, E, VU by  at 1/23/2025 1510                      Point: Body mechanics (Done)       Description:   Instruct learner(s) on proper positioning and spine alignment during self-care, functional  mobility activities and/or exercises.                  Learning Progress Summary            Patient Acceptance, E, VU by  at 1/23/2025 1510                                      User Key       Initials Effective Dates Name Provider Type Discipline     07/11/23 -  Erika Mann OTR/L Occupational Therapist OT     02/03/23 -  Aleksandra iY COTA Occupational Therapist Assistant OT                      OT Recommendation and Plan     Plan of Care Review  Plan of Care Reviewed With: patient  Progress: improving  Outcome Evaluation: Pt completed tx on room air and O2 remained in mid to upper 90's. Pt min A with LB dressing and SBA for toileting task on BSC. Pt ambulated with RW with SBA. Pt would benefit from continued rehab. Continue OT POC  Plan of Care Reviewed With: patient  Outcome Evaluation: Pt completed tx on room air and O2 remained in mid to upper 90's. Pt min A with LB dressing and SBA for toileting task on BSC. Pt ambulated with RW with SBA. Pt would benefit from continued rehab. Continue OT POC     Outcome Measures       Row Name 01/24/25 1200             How much help from another is currently needed...    Putting on and taking off regular lower body clothing? 3  -TS      Bathing (including washing, rinsing, and drying) 3  -TS      Toileting (which includes using toilet bed pan or urinal) 3  -TS      Putting on and taking off regular upper body clothing 3  -TS      Taking care of personal grooming (such as brushing teeth) 4  -TS      Eating meals 4  -TS      AM-PAC 6 Clicks Score (OT) 20  -TS                User Key  (r) = Recorded By, (t) = Taken By, (c) = Cosigned By      Initials Name Provider Type    TS Aleksandra Yi COTA Occupational Therapist Assistant                    Time Calculation:    Time Calculation- OT       Row Name 01/24/25 1221             Time Calculation- OT    OT Start Time 0840  -TS      OT Stop Time 0935  -TS      OT Time Calculation (min) 55 min  -TS      Total  Timed Code Minutes- OT 55 minute(s)  -TS      OT Received On 01/24/25  -TS         Timed Charges    70894 - OT Self Care/Mgmt Minutes 55  -TS         Total Minutes    Timed Charges Total Minutes 55  -TS       Total Minutes 55  -TS                User Key  (r) = Recorded By, (t) = Taken By, (c) = Cosigned By      Initials Name Provider Type    TS Aleksandra Yi COTA Occupational Therapist Assistant                  Therapy Charges for Today       Code Description Service Date Service Provider Modifiers Qty    89091252041 HC OT SELF CARE/MGMT/TRAIN EA 15 MIN 1/24/2025 Aleksandra Yi COTA GO 4                 Aleksandra MILLER. EMERALD Yi  1/24/2025

## 2025-01-24 NOTE — CASE MANAGEMENT/SOCIAL WORK
Continued Stay Note  [facility]     Patient Name: Manohar Garcia  MRN: [id number]  Today's Date: 1/24/2025    Admit Date: 1/22/2025        Discharge Plan       Row Name 01/24/25 0815       Plan    Plan Comments Patient was admitted from [facility].  Tentative plan is for patient to return to [facility] upon discharge.  [facility] has advised patient does have a bed hold.  Will plan on patient returning to [facility] upon discharge [phone].                   Discharge Codes    No documentation.                       CHANDAN Mancilla

## 2025-01-24 NOTE — THERAPY TREATMENT NOTE
Acute Care - Physical Therapy Treatment Note  [facility]     Patient Name: Manohar Garcia  : 1940  MRN: [id number]  Today's Date: 2025      Visit Dx:     ICD-10-CM ICD-9-CM   1. Shortness of breath  R06.02 786.05   2. COVID-19  U07.1 079.89   3. Influenza  J11.1 487.1   4. 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    Thrombocytopenia    Hyperlactatemia    Hip fracture    Closed 2-part intertrochanteric fracture of proximal end of right femur    Acute blood loss anemia    Stage 3b chronic kidney disease    COVID-19 virus detected    Influenza A    Hypoxia     Past Medical History:   Diagnosis Date    Dementia     GERD (gastroesophageal reflux disease)     Liver disorder      Past Surgical History:   Procedure Laterality Date    HEMORRHOIDECTOMY      HERNIA REPAIR      HIP TROCHANTERIC NAILING WITH INTRAMEDULLARY HIP SCREW Right 2024    Procedure: HIP TROCHANTERIC NAILING SHORT WITH INTRAMEDULLARY HIP SCREW;  Surgeon: Arcenio Chandra MD;  Location: [facility];  Service: Orthopedics;  Laterality: Right;     PT Assessment (Last 12 Hours)       PT Evaluation and Treatment       Row Name 25 1433          Physical Therapy Time and Intention    Subjective Information no complaints  -KJ     Document Type therapy note (daily note)  -KJ     Mode of Treatment physical therapy  -KJ     Patient Effort good  -KJ       Row Name 25 1433          General Information    Existing Precautions/Restrictions fall  -KJ       Row Name 25 1433          Pain    Pretreatment Pain Rating 0/10 - no pain  -KJ     Posttreatment Pain Rating 0/10 - no pain  -KJ       Row Name 25 1433          Bed Mobility    Supine-Sit  [place] (Bed Mobility) minimum assist (75% patient effort)  -KJ     Sit-Supine [place] (Bed Mobility) supervision  -KJ       Row Name 01/24/25 1433          Sit-Stand Transfer    Sit-Stand [place] (Transfers) verbal cues;contact guard  -KJ     Assistive Device (Sit-Stand Transfers) walker, front-wheeled  -KJ       Row Name 01/24/25 1433          Stand-Sit Transfer    Stand-Sit [place] (Transfers) verbal cues;contact guard  -KJ       Row Name 01/24/25 1433          Gait/Stairs (Locomotion)    [place] Level (Gait) contact guard;verbal cues;minimum assist (75% patient effort)  -KJ     Assistive Device (Gait) walker, front-wheeled  -KJ     Distance in Feet (Gait) 45  -KJ     Deviations/Abnormal Patterns (Gait) gait speed decreased  -KJ       Row Name 01/24/25 1433          Motor Skills    Therapeutic Exercise aerobic  -KJ       Row Name 01/24/25 1433          Aerobic Exercise    Comment, Aerobic Exercise (Therapeutic Exercise) AROM BLE's  -KJ       Row Name 01/24/25 1433          Positioning and Restraints    Pre-Treatment Position in bed  -KJ     Post Treatment Position bed  -KJ               User Key  (r) = Recorded By, (t) = Taken By, (c) = Cosigned By      Initials Name Provider Type    Mckayla Vogel, PTA Physical Therapist Assistant                    Physical Therapy Education       Title: PT OT SLP Therapies (In Progress)       Topic: Physical Therapy (In Progress)       Point: Mobility training (Done)       Learning Progress Summary            Patient Acceptance, E, VU by MS at 1/23/2025 2139    Comment: role of PT in his care                      Point: Home exercise program (Not Started)       Learner Progress:  Not documented in this visit.              Point: Body mechanics (Not Started)       Learner Progress:  Not documented in this visit.              Point: Precautions (Not Started)       Learner Progress:  Not documented in this visit.                              User Key        Initials Effective Dates Name Provider Type Discipline    MS 07/11/23 -  Tamra Roberts R, PT, DPT, NCS Physical Therapist PT                  PT Recommendation and Plan     Progress: improving  Outcome Evaluation: PT tx completed. Pt supine in bed, no c/o. Bed mobility S, amb short distance in room utilizing r wx CG n 2L. Decreased tolerance to activity. Recommend cont Pt for strengthening.   Outcome Measures       Row Name 01/24/25 1200             How much help from another is currently needed...    Putting on and taking off regular lower body clothing? 3  -TS      Bathing (including washing, rinsing, and drying) 3  -TS      Toileting (which includes using toilet bed pan or urinal) 3  -TS      Putting on and taking off regular upper body clothing 3  -TS      Taking care of personal grooming (such as brushing teeth) 4  -TS      Eating meals 4  -TS      AM-PAC 6 Clicks Score (OT) 20  -TS                User Key  (r) = Recorded By, (t) = Taken By, (c) = Cosigned By      Initials Name Provider Type    Aleksandra Haji COTA Occupational Therapist Assistant                     Time Calculation:    PT Charges       Row Name 01/24/25 1520             Time Calculation    Start Time 1433  -KJ      Stop Time 1511  -KJ      Time Calculation (min) 38 min  -KJ      PT Received On 01/24/25  -KJ      PT Goal Re-Cert Due Date 02/02/25  -KJ         Time Calculation- PT    Total Timed Code Minutes- PT 38 minute(s)  -KJ                User Key  (r) = Recorded By, (t) = Taken By, (c) = Cosigned By      Initials Name Provider Type    Mckayla Vogel PTA Physical Therapist Assistant                  Therapy Charges for Today       Code Description Service Date Service Provider Modifiers Qty    15315119359 HC GAIT TRAINING EA 15 MIN 1/24/2025 Mckayla John, [place] GP 1    56440092044 HC PT THER PROC EA 15 MIN 1/24/2025 Mckayla John PTA GP 1    12232845161 HC PT THERAPEUTIC ACT EA 15 MIN 1/24/2025 Mckayla John PTA  GP 1            PT G-Codes  Outcome Measure Options: AM-PAC 6 Clicks Basic Mobility (PT)  AM-PAC 6 Clicks Score (PT): 16  AM-PAC 6 Clicks Score (OT): 20    Mckayla John, PTA  1/24/2025

## 2025-01-24 NOTE — PROGRESS NOTES
[facility] Medicine Services  INPATIENT PROGRESS NOTE    Patient Name: Manohar Garcia  Date of Admission: 1/22/2025  Today's Date: 01/24/25  Length of Stay: 2  Primary Care Physician: Dani Farfan MD    Subjective   Chief Complaint: cough    No acute events overnight.  Patient was on room air this morning.  However said that he was feeling a little short of breath.  At that time he was saturating 97% on room air.  I put him on 1.5 L of oxygen.  Patient airway rattling, encouraged to cough however he is unable to adequately expectorate.  He cannot have Mucomyst nebulizer or any other nebulizer due to the his COVID status as per hospital protocol.  Will add chest physiotherapy.  Continuing guaifenesin and other inhalers.    Review of Systems   Respiratory:  Positive for cough.       All pertinent negatives and positives are as above. All other systems have been reviewed and are negative unless otherwise stated.     Objective    Temp:  [97.5 °F (36.4 °C)-98.3 °F (36.8 °C)] 97.9 °F (36.6 °C)  Heart Rate:  [62-77] 66  Resp:  [18-24] 18  BP: (122-139)/(65-82) 134/74  Physical Exam  GEN: Awake, alert, interactive, in NAD, very hard of hearing   HEENT: Atraumatic, EOMI, Anicteric  Lungs: audible secretions, diffuse rhonchi   Heart: RRR, +S1/s2, no rub  ABD: soft, nt/nd, +BS, no guarding/rebound  Extremities: atraumatic, no cyanosis, no edema  Skin: no rashes or lesions  Neuro: AAOx3, no focal deficits  Psych: normal mood & affect    Results Review:  I have reviewed the labs, radiology results, and diagnostic studies.    Laboratory Data:   Results from last 7 days   Lab Units 01/23/25  0404 01/22/25  1145   WBC 10*3/mm3 2.83* 3.67   HEMOGLOBIN g/dL 9.0* 10.9*   HEMATOCRIT % 28.8* 33.9*   PLATELETS 10*3/mm3 79* 99*        Results from last 7 days   Lab Units 01/23/25  0404 01/22/25  1556 01/22/25  1145   SODIUM mmol/L 139  --  139   SODIUM, ARTERIAL mmol/L  --  141  --    POTASSIUM  mmol/L 3.9  --  3.6   CHLORIDE mmol/L 113*  --  108*   CO2 mmol/L 17.0*  --  17.0*   BUN mg/dL 34*  --  31*   CREATININE mg/dL 1.57*  --  1.79*   CALCIUM mg/dL 8.2*  --  8.6   BILIRUBIN mg/dL 0.4  --   --    ALK PHOS U/L 116  --   --    ALT (SGPT) U/L 10  --   --    AST (SGOT) U/L 19  --   --    GLUCOSE mg/dL 130*  --  146*       Culture Data:   @[facility]@    Radiology Data:   Imaging Results (Last 24 Hours)       Procedure Component Value Units Date/Time    CT Chest Without Contrast Diagnostic [[id number]] Collected: 01/23/25 1607     Updated: 01/23/25 1622    Narrative:      EXAMINATION: CT CHEST WO CONTRAST DIAGNOSTIC- 1/23/2025 4:07 PM     HISTORY: hypoxia, abnormal chest x-ray; R06.02-Shortness of breath;  U07.1-COVID-19; J11.1-Influenza due to unidentified influenza virus with  other respiratory manifestations     TOTAL DOSE: 470.48 mGy.cm (Automatic exposure control technique was  implemented in an effort to keep the radiation dose as low as possible  without compromising image quality)     REPORT: Spiral CT of the chest was performed without intravenous  contrast from the thoracic inlet through the upper abdomen.  Reconstructed coronal and sagittal images are also reviewed.     Comparison: Chest x-ray 1/22/2025. Abdomen CT 1/16/2025     Review of the lung windows demonstrates mild respiratory motion  artifact, there is chronic elevation of the right diaphragm. There is  stable parenchymal opacity with air bronchograms in the right infrahilar  lung along the medial margin of the elevated hemidiaphragm. This is  favored to represent persistent atelectasis, though there is some  separate patchy groundglass infiltrate in the superior segment the right  lower lobe as well. There is mild linear atelectasis in the medial basal  segment of the left lower lobe. No pneumothorax or pleural effusion is  identified.     Soft tissue windows demonstrate bilateral gynecomastia. No intrathoracic  lymphadenopathy is  identified. There is ectasia of the thoracic aorta  without evidence of an aortic aneurysm. Evaluation of the vascular  structures is limited without intravenous contrast. The central  pulmonary arteries appear normal in caliber. There is tortuosity of the  descending thoracic aorta. In the upper abdomen, there is persistent  right-sided hydronephrosis, the left kidney is not visualized, the  previous abdominal CT demonstrates a horseshoe kidney, with a more  inferior position of the left kidney than is imaged today. The liver  appears cirrhotic. The stomach is distended with ingested food. Oval  mass posterior to the spleen and stomach measures 3.8 cm and is probably  an accessory spleen. There are collateral vessels in the left upper  quadrant as before.       Impression:      1. Marked elevation of the right hemidiaphragm with associated  compressive atelectasis of the right lower lobe, this appears to account  for the opacity seen on chest x-ray and no lung mass is identified. Some  patchy groundglass opacity is also seen in the superior segment of the  right lower lobe, may also be related to atelectasis versus a small area  of pneumonia. No lung consolidation related to pneumonia however.  2. Cirrhotic appearance of the liver with evidence of portal  hypertension.  3. Chronic right-sided hydronephrosis.        This report was signed and finalized on 1/23/2025 4:19 PM by Dr. Antonino Victor MD.               I have reviewed the patient's current medications.     Assessment/Plan   Assessment  Active Hospital Problems    Diagnosis     **Hypoxia     Stage 3b chronic kidney disease     COVID-19 virus detected     Influenza A        Treatment Plan:    # Covid-19  # Influenza  # Hypoxia - on 2L NC  CXR showing RL consolidation that is somewhat similar as previously seen on abdominal CT 1/16   CT chest w/o contrast: Marked elevation of the right hemidiaphragm with associated  compressive atelectasis of the right  lower lobe, this appears to account  for the opacity seen on chest x-ray and no lung mass is identified.No lung consolidation related to pneumonia however.   Currently afebrile, normal white count   Given advanced age, elevated procalcitonin (which could be 2/2 CKD), lactic acidosis and hypoxia/tachypnea at presentation, started antibiotics  - complete 10 days of Decadron for covid hypoxia  - continue Ceftriaxone ans Azithromycin  -Cannot have Mucomyst nebulizer ordered yesterday due to his COVID status as per hospital protocol  - wean off oxygen as tolerated   -Continue Symbicort, and albuterol and chest PT    # Liver Cirrhosis  # Portal Hypertension - seen on CT   - started Coreg this admission to lower splanchnic pressure  - euvolemic will not be adding any diuretics at this time   - will need outpatient follow up with GI for EGD and assessing for varices     # CKD 3B  At baseline  Avoid nephrotoxins       Medical Decision Making  Number and Complexity of problems: 3 Acute moderate complexity  Differential Diagnosis: as above     Conditions and Status        Condition is improving.     MDM Data  External documents reviewed: reviewed  Cardiac tracing (EKG, telemetry) interpretation: reviewed  Radiology interpretation: reviewed  Labs reviewed: as above   Any tests that were considered but not ordered: none     Decision rules/scores evaluated (example [id number], [place], etc): none     Discussed with: Patient, RT and bedside RN     Care Planning  Shared decision making: Patient apprised of current labs, vitals, imaging and treatment plan.  They are agreeable with proceeding with plans as discussed.   Code status and discussions: full code     Disposition  Social Determinants of Health that impact treatment or disposition: none   I expect the patient to be discharged back to [facility] in 1-2 days.         Electronically signed by Dominick Kahn MD, 01/24/25, 07:42 CST.

## 2025-01-25 LAB
ANION GAP SERPL CALCULATED.3IONS-SCNC: 7 MMOL/L (ref 5–15)
BACTERIA SPEC RESP CULT: NORMAL
BUN SERPL-MCNC: 44 MG/DL (ref 8–23)
BUN/CREAT SERPL: 28.8 (ref 7–25)
CALCIUM SPEC-SCNC: 8.5 MG/DL (ref 8.6–10.5)
CHLORIDE SERPL-SCNC: 116 MMOL/L (ref 98–107)
CO2 SERPL-SCNC: 18 MMOL/L (ref 22–29)
CREAT SERPL-MCNC: 1.53 MG/DL (ref 0.76–1.27)
DEPRECATED RDW RBC AUTO: 49.9 FL (ref 37–54)
EGFRCR SERPLBLD CKD-EPI 2021: 44.6 ML/MIN/1.73
ERYTHROCYTE [DISTWIDTH] IN BLOOD BY AUTOMATED COUNT: 14.7 % (ref 12.3–15.4)
GLUCOSE SERPL-MCNC: 128 MG/DL (ref 65–99)
GRAM STN SPEC: NORMAL
HCT VFR BLD AUTO: 28.9 % (ref 37.5–51)
HGB BLD-MCNC: 8.9 G/DL (ref 13–17.7)
MCH RBC QN AUTO: 28.6 PG (ref 26.6–33)
MCHC RBC AUTO-ENTMCNC: 30.8 G/DL (ref 31.5–35.7)
MCV RBC AUTO: 92.9 FL (ref 79–97)
PLATELET # BLD AUTO: 84 10*3/MM3 (ref 140–450)
PMV BLD AUTO: 11 FL (ref 6–12)
POTASSIUM SERPL-SCNC: 4.8 MMOL/L (ref 3.5–5.2)
RBC # BLD AUTO: 3.11 10*6/MM3 (ref 4.14–5.8)
SODIUM SERPL-SCNC: 141 MMOL/L (ref 136–145)
WBC NRBC COR # BLD AUTO: 5.23 10*3/MM3 (ref 3.4–10.8)

## 2025-01-25 PROCEDURE — 94799 UNLISTED PULMONARY SVC/PX: CPT

## 2025-01-25 PROCEDURE — 85027 COMPLETE CBC AUTOMATED: CPT | Performed by: STUDENT IN AN ORGANIZED HEALTH CARE EDUCATION/TRAINING PROGRAM

## 2025-01-25 PROCEDURE — 25010000002 CEFTRIAXONE PER 250 MG: Performed by: STUDENT IN AN ORGANIZED HEALTH CARE EDUCATION/TRAINING PROGRAM

## 2025-01-25 PROCEDURE — 25010000002 DEXAMETHASONE PER 1 MG: Performed by: NURSE PRACTITIONER

## 2025-01-25 PROCEDURE — 97530 THERAPEUTIC ACTIVITIES: CPT

## 2025-01-25 PROCEDURE — 80048 BASIC METABOLIC PNL TOTAL CA: CPT | Performed by: STUDENT IN AN ORGANIZED HEALTH CARE EDUCATION/TRAINING PROGRAM

## 2025-01-25 PROCEDURE — 97116 GAIT TRAINING THERAPY: CPT

## 2025-01-25 PROCEDURE — 25010000002 ENOXAPARIN PER 10 MG: Performed by: NURSE PRACTITIONER

## 2025-01-25 PROCEDURE — 97110 THERAPEUTIC EXERCISES: CPT

## 2025-01-25 PROCEDURE — 94669 MECHANICAL CHEST WALL OSCILL: CPT

## 2025-01-25 RX ADMIN — IPRATROPIUM BROMIDE 2 PUFF: 17 AEROSOL, METERED RESPIRATORY (INHALATION) at 11:17

## 2025-01-25 RX ADMIN — SODIUM CHLORIDE 1000 MG: 900 INJECTION INTRAVENOUS at 21:00

## 2025-01-25 RX ADMIN — BUDESONIDE AND FORMOTEROL FUMARATE DIHYDRATE 2 PUFF: 160; 4.5 AEROSOL RESPIRATORY (INHALATION) at 19:52

## 2025-01-25 RX ADMIN — DEXAMETHASONE SODIUM PHOSPHATE 6 MG: 10 INJECTION INTRAMUSCULAR; INTRAVENOUS at 09:03

## 2025-01-25 RX ADMIN — CARVEDILOL 3.12 MG: 3.12 TABLET, FILM COATED ORAL at 17:44

## 2025-01-25 RX ADMIN — IPRATROPIUM BROMIDE 2 PUFF: 17 AEROSOL, METERED RESPIRATORY (INHALATION) at 07:25

## 2025-01-25 RX ADMIN — ALBUTEROL SULFATE 2 PUFF: 108 INHALANT RESPIRATORY (INHALATION) at 11:16

## 2025-01-25 RX ADMIN — TAMSULOSIN HYDROCHLORIDE 0.4 MG: 0.4 CAPSULE ORAL at 21:00

## 2025-01-25 RX ADMIN — BUDESONIDE AND FORMOTEROL FUMARATE DIHYDRATE 2 PUFF: 160; 4.5 AEROSOL RESPIRATORY (INHALATION) at 07:25

## 2025-01-25 RX ADMIN — Medication 10 ML: at 09:03

## 2025-01-25 RX ADMIN — CARVEDILOL 3.12 MG: 3.12 TABLET, FILM COATED ORAL at 09:03

## 2025-01-25 RX ADMIN — IPRATROPIUM BROMIDE 2 PUFF: 17 AEROSOL, METERED RESPIRATORY (INHALATION) at 15:03

## 2025-01-25 RX ADMIN — ALBUTEROL SULFATE 2 PUFF: 108 INHALANT RESPIRATORY (INHALATION) at 07:25

## 2025-01-25 RX ADMIN — Medication 10 ML: at 21:01

## 2025-01-25 RX ADMIN — ALBUTEROL SULFATE 2 PUFF: 108 INHALANT RESPIRATORY (INHALATION) at 19:52

## 2025-01-25 RX ADMIN — ENOXAPARIN SODIUM 40 MG: 100 INJECTION SUBCUTANEOUS at 17:44

## 2025-01-25 RX ADMIN — ALBUTEROL SULFATE 2 PUFF: 108 INHALANT RESPIRATORY (INHALATION) at 15:03

## 2025-01-25 RX ADMIN — IPRATROPIUM BROMIDE 2 PUFF: 17 AEROSOL, METERED RESPIRATORY (INHALATION) at 19:52

## 2025-01-25 NOTE — PROGRESS NOTES
[facility] Medicine Services  INPATIENT PROGRESS NOTE    Patient Name: Manohar Garcia  Date of Admission: 1/22/2025  Today's Date: 01/25/25  Length of Stay: 3  Primary Care Physician: Dani Farfan MD    Subjective   Chief Complaint: cough    No acute events overnight patient looking better today.  He does not have any complaints    Review of Systems   Respiratory:  Positive for cough.       All pertinent negatives and positives are as above. All other systems have been reviewed and are negative unless otherwise stated.     Objective    Temp:  [97.6 °F (36.4 °C)-98.6 °F (37 °C)] 98.6 °F (37 °C)  Heart Rate:  [62-78] 62  Resp:  [14-20] 16  BP: (116-148)/(62-88) 140/80  Physical Exam  GEN: Awake, alert, interactive, in NAD, very hard of hearing   HEENT: Atraumatic, EOMI, Anicteric  Lungs: Sounding more clear today  Heart: RRR, +S1/s2, no rub  ABD: soft, nt/nd, +BS, no guarding/rebound  Extremities: atraumatic, no cyanosis, no edema  Skin: no rashes or lesions  Neuro: AAOx3, no focal deficits  Psych: normal mood & affect    Results Review:  I have reviewed the labs, radiology results, and diagnostic studies.    Laboratory Data:   Results from last 7 days   Lab Units 01/25/25  0200 01/24/25  1108 01/23/25  0404   WBC 10*3/mm3 5.23 7.84 2.83*   HEMOGLOBIN g/dL 8.9* 10.7* 9.0*   HEMATOCRIT % 28.9* 33.2* 28.8*   PLATELETS 10*3/mm3 84* 105* 79*        Results from last 7 days   Lab Units 01/25/25  0200 01/24/25  0907 01/23/25  0404   SODIUM mmol/L 141 143 139   POTASSIUM mmol/L 4.8 4.1 3.9   CHLORIDE mmol/L 116* 112* 113*   CO2 mmol/L 18.0* 20.0* 17.0*   BUN mg/dL 44* 44* 34*   CREATININE mg/dL 1.53* 1.64* 1.57*   CALCIUM mg/dL 8.5* 9.1 8.2*   BILIRUBIN mg/dL  --   --  0.4   ALK PHOS U/L  --   --  116   ALT (SGPT) U/L  --   --  10   AST (SGOT) U/L  --   --  19   GLUCOSE mg/dL 128* 149* 130*       Culture Data:   [facility]    Radiology Data:   Imaging Results (Last 24 Hours)        ** No results found for the last 24 hours. **            I have reviewed the patient's current medications.     Assessment/Plan   Assessment  Active Hospital Problems    Diagnosis     **Hypoxia     Stage 3b chronic kidney disease     COVID-19 virus detected     Influenza A        Treatment Plan:    # Covid-19  # Influenza  # Hypoxia - on 2L NC  CXR showing RL consolidation that is somewhat similar as previously seen on abdominal CT 1/16   CT chest w/o contrast: Marked elevation of the right hemidiaphragm with associated  compressive atelectasis of the right lower lobe, this appears to account  for the opacity seen on chest x-ray and no lung mass is identified.No lung consolidation related to pneumonia however.   Currently afebrile, normal white count   Given advanced age, elevated procalcitonin (which could be 2/2 CKD), lactic acidosis and hypoxia/tachypnea at presentation, started antibiotics  CRP 11 > 5  - complete 10 days of Decadron for covid hypoxia  - continue Ceftriaxone ans Azithromycin - day 4/5  -Cannot have Mucomyst nebulizer ordered yesterday due to his COVID status as per hospital protocol  - wean off oxygen as tolerated   -Continue Symbicort, and albuterol and chest PT    # Liver Cirrhosis  # Portal Hypertension - seen on CT   - started Coreg this admission to lower splanchnic pressure  - euvolemic will not be adding any diuretics at this time   - will need outpatient follow up with GI for EGD and assessing for varices     # CKD 3B  At baseline  Avoid nephrotoxins       Medical Decision Making  Number and Complexity of problems: 3 Acute moderate complexity  Differential Diagnosis: as above     Conditions and Status        Condition is improving.     MDM Data  External documents reviewed: reviewed  Cardiac tracing (EKG, telemetry) interpretation: reviewed  Radiology interpretation: reviewed  Labs reviewed: as above   Any tests that were considered but not ordered: none     Decision rules/scores  evaluated (example [id number], Wells, etc): none     Discussed with: Patient and bedside RN     Care Planning  Shared decision making: Patient apprised of current labs, vitals, imaging and treatment plan.  They are agreeable with proceeding with plans as discussed.   Code status and discussions: full code     Disposition  Social Determinants of Health that impact treatment or disposition: none   I expect the patient to be discharged back to [facility] in 1-2 days.         Electronically signed by Dominick Kahn MD, 01/25/25, 11:44 CST.

## 2025-01-25 NOTE — THERAPY TREATMENT NOTE
Acute Care - Physical Therapy Treatment Note  [facility]     Patient Name: Manohar Garcia  : 1940  MRN: [id number]  Today's Date: 2025      Visit Dx:     ICD-10-CM ICD-9-CM   1. Shortness of breath  R06.02 786.05   2. COVID-19  U07.1 079.89   3. Influenza  J11.1 487.1   4. 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    Thrombocytopenia    Hyperlactatemia    Hip fracture    Closed 2-part intertrochanteric fracture of proximal end of right femur    Acute blood loss anemia    Stage 3b chronic kidney disease    COVID-19 virus detected    Influenza A    Hypoxia     Past Medical History:   Diagnosis Date    Dementia     GERD (gastroesophageal reflux disease)     Liver disorder      Past Surgical History:   Procedure Laterality Date    HEMORRHOIDECTOMY      HERNIA REPAIR      HIP TROCHANTERIC NAILING WITH INTRAMEDULLARY HIP SCREW Right 2024    Procedure: HIP TROCHANTERIC NAILING SHORT WITH INTRAMEDULLARY HIP SCREW;  Surgeon: Arcenio Chandra MD;  Location: [facility];  Service: Orthopedics;  Laterality: Right;     PT Assessment (Last 12 Hours)       PT Evaluation and Treatment       Row Name 25 0845          Physical Therapy Time and Intention    Subjective Information no complaints  -KJ     Document Type therapy note (daily note)  -KJ     Mode of Treatment physical therapy  -KJ     Patient Effort good  -KJ       Row Name 25 0845          General Information    Existing Precautions/Restrictions fall  -KJ     Barriers to Rehab hearing deficit  -KJ       Row Name 25 0845          Pain    Pretreatment Pain Rating 0/10 - no pain  -KJ     Posttreatment Pain Rating 0/10 - no pain  -KJ       Row Name 25  0845          Bed Mobility    Supine-Sit [place] (Bed Mobility) minimum assist (75% patient effort)  -KJ     Comment, (Bed Mobility) to BR during tx  -KJ       Row Name 01/25/25 0845          Sit-Stand Transfer    Sit-Stand [place] (Transfers) contact guard  -KJ     Assistive Device (Sit-Stand Transfers) walker, front-wheeled  -KJ       Row Name 01/25/25 0845          Stand-Sit Transfer    Stand-Sit [place] (Transfers) contact guard  -KJ       Row Name 01/25/25 0845          Gait/Stairs (Locomotion)    [place] Level (Gait) contact guard;minimum assist (75% patient effort)  -KJ     Assistive Device (Gait) walker, front-wheeled  -KJ     Distance in Feet (Gait) 50  -KJ     Deviations/Abnormal Patterns (Gait) gait speed decreased  -KJ     Bilateral Gait Deviations forward flexed posture  -KJ       Row Name 01/25/25 0845          Aerobic Exercise    Comment, Aerobic Exercise (Therapeutic Exercise) AROM BLE's x 20 reps  -KJ       Row Name 01/25/25 0845          Positioning and Restraints    Pre-Treatment Position in bed  -KJ     Post Treatment Position chair  -KJ     In Bed exit alarm on;call light within reach;encouraged to call for assist  -KJ               User Key  (r) = Recorded By, (t) = Taken By, (c) = Cosigned By      Initials Name Provider Type    Mckayla Vogel, PTA Physical Therapist Assistant                    Physical Therapy Education       Title: PT OT SLP Therapies (In Progress)       Topic: Physical Therapy (In Progress)       Point: Mobility training (Done)       Learning Progress Summary            Patient Acceptance, LANEY, VU by MS at 1/23/2025 4031    Comment: role of PT in his care                      Point: Home exercise program (Not Started)       Learner Progress:  Not documented in this visit.              Point: Body mechanics (Not Started)       Learner Progress:  Not documented in this visit.              Point: Precautions (Not Started)       Learner Progress:  Not  documented in this visit.                              User Key       Initials Effective Dates Name Provider Type Discipline    MS 07/11/23 -  Tamra Roberts R, PT, DPT, NCS Physical Therapist PT                  PT Recommendation and Plan     Progress: improving  Outcome Evaluation: PT tx completed. Pt has no c/o. Sherlyn bed mobility, CG sit<>stand, amb several laps in room utilizing r wx CG/Sherlyn. Up in chair, encouraged to call for assistance and sitting on exit alarm.   Outcome Measures       Row Name 01/24/25 1200             How much help from another is currently needed...    Putting on and taking off regular lower body clothing? 3  -TS      Bathing (including washing, rinsing, and drying) 3  -TS      Toileting (which includes using toilet bed pan or urinal) 3  -TS      Putting on and taking off regular upper body clothing 3  -TS      Taking care of personal grooming (such as brushing teeth) 4  -TS      Eating meals 4  -TS      AM-PAC 6 Clicks Score (OT) 20  -TS                User Key  (r) = Recorded By, (t) = Taken By, (c) = Cosigned By      Initials Name Provider Type    Aleksandra Haji COTA Occupational Therapist Assistant                     Time Calculation:    PT Charges       Row Name 01/25/25 0939             Time Calculation    Start Time 0845  -KJ      Stop Time 0923  -KJ      Time Calculation (min) 38 min  -KJ      PT Received On 01/25/25  -KJ      PT Goal Re-Cert Due Date 02/02/25  -KJ         Time Calculation- PT    Total Timed Code Minutes- PT 38 minute(s)  -KJ                User Key  (r) = Recorded By, (t) = Taken By, (c) = Cosigned By      Initials Name Provider Type    Mckayla Vogel PTA Physical Therapist Assistant                  Therapy Charges for Today       Code Description Service Date Service Provider Modifiers Qty    64101033387 HC GAIT TRAINING EA 15 MIN 1/24/2025 Mckayla John, [place] GP 1    16430090350 HC PT THER PROC EA 15 MIN 1/24/2025 Mckayla John PTA GP 1     33163068877 HC PT THERAPEUTIC ACT EA 15 MIN 1/24/2025 Mckayla John, PTA GP 1    86613935734 HC GAIT TRAINING EA 15 MIN 1/25/2025 Mckayla John, PTA GP 1    36834523513 HC PT THER PROC EA 15 MIN 1/25/2025 Mckayla John, PTA GP 1    63493265977 HC PT THERAPEUTIC ACT EA 15 MIN 1/25/2025 Mckayla John, PTA GP 1            PT G-Codes  Outcome Measure Options: AM-PAC 6 Clicks Basic Mobility (PT)  AM-PAC 6 Clicks Score (PT): 17  AM-PAC 6 Clicks Score (OT): 20    Mckayla John PTA  1/25/2025

## 2025-01-26 LAB
ANION GAP SERPL CALCULATED.3IONS-SCNC: 6 MMOL/L (ref 5–15)
BUN SERPL-MCNC: 45 MG/DL (ref 8–23)
BUN/CREAT SERPL: 35.7 (ref 7–25)
CALCIUM SPEC-SCNC: 8.8 MG/DL (ref 8.6–10.5)
CHLORIDE SERPL-SCNC: 117 MMOL/L (ref 98–107)
CO2 SERPL-SCNC: 20 MMOL/L (ref 22–29)
CREAT SERPL-MCNC: 1.26 MG/DL (ref 0.76–1.27)
CRP SERPL-MCNC: 1.99 MG/DL (ref 0–0.5)
DEPRECATED RDW RBC AUTO: 49.1 FL (ref 37–54)
EGFRCR SERPLBLD CKD-EPI 2021: 56.2 ML/MIN/1.73
ERYTHROCYTE [DISTWIDTH] IN BLOOD BY AUTOMATED COUNT: 14.4 % (ref 12.3–15.4)
GLUCOSE SERPL-MCNC: 129 MG/DL (ref 65–99)
HCT VFR BLD AUTO: 30.7 % (ref 37.5–51)
HGB BLD-MCNC: 9.6 G/DL (ref 13–17.7)
MCH RBC QN AUTO: 28.9 PG (ref 26.6–33)
MCHC RBC AUTO-ENTMCNC: 31.3 G/DL (ref 31.5–35.7)
MCV RBC AUTO: 92.5 FL (ref 79–97)
PLATELET # BLD AUTO: 84 10*3/MM3 (ref 140–450)
PMV BLD AUTO: 10.4 FL (ref 6–12)
POTASSIUM SERPL-SCNC: 4.7 MMOL/L (ref 3.5–5.2)
RBC # BLD AUTO: 3.32 10*6/MM3 (ref 4.14–5.8)
SODIUM SERPL-SCNC: 143 MMOL/L (ref 136–145)
WBC NRBC COR # BLD AUTO: 3.92 10*3/MM3 (ref 3.4–10.8)

## 2025-01-26 PROCEDURE — 85027 COMPLETE CBC AUTOMATED: CPT | Performed by: STUDENT IN AN ORGANIZED HEALTH CARE EDUCATION/TRAINING PROGRAM

## 2025-01-26 PROCEDURE — 25010000002 ENOXAPARIN PER 10 MG: Performed by: NURSE PRACTITIONER

## 2025-01-26 PROCEDURE — 97110 THERAPEUTIC EXERCISES: CPT

## 2025-01-26 PROCEDURE — 25010000002 CEFTRIAXONE PER 250 MG: Performed by: STUDENT IN AN ORGANIZED HEALTH CARE EDUCATION/TRAINING PROGRAM

## 2025-01-26 PROCEDURE — 80048 BASIC METABOLIC PNL TOTAL CA: CPT | Performed by: STUDENT IN AN ORGANIZED HEALTH CARE EDUCATION/TRAINING PROGRAM

## 2025-01-26 PROCEDURE — 94799 UNLISTED PULMONARY SVC/PX: CPT

## 2025-01-26 PROCEDURE — 25010000002 DEXAMETHASONE PER 1 MG: Performed by: NURSE PRACTITIONER

## 2025-01-26 PROCEDURE — 94669 MECHANICAL CHEST WALL OSCILL: CPT

## 2025-01-26 PROCEDURE — 86140 C-REACTIVE PROTEIN: CPT | Performed by: STUDENT IN AN ORGANIZED HEALTH CARE EDUCATION/TRAINING PROGRAM

## 2025-01-26 PROCEDURE — 97116 GAIT TRAINING THERAPY: CPT

## 2025-01-26 RX ADMIN — ALBUTEROL SULFATE 2 PUFF: 108 INHALANT RESPIRATORY (INHALATION) at 06:57

## 2025-01-26 RX ADMIN — IPRATROPIUM BROMIDE 2 PUFF: 17 AEROSOL, METERED RESPIRATORY (INHALATION) at 15:06

## 2025-01-26 RX ADMIN — IPRATROPIUM BROMIDE 2 PUFF: 17 AEROSOL, METERED RESPIRATORY (INHALATION) at 11:05

## 2025-01-26 RX ADMIN — IPRATROPIUM BROMIDE 2 PUFF: 17 AEROSOL, METERED RESPIRATORY (INHALATION) at 06:57

## 2025-01-26 RX ADMIN — TAMSULOSIN HYDROCHLORIDE 0.4 MG: 0.4 CAPSULE ORAL at 20:32

## 2025-01-26 RX ADMIN — Medication 10 ML: at 08:50

## 2025-01-26 RX ADMIN — BUDESONIDE AND FORMOTEROL FUMARATE DIHYDRATE 2 PUFF: 160; 4.5 AEROSOL RESPIRATORY (INHALATION) at 19:01

## 2025-01-26 RX ADMIN — ALBUTEROL SULFATE 2 PUFF: 108 INHALANT RESPIRATORY (INHALATION) at 11:05

## 2025-01-26 RX ADMIN — ALBUTEROL SULFATE 2 PUFF: 108 INHALANT RESPIRATORY (INHALATION) at 19:01

## 2025-01-26 RX ADMIN — ENOXAPARIN SODIUM 40 MG: 100 INJECTION SUBCUTANEOUS at 17:22

## 2025-01-26 RX ADMIN — DEXAMETHASONE SODIUM PHOSPHATE 6 MG: 10 INJECTION INTRAMUSCULAR; INTRAVENOUS at 08:49

## 2025-01-26 RX ADMIN — CARVEDILOL 3.12 MG: 3.12 TABLET, FILM COATED ORAL at 08:49

## 2025-01-26 RX ADMIN — SODIUM CHLORIDE 1000 MG: 900 INJECTION INTRAVENOUS at 20:32

## 2025-01-26 RX ADMIN — Medication 10 ML: at 20:33

## 2025-01-26 RX ADMIN — IPRATROPIUM BROMIDE 2 PUFF: 17 AEROSOL, METERED RESPIRATORY (INHALATION) at 19:01

## 2025-01-26 RX ADMIN — BUDESONIDE AND FORMOTEROL FUMARATE DIHYDRATE 2 PUFF: 160; 4.5 AEROSOL RESPIRATORY (INHALATION) at 06:57

## 2025-01-26 RX ADMIN — ALBUTEROL SULFATE 2 PUFF: 108 INHALANT RESPIRATORY (INHALATION) at 15:06

## 2025-01-26 RX ADMIN — CARVEDILOL 3.12 MG: 3.12 TABLET, FILM COATED ORAL at 17:22

## 2025-01-26 NOTE — THERAPY TREATMENT NOTE
Acute Care - Physical Therapy Treatment Note  [facility]     Patient Name: Manohar Garcia  : 1940  MRN: [id number]  Today's Date: 2025      Visit Dx:     ICD-10-CM ICD-9-CM   1. Shortness of breath  R06.02 786.05   2. COVID-19  U07.1 079.89   3. Influenza  J11.1 487.1   4. 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    Thrombocytopenia    Hyperlactatemia    Hip fracture    Closed 2-part intertrochanteric fracture of proximal end of right femur    Acute blood loss anemia    Stage 3b chronic kidney disease    COVID-19 virus detected    Influenza A    Hypoxia     Past Medical History:   Diagnosis Date    Dementia     GERD (gastroesophageal reflux disease)     Liver disorder      Past Surgical History:   Procedure Laterality Date    HEMORRHOIDECTOMY      HERNIA REPAIR      HIP TROCHANTERIC NAILING WITH INTRAMEDULLARY HIP SCREW Right 2024    Procedure: HIP TROCHANTERIC NAILING SHORT WITH INTRAMEDULLARY HIP SCREW;  Surgeon: Arcenio Chandra MD;  Location: Health system;  Service: Orthopedics;  Laterality: Right;     PT Assessment (Last 12 Hours)       PT Evaluation and Treatment       Row Name 25 0925          Physical Therapy Time and Intention    Subjective Information no complaints  -KJ     Document Type therapy note (daily note)  -KJ     Mode of Treatment physical therapy  -KJ     Patient Effort good  -KJ       Row Name 25 0925          General Information    Existing Precautions/Restrictions fall  -KJ       Row Name 25 0925          Pain    Pretreatment Pain Rating 0/10 - no pain  -KJ     Posttreatment Pain Rating 0/10 - no pain  -KJ       Row Name 25 0925          Bed Mobility    Supine-Sit  Stokes (Bed Mobility) minimum assist (75% patient effort)  -KJ       Row Name 01/26/25 0925          Sit-Stand Transfer    Sit-Stand Stokes (Transfers) contact guard  -KJ     Assistive Device (Sit-Stand Transfers) walker, front-wheeled  -KJ       Row Name 01/26/25 0925          Stand-Sit Transfer    Stand-Sit [place] (Transfers) contact guard  -KJ       Row Name 01/26/25 0925          Gait/Stairs (Locomotion)    Stokes Level (Gait) contact guard;minimum assist (75% patient effort)  -KJ     Assistive Device (Gait) walker, front-wheeled  -KJ     Distance in Feet (Gait) 50  -KJ     Deviations/Abnormal Patterns (Gait) gait speed decreased  -KJ     Bilateral Gait Deviations forward flexed posture  -KJ     Comment, (Gait/Stairs) multiple turns in room for walking  -KJ       Row Name 01/26/25 0925          Motor Skills    Therapeutic Exercise aerobic  -KJ       Row Name 01/26/25 0925          Aerobic Exercise    Comment, Aerobic Exercise (Therapeutic Exercise) AROM BLE's x 20 reps  -KJ       Row Name 01/26/25 0925          Positioning and Restraints    Pre-Treatment Position in bed  -KJ     Post Treatment Position chair  -KJ     In Bed call light within reach;exit alarm on  -KJ               User Key  (r) = Recorded By, (t) = Taken By, (c) = Cosigned By      Initials Name Provider Type    Mckayla Vogel, PTA Physical Therapist Assistant                    Physical Therapy Education       Title: PT OT SLP Therapies (In Progress)       Topic: Physical Therapy (In Progress)       Point: Mobility training (Done)       Learning Progress Summary            Patient Acceptance, LANEY VU by MS at 1/23/2025 4782    Comment: role of PT in his care                      Point: Home exercise program (Not Started)       Learner Progress:  Not documented in this visit.              Point: Body mechanics (Not Started)       Learner Progress:  Not documented in this visit.              Point: Precautions (Not  Started)       Learner Progress:  Not documented in this visit.                              User Key       Initials Effective Dates Name Provider Type Discipline    MS 07/11/23 -  Tamra Roberts R, PT, DPT, NCS Physical Therapist PT                  PT Recommendation and Plan     Progress: improving  Outcome Evaluation: PT tx completed. PT c/o abdominal pn. This was reported to RN. Sherlyn sup>sit, sit<>stand CG, amb ~ 50' in room utilizing r wx on 2L. Decreased endurance, cues for safety awareness, and encouragement to sit in chair.   Outcome Measures       Row Name 01/24/25 1200             How much help from another is currently needed...    Putting on and taking off regular lower body clothing? 3  -TS      Bathing (including washing, rinsing, and drying) 3  -TS      Toileting (which includes using toilet bed pan or urinal) 3  -TS      Putting on and taking off regular upper body clothing 3  -TS      Taking care of personal grooming (such as brushing teeth) 4  -TS      Eating meals 4  -TS      AM-PAC 6 Clicks Score (OT) 20  -TS                User Key  (r) = Recorded By, (t) = Taken By, (c) = Cosigned By      Initials Name Provider Type    TS Aleksandra Yi COTA Occupational Therapist Assistant                     Time Calculation:    PT Charges       Row Name 01/26/25 0948             Time Calculation    Start Time 0925  -KJ      Stop Time 0949  -KJ      Time Calculation (min) 24 min  -KJ      PT Received On 01/26/25  -KJ      PT Goal Re-Cert Due Date 02/02/25  -KJ         Time Calculation- PT    Total Timed Code Minutes- PT 24 minute(s)  -KJ                User Key  (r) = Recorded By, (t) = Taken By, (c) = Cosigned By      Initials Name Provider Type    Mckayla Vogel PTA Physical Therapist Assistant                  Therapy Charges for Today       Code Description Service Date Service Provider Modifiers Qty    64330135438 HC GAIT TRAINING EA 15 MIN 1/25/2025 Mckayla John PTA GP 1    64382413108  HC PT THER PROC EA 15 MIN 1/25/2025 Mckayla John, PTA GP 1    08797022629 HC PT THERAPEUTIC ACT EA 15 MIN 1/25/2025 Mckayla John, PTA GP 1    52809211031 HC GAIT TRAINING EA 15 MIN 1/26/2025 Mckayla John, PTA GP 1    58160086683 HC PT THER PROC EA 15 MIN 1/26/2025 Mckayla John, PTA GP 1            PT G-Codes  Outcome Measure Options: AM-PAC 6 Clicks Basic Mobility (PT)  AM-PAC 6 Clicks Score (PT): 17  AM-PAC 6 Clicks Score (OT): 20    Mckayla John PTA  1/26/2025

## 2025-01-27 LAB
ANION GAP SERPL CALCULATED.3IONS-SCNC: 7 MMOL/L (ref 5–15)
BUN SERPL-MCNC: 40 MG/DL (ref 8–23)
BUN/CREAT SERPL: 35.7 (ref 7–25)
CALCIUM SPEC-SCNC: 9.1 MG/DL (ref 8.6–10.5)
CHLORIDE SERPL-SCNC: 114 MMOL/L (ref 98–107)
CO2 SERPL-SCNC: 23 MMOL/L (ref 22–29)
CREAT SERPL-MCNC: 1.12 MG/DL (ref 0.76–1.27)
DEPRECATED RDW RBC AUTO: 48.2 FL (ref 37–54)
EGFRCR SERPLBLD CKD-EPI 2021: 64.8 ML/MIN/1.73
ERYTHROCYTE [DISTWIDTH] IN BLOOD BY AUTOMATED COUNT: 14.2 % (ref 12.3–15.4)
GLUCOSE SERPL-MCNC: 134 MG/DL (ref 65–99)
HCT VFR BLD AUTO: 31.5 % (ref 37.5–51)
HGB BLD-MCNC: 9.9 G/DL (ref 13–17.7)
MCH RBC QN AUTO: 28.9 PG (ref 26.6–33)
MCHC RBC AUTO-ENTMCNC: 31.4 G/DL (ref 31.5–35.7)
MCV RBC AUTO: 91.8 FL (ref 79–97)
PLATELET # BLD AUTO: 78 10*3/MM3 (ref 140–450)
PMV BLD AUTO: 10.6 FL (ref 6–12)
POTASSIUM SERPL-SCNC: 4.6 MMOL/L (ref 3.5–5.2)
RBC # BLD AUTO: 3.43 10*6/MM3 (ref 4.14–5.8)
SODIUM SERPL-SCNC: 144 MMOL/L (ref 136–145)
WBC NRBC COR # BLD AUTO: 3.35 10*3/MM3 (ref 3.4–10.8)

## 2025-01-27 PROCEDURE — 85027 COMPLETE CBC AUTOMATED: CPT | Performed by: STUDENT IN AN ORGANIZED HEALTH CARE EDUCATION/TRAINING PROGRAM

## 2025-01-27 PROCEDURE — 94799 UNLISTED PULMONARY SVC/PX: CPT

## 2025-01-27 PROCEDURE — 25010000002 DEXAMETHASONE PER 1 MG: Performed by: NURSE PRACTITIONER

## 2025-01-27 PROCEDURE — 25010000002 ENOXAPARIN PER 10 MG: Performed by: NURSE PRACTITIONER

## 2025-01-27 PROCEDURE — 25010000002 ONDANSETRON PER 1 MG: Performed by: NURSE PRACTITIONER

## 2025-01-27 PROCEDURE — 97110 THERAPEUTIC EXERCISES: CPT

## 2025-01-27 PROCEDURE — 97116 GAIT TRAINING THERAPY: CPT

## 2025-01-27 PROCEDURE — 80048 BASIC METABOLIC PNL TOTAL CA: CPT | Performed by: STUDENT IN AN ORGANIZED HEALTH CARE EDUCATION/TRAINING PROGRAM

## 2025-01-27 PROCEDURE — 94669 MECHANICAL CHEST WALL OSCILL: CPT

## 2025-01-27 RX ADMIN — ALBUTEROL SULFATE 2 PUFF: 108 INHALANT RESPIRATORY (INHALATION) at 09:16

## 2025-01-27 RX ADMIN — IPRATROPIUM BROMIDE 2 PUFF: 17 AEROSOL, METERED RESPIRATORY (INHALATION) at 05:37

## 2025-01-27 RX ADMIN — Medication 10 ML: at 21:34

## 2025-01-27 RX ADMIN — BUDESONIDE AND FORMOTEROL FUMARATE DIHYDRATE 2 PUFF: 160; 4.5 AEROSOL RESPIRATORY (INHALATION) at 18:30

## 2025-01-27 RX ADMIN — ACETAMINOPHEN 650 MG: 325 TABLET ORAL at 04:26

## 2025-01-27 RX ADMIN — IPRATROPIUM BROMIDE 2 PUFF: 17 AEROSOL, METERED RESPIRATORY (INHALATION) at 13:11

## 2025-01-27 RX ADMIN — DEXAMETHASONE SODIUM PHOSPHATE 6 MG: 10 INJECTION INTRAMUSCULAR; INTRAVENOUS at 09:20

## 2025-01-27 RX ADMIN — IPRATROPIUM BROMIDE 2 PUFF: 17 AEROSOL, METERED RESPIRATORY (INHALATION) at 09:16

## 2025-01-27 RX ADMIN — ENOXAPARIN SODIUM 40 MG: 100 INJECTION SUBCUTANEOUS at 17:12

## 2025-01-27 RX ADMIN — CARVEDILOL 3.12 MG: 3.12 TABLET, FILM COATED ORAL at 17:12

## 2025-01-27 RX ADMIN — ALBUTEROL SULFATE 2 PUFF: 108 INHALANT RESPIRATORY (INHALATION) at 13:11

## 2025-01-27 RX ADMIN — Medication 10 ML: at 09:20

## 2025-01-27 RX ADMIN — TAMSULOSIN HYDROCHLORIDE 0.4 MG: 0.4 CAPSULE ORAL at 21:33

## 2025-01-27 RX ADMIN — ONDANSETRON 4 MG: 2 INJECTION INTRAMUSCULAR; INTRAVENOUS at 04:26

## 2025-01-27 RX ADMIN — ALBUTEROL SULFATE 2 PUFF: 108 INHALANT RESPIRATORY (INHALATION) at 05:37

## 2025-01-27 RX ADMIN — BUDESONIDE AND FORMOTEROL FUMARATE DIHYDRATE 2 PUFF: 160; 4.5 AEROSOL RESPIRATORY (INHALATION) at 05:37

## 2025-01-27 RX ADMIN — ALBUTEROL SULFATE 2 PUFF: 108 INHALANT RESPIRATORY (INHALATION) at 18:30

## 2025-01-27 RX ADMIN — IPRATROPIUM BROMIDE 2 PUFF: 17 AEROSOL, METERED RESPIRATORY (INHALATION) at 18:30

## 2025-01-27 RX ADMIN — CARVEDILOL 3.12 MG: 3.12 TABLET, FILM COATED ORAL at 09:20

## 2025-01-27 NOTE — PROGRESS NOTES
[facility] Medicine Services  INPATIENT PROGRESS NOTE    Patient Name: Manohar Garcia  Date of Admission: 1/22/2025  Today's Date: 01/27/25  Length of Stay: 5  Primary Care Physician: Dani Farfan MD    Subjective   Chief Complaint: COVID/flu A/renal failure/anemia/thrombocytopenia/cirrhosis  HPI   Tmax 100.7.  T-current 97.5.  Blood pressure stable.  Patient is on 1 L of oxygen.  Neutropenia noted.  Hemoglobin stable.  Slight decrease in platelets.    Review of Systems   Constitutional:  Positive for activity change and appetite change. Negative for chills and fever.   HENT:  Negative for hearing loss, nosebleeds, tinnitus and trouble swallowing.    Eyes:  Negative for visual disturbance.   Respiratory:  Positive for shortness of breath. Negative for cough, chest tightness and wheezing.    Cardiovascular:  Negative for chest pain, palpitations and leg swelling.   Gastrointestinal:  Negative for abdominal distention, abdominal pain, blood in stool, constipation, diarrhea, nausea and vomiting.   Endocrine: Negative for cold intolerance, heat intolerance, polydipsia, polyphagia and polyuria.   Genitourinary:  Negative for decreased urine volume, difficulty urinating, dysuria, flank pain, frequency and hematuria.   Musculoskeletal:  Positive for arthralgias, gait problem and myalgias. Negative for joint swelling.   Skin:  Negative for rash.   Allergic/Immunologic: Negative for immunocompromised state.   Neurological:  Positive for weakness. Negative for dizziness, syncope, light-headedness and headaches.   Hematological:  Negative for adenopathy. Does not bruise/bleed easily.   Psychiatric/Behavioral:  Positive for confusion. Negative for sleep disturbance. The patient is not nervous/anxious.         All pertinent negatives and positives are as above. All other systems have been reviewed and are negative unless otherwise stated.     Objective    Temp:  [97.5 °F (36.4  °C)-100.7 °F (38.2 °C)] 98 °F (36.7 °C)  Heart Rate:  [64-92] 89  Resp:  [15-18] 18  BP: (134-158)/(66-96) 134/66  Physical Exam  Vitals and nursing note reviewed.   Constitutional:       Appearance: He is well-developed.      Comments: Advanced age.  Chronically ill.  Hard of hearing.  Dementia.   HENT:      Head: Normocephalic.   Eyes:      General: No scleral icterus.     Pupils: Pupils are equal, round, and reactive to light.   Neck:      Thyroid: No thyromegaly.      Vascular: No carotid bruit or JVD.      Trachea: No tracheal deviation.   Cardiovascular:      Rate and Rhythm: Normal rate and regular rhythm.      Heart sounds: No murmur heard.     No friction rub. No gallop.   Pulmonary:      Effort: No respiratory distress.      Breath sounds: No wheezing or rales.      Comments: Diminished breath sound bilateral, slight rhonchi on 1 L of oxygen.  Chest:      Chest wall: No tenderness.   Abdominal:      General: Bowel sounds are normal. There is no distension.      Palpations: Abdomen is soft.      Tenderness: There is no abdominal tenderness.   Musculoskeletal:         General: Normal range of motion.      Cervical back: Normal range of motion and neck supple.   Skin:     General: Skin is warm and dry.      Capillary Refill: Capillary refill takes 2 to 3 seconds.      Findings: No rash.   Neurological:      Mental Status: He is alert.      Cranial Nerves: No cranial nerve deficit.      Motor: Weakness present.      Coordination: Coordination abnormal.      Gait: Gait abnormal.   Psychiatric:         Mood and Affect: Mood normal.         Behavior: Behavior normal.             Results Review:  I have reviewed the labs, radiology results, and diagnostic studies.    Laboratory Data:   Results from last 7 days   Lab Units 01/27/25  0359 01/26/25  0301 01/25/25  0200   WBC 10*3/mm3 3.35* 3.92 5.23   HEMOGLOBIN g/dL 9.9* 9.6* 8.9*   HEMATOCRIT % 31.5* 30.7* 28.9*   PLATELETS 10*3/mm3 78* 84* 84*        Results from  last 7 days   Lab Units 01/27/25  0359 01/26/25  0301 01/25/25  0200 01/24/25  0907 01/23/25  0404   SODIUM mmol/L 144 143 141   < > 139   POTASSIUM mmol/L 4.6 4.7 4.8   < > 3.9   CHLORIDE mmol/L 114* 117* 116*   < > 113*   CO2 mmol/L 23.0 20.0* 18.0*   < > 17.0*   BUN mg/dL 40* 45* 44*   < > 34*   CREATININE mg/dL 1.12 1.26 1.53*   < > 1.57*   CALCIUM mg/dL 9.1 8.8 8.5*   < > 8.2*   BILIRUBIN mg/dL  --   --   --   --  0.4   ALK PHOS U/L  --   --   --   --  116   ALT (SGPT) U/L  --   --   --   --  10   AST (SGOT) U/L  --   --   --   --  19   GLUCOSE mg/dL 134* 129* 128*   < > 130*    < > = values in this interval not displayed.       Culture Data:   Respiratory Culture   Date Value Ref Range Status   01/22/2025   Final    Heavy growth (4+) Normal respiratory blanca. No S. aureus or Pseudomonas aeruginosa detected. Final report.       Radiology Data:   Imaging Results (Last 24 Hours)       ** No results found for the last 24 hours. **            I have reviewed the patient's current medications.     Assessment/Plan   Assessment  Active Hospital Problems    Diagnosis     **Hypoxia     Stage 3b chronic kidney disease     COVID-19 virus detected     Influenza A      HPI .  Mr. Garcia is an 84-year-old male who presented to [facility] with 1 week history of worsening shortness of breath and cough. 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 He has resided at [place] since September 2024. Positive for covid-19 and flu.     Treatment Plan  Covid-19/Influenza/Hypoxia   - complete 11 days of Decadron for covid hypoxia, about 4 days left  -Complete 5 days of ceftriaxone ans Azithromycin 1/26/2025.  -Cannot have Mucomyst nebulizer ordered yesterday due to his COVID status as per hospital protocol  - wean off oxygen as tolerated   -Continue Symbicort, and albuterol and Atrovent inhaler and chest PT and Robitussin as needed, incentive  spirometer.  CXR showing RL consolidation that is somewhat similar as previously seen on abdominal CT 1/16   CT chest w/o contrast-Marked elevation of the right hemidiaphragm with associated compressive atelectasis of the right lower lobe- appears to account for the opacity seen on chest x-ray and no lung mass is identified- patchy groundglass opacity is also seen in the superior segment of the  right lower lobe-related to atelectasis versus a small area of pneumonia- No lung consolidation related to pneumonia however, Cirrhotic appearance of the liver with evidence of portal hypertension, Chronic right-sided hydronephrosis.  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  At baseline  Avoid nephrotoxins   CT scan abdomen pelvic-Moderate right-sided hydronephrosis extending to the ureterovesical  Junction- No ureteral stone identified- there is a large 3.3 cm bladder calculus which may be causing obstruction of the right ureterovesical junction. No left-sided hydronephrosis, Anatomic variant  horseshoe kidney with two 4 mm calculi in the left portion of the horseshoe kidney,  Prostate is markedly enlarged, Chronic right hemidiaphragm elevation with right lower lobe atelectasis,  Chronic liver disease/cirrhosis with left upper abdominal varices and splenomegaly.     Neutropenia.    Anemia.  Hemoglobin stable.  No sign of acute bleed.    Thrombocytopenia.  Fluctuation platelets.    Prostate hypertrophy.  Flomax.    Nausea/chronically right hemidiaphragm elevation.  Zofran as needed.    Lovenox prophylaxis.    Nutrition . regular/house diet.    Deconditioning.  Patient is  wheelchair-bound at baseline.  Patient can transfer only.  PT and OT consult.    Legionella antigen-negative.  MRSA screen-negative.  Respiratory culture normal respiratory blanca.  Strep pneumo-negative.  Respiratory PCR flu a positive and COVID-positive.  Urine cultures-mixed blanca.    [facility].        Medical Decision Making  Number and Complexity of problems: COVID/flu/hypoxia/cirrhosis/portal hypertension/chronic stage IIIb renal failure  Differential Diagnosis: None.     Conditions and Status        Condition is improving.     MDM Data  External documents reviewed: reviewed  Cardiac tracing (EKG, telemetry) interpretation: reviewed  Radiology interpretation: reviewed  Labs reviewed: as above   Any tests that were considered but not ordered: none     Decision rules/scores evaluated (example [id number], Wells, etc): none     Discussed with: Patient and bedside RN     Care Planning  Shared decision making: Patient apprised of current labs, vitals, imaging and treatment plan.  They are agreeable with proceeding with plans as discussed.   Code status and discussions: full code      Disposition  Social Determinants of Health that impact treatment or disposition: none   1 to 2 days.  From [place].      Electronically signed by James Donovan MD, 01/27/25, 09:36 CST.

## 2025-01-27 NOTE — CASE MANAGEMENT/SOCIAL WORK
Continued Stay Note  [facility]     Patient Name: Manohar Garcia  MRN: [id number]  Today's Date: 1/27/2025    Admit Date: 1/22/2025    [facility]   Discharge Plan       Row Name 01/27/25 0828       Plan    Plan [facility]    Patient/Family in Agreement with Plan yes    Plan Comments Patient was admitted from [facility].  Tentative plan is for patient to return to [facility] upon discharge.  [facility] has advised patient does have a bed hold.  Will plan on patient returning to [facility] upon discharge [phone].                   Discharge Codes    No documentation.                 Expected Discharge Date and Time       Expected Discharge Date Expected Discharge Time    Jan 26, 2025               CHANDAN [place]

## 2025-01-27 NOTE — THERAPY TREATMENT NOTE
Acute Care - Physical Therapy Treatment Note  [facility]     Patient Name: Manohar Garcia  : 1940  MRN: [id number]  Today's Date: 2025      Visit Dx:     ICD-10-CM ICD-9-CM   1. Shortness of breath  R06.02 786.05   2. COVID-19  U07.1 079.89   3. Influenza  J11.1 487.1   4. 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    Thrombocytopenia    Hyperlactatemia    Hip fracture    Closed 2-part intertrochanteric fracture of proximal end of right femur    Acute blood loss anemia    Stage 3b chronic kidney disease    COVID-19 virus detected    Influenza A    Hypoxia     Past Medical History:   Diagnosis Date    Dementia     GERD (gastroesophageal reflux disease)     Liver disorder      Past Surgical History:   Procedure Laterality Date    HEMORRHOIDECTOMY      HERNIA REPAIR      HIP TROCHANTERIC NAILING WITH INTRAMEDULLARY HIP SCREW Right 2024    Procedure: HIP TROCHANTERIC NAILING SHORT WITH INTRAMEDULLARY HIP SCREW;  Surgeon: Arcenio Chandra MD;  Location: [facility];  Service: Orthopedics;  Laterality: Right;     PT Assessment (Last 12 Hours)       PT Evaluation and Treatment       Row Name 25 1409          Physical Therapy Time and Intention    Subjective Information complains of;pain  -WK     Document Type therapy note (daily note)  -WK     Mode of Treatment physical therapy  -WK       Row Name 25 1409          General Information    Existing Precautions/Restrictions fall  -WK     Limitations/Impairments hearing  -WK       Row Name 25 1409          Pain Scale: FACES Pre/Post-Treatment    Pain: FACES Scale, Pretreatment 2-->hurts little bit  -WK     Posttreatment Pain Rating 2-->hurts little bit  -WK        Row Name 01/27/25 1409          Bed Mobility    Supine-Sit Apex (Bed Mobility) standby assist  -WK       Row Name 01/27/25 1409          Sit-Stand Transfer    Sit-Stand Apex (Transfers) verbal cues;contact guard  -WK     Assistive Device (Sit-Stand Transfers) walker, front-wheeled  -WK       Row Name 01/27/25 1409          Stand-Sit Transfer    Stand-Sit Apex (Transfers) verbal cues;contact guard  -WK     Assistive Device (Stand-Sit Transfers) walker, front-wheeled  -WK       Row Name 01/27/25 1409          Gait/Stairs (Locomotion)    Apex Level (Gait) verbal cues;contact guard;minimum assist (75% patient effort)  -WK     Assistive Device (Gait) walker, front-wheeled  -WK     Distance in Feet (Gait) 60  -WK     Deviations/Abnormal Patterns (Gait) gait speed decreased  -WK     Bilateral Gait Deviations forward flexed posture  -WK     Comment, (Gait/Stairs) 1 LOB with standing and required min A to correct. Turns at every 10'  -WK       Row Name 01/27/25 1409          Balance    Static Sitting Balance supervision  -WK     Dynamic Sitting Balance contact guard  -WK     Position, Sitting Balance unsupported  -WK     Sit to Stand Dynamic Balance verbal cues;minimal assist  -WK     Static Standing Balance contact guard  -WK     Dynamic Standing Balance contact guard  -WK     Position/Device Used, Standing Balance walker, front-wheeled  -WK       Row Name 01/27/25 1409          Aerobic Exercise    Comment, Aerobic Exercise (Therapeutic Exercise) AROM BLE 20 reps  -WK       Row Name 01/27/25 1409          Plan of Care Review    Plan of Care Reviewed With patient  -WK     Progress improving  -WK     Outcome Evaluation Bed mobility SBA. Sit to stand CGA with LOB forward requiring min A to correct. Pt amb 60' in room CGA-min A with turns every 10' . Pt educated on safety and POC. Pt sitting in chair with exit alarm on and nsg notified.  -WK       Row Name 01/27/25 1409          Vital Signs     Pretreatment Heart Rate (beats/min) 81  -WK     Pre SpO2 (%) 96  -WK     O2 Delivery Pre Treatment nasal cannula  1l  -WK     Intra SpO2 (%) 94  -WK     O2 Delivery Intra Treatment room air  -WK     Post SpO2 (%) 95  -WK     O2 Delivery Post Treatment nasal cannula  -WK       Row Name 01/27/25 1409          Positioning and Restraints    Pre-Treatment Position in bed  -WK     Post Treatment Position chair  -WK     In Chair reclined;call light within reach;encouraged to call for assist;exit alarm on;notified nsg  -WK               User Key  (r) = Recorded By, (t) = Taken By, (c) = Cosigned By      Initials Name Provider Type    WK Kellen Block PTA Physical Therapist Assistant                    Physical Therapy Education       Title: PT OT SLP Therapies (In Progress)       Topic: Physical Therapy (In Progress)       Point: Mobility training (Done)       Learning Progress Summary            Patient Acceptance, E, VU by MS at 1/23/2025 2154    Comment: role of PT in his care                      Point: Home exercise program (Not Started)       Learner Progress:  Not documented in this visit.              Point: Body mechanics (Not Started)       Learner Progress:  Not documented in this visit.              Point: Precautions (Not Started)       Learner Progress:  Not documented in this visit.                              User Key       Initials Effective Dates Name Provider Type Discipline    MS 07/11/23 -  Tamra Roberts, PT, DPT, NCS Physical Therapist PT                  PT Recommendation and Plan     Plan of Care Reviewed With: patient  Progress: improving  Outcome Evaluation: Bed mobility SBA. Sit to stand CGA with LOB forward requiring min A to correct. Pt amb 60' in room CGA-min A with turns every 10' . Pt educated on safety and POC. Pt sitting in chair with exit alarm on and nsg notified.   Outcome Measures       Row Name 01/27/25 1409             How much help from another person do you currently  need...    Turning from your back to your side while in flat bed without using bedrails? 4  -WK      Moving from lying on back to sitting on the side of a flat bed without bedrails? 3  -WK      Moving to and from a bed to a chair (including a wheelchair)? 3  -WK      Standing up from a chair using your arms (e.g., wheelchair, bedside chair)? 3  -WK      Climbing 3-5 steps with a railing? 3  -WK      To walk in hospital room? 3  -WK      AM-PAC 6 Clicks Score (PT) 19  -WK         Functional Assessment    Outcome Measure Options AM-PAC 6 Clicks Basic Mobility (PT)  -WK                User Key  (r) = Recorded By, (t) = Taken By, (c) = Cosigned By      Initials Name Provider Type    WK Kellen Block PTA Physical Therapist Assistant                     Time Calculation:    PT Charges       Row Name 01/27/25 1433             Time Calculation    Start Time 1409  -WK      Stop Time 1433  -WK      Time Calculation (min) 24 min  -WK      PT Received On 01/27/25  -WK         Time Calculation- PT    Total Timed Code Minutes- PT 24 minute(s)  -WK         Timed Charges    45617 - PT Therapeutic Exercise Minutes 10  -WK      32054 - Gait Training Minutes  14  -WK         Total Minutes    Timed Charges Total Minutes 24  -WK       Total Minutes 24  -WK                User Key  (r) = Recorded By, (t) = Taken By, (c) = Cosigned By      Initials Name Provider Type    WK Kellen Block PTA Physical Therapist Assistant                  Therapy Charges for Today       Code Description Service Date Service Provider Modifiers Qty    61902124646 HC PT THER PROC EA 15 MIN 1/27/2025 Kellen Block PTA GP 1    85366077845 HC GAIT TRAINING EA 15 MIN 1/27/2025 Kellen Block PTA GP 1            PT G-Codes  Outcome Measure Options: AM-PAC 6 Clicks Basic Mobility (PT)  AM-PAC 6 Clicks Score (PT): 19  AM-PAC 6 Clicks Score (OT): 20    Kellen Block PTA  1/27/2025

## 2025-01-27 NOTE — PROGRESS NOTES
Nutrition Services    Patient Name:  Manohar Garcia  YOB: 1940  MRN: [id number]  Admit Date:  1/22/2025    Per  IP LDA report listed old wounds: Left posterior elbow abrasion (8/29/24),right posteror elbow abrasion (8/29/24),right anterior hip incision (8/30/24) and pressure injury coccyx (9/1/24). Pt does not have any current wounds at this time. RD to see patients with stage II or>pressure injury or deep tissue injury. Will cont to follow for plan of care.     Electronically signed by:  Nae Rendon RDN, LD  01/27/25 12:10 CST

## 2025-01-27 NOTE — PROGRESS NOTES
[facility] Medicine Services  INPATIENT PROGRESS NOTE    Patient Name: Manohar Garcia  Date of Admission: 1/22/2025  Today's Date: 01/26/25  Length of Stay: 4  Primary Care Physician: Dani Farfan MD    Subjective   Chief Complaint: cough    No acute events overnight.  He is 1 L oxygen saturating 97%, this is mostly for comfort.  Last dose of antibiotics due tonight.    Review of Systems   Respiratory:  Positive for cough.       All pertinent negatives and positives are as above. All other systems have been reviewed and are negative unless otherwise stated.     Objective    Temp:  [97.5 °F (36.4 °C)-98.7 °F (37.1 °C)] 98.6 °F (37 °C)  Heart Rate:  [57-73] 73  Resp:  [15-18] 15  BP: (115-151)/(68-80) 138/80  Physical Exam  GEN: Awake, alert, interactive, in NAD, very hard of hearing   HEENT: Atraumatic, EOMI, Anicteric  Lungs: Sounding more clear today  Heart: RRR, +S1/s2, no rub  ABD: soft, nt/nd, +BS, no guarding/rebound  Extremities: atraumatic, no cyanosis, no edema  Skin: no rashes or lesions  Neuro: AAOx3, no focal deficits  Psych: normal mood & affect    Results Review:  I have reviewed the labs, radiology results, and diagnostic studies.    Laboratory Data:   Results from last 7 days   Lab Units 01/26/25  0301 01/25/25  0200 01/24/25  1108   WBC 10*3/mm3 3.92 5.23 7.84   HEMOGLOBIN g/dL 9.6* 8.9* 10.7*   HEMATOCRIT % 30.7* 28.9* 33.2*   PLATELETS 10*3/mm3 84* 84* 105*        Results from last 7 days   Lab Units 01/26/25  0301 01/25/25  0200 01/24/25  0907 01/23/25  0404   SODIUM mmol/L 143 141 143 139   POTASSIUM mmol/L 4.7 4.8 4.1 3.9   CHLORIDE mmol/L 117* 116* 112* 113*   CO2 mmol/L 20.0* 18.0* 20.0* 17.0*   BUN mg/dL 45* 44* 44* 34*   CREATININE mg/dL 1.26 1.53* 1.64* 1.57*   CALCIUM mg/dL 8.8 8.5* 9.1 8.2*   BILIRUBIN mg/dL  --   --   --  0.4   ALK PHOS U/L  --   --   --  116   ALT (SGPT) U/L  --   --   --  10   AST (SGOT) U/L  --   --   --  19   GLUCOSE mg/dL  129* 128* 149* 130*       Culture Data:   @[facility]@    Radiology Data:   Imaging Results (Last 24 Hours)       ** No results found for the last 24 hours. **            I have reviewed the patient's current medications.     Assessment/Plan   Assessment  Active Hospital Problems    Diagnosis     **Hypoxia     Stage 3b chronic kidney disease     COVID-19 virus detected     Influenza A      Mr. Garcia is an 84-year-old male who presented to [facility] with 1 week history of worsening shortness of breath and cough.  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  He has resided at [place] since September 2024.  Positive for covid-19 and flu.     Treatment Plan:    # Covid-19  # Influenza  # Hypoxia - on 2L NC  CXR showing RL consolidation that is somewhat similar as previously seen on abdominal CT 1/16   CT chest w/o contrast: Marked elevation of the right hemidiaphragm with associated  compressive atelectasis of the right lower lobe, this appears to account  for the opacity seen on chest x-ray and no lung mass is identified.No lung consolidation related to pneumonia however.   Currently afebrile, normal white count   Given advanced age, elevated procalcitonin (which could be 2/2 CKD), lactic acidosis and hypoxia/tachypnea at presentation, started antibiotics  CRP 11 > 5 > 1.99  - complete 10 days of Decadron for covid hypoxia, about 5 days left  -Pleating 5 days of ceftriaxone ans Azithromycin today  -Cannot have Mucomyst nebulizer ordered yesterday due to his COVID status as per hospital protocol  - wean off oxygen as tolerated   -Continue Symbicort, and albuterol and chest PT    # Liver Cirrhosis  # Portal Hypertension - seen on CT   - started Coreg this admission to lower splanchnic pressure  - euvolemic will not be adding any diuretics at this time   - will need outpatient follow up with GI for EGD and assessing for  varices     # CKD 3B  At baseline  Avoid nephrotoxins       Medical Decision Making  Number and Complexity of problems: 3 Acute moderate complexity  Differential Diagnosis: as above     Conditions and Status        Condition is improving.     [facility] Data  External documents reviewed: reviewed  Cardiac tracing (EKG, telemetry) interpretation: reviewed  Radiology interpretation: reviewed  Labs reviewed: as above   Any tests that were considered but not ordered: none     Decision rules/scores evaluated (example [id number], Wells, etc): none     Discussed with: Patient and bedside RN     Care Planning  Shared decision making: Patient apprised of current labs, vitals, imaging and treatment plan.  They are agreeable with proceeding with plans as discussed.   Code status and discussions: full code     Disposition  Social Determinants of Health that impact treatment or disposition: none   I expect the patient to be discharged back to [facility] in 1 day.        Electronically signed by Dominick Kahn MD, 01/26/25, 19:41 CST.

## 2025-01-28 VITALS
HEART RATE: 69 BPM | HEIGHT: 71 IN | WEIGHT: 192.2 LBS | SYSTOLIC BLOOD PRESSURE: 124 MMHG | DIASTOLIC BLOOD PRESSURE: 69 MMHG | RESPIRATION RATE: 16 BRPM | OXYGEN SATURATION: 96 % | TEMPERATURE: 97.9 F | BODY MASS INDEX: 26.91 KG/M2

## 2025-01-28 PROBLEM — D89.831 CYTOKINE RELEASE SYNDROME, GRADE 1: Status: ACTIVE | Noted: 2025-01-28

## 2025-01-28 LAB
ALBUMIN SERPL-MCNC: 2.6 G/DL (ref 3.5–5.2)
ALBUMIN/GLOB SERPL: 0.8 G/DL
ALP SERPL-CCNC: 110 U/L (ref 39–117)
ALT SERPL W P-5'-P-CCNC: 18 U/L (ref 1–41)
ANION GAP SERPL CALCULATED.3IONS-SCNC: 7 MMOL/L (ref 5–15)
AST SERPL-CCNC: 17 U/L (ref 1–40)
BASOPHILS # BLD AUTO: 0 10*3/MM3 (ref 0–0.2)
BASOPHILS NFR BLD AUTO: 0 % (ref 0–1.5)
BILIRUB SERPL-MCNC: 0.6 MG/DL (ref 0–1.2)
BUN SERPL-MCNC: 38 MG/DL (ref 8–23)
BUN/CREAT SERPL: 36.5 (ref 7–25)
CALCIUM SPEC-SCNC: 8.9 MG/DL (ref 8.6–10.5)
CHLORIDE SERPL-SCNC: 111 MMOL/L (ref 98–107)
CHOLEST SERPL-MCNC: 102 MG/DL (ref 0–200)
CO2 SERPL-SCNC: 23 MMOL/L (ref 22–29)
CREAT SERPL-MCNC: 1.04 MG/DL (ref 0.76–1.27)
CRP SERPL-MCNC: 0.85 MG/DL (ref 0–0.5)
DEPRECATED RDW RBC AUTO: 46.7 FL (ref 37–54)
EGFRCR SERPLBLD CKD-EPI 2021: 70.8 ML/MIN/1.73
EOSINOPHIL # BLD AUTO: 0 10*3/MM3 (ref 0–0.4)
EOSINOPHIL NFR BLD AUTO: 0 % (ref 0.3–6.2)
ERYTHROCYTE [DISTWIDTH] IN BLOOD BY AUTOMATED COUNT: 13.9 % (ref 12.3–15.4)
GLOBULIN UR ELPH-MCNC: 3.1 GM/DL
GLUCOSE SERPL-MCNC: 119 MG/DL (ref 65–99)
HBA1C MFR BLD: 5 % (ref 4.8–5.6)
HCT VFR BLD AUTO: 32.3 % (ref 37.5–51)
HDLC SERPL-MCNC: 33 MG/DL (ref 40–60)
HGB BLD-MCNC: 10.1 G/DL (ref 13–17.7)
IMM GRANULOCYTES # BLD AUTO: 0.02 10*3/MM3 (ref 0–0.05)
IMM GRANULOCYTES NFR BLD AUTO: 0.4 % (ref 0–0.5)
LDLC SERPL CALC-MCNC: 54 MG/DL (ref 0–100)
LDLC/HDLC SERPL: 1.67 {RATIO}
LYMPHOCYTES # BLD AUTO: 0.58 10*3/MM3 (ref 0.7–3.1)
LYMPHOCYTES NFR BLD AUTO: 12.2 % (ref 19.6–45.3)
MCH RBC QN AUTO: 28.5 PG (ref 26.6–33)
MCHC RBC AUTO-ENTMCNC: 31.3 G/DL (ref 31.5–35.7)
MCV RBC AUTO: 91 FL (ref 79–97)
MONOCYTES # BLD AUTO: 0.27 10*3/MM3 (ref 0.1–0.9)
MONOCYTES NFR BLD AUTO: 5.7 % (ref 5–12)
NEUTROPHILS NFR BLD AUTO: 3.87 10*3/MM3 (ref 1.7–7)
NEUTROPHILS NFR BLD AUTO: 81.7 % (ref 42.7–76)
NRBC BLD AUTO-RTO: 0 /100 WBC (ref 0–0.2)
PLATELET # BLD AUTO: 80 10*3/MM3 (ref 140–450)
PMV BLD AUTO: 10.3 FL (ref 6–12)
POTASSIUM SERPL-SCNC: 4.6 MMOL/L (ref 3.5–5.2)
PROT SERPL-MCNC: 5.7 G/DL (ref 6–8.5)
RBC # BLD AUTO: 3.55 10*6/MM3 (ref 4.14–5.8)
SODIUM SERPL-SCNC: 141 MMOL/L (ref 136–145)
T4 FREE SERPL-MCNC: 1.09 NG/DL (ref 0.93–1.7)
TRIGL SERPL-MCNC: 69 MG/DL (ref 0–150)
TSH SERPL DL<=0.05 MIU/L-ACNC: 0.24 UIU/ML (ref 0.27–4.2)
VLDLC SERPL-MCNC: 15 MG/DL (ref 5–40)
WBC NRBC COR # BLD AUTO: 4.74 10*3/MM3 (ref 3.4–10.8)

## 2025-01-28 PROCEDURE — 25010000002 DEXAMETHASONE PER 1 MG: Performed by: NURSE PRACTITIONER

## 2025-01-28 PROCEDURE — 84443 ASSAY THYROID STIM HORMONE: CPT | Performed by: FAMILY MEDICINE

## 2025-01-28 PROCEDURE — 97530 THERAPEUTIC ACTIVITIES: CPT

## 2025-01-28 PROCEDURE — 97110 THERAPEUTIC EXERCISES: CPT

## 2025-01-28 PROCEDURE — 80061 LIPID PANEL: CPT | Performed by: FAMILY MEDICINE

## 2025-01-28 PROCEDURE — 94799 UNLISTED PULMONARY SVC/PX: CPT

## 2025-01-28 PROCEDURE — 80053 COMPREHEN METABOLIC PANEL: CPT | Performed by: FAMILY MEDICINE

## 2025-01-28 PROCEDURE — 94669 MECHANICAL CHEST WALL OSCILL: CPT

## 2025-01-28 PROCEDURE — 85025 COMPLETE CBC W/AUTO DIFF WBC: CPT | Performed by: FAMILY MEDICINE

## 2025-01-28 PROCEDURE — 97535 SELF CARE MNGMENT TRAINING: CPT

## 2025-01-28 PROCEDURE — 83036 HEMOGLOBIN GLYCOSYLATED A1C: CPT | Performed by: FAMILY MEDICINE

## 2025-01-28 PROCEDURE — 86140 C-REACTIVE PROTEIN: CPT | Performed by: FAMILY MEDICINE

## 2025-01-28 PROCEDURE — 84439 ASSAY OF FREE THYROXINE: CPT | Performed by: FAMILY MEDICINE

## 2025-01-28 PROCEDURE — 97116 GAIT TRAINING THERAPY: CPT

## 2025-01-28 RX ORDER — TAMSULOSIN HYDROCHLORIDE 0.4 MG/1
1 CAPSULE ORAL NIGHTLY
Start: 2025-01-28

## 2025-01-28 RX ORDER — CARVEDILOL 3.12 MG/1
3.12 TABLET ORAL 2 TIMES DAILY WITH MEALS
Start: 2025-01-28

## 2025-01-28 RX ORDER — ALBUTEROL SULFATE 90 UG/1
2 INHALANT RESPIRATORY (INHALATION)
Start: 2025-01-28

## 2025-01-28 RX ORDER — DEXAMETHASONE 4 MG/1
4 TABLET ORAL
Qty: 4 TABLET | Refills: 0 | Status: SHIPPED | OUTPATIENT
Start: 2025-01-28 | End: 2025-02-01

## 2025-01-28 RX ORDER — NYSTATIN 100000 [USP'U]/G
1 POWDER TOPICAL 3 TIMES DAILY PRN
Start: 2025-01-28

## 2025-01-28 RX ORDER — BUDESONIDE AND FORMOTEROL FUMARATE DIHYDRATE 160; 4.5 UG/1; UG/1
2 AEROSOL RESPIRATORY (INHALATION)
Start: 2025-01-28

## 2025-01-28 RX ADMIN — ALBUTEROL SULFATE 2 PUFF: 108 INHALANT RESPIRATORY (INHALATION) at 09:16

## 2025-01-28 RX ADMIN — CARVEDILOL 3.12 MG: 3.12 TABLET, FILM COATED ORAL at 09:31

## 2025-01-28 RX ADMIN — ALBUTEROL SULFATE 2 PUFF: 108 INHALANT RESPIRATORY (INHALATION) at 05:16

## 2025-01-28 RX ADMIN — IPRATROPIUM BROMIDE 2 PUFF: 17 AEROSOL, METERED RESPIRATORY (INHALATION) at 05:16

## 2025-01-28 RX ADMIN — IPRATROPIUM BROMIDE 2 PUFF: 17 AEROSOL, METERED RESPIRATORY (INHALATION) at 09:16

## 2025-01-28 RX ADMIN — BISACODYL 5 MG: 5 TABLET, COATED ORAL at 09:59

## 2025-01-28 RX ADMIN — BUDESONIDE AND FORMOTEROL FUMARATE DIHYDRATE 2 PUFF: 160; 4.5 AEROSOL RESPIRATORY (INHALATION) at 05:16

## 2025-01-28 RX ADMIN — Medication 10 ML: at 09:32

## 2025-01-28 RX ADMIN — ACETAMINOPHEN 650 MG: 325 TABLET ORAL at 04:59

## 2025-01-28 RX ADMIN — DEXAMETHASONE SODIUM PHOSPHATE 6 MG: 10 INJECTION INTRAMUSCULAR; INTRAVENOUS at 09:31

## 2025-01-28 NOTE — THERAPY DISCHARGE NOTE
Acute Care - Occupational Therapy Treatment Note/Discharge  [facility]     Patient Name: Manohar Garcia  : 1940  MRN: [id number]  Today's Date: 2025               Admit Date: 2025       ICD-10-CM ICD-9-CM   1. Shortness of breath  R06.02 786.05   2. COVID-19  U07.1 079.89   3. Influenza  J11.1 487.1   4. Decreased functional activity tolerance [R68.89]  R68.89 780.99   5. 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    Thrombocytopenia    Hyperlactatemia    Hip fracture    Closed 2-part intertrochanteric fracture of proximal end of right femur    Acute blood loss anemia    Stage 3b chronic kidney disease    COVID-19 virus detected    Influenza A    Hypoxia    Cytokine release syndrome, grade 1     Past Medical History:   Diagnosis Date    Dementia     GERD (gastroesophageal reflux disease)     Liver disorder      Past Surgical History:   Procedure Laterality Date    HEMORRHOIDECTOMY      HERNIA REPAIR      HIP TROCHANTERIC NAILING WITH INTRAMEDULLARY HIP SCREW Right 2024    Procedure: HIP TROCHANTERIC NAILING SHORT WITH INTRAMEDULLARY HIP SCREW;  Surgeon: Arcenio Chandra MD;  Location: [facility];  Service: Orthopedics;  Laterality: Right;       OT ASSESSMENT FLOWSHEET (Last 12 Hours)       OT Evaluation and Treatment       Row Name 25 1102                   OT Time and Intention    Subjective Information complains of;dyspnea;fatigue  -TS        Document Type therapy note (daily note)  -TS        Mode of Treatment occupational therapy  -TS        Patient Effort good  -TS           General Information    Existing Precautions/Restrictions fall  -TS           Pain Assessment    Pretreatment Pain Rating 0/10 - no pain  -TS        Posttreatment  Pain Rating 0/10 - no pain  -TS           Cognition    Orientation Status (Cognition) oriented x 4  -TS        Personal Safety Interventions fall prevention program maintained;gait belt;nonskid shoes/slippers when out of bed  -TS           Activities of Daily Living    BADL Assessment/Intervention bathing;upper body dressing;lower body dressing;grooming  -TS           Bathing Assessment/Intervention    [place] Level (Bathing) upper body;perineal area;proximal lower extremities;set up;distal lower extremities/feet;moderate assist (50% patient effort)  -TS        Assistive Devices (Bathing) hand-held shower spray hose;tub bench;grab bar, tub/shower  -TS        Position (Bathing) unsupported sitting;supported standing  -TS           Upper Body Dressing Assessment/Training    [place] Level (Upper Body Dressing) don;pull-over garment;set up;minimum assist (75% patient effort)  -TS        Position (Upper Body Dressing) unsupported sitting  -TS           Lower Body Dressing Assessment/Training    [place] Level (Lower Body Dressing) don;doff;socks;maximum assist (25% patient effort);pants/bottoms;moderate assist (50% patient effort)  -TS        Position (Lower Body Dressing) unsupported sitting  -TS           Grooming Assessment/Training    [place] Level (Grooming) wash face, hands;hair care, combing/brushing;set up  -TS        Position (Grooming) unsupported sitting  -TS           Bed Mobility    Supine-Sit [place] (Bed Mobility) modified independence  -TS        Assistive Device (Bed Mobility) head of bed elevated  -TS           Functional Mobility    Functional Mobility- Ind. Level standby assist  -TS        Functional Mobility- Device walker, front-wheeled  -TS        Functional Mobility- Comment in room, in BR  -TS           Transfer Assessment/Treatment    Transfers sit-stand transfer;stand-sit transfer  -TS           Sit-Stand Transfer    Sit-Stand [place] (Transfers) standby  assist;contact guard  -TS        Assistive Device (Sit-Stand Transfers) walker, front-wheeled  -TS           Stand-Sit Transfer    Stand-Sit [place] (Transfers) standby assist  -TS        Assistive Device (Stand-Sit Transfers) walker, front-wheeled  -TS           Plan of Care Review    Plan of Care Reviewed With patient  -TS        Progress improving  -TS        Outcome Evaluation Pt completed TB bathing task while seated on tub bench with set up and overall Min A. Mod A for LB dressing. Pt completed treatment on room air, did demonstrate increased WOB with TB bathing task. Pt would benefit from continued rehab at discharge.  -TS           Positioning and Restraints    Pre-Treatment Position in bed  -TS        Post Treatment Position chair  -TS        In Chair reclined;call light within reach;encouraged to call for assist;exit alarm on;waffle cushion  -TS           Therapy Plan Review/Discharge Plan (OT)    Anticipated Discharge Disposition (OT) skilled nursing facility  -TS           Transfer Goal 1 (OT)    Activity/Assistive Device (Transfer Goal 1, OT) sit-to-stand/stand-to-sit;bed-to-chair/chair-to-bed;commode, 3-in-1  -TS        [place] Level/Cues Needed (Transfer Goal 1, OT) supervision required  -TS        Time Frame (Transfer Goal 1, OT) long term goal (LTG);by discharge  -TS        Progress/Outcome (Transfer Goal 1, OT) goal not met  -TS           Dressing Goal 1 (OT)    Activity/Device (Dressing Goal 1, OT) lower body dressing  -TS        [place]/Cues Needed (Dressing Goal 1, OT) minimum assist (75% or more patient effort)  -TS        Time Frame (Dressing Goal 1, OT) long term goal (LTG);by discharge  -TS        Progress/Outcome (Dressing Goal 1, OT) goal not met  -TS           Toileting Goal 1 (OT)    Activity/Device (Toileting Goal 1, OT) toileting skills, all;adjust/manage clothing;perform perineal hygiene;commode, 3-in-1  -TS        [place] Level/Cues Needed (Toileting Goal 1, OT)  moderate assist (50-74% patient effort)  -TS        Time Frame (Toileting Goal 1, OT) long term goal (LTG);by discharge  -TS        Progress/Outcome (Toileting Goal 1, OT) goal met  -TS                  User Key  (r) = Recorded By, (t) = Taken By, (c) = Cosigned By      Initials Name Effective Dates    TS Aleksandra Yi EMERALD MILLER 02/03/23 -                     Occupational Therapy Education       Title: PT OT SLP Therapies (In Progress)       Topic: Occupational Therapy (In Progress)       Point: ADL training (Done)       Description:   Instruct learner(s) on proper safety adaptation and remediation techniques during self care or transfers.   Instruct in proper use of assistive devices.                  Learning Progress Summary            Patient Acceptance, E, VU by TS at 1/28/2025 1222    Acceptance, E, VU by AR at 1/25/2025 0130    Acceptance, E, VU by TS at 1/24/2025 1221    Acceptance, E, VU by CH at 1/23/2025 1510                      Point: Home exercise program (Not Started)       Description:   Instruct learner(s) on appropriate technique for monitoring, assisting and/or progressing therapeutic exercises/activities.                  Learner Progress:  Not documented in this visit.              Point: Precautions (Done)       Description:   Instruct learner(s) on prescribed precautions during self-care and functional transfers.                  Learning Progress Summary            Patient Acceptance, E, VU by  at 1/23/2025 1510                      Point: Body mechanics (Done)       Description:   Instruct learner(s) on proper positioning and spine alignment during self-care, functional mobility activities and/or exercises.                  Learning Progress Summary            Patient Acceptance, E, VU by  at 1/23/2025 1510                                      User Key       Initials Effective Dates Name Provider Type Discipline     07/11/23 -  Erika Mann, OTR/L Occupational Therapist OT    TS  02/03/23 -  Aleksandra Yi COTA Occupational Therapist Assistant OT    AR 05/24/22 -  Cait Mota, RN Registered Nurse Nurse                    OT Recommendation and Plan     Plan of Care Review  Plan of Care Reviewed With: patient  Progress: improving  Outcome Evaluation: Pt completed TB bathing task while seated on tub bench with set up and overall Min A. Mod A for LB dressing. Pt completed treatment on room air, did demonstrate increased WOB with TB bathing task. Pt would benefit from continued rehab at discharge.  Plan of Care Reviewed With: patient  Outcome Evaluation: Pt completed TB bathing task while seated on tub bench with set up and overall Min A. Mod A for LB dressing. Pt completed treatment on room air, did demonstrate increased WOB with TB bathing task. Pt would benefit from continued rehab at discharge.      OT Rehab Goals       Row Name 01/28/25 1102             Transfer Goal 1 (OT)    Activity/Assistive Device (Transfer Goal 1, OT) sit-to-stand/stand-to-sit;bed-to-chair/chair-to-bed;commode, 3-in-1  -TS      [place] Level/Cues Needed (Transfer Goal 1, OT) supervision required  -TS      Time Frame (Transfer Goal 1, OT) long term goal (LTG);by discharge  -TS      Progress/Outcome (Transfer Goal 1, OT) goal not met  -TS         Dressing Goal 1 (OT)    Activity/Device (Dressing Goal 1, OT) lower body dressing  -TS      [place]/Cues Needed (Dressing Goal 1, OT) minimum assist (75% or more patient effort)  -TS      Time Frame (Dressing Goal 1, OT) long term goal (LTG);by discharge  -TS      Progress/Outcome (Dressing Goal 1, OT) goal not met  -TS         Toileting Goal 1 (OT)    Activity/Device (Toileting Goal 1, OT) toileting skills, all;adjust/manage clothing;perform perineal hygiene;commode, 3-in-1  -TS      [place] Level/Cues Needed (Toileting Goal 1, OT) moderate assist (50-74% patient effort)  -TS      Time Frame (Toileting Goal 1, OT) long term goal (LTG);by discharge  -TS       Progress/Outcome (Toileting Goal 1, OT) goal met  -TS                User Key  (r) = Recorded By, (t) = Taken By, (c) = Cosigned By      Initials Name Provider Type Discipline    Aleksandra Haji COTA Occupational Therapist Assistant OT                     Outcome Measures       Row Name 01/28/25 1200 01/27/25 1409          How much help from another person do you currently need...    Turning from your back to your side while in flat bed without using bedrails? -- 4  -WK     Moving from lying on back to sitting on the side of a flat bed without bedrails? -- 3  -WK     Moving to and from a bed to a chair (including a wheelchair)? -- 3  -WK     Standing up from a chair using your arms (e.g., wheelchair, bedside chair)? -- 3  -WK     Climbing 3-5 steps with a railing? -- 3  -WK     To walk in hospital room? -- 3  -WK     AM-PAC 6 Clicks Score (PT) -- 19  -WK        How much help from another is currently needed...    Putting on and taking off regular lower body clothing? 2  -TS --     Bathing (including washing, rinsing, and drying) 3  -TS --     Toileting (which includes using toilet bed pan or urinal) 3  -TS --     Putting on and taking off regular upper body clothing 3  -TS --     Taking care of personal grooming (such as brushing teeth) 4  -TS --     Eating meals 4  -TS --     AM-PAC 6 Clicks Score (OT) 19  -TS --        Functional Assessment    Outcome Measure Options -- AM-PAC 6 Clicks Basic Mobility (PT)  -WK               User Key  (r) = Recorded By, (t) = Taken By, (c) = Cosigned By      Initials Name Provider Type    Aleksandra Haji COTA Occupational Therapist Assistant    Kellen Palomino, [place] Physical Therapist Assistant                    Time Calculation:    Time Calculation- OT       Row Name 01/28/25 1223             Time Calculation- OT    OT Start Time 1102  -TS      OT Stop Time 1200  -TS      OT Time Calculation (min) 58 min  -TS      Total Timed Code Minutes- OT 58 minute(s)   -TS      OT Received On 01/28/25  -TS         Timed Charges    67077 - OT Self Care/Mgmt Minutes 58  -TS         Total Minutes    Timed Charges Total Minutes 58  -TS       Total Minutes 58  -TS                User Key  (r) = Recorded By, (t) = Taken By, (c) = Cosigned By      Initials Name Provider Type    TS Aleksandra Yi COTA Occupational Therapist Assistant                  Timed Therapy Charges  Total Units: 4      Suggested Charges  Total Units: 4      Procedure Name Documented Minutes Units Code    HC OT SELF CARE/MGMT/TRAIN EA 15 MIN 58 4   15014 (CPT®)                 Documented Minutes  Total Minutes: 58      Therapy Provided Minutes    76598 - OT Self Care/Mgmt Minutes 58                           OT Discharge Summary  Anticipated Discharge Disposition (OT): skilled nursing facility  Reason for Discharge: Discharge from facility  Outcomes Achieved: Refer to plan of care for updates on goals achieved  Discharge Destination: SNF    EMERALD Vazquez  1/28/2025

## 2025-01-28 NOTE — THERAPY TREATMENT NOTE
Acute Care - Physical Therapy Treatment Note  [facility]     Patient Name: Manohar Garcia  : 1940  MRN: [id number]  Today's Date: 2025      Visit Dx:     ICD-10-CM ICD-9-CM   1. Shortness of breath  R06.02 786.05   2. COVID-19  U07.1 079.89   3. Influenza  J11.1 487.1   4. Decreased functional activity tolerance [R68.89]  R68.89 780.99   5. 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    Thrombocytopenia    Hyperlactatemia    Hip fracture    Closed 2-part intertrochanteric fracture of proximal end of right femur    Acute blood loss anemia    Stage 3b chronic kidney disease    COVID-19 virus detected    Influenza A    Hypoxia    Cytokine release syndrome, grade 1     Past Medical History:   Diagnosis Date    Dementia     GERD (gastroesophageal reflux disease)     Liver disorder      Past Surgical History:   Procedure Laterality Date    HEMORRHOIDECTOMY      HERNIA REPAIR      HIP TROCHANTERIC NAILING WITH INTRAMEDULLARY HIP SCREW Right 2024    Procedure: HIP TROCHANTERIC NAILING SHORT WITH INTRAMEDULLARY HIP SCREW;  Surgeon: Arcenio Chandra MD;  Location: [facility];  Service: Orthopedics;  Laterality: Right;     PT Assessment (Last 12 Hours)       PT Evaluation and Treatment       Row Name 25 0920          Physical Therapy Time and Intention    Subjective Information no complaints  -KJ     Document Type therapy note (daily note)  -KJ     Mode of Treatment physical therapy  -KJ     Patient Effort good  -KJ       Row Name 25 0920          General Information    Existing Precautions/Restrictions fall  -KJ     Limitations/Impairments hearing  -KJ       Row Name 25 0920          Pain    Pretreatment Pain Rating 0/10 - no pain  -KJ      Posttreatment Pain Rating 0/10 - no pain  -KJ       Row Name 01/28/25 0920          Bed Mobility    Supine-Sit [place] (Bed Mobility) independent  -KJ       Row Name 01/28/25 0920          Sit-Stand Transfer    Sit-Stand [place] (Transfers) contact guard  -KJ     Assistive Device (Sit-Stand Transfers) walker, front-wheeled  -KJ       Row Name 01/28/25 0920          Stand-Sit Transfer    Stand-Sit [place] (Transfers) supervision  -KJ       Row Name 01/28/25 0920          Gait/Stairs (Locomotion)    [place] Level (Gait) verbal cues;contact guard  -KJ     Assistive Device (Gait) walker, front-wheeled  -KJ     Distance in Feet (Gait) 40  -KJ       Row Name 01/28/25 0920          Aerobic Exercise    Comment, Aerobic Exercise (Therapeutic Exercise) AROM BLE's x 20 reps  -KJ       Row Name 01/28/25 0920          Positioning and Restraints    Pre-Treatment Position in bed  -KJ     Post Treatment Position chair  -KJ     In Bed call light within reach;exit alarm on  -KJ               User Key  (r) = Recorded By, (t) = Taken By, (c) = Cosigned By      Initials Name Provider Type    KJ Mckayla John, PTA Physical Therapist Assistant                    Physical Therapy Education       Title: PT OT SLP Therapies (In Progress)       Topic: Physical Therapy (In Progress)       Point: Mobility training (Done)       Learning Progress Summary            Patient Acceptance, E VU by MS at 1/23/2025 1502    Comment: role of PT in his care                      Point: Home exercise program (Not Started)       Learner Progress:  Not documented in this visit.              Point: Body mechanics (Not Started)       Learner Progress:  Not documented in this visit.              Point: Precautions (Not Started)       Learner Progress:  Not documented in this visit.                              User Key       Initials Effective Dates Name Provider Type Discipline    MS 07/11/23 -  Tamra Roberts, PT, DPT, NCS Physical  Therapist PT                  PT Recommendation and Plan     Progress: improving  Outcome Evaluation: PT tx completed. PT c/o abdominal pn. This was reported to RN. [place] sup>sit, sit<>stand CG, amb ~ 50' in room utilizing r wx on 2L. Decreased endurance, cues for safety awareness, and encouragement to sit in chair.   Outcome Measures       Row Name 01/27/25 1409             How much help from another person do you currently need...    Turning from your back to your side while in flat bed without using bedrails? 4  -WK      Moving from lying on back to sitting on the side of a flat bed without bedrails? 3  -WK      Moving to and from a bed to a chair (including a wheelchair)? 3  -WK      Standing up from a chair using your arms (e.g., wheelchair, bedside chair)? 3  -WK      Climbing 3-5 steps with a railing? 3  -WK      To walk in hospital room? 3  -WK      AM-PAC 6 Clicks Score (PT) 19  -WK         Functional Assessment    Outcome Measure Options AM-PAC 6 Clicks Basic Mobility (PT)  -WK                User Key  (r) = Recorded By, (t) = Taken By, (c) = Cosigned By      Initials Name Provider Type    WK Kellen Block PTA Physical Therapist Assistant                     Time Calculation:    PT Charges       Row Name 01/28/25 1006             Time Calculation    Start Time 0920  -KJ      Stop Time 0958  -KJ      Time Calculation (min) 38 min  -KJ      PT Received On 01/28/25  -KJ      PT Goal Re-Cert Due Date 02/02/25  -KJ         Time Calculation- PT    Total Timed Code Minutes- PT 38 minute(s)  -KJ                User Key  (r) = Recorded By, (t) = Taken By, (c) = Cosigned By      Initials Name Provider Type    Mckayla Vogel PTA Physical Therapist Assistant                  Therapy Charges for Today       Code Description Service Date Service Provider Modifiers Qty    30778047511 HC PT THER PROC EA 15 MIN 1/28/2025 Mckayla John PTA GP 1    38211963124 HC GAIT TRAINING EA 15 MIN 1/28/2025 Mckayla John,  PTA GP 1    07777013204  PT THERAPEUTIC ACT EA 15 MIN 1/28/2025 Mckayla John, PTA GP 1            PT G-Codes  Outcome Measure Options: AM-PAC 6 Clicks Basic Mobility (PT)  AM-PAC 6 Clicks Score (PT): 19  AM-PAC 6 Clicks Score (OT): 20    Mckayla John PTA  1/28/2025

## 2025-01-28 NOTE — CASE MANAGEMENT/SOCIAL WORK
Continued Stay Note  [facility]     Patient Name: Manohar Garcia  MRN: [id number]  Today's Date: 1/28/2025    Admit Date: 1/22/2025    Plan: [facility]   Discharge Plan       Row Name 01/28/25 1124       Plan    Final Discharge Disposition Code 03 - skilled nursing facility (SNF)    Final Note Pt is being dc'd back to [facility] today. Pharmacy correct in [place] for [facility]. Call report number is [phone].                   Discharge Codes    No documentation.                 Expected Discharge Date and Time       Expected Discharge Date Expected Discharge Time    Jan 28, 2025               CAIO Box

## 2025-01-28 NOTE — DISCHARGE SUMMARY
[facility] Medicine Services  DISCHARGE SUMMARY       Date of Admission: 1/22/2025  Date of Discharge:  1/28/2025  Primary Care Physician: Dani Farfan MD    Presenting Problem/History of Present Illness:      Final Discharge Diagnoses:  Active Hospital Problems    Diagnosis     **Hypoxia     Cytokine release syndrome, grade 1     Stage 3b chronic kidney disease     COVID-19 virus detected     Influenza A        Pertinent Test Results:       Imaging Results (All)       Procedure Component Value Units Date/Time    CT Chest Without Contrast Diagnostic [[id number]] Collected: 01/23/25 1607     Updated: 01/23/25 1622    Narrative:      EXAMINATION: CT CHEST WO CONTRAST DIAGNOSTIC- 1/23/2025 4:07 PM     HISTORY: hypoxia, abnormal chest x-ray; R06.02-Shortness of breath;  U07.1-COVID-19; J11.1-Influenza due to unidentified influenza virus with  other respiratory manifestations     TOTAL DOSE: 470.48 mGy.cm (Automatic exposure control technique was  implemented in an effort to keep the radiation dose as low as possible  without compromising image quality)     REPORT: Spiral CT of the chest was performed without intravenous  contrast from the thoracic inlet through the upper abdomen.  Reconstructed coronal and sagittal images are also reviewed.     Comparison: Chest x-ray 1/22/2025. Abdomen CT 1/16/2025     Review of the lung windows demonstrates mild respiratory motion  artifact, there is chronic elevation of the right diaphragm. There is  stable parenchymal opacity with air bronchograms in the right infrahilar  lung along the medial margin of the elevated hemidiaphragm. This is  favored to represent persistent atelectasis, though there is some  separate patchy groundglass infiltrate in the superior segment the right  lower lobe as well. There is mild linear atelectasis in the medial basal  segment of the left lower lobe. No pneumothorax or pleural effusion is  identified.     Soft  tissue windows demonstrate bilateral gynecomastia. No intrathoracic  lymphadenopathy is identified. There is ectasia of the thoracic aorta  without evidence of an aortic aneurysm. Evaluation of the vascular  structures is limited without intravenous contrast. The central  pulmonary arteries appear normal in caliber. There is tortuosity of the  descending thoracic aorta. In the upper abdomen, there is persistent  right-sided hydronephrosis, the left kidney is not visualized, the  previous abdominal CT demonstrates a horseshoe kidney, with a more  inferior position of the left kidney than is imaged today. The liver  appears cirrhotic. The stomach is distended with ingested food. Oval  mass posterior to the spleen and stomach measures 3.8 cm and is probably  an accessory spleen. There are collateral vessels in the left upper  quadrant as before.       Impression:      1. Marked elevation of the right hemidiaphragm with associated  compressive atelectasis of the right lower lobe, this appears to account  for the opacity seen on chest x-ray and no lung mass is identified. Some  patchy groundglass opacity is also seen in the superior segment of the  right lower lobe, may also be related to atelectasis versus a small area  of pneumonia. No lung consolidation related to pneumonia however.  2. Cirrhotic appearance of the liver with evidence of portal  hypertension.  3. Chronic right-sided hydronephrosis.        This report was signed and finalized on 1/23/2025 4:19 PM by Dr. Antonino Victor MD.       XR Chest 1 View [[id number]] Collected: 01/22/25 1150     Updated: 01/22/25 1157    Narrative:      EXAMINATION: XR CHEST 1 VW- 1/22/2025 11:50 AM     HISTORY: Shortness of breath.     REPORT: A frontal view of the chest was obtained.     COMPARISON: Chest x-ray 8/29/2024. CT abdomen and pelvis 1/16/2025.     The lungs are hypoaerated, there is mild central basilar vascular  crowding, normal heart size. Thoracic aortic ectasia  is present as  before. There is mild elevation of the right hemidiaphragm. Rounded area  of consolidation in the medial right lung base persists, may represent  rounded atelectasis or pneumonia. There is no pneumothorax or pleural  effusion. No acute osseous abnormality.       Impression:      Rounded area of consolidation in the medial right lung base  may represent pneumonia or round atelectasis. This appears similar to  the previous abdominal CT. Clinical correlation is needed. The lungs are  hypoaerated.     This report was signed and finalized on 1/22/2025 11:54 AM by Dr. Antonino Victor MD.             LAB RESULTS:      Lab 01/28/25  0436 01/27/25  0359 01/26/25  0301 01/25/25  0200 01/24/25  1108 01/24/25  0907 01/23/25  0404 01/22/25  1556 01/22/25  1247 01/22/25  1145   WBC 4.74 3.35* 3.92 5.23 7.84  --  2.83*  --   --  3.67   HEMOGLOBIN 10.1* 9.9* 9.6* 8.9* 10.7*  --  9.0*  --   --  10.9*   HEMATOCRIT 32.3* 31.5* 30.7* 28.9* 33.2*  --  28.8*  --   --  33.9*   PLATELETS 80* 78* 84* 84* 105*  --  79*  --   --  99*   NEUTROS ABS 3.87  --   --   --   --   --  2.72  --   --  3.37   IMMATURE GRANS (ABS) 0.02  --   --   --   --   --   --   --   --   --    LYMPHS ABS 0.58*  --   --   --   --   --   --   --   --   --    MONOS ABS 0.27  --   --   --   --   --   --   --   --   --    EOS ABS 0.00  --   --   --   --   --  0.00  --   --  0.00   MCV 91.0 91.8 92.5 92.9 92.2  --  92.6  --   --  91.4   SED RATE  --   --   --   --  52*  --   --   --   --   --    CRP 0.85*  --  1.99*  --   --  5.71*  --   --  11.77*  --    PROCALCITONIN  --   --   --   --   --   --   --   --  0.28*  --    LACTATE  --   --   --   --   --   --   --  2.0  --  2.9*         Lab 01/28/25  0436 01/27/25  0359 01/26/25  0301 01/25/25  0200 01/24/25  0907 01/22/25  1556 01/22/25  1145   SODIUM 141 144 143 141 143   < > 139   SODIUM, ARTERIAL  --   --   --   --   --    < >  --    POTASSIUM 4.6 4.6 4.7 4.8 4.1   < > 3.6   CHLORIDE 111* 114* 117* 116*  112*   < > 108*   CO2 23.0 23.0 20.0* 18.0* 20.0*   < > 17.0*   ANION GAP 7.0 7.0 6.0 7.0 11.0   < > 14.0   BUN 38* 40* 45* 44* 44*   < > 31*   CREATININE 1.04 1.12 1.26 1.53* 1.64*   < > 1.79*   EGFR 70.8 64.8 56.2* 44.6* 41.0*   < > 36.9*   GLUCOSE 119* 134* 129* 128* 149*   < > 146*   CALCIUM 8.9 9.1 8.8 8.5* 9.1   < > 8.6   MAGNESIUM  --   --   --   --   --   --  2.0   HEMOGLOBIN A1C 5.00  --   --   --   --   --   --    TSH 0.239*  --   --   --   --   --   --     < > = values in this interval not displayed.         Lab 01/28/25  0436 01/23/25  0404   TOTAL PROTEIN 5.7* 5.9*   ALBUMIN 2.6* 2.4*   GLOBULIN 3.1 3.5   ALT (SGPT) 18 10   AST (SGOT) 17 19   BILIRUBIN 0.6 0.4   ALK PHOS 110 116         Lab 01/22/25  1247 01/22/25  1145   PROBNP  --  483.7   HSTROP T 26* 27*         Lab 01/28/25  0436   CHOLESTEROL 102   LDL CHOL 54   HDL CHOL 33*   TRIGLYCERIDES 69             Lab 01/22/25  1556   PH, ARTERIAL 7.356   PCO2, ARTERIAL 30.0*   PO2 .0   O2 SATURATION ART 98.2   HCO3 ART 16.7*   BASE EXCESS ART -7.8*   CARBOXYHEMOGLOBIN 0.7     Brief Urine Lab Results  (Last result in the past 365 days)        Color   Clarity   Blood   Leuk Est   Nitrite   Protein   CREAT   Urine HCG        10/28/24 1600 Yellow   Turbid   Large (3+)   Large (3+)   Negative   >=300 mg/dL (3+)                 Microbiology Results (last 10 days)       Procedure Component Value - Date/Time    MRSA Screen, PCR (Inpatient) - Swab, Nares [[id number]]  (Normal) Collected: 01/22/25 2226    Lab Status: Final result Specimen: Swab from Nares Updated: 01/23/25 0018     MRSA PCR No MRSA Detected    Narrative:      The negative predictive value of this diagnostic test is high and should only be used to consider de-escalating anti-MRSA therapy. A positive result may indicate colonization with MRSA and must be correlated clinically.    S. Pneumo Ag Urine or CSF - Urine, Urine, Clean Catch [[id number]]  (Normal) Collected: 01/22/25 5046    Lab Status:  Final result Specimen: Urine, Clean Catch Updated: 01/22/25 2004     Strep Pneumo Ag Negative    Legionella Antigen, Urine - Urine, Urine, Clean Catch [[id number]]  (Normal) Collected: 01/22/25 1815    Lab Status: Final result Specimen: Urine, Clean Catch Updated: 01/22/25 2003     LEGIONELLA ANTIGEN, URINE Negative    Respiratory Culture - Sputum, Cough [[id number]] Collected: 01/22/25 1815    Lab Status: Final result Specimen: Sputum from Cough Updated: 01/25/25 0947     Respiratory Culture Heavy growth (4+) Normal respiratory blanca. No S. aureus or Pseudomonas aeruginosa detected. Final report.     Gram Stain Moderate (3+) WBCs per low power field      Rare (1+) Epithelial cells per low power field      Moderate (3+) Gram positive cocci      Moderate (3+) Gram negative bacilli    Respiratory Panel PCR w/COVID-19(SARS-CoV-2) EDU/EDITH/YUDELKA/PAD/COR/DEJON In-House, NP Swab in UTM/VTM, 2 HR TAT - Swab, Nasopharynx [[id number]]  (Abnormal) Collected: 01/22/25 1136    Lab Status: Final result Specimen: Swab from Nasopharynx Updated: 01/22/25 1258     ADENOVIRUS, PCR Not Detected     Coronavirus 229E Not Detected     Coronavirus HKU1 Not Detected     Coronavirus NL63 Not Detected     Coronavirus OC43 Not Detected     COVID19 Detected     Human Metapneumovirus Not Detected     Human Rhinovirus/Enterovirus Not Detected     Influenza A H3 Detected     Influenza B PCR Not Detected     Parainfluenza Virus 1 Not Detected     Parainfluenza Virus 2 Not Detected     Parainfluenza Virus 3 Not Detected     Parainfluenza Virus 4 Not Detected     RSV, PCR Not Detected     Bordetella pertussis pcr Not Detected     Bordetella parapertussis PCR Not Detected     Chlamydophila pneumoniae PCR Not Detected     Mycoplasma pneumo by PCR Not Detected    Narrative:      In the setting of a positive respiratory panel with a viral infection PLUS a negative procalcitonin without other underlying concern for bacterial infection, consider observing off  antibiotics or discontinuation of antibiotics and continue supportive care. If the respiratory panel is positive for atypical bacterial infection (Bordetella pertussis, Chlamydophila pneumoniae, or Mycoplasma pneumoniae), consider antibiotic de-escalation to target atypical bacterial infection.        HPI .  Mr. Garcia is an 84-year-old male who presented to [facility] with 1 week history of worsening shortness of breath and cough. 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 He has resided at [place] since September 2024. Positive for covid-19 and flu.     Hospital Course:   Covid-19/Influenza/Hypoxia   - complete 11 days of Decadron for covid hypoxia, about 4 days left  -Complete 5 days of ceftriaxone ans Azithromycin 1/26/2025.  -Cannot have Mucomyst nebulizer ordered yesterday due to his COVID status as per hospital protocol  - wean off oxygen as tolerated   -Continue Symbicort, and albuterol and Atrovent inhaler and chest PT and Robitussin as needed, incentive spirometer.  CXR showing RL consolidation that is somewhat similar as previously seen on abdominal CT 1/16   CT chest w/o contrast-Marked elevation of the right hemidiaphragm with associated compressive atelectasis of the right lower lobe- appears to account for the opacity seen on chest x-ray and no lung mass is identified- patchy groundglass opacity is also seen in the superior segment of the  right lower lobe-related to atelectasis versus a small area of pneumonia- No lung consolidation related to pneumonia however, Cirrhotic appearance of the liver with evidence of portal hypertension, Chronic right-sided hydronephrosis.  CRP 11 > 5 > 1.99- Now 0.85.  Patient is on room air.     Chronic liver Cirrhosis/Portal Hypertension - seen on CT  - started Coreg this admission to lower splanchnic pressure  - euvolemic will not be adding any diuretics at this time   -  "will need outpatient follow up with GI for EGD and assessing for varices      CKD 3B/right-sided hydronephrosis-large 3.3 calculus in the bladder/horseshoe kidneys.  At baseline  Avoid nephrotoxins   CT scan abdomen pelvic-Moderate right-sided hydronephrosis extending to the ureterovesical  Junction- No ureteral stone identified- there is a large 3.3 cm bladder calculus which may be causing obstruction of the right ureterovesical junction. No left-sided hydronephrosis, Anatomic variant  horseshoe kidney with two 4 mm calculi in the left portion of the horseshoe kidney,  Prostate is markedly enlarged, Chronic right hemidiaphragm elevation with right lower lobe atelectasis,  Chronic liver disease/cirrhosis with left upper abdominal varices and splenomegaly.     Neutropenia.  Resolved.     Anemia.  Hemoglobin stable.  No sign of acute bleed.     Thrombocytopenia.  Slight increase platelet .     Prostate hypertrophy.  Flomax.     Nausea/chronically right hemidiaphragm elevation.  Zofran as needed.     Lovenox prophylaxis.     Nutrition . regular/house diet.     Deconditioning.  Patient is wheelchair-bound at baseline.  Patient can transfer only.  PT and OT consult.     Legionella antigen-negative.  MRSA screen-negative.  Respiratory culture normal respiratory blanca.  Strep pneumo-negative.  Respiratory PCR flu a positive and COVID-positive.  Urine cultures-mixed blanca.     [facility].     Vital signs stable, afebrile.  Plan to discharge patient back to rehab.  Follow-up with rehab physician as soon as able.    Physical Exam on Discharge:  /74 (BP Location: Right arm, Patient Position: Lying)   Pulse 79   Temp 97.7 °F (36.5 °C) (Axillary)   Resp 16   Ht 180.3 cm (70.98\")   Wt 87.2 kg (192 lb 3.2 oz)   SpO2 93%   BMI 26.82 kg/m²   Physical Exam  Vitals and nursing note reviewed.   Constitutional:       Appearance: He is well-developed.      Comments: Advanced age.  Chronically ill.  Hard of " hearing.  Dementia.   HENT:      Head: Normocephalic.   Eyes:      General: No scleral icterus.     Pupils: Pupils are equal, round, and reactive to light.   Neck:      Thyroid: No thyromegaly.      Vascular: No carotid bruit or JVD.      Trachea: No tracheal deviation.   Cardiovascular:      Rate and Rhythm: Normal rate and regular rhythm.      Heart sounds: No murmur heard.     No friction rub. No gallop.   Pulmonary:      Effort: No respiratory distress.      Breath sounds: No wheezing or rales.      Comments: Diminished breath sound bilateral, clear, patient is on room air.  Chest:      Chest wall: No tenderness.   Abdominal:      General: Bowel sounds are normal. There is no distension.      Palpations: Abdomen is soft.      Tenderness: There is no abdominal tenderness.   Musculoskeletal:         General: Normal range of motion.      Cervical back: Normal range of motion and neck supple.   Skin:     General: Skin is warm and dry.      Capillary Refill: Capillary refill takes 2 to 3 seconds.      Findings: No rash.   Neurological:      Mental Status: He is alert.      Cranial Nerves: No cranial nerve deficit.      Motor: Weakness present.      Coordination: Coordination abnormal.      Gait: Gait abnormal.   Psychiatric:         Mood and Affect: Mood normal.         Behavior: Behavior normal.        Condition on Discharge: Stable.    Discharge Disposition: Discharge back to rehab.  Skilled Nursing Facility (DC - External)    Discharge Medications:     Discharge Medications        New Medications        Instructions Start Date   albuterol sulfate  (90 Base) MCG/ACT inhaler  Commonly known as: PROVENTIL HFA;VENTOLIN HFA;PROAIR HFA   2 puffs, Inhalation, 4 Times Daily - RT      budesonide-formoterol 160-4.5 MCG/ACT inhaler  Commonly known as: SYMBICORT   2 puffs, Inhalation, 2 Times Daily - RT      carvedilol 3.125 MG tablet  Commonly known as: COREG   3.125 mg, Oral, 2 Times Daily With Meals       dexAMETHasone 4 MG tablet  Commonly known as: DECADRON   4 mg, Oral, Daily With Breakfast      ipratropium 17 MCG/ACT inhaler  Commonly known as: ATROVENT HFA   2 puffs, Inhalation, 4 Times Daily - RT             Changes to Medications        Instructions Start Date   nystatin 415385 UNIT/GM powder  Commonly known as: MYCOSTATIN  What changed:   how much to take  when to take this  reasons to take this   1 Application, Topical, 3 Times Daily PRN             Continue These Medications        Instructions Start Date   acetaminophen 325 MG tablet  Commonly known as: TYLENOL   650 mg, Oral, Every 4 Hours PRN      apixaban 2.5 MG tablet tablet  Commonly known as: ELIQUIS   2.5 mg, Oral, 2 Times Daily      bisacodyl 5 MG EC tablet  Commonly known as: DULCOLAX   5 mg, Oral, Daily PRN      bisacodyl 10 MG suppository  Commonly known as: DULCOLAX   10 mg, Rectal, Daily PRN      famotidine 20 MG tablet  Commonly known as: PEPCID   20 mg, Oral, Daily      tamsulosin 0.4 MG capsule 24 hr capsule  Commonly known as: FLOMAX   0.4 mg, Oral, Nightly             Stop These Medications      Gemtesa 75 MG tablet  Generic drug: Vibegron     HYDROcodone-acetaminophen 5-325 MG per tablet  Commonly known as: NORCO     ipratropium-albuterol 0.5-2.5 mg/3 ml nebulizer  Commonly known as: DUO-NEB                Discharge Diet:   Diet Instructions       Advance Diet As Tolerated -Target Diet: Regular .      Target Diet: Regular .            Activity at Discharge:   Activity Instructions       Activity as Tolerated              Follow-up Appointments:   Future Appointments   Date Time Provider Department Center   4/18/2025  1:10 PM Abelardo Panchal PA [facility]       Follow-up with nursing home physician as soon as able.    Electronically signed by James Donovan MD, 01/28/25, 09:48 CST.    Time: Greater than 30 minutes.

## 2025-01-29 NOTE — THERAPY DISCHARGE NOTE
Acute Care - Physical Therapy Discharge Summary  [facility]       Patient Name: Manohar Garcia  : 1940  MRN: [id number]    Today's Date: 2025                 Admit Date: 2025      PT Recommendation and Plan    Visit Dx:    ICD-10-CM ICD-9-CM   1. Shortness of breath  R06.02 786.05   2. COVID-19  U07.1 079.89   3. Influenza  J11.1 487.1   4. Decreased functional activity tolerance [R68.89]  R68.89 780.99   5. Frequent UTI  N39.0 599.0        Outcome Measures       Row Name 25 1200 25 1409          How much help from another person do you currently need...    Turning from your back to your side while in flat bed without using bedrails? -- 4  -WK     Moving from lying on back to sitting on the side of a flat bed without bedrails? -- 3  -WK     Moving to and from a bed to a chair (including a wheelchair)? -- 3  -WK     Standing up from a chair using your arms (e.g., wheelchair, bedside chair)? -- 3  -WK     Climbing 3-5 steps with a railing? -- 3  -WK     To walk in hospital room? -- 3  -WK     AM-PAC 6 Clicks Score (PT) -- 19  -WK        How much help from another is currently needed...    Putting on and taking off regular lower body clothing? 2  -TS --     Bathing (including washing, rinsing, and drying) 3  -TS --     Toileting (which includes using toilet bed pan or urinal) 3  -TS --     Putting on and taking off regular upper body clothing 3  -TS --     Taking care of personal grooming (such as brushing teeth) 4  -TS --     Eating meals 4  -TS --     AM-PAC 6 Clicks Score (OT) 19  -TS --        Functional Assessment    Outcome Measure Options -- AM-PAC 6 Clicks Basic Mobility (PT)  -WK               User Key  (r) = Recorded By, (t) = Taken By, (c) = Cosigned By      Initials Name Provider Type    TS Aleksandra Yi COTA Occupational Therapist Assistant    WK Kellen Block PTA Physical Therapist Assistant                         PT Rehab Goals       Row Name 25 0900              Bed Mobility Goal 1 (PT)    Activity/Assistive Device (Bed Mobility Goal 1, PT) bed mobility activities, all  -AB      [place] Level/Cues Needed (Bed Mobility Goal 1, PT) independent  -AB      Time Frame (Bed Mobility Goal 1, PT) long term goal (LTG);by discharge  -AB      Progress/Outcomes (Bed Mobility Goal 1, PT) goal partially met  -AB         Transfer Goal 1 (PT)    Activity/Assistive Device (Transfer Goal 1, PT) sit-to-stand/stand-to-sit;bed-to-chair/chair-to-bed;walker, rolling  -AB      [place]ax Level/Cues Needed (Transfer Goal 1, PT) modified independence  -AB      Time Frame (Transfer Goal 1, PT) long term goal (LTG);by discharge  -AB      Progress/Outcome (Transfer Goal 1, PT) goal not met  -AB         Gait Training Goal 1 (PT)    Activity/Assistive Device (Gait Training Goal 1, PT) gait (walking locomotion);assistive device use;decrease fall risk;increase endurance/gait distance;improve balance and speed;walker, rolling  -AB      [place] Level (Gait Training Goal 1, PT) modified independence  -AB      Distance (Gait Training Goal 1, PT) 75ft maintaining SpO2 at or above 90%  -AB      Time Frame (Gait Training Goal 1, PT) long term goal (LTG);by discharge  -AB      Progress/Outcome (Gait Training Goal 1, PT) goal not met  -AB                User Key  (r) = Recorded By, (t) = Taken By, (c) = Cosigned By      Initials Name Provider Type Discipline    Cait Villalba PTA Physical Therapist Assistant PT                        PT Discharge Summary  Anticipated Discharge Disposition (PT): skilled nursing facility  Reason for Discharge: Discharge from facility  Outcomes Achieved: Refer to plan of care for updates on goals achieved  Discharge Destination: SNF      Cait Montenegro PTA   1/29/2025

## 2025-02-04 ENCOUNTER — APPOINTMENT (OUTPATIENT)
Dept: CT IMAGING | Facility: HOSPITAL | Age: 85
DRG: 690 | End: 2025-02-04
Payer: MEDICARE

## 2025-02-04 ENCOUNTER — HOSPITAL ENCOUNTER (INPATIENT)
Facility: HOSPITAL | Age: 85
LOS: 6 days | Discharge: NURSING, REHAB OR LONG-TERM CARE | DRG: 690 | End: 2025-02-10
Attending: EMERGENCY MEDICINE | Admitting: INTERNAL MEDICINE
Payer: MEDICARE

## 2025-02-04 ENCOUNTER — APPOINTMENT (OUTPATIENT)
Dept: GENERAL RADIOLOGY | Facility: HOSPITAL | Age: 85
DRG: 690 | End: 2025-02-04
Payer: MEDICARE

## 2025-02-04 DIAGNOSIS — N17.9 ACUTE RENAL FAILURE, UNSPECIFIED ACUTE RENAL FAILURE TYPE: ICD-10-CM

## 2025-02-04 DIAGNOSIS — Z74.09 IMPAIRED MOBILITY: ICD-10-CM

## 2025-02-04 DIAGNOSIS — N12 PYELONEPHRITIS: Primary | ICD-10-CM

## 2025-02-04 DIAGNOSIS — N10 ACUTE PYELONEPHRITIS: ICD-10-CM

## 2025-02-04 DIAGNOSIS — N21.0 BLADDER STONE: ICD-10-CM

## 2025-02-04 LAB
ABO GROUP BLD: NORMAL
ALBUMIN SERPL-MCNC: 2.3 G/DL (ref 3.5–5.2)
ALBUMIN/GLOB SERPL: 1 G/DL
ALP SERPL-CCNC: 112 U/L (ref 39–117)
ALT SERPL W P-5'-P-CCNC: 16 U/L (ref 1–41)
ANION GAP SERPL CALCULATED.3IONS-SCNC: 8 MMOL/L (ref 5–15)
ANION GAP SERPL CALCULATED.3IONS-SCNC: 9 MMOL/L (ref 5–15)
AST SERPL-CCNC: 15 U/L (ref 1–40)
B PARAPERT DNA SPEC QL NAA+PROBE: NOT DETECTED
B PERT DNA SPEC QL NAA+PROBE: NOT DETECTED
BACTERIA UR QL AUTO: ABNORMAL /HPF
BASOPHILS # BLD AUTO: 0.01 10*3/MM3 (ref 0–0.2)
BASOPHILS # BLD MANUAL: 0 10*3/MM3 (ref 0–0.2)
BASOPHILS NFR BLD AUTO: 0.1 % (ref 0–1.5)
BASOPHILS NFR BLD MANUAL: 0 % (ref 0–1.5)
BILIRUB SERPL-MCNC: 1 MG/DL (ref 0–1.2)
BILIRUB UR QL STRIP: ABNORMAL
BLD GP AB SCN SERPL QL: NEGATIVE
BUN SERPL-MCNC: 44 MG/DL (ref 8–23)
BUN SERPL-MCNC: 48 MG/DL (ref 8–23)
BUN/CREAT SERPL: 24.5 (ref 7–25)
BUN/CREAT SERPL: 26.3 (ref 7–25)
C PNEUM DNA NPH QL NAA+NON-PROBE: NOT DETECTED
CALCIUM SPEC-SCNC: 7.9 MG/DL (ref 8.6–10.5)
CALCIUM SPEC-SCNC: 8.3 MG/DL (ref 8.6–10.5)
CHLORIDE SERPL-SCNC: 109 MMOL/L (ref 98–107)
CHLORIDE SERPL-SCNC: 110 MMOL/L (ref 98–107)
CLARITY UR: ABNORMAL
CO2 SERPL-SCNC: 20 MMOL/L (ref 22–29)
CO2 SERPL-SCNC: 20 MMOL/L (ref 22–29)
COLOR UR: ABNORMAL
CREAT SERPL-MCNC: 1.67 MG/DL (ref 0.76–1.27)
CREAT SERPL-MCNC: 1.96 MG/DL (ref 0.76–1.27)
CRP SERPL-MCNC: 4.22 MG/DL (ref 0–0.5)
D-LACTATE SERPL-SCNC: 2 MMOL/L (ref 0.5–2)
D-LACTATE SERPL-SCNC: 2.6 MMOL/L (ref 0.5–2)
D-LACTATE SERPL-SCNC: 3.9 MMOL/L (ref 0.5–2)
D-LACTATE SERPL-SCNC: 4.7 MMOL/L (ref 0.5–2)
DEPRECATED RDW RBC AUTO: 49.7 FL (ref 37–54)
DEPRECATED RDW RBC AUTO: 51.8 FL (ref 37–54)
EGFRCR SERPLBLD CKD-EPI 2021: 33.1 ML/MIN/1.73
EGFRCR SERPLBLD CKD-EPI 2021: 40.1 ML/MIN/1.73
ELLIPTOCYTES BLD QL SMEAR: ABNORMAL
EOSINOPHIL # BLD AUTO: 0 10*3/MM3 (ref 0–0.4)
EOSINOPHIL # BLD MANUAL: 0 10*3/MM3 (ref 0–0.4)
EOSINOPHIL NFR BLD AUTO: 0 % (ref 0.3–6.2)
EOSINOPHIL NFR BLD MANUAL: 0 % (ref 0.3–6.2)
ERYTHROCYTE [DISTWIDTH] IN BLOOD BY AUTOMATED COUNT: 15.5 % (ref 12.3–15.4)
ERYTHROCYTE [DISTWIDTH] IN BLOOD BY AUTOMATED COUNT: 15.6 % (ref 12.3–15.4)
FLUAV SUBTYP SPEC NAA+PROBE: NOT DETECTED
FLUBV RNA ISLT QL NAA+PROBE: NOT DETECTED
GLOBULIN UR ELPH-MCNC: 2.4 GM/DL
GLUCOSE SERPL-MCNC: 130 MG/DL (ref 65–99)
GLUCOSE SERPL-MCNC: 130 MG/DL (ref 65–99)
GLUCOSE UR STRIP-MCNC: NEGATIVE MG/DL
HADV DNA SPEC NAA+PROBE: NOT DETECTED
HCOV 229E RNA SPEC QL NAA+PROBE: NOT DETECTED
HCOV HKU1 RNA SPEC QL NAA+PROBE: NOT DETECTED
HCOV NL63 RNA SPEC QL NAA+PROBE: NOT DETECTED
HCOV OC43 RNA SPEC QL NAA+PROBE: NOT DETECTED
HCT VFR BLD AUTO: 26.4 % (ref 37.5–51)
HCT VFR BLD AUTO: 30.5 % (ref 37.5–51)
HGB BLD-MCNC: 8.5 G/DL (ref 13–17.7)
HGB BLD-MCNC: 9.7 G/DL (ref 13–17.7)
HGB UR QL STRIP.AUTO: ABNORMAL
HMPV RNA NPH QL NAA+NON-PROBE: NOT DETECTED
HPIV1 RNA ISLT QL NAA+PROBE: NOT DETECTED
HPIV2 RNA SPEC QL NAA+PROBE: NOT DETECTED
HPIV3 RNA NPH QL NAA+PROBE: NOT DETECTED
HPIV4 P GENE NPH QL NAA+PROBE: NOT DETECTED
HYALINE CASTS UR QL AUTO: ABNORMAL /LPF
IMM GRANULOCYTES # BLD AUTO: 0.1 10*3/MM3 (ref 0–0.05)
IMM GRANULOCYTES NFR BLD AUTO: 1 % (ref 0–0.5)
INR PPP: 1.85 (ref 0.91–1.09)
KETONES UR QL STRIP: NEGATIVE
LEUKOCYTE ESTERASE UR QL STRIP.AUTO: ABNORMAL
LYMPHOCYTES # BLD AUTO: 0.3 10*3/MM3 (ref 0.7–3.1)
LYMPHOCYTES # BLD MANUAL: 0.1 10*3/MM3 (ref 0.7–3.1)
LYMPHOCYTES NFR BLD AUTO: 3 % (ref 19.6–45.3)
LYMPHOCYTES NFR BLD MANUAL: 2 % (ref 5–12)
M PNEUMO IGG SER IA-ACNC: NOT DETECTED
MAGNESIUM SERPL-MCNC: 2.1 MG/DL (ref 1.6–2.4)
MCH RBC QN AUTO: 29 PG (ref 26.6–33)
MCH RBC QN AUTO: 29.4 PG (ref 26.6–33)
MCHC RBC AUTO-ENTMCNC: 31.8 G/DL (ref 31.5–35.7)
MCHC RBC AUTO-ENTMCNC: 32.2 G/DL (ref 31.5–35.7)
MCV RBC AUTO: 90.1 FL (ref 79–97)
MCV RBC AUTO: 92.4 FL (ref 79–97)
MONOCYTES # BLD AUTO: 0.43 10*3/MM3 (ref 0.1–0.9)
MONOCYTES # BLD: 0.2 10*3/MM3 (ref 0.1–0.9)
MONOCYTES NFR BLD AUTO: 4.3 % (ref 5–12)
NEUTROPHILS # BLD AUTO: 9.46 10*3/MM3 (ref 1.7–7)
NEUTROPHILS NFR BLD AUTO: 9.15 10*3/MM3 (ref 1.7–7)
NEUTROPHILS NFR BLD AUTO: 91.6 % (ref 42.7–76)
NEUTROPHILS NFR BLD MANUAL: 78 % (ref 42.7–76)
NEUTS BAND NFR BLD MANUAL: 19 % (ref 0–5)
NEUTS VAC BLD QL SMEAR: ABNORMAL
NITRITE UR QL STRIP: POSITIVE
OVALOCYTES BLD QL SMEAR: ABNORMAL
PH UR STRIP.AUTO: 6 [PH] (ref 5–8)
PLATELET # BLD AUTO: 56 10*3/MM3 (ref 140–450)
PLATELET # BLD AUTO: 62 10*3/MM3 (ref 140–450)
PMV BLD AUTO: 11.6 FL (ref 6–12)
PMV BLD AUTO: 11.7 FL (ref 6–12)
POIKILOCYTOSIS BLD QL SMEAR: ABNORMAL
POLYCHROMASIA BLD QL SMEAR: ABNORMAL
POTASSIUM SERPL-SCNC: 4.2 MMOL/L (ref 3.5–5.2)
POTASSIUM SERPL-SCNC: 4.5 MMOL/L (ref 3.5–5.2)
PROCALCITONIN SERPL-MCNC: 15.72 NG/ML (ref 0–0.25)
PROT SERPL-MCNC: 4.7 G/DL (ref 6–8.5)
PROT UR QL STRIP: ABNORMAL
PROTHROMBIN TIME: 22.4 SECONDS (ref 11.8–14.8)
RBC # BLD AUTO: 2.93 10*6/MM3 (ref 4.14–5.8)
RBC # BLD AUTO: 3.3 10*6/MM3 (ref 4.14–5.8)
RBC # UR STRIP: ABNORMAL /HPF
REF LAB TEST METHOD: ABNORMAL
RH BLD: POSITIVE
RHINOVIRUS RNA SPEC NAA+PROBE: NOT DETECTED
RSV RNA NPH QL NAA+NON-PROBE: NOT DETECTED
SARS-COV-2 RNA RESP QL NAA+PROBE: NOT DETECTED
SMALL PLATELETS BLD QL SMEAR: ABNORMAL
SODIUM SERPL-SCNC: 138 MMOL/L (ref 136–145)
SODIUM SERPL-SCNC: 138 MMOL/L (ref 136–145)
SP GR UR STRIP: 1.03 (ref 1–1.03)
SQUAMOUS #/AREA URNS HPF: ABNORMAL /HPF
T&S EXPIRATION DATE: NORMAL
UROBILINOGEN UR QL STRIP: ABNORMAL
VARIANT LYMPHS NFR BLD MANUAL: 1 % (ref 19.6–45.3)
WBC # UR STRIP: ABNORMAL /HPF
WBC NRBC COR # BLD AUTO: 9.75 10*3/MM3 (ref 3.4–10.8)
WBC NRBC COR # BLD AUTO: 9.99 10*3/MM3 (ref 3.4–10.8)

## 2025-02-04 PROCEDURE — 81001 URINALYSIS AUTO W/SCOPE: CPT | Performed by: EMERGENCY MEDICINE

## 2025-02-04 PROCEDURE — 87077 CULTURE AEROBIC IDENTIFY: CPT | Performed by: EMERGENCY MEDICINE

## 2025-02-04 PROCEDURE — 85007 BL SMEAR W/DIFF WBC COUNT: CPT | Performed by: EMERGENCY MEDICINE

## 2025-02-04 PROCEDURE — 25810000003 SODIUM CHLORIDE 0.9 % SOLUTION: Performed by: HOSPITALIST

## 2025-02-04 PROCEDURE — 87186 SC STD MICRODIL/AGAR DIL: CPT | Performed by: EMERGENCY MEDICINE

## 2025-02-04 PROCEDURE — 87040 BLOOD CULTURE FOR BACTERIA: CPT | Performed by: EMERGENCY MEDICINE

## 2025-02-04 PROCEDURE — 36415 COLL VENOUS BLD VENIPUNCTURE: CPT | Performed by: EMERGENCY MEDICINE

## 2025-02-04 PROCEDURE — 94640 AIRWAY INHALATION TREATMENT: CPT

## 2025-02-04 PROCEDURE — 84145 PROCALCITONIN (PCT): CPT | Performed by: EMERGENCY MEDICINE

## 2025-02-04 PROCEDURE — 71045 X-RAY EXAM CHEST 1 VIEW: CPT

## 2025-02-04 PROCEDURE — 94664 DEMO&/EVAL PT USE INHALER: CPT

## 2025-02-04 PROCEDURE — 86140 C-REACTIVE PROTEIN: CPT | Performed by: EMERGENCY MEDICINE

## 2025-02-04 PROCEDURE — 86850 RBC ANTIBODY SCREEN: CPT | Performed by: EMERGENCY MEDICINE

## 2025-02-04 PROCEDURE — 93010 ELECTROCARDIOGRAM REPORT: CPT | Performed by: EMERGENCY MEDICINE

## 2025-02-04 PROCEDURE — 85025 COMPLETE CBC W/AUTO DIFF WBC: CPT | Performed by: HOSPITALIST

## 2025-02-04 PROCEDURE — 25010000002 ERTAPENEM PER 500 MG: Performed by: EMERGENCY MEDICINE

## 2025-02-04 PROCEDURE — 80053 COMPREHEN METABOLIC PANEL: CPT | Performed by: EMERGENCY MEDICINE

## 2025-02-04 PROCEDURE — 83735 ASSAY OF MAGNESIUM: CPT | Performed by: EMERGENCY MEDICINE

## 2025-02-04 PROCEDURE — 0202U NFCT DS 22 TRGT SARS-COV-2: CPT | Performed by: EMERGENCY MEDICINE

## 2025-02-04 PROCEDURE — P9612 CATHETERIZE FOR URINE SPEC: HCPCS

## 2025-02-04 PROCEDURE — 99285 EMERGENCY DEPT VISIT HI MDM: CPT

## 2025-02-04 PROCEDURE — 74176 CT ABD & PELVIS W/O CONTRAST: CPT

## 2025-02-04 PROCEDURE — 85610 PROTHROMBIN TIME: CPT | Performed by: EMERGENCY MEDICINE

## 2025-02-04 PROCEDURE — 86901 BLOOD TYPING SEROLOGIC RH(D): CPT | Performed by: EMERGENCY MEDICINE

## 2025-02-04 PROCEDURE — 85025 COMPLETE CBC W/AUTO DIFF WBC: CPT | Performed by: EMERGENCY MEDICINE

## 2025-02-04 PROCEDURE — 25810000003 LACTATED RINGERS SOLUTION: Performed by: EMERGENCY MEDICINE

## 2025-02-04 PROCEDURE — 83605 ASSAY OF LACTIC ACID: CPT | Performed by: EMERGENCY MEDICINE

## 2025-02-04 PROCEDURE — 93005 ELECTROCARDIOGRAM TRACING: CPT | Performed by: EMERGENCY MEDICINE

## 2025-02-04 PROCEDURE — 87086 URINE CULTURE/COLONY COUNT: CPT | Performed by: EMERGENCY MEDICINE

## 2025-02-04 PROCEDURE — 86900 BLOOD TYPING SEROLOGIC ABO: CPT | Performed by: EMERGENCY MEDICINE

## 2025-02-04 RX ORDER — SODIUM CHLORIDE 9 MG/ML
40 INJECTION, SOLUTION INTRAVENOUS AS NEEDED
Status: DISCONTINUED | OUTPATIENT
Start: 2025-02-04 | End: 2025-02-10 | Stop reason: HOSPADM

## 2025-02-04 RX ORDER — BISACODYL 5 MG/1
5 TABLET, DELAYED RELEASE ORAL DAILY PRN
Status: DISCONTINUED | OUTPATIENT
Start: 2025-02-04 | End: 2025-02-10 | Stop reason: HOSPADM

## 2025-02-04 RX ORDER — ACETAMINOPHEN 325 MG/1
650 TABLET ORAL EVERY 4 HOURS PRN
Status: DISCONTINUED | OUTPATIENT
Start: 2025-02-04 | End: 2025-02-10 | Stop reason: HOSPADM

## 2025-02-04 RX ORDER — AMOXICILLIN 250 MG
2 CAPSULE ORAL 2 TIMES DAILY PRN
Status: DISCONTINUED | OUTPATIENT
Start: 2025-02-04 | End: 2025-02-10 | Stop reason: HOSPADM

## 2025-02-04 RX ORDER — NALOXONE HCL 0.4 MG/ML
0.4 VIAL (ML) INJECTION
Status: DISCONTINUED | OUTPATIENT
Start: 2025-02-04 | End: 2025-02-05

## 2025-02-04 RX ORDER — SODIUM CHLORIDE 0.9 % (FLUSH) 0.9 %
10 SYRINGE (ML) INJECTION AS NEEDED
Status: DISCONTINUED | OUTPATIENT
Start: 2025-02-04 | End: 2025-02-10 | Stop reason: HOSPADM

## 2025-02-04 RX ORDER — SODIUM CHLORIDE 9 MG/ML
75 INJECTION, SOLUTION INTRAVENOUS CONTINUOUS
Status: DISCONTINUED | OUTPATIENT
Start: 2025-02-04 | End: 2025-02-05

## 2025-02-04 RX ORDER — ONDANSETRON 2 MG/ML
4 INJECTION INTRAMUSCULAR; INTRAVENOUS EVERY 6 HOURS PRN
Status: DISCONTINUED | OUTPATIENT
Start: 2025-02-04 | End: 2025-02-10 | Stop reason: HOSPADM

## 2025-02-04 RX ORDER — SODIUM CHLORIDE 0.9 % (FLUSH) 0.9 %
10 SYRINGE (ML) INJECTION EVERY 12 HOURS SCHEDULED
Status: DISCONTINUED | OUTPATIENT
Start: 2025-02-04 | End: 2025-02-10 | Stop reason: HOSPADM

## 2025-02-04 RX ORDER — ACETAMINOPHEN 650 MG/1
650 SUPPOSITORY RECTAL EVERY 6 HOURS PRN
Status: DISCONTINUED | OUTPATIENT
Start: 2025-02-04 | End: 2025-02-10 | Stop reason: HOSPADM

## 2025-02-04 RX ORDER — ACETAMINOPHEN 160 MG/5ML
650 SOLUTION ORAL EVERY 4 HOURS PRN
Status: DISCONTINUED | OUTPATIENT
Start: 2025-02-04 | End: 2025-02-10 | Stop reason: HOSPADM

## 2025-02-04 RX ORDER — MORPHINE SULFATE 2 MG/ML
1 INJECTION, SOLUTION INTRAMUSCULAR; INTRAVENOUS EVERY 4 HOURS PRN
Status: DISCONTINUED | OUTPATIENT
Start: 2025-02-04 | End: 2025-02-05

## 2025-02-04 RX ORDER — POLYETHYLENE GLYCOL 3350 17 G/17G
17 POWDER, FOR SOLUTION ORAL DAILY PRN
Status: DISCONTINUED | OUTPATIENT
Start: 2025-02-04 | End: 2025-02-10 | Stop reason: HOSPADM

## 2025-02-04 RX ORDER — BISACODYL 10 MG
10 SUPPOSITORY, RECTAL RECTAL DAILY PRN
Status: DISCONTINUED | OUTPATIENT
Start: 2025-02-04 | End: 2025-02-10 | Stop reason: HOSPADM

## 2025-02-04 RX ADMIN — SODIUM CHLORIDE, POTASSIUM CHLORIDE, SODIUM LACTATE AND CALCIUM CHLORIDE 1000 ML: 600; 310; 30; 20 INJECTION, SOLUTION INTRAVENOUS at 13:44

## 2025-02-04 RX ADMIN — ERTAPENEM 1000 MG: 1 INJECTION INTRAMUSCULAR; INTRAVENOUS at 12:41

## 2025-02-04 RX ADMIN — SODIUM CHLORIDE, POTASSIUM CHLORIDE, SODIUM LACTATE AND CALCIUM CHLORIDE 1000 ML: 600; 310; 30; 20 INJECTION, SOLUTION INTRAVENOUS at 12:39

## 2025-02-04 RX ADMIN — Medication 10 ML: at 20:21

## 2025-02-04 RX ADMIN — SODIUM CHLORIDE 75 ML/HR: 9 INJECTION, SOLUTION INTRAVENOUS at 16:39

## 2025-02-04 RX ADMIN — RACEPINEPHRINE HYDROCHLORIDE 0.5 ML: 11.25 SOLUTION RESPIRATORY (INHALATION) at 13:33

## 2025-02-04 RX ADMIN — SODIUM CHLORIDE, POTASSIUM CHLORIDE, SODIUM LACTATE AND CALCIUM CHLORIDE 500 ML: 600; 310; 30; 20 INJECTION, SOLUTION INTRAVENOUS at 15:11

## 2025-02-04 NOTE — H&P
[facility] Medicine Services  HISTORY AND PHYSICAL    Date of Admission: 2/4/2025  Primary Care Physician: Dani Farfan MD    Subjective       Chief Complaint: hematuria     Rectal Bleeding    Patient is a 84-year-old chronically ill-appearing gentleman with PMH of hip  replacement, HTN, COPD on eliquis who came to the ED with concerns for bleeding from the rectum. As per the nursing staff the patient does usually tries to digitally disimpact himself. Also having hematuria. On review of the patient's charts patient was recently admitted to the medicine service for COVID and was discharged home he also has a history of urinary bladder stone which was not taken out since the patient recently had a hip fracture or a femoral fracture and it was deemed best for the patient to wait for 3 months and repeat the CT at that time. He has been having ongoing hematuria since then. Today came to the ED for above complaints. He is not hypoxic but blood pressure on the lower side for which she was given some fluids lab workup was initiated. I had extensive discussion with the patient's daughter who is his care provider and decision maker. She does not want the patient to be resuscitated given CPR placed on mechanical ventilator.   In ER lactic acidosis procalcitonin elevated, CT abdomen pelvis hydro, bladder stone old, BP was soft, got better with IVF, case was discussed with urology who did not think he needs any intervention, de inserted, hold eliquis, BC , UC hold bp meds , scds for DVT prophylaxis identify reconcile restart home meds once reconciled       Review of Systems   Gastrointestinal:  Positive for hematochezia.      Otherwise complete ROS reviewed and negative except as mentioned in the HPI.    Past Medical History:   Past Medical History:   Diagnosis Date    Dementia     GERD (gastroesophageal reflux disease)     Liver disorder      Past Surgical History:  Past Surgical  History:   Procedure Laterality Date    HEMORRHOIDECTOMY      HERNIA REPAIR      HIP TROCHANTERIC NAILING WITH INTRAMEDULLARY HIP SCREW Right 8/30/2024    Procedure: HIP TROCHANTERIC NAILING SHORT WITH INTRAMEDULLARY HIP SCREW;  Surgeon: Arcenio Chandra MD;  Location: [facility];  Service: Orthopedics;  Laterality: Right;     Social History:  reports that he has quit smoking. His smoking use included cigarettes and pipe. He has never used smokeless tobacco. He reports that he does not currently use drugs after having used the following drugs: Amphetamines. He reports that he does not drink alcohol.    Family History: family history is not on file.       Allergies:  Allergies   Allergen Reactions    Penicillins Anaphylaxis    Contrast Dye (Echo Or Unknown Ct/Mr) Hives       Medications:  Prior to Admission medications    Medication Sig Start Date End Date Taking? Authorizing Provider   acetaminophen (TYLENOL) 325 MG tablet Take 2 tablets by mouth Every 4 (Four) Hours As Needed for Mild Pain. 9/4/24   Mirta Branch,    albuterol sulfate  (90 Base) MCG/ACT inhaler Inhale 2 puffs 4 (Four) Times a Day. 1/28/25   James Donovan MD   apixaban (ELIQUIS) 2.5 MG tablet tablet Take 1 tablet by mouth 2 (Two) Times a Day.    Provider, MD Shu   bisacodyl (DULCOLAX) 10 MG suppository Insert 1 suppository into the rectum Daily As Needed for Constipation. 9/4/24   Mirta Branch,    bisacodyl (DULCOLAX) 5 MG EC tablet Take 1 tablet by mouth Daily As Needed for Constipation.    Provider, MD Shu   budesonide-formoterol (SYMBICORT) 160-4.5 MCG/ACT inhaler Inhale 2 puffs 2 (Two) Times a Day. 1/28/25   James Donovan MD   carvedilol (COREG) 3.125 MG tablet Take 1 tablet by mouth 2 (Two) Times a Day With Meals. 1/28/25   James Donovan MD   famotidine (PEPCID) 20 MG tablet Take 1 tablet by mouth Daily. 9/5/24   Mirta Branch DO   ipratropium (ATROVENT HFA) 17 MCG/ACT inhaler Inhale 2 puffs  "4 (Four) Times a Day. 1/28/25   James Donovan MD   nystatin (MYCOSTATIN) 965925 UNIT/GM powder Apply 1 Application topically to the appropriate area as directed 3 (Three) Times a Day As Needed (Yeast). 1/28/25   James Donovan MD   tamsulosin (FLOMAX) 0.4 MG capsule 24 hr capsule Take 1 capsule by mouth Every Night. 1/28/25   James Donovan MD     I have utilized all available immediate resources to obtain, update, or review the patient's current medications (including all prescriptions, over-the-counter products, herbals, cannabis/cannabidiol products, and vitamin/mineral/dietary (nutritional) supplements).    Objective     Vital Signs: BP 95/60   Pulse 73   Temp 97.4 °F (36.3 °C) (Oral)   Resp 19   Ht 180.3 cm (70.98\")   Wt 93.4 kg (206 lb)   SpO2 97%   BMI 28.75 kg/m²   Physical Exam  Vitals reviewed.   Constitutional:       Appearance: He is ill-appearing.   HENT:      Head: Normocephalic.      Nose: Nose normal.   Cardiovascular:      Rate and Rhythm: Normal rate.   Pulmonary:      Breath sounds: Wheezing present.   Abdominal:      General: Abdomen is flat.      Tenderness: There is abdominal tenderness. There is guarding.   Musculoskeletal:      Cervical back: Normal range of motion.   Skin:     Capillary Refill: Capillary refill takes less than 2 seconds.   Neurological:      General: No focal deficit present.      Mental Status: He is alert.              Results Reviewed:  Lab Results (last 24 hours)       Procedure Component Value Units Date/Time    STAT Lactic Acid, Reflex [[id number]]  (Abnormal) Collected: 02/04/25 1550    Specimen: Blood Updated: 02/04/25 1622     Lactate 3.9 mmol/L     Urinalysis With Culture If Indicated - Straight Cath [[id number]]  (Abnormal) Collected: 02/04/25 1258    Specimen: Urine from Straight Cath Updated: 02/04/25 1347     Color, UA Red     Appearance, UA Turbid     pH, UA 6.0     Specific Gravity, UA 1.027     Glucose, UA Negative     Ketones, UA Negative     " Bilirubin, UA Small (1+)     Blood, UA Small (1+)     Protein, UA 30 mg/dL (1+)     Leuk Esterase, UA Large (3+)     Nitrite, UA Positive     Urobilinogen, UA 0.2 E.U./dL    Narrative:      In absence of clinical symptoms, the presence of pyuria, bacteria, and/or nitrites on the urinalysis result does not correlate with infection.    Results my be inaccurate due to color of urine.    Respiratory Panel PCR w/COVID-19(SARS-CoV-2) EDU/EDITH/YUDELKA/PAD/COR/DEJON In-House, NP Swab in UTM/VTM, 2 HR TAT - Swab, Nasopharynx [[id number]]  (Normal) Collected: 02/04/25 1248    Specimen: Swab from Nasopharynx Updated: 02/04/25 6905     ADENOVIRUS, PCR Not Detected     Coronavirus 229E Not Detected     Coronavirus HKU1 Not Detected     Coronavirus NL63 Not Detected     Coronavirus OC43 Not Detected     COVID19 Not Detected     Human Metapneumovirus Not Detected     Human Rhinovirus/Enterovirus Not Detected     Influenza A PCR Not Detected     Influenza B PCR Not Detected     Parainfluenza Virus 1 Not Detected     Parainfluenza Virus 2 Not Detected     Parainfluenza Virus 3 Not Detected     Parainfluenza Virus 4 Not Detected     RSV, PCR Not Detected     Bordetella pertussis pcr Not Detected     Bordetella parapertussis PCR Not Detected     Chlamydophila pneumoniae PCR Not Detected     Mycoplasma pneumo by PCR Not Detected    Narrative:      In the setting of a positive respiratory panel with a viral infection PLUS a negative procalcitonin without other underlying concern for bacterial infection, consider observing off antibiotics or discontinuation of antibiotics and continue supportive care. If the respiratory panel is positive for atypical bacterial infection (Bordetella pertussis, Chlamydophila pneumoniae, or Mycoplasma pneumoniae), consider antibiotic de-escalation to target atypical bacterial infection.    Urinalysis, Microscopic Only - Straight Cath [[id number]]  (Abnormal) Collected: 02/04/25 1258    Specimen: Urine from Straight  "Cath Updated: 02/04/25 1345     RBC, UA Too Numerous to Count /HPF      WBC, UA 6-10 /HPF      Bacteria, UA Trace /HPF      Squamous Epithelial Cells, UA 3-6 /HPF      Hyaline Casts, UA None Seen /LPF      Methodology Manual Light Microscopy    Urine Culture - Urine, Straight Cath [[id number]] Collected: 02/04/25 1258    Specimen: Urine from Straight Cath Updated: 02/04/25 1345    Procalcitonin [[id number]]  (Abnormal) Collected: 02/04/25 1236    Specimen: Blood Updated: 02/04/25 1328     Procalcitonin 15.72 ng/mL     Narrative:      As a Marker for Sepsis (Non-Neonates):    1. <0.5 ng/mL represents a low risk of severe sepsis and/or septic shock.  2. >2 ng/mL represents a high risk of severe sepsis and/or septic shock.    As a Marker for Lower Respiratory Tract Infections that require antibiotic therapy:    PCT on Admission    Antibiotic Therapy       6-12 Hrs later    >0.5                Strongly Recommended  >0.25 - <0.5        Recommended   0.1 - 0.25          Discouraged              Remeasure/reassess PCT  <0.1                Strongly Discouraged     Remeasure/reassess PCT    As 28 day mortality risk marker: \"Change in Procalcitonin Result\" (>80% or <=80%) if Day 0 (or Day 1) and Day 4 values are available. Refer to http://www.[facility]-pct-calculator.com    Change in PCT <=80%  A decrease of PCT levels below or equal to 80% defines a positive change in PCT test result representing a higher risk for 28-day all-cause mortality of patients diagnosed with severe sepsis for septic shock.    Change in PCT >80%  A decrease of PCT levels of more than 80% defines a negative change in PCT result representing a lower risk for 28-day all-cause mortality of patients diagnosed with severe sepsis or septic shock.       Lactic Acid, Plasma [[id number]]  (Abnormal) Collected: 02/04/25 1236    Specimen: Blood Updated: 02/04/25 1326     Lactate 4.7 mmol/L     C-reactive Protein [[id number]]  (Abnormal) Collected: 02/04/25 1236    " Specimen: Blood Updated: 02/04/25 1324     C-Reactive Protein 4.22 mg/dL     Manual Differential [[id number]]  (Abnormal) Collected: 02/04/25 1236    Specimen: Blood Updated: 02/04/25 1322     Neutrophil % 78.0 %      Lymphocyte % 1.0 %      Monocyte % 2.0 %      Eosinophil % 0.0 %      Basophil % 0.0 %      Bands %  19.0 %      Neutrophils Absolute 9.46 10*3/mm3      Lymphocytes Absolute 0.10 10*3/mm3      Monocytes Absolute 0.20 10*3/mm3      Eosinophils Absolute 0.00 10*3/mm3      Basophils Absolute 0.00 10*3/mm3      Elliptocytes Slight/1+     Ovalocytes Slight/1+     Poikilocytes Slight/1+     Polychromasia Slight/1+     Vacuolated Neutrophils Slight/1+     Platelet Estimate Decreased    CBC & Differential [[id number]]  (Abnormal) Collected: 02/04/25 1236    Specimen: Blood Updated: 02/04/25 1322    Narrative:      The following orders were created for panel order CBC & Differential.  Procedure                               Abnormality         Status                     ---------                               -----------         ------                     CBC Auto Differential[[id number]]        Abnormal            Final result                 Please view results for these tests on the individual orders.    CBC Auto Differential [[id number]]  (Abnormal) Collected: 02/04/25 1236    Specimen: Blood Updated: 02/04/25 1322     WBC 9.75 10*3/mm3      RBC 3.30 10*6/mm3      Hemoglobin 9.7 g/dL      Hematocrit 30.5 %      MCV 92.4 fL      MCH 29.4 pg      MCHC 31.8 g/dL      RDW 15.6 %      RDW-SD 51.8 fl      MPV 11.7 fL      Platelets 62 10*3/mm3     Narrative:      The previously reported component NRBC is no longer being reported. Previous result was 0.0 /100 WBC (Reference Range: 0.0-0.2 /100 WBC) on 2/4/2025 at 1303 CST.    Comprehensive Metabolic Panel [[id number]]  (Abnormal) Collected: 02/04/25 1236    Specimen: Blood Updated: 02/04/25 1322     Glucose 130 mg/dL      BUN 48 mg/dL      Creatinine 1.96 mg/dL       Sodium 138 mmol/L      Potassium 4.2 mmol/L      Chloride 109 mmol/L      CO2 20.0 mmol/L      Calcium 8.3 mg/dL      Total Protein 4.7 g/dL      Albumin 2.3 g/dL      ALT (SGPT) 16 U/L      AST (SGOT) 15 U/L      Alkaline Phosphatase 112 U/L      Total Bilirubin 1.0 mg/dL      Globulin 2.4 gm/dL      A/G Ratio 1.0 g/dL      BUN/Creatinine Ratio 24.5     Anion Gap 9.0 mmol/L      eGFR 33.1 mL/min/1.73     Narrative:      GFR Categories in Chronic Kidney Disease (CKD)      GFR Category          GFR (mL/min/1.73)    Interpretation  G1                     90 or greater         Normal or high (1)  G2                      60-89                Mild decrease (1)  G3a                   45-59                Mild to moderate decrease  G3b                   30-44                Moderate to severe decrease  G4                    15-29                Severe decrease  G5                    14 or less           Kidney failure          (1)In the absence of evidence of kidney disease, neither GFR category G1 or G2 fulfill the criteria for CKD.    eGFR calculation 2021 CKD-EPI creatinine equation, which does not include race as a factor    Magnesium [[id number]]  (Normal) Collected: 02/04/25 1236    Specimen: Blood Updated: 02/04/25 1322     Magnesium 2.1 mg/dL     Protime-INR [[id number]]  (Abnormal) Collected: 02/04/25 1236    Specimen: Blood Updated: 02/04/25 1311     Protime 22.4 Seconds      INR 1.85    Blood Culture - Blood, Arm, Right [[id number]] Collected: 02/04/25 1236    Specimen: Blood from Arm, Right Updated: 02/04/25 1302    Blood Culture - Blood, Hand, Left [[id number]] Collected: 02/04/25 1236    Specimen: Blood from Hand, Left Updated: 02/04/25 1302          Imaging Results (Last 24 Hours)       Procedure Component Value Units Date/Time    CT Abdomen Pelvis Without Contrast [[id number]] Collected: 02/04/25 1995     Updated: 02/04/25 1511    Narrative:      EXAMINATION: CT ABDOMEN PELVIS WO CONTRAST-      2/4/2025 1:32  PM     HISTORY: Patient with hematuria history of bladder  stone     In order to have a CT radiation dose as low as reasonably achievable  Automated Exposure Control was utilized for adjustment of the mA and/or  KV according to patient size.     Total DLP = 1113.64 mGy.cm     The CT scan of the abdomen and pelvis is performed without intravenous  contrast enhancement.     Images are acquired in axial plane and subsequent reconstruction in  coronal and sagittal planes.     Comparison is made with the previous study dated 1/16/2025.     Limited included lungs show consolidation/atelectasis in the right lower  lung adjacent to a significant elevated right diaphragm. Atelectatic  changes to a lesser extent are seen in the left lower lung.     Unenhanced liver show moderate lobulation and nodularity suggesting  hepatic cirrhosis. Spleen is significantly enlarged similar to the  previous study. It measures 17 cm in craniocaudal dimension.     No radiopaque gallstones.     The unenhanced pancreas appears unremarkable.     Adrenal glands bilaterally are unremarkable.     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 There are  small radiopaque calculi in the mid to lower pole of the left unit.  There is a persistent moderate hydronephrosis of the right kidney. There  is persistent moderate dilatation of the right ureter down to the level  of the UV junction. There are no calculi in the right ureter. Urinary  bladder is poorly distended. There is mildly dilated left collecting  system. It was normal in the previous study. There is no calculus in the  left ureter. There is a large radiopaque calculus located posteriorly  and to the right at the floor of the urinary bladder measuring 3.3 cm in  diameter. This is similar to the previous study. The remaining urinary  bladder appears unremarkable.     Moderately enlarged prostate is similar to the previous study.     There are small fat-containing inguinal  hernias bilaterally.     There is a small fat-containing umbilical hernia.     Stomach is full of fluid and gas. Duodenum is normal. Small bowel is  nondistended. Appendix is normal. Moderate gas and stool is seen in the  colon. There is diverticulosis of the distal colon. No evidence of  diverticulitis.     Atheromatous changes of the moderately tortuous abdominal aorta is  noted. No aneurysmal dilatation.     There are significantly dilated tortuous mesenteric veins and the left  upper abdominal veins between the spleen and the left kidney suggesting  portosystemic shunting. A significantly enlarged umbilical vein is seen  draining into the left hepatic vein. This is similar to the previous  study.     No evidence of abdominal or pelvic lymphadenopathy.     Images reviewed in bone window show chronic degenerative changes of the  lumbar and limited visualized thoracic spine. There is S-shaped  scoliosis of thoracolumbar spine. Hardware internal fixation of the  right proximal femur is seen. Fracture line is visible. No bony union.       Impression:      1. A mild left and moderate right hydronephrosis and hydroureter.  Dilatation of the left collecting system is a new finding since the  previous study dated 1/16/2025. No calculi in either ureter. This is  probably due to the presence of a large calculus at the floor of the  urinary bladder which may be causing obstruction?.  2. Evidence of horseshoe shaped kidney. Small radiopaque calculi in the  left kidney similar to the previous study.  3. Hepatic cirrhosis. Splenomegaly. The size of the spleen appears  larger than the previous study.  4. Evidence of abdominal varices similar to the previous study.  Significantly enlarged umbilical vein draining into the left portal  vein.  5. Hardware internal fixation of the intertrochanteric fracture of the  right proximal femur. Fracture line is visible with no bony union at  this time. This is similar to the previous  study.  6. Right lower lobar consolidation/atelectasis and significantly  elevated right diaphragm similar to the previous study.                                               This report was signed and finalized on 2/4/2025 3:08 PM by Dr. Shahram Patel MD.       XR Chest 1 View [[id number]] Collected: 02/04/25 1245     Updated: 02/04/25 1251    Narrative:      EXAM: XR CHEST 1 VW-      DATE: 2/4/2025 11:34 AM     HISTORY: soa       COMPARISON: 1/22/2025.     TECHNIQUE:  Single view. Frontal view of the chest. 1 images.       FINDINGS:    Similar RIGHT hemidiaphragm elevation. Similar round opacity at the  RIGHT lung base, which appears to correlate with a combination of  compressive atelectasis and liver density on prior CT 1/23/2025.     No pneumothorax or pleural effusion. Streaky bibasilar opacities.  Cardiac mediastinal silhouette similar to prior. No acute bony finding.          Impression:      1. Similar elevation the RIGHT hemidiaphragm. Similar streaky bibasilar  opacities.     This report was signed and finalized on 2/4/2025 12:47 PM by Dr Erika Moore MD.             I have personally reviewed and interpreted the radiology studies and ECG obtained at time of admission.     Assessment / Plan   Assessment:   Active Hospital Problems    Diagnosis     **Acute pyelonephritis        Treatment Plan  The patient will be admitted to my service here at [facility].   In ER lactic acidosis procalcitonin elevated, CT abdomen pelvis hydro, bladder stone old, BP was soft, got better with IVF, case was discussed with urology who did not think he needs any intervention, de inserted, hold eliquis, BC , UC hold bp meds , scds for DVT prophylaxis identify reconcile restart home meds once reconciled   Medical Decision Making  Number and Complexity of problems: 3 acute  Differential Diagnosis: as above     Conditions and Status        Condition is at treatment goal.     [facility] Data  External documents  reviewed: yes  Cardiac tracing (EKG, telemetry) interpretation: yes  Radiology interpretation: yes  Labs reviewed: yes  Any tests that were considered but not ordered: no      Decision rules/scores evaluated (example [id number], Wells, etc): no     Discussed with: ED     Care Planning  Shared decision making: Patient apprised of current labs, vitals, imaging and treatment plan.  They are agreeable with proceeding with plans as discussed.   Code status and discussions: DNR/DNI    Disposition  Social Determinants of Health that impact treatment or disposition: no  Estimated length of stay is TBD.     I confirmed that the patient's advanced care plan is present, code status is documented, and a surrogate decision maker is listed in the patient's medical record.          The patient was seen and examined by me on 1643 at [facility].    Electronically signed by Arpan Jerez MD, 02/04/25, 16:33 CST.

## 2025-02-04 NOTE — ED PROVIDER NOTES
EMERGENCY DEPARTMENT ATTENDING PROGRESS NOTE    Patient Name: Manohar Garcia    Chief Complaint   Patient presents with    Rectal Bleeding         MEDICAL DECISION MAKING (ADDENDUM TO MDM OF PRIOR PROVIDER):    Manohar Garcia is a 84 y.o. male who was initially seen by the prior emergency medicine provider, Dr Emanuel. Please see their note for full history and physical exam.    Patient had presented to the ED with generalized weakness, confusion, concern for urinary tract infection.  He is a [facility] following admission for COVID-19 and influenza.  He has a history of bladder stone, horseshoe kidney, intrarenal stones and recurrent urinary tract infections.    Upon arrival to the ED patient ill-appearing, hypotensive.  Lactate 4.7.  Procalcitonin 15.  Initially seen by Dr. Emanuel and infectious workup initiated.  He received 30 mL/kg fluid bolus, Invanz for broad-spectrum antibiotic coverage.    Labs reveal urinary tract infection.  White blood cell count normal at 9, however; 19% bandemia.  He also has acute renal failure.  CT was ordered, showed bladder stone, chronic right sided hydro and now new mild left-sided hydro.  Suspect this is from a pyelonephritis.  Less likely from an obstruction.  No evidence of bladder outlet obstruction.  I spoke to Dr. Mcguire with urology, does not feel like there is a need for any emergent intervention at this time and is okay with consulting with the patient submitted at [facility].  Will plan for admission to the hospital service.    Spoke to patient's daughter who is at the bedside and is a medical decision maker, she  expressed wishes of patient being DNR/DNI.      Lab Results (last 24 hours)       Procedure Component Value Units Date/Time    CBC & Differential [[id number]]  (Abnormal) Collected: 02/04/25 1236    Specimen: Blood Updated: 02/04/25 1322    Narrative:      The following orders were created for panel order CBC &  Differential.  Procedure                               Abnormality         Status                     ---------                               -----------         ------                     CBC Auto Differential[[id number]]        Abnormal            Final result                 Please view results for these tests on the individual orders.    Comprehensive Metabolic Panel [[id number]]  (Abnormal) Collected: 02/04/25 1236    Specimen: Blood Updated: 02/04/25 1322     Glucose 130 mg/dL      BUN 48 mg/dL      Creatinine 1.96 mg/dL      Sodium 138 mmol/L      Potassium 4.2 mmol/L      Chloride 109 mmol/L      CO2 20.0 mmol/L      Calcium 8.3 mg/dL      Total Protein 4.7 g/dL      Albumin 2.3 g/dL      ALT (SGPT) 16 U/L      AST (SGOT) 15 U/L      Alkaline Phosphatase 112 U/L      Total Bilirubin 1.0 mg/dL      Globulin 2.4 gm/dL      A/G Ratio 1.0 g/dL      BUN/Creatinine Ratio 24.5     Anion Gap 9.0 mmol/L      eGFR 33.1 mL/min/1.73     Narrative:      GFR Categories in Chronic Kidney Disease (CKD)      GFR Category          GFR (mL/min/1.73)    Interpretation  G1                     90 or greater         Normal or high (1)  G2                      60-89                Mild decrease (1)  G3a                   45-59                Mild to moderate decrease  G3b                   30-44                Moderate to severe decrease  G4                    15-29                Severe decrease  G5                    14 or less           Kidney failure          (1)In the absence of evidence of kidney disease, neither GFR category G1 or G2 fulfill the criteria for CKD.    eGFR calculation 2021 CKD-EPI creatinine equation, which does not include race as a factor    Protime-INR [[id number]]  (Abnormal) Collected: 02/04/25 1236    Specimen: Blood Updated: 02/04/25 1311     Protime 22.4 Seconds      INR 1.85    Blood Culture - Blood, Arm, Right [[id number]] Collected: 02/04/25 1236    Specimen: Blood from Arm, Right Updated: 02/04/25 1302  "   Blood Culture - Blood, Hand, Left [[id number]] Collected: 02/04/25 1236    Specimen: Blood from Hand, Left Updated: 02/04/25 1302    Lactic Acid, Plasma [[id number]]  (Abnormal) Collected: 02/04/25 1236    Specimen: Blood Updated: 02/04/25 1326     Lactate 4.7 mmol/L     Procalcitonin [[id number]]  (Abnormal) Collected: 02/04/25 1236    Specimen: Blood Updated: 02/04/25 1328     Procalcitonin 15.72 ng/mL     Narrative:      As a Marker for Sepsis (Non-Neonates):    1. <0.5 ng/mL represents a low risk of severe sepsis and/or septic shock.  2. >2 ng/mL represents a high risk of severe sepsis and/or septic shock.    As a Marker for Lower Respiratory Tract Infections that require antibiotic therapy:    PCT on Admission    Antibiotic Therapy       6-12 Hrs later    >0.5                Strongly Recommended  >0.25 - <0.5        Recommended   0.1 - 0.25          Discouraged              Remeasure/reassess PCT  <0.1                Strongly Discouraged     Remeasure/reassess PCT    As 28 day mortality risk marker: \"Change in Procalcitonin Result\" (>80% or <=80%) if Day 0 (or Day 1) and Day 4 values are available. Refer to http://www.[employer]-pct-calculator.com    Change in PCT <=80%  A decrease of PCT levels below or equal to 80% defines a positive change in PCT test result representing a higher risk for 28-day all-cause mortality of patients diagnosed with severe sepsis for septic shock.    Change in PCT >80%  A decrease of PCT levels of more than 80% defines a negative change in PCT result representing a lower risk for 28-day all-cause mortality of patients diagnosed with severe sepsis or septic shock.       C-reactive Protein [[id number]]  (Abnormal) Collected: 02/04/25 1236    Specimen: Blood Updated: 02/04/25 1324     C-Reactive Protein 4.22 mg/dL     Magnesium [[id number]]  (Normal) Collected: 02/04/25 1236    Specimen: Blood Updated: 02/04/25 1322     Magnesium 2.1 mg/dL     CBC Auto Differential [[id number]]  (Abnormal) " Collected: 02/04/25 1236    Specimen: Blood Updated: 02/04/25 1322     WBC 9.75 10*3/mm3      RBC 3.30 10*6/mm3      Hemoglobin 9.7 g/dL      Hematocrit 30.5 %      MCV 92.4 fL      MCH 29.4 pg      MCHC 31.8 g/dL      RDW 15.6 %      RDW-SD 51.8 fl      MPV 11.7 fL      Platelets 62 10*3/mm3     Narrative:      The previously reported component NRBC is no longer being reported. Previous result was 0.0 /100 WBC (Reference Range: 0.0-0.2 /100 WBC) on 2/4/2025 at 1303 CST.    Manual Differential [[id number]]  (Abnormal) Collected: 02/04/25 1236    Specimen: Blood Updated: 02/04/25 1322     Neutrophil % 78.0 %      Lymphocyte % 1.0 %      Monocyte % 2.0 %      Eosinophil % 0.0 %      Basophil % 0.0 %      Bands %  19.0 %      Neutrophils Absolute 9.46 10*3/mm3      Lymphocytes Absolute 0.10 10*3/mm3      Monocytes Absolute 0.20 10*3/mm3      Eosinophils Absolute 0.00 10*3/mm3      Basophils Absolute 0.00 10*3/mm3      Elliptocytes Slight/1+     Ovalocytes Slight/1+     Poikilocytes Slight/1+     Polychromasia Slight/1+     Vacuolated Neutrophils Slight/1+     Platelet Estimate Decreased    Respiratory Panel PCR w/COVID-19(SARS-CoV-2) EDU/EDITH/YUDELKA/PAD/COR/DEJON In-House, NP Swab in UTM/VTM, 2 HR TAT - Swab, Nasopharynx [[id number]]  (Normal) Collected: 02/04/25 1248    Specimen: Swab from Nasopharynx Updated: 02/04/25 1347     ADENOVIRUS, PCR Not Detected     Coronavirus 229E Not Detected     Coronavirus HKU1 Not Detected     Coronavirus NL63 Not Detected     Coronavirus OC43 Not Detected     COVID19 Not Detected     Human Metapneumovirus Not Detected     Human Rhinovirus/Enterovirus Not Detected     Influenza A PCR Not Detected     Influenza B PCR Not Detected     Parainfluenza Virus 1 Not Detected     Parainfluenza Virus 2 Not Detected     Parainfluenza Virus 3 Not Detected     Parainfluenza Virus 4 Not Detected     RSV, PCR Not Detected     Bordetella pertussis pcr Not Detected     Bordetella parapertussis PCR Not  Detected     Chlamydophila pneumoniae PCR Not Detected     Mycoplasma pneumo by PCR Not Detected    Narrative:      In the setting of a positive respiratory panel with a viral infection PLUS a negative procalcitonin without other underlying concern for bacterial infection, consider observing off antibiotics or discontinuation of antibiotics and continue supportive care. If the respiratory panel is positive for atypical bacterial infection (Bordetella pertussis, Chlamydophila pneumoniae, or Mycoplasma pneumoniae), consider antibiotic de-escalation to target atypical bacterial infection.    Urinalysis With Culture If Indicated - Straight Cath [[id number]]  (Abnormal) Collected: 02/04/25 1258    Specimen: Urine from Straight Cath Updated: 02/04/25 1347     Color, UA Red     Appearance, UA Turbid     pH, UA 6.0     Specific Gravity, UA 1.027     Glucose, UA Negative     Ketones, UA Negative     Bilirubin, UA Small (1+)     Blood, UA Small (1+)     Protein, UA 30 mg/dL (1+)     Leuk Esterase, UA Large (3+)     Nitrite, UA Positive     Urobilinogen, UA 0.2 E.U./dL    Narrative:      In absence of clinical symptoms, the presence of pyuria, bacteria, and/or nitrites on the urinalysis result does not correlate with infection.    Results my be inaccurate due to color of urine.    Urinalysis, Microscopic Only - Straight Cath [[id number]]  (Abnormal) Collected: 02/04/25 1258    Specimen: Urine from Straight Cath Updated: 02/04/25 1345     RBC, UA Too Numerous to Count /HPF      WBC, UA 6-10 /HPF      Bacteria, UA Trace /HPF      Squamous Epithelial Cells, UA 3-6 /HPF      Hyaline Casts, UA None Seen /LPF      Methodology Manual Light Microscopy    Urine Culture - Urine, Straight Cath [[id number]] Collected: 02/04/25 1258    Specimen: Urine from Straight Cath Updated: 02/04/25 1345    STAT Lactic Acid, Reflex [[id number]]  (Abnormal) Collected: 02/04/25 1550    Specimen: Blood Updated: 02/04/25 1622     Lactate 3.9 mmol/L           CT Abdomen Pelvis Without Contrast    Result Date: 2/4/2025  EXAMINATION: CT ABDOMEN PELVIS WO CONTRAST-   2/4/2025 1:32 PM  HISTORY: Patient with hematuria history of bladder  stone  In order to have a CT radiation dose as low as reasonably achievable Automated Exposure Control was utilized for adjustment of the mA and/or KV according to patient size.  Total DLP = 1113.64 mGy.cm  The CT scan of the abdomen and pelvis is performed without intravenous contrast enhancement.  Images are acquired in axial plane and subsequent reconstruction in coronal and sagittal planes.  Comparison is made with the previous study dated 1/16/2025.  Limited included lungs show consolidation/atelectasis in the right lower lung adjacent to a significant elevated right diaphragm. Atelectatic changes to a lesser extent are seen in the left lower lung.  Unenhanced liver show moderate lobulation and nodularity suggesting hepatic cirrhosis. Spleen is significantly enlarged similar to the previous study. It measures 17 cm in craniocaudal dimension.  No radiopaque gallstones.  The unenhanced pancreas appears unremarkable.  Adrenal glands bilaterally are unremarkable.  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 There are small radiopaque calculi in the mid to lower pole of the left unit. There is a persistent moderate hydronephrosis of the right kidney. There is persistent moderate dilatation of the right ureter down to the level of the UV junction. There are no calculi in the right ureter. Urinary bladder is poorly distended. There is mildly dilated left collecting system. It was normal in the previous study. There is no calculus in the left ureter. There is a large radiopaque calculus located posteriorly and to the right at the floor of the urinary bladder measuring 3.3 cm in diameter. This is similar to the previous study. The remaining urinary bladder appears unremarkable.  Moderately enlarged prostate is similar to the  previous study.  There are small fat-containing inguinal hernias bilaterally.  There is a small fat-containing umbilical hernia.  Stomach is full of fluid and gas. Duodenum is normal. Small bowel is nondistended. Appendix is normal. Moderate gas and stool is seen in the colon. There is diverticulosis of the distal colon. No evidence of diverticulitis.  Atheromatous changes of the moderately tortuous abdominal aorta is noted. No aneurysmal dilatation.  There are significantly dilated tortuous mesenteric veins and the left upper abdominal veins between the spleen and the left kidney suggesting portosystemic shunting. A significantly enlarged umbilical vein is seen draining into the left hepatic vein. This is similar to the previous study.  No evidence of abdominal or pelvic lymphadenopathy.  Images reviewed in bone window show chronic degenerative changes of the lumbar and limited visualized thoracic spine. There is S-shaped scoliosis of thoracolumbar spine. Hardware internal fixation of the right proximal femur is seen. Fracture line is visible. No bony union.      1. A mild left and moderate right hydronephrosis and hydroureter. Dilatation of the left collecting system is a new finding since the previous study dated 1/16/2025. No calculi in either ureter. This is probably due to the presence of a large calculus at the floor of the urinary bladder which may be causing obstruction?. 2. Evidence of horseshoe shaped kidney. Small radiopaque calculi in the left kidney similar to the previous study. 3. Hepatic cirrhosis. Splenomegaly. The size of the spleen appears larger than the previous study. 4. Evidence of abdominal varices similar to the previous study. Significantly enlarged umbilical vein draining into the left portal vein. 5. Hardware internal fixation of the intertrochanteric fracture of the right proximal femur. Fracture line is visible with no bony union at this time. This is similar to the previous study. 6.  Right lower lobar consolidation/atelectasis and significantly elevated right diaphragm similar to the previous study.                This report was signed and finalized on 2/4/2025 3:08 PM by Dr. Shahram Patel MD.      XR Chest 1 View    Result Date: 2/4/2025  EXAM: XR CHEST 1 VW-  DATE: 2/4/2025 11:34 AM  HISTORY: soa   COMPARISON: 1/22/2025.  TECHNIQUE:  Single view. Frontal view of the chest. 1 images.   FINDINGS:  Similar RIGHT hemidiaphragm elevation. Similar round opacity at the RIGHT lung base, which appears to correlate with a combination of compressive atelectasis and liver density on prior CT 1/23/2025.  No pneumothorax or pleural effusion. Streaky bibasilar opacities. Cardiac mediastinal silhouette similar to prior. No acute bony finding.       1. Similar elevation the RIGHT hemidiaphragm. Similar streaky bibasilar opacities.  This report was signed and finalized on 2/4/2025 12:47 PM by Dr Erika Moore MD.        ED Diagnosis:  (N12) Pyelonephritis    (N21.0) Bladder stone    (N17.9) Acute renal failure, unspecified acute renal failure type     Disposition: admit to hospitalist          Signed:  Maykel Cr MD  Emergency Medicine Physician    Please note that portions of this note were completed with a voice recognition program.      Maykel Cr MD  02/04/25 9026

## 2025-02-04 NOTE — ED PROVIDER NOTES
Subjective   History of Present Illness  Patient is a 84-year-old chronically ill-appearing gentleman who came to the ED with concerns for bleeding from the rectum.  As per the nursing staff the patient does usually tries to digitally disimpact himself.  Also having hematuria.  On review of the patient's charts patient was recently admitted to the medicine service for COVID and was discharged home he also has a history of urinary bladder stone which was not taken out since the patient recently had a hip fracture or a femoral fracture and it was deemed best for the patient to wait for 3 months and repeat the CT at that time.  He has been having ongoing hematuria since then.  Today came to the ED for above complaints.  He is not hypoxic but blood pressure on the lower side for which she was given some fluids lab workup was initiated.  I had extensive discussion with the patient's daughter who is his care provider and decision maker.  She does not want the patient to be resuscitated given CPR placed on mechanical ventilator.    Rectal Bleeding  Quality:  Bright red  Amount:  Unable to specify  Timing:  Intermittent  Context: not defecation, not diarrhea, not hemorrhoids and not rectal injury    Similar prior episodes: no    Relieved by:  Nothing  Worsened by:  Nothing  Ineffective treatments:  None tried  Associated symptoms: no abdominal pain, no dizziness, no fever, no hematemesis, no light-headedness, no recent illness and no vomiting    Risk factors: no hx of colorectal cancer and no liver disease        Review of Systems   Constitutional: Negative.  Negative for chills, fatigue and fever.   HENT: Negative.  Negative for congestion.    Respiratory: Negative.  Negative for cough, chest tightness and stridor.    Cardiovascular: Negative.  Negative for chest pain.   Gastrointestinal:  Positive for hematochezia. Negative for abdominal distention, abdominal pain, hematemesis, nausea and vomiting.   Endocrine: Negative.     Genitourinary: Negative.  Negative for difficulty urinating and flank pain.   Musculoskeletal: Negative.    Skin: Negative.  Negative for color change.   Neurological: Negative.  Negative for dizziness, light-headedness and headaches.   All other systems reviewed and are negative.      Past Medical History:   Diagnosis Date    Dementia     GERD (gastroesophageal reflux disease)     Liver disorder        Allergies   Allergen Reactions    Penicillins Anaphylaxis    Contrast Dye (Echo Or Unknown Ct/Mr) Hives       Past Surgical History:   Procedure Laterality Date    HEMORRHOIDECTOMY      HERNIA REPAIR      HIP TROCHANTERIC NAILING WITH INTRAMEDULLARY HIP SCREW Right 8/30/2024    Procedure: HIP TROCHANTERIC NAILING SHORT WITH INTRAMEDULLARY HIP SCREW;  Surgeon: Arcenio Chandra MD;  Location: [facility] OR;  Service: Orthopedics;  Laterality: Right;       History reviewed. No pertinent family history.    Social History     Socioeconomic History    Marital status:    Tobacco Use    Smoking status: Former     Types: Cigarettes, Pipe    Smokeless tobacco: Never   Vaping Use    Vaping status: Never Used   Substance and Sexual Activity    Alcohol use: No    Drug use: Not Currently     Types: Amphetamines     Comment: Quit 40 years ago    Sexual activity: Defer           Objective   Physical Exam  Vitals and nursing note reviewed. Exam conducted with a chaperone present.   Constitutional:       General: He is awake.      Appearance: Normal appearance. He is well-developed. He is ill-appearing.   HENT:      Head: Normocephalic and atraumatic.      Nose: Congestion present. No rhinorrhea.   Eyes:      General: Lids are normal.      Conjunctiva/sclera: Conjunctivae normal.      Pupils: Pupils are equal, round, and reactive to light.   Cardiovascular:      Rate and Rhythm: Normal rate and regular rhythm.      Chest Wall: PMI is not displaced.      Pulses: Normal pulses.      Heart sounds: Normal heart sounds. No murmur  heard.  Pulmonary:      Effort: Pulmonary effort is normal. No respiratory distress.      Breath sounds: Normal breath sounds. No decreased breath sounds or wheezing.   Abdominal:      General: Abdomen is flat. Bowel sounds are normal.      Palpations: Abdomen is soft.      Tenderness: There is no abdominal tenderness. There is no guarding or rebound.   Musculoskeletal:         General: No swelling or deformity. Normal range of motion.      Cervical back: Full passive range of motion without pain, normal range of motion and neck supple. No rigidity.   Lymphadenopathy:      Cervical: No cervical adenopathy.   Skin:     General: Skin is warm and dry.      Capillary Refill: Capillary refill takes 2 to 3 seconds.      Coloration: Skin is pale. Skin is not jaundiced.   Neurological:      General: No focal deficit present.      Mental Status: He is alert and oriented to person, place, and time. Mental status is at baseline.      GCS: GCS eye subscore is 4. GCS verbal subscore is 4. GCS motor subscore is 5.      Cranial Nerves: Cranial nerves 2-12 are intact. No cranial nerve deficit or facial asymmetry.      Motor: Weakness present.      Coordination: Coordination normal.      Deep Tendon Reflexes: Reflexes are normal and symmetric.      Comments: Generalized weakness.   Psychiatric:         Behavior: Behavior is cooperative.         Procedures           ED Course  ED Course as of 02/10/25 1035   Tue Feb 04, 2025   1247 Sinus rhythm bifascicular block [TS]   1327 Recommended 30 mL/kg bolus not received because it would be harmful or detrimental to the patient.  Reason: Concern for Fluid Overload.   Ordered 1000 mL of IV Fluid as bolus.  [TS]   1348 Turned over to Dr. Cr pending CAT scan report [AW]   0667 Received signout from Dr. Emanuel, please see my progress note for definitive care plan. [AW]      ED Course User Index  [AW] Maykel Cr MD  [TS] Kvng Emanuel MD                                                        Medical Decision Making  Patient with questionable lower GI bleeding and hematuria we will get some lab workup.    Problems Addressed:  Acute renal failure, unspecified acute renal failure type: complicated acute illness or injury  Bladder stone: complicated acute illness or injury  Pyelonephritis: complicated acute illness or injury    Amount and/or Complexity of Data Reviewed  Labs: ordered.  Radiology: ordered.  ECG/medicine tests: ordered.    Risk  OTC drugs.  Decision regarding hospitalization.        Final diagnoses:   Pyelonephritis   Bladder stone   Acute renal failure, unspecified acute renal failure type       ED Disposition  ED Disposition       ED Disposition   Decision to Admit    Condition   --    Comment   Level of Care: Remote Telemetry [26]   Diagnosis: Acute pyelonephritis [392468]   Certification: I Certify That Inpatient Hospital Services Are Medically Necessary For Greater Than 2 Midnights                 [facility]  [address]  [facility] [address]  [phone]        Dani Farfan MD  [address]  [facility] [id number] [phone]      Within 24 to 48 hours at skilled nursing facility    Zeyad Mcguire MD  [address]  [address]  [facility] [address]  [phone]    Follow up in 1 week(s)  1 week         Medication List        New Prescriptions      levoFLOXacin 750 MG tablet  Commonly known as: LEVAQUIN  Take 1 tablet by mouth Daily for 5 doses. Indications: Urinary Tract Infection            Stop      dexAMETHasone 4 MG tablet  Commonly known as: DECADRON               Where to Get Your Medications        These medications were sent to [facility] [place], [place] - [address] [facility] - [phone]  - [phone]   [address] [facility], [facility] [address]      Phone: [phone]   HYDROcodone-acetaminophen 5-325 MG per tablet  levoFLOXacin 750 MG tablet            Kvng Emanuel,  MD  02/04/25 1326       Kvng Emanuel MD  02/04/25 1348       Kvng Emanuel MD  02/10/25 1037

## 2025-02-04 NOTE — Clinical Note
Level of Care: Telemetry [5]   Diagnosis: Acute pyelonephritis [[id number]]   Admitting Physician: AYANNA BRADSHAW [[id number]]   Certification: I Certify That Inpatient Hospital Services Are Medically Necessary For Greater Than 2 Midnights

## 2025-02-05 ENCOUNTER — APPOINTMENT (OUTPATIENT)
Dept: GENERAL RADIOLOGY | Facility: HOSPITAL | Age: 85
DRG: 690 | End: 2025-02-05
Payer: MEDICARE

## 2025-02-05 LAB
ANION GAP SERPL CALCULATED.3IONS-SCNC: 7 MMOL/L (ref 5–15)
BASOPHILS # BLD AUTO: 0.01 10*3/MM3 (ref 0–0.2)
BASOPHILS NFR BLD AUTO: 0.1 % (ref 0–1.5)
BUN SERPL-MCNC: 46 MG/DL (ref 8–23)
BUN/CREAT SERPL: 27.7 (ref 7–25)
CALCIUM SPEC-SCNC: 8.1 MG/DL (ref 8.6–10.5)
CHLORIDE SERPL-SCNC: 112 MMOL/L (ref 98–107)
CO2 SERPL-SCNC: 22 MMOL/L (ref 22–29)
CREAT SERPL-MCNC: 1.66 MG/DL (ref 0.76–1.27)
DEPRECATED RDW RBC AUTO: 50.6 FL (ref 37–54)
EGFRCR SERPLBLD CKD-EPI 2021: 40.4 ML/MIN/1.73
EOSINOPHIL # BLD AUTO: 0 10*3/MM3 (ref 0–0.4)
EOSINOPHIL NFR BLD AUTO: 0 % (ref 0.3–6.2)
ERYTHROCYTE [DISTWIDTH] IN BLOOD BY AUTOMATED COUNT: 15.5 % (ref 12.3–15.4)
GLUCOSE SERPL-MCNC: 111 MG/DL (ref 65–99)
HCT VFR BLD AUTO: 28.1 % (ref 37.5–51)
HGB BLD-MCNC: 9 G/DL (ref 13–17.7)
IMM GRANULOCYTES # BLD AUTO: 0.12 10*3/MM3 (ref 0–0.05)
IMM GRANULOCYTES NFR BLD AUTO: 1.2 % (ref 0–0.5)
LYMPHOCYTES # BLD AUTO: 0.59 10*3/MM3 (ref 0.7–3.1)
LYMPHOCYTES NFR BLD AUTO: 5.9 % (ref 19.6–45.3)
MCH RBC QN AUTO: 29.1 PG (ref 26.6–33)
MCHC RBC AUTO-ENTMCNC: 32 G/DL (ref 31.5–35.7)
MCV RBC AUTO: 90.9 FL (ref 79–97)
MONOCYTES # BLD AUTO: 0.34 10*3/MM3 (ref 0.1–0.9)
MONOCYTES NFR BLD AUTO: 3.4 % (ref 5–12)
NEUTROPHILS NFR BLD AUTO: 8.89 10*3/MM3 (ref 1.7–7)
NEUTROPHILS NFR BLD AUTO: 89.4 % (ref 42.7–76)
PLATELET # BLD AUTO: 63 10*3/MM3 (ref 140–450)
PMV BLD AUTO: 11.6 FL (ref 6–12)
POTASSIUM SERPL-SCNC: 4.2 MMOL/L (ref 3.5–5.2)
QT INTERVAL: 386 MS
QTC INTERVAL: 425 MS
RBC # BLD AUTO: 3.09 10*6/MM3 (ref 4.14–5.8)
SODIUM SERPL-SCNC: 141 MMOL/L (ref 136–145)
WBC NRBC COR # BLD AUTO: 9.95 10*3/MM3 (ref 3.4–10.8)

## 2025-02-05 PROCEDURE — 25810000003 SODIUM CHLORIDE 0.9 % SOLUTION: Performed by: HOSPITALIST

## 2025-02-05 PROCEDURE — 25010000002 ERTAPENEM PER 500 MG: Performed by: HOSPITALIST

## 2025-02-05 PROCEDURE — 80048 BASIC METABOLIC PNL TOTAL CA: CPT | Performed by: HOSPITALIST

## 2025-02-05 PROCEDURE — 97161 PT EVAL LOW COMPLEX 20 MIN: CPT | Performed by: PHYSICAL THERAPIST

## 2025-02-05 PROCEDURE — 85025 COMPLETE CBC W/AUTO DIFF WBC: CPT | Performed by: HOSPITALIST

## 2025-02-05 PROCEDURE — 71045 X-RAY EXAM CHEST 1 VIEW: CPT

## 2025-02-05 PROCEDURE — 99222 1ST HOSP IP/OBS MODERATE 55: CPT | Performed by: UROLOGY

## 2025-02-05 RX ORDER — NYSTATIN 100000 [USP'U]/G
1 POWDER TOPICAL 3 TIMES DAILY
COMMUNITY

## 2025-02-05 RX ORDER — DEXAMETHASONE 4 MG/1
4 TABLET ORAL
COMMUNITY
End: 2025-02-10 | Stop reason: HOSPADM

## 2025-02-05 RX ORDER — HYDROCODONE BITARTRATE AND ACETAMINOPHEN 5; 325 MG/1; MG/1
1 TABLET ORAL EVERY 4 HOURS PRN
Status: ON HOLD | COMMUNITY
End: 2025-02-09

## 2025-02-05 RX ORDER — ONDANSETRON 4 MG/1
4 TABLET, ORALLY DISINTEGRATING ORAL EVERY 4 HOURS PRN
COMMUNITY

## 2025-02-05 RX ORDER — IPRATROPIUM BROMIDE AND ALBUTEROL SULFATE 2.5; .5 MG/3ML; MG/3ML
3 SOLUTION RESPIRATORY (INHALATION) EVERY 6 HOURS PRN
COMMUNITY

## 2025-02-05 RX ADMIN — ERTAPENEM 1000 MG: 1 INJECTION INTRAMUSCULAR; INTRAVENOUS at 12:44

## 2025-02-05 RX ADMIN — Medication 10 ML: at 21:18

## 2025-02-05 RX ADMIN — SODIUM CHLORIDE 75 ML/HR: 9 INJECTION, SOLUTION INTRAVENOUS at 04:12

## 2025-02-05 NOTE — PLAN OF CARE
Goal Outcome Evaluation:  Plan of Care Reviewed With: patient        Progress: no change  Outcome Evaluation: PT eval completed. A&Ox4. Strength WFL. Sensation intact. Patient able to perform bed mobility and keiry socks w/ SBA. Patient able to stand w/ Nyla and ambulate CGA. Patient taken to restroom and was able to clean self. Sheets and briefs changed. Patient returned to bed CGA. Patient shows deficits in balance when ambulating but is able to remain standing w/out loss of balance. Patient in bed w/ call light w/in reach and bed alarm on. Patient is a good candidate for therapy to address gait deviations and standing balance. Discharge recommendation: return to [facility] (SNF).    Anticipated Discharge Disposition (PT): skilled nursing facility

## 2025-02-05 NOTE — PLAN OF CARE
Goal Outcome Evaluation: Pt is alert and oriented, room air, very [place], IV is clean dry intact, vitals are stable, de cath placed due to retention, urine return is brown, pt does not ambulate, pt turns self in bed, sits on side of bed to eat, cough is productive, pt is encouraged to spit in emesis bag but insist on spitting in trash and in floor,

## 2025-02-05 NOTE — PROGRESS NOTES
[facility] Medicine Services  INPATIENT PROGRESS NOTE    Patient Name: Manohar Garcia  Date of Admission: 2/4/2025  Today's Date: 02/05/25  Length of Stay: 1  Primary Care Physician: Dani Farfan MD    Subjective   Chief Complaint: Hematuria  HPI   Patient is a 84-year-old chronically ill-appearing gentleman with PMH of hip  replacement, HTN, COPD on eliquis who came to the ED with concerns for bleeding from the rectum. As per the nursing staff the patient does usually tries to digitally disimpact himself. Also having hematuria. On review of the patient's charts patient was recently admitted to the medicine service for COVID and was discharged home he also has a history of urinary bladder stone which was not taken out since the patient recently had a hip fracture or a femoral fracture and it was deemed best for the patient to wait for 3 months and repeat the CT at that time. He has been having ongoing hematuria since then. Today came to the ED for above complaints. He is not hypoxic but blood pressure on the lower side for which she was given some fluids lab workup was initiated. I had extensive discussion with the patient's daughter who is his care provider and decision maker. She does not want the patient to be resuscitated given CPR placed on mechanical ventilator.   In ER lactic acidosis procalcitonin elevated, CT abdomen pelvis hydro, bladder stone old, BP was soft, got better with IVF, case was discussed with urology who did not think he needs any intervention, de inserted, hold eliquis, BC , UC hold bp meds , scds for DVT prophylaxis identify reconcile restart home meds once reconciled   2/5  As before lactic acidosis has resolved renal failure is getting better will Hep-Lock his IV fluid as the patient has been having crackles, remain on Invanz for acute pyelonephritis with hematuria continue to hold blood pressure medications hold Eliquis, culture showing  gram-positive cocci blood culture is negative awaiting urology see      Review of Systems   All pertinent negatives and positives are as above. All other systems have been reviewed and are negative unless otherwise stated.     Objective    Temp:  [97.4 °F (36.3 °C)-98.8 °F (37.1 °C)] 97.7 °F (36.5 °C)  Heart Rate:  [64-89] 80  Resp:  [16-20] 18  BP: ()/(40-84) 125/65  Physical Exam  HENT:      Head: Normocephalic.      Nose: Nose normal.   Eyes:      Pupils: Pupils are equal, round, and reactive to light.   Cardiovascular:      Rate and Rhythm: Normal rate.   Pulmonary:      Breath sounds: Wheezing present.   Abdominal:      Tenderness: There is abdominal tenderness.   Musculoskeletal:         General: Normal range of motion.      Cervical back: Normal range of motion.   Neurological:      Mental Status: He is alert.             Results Review:  I have reviewed the labs, radiology results, and diagnostic studies.    Laboratory Data:   Results from last 7 days   Lab Units 02/05/25  0500 02/04/25 1926 02/04/25  1236   WBC 10*3/mm3 9.95 9.99 9.75   HEMOGLOBIN g/dL 9.0* 8.5* 9.7*   HEMATOCRIT % 28.1* 26.4* 30.5*   PLATELETS 10*3/mm3 63* 56* 62*        Results from last 7 days   Lab Units 02/05/25  0500 02/04/25 1926 02/04/25  1236   SODIUM mmol/L 141 138 138   POTASSIUM mmol/L 4.2 4.5 4.2   CHLORIDE mmol/L 112* 110* 109*   CO2 mmol/L 22.0 20.0* 20.0*   BUN mg/dL 46* 44* 48*   CREATININE mg/dL 1.66* 1.67* 1.96*   CALCIUM mg/dL 8.1* 7.9* 8.3*   BILIRUBIN mg/dL  --   --  1.0   ALK PHOS U/L  --   --  112   ALT (SGPT) U/L  --   --  16   AST (SGOT) U/L  --   --  15   GLUCOSE mg/dL 111* 130* 130*       Culture Data:   Urine Culture   Date Value Ref Range Status   02/04/2025 >100,000 CFU/mL Gram Positive Cocci (A)  Preliminary       Radiology Data:   Imaging Results (Last 24 Hours)       Procedure Component Value Units Date/Time    CT Abdomen Pelvis Without Contrast [[id number]] Collected: 02/04/25 7185     Updated:  02/04/25 1511    Narrative:      EXAMINATION: CT ABDOMEN PELVIS WO CONTRAST-      2/4/2025 1:32 PM     HISTORY: Patient with hematuria history of bladder  stone     In order to have a CT radiation dose as low as reasonably achievable  Automated Exposure Control was utilized for adjustment of the mA and/or  KV according to patient size.     Total DLP = 1113.64 mGy.cm     The CT scan of the abdomen and pelvis is performed without intravenous  contrast enhancement.     Images are acquired in axial plane and subsequent reconstruction in  coronal and sagittal planes.     Comparison is made with the previous study dated 1/16/2025.     Limited included lungs show consolidation/atelectasis in the right lower  lung adjacent to a significant elevated right diaphragm. Atelectatic  changes to a lesser extent are seen in the left lower lung.     Unenhanced liver show moderate lobulation and nodularity suggesting  hepatic cirrhosis. Spleen is significantly enlarged similar to the  previous study. It measures 17 cm in craniocaudal dimension.     No radiopaque gallstones.     The unenhanced pancreas appears unremarkable.     Adrenal glands bilaterally are unremarkable.     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 There are  small radiopaque calculi in the mid to lower pole of the left unit.  There is a persistent moderate hydronephrosis of the right kidney. There  is persistent moderate dilatation of the right ureter down to the level  of the UV junction. There are no calculi in the right ureter. Urinary  bladder is poorly distended. There is mildly dilated left collecting  system. It was normal in the previous study. There is no calculus in the  left ureter. There is a large radiopaque calculus located posteriorly  and to the right at the floor of the urinary bladder measuring 3.3 cm in  diameter. This is similar to the previous study. The remaining urinary  bladder appears unremarkable.     Moderately  enlarged prostate is similar to the previous study.     There are small fat-containing inguinal hernias bilaterally.     There is a small fat-containing umbilical hernia.     Stomach is full of fluid and gas. Duodenum is normal. Small bowel is  nondistended. Appendix is normal. Moderate gas and stool is seen in the  colon. There is diverticulosis of the distal colon. No evidence of  diverticulitis.     Atheromatous changes of the moderately tortuous abdominal aorta is  noted. No aneurysmal dilatation.     There are significantly dilated tortuous mesenteric veins and the left  upper abdominal veins between the spleen and the left kidney suggesting  portosystemic shunting. A significantly enlarged umbilical vein is seen  draining into the left hepatic vein. This is similar to the previous  study.     No evidence of abdominal or pelvic lymphadenopathy.     Images reviewed in bone window show chronic degenerative changes of the  lumbar and limited visualized thoracic spine. There is S-shaped  scoliosis of thoracolumbar spine. Hardware internal fixation of the  right proximal femur is seen. Fracture line is visible. No bony union.       Impression:      1. A mild left and moderate right hydronephrosis and hydroureter.  Dilatation of the left collecting system is a new finding since the  previous study dated 1/16/2025. No calculi in either ureter. This is  probably due to the presence of a large calculus at the floor of the  urinary bladder which may be causing obstruction?.  2. Evidence of horseshoe shaped kidney. Small radiopaque calculi in the  left kidney similar to the previous study.  3. Hepatic cirrhosis. Splenomegaly. The size of the spleen appears  larger than the previous study.  4. Evidence of abdominal varices similar to the previous study.  Significantly enlarged umbilical vein draining into the left portal  vein.  5. Hardware internal fixation of the intertrochanteric fracture of the  right proximal femur.  Fracture line is visible with no bony union at  this time. This is similar to the previous study.  6. Right lower lobar consolidation/atelectasis and significantly  elevated right diaphragm similar to the previous study.                                               This report was signed and finalized on 2/4/2025 3:08 PM by Dr. Shahram Patel MD.       XR Chest 1 View [[id number]] Collected: 02/04/25 1245     Updated: 02/04/25 1251    Narrative:      EXAM: XR CHEST 1 VW-      DATE: 2/4/2025 11:34 AM     HISTORY: soa       COMPARISON: 1/22/2025.     TECHNIQUE:  Single view. Frontal view of the chest. 1 images.       FINDINGS:    Similar RIGHT hemidiaphragm elevation. Similar round opacity at the  RIGHT lung base, which appears to correlate with a combination of  compressive atelectasis and liver density on prior CT 1/23/2025.     No pneumothorax or pleural effusion. Streaky bibasilar opacities.  Cardiac mediastinal silhouette similar to prior. No acute bony finding.          Impression:      1. Similar elevation the RIGHT hemidiaphragm. Similar streaky bibasilar  opacities.     This report was signed and finalized on 2/4/2025 12:47 PM by Dr Erika Moore MD.               I have reviewed the patient's current medications.     Assessment/Plan   Assessment  Active Hospital Problems    Diagnosis     **Acute pyelonephritis        Treatment Plan     In ER lactic acidosis procalcitonin elevated, CT abdomen pelvis hydro, bladder stone old, BP was soft, got better with IVF, case was discussed with urology who did not think he needs any intervention, de inserted, hold eliquis, BC , UC hold bp meds , scds for DVT prophylaxis identify reconcile restart home meds once reconciled   Medical Decision Making  Number and Complexity of problems: 3 acute  Differential Diagnosis: as above      Conditions and Status        Condition is at treatment goal.     MDM Data  External documents reviewed: yes  Cardiac tracing (EKG,  telemetry) interpretation: yes  Radiology interpretation: yes  Labs reviewed: yes  Any tests that were considered but not ordered: no      Decision rules/scores evaluated (example [id number], [place], etc): no     Discussed with: ED     Care Planning  Shared decision making: Patient apprised of current labs, vitals, imaging and treatment plan.  They are agreeable with proceeding with plans as discussed.   Code status and discussions: DNR/DNI     Disposition  Social Determinants of Health that impact treatment or disposition: no  Estimated length of stay is TBD.      I confirmed that the patient's advanced care plan is present, code status is documented, and a surrogate decision maker is listed in the patient's medical record      Electronically signed by Arpan Jerez MD, 02/05/25, 10:43 CST.

## 2025-02-05 NOTE — CASE MANAGEMENT/SOCIAL WORK
Discharge Planning Assessment  [facility]     Patient Name: Manohar Garcia  MRN: [id number]  Today's Date: 2/5/2025    Admit Date: 2/4/2025        Discharge Needs Assessment       Row Name 02/05/25 1211       Living Environment    People in Home facility resident    Current Living Arrangements extended care facility    Potentially Unsafe Housing Conditions none    Primary Care Provided by other (see comments)    Provides Primary Care For no one    Family Caregiver if Needed child(hayley), adult    Quality of Family Relationships helpful;involved;supportive    Able to Return to Prior Arrangements yes       Resource/Environmental Concerns    Resource/Environmental Concerns none       Transition Planning    Patient/Family Anticipates Transition to long-term care facility    Patient/Family Anticipated Services at Transition skilled nursing    Transportation Anticipated health plan transportation       Discharge Needs Assessment    Readmission Within the Last 30 Days no previous admission in last 30 days    Current Outpatient/Agency/Support Group skilled nursing facility    Outpatient/Agency/Support Group Needs skilled nursing facility    Discharge Facility/Level of Care Needs nursing facility, skilled    Discharge Coordination/Progress Pt is from [facility] [phone]. He does have a bed hold. Spoke with pt's daughter Miriam [phone] and she does plan for pt to return to [facility] at d/c.                   Discharge Plan    No documentation.                 Continued Care and Services - Admitted Since 2/4/2025    No active coordination exists for this encounter.       Selected Continued Care - Prior Encounters Includes continued care and service providers with selected services from prior encounters from 11/6/2024 to 2/5/2025      Discharged on 1/28/2025 Admission date: 1/22/2025 - Discharge disposition: Skilled Nursing Facility (DC - External)      Destination       Service Provider Services Address  Phone Fax Patient Preferred    [facility] [address], [facility] [address] [phone] --                             Demographic Summary    No documentation.                  Functional Status    No documentation.                  Psychosocial    No documentation.                  Abuse/Neglect    No documentation.                  Legal    No documentation.                  Substance Abuse    No documentation.                  Patient Forms    No documentation.                     DIANNA Lr

## 2025-02-05 NOTE — THERAPY EVALUATION
Patient Name: Manohar Garcia  : 1940    MRN: [id number]                              Today's Date: 2025       Admit Date: 2025    Visit Dx:     ICD-10-CM ICD-9-CM   1. Pyelonephritis  N12 590.80   2. Bladder stone  N21.0 594.1   3. Acute renal failure, unspecified acute renal failure type  N17.9 584.9   4. 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    Thrombocytopenia    Hyperlactatemia    Hip fracture    Closed 2-part intertrochanteric fracture of proximal end of right femur    Acute blood loss anemia    Stage 3b chronic kidney disease    COVID-19 virus detected    Influenza A    Hypoxia    Cytokine release syndrome, grade 1    Acute pyelonephritis     Past Medical History:   Diagnosis Date    Dementia     GERD (gastroesophageal reflux disease)     Liver disorder      Past Surgical History:   Procedure Laterality Date    HEMORRHOIDECTOMY      HERNIA REPAIR      HIP TROCHANTERIC NAILING WITH INTRAMEDULLARY HIP SCREW Right 2024    Procedure: HIP TROCHANTERIC NAILING SHORT WITH INTRAMEDULLARY HIP SCREW;  Surgeon: Arcenio Chandra MD;  Location: [facility];  Service: Orthopedics;  Laterality: Right;      General Information       Row Name 25 1534          Physical Therapy Time and Intention    Document Type evaluation  Dx: acute pyelonephritis. Hx: FERNANDO, hip fx, cytokine release syndrome  -MS (r) BL (t) MS (c)     Mode of Treatment physical therapy  -MS (r) BL (t) MS (c)       Row Name 25 1534          General Information    Patient Profile Reviewed yes  -MS (r) BL (t) MS (c)     Prior Level of Function independent:;bed mobility;feeding;bathing;min assist:;dressing;gait  -MS (r) BL (t) MS (c)     Existing  Precautions/Restrictions fall  -MS (r) BL (t) MS (c)       Row Name 02/05/25 1534          Living Environment    People in Home facility resident  [facility]  -MS (r) BL (t) MS (c)       Row Name 02/05/25 1534          Cognition    Orientation Status (Cognition) oriented to;person;unable/difficult to assess  Patient very hard of hearing. Patient having difficulty understanding questions  -MS (r) BL (t) MS (c)       Row Name 02/05/25 1534          Safety Issues/Impairments Affecting Functional Mobility    Safety Issues Affecting Function (Mobility) at risk behavior observed;friction/shear risk;safety precaution awareness;safety precautions follow-through/compliance  -MS (r) BL (t) MS (c)     Impairments Affecting Function (Mobility) balance;endurance/activity tolerance;strength  -MS (r) BL (t) MS (c)               User Key  (r) = Recorded By, (t) = Taken By, (c) = Cosigned By      Initials Name Provider Type    Tamra Rodriges, PT, DPT, NCS Physical Therapist    Cristofer Felix PT Student PT Student                   Mobility       Row Name 02/05/25 1534          Bed Mobility    Bed Mobility supine-sit;sit-supine  -MS (r) BL (t) MS (c)     Supine-Sit Nunnelly (Bed Mobility) standby assist  -MS (r) BL (t) MS (c)     Sit-Supine Nunnelly (Bed Mobility) standby assist  -MS (r) BL (t) MS (c)     Assistive Device (Bed Mobility) bed rails  -MS (r) BL (t) MS (c)       Row Name 02/05/25 1534          Sit-Stand Transfer    Sit-Stand Nunnelly (Transfers) minimum assist (75% patient effort);nonverbal cues (demo/gesture)  -MS (r) BL (t) MS (c)     Assistive Device (Sit-Stand Transfers) walker, front-wheeled  -MS (r) BL (t) MS (c)       Row Name 02/05/25 1534          Gait/Stairs (Locomotion)    Nunnelly Level (Gait) contact guard;1 person assist;nonverbal cues (demo/gesture);verbal cues  -MS (r) BL (t) MS (c)     Assistive Device (Gait) walker, front-wheeled  -MS (r) BL (t) MS (c)      Distance in Feet (Gait) 30  -MS (r) BL (t) MS (c)     Bilateral Gait Deviations forward flexed posture  -MS (r) BL (t) MS (c)               User Key  (r) = Recorded By, (t) = Taken By, (c) = Cosigned By      Initials Name Provider Type    Tamra Rodriges NATHAN, PT, DPT, NCS Physical Therapist    BL Cristofer Jane, PT Student PT Student                   Obj/Interventions       Row Name 02/05/25 1543          Range of Motion Comprehensive    Comment, General Range of Motion B Ankle ROM limited  -MS (r) BL (t) MS (c)       Row Name 02/05/25 1543          Strength Comprehensive (MMT)    Comment, General Manual Muscle Testing (MMT) Assessment B knee flexion 3+/5  -MS (r) BL (t) MS (c)       Row Name 02/05/25 1543          Balance    Balance Assessment sitting static balance;sitting dynamic balance;standing static balance;standing dynamic balance  -MS (r) BL (t) MS (c)     Static Sitting Balance standby assist  -MS (r) BL (t) MS (c)     Dynamic Sitting Balance standby assist  -MS (r) BL (t) MS (c)     Position, Sitting Balance unsupported;sitting edge of bed  -MS (r) BL (t) MS (c)     Static Standing Balance standby assist  -MS (r) BL (t) MS (c)     Dynamic Standing Balance contact guard  -MS (r) BL (t) MS (c)     Position/Device Used, Standing Balance supported;walker, front-wheeled  -MS (r) BL (t) MS (c)     Balance Interventions standing;dynamic;moderate challenge  -MS (r) BL (t) MS (c)       Row Name 02/05/25 1543          Sensory Assessment (Somatosensory)    Sensory Assessment (Somatosensory) LE sensation intact  -MS (r) BL (t) MS (c)               User Key  (r) = Recorded By, (t) = Taken By, (c) = Cosigned By      Initials Name Provider Type    Tamra Rodriges R, PT, DPT, NCS Physical Therapist    BL Cristofer Jane, PT Student PT Student                   Goals/Plan       Row Name 02/05/25 1552          Transfer Goal 1 (PT)    Activity/Assistive Device (Transfer Goal 1, PT)  sit-to-stand/stand-to-sit;bed-to-chair/chair-to-bed  -MS (r) BL (t) MS (c)     Harvey Level/Cues Needed (Transfer Goal 1, PT) independent  -MS (r) BL (t) MS (c)     Time Frame (Transfer Goal 1, PT) long term goal (LTG);by discharge  -MS (r) BL (t) MS (c)     Progress/Outcome (Transfer Goal 1, PT) new goal  -MS (r) BL (t) MS (c)       Row Name 02/05/25 1552          Gait Training Goal 1 (PT)    Activity/Assistive Device (Gait Training Goal 1, PT) gait (walking locomotion);assistive device use;walker, rolling;decrease fall risk;diminish gait deviation;improve balance and speed;increase endurance/gait distance  -MS (r) BL (t) MS (c)     Harvey Level (Gait Training Goal 1, PT) supervision required  -MS (r) BL (t) MS (c)     Distance (Gait Training Goal 1, PT) 50  -MS (r) BL (t) MS (c)     Time Frame (Gait Training Goal 1, PT) long term goal (LTG);by discharge  -MS (r) BL (t) MS (c)     Strategies/Barriers (Gait Training Goal 1, PT) Improve endurance, decrease fall risk and gait deviations  -MS (r) BL (t) MS (c)     Progress/Outcome (Gait Training Goal 1, PT) new goal  -MS (r) BL (t) MS (c)       Row Name 02/05/25 1552          Balance Goal 1 (PT)    Activity/Assistive Device (Balance Goal) standing static balance;standing dynamic balance;supported;walker, rolling;with functional mobility activities  -MS (r) BL (t) MS (c)     Harvey Level/Cues Needed (Balance Goal 1, PT) supervision required  -MS (r) BL (t) MS (c)     Time Frame (Balance Goal 1, PT) long-term goal (LTG);by discharge  -MS (r) BL (t) MS (c)     Progress/Outcomes (Balance Goal 1, PT) other (see comments)  New goal  -MS (r) BL (t) MS (c)       Row Name 02/05/25 1559          Therapy Assessment/Plan (PT)    Planned Therapy Interventions (PT) balance training;gait training;home exercise program;neuromuscular re-education;patient/family education;strengthening;transfer training  -MS (r) BL (t) MS (c)               User Key  (r) = Recorded By,  (t) = Taken By, (c) = Cosigned By      Initials Name Provider Type    Tamra Rodriges R, PT, DPT, NCS Physical Therapist    Cristofer Felix PT Student PT Student                   Clinical Impression       Row Name 02/05/25 3501          Pain    Pain Management Interventions exercise or physical activity utilized  -MS (r) BL (t) MS (c)     Response to Pain Interventions activity participation with tolerable pain  -MS (r) BL (t) MS (c)     Additional Documentation Pain Scale: FACES Pre/Post-Treatment (Group)  -MS (r) BL (t) MS (c)       Row Name 02/05/25 1585          Pain Scale: FACES Pre/Post-Treatment    Pain: FACES Scale, Pretreatment 2-->hurts little bit  -MS (r) BL (t) MS (c)     Posttreatment Pain Rating 2-->hurts little bit  -MS (r) BL (t) MS (c)       Row Name 02/05/25 1540          Plan of Care Review    Plan of Care Reviewed With patient  -MS (r) BL (t) MS (c)     Progress no change  -MS (r) BL (t) MS (c)     Outcome Evaluation PT eval completed. A&Ox4. Strength WFL. Sensation intact. Patient able to perform bed mobility and keiry socks w/ SBA. Patient able to stand w/ Nyla and ambulate CGA. Patient taken to restroom and was able to clean self. Sheets and briefs changed. Patient returned to bed CGA. Patient shows deficits in balance when ambulating but is able to remain standing w/out loss of balance. Patient in bed w/ call light w/in reach and bed alarm on. Patient is a good candidate for therapy to address gait deviations and standing balance. Discharge recommendation: return to [facility] ([facility]).  -MS (r) BL (t) MS (c)       Row Name 02/05/25 8731          Therapy Assessment/Plan (PT)    Patient/Family Therapy Goals Statement (PT) Return to living facility  -MS (r) BL (t) MS (c)     Rehab Potential (PT) good  -MS (r) BL (t) MS (c)     Criteria for Skilled Interventions Met (PT) yes;meets criteria;skilled treatment is necessary  -MS (r) BL (t) MS (c)     Therapy Frequency (PT) 2  times/day  -MS (r) BL (t) MS (c)     Predicted Duration of Therapy Intervention (PT) Until discharge  -MS (r) BL (t) MS (c)       Row Name 02/05/25 1545          Vital Signs    Pre Patient Position Supine  -MS (r) BL (t) MS (c)     Intra Patient Position Standing  -MS (r) BL (t) MS (c)     Post Patient Position Supine  -MS (r) BL (t) MS (c)       Row Name 02/05/25 1545          Positioning and Restraints    Pre-Treatment Position in bed  -MS (r) BL (t) MS (c)     Post Treatment Position bed  -MS (r) BL (t) MS (c)     In Bed supine;fowlers;call light within reach;encouraged to call for assist;exit alarm on  -MS (r) BL (t) MS (c)               User Key  (r) = Recorded By, (t) = Taken By, (c) = Cosigned By      Initials Name Provider Type    Tamra Rodriges, PT, DPT, NCS Physical Therapist    Cristofer Felix PT Student PT Student                   Outcome Measures       Row Name 02/05/25 1554 02/05/25 0830       How much help from another person do you currently need...    Turning from your back to your side while in flat bed without using bedrails? 4  -MS (r) BL (t) MS (c) 4  -SB    Moving from lying on back to sitting on the side of a flat bed without bedrails? 4  -MS (r) BL (t) MS (c) 3  -SB    Moving to and from a bed to a chair (including a wheelchair)? 2  -MS (r) BL (t) MS (c) 2  -SB    Standing up from a chair using your arms (e.g., wheelchair, bedside chair)? 3  -MS (r) BL (t) MS (c) 2  -SB    Climbing 3-5 steps with a railing? 2  -MS (r) BL (t) MS (c) 1  -SB    To walk in hospital room? 3  -MS (r) BL (t) MS (c) 1  -SB    AM-PAC 6 Clicks Score (PT) 18  -MS (r) BL (t) 13  -SB    Highest Level of Mobility Goal 6 --> Walk 10 steps or more  -MS (r) BL (t) 4 --> Transfer to chair/commode  -SB      Row Name 02/05/25 1554          Functional Assessment    Outcome Measure Options AM-PAC 6 Clicks Basic Mobility (PT)  -MS (r) BL (t) MS (c)               User Key  (r) = Recorded By, (t) = Taken By, (c) = Cosigned By       Initials Name Provider Type    Tamra Rodriges R, PT, DPT, NCS Physical Therapist    Nataliya Pruitt, RN Registered Nurse    Cristofer Felix, PT Student PT Student                                 Physical Therapy Education       Title: PT OT SLP Therapies (In Progress)       Topic: Physical Therapy (In Progress)       Point: Mobility training (Done)       Learning Progress Summary            Patient Acceptance, E, VU by BL at 2/5/2025 1555    Comment: Home safety, plan of care, bed mobility, transfer techniques                      Point: Home exercise program (Not Started)       Learner Progress:  Not documented in this visit.              Point: Body mechanics (Done)       Learning Progress Summary            Patient Acceptance, E, VU by BL at 2/5/2025 1555    Comment: Home safety, plan of care, bed mobility, transfer techniques                      Point: Precautions (Done)       Learning Progress Summary            Patient Acceptance, E, VU by  at 2/5/2025 1555    Comment: Home safety, plan of care, bed mobility, transfer techniques                                      User Key       Initials Effective Dates Name Provider Type Discipline     12/17/24 -  Cristofer Jane PT Student PT Student PT                  PT Recommendation and Plan  Planned Therapy Interventions (PT): balance training, gait training, home exercise program, neuromuscular re-education, patient/family education, strengthening, transfer training  Progress: no change  Outcome Evaluation: PT eval completed. A&Ox4. Strength WFL. Sensation intact. Patient able to perform bed mobility and keiry socks w/ SBA. Patient able to stand w/ Nyla and ambulate CGA. Patient taken to restroom and was able to clean self. Sheets and briefs changed. Patient returned to bed CGA. Patient shows deficits in balance when ambulating but is able to remain standing w/out loss of balance. Patient in bed w/ call light w/in reach and bed alarm on. Patient is a  good candidate for therapy to address gait deviations and standing balance. Discharge recommendation: return to [facility] (SNF).     Time Calculation:         PT Charges       Row Name 02/05/25 1534             Time Calculation    Start Time 1450  5 min chart time  -MS (r) BL (t) MS (c)      Stop Time 1530  -MS (r) BL (t) MS (c)      Time Calculation (min) 40 min  -MS (r) BL (t)      PT Received On 02/05/25  -MS (r) BL (t) MS (c)      PT Goal Re-Cert Due Date 02/15/25  -MS (r) BL (t) MS (c)         Untimed Charges    PT Eval/Re-eval Minutes 45  -MS (r) BL (t) MS (c)         Total Minutes    Untimed Charges Total Minutes 45  -MS (r) BL (t)       Total Minutes 45  -MS (r) BL (t)                User Key  (r) = Recorded By, (t) = Taken By, (c) = Cosigned By      Initials Name Provider Type    MS Armando Tamra NATHAN, PT, DPT, NCS Physical Therapist    Cristofer Felix PT Student PT Student                      PT G-Codes  Outcome Measure Options: AM-PAC 6 Clicks Basic Mobility (PT)  AM-PAC 6 Clicks Score (PT): 18  PT Discharge Summary  Anticipated Discharge Disposition (PT): skilled nursing facility    Cristofer Jane PT Student  2/5/2025

## 2025-02-05 NOTE — NURSING NOTE
Received from er per bed. Oriented x4. Very [place], but follows commands well. Protective drsgs applied to bilat heels and coccyx. Noted perianal excoriation with slow oozing blood- area cleaned and applied moisture barrier ointment.

## 2025-02-05 NOTE — CONSULTS
[facility]   Consult Note    Patient Name: Manohar Garcia  : 1940  MRN: [id number]  Primary Care Physician:  Dani Farfan MD  Referring Physician: Maykel Cr MD  Date of admission: 2025    Subjective   Subjective     Reason for Consult/ Chief Complaint: Bladder stone and bilateral hydronephrosis    HPI:  Manohar Garcia is a 84 y.o. male who is a patient in the urology clinic, followed by KEARA Asencoi.  He has a history of recurring urinary tract infections.  He has some sensation of incomplete bladder emptying but was found to have a postvoid residual of 105 cc.  He was placed on tamsulosin.  He was sent for a CT of the abdomen and pelvis which showed a 3 cm stone in the bladder.  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  The CT scan showed mild right hydronephrosis on 2025.  He was planned for radiographic follow-up in 3 months as he had recently undergone left hip surgery and was not felt to be a good candidate for a procedure in the lithotomy position.  He presented yesterday to the emergency room at [facility] due to concern of the nursing home staff regarding bleeding from the rectum.  There is also some weakness and confusion and concern for a urinary tract infection.  He was found to have evidence of urinary tract infection.  His lactate was up to 4.0.  Did not have leukocytosis.  His serum creatinine was up to 1.96 from baseline of 1.1, although 2 weeks ago he was about 1.6.  CT of the abdomen and pelvis obtained yesterday again showed the same bladder stone with slight worsening of hydronephrosis on both sides.  Of note is that on review of prior imaging, a CT scan from 2023 showed the same bladder stone and a higher degree of hydronephrosis then noted on yesterday's study.  There was no intervention at that time.  Today the patient states that he feels better.  He is mainly concerned about whether his COVID  has resolved.    Review of Systems   Review of systems could not be obtained due to   patient unable to participate    Personal History     Past Medical History:   Diagnosis Date    Dementia     GERD (gastroesophageal reflux disease)     Liver disorder        Past Surgical History:   Procedure Laterality Date    HEMORRHOIDECTOMY      HERNIA REPAIR      HIP TROCHANTERIC NAILING WITH INTRAMEDULLARY HIP SCREW Right 8/30/2024    Procedure: HIP TROCHANTERIC NAILING SHORT WITH INTRAMEDULLARY HIP SCREW;  Surgeon: Arcenio Chandra MD;  Location: [facility];  Service: Orthopedics;  Laterality: Right;       Family History: family history is not on file. Otherwise pertinent FHx was reviewed and not pertinent to current issue.    Social History:  reports that he has quit smoking. His smoking use included cigarettes and pipe. He has never used smokeless tobacco. He reports that he does not currently use drugs after having used the following drugs: Amphetamines. He reports that he does not drink alcohol.    Home Medications:  HYDROcodone-acetaminophen, acetaminophen, albuterol sulfate HFA, apixaban, bisacodyl, budesonide-formoterol, carvedilol, dexAMETHasone, famotidine, ipratropium, ipratropium-albuterol, nystatin, ondansetron ODT, and tamsulosin    Allergies:  Allergies   Allergen Reactions    Penicillins Anaphylaxis    Contrast Dye (Echo Or Unknown Ct/Mr) Hives       Objective    Objective     Vitals:   Temp:  [97.4 °F (36.3 °C)-98.8 °F (37.1 °C)] 97.7 °F (36.5 °C)  Heart Rate:  [64-89] 80  Resp:  [16-20] 18  BP: ()/(40-84) 125/65    Physical Exam:   Constitutional: Awake, alert   Eyes: PERRLA, sclerae anicteric, no conjunctival injection   HENT: NCAT, mucous membranes moist   Neck: Supple, no thyromegaly, no lymphadenopathy, trachea midline   Respiratory: Clear to auscultation bilaterally, nonlabored respirations    Cardiovascular: RRR, no murmurs, rubs, or gallops, palpable pedal pulses bilaterally   Gastrointestinal:  Positive bowel sounds, soft, nontender, nondistended   Musculoskeletal: No bilateral ankle edema, no clubbing or cyanosis to extremities   Psychiatric: Appropriate affect, cooperative   Neurologic: Oriented x 3, strength symmetric in all extremities, Cranial Nerves grossly intact to confrontation, speech clear   Skin: No rashes       Result Review    Result Review:  I have personally reviewed the results from the time of this admission to 2/5/2025 11:27 CST and agree with these findings:  [x]  Laboratory list / accordion  [x]  Microbiology  [x]  Radiology  []  EKG/Telemetry   []  Cardiology/Vascular   []  Pathology  [x]  Old records  []  Other:  Most notable findings include: Per HPI      Assessment & Plan   Assessment / Plan     Brief Patient Summary:  Manohar Garcia is a 84 y.o. male who has a long history of bladder calculus.  He has had variable amounts of hydronephrosis on various studies and actually has left hydronephrosis on yesterday's study than he did in March 2023.  I suspected that the degree of hydronephrosis is based on his emptying on any given day.  He does have a horseshoe kidney with a small stone or stones in a nonobstructing position in the left lower pole.    Active Hospital Problems:  Active Hospital Problems    Diagnosis     **Acute pyelonephritis      Plan: He is clinically improving with fluids and antibiotics alone.  Based on his clinical improvement, the variability of hydronephrosis noted on multiple studies and likely related to emptying issues, and his daughters intend to minimize intervention, I would hold on any sort of surgical treatment at this time.  It would not be unreasonable to place a Perkins at this time but it is not 100% necessary unless he does show more signs of retention.  He can follow-up in the urology department as planned to readdress treatment of his bladder calculus.  Available as needed.  I spent 50 minutes on this consultation with at least 50% spent in  face-to-face visit with the patient, review of records, and coordination of care.    Zeyad Mcguire MD

## 2025-02-06 LAB
ANION GAP SERPL CALCULATED.3IONS-SCNC: 7 MMOL/L (ref 5–15)
BACTERIA SPEC AEROBE CULT: ABNORMAL
BASOPHILS # BLD AUTO: 0.01 10*3/MM3 (ref 0–0.2)
BASOPHILS NFR BLD AUTO: 0.1 % (ref 0–1.5)
BUN SERPL-MCNC: 40 MG/DL (ref 8–23)
BUN/CREAT SERPL: 35.1 (ref 7–25)
CALCIUM SPEC-SCNC: 8.2 MG/DL (ref 8.6–10.5)
CHLORIDE SERPL-SCNC: 112 MMOL/L (ref 98–107)
CO2 SERPL-SCNC: 22 MMOL/L (ref 22–29)
CREAT SERPL-MCNC: 1.14 MG/DL (ref 0.76–1.27)
DEPRECATED RDW RBC AUTO: 50.5 FL (ref 37–54)
EGFRCR SERPLBLD CKD-EPI 2021: 63.4 ML/MIN/1.73
EOSINOPHIL # BLD AUTO: 0.07 10*3/MM3 (ref 0–0.4)
EOSINOPHIL NFR BLD AUTO: 0.8 % (ref 0.3–6.2)
ERYTHROCYTE [DISTWIDTH] IN BLOOD BY AUTOMATED COUNT: 15.4 % (ref 12.3–15.4)
GLUCOSE SERPL-MCNC: 92 MG/DL (ref 65–99)
HCT VFR BLD AUTO: 30.4 % (ref 37.5–51)
HGB BLD-MCNC: 9.6 G/DL (ref 13–17.7)
IMM GRANULOCYTES # BLD AUTO: 0.04 10*3/MM3 (ref 0–0.05)
IMM GRANULOCYTES NFR BLD AUTO: 0.4 % (ref 0–0.5)
LYMPHOCYTES # BLD AUTO: 1.06 10*3/MM3 (ref 0.7–3.1)
LYMPHOCYTES NFR BLD AUTO: 11.9 % (ref 19.6–45.3)
MCH RBC QN AUTO: 28.9 PG (ref 26.6–33)
MCHC RBC AUTO-ENTMCNC: 31.6 G/DL (ref 31.5–35.7)
MCV RBC AUTO: 91.6 FL (ref 79–97)
MONOCYTES # BLD AUTO: 0.46 10*3/MM3 (ref 0.1–0.9)
MONOCYTES NFR BLD AUTO: 5.2 % (ref 5–12)
NEUTROPHILS NFR BLD AUTO: 7.27 10*3/MM3 (ref 1.7–7)
NEUTROPHILS NFR BLD AUTO: 81.6 % (ref 42.7–76)
NRBC BLD AUTO-RTO: 0 /100 WBC (ref 0–0.2)
PLATELET # BLD AUTO: 70 10*3/MM3 (ref 140–450)
PMV BLD AUTO: 11.5 FL (ref 6–12)
POTASSIUM SERPL-SCNC: 4.1 MMOL/L (ref 3.5–5.2)
RBC # BLD AUTO: 3.32 10*6/MM3 (ref 4.14–5.8)
SODIUM SERPL-SCNC: 141 MMOL/L (ref 136–145)
WBC NRBC COR # BLD AUTO: 8.91 10*3/MM3 (ref 3.4–10.8)

## 2025-02-06 PROCEDURE — 85025 COMPLETE CBC W/AUTO DIFF WBC: CPT | Performed by: HOSPITALIST

## 2025-02-06 PROCEDURE — 97166 OT EVAL MOD COMPLEX 45 MIN: CPT | Performed by: OCCUPATIONAL THERAPIST

## 2025-02-06 PROCEDURE — 80048 BASIC METABOLIC PNL TOTAL CA: CPT | Performed by: HOSPITALIST

## 2025-02-06 PROCEDURE — 97116 GAIT TRAINING THERAPY: CPT

## 2025-02-06 PROCEDURE — 36415 COLL VENOUS BLD VENIPUNCTURE: CPT | Performed by: HOSPITALIST

## 2025-02-06 RX ADMIN — ACETAMINOPHEN 650 MG: 325 TABLET ORAL at 18:37

## 2025-02-06 RX ADMIN — Medication 10 ML: at 09:25

## 2025-02-06 RX ADMIN — Medication 10 ML: at 20:10

## 2025-02-06 NOTE — PLAN OF CARE
Goal Outcome Evaluation:  Plan of Care Reviewed With: patient        Progress: no change  Outcome Evaluation: Pt A&O with intermittent confusion. Pt Lime. VSS. No c/o pain. Pt rested for much of day. Safety maintained.

## 2025-02-06 NOTE — THERAPY EVALUATION
Patient Name: Manohar Garcia  : 1940    MRN: [id number]                              Today's Date: 2025       Admit Date: 2025    Visit Dx:     ICD-10-CM ICD-9-CM   1. Pyelonephritis  N12 590.80   2. Bladder stone  N21.0 594.1   3. Acute renal failure, unspecified acute renal failure type  N17.9 584.9   4. 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    Thrombocytopenia    Hyperlactatemia    Hip fracture    Closed 2-part intertrochanteric fracture of proximal end of right femur    Acute blood loss anemia    Stage 3b chronic kidney disease    COVID-19 virus detected    Influenza A    Hypoxia    Cytokine release syndrome, grade 1    Acute pyelonephritis     Past Medical History:   Diagnosis Date    Dementia     GERD (gastroesophageal reflux disease)     Liver disorder      Past Surgical History:   Procedure Laterality Date    HEMORRHOIDECTOMY      HERNIA REPAIR      HIP TROCHANTERIC NAILING WITH INTRAMEDULLARY HIP SCREW Right 2024    Procedure: HIP TROCHANTERIC NAILING SHORT WITH INTRAMEDULLARY HIP SCREW;  Surgeon: Arcenio Chandra MD;  Location: [facility];  Service: Orthopedics;  Laterality: Right;      General Information       Row Name 25 1021 25 0903       OT Time and Intention    Subjective Information --  -MM complains of;weakness;pain  -MM    Document Type --  -MM evaluation  -MM    Mode of Treatment --  -MM occupational therapy  -MM      Row Name 25 1021 25 0903       General Information    Patient Profile Reviewed --  -MM yes  -MM    Prior Level of Function --  -MM independent:;bed mobility;feeding;grooming;bathing;min assist:;gait;dressing  -MM    Existing Precautions/Restrictions --  -MM fall  -MM     Barriers to Rehab --  -MM medically complex;hearing deficit  -MM      Row Name 02/06/25 1021 02/06/25 0903       Living Environment    People in Home --  -MM facility resident  -MM      Row Name 02/06/25 1021 02/06/25 0903       Cognition    Orientation Status (Cognition) --  -MM oriented to;person;place  -MM      Row Name 02/06/25 1021 02/06/25 0903       Safety Issues/Impairments Affecting Functional Mobility    Safety Issues Affecting Function (Mobility) --  -MM ability to follow commands;at risk behavior observed;awareness of need for assistance;impulsivity;insight into deficits/self-awareness;judgment;positioning of assistive device;problem-solving;safety precaution awareness;safety precautions follow-through/compliance;sequencing abilities  -MM    Impairments Affecting Function (Mobility) --  -MM balance;endurance/activity tolerance;strength;cognition  -MM    Cognitive Impairments, Mobility Safety/Performance --  -MM attention;awareness, need for assistance;impulsivity;insight into deficits/self-awareness;judgment;problem-solving/reasoning;safety precaution awareness;safety precaution follow-through;sequencing abilities  -MM              User Key  (r) = Recorded By, (t) = Taken By, (c) = Cosigned By      Initials Name Provider Type    MM Edis Quintanilla, OTR/L Occupational Therapist                     Mobility/ADL's       Row Name 02/06/25 0903          Bed Mobility    Bed Mobility supine-sit  -MM     Supine-Sit [place] (Bed Mobility) supervision;verbal cues  -MM     Sit-Supine [place] (Bed Mobility) --  -MM     Assistive Device (Bed Mobility) bed rails;head of bed elevated  -MM       Row Name 02/06/25 0903          Transfers    Transfers stand-sit transfer;sit-stand transfer  -MM       Row Name 02/06/25 0903          Sit-Stand Transfer    Sit-Stand [place] (Transfers) minimum assist (75% patient effort);2 person assist;verbal cues  -MM     Assistive Device (Sit-Stand Transfers) walker,  front-wheeled  -MM       Row Name 02/06/25 0903          Stand-Sit Transfer    Stand-Sit [place] (Transfers) contact guard;verbal cues  -MM     Assistive Device (Stand-Sit Transfers) walker, front-wheeled  -MM       Row Name 02/06/25 0903          Functional Mobility    Functional Mobility- Ind. Level contact guard assist;verbal cues required  -MM     Functional Mobility- Device walker, front-wheeled  -MM     Functional Mobility- Comment steps from bed to chair.  -MM       Row Name 02/06/25 0903          Activities of Daily Living    BADL Assessment/Intervention upper body dressing;lower body dressing;grooming;toileting;feeding  -MM       Row Name 02/06/25 0903          Upper Body Dressing Assessment/Training    [place] Level (Upper Body Dressing) doff;don;moderate assist (50% patient effort);verbal cues  hospital gown.  -MM     Position (Upper Body Dressing) unsupported sitting  -MM       Row Name 02/06/25 0903          Lower Body Dressing Assessment/Training    [place] Level (Lower Body Dressing) don;socks;dependent (less than 25% patient effort);verbal cues;set up  right foot  -MM     Position (Lower Body Dressing) edge of bed sitting  -MM       Row Name 02/06/25 0903          Grooming Assessment/Training    [place] Level (Grooming) wash face, hands;supervision;set up;verbal cues  -MM     Position (Grooming) edge of bed sitting;supported sitting  EOB and sitting in chair.  -MM       Row Name 02/06/25 0903          Toileting Assessment/Training    [place] Level (Toileting) toileting skills;maximum assist (25% patient effort);dependent (less than 25% patient effort);verbal cues  -MM     Position (Toileting) supported standing  -MM       Row Name 02/06/25 0903          Self-Feeding Assessment/Training    [place] Level (Feeding) feeding skills;modified independence;set up  -MM     Position (Feeding) supported sitting  -MM               User Key  (r) = Recorded By, (t) = Taken By, (c) =  Cosigned By      Initials Name Provider Type    Edis Villafuerte, OTR/L Occupational Therapist                   Obj/Interventions       Row Name 02/06/25 0903          Sensory Assessment (Somatosensory)    Sensory Assessment (Somatosensory) UE sensation intact  per pt report  -MM       Row Name 02/06/25 0903          Range of Motion Comprehensive    General Range of Motion bilateral upper extremity ROM WFL  -MM       [facility] Name 02/06/25 0903          Strength Comprehensive (MMT)    Comment, General Manual Muscle Testing (MMT) Assessment BUE strength: 4/5  -MM       Row Name 02/06/25 0903          Motor Skills    Motor Skills coordination  -MM     Coordination other (see comments)  Difficult to assess 2' command following v. hearing deficit.  -MM       Row Name 02/06/25 0903          Balance    Balance Assessment sitting static balance;sitting dynamic balance;standing static balance;standing dynamic balance  -MM     Static Sitting Balance standby assist;verbal cues  -MM     Dynamic Sitting Balance standby assist;supervision;verbal cues  -MM     Position, Sitting Balance unsupported;sitting edge of bed  -MM     Static Standing Balance contact guard;verbal cues  -MM     Dynamic Standing Balance contact guard;verbal cues  -MM     Position/Device Used, Standing Balance supported;walker, front-wheeled  -MM               User Key  (r) = Recorded By, (t) = Taken By, (c) = Cosigned By      Initials Name Provider Type    Edis Villafuerte, OTR/L Occupational Therapist                   Goals/Plan       Row Name 02/06/25 0903          Transfer Goal 1 (OT)    Activity/Assistive Device (Transfer Goal 1, OT) toilet;bed-to-chair/chair-to-bed;walker, rolling  -MM     [place] Level/Cues Needed (Transfer Goal 1, OT) standby assist  -MM     Time Frame (Transfer Goal 1, OT) long term goal (LTG);by discharge  -MM     Progress/Outcome (Transfer Goal 1, OT) new goal  -MM       [facility] Name 02/06/25 0903          Dressing Goal 1  (OT)    Activity/Device (Dressing Goal 1, OT) dressing skills, all  -MM     [place]/Cues Needed (Dressing Goal 1, OT) minimum assist (75% or more patient effort);set-up required;verbal cues required  -MM     Time Frame (Dressing Goal 1, OT) long term goal (LTG);by discharge  -MM     Progress/Outcome (Dressing Goal 1, OT) new goal  -MM       Row Name 02/06/25 0903          Toileting Goal 1 (OT)    Activity/Device (Toileting Goal 1, OT) toileting skills, all  -MM     [place] Level/Cues Needed (Toileting Goal 1, OT) minimum assist (75% or more patient effort);set-up required;verbal cues required  -MM     Time Frame (Toileting Goal 1, OT) long term goal (LTG);by discharge  -MM     Progress/Outcome (Toileting Goal 1, OT) new goal  -MM       Row Name 02/06/25 0903          Therapy Assessment/Plan (OT)    Planned Therapy Interventions (OT) activity tolerance training;BADL retraining;functional balance retraining;occupation/activity based interventions;patient/caregiver education/training;ROM/therapeutic exercise;strengthening exercise;transfer/mobility retraining;adaptive equipment training;cognitive/visual perception retraining;neuromuscular control/coordination retraining;passive ROM/stretching  -MM               User Key  (r) = Recorded By, (t) = Taken By, (c) = Cosigned By      Initials Name Provider Type    MM Edis Quintanilla, OTR/L Occupational Therapist                   Clinical Impression       Row Name 02/06/25 0903          Pain Assessment    Pretreatment Pain Rating 0/10 - no pain  -MM     Posttreatment Pain Rating 0/10 - no pain  -MM       Row Name 02/06/25 0903          Plan of Care Review    Plan of Care Reviewed With patient  -MM     Progress no change  -MM     Outcome Evaluation OT evaluation completed. Pt is A&Ox2. Pt is very [place]. Pt with decreased safety awareness and impulsivity. Decreased command following, unsure if this is command following deficit v. [place]. Pt reports no pain. Pt is  agreeeable to therapy. Pt with frequent spitting noted and what appears to be multiple piles of spit on the floor beside pt. Pt was supervision for supine to sit. Pt with noted bowel movement on debby pad and sheet despite having on a brief. Pt was max to dependent for posterior hygiene. Pt was mod A to don/[facility] gown. Pt was max A to don sock to right foot. Pt was supervision with set up to wash face and hands. Pt was min A x2 for sit to stand t/f. pt was CGA with RW for steps from bed to chair. Pt would benefit from skilled OT to address adls, functional mobility and safety awareness. Recommended d/c SNF.  -MM       Row Name 02/06/25 0903          Therapy Assessment/Plan (OT)    Patient/Family Therapy Goal Statement (OT) to go home  -MM     Rehab Potential (OT) good  -MM     Criteria for Skilled Therapeutic Interventions Met (OT) yes;meets criteria;skilled treatment is necessary  -MM     Therapy Frequency (OT) 5 times/wk  -MM     Predicted Duration of Therapy Intervention (OT) until hospital discharge  -MM       Row Name 02/06/25 0903          Therapy Plan Review/Discharge Plan (OT)    Anticipated Discharge Disposition (OT) skilled nursing facility  -MM       Row Name 02/06/25 0903          Positioning and Restraints    Pre-Treatment Position in bed  -MM     Post Treatment Position chair  -MM     In Chair notified nsg;sitting;call light within reach;encouraged to call for assist;exit alarm on  eating breakfast  -MM               User Key  (r) = Recorded By, (t) = Taken By, (c) = Cosigned By      Initials Name Provider Type    MM Edis Quintanilla, OTR/L Occupational Therapist                   Outcome Measures       Row Name 02/06/25 0903          How much help from another is currently needed...    Putting on and taking off regular lower body clothing? 2  -MM     Bathing (including washing, rinsing, and drying) 2  -MM     Toileting (which includes using toilet bed pan or urinal) 1  -MM     Putting on and  taking off regular upper body clothing 2  -MM     Taking care of personal grooming (such as brushing teeth) 3  -MM     Eating meals 3  -MM     AM-PAC 6 Clicks Score (OT) 13  -MM       Row Name 02/06/25 1000          How much help from another person do you currently need...    Turning from your back to your side while in flat bed without using bedrails? 3  -AH     Moving from lying on back to sitting on the side of a flat bed without bedrails? 3  -AH     Moving to and from a bed to a chair (including a wheelchair)? 3  -AH     Standing up from a chair using your arms (e.g., wheelchair, bedside chair)? 3  -AH     Climbing 3-5 steps with a railing? 2  -AH     To walk in hospital room? 3  -AH     AM-PAC 6 Clicks Score (PT) 17  -AH     Highest Level of Mobility Goal 5 --> Static standing  -AH       Row Name 02/06/25 1000 02/06/25 0903       Functional Assessment    Outcome Measure Options AM-PAC 6 Clicks Basic Mobility (PT)  -AH AM-PAC 6 Clicks Daily Activity (OT)  -MM              User Key  (r) = Recorded By, (t) = Taken By, (c) = Cosigned By      Initials Name Provider Type     Astrid Frank, PTA Physical Therapist Assistant    Edis Villafuerte, OTR/L Occupational Therapist                    Occupational Therapy Education       Title: PT OT SLP Therapies (In Progress)       Topic: Occupational Therapy (In Progress)       Point: ADL training (In Progress)       Description:   Instruct learner(s) on proper safety adaptation and remediation techniques during self care or transfers.   Instruct in proper use of assistive devices.                  Learning Progress Summary            Patient Acceptance, E, NR by MM at 2/6/2025 1315                      Point: Home exercise program (Not Started)       Description:   Instruct learner(s) on appropriate technique for monitoring, assisting and/or progressing therapeutic exercises/activities.                  Learner Progress:  Not documented in this visit.               Point: Precautions (In Progress)       Description:   Instruct learner(s) on prescribed precautions during self-care and functional transfers.                  Learning Progress Summary            Patient Acceptance, E, NR by  at 2/6/2025 1315                      Point: Body mechanics (In Progress)       Description:   Instruct learner(s) on proper positioning and spine alignment during self-care, functional mobility activities and/or exercises.                  Learning Progress Summary            Patient Acceptance, E, NR by  at 2/6/2025 1315                                      User Key       Initials Effective Dates Name Provider Type Discipline     07/11/23 -  Edis Quintanilla E, OTR/L Occupational Therapist OT                  OT Recommendation and Plan  Planned Therapy Interventions (OT): activity tolerance training, BADL retraining, functional balance retraining, occupation/activity based interventions, patient/caregiver education/training, ROM/therapeutic exercise, strengthening exercise, transfer/mobility retraining, adaptive equipment training, cognitive/visual perception retraining, neuromuscular control/coordination retraining, passive ROM/stretching  Therapy Frequency (OT): 5 times/wk  Plan of Care Review  Plan of Care Reviewed With: patient  Progress: no change  Outcome Evaluation: OT evaluation completed. Pt is A&Ox2. Pt is very [place]. Pt with decreased safety awareness and impulsivity. Decreased command following, unsure if this is command following deficit v. [place]. Pt reports no pain. Pt is agreeeable to therapy. Pt with frequent spitting noted and what appears to be multiple piles of spit on the floor beside pt. Pt was supervision for supine to sit. Pt with noted bowel movement on debby pad and sheet despite having on a brief. Pt was max to dependent for posterior hygiene. Pt was mod A to don/[facility] gown. Pt was max A to don sock to right foot. Pt was supervision with set up to wash face  and hands. Pt was min A x2 for sit to stand t/f. pt was CGA with RW for steps from bed to chair. Pt would benefit from skilled OT to address adls, functional mobility and safety awareness. Recommended d/c SNF.     Time Calculation:         Time Calculation- OT       Row Name 02/06/25 1019 02/06/25 0903          Time Calculation- OT    OT Start Time -- 0903  -MM     OT Stop Time -- 0959  -MM     OT Time Calculation (min) -- 56 min  -MM     OT Received On -- 02/06/25  -MM     OT Goal Re-Cert Due Date -- 02/16/25  -MM        Timed Charges    36944 - Gait Training Minutes  23  -AH --        Total Minutes    Timed Charges Total Minutes 23  -AH --      Total Minutes 23  -AH --               User Key  (r) = Recorded By, (t) = Taken By, (c) = Cosigned By      Initials Name Provider Type     Astrid Frank, PTA Physical Therapist Assistant    MM Edis Quintanilla, OTR/L Occupational Therapist                  Therapy Charges for Today       Code Description Service Date Service Provider Modifiers Qty    [id number]  OT EVAL MOD COMPLEXITY 4 2/6/2025 Edis Quintanilla, OTR/L GO 1                 Edis Quintanilla OTR/L  2/6/2025

## 2025-02-06 NOTE — PROGRESS NOTES
[facility] Medicine Services  INPATIENT PROGRESS NOTE    Patient Name: Manohar Garcia  Date of Admission: 2/4/2025  Today's Date: 02/06/25  Length of Stay: 2  Primary Care Physician: Dani Farfan MD    Subjective   Chief Complaint: Hematuria  Rectal Bleeding     Patient is a 84-year-old chronically ill-appearing gentleman with PMH of hip  replacement, HTN, COPD on eliquis who came to the ED with concerns for bleeding from the rectum. As per the nursing staff the patient does usually tries to digitally disimpact himself. Also having hematuria. On review of the patient's charts patient was recently admitted to the medicine service for COVID and was discharged home he also has a history of urinary bladder stone which was not taken out since the patient recently had a hip fracture or a femoral fracture and it was deemed best for the patient to wait for 3 months and repeat the CT at that time. He has been having ongoing hematuria since then. Today came to the ED for above complaints. He is not hypoxic but blood pressure on the lower side for which she was given some fluids lab workup was initiated. I had extensive discussion with the patient's daughter who is his care provider and decision maker. She does not want the patient to be resuscitated given CPR placed on mechanical ventilator.   In ER lactic acidosis procalcitonin elevated, CT abdomen pelvis hydro, bladder stone old, BP was soft, got better with IVF, case was discussed with urology who did not think he needs any intervention, de inserted, hold eliquis, BC , UC hold bp meds , scds for DVT prophylaxis identify reconcile restart home meds once reconciled   2/5  As before lactic acidosis has resolved renal failure is getting better will Hep-Lock his IV fluid as the patient has been having crackles, remain on Invanz for acute pyelonephritis with hematuria continue to hold blood pressure medications hold Eliquis,  culture showing gram-positive cocci blood culture is negative awaiting urology see  2/6  As before creatinine has normalized showing bronchitis remains on Invanz with IV lock urology been seeing him  Patient and his daughter according to urology are not very interested in surgical options Perkins catheter placed for urinary retention he needs to follow-up again as outpatient for his bladder calculus urine culture showing Enterococcus susceptible to penicillin but he is allergic to penicillin but we could use Vanco or Levaquin we will DC Invanz and switch him to Levaquin blood culture has been unremarkable  Review of Systems   Gastrointestinal:  Positive for hematochezia.      All pertinent negatives and positives are as above. All other systems have been reviewed and are negative unless otherwise stated.     Objective    Temp:  [97.6 °F (36.4 °C)-98.5 °F (36.9 °C)] 98.2 °F (36.8 °C)  Heart Rate:  [67-83] 82  Resp:  [16-18] 18  BP: (112-133)/(54-71) 125/60  Physical Exam  HENT:      Head: Normocephalic.      Nose: Nose normal.   Eyes:      Pupils: Pupils are equal, round, and reactive to light.   Cardiovascular:      Rate and Rhythm: Normal rate.   Pulmonary:      Breath sounds: Wheezing present.   Abdominal:      Tenderness: There is abdominal tenderness.   Musculoskeletal:         General: Normal range of motion.      Cervical back: Normal range of motion.   Neurological:      Mental Status: He is alert.             Results Review:  I have reviewed the labs, radiology results, and diagnostic studies.    Laboratory Data:   Results from last 7 days   Lab Units 02/06/25  0346 02/05/25  0500 02/04/25 1926   WBC 10*3/mm3 8.91 9.95 9.99   HEMOGLOBIN g/dL 9.6* 9.0* 8.5*   HEMATOCRIT % 30.4* 28.1* 26.4*   PLATELETS 10*3/mm3 70* 63* 56*        Results from last 7 days   Lab Units 02/06/25  0346 02/05/25  0500 02/04/25 1926 02/04/25  1236   SODIUM mmol/L 141 141 138 138   POTASSIUM mmol/L 4.1 4.2 4.5 4.2   CHLORIDE mmol/L  112* 112* 110* 109*   CO2 mmol/L 22.0 22.0 20.0* 20.0*   BUN mg/dL 40* 46* 44* 48*   CREATININE mg/dL 1.14 1.66* 1.67* 1.96*   CALCIUM mg/dL 8.2* 8.1* 7.9* 8.3*   BILIRUBIN mg/dL  --   --   --  1.0   ALK PHOS U/L  --   --   --  112   ALT (SGPT) U/L  --   --   --  16   AST (SGOT) U/L  --   --   --  15   GLUCOSE mg/dL 92 111* 130* 130*       Culture Data:   Urine Culture   Date Value Ref Range Status   02/04/2025 >100,000 CFU/mL Gram Positive Cocci (A)  Preliminary       Radiology Data:   Imaging Results (Last 24 Hours)       Procedure Component Value Units Date/Time    XR Chest 1 View [[id number]] Collected: 02/05/25 1219     Updated: 02/05/25 1225    Narrative:      EXAMINATION:  XR CHEST 1 VW-  2/5/2025 10:11 AM     HISTORY: Coughing. B07-Hgjmkb-xwyjhfykhnhl nephritis, not specified as  acute or chronic; N21.0-Calculus in bladder; N17.9-Acute kidney failure,  unspecified.     COMPARISON: 2/4/2025. Chest CT on 1/23/2025.     TECHNIQUE: Single view AP image.     FINDINGS: There is stable elevation/eventration of the right  hemidiaphragm. There is atelectasis in both lung bases. There is no  dense infiltrate. There is mild bronchial wall thickening, stable. Heart  size is prominent. There is fullness of the mediastinum. Recent chest CT  does not demonstrate any significant mediastinal lymphadenopathy. No  mediastinal mass is seen. The thoracic aorta appears to be tortuous. No  acute appearing bony abnormality is seen.          Impression:      1. Stable elevation/eventration of the right hemidiaphragm.  2. Atelectasis in both lung bases.  3. Stable mild bronchial wall thickening.  4. Mediastinal widening likely related to ectatic vascularity. No  lymphadenopathy or masses seen on recent CT. There is cardiomegaly.           This report was signed and finalized on 2/5/2025 12:22 PM by Dr. Tao Navas MD.               I have reviewed the patient's current medications.     Assessment/Plan   Assessment  Active Hospital  Problems    Diagnosis     **Acute pyelonephritis        Treatment Plan     In ER lactic acidosis procalcitonin elevated, CT abdomen pelvis hydro, bladder stone old, BP was soft, got better with IVF, case was discussed with urology who did not think he needs any intervention, de inserted, hold eliquis, BC , UC hold bp meds , scds for DVT prophylaxis identify reconcile restart home meds once reconciled   Medical Decision Making  Number and Complexity of problems: 3 acute  Differential Diagnosis: as above      Conditions and Status        Condition is at treatment goal.     [facility] Data  External documents reviewed: yes  Cardiac tracing (EKG, telemetry) interpretation: yes  Radiology interpretation: yes  Labs reviewed: yes  Any tests that were considered but not ordered: no      Decision rules/scores evaluated (example [id number], [place], etc): no     Discussed with: ED     Care Planning  Shared decision making: Patient apprised of current labs, vitals, imaging and treatment plan.  They are agreeable with proceeding with plans as discussed.   Code status and discussions: DNR/DNI     Disposition  Social Determinants of Health that impact treatment or disposition: no  Estimated length of stay is TBD.      I confirmed that the patient's advanced care plan is present, code status is documented, and a surrogate decision maker is listed in the patient's medical record      Electronically signed by Arpan Jerez MD, 02/06/25, 10:45 CST.

## 2025-02-06 NOTE — PLAN OF CARE
Goal Outcome Evaluation:  Plan of Care Reviewed With: other (see comments)        Progress: no change   Inpatient Nutrition Services screening for wound(s). Per the assessment and workflow policy, pressure injuries stage >/=2 and/or deep tissue injuries (DTI) warrant nutrition assessment. Patient EHR reviewed. Pt with sanguineous perianal excoriation.  Current skin condition does not warrant MNT at this time. Nutrition services will continue to monitor per protocol.

## 2025-02-06 NOTE — THERAPY TREATMENT NOTE
Acute Care - Physical Therapy Treatment Note  [facility]     Patient Name: Manohar Garcia  : 1940  MRN: [id number]  Today's Date: 2025      Visit Dx:     ICD-10-CM ICD-9-CM   1. Pyelonephritis  N12 590.80   2. Bladder stone  N21.0 594.1   3. Acute renal failure, unspecified acute renal failure type  N17.9 584.9   4. 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    Thrombocytopenia    Hyperlactatemia    Hip fracture    Closed 2-part intertrochanteric fracture of proximal end of right femur    Acute blood loss anemia    Stage 3b chronic kidney disease    COVID-19 virus detected    Influenza A    Hypoxia    Cytokine release syndrome, grade 1    Acute pyelonephritis     Past Medical History:   Diagnosis Date    Dementia     GERD (gastroesophageal reflux disease)     Liver disorder      Past Surgical History:   Procedure Laterality Date    HEMORRHOIDECTOMY      HERNIA REPAIR      HIP TROCHANTERIC NAILING WITH INTRAMEDULLARY HIP SCREW Right 2024    Procedure: HIP TROCHANTERIC NAILING SHORT WITH INTRAMEDULLARY HIP SCREW;  Surgeon: Arcenio Chandra MD;  Location: [facility];  Service: Orthopedics;  Laterality: Right;     PT Assessment (Last 12 Hours)       PT Evaluation and Treatment       Row Name 25 0955          Physical Therapy Time and Intention    Subjective Information complains of;weakness;pain  -AH     Document Type therapy note (daily note)  -     Mode of Treatment physical therapy  -       Row Name 25 0955          General Information    Existing Precautions/Restrictions fall  -     Limitations/Impairments hearing  -     Barriers to Rehab hearing deficit;medically complex  -       Row Name 25 0955           Pain    Pain Location buttock  -     Pain Management Interventions positioning techniques utilized  -     Response to Pain Interventions activity participation with tolerable pain  -       Row Name 02/06/25 0955          Pain Scale: FACES Pre/Post-Treatment    Pain: FACES Scale, Pretreatment 4-->hurts little more  -     Posttreatment Pain Rating 4-->hurts little more  -       Row Name 02/06/25 0955          Bed Mobility    Bed Mobility scooting/bridging  -     Scooting/Bridging [place] (Bed Mobility) maximum assist (25% patient effort);verbal cues  -     Supine-Sit [place] (Bed Mobility) --  chair  -     Sit-Supine [place] (Bed Mobility) minimum assist (75% patient effort);verbal cues  -       Row Name 02/06/25 0955          Transfers    Transfers stand-sit transfer;sit-stand transfer  -[facility] Name 02/06/25 0955          Sit-Stand Transfer    Sit-Stand [place] (Transfers) minimum assist (75% patient effort);contact guard;verbal cues  -[facility] Name 02/06/25 0955          Stand-Sit Transfer    Stand-Sit [place] (Transfers) contact guard;verbal cues  -[facility] Name 02/06/25 0955          Gait/Stairs (Locomotion)    [place] Level (Gait) contact guard;minimum assist (75% patient effort);verbal cues  -     Assistive Device (Gait) walker, front-wheeled  -     Distance in Feet (Gait) 30  -     Deviations/Abnormal Patterns (Gait) antalgic  -     Bilateral Gait Deviations forward flexed posture  -       Row Name             Wound 02/04/25 2135 anus    Wound - Properties Group Placement Date: 02/04/25  -LB Placement Time: 2135 -LB Location: anus  -LB    Retired Wound - Properties Group Placement Date: 02/04/25  -LB Placement Time: 2135 -LB Location: anus  -LB    Retired Wound - Properties Group Placement Date: 02/04/25  -LB Placement Time: 2135 -LB Location: anus  -LB    Retired Wound - Properties Group Date first assessed: 02/04/25  -LB Time first  assessed: 2135  - Location: anus  -      Row Name 02/06/25 0955          Plan of Care Review    Plan of Care Reviewed With patient  -     Progress no change  -     Outcome Evaluation pt in chair, c/o bottom pain, pt is VERY [place], trans sit-stand cga-min, amb 30 feet rwx cga-min, trans back to bed min, pt would benefit from SNF  -       Row Name 02/06/25 0955          Positioning and Restraints    Pre-Treatment Position sitting in chair/recliner  -     Post Treatment Position bed  -AH     In Bed notified nsg;fowlers;call light within reach;encouraged to call for assist;exit alarm on  -               User Key  (r) = Recorded By, (t) = Taken By, (c) = Cosigned By      Initials Name Provider Type     Astrid Frank, PTA Physical Therapist Assistant    Khadijah Reyes, RN Registered Nurse                    Physical Therapy Education       Title: PT OT SLP Therapies (In Progress)       Topic: Physical Therapy (In Progress)       Point: Mobility training (Done)       Learning Progress Summary            Patient Acceptance, E, VU by  at 2/5/2025 1555    Comment: Home safety, plan of care, bed mobility, transfer techniques                      Point: Home exercise program (Not Started)       Learner Progress:  Not documented in this visit.              Point: Body mechanics (Done)       Learning Progress Summary            Patient Acceptance, E, VU by BL at 2/5/2025 1555    Comment: Home safety, plan of care, bed mobility, transfer techniques                      Point: Precautions (Done)       Learning Progress Summary            Patient Acceptance, E, VU by BL at 2/5/2025 1555    Comment: Home safety, plan of care, bed mobility, transfer techniques                                      User Key       Initials Effective Dates Name Provider Type [facility] 12/17/24 -  Cristofer Jane PT Student PT Student PT                  PT Recommendation and Plan     Plan of Care Reviewed With:  patient  Progress: no change  Outcome Evaluation: pt in chair, c/o bottom pain, pt is VERY [place], trans sit-stand cga-min, amb 30 feet rwx cga-min, trans back to bed min, pt would benefit from SNF   Outcome Measures       Row Name 02/06/25 1000             How much help from another person do you currently need...    Turning from your back to your side while in flat bed without using bedrails? 3  -AH      Moving from lying on back to sitting on the side of a flat bed without bedrails? 3  -AH      Moving to and from a bed to a chair (including a wheelchair)? 3  -AH      Standing up from a chair using your arms (e.g., wheelchair, bedside chair)? 3  -AH      Climbing 3-5 steps with a railing? 2  -AH      To walk in hospital room? 3  -AH      AM-PAC 6 Clicks Score (PT) 17  -         Functional Assessment    Outcome Measure Options AM-PAC 6 Clicks Basic Mobility (PT)  -                User Key  (r) = Recorded By, (t) = Taken By, (c) = Cosigned By      Initials Name Provider Type     Astrid Frank PTA Physical Therapist Assistant                     Time Calculation:    PT Charges       Row Name 02/06/25 1019             Time Calculation    Start Time 0955  -      Stop Time 1018  -      Time Calculation (min) 23 min  -      PT Received On 02/06/25  -         Time Calculation- PT    Total Timed Code Minutes- PT 23 minute(s)  -         Timed Charges    14783 - Gait Training Minutes  23  -AH         Total Minutes    Timed Charges Total Minutes 23  -AH       Total Minutes 23  -AH                User Key  (r) = Recorded By, (t) = Taken By, (c) = Cosigned By      Initials Name Provider Type     Astrid Frank PTA Physical Therapist Assistant                  Therapy Charges for Today       Code Description Service Date Service Provider Modifiers Qty    [id number] HC GAIT TRAINING EA 15 MIN 2/6/2025 Astrid Frank PTA GP 2            PT G-Codes  Outcome Measure Options: AM-PAC 6 Clicks Basic Mobility  (PT)  AM-PAC 6 Clicks Score (PT): 17    Astrid Frank, PTA  2/6/2025

## 2025-02-06 NOTE — PLAN OF CARE
Goal Outcome Evaluation:  Plan of Care Reviewed With: patient        Progress: no change  Outcome Evaluation: Patient admitted from ED w acute pyelonephritis, AOx4 at this time, [place], protective dressing applied to bilat heels and coccyx, perianal excoriated, bleeding from anal area, pt digitally disempacts self per NH, Invanz for bacteremia, electrolyte protocol, cardiac diet, SCDs, bed check on, bladder scanned 800 mL, I&O cath 650 mL, urine dark red.

## 2025-02-06 NOTE — PROGRESS NOTES
Urology Progress Note    De placed yesterday due to retention. Creat now down to 1.14.   Afebrile, VSS.  Urine culture prelim with >100,000 cfu/ml of GPC  Blood cultures negative to date    Continue de, follow up in Urology office for further plan regarding potential bladder stone treatment, urinary retention issues.    Zeyad Mcguire M.D.

## 2025-02-06 NOTE — PLAN OF CARE
Goal Outcome Evaluation:  Plan of Care Reviewed With: patient        Progress: no change  Outcome Evaluation: pt in chair, c/o bottom pain, pt is VERY [place], trans sit-stand cga-min, amb 30 feet rwx cga-min, trans back to bed min, pt would benefit from SNF

## 2025-02-06 NOTE — PLAN OF CARE
Goal Outcome Evaluation:  Plan of Care Reviewed With: patient        Progress: no change  Outcome Evaluation: AOx4, very [place] for retention to BSD, urine dark [place], encouraged and refuses turn q2h, electrolyte protocol, cardiac diet, SCD's, bed check on.

## 2025-02-06 NOTE — PLAN OF CARE
Goal Outcome Evaluation:  Plan of Care Reviewed With: patient        Progress: no change  Outcome Evaluation: OT evaluation completed. Pt is A&Ox2. Pt is very [place]. Pt with decreased safety awareness and impulsivity. Decreased command following, unsure if this is command following deficit v. [place]. Pt reports no pain. Pt is agreeeable to therapy. Pt with frequent spitting noted and what appears to be multiple piles of spit on the floor beside pt. Pt was supervision for supine to sit. Pt with noted bowel movement on debby pad and sheet despite having on a brief. Pt was max to dependent for posterior hygiene. Pt was mod A to don/[facility] gown. Pt was max A to don sock to right foot. Pt was supervision with set up to wash face and hands. Pt was min A x2 for sit to stand t/f. pt was CGA with RW for steps from bed to chair. Pt would benefit from skilled OT to address adls, functional mobility and safety awareness. Recommended d/c SNF.    Anticipated Discharge Disposition (OT): skilled nursing facility

## 2025-02-07 RX ORDER — LEVOFLOXACIN 500 MG/1
750 TABLET, FILM COATED ORAL EVERY 24 HOURS
Status: DISCONTINUED | OUTPATIENT
Start: 2025-02-07 | End: 2025-02-10 | Stop reason: HOSPADM

## 2025-02-07 RX ADMIN — Medication 10 ML: at 20:20

## 2025-02-07 RX ADMIN — Medication 10 ML: at 11:00

## 2025-02-07 RX ADMIN — LEVOFLOXACIN 750 MG: 500 TABLET, FILM COATED ORAL at 17:19

## 2025-02-07 NOTE — CASE MANAGEMENT/SOCIAL WORK
Continued Stay Note   [place]     Patient Name: Manohar Garcia  MRN: [id number]  Today's Date: 2/7/2025    Admit Date: 2/4/2025    [facility]   Discharge Plan       Row Name 02/07/25 1457       Plan    Plan [place]    Patient/Family in Agreement with Plan yes    Plan Comments Pt has a bed hold at [facility]. Will follow for d/c.                   Discharge Codes    No documentation.                       DIANNA Lr

## 2025-02-07 NOTE — PLAN OF CARE
Goal Outcome Evaluation:  Plan of Care Reviewed With: patient        Progress: no change  Outcome Evaluation: Pt A&O x4 with intermittent confusion. [place]. VSS. No c/o pain. Pt rested much of day. Pt bleeding from perianal excoriation. Area cleansed. Room air. Safety maintained.

## 2025-02-07 NOTE — PLAN OF CARE
Goal Outcome Evaluation:  Plan of Care Reviewed With: patient      Progress: no change     Pt A&O x4 w/ some confusion noted. No c/o pain this shift. Voiding per de. Turn Q2. Bed check on. Pt resting well throughout the night. VSS. Safety maintained.

## 2025-02-07 NOTE — PROGRESS NOTES
[facility] Medicine Services  INPATIENT PROGRESS NOTE    Patient Name: Manohar Garcia  Date of Admission: 2/4/2025  Today's Date: 02/07/25  Length of Stay: 3  Primary Care Physician: Dani Farfan MD    Subjective   Chief Complaint: Hematuria  Rectal Bleeding     Patient is a 84-year-old chronically ill-appearing gentleman with PMH of hip  replacement, HTN, COPD on eliquis who came to the ED with concerns for bleeding from the rectum. As per the nursing staff the patient does usually tries to digitally disimpact himself. Also having hematuria. On review of the patient's charts patient was recently admitted to the medicine service for COVID and was discharged home he also has a history of urinary bladder stone which was not taken out since the patient recently had a hip fracture or a femoral fracture and it was deemed best for the patient to wait for 3 months and repeat the CT at that time. He has been having ongoing hematuria since then. Today came to the ED for above complaints. He is not hypoxic but blood pressure on the lower side for which she was given some fluids lab workup was initiated. I had extensive discussion with the patient's daughter who is his care provider and decision maker. She does not want the patient to be resuscitated given CPR placed on mechanical ventilator.   In ER lactic acidosis procalcitonin elevated, CT abdomen pelvis hydro, bladder stone old, BP was soft, got better with IVF, case was discussed with urology who did not think he needs any intervention, de inserted, hold eliquis, BC , UC hold bp meds , scds for DVT prophylaxis identify reconcile restart home meds once reconciled   2/5  As before lactic acidosis has resolved renal failure is getting better will Hep-Lock his IV fluid as the patient has been having crackles, remain on Invanz for acute pyelonephritis with hematuria continue to hold blood pressure medications hold Eliquis,  culture showing gram-positive cocci blood culture is negative awaiting urology see  2/6  As before creatinine has normalized showing bronchitis remains on Invanz with IV lock urology been seeing him  Patient and his daughter according to urology are not very interested in surgical options Perkins catheter placed for urinary retention he needs to follow-up again as outpatient for his bladder calculus urine culture showing Enterococcus susceptible to penicillin but he is allergic to penicillin but we could use Vanco or Levaquin we will DC Invanz and switch him to Levaquin blood culture has been unremarkable  2/7  As before remained afebrile white count unremarkable tolerating Levaquin patient is to return back to [facility] and rehab  Review of Systems   Gastrointestinal:  Positive for hematochezia.      All pertinent negatives and positives are as above. All other systems have been reviewed and are negative unless otherwise stated.     Objective    Temp:  [97.8 °F (36.6 °C)-98.3 °F (36.8 °C)] 97.8 °F (36.6 °C)  Heart Rate:  [71-80] 80  Resp:  [16-18] 16  BP: ()/(40-76) 143/76  Physical Exam  HENT:      Head: Normocephalic.      Nose: Nose normal.   Eyes:      Pupils: Pupils are equal, round, and reactive to light.   Cardiovascular:      Rate and Rhythm: Normal rate.   Pulmonary:      Breath sounds: Wheezing present.   Abdominal:      Tenderness: There is abdominal tenderness.   Musculoskeletal:         General: Normal range of motion.      Cervical back: Normal range of motion.   Neurological:      Mental Status: He is alert.             Results Review:  I have reviewed the labs, radiology results, and diagnostic studies.    Laboratory Data:   Results from last 7 days   Lab Units 02/06/25  0346 02/05/25  0500 02/04/25  1926   WBC 10*3/mm3 8.91 9.95 9.99   HEMOGLOBIN g/dL 9.6* 9.0* 8.5*   HEMATOCRIT % 30.4* 28.1* 26.4*   PLATELETS 10*3/mm3 70* 63* 56*        Results from last 7 days   Lab Units  02/06/25  0346 02/05/25  0500 02/04/25  1926 02/04/25  1236   SODIUM mmol/L 141 141 138 138   POTASSIUM mmol/L 4.1 4.2 4.5 4.2   CHLORIDE mmol/L 112* 112* 110* 109*   CO2 mmol/L 22.0 22.0 20.0* 20.0*   BUN mg/dL 40* 46* 44* 48*   CREATININE mg/dL 1.14 1.66* 1.67* 1.96*   CALCIUM mg/dL 8.2* 8.1* 7.9* 8.3*   BILIRUBIN mg/dL  --   --   --  1.0   ALK PHOS U/L  --   --   --  112   ALT (SGPT) U/L  --   --   --  16   AST (SGOT) U/L  --   --   --  15   GLUCOSE mg/dL 92 111* 130* 130*       Culture Data:   Urine Culture   Date Value Ref Range Status   02/04/2025 >100,000 CFU/mL Gram Positive Cocci (A)  Preliminary       Radiology Data:   Imaging Results (Last 24 Hours)       ** No results found for the last 24 hours. **            I have reviewed the patient's current medications.     Assessment/Plan   Assessment  Active Hospital Problems    Diagnosis     **Acute pyelonephritis        Treatment Plan     In ER lactic acidosis procalcitonin elevated, CT abdomen pelvis hydro, bladder stone old, BP was soft, got better with IVF, case was discussed with urology who did not think he needs any intervention, de inserted, hold eliquis, BC , UC hold bp meds , scds for DVT prophylaxis identify reconcile restart home meds once reconciled   Medical Decision Making  Number and Complexity of problems: 3 acute  Differential Diagnosis: as above      Conditions and Status        Condition is at treatment goal.     [facility] Data  External documents reviewed: yes  Cardiac tracing (EKG, telemetry) interpretation: yes  Radiology interpretation: yes  Labs reviewed: yes  Any tests that were considered but not ordered: no      Decision rules/scores evaluated (example [id number], Wells, etc): no     Discussed with: ED     Care Planning  Shared decision making: Patient apprised of current labs, vitals, imaging and treatment plan.  They are agreeable with proceeding with plans as discussed.   Code status and discussions: DNR/DNI     Disposition  Social  Determinants of Health that impact treatment or disposition: no  Estimated length of stay is TBD.      I confirmed that the patient's advanced care plan is present, code status is documented, and a surrogate decision maker is listed in the patient's medical record      Electronically signed by Arpan Jerez MD, 02/07/25, 10:57 CST.

## 2025-02-08 RX ADMIN — Medication 10 ML: at 20:05

## 2025-02-08 RX ADMIN — ACETAMINOPHEN 650 MG: 325 TABLET ORAL at 20:05

## 2025-02-08 RX ADMIN — Medication 10 ML: at 09:59

## 2025-02-08 RX ADMIN — LEVOFLOXACIN 750 MG: 500 TABLET, FILM COATED ORAL at 16:31

## 2025-02-08 NOTE — PLAN OF CARE
Goal Outcome Evaluation:  Plan of Care Reviewed With: patient      Progress: no change     Pt A&O x4 w/ some confusion. No c/o pain this shift thus far. Voiding per de. Incont of stool x1. Turn Q2. VSS. Safety maintained.

## 2025-02-08 NOTE — PROGRESS NOTES
[facility] Medicine Services  INPATIENT PROGRESS NOTE    Patient Name: Manohar Garcia  Date of Admission: 2/4/2025  Today's Date: 02/08/25  Length of Stay: 4  Primary Care Physician: Dani Farfan MD    Subjective   Chief Complaint: Hematuria  Rectal Bleeding     Patient is a 84-year-old chronically ill-appearing gentleman with PMH of hip  replacement, HTN, COPD on eliquis who came to the ED with concerns for bleeding from the rectum. As per the nursing staff the patient does usually tries to digitally disimpact himself. Also having hematuria. On review of the patient's charts patient was recently admitted to the medicine service for COVID and was discharged home he also has a history of urinary bladder stone which was not taken out since the patient recently had a hip fracture or a femoral fracture and it was deemed best for the patient to wait for 3 months and repeat the CT at that time. He has been having ongoing hematuria since then. Today came to the ED for above complaints. He is not hypoxic but blood pressure on the lower side for which she was given some fluids lab workup was initiated. I had extensive discussion with the patient's daughter who is his care provider and decision maker. She does not want the patient to be resuscitated given CPR placed on mechanical ventilator.   In ER lactic acidosis procalcitonin elevated, CT abdomen pelvis hydro, bladder stone old, BP was soft, got better with IVF, case was discussed with urology who did not think he needs any intervention, de inserted, hold eliquis, BC , UC hold bp meds , scds for DVT prophylaxis identify reconcile restart home meds once reconciled   2/5  As before lactic acidosis has resolved renal failure is getting better will Hep-Lock his IV fluid as the patient has been having crackles, remain on Invanz for acute pyelonephritis with hematuria continue to hold blood pressure medications hold Eliquis,  culture showing gram-positive cocci blood culture is negative awaiting urology see  2/6  As before creatinine has normalized showing bronchitis remains on Invanz with IV lock urology been seeing him  Patient and his daughter according to urology are not very interested in surgical options Perkins catheter placed for urinary retention he needs to follow-up again as outpatient for his bladder calculus urine culture showing Enterococcus susceptible to penicillin but he is allergic to penicillin but we could use Vanco or Levaquin we will DC Invanz and switch him to Levaquin blood culture has been unremarkable  2/7  As before remained afebrile white count unremarkable tolerating Levaquin patient is to return back to [facility] and rehab  2/8  Before tolerating Levaquin well no fever no new issues  Review of Systems   All pertinent negatives and positives are as above. All other systems have been reviewed and are negative unless otherwise stated.     Objective    Temp:  [97.4 °F (36.3 °C)-98.1 °F (36.7 °C)] 97.7 °F (36.5 °C)  Heart Rate:  [72-83] 76  Resp:  [18-20] 18  BP: (102-124)/(49-68) 118/57  Physical Exam  HENT:      Head: Normocephalic.      Nose: Nose normal.   Eyes:      Pupils: Pupils are equal, round, and reactive to light.   Cardiovascular:      Rate and Rhythm: Normal rate.   Pulmonary:      Breath sounds: Wheezing present.   Abdominal:      Tenderness: There is abdominal tenderness.   Musculoskeletal:         General: Normal range of motion.      Cervical back: Normal range of motion.   Neurological:      Mental Status: He is alert.             Results Review:  I have reviewed the labs, radiology results, and diagnostic studies.    Laboratory Data:   Results from last 7 days   Lab Units 02/06/25  0346 02/05/25  0500 02/04/25  1926   WBC 10*3/mm3 8.91 9.95 9.99   HEMOGLOBIN g/dL 9.6* 9.0* 8.5*   HEMATOCRIT % 30.4* 28.1* 26.4*   PLATELETS 10*3/mm3 70* 63* 56*        Results from last 7 days   Lab Units  02/06/25  0346 02/05/25  0500 02/04/25  1926 02/04/25  1236   SODIUM mmol/L 141 141 138 138   POTASSIUM mmol/L 4.1 4.2 4.5 4.2   CHLORIDE mmol/L 112* 112* 110* 109*   CO2 mmol/L 22.0 22.0 20.0* 20.0*   BUN mg/dL 40* 46* 44* 48*   CREATININE mg/dL 1.14 1.66* 1.67* 1.96*   CALCIUM mg/dL 8.2* 8.1* 7.9* 8.3*   BILIRUBIN mg/dL  --   --   --  1.0   ALK PHOS U/L  --   --   --  112   ALT (SGPT) U/L  --   --   --  16   AST (SGOT) U/L  --   --   --  15   GLUCOSE mg/dL 92 111* 130* 130*       Culture Data:   Urine Culture   Date Value Ref Range Status   02/04/2025 >100,000 CFU/mL Gram Positive Cocci (A)  Preliminary       Radiology Data:   Imaging Results (Last 24 Hours)       ** No results found for the last 24 hours. **            I have reviewed the patient's current medications.     Assessment/Plan   Assessment  Active Hospital Problems    Diagnosis     **Acute pyelonephritis        Treatment Plan     In ER lactic acidosis procalcitonin elevated, CT abdomen pelvis hydro, bladder stone old, BP was soft, got better with IVF, case was discussed with urology who did not think he needs any intervention, de inserted, hold eliquis, BC , UC hold bp meds , scds for DVT prophylaxis identify reconcile restart home meds once reconciled   Medical Decision Making  Number and Complexity of problems: 3 acute  Differential Diagnosis: as above      Conditions and Status        Condition is at treatment goal.     [facility] Data  External documents reviewed: yes  Cardiac tracing (EKG, telemetry) interpretation: yes  Radiology interpretation: yes  Labs reviewed: yes  Any tests that were considered but not ordered: no      Decision rules/scores evaluated (example [id number], Wells, etc): no     Discussed with: ED     Care Planning  Shared decision making: Patient apprised of current labs, vitals, imaging and treatment plan.  They are agreeable with proceeding with plans as discussed.   Code status and discussions: DNR/DNI     Disposition  Social  Determinants of Health that impact treatment or disposition: no  Estimated length of stay is TBD.      I confirmed that the patient's advanced care plan is present, code status is documented, and a surrogate decision maker is listed in the patient's medical record      Electronically signed by Arpan Jerez MD, 02/08/25, 10:33 CST.

## 2025-02-09 LAB
BACTERIA SPEC AEROBE CULT: NORMAL
BACTERIA SPEC AEROBE CULT: NORMAL

## 2025-02-09 PROCEDURE — 97116 GAIT TRAINING THERAPY: CPT

## 2025-02-09 PROCEDURE — 97110 THERAPEUTIC EXERCISES: CPT

## 2025-02-09 RX ORDER — LEVOFLOXACIN 750 MG/1
750 TABLET, FILM COATED ORAL EVERY 24 HOURS
Qty: 5 TABLET | Refills: 0 | Status: SHIPPED | OUTPATIENT
Start: 2025-02-09 | End: 2025-02-14

## 2025-02-09 RX ORDER — HYDROCODONE BITARTRATE AND ACETAMINOPHEN 5; 325 MG/1; MG/1
1 TABLET ORAL EVERY 4 HOURS PRN
Qty: 14 TABLET | Refills: 0 | Status: SHIPPED | OUTPATIENT
Start: 2025-02-09

## 2025-02-09 RX ADMIN — Medication 10 ML: at 09:40

## 2025-02-09 RX ADMIN — LEVOFLOXACIN 750 MG: 500 TABLET, FILM COATED ORAL at 16:52

## 2025-02-09 RX ADMIN — Medication 10 ML: at 20:10

## 2025-02-09 NOTE — PLAN OF CARE
Goal Outcome Evaluation:           Progress: improving     VSS.  Perkins with tea color urine.  A/O.  Safety maintained.  SCDs.  Denies pain.  IID.  RA.  Safety maintained.

## 2025-02-09 NOTE — PROGRESS NOTES
[facility] Medicine Services  INPATIENT PROGRESS NOTE    Patient Name: Manohar Garcia  Date of Admission: 2/4/2025  Today's Date: 02/09/25  Length of Stay: 5  Primary Care Physician: Dani Farfan MD    Subjective   Chief Complaint: Hematuria  Rectal Bleeding     Patient is a 84-year-old chronically ill-appearing gentleman with PMH of hip  replacement, HTN, COPD on eliquis who came to the ED with concerns for bleeding from the rectum. As per the nursing staff the patient does usually tries to digitally disimpact himself. Also having hematuria. On review of the patient's charts patient was recently admitted to the medicine service for COVID and was discharged home he also has a history of urinary bladder stone which was not taken out since the patient recently had a hip fracture or a femoral fracture and it was deemed best for the patient to wait for 3 months and repeat the CT at that time. He has been having ongoing hematuria since then. Today came to the ED for above complaints. He is not hypoxic but blood pressure on the lower side for which she was given some fluids lab workup was initiated. I had extensive discussion with the patient's daughter who is his care provider and decision maker. She does not want the patient to be resuscitated given CPR placed on mechanical ventilator.   In ER lactic acidosis procalcitonin elevated, CT abdomen pelvis hydro, bladder stone old, BP was soft, got better with IVF, case was discussed with urology who did not think he needs any intervention, de inserted, hold eliquis, BC , UC hold bp meds , scds for DVT prophylaxis identify reconcile restart home meds once reconciled   2/5  As before lactic acidosis has resolved renal failure is getting better will Hep-Lock his IV fluid as the patient has been having crackles, remain on Invanz for acute pyelonephritis with hematuria continue to hold blood pressure medications hold Eliquis,  culture showing gram-positive cocci blood culture is negative awaiting urology see  2/6  As before creatinine has normalized showing bronchitis remains on Invanz with IV lock urology been seeing him  Patient and his daughter according to urology are not very interested in surgical options Perkins catheter placed for urinary retention he needs to follow-up again as outpatient for his bladder calculus urine culture showing Enterococcus susceptible to penicillin but he is allergic to penicillin but we could use Vanco or Levaquin we will DC Invanz and switch him to Levaquin blood culture has been unremarkable  2/7  As before remained afebrile white count unremarkable tolerating Levaquin patient is to return back to [facility] and rehab  2/8  Before tolerating Levaquin well no fever no new issues  2/9  As before responding to treatment no white count no fever will continue with Levaquin will discharge back to SNF keep the Perkins catheter and then follow-up with urology as outpatient  Review of Systems   Gastrointestinal:  Positive for hematochezia.      All pertinent negatives and positives are as above. All other systems have been reviewed and are negative unless otherwise stated.     Objective    Temp:  [97.8 °F (36.6 °C)-98.2 °F (36.8 °C)] 97.8 °F (36.6 °C)  Heart Rate:  [59-85] 85  Resp:  [18] 18  BP: ()/(53-62) 112/56  Physical Exam  HENT:      Head: Normocephalic.      Nose: Nose normal.   Eyes:      Pupils: Pupils are equal, round, and reactive to light.   Cardiovascular:      Rate and Rhythm: Normal rate.   Pulmonary:      Breath sounds: Wheezing present.   Abdominal:      Tenderness: There is abdominal tenderness.   Musculoskeletal:         General: Normal range of motion.      Cervical back: Normal range of motion.   Neurological:      Mental Status: He is alert.             Results Review:  I have reviewed the labs, radiology results, and diagnostic studies.    Laboratory Data:   Results from last  7 days   Lab Units 02/06/25  0346 02/05/25  0500 02/04/25  1926   WBC 10*3/mm3 8.91 9.95 9.99   HEMOGLOBIN g/dL 9.6* 9.0* 8.5*   HEMATOCRIT % 30.4* 28.1* 26.4*   PLATELETS 10*3/mm3 70* 63* 56*        Results from last 7 days   Lab Units 02/06/25  0346 02/05/25  0500 02/04/25  1926 02/04/25  1236   SODIUM mmol/L 141 141 138 138   POTASSIUM mmol/L 4.1 4.2 4.5 4.2   CHLORIDE mmol/L 112* 112* 110* 109*   CO2 mmol/L 22.0 22.0 20.0* 20.0*   BUN mg/dL 40* 46* 44* 48*   CREATININE mg/dL 1.14 1.66* 1.67* 1.96*   CALCIUM mg/dL 8.2* 8.1* 7.9* 8.3*   BILIRUBIN mg/dL  --   --   --  1.0   ALK PHOS U/L  --   --   --  112   ALT (SGPT) U/L  --   --   --  16   AST (SGOT) U/L  --   --   --  15   GLUCOSE mg/dL 92 111* 130* 130*       Culture Data:   Urine Culture   Date Value Ref Range Status   02/04/2025 >100,000 CFU/mL Gram Positive Cocci (A)  Preliminary       Radiology Data:   Imaging Results (Last 24 Hours)       ** No results found for the last 24 hours. **            I have reviewed the patient's current medications.     Assessment/Plan   Assessment  Active Hospital Problems    Diagnosis     **Acute pyelonephritis        Treatment Plan     In ER lactic acidosis procalcitonin elevated, CT abdomen pelvis hydro, bladder stone old, BP was soft, got better with IVF, case was discussed with urology who did not think he needs any intervention, de inserted, hold eliquis, BC , UC hold bp meds , scds for DVT prophylaxis identify reconcile restart home meds once reconciled   Medical Decision Making  Number and Complexity of problems: 3 acute  Differential Diagnosis: as above      Conditions and Status        Condition is at treatment goal.     [facility] Data  External documents reviewed: yes  Cardiac tracing (EKG, telemetry) interpretation: yes  Radiology interpretation: yes  Labs reviewed: yes  Any tests that were considered but not ordered: no      Decision rules/scores evaluated (example [id number], Wells, etc): no     Discussed with:  ED     Care Planning  Shared decision making: Patient apprised of current labs, vitals, imaging and treatment plan.  They are agreeable with proceeding with plans as discussed.   Code status and discussions: DNR/DNI     Disposition  Social Determinants of Health that impact treatment or disposition: no  Estimated length of stay is TBD.      I confirmed that the patient's advanced care plan is present, code status is documented, and a surrogate decision maker is listed in the patient's medical record      Electronically signed by Arpan Jerez MD, 02/09/25, 10:53 CST.

## 2025-02-09 NOTE — PLAN OF CARE
"Goal Outcome Evaluation:  Plan of Care Reviewed With: patient        Progress: improving  Outcome Evaluation: PT tx completed. Pt in bed and agrees to therapy. Able to t/f to EOB with increased time and S. completed BLE AROM while seated, cues for attention to task. Able to stand with CGA, cues for hand placement. Amb 25' only with RWX and CGA/min x1 d/t increased fatigue. Pt relies heavily on RWX and forward flexed posture increases as he fatigues. Pt reports \"I'm just too weak.\" Pt left up in chair. Will follow.                             "

## 2025-02-09 NOTE — CASE MANAGEMENT/SOCIAL WORK
Continued Stay Note  [facility]     Patient Name: Manohar Garcia  MRN: [id number]  Today's Date: 2/9/2025    Admit Date: 2/4/2025    Plan: [facility]   Discharge Plan       Row Name 02/09/25 1216       Plan    Plan Comments Pt ready to d/c back to [facility] and pt came from this facility. Called 4 x and could not get anyone to confirm, finally left message on phone sent to several times as well for a call back. [phone].                   Discharge Codes    No documentation.                       WESLEY BostonW

## 2025-02-09 NOTE — PLAN OF CARE
Goal Outcome Evaluation:  Plan of Care Reviewed With: patient      Progress: no change     Pt A&O x4. C/o mild pain; see MAR. Voiding per de; urine remains tea-colored. Incont Bm x1 this shift. Turn Q2. VSS. Safety maintained.

## 2025-02-09 NOTE — THERAPY TREATMENT NOTE
Acute Care - Physical Therapy Treatment Note  [facility]     Patient Name: Manohar Garcia  : 1940  MRN: [id number]  Today's Date: 2025      Visit Dx:     ICD-10-CM ICD-9-CM   1. Pyelonephritis  N12 590.80   2. Bladder stone  N21.0 594.1   3. Acute renal failure, unspecified acute renal failure type  N17.9 584.9   4. 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    Thrombocytopenia    Hyperlactatemia    Hip fracture    Closed 2-part intertrochanteric fracture of proximal end of right femur    Acute blood loss anemia    Stage 3b chronic kidney disease    COVID-19 virus detected    Influenza A    Hypoxia    Cytokine release syndrome, grade 1    Acute pyelonephritis     Past Medical History:   Diagnosis Date    Dementia     GERD (gastroesophageal reflux disease)     Liver disorder      Past Surgical History:   Procedure Laterality Date    HEMORRHOIDECTOMY      HERNIA REPAIR      HIP TROCHANTERIC NAILING WITH INTRAMEDULLARY HIP SCREW Right 2024    Procedure: HIP TROCHANTERIC NAILING SHORT WITH INTRAMEDULLARY HIP SCREW;  Surgeon: Arcenio Chandra MD;  Location: [facility];  Service: Orthopedics;  Laterality: Right;     PT Assessment (Last 12 Hours)       PT Evaluation and Treatment       Row Name 25          Physical Therapy Time and Intention    Subjective Information complains of;weakness;fatigue  -LY     Document Type therapy note (daily note)  -LY     Mode of Treatment physical therapy  -LY       Row Name 25          General Information    Existing Precautions/Restrictions fall  -LY       Row Name 25          Pain    Pretreatment Pain Rating 0/10 - no pain  -LY     Posttreatment Pain Rating 0/10 - no pain   "-LY       Row Name 02/09/25 0900          Bed Mobility    Supine-Sit Bradford (Bed Mobility) supervision  -LY     Assistive Device (Bed Mobility) bed rails;head of bed elevated  -LY       Row Name 02/09/25 0900          Sit-Stand Transfer    Sit-Stand Bradford (Transfers) verbal cues;contact guard  -LY     Assistive Device (Sit-Stand Transfers) walker, front-wheeled  -LY       Row Name 02/09/25 0900          Stand-Sit Transfer    Stand-Sit [place] (Transfers) contact guard;verbal cues  -LY     Assistive Device (Stand-Sit Transfers) walker, front-wheeled  -LY       Row Name 02/09/25 0900          Gait/Stairs (Locomotion)    Bradford Level (Gait) contact guard;minimum assist (75% patient effort);verbal cues  -LY     Assistive Device (Gait) walker, front-wheeled  -LY     Distance in Feet (Gait) 25  -LY     Deviations/Abnormal Patterns (Gait) gait speed decreased;stride length decreased;antalgic  -LY     Bilateral Gait Deviations forward flexed posture;heel strike decreased  -LY     Comment, (Gait/Stairs) heavily relies on RWX with BUEs, forward flexed posture increases as he fatigue, pt reports \"I'm just too weak.\"  -LY       Row Name 02/09/25 0900          Motor Skills    Comments, Therapeutic Exercise BLE AROM seated, cues for attention to task  -LY     Additional Documentation Comments, Therapeutic Exercise (Row)  -LY       Row Name             Wound 02/04/25 2135 anus    Wound - Properties Group Placement Date: 02/04/25  -LB Placement Time: 2135 -LB Location: anus  -LB    Retired Wound - Properties Group Placement Date: 02/04/25  -LB Placement Time: 2135 -LB Location: anus  -LB    Retired Wound - Properties Group Placement Date: 02/04/25  -LB Placement Time: 2135 -LB Location: anus  -LB    Retired Wound - Properties Group Date first assessed: 02/04/25  -LB Time first assessed: 2135 -LB Location: anus  -LB      Row Name 02/09/25 0900          Plan of Care Review    Plan of Care Reviewed With " "patient  -LY     Progress improving  -LY     Outcome Evaluation PT tx completed. Pt in bed and agrees to therapy. Able to t/f to EOB with increased time and S. completed BLE AROM while seated, cues for attention to task. Able to stand with CGA, cues for hand placement. Amb 25' only with RWX and CGA/min x1 d/t increased fatigue. Pt relies heavily on RWX and forward flexed posture increases as he fatigues. Pt reports \"I'm just too weak.\" Pt left up in chair. Will follow.  -LY       Row Name 02/09/25 0900          Positioning and Restraints    Pre-Treatment Position in bed  -LY     Post Treatment Position wheelchair  -LY     In Chair reclined;call light within reach;encouraged to call for assist;exit alarm on  -LY               User Key  (r) = Recorded By, (t) = Taken By, (c) = Cosigned By      Initials Name Provider Type    LB Khadijah Puentes, RN Registered Nurse    Lauren De La Cruz, PTA Physical Therapist Assistant                    Physical Therapy Education       Title: PT OT SLP Therapies (In Progress)       Topic: Physical Therapy (In Progress)       Point: Mobility training (Done)       Learning Progress Summary            Patient Acceptance, E, VU by BL at 2/5/2025 1555    Comment: Home safety, plan of care, bed mobility, transfer techniques                      Point: Home exercise program (Not Started)       Learner Progress:  Not documented in this visit.              Point: Body mechanics (Done)       Learning Progress Summary            Patient Acceptance, E, VU by BL at 2/5/2025 1555    Comment: Home safety, plan of care, bed mobility, transfer techniques                      Point: Precautions (Done)       Learning Progress Summary            Patient Acceptance, E, VU by BL at 2/5/2025 1555    Comment: Home safety, plan of care, bed mobility, transfer techniques                                      User Key       Initials Effective Dates Name Provider Type Discipline    BL 12/17/24 -  Cristofer Jane PT " "Student PT Student PT                  PT Recommendation and Plan     Plan of Care Reviewed With: patient  Progress: improving  Outcome Evaluation: PT tx completed. Pt in bed and agrees to therapy. Able to t/f to EOB with increased time and S. completed BLE AROM while seated, cues for attention to task. Able to stand with CGA, cues for hand placement. Amb 25' only with RWX and CGA/min x1 d/t increased fatigue. Pt relies heavily on RWX and forward flexed posture increases as he fatigues. Pt reports \"I'm just too weak.\" Pt left up in chair. Will follow.   Outcome Measures       Row Name 02/06/25 1000             How much help from another person do you currently need...    Turning from your back to your side while in flat bed without using bedrails? 3  -AH      Moving from lying on back to sitting on the side of a flat bed without bedrails? 3  -AH      Moving to and from a bed to a chair (including a wheelchair)? 3  -AH      Standing up from a chair using your arms (e.g., wheelchair, bedside chair)? 3  -AH      Climbing 3-5 steps with a railing? 2  -AH      To walk in hospital room? 3  -AH      AM-PAC 6 Clicks Score (PT) 17  -AH         Functional Assessment    Outcome Measure Options AM-PAC 6 Clicks Basic Mobility (PT)  -AH                User Key  (r) = Recorded By, (t) = Taken By, (c) = Cosigned By      Initials Name Provider Type     Astrid Frakn, PTA Physical Therapist Assistant                     Time Calculation:    PT Charges       Row Name 02/09/25 0927             Time Calculation    Start Time 0900  -LY      Stop Time 0923  -LY      Time Calculation (min) 23 min  -LY      PT Received On 02/09/25  -LY         Time Calculation- PT    Total Timed Code Minutes- PT 23 minute(s)  -LY         Timed Charges    53627 - PT Therapeutic Exercise Minutes 10  -LY      27464 - Gait Training Minutes  13  -LY         Total Minutes    Timed Charges Total Minutes 23  -LY       Total Minutes 23  -LY                User " Key  (r) = Recorded By, (t) = Taken By, (c) = Cosigned By      Initials Name Provider Type    Lauren De La Cruz PTA Physical Therapist Assistant                  Therapy Charges for Today       Code Description Service Date Service Provider Modifiers Qty    94833059705 HC PT THER PROC EA 15 MIN 2/9/2025 Lauren Auguste, [place] GP 1    08413524624 HC GAIT TRAINING EA 15 MIN 2/9/2025 Lauren Auguste, [place] GP 1            PT G-Codes  Outcome Measure Options: AM-PAC 6 Clicks Basic Mobility (PT)  AM-PAC 6 Clicks Score (PT): 16  AM-PAC 6 Clicks Score (OT): 13    Lauren Auguste PTA  2/9/2025

## 2025-02-09 NOTE — DISCHARGE SUMMARY
[facility] Medicine Services  DISCHARGE SUMMARY       Date of Admission: 2/4/2025  Date of Discharge:  2/9/2025  Primary Care Physician: Dani Farfan MD    Presenting Problem/History of Present Illness:  Patient is a 84-year-old chronically ill-appearing gentleman with PMH of hip  replacement, HTN, COPD on eliquis who came to the ED with concerns for bleeding from the rectum. As per the nursing staff the patient does usually tries to digitally disimpact himself. Also having hematuria. On review of the patient's charts patient was recently admitted to the medicine service for COVID and was discharged home he also has a history of urinary bladder stone which was not taken out since the patient recently had a hip fracture or a femoral fracture and it was deemed best for the patient to wait for 3 months and repeat the CT at that time. He has been having ongoing hematuria since then. Today came to the ED for above complaints. He is not hypoxic but blood pressure on the lower side for which she was given some fluids lab workup was initiated. I had extensive discussion with the patient's daughter who is his care provider and decision maker. She does not want the patient to be resuscitated given CPR placed on mechanical ventilator.   In ER lactic acidosis procalcitonin elevated, CT abdomen pelvis hydro, bladder stone old, BP was soft, got better with IVF, case was discussed with urology who did not think he needs any intervention, de inserted, hold eliquis, BC , UC hold bp meds , scds for DVT prophylaxis identify reconcile restart home meds once reconciled   2/5  As before lactic acidosis has resolved renal failure is getting better will Hep-Lock his IV fluid as the patient has been having crackles, remain on Invanz for acute pyelonephritis with hematuria continue to hold blood pressure medications hold Eliquis, culture showing gram-positive cocci blood culture is negative  awaiting urology see  2/6  As before creatinine has normalized showing bronchitis remains on Invanz with IV lock urology been seeing him  Patient and his daughter according to urology are not very interested in surgical options Perkins catheter placed for urinary retention he needs to follow-up again as outpatient for his bladder calculus urine culture showing Enterococcus susceptible to penicillin but he is allergic to penicillin but we could use Vanco or Levaquin we will DC Invanz and switch him to Levaquin blood culture has been unremarkable  2/7  As before remained afebrile white count unremarkable tolerating Levaquin patient is to return back to [facility]  2/8  Before tolerating Levaquin well no fever no new issues  2/9  As before responding to treatment no white count no fever will continue with Levaquin will discharge back to [facility] keep the Perkins catheter and then follow-up with urology as outpatient    Final Discharge Diagnoses:  Active Hospital Problems    Diagnosis     **Acute pyelonephritis        Consults: Urology    Procedures Performed: Perkins catheter    Pertinent Test Results:       Imaging Results (All)       Procedure Component Value Units Date/Time    XR Chest 1 View [[id number]] Collected: 02/05/25 1219     Updated: 02/05/25 1225    Narrative:      EXAMINATION:  XR CHEST 1 VW-  2/5/2025 10:11 AM     HISTORY: Coughing. G97-Prhuzw-zlpmkyqwdsjm nephritis, not specified as  acute or chronic; N21.0-Calculus in bladder; N17.9-Acute kidney failure,  unspecified.     COMPARISON: 2/4/2025. Chest CT on 1/23/2025.     TECHNIQUE: Single view AP image.     FINDINGS: There is stable elevation/eventration of the right  hemidiaphragm. There is atelectasis in both lung bases. There is no  dense infiltrate. There is mild bronchial wall thickening, stable. Heart  size is prominent. There is fullness of the mediastinum. Recent chest CT  does not demonstrate any significant mediastinal lymphadenopathy.  No  mediastinal mass is seen. The thoracic aorta appears to be tortuous. No  acute appearing bony abnormality is seen.          Impression:      1. Stable elevation/eventration of the right hemidiaphragm.  2. Atelectasis in both lung bases.  3. Stable mild bronchial wall thickening.  4. Mediastinal widening likely related to ectatic vascularity. No  lymphadenopathy or masses seen on recent CT. There is cardiomegaly.           This report was signed and finalized on 2/5/2025 12:22 PM by Dr. Tao Navas MD.       CT Abdomen Pelvis Without Contrast [[id number]] Collected: 02/04/25 1455     Updated: 02/04/25 1511    Narrative:      EXAMINATION: CT ABDOMEN PELVIS WO CONTRAST-      2/4/2025 1:32 PM     HISTORY: Patient with hematuria history of bladder  stone     In order to have a CT radiation dose as low as reasonably achievable  Automated Exposure Control was utilized for adjustment of the mA and/or  KV according to patient size.     Total DLP = 1113.64 mGy.cm     The CT scan of the abdomen and pelvis is performed without intravenous  contrast enhancement.     Images are acquired in axial plane and subsequent reconstruction in  coronal and sagittal planes.     Comparison is made with the previous study dated 1/16/2025.     Limited included lungs show consolidation/atelectasis in the right lower  lung adjacent to a significant elevated right diaphragm. Atelectatic  changes to a lesser extent are seen in the left lower lung.     Unenhanced liver show moderate lobulation and nodularity suggesting  hepatic cirrhosis. Spleen is significantly enlarged similar to the  previous study. It measures 17 cm in craniocaudal dimension.     No radiopaque gallstones.     The unenhanced pancreas appears unremarkable.     Adrenal glands bilaterally are unremarkable.     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 There are  small radiopaque calculi in the mid to lower pole of the left unit.  There is a persistent  moderate hydronephrosis of the right kidney. There  is persistent moderate dilatation of the right ureter down to the level  of the UV junction. There are no calculi in the right ureter. Urinary  bladder is poorly distended. There is mildly dilated left collecting  system. It was normal in the previous study. There is no calculus in the  left ureter. There is a large radiopaque calculus located posteriorly  and to the right at the floor of the urinary bladder measuring 3.3 cm in  diameter. This is similar to the previous study. The remaining urinary  bladder appears unremarkable.     Moderately enlarged prostate is similar to the previous study.     There are small fat-containing inguinal hernias bilaterally.     There is a small fat-containing umbilical hernia.     Stomach is full of fluid and gas. Duodenum is normal. Small bowel is  nondistended. Appendix is normal. Moderate gas and stool is seen in the  colon. There is diverticulosis of the distal colon. No evidence of  diverticulitis.     Atheromatous changes of the moderately tortuous abdominal aorta is  noted. No aneurysmal dilatation.     There are significantly dilated tortuous mesenteric veins and the left  upper abdominal veins between the spleen and the left kidney suggesting  portosystemic shunting. A significantly enlarged umbilical vein is seen  draining into the left hepatic vein. This is similar to the previous  study.     No evidence of abdominal or pelvic lymphadenopathy.     Images reviewed in bone window show chronic degenerative changes of the  lumbar and limited visualized thoracic spine. There is S-shaped  scoliosis of thoracolumbar spine. Hardware internal fixation of the  right proximal femur is seen. Fracture line is visible. No bony union.       Impression:      1. A mild left and moderate right hydronephrosis and hydroureter.  Dilatation of the left collecting system is a new finding since the  previous study dated 1/16/2025. No calculi  in either ureter. This is  probably due to the presence of a large calculus at the floor of the  urinary bladder which may be causing obstruction?.  2. Evidence of horseshoe shaped kidney. Small radiopaque calculi in the  left kidney similar to the previous study.  3. Hepatic cirrhosis. Splenomegaly. The size of the spleen appears  larger than the previous study.  4. Evidence of abdominal varices similar to the previous study.  Significantly enlarged umbilical vein draining into the left portal  vein.  5. Hardware internal fixation of the intertrochanteric fracture of the  right proximal femur. Fracture line is visible with no bony union at  this time. This is similar to the previous study.  6. Right lower lobar consolidation/atelectasis and significantly  elevated right diaphragm similar to the previous study.                                               This report was signed and finalized on 2/4/2025 3:08 PM by Dr. Shahram Patel MD.       XR Chest 1 View [[id number]] Collected: 02/04/25 1245     Updated: 02/04/25 1251    Narrative:      EXAM: XR CHEST 1 VW-      DATE: 2/4/2025 11:34 AM     HISTORY: soa       COMPARISON: 1/22/2025.     TECHNIQUE:  Single view. Frontal view of the chest. 1 images.       FINDINGS:    Similar RIGHT hemidiaphragm elevation. Similar round opacity at the  RIGHT lung base, which appears to correlate with a combination of  compressive atelectasis and liver density on prior CT 1/23/2025.     No pneumothorax or pleural effusion. Streaky bibasilar opacities.  Cardiac mediastinal silhouette similar to prior. No acute bony finding.          Impression:      1. Similar elevation the RIGHT hemidiaphragm. Similar streaky bibasilar  opacities.     This report was signed and finalized on 2/4/2025 12:47 PM by Dr Erika Moore MD.             LAB RESULTS:      Lab 02/06/25  0346 02/05/25  0500 02/04/25  2226 02/04/25  1926 02/04/25  1550 02/04/25  1236   WBC 8.91 9.95  --  9.99  --  9.75    HEMOGLOBIN 9.6* 9.0*  --  8.5*  --  9.7*   HEMATOCRIT 30.4* 28.1*  --  26.4*  --  30.5*   PLATELETS 70* 63*  --  56*  --  62*   NEUTROS ABS 7.27* 8.89*  --  9.15*  --  9.46*   IMMATURE GRANS (ABS) 0.04 0.12*  --  0.10*  --   --    LYMPHS ABS 1.06 0.59*  --  0.30*  --   --    MONOS ABS 0.46 0.34  --  0.43  --   --    EOS ABS 0.07 0.00  --  0.00  --  0.00   MCV 91.6 90.9  --  90.1  --  92.4   CRP  --   --   --   --   --  4.22*   PROCALCITONIN  --   --   --   --   --  15.72*   LACTATE  --   --  2.0 2.6* 3.9* 4.7*   PROTIME  --   --   --   --   --  22.4*         Lab 02/06/25  0346 02/05/25  0500 02/04/25  1926 02/04/25  1236   SODIUM 141 141 138 138   POTASSIUM 4.1 4.2 4.5 4.2   CHLORIDE 112* 112* 110* 109*   CO2 22.0 22.0 20.0* 20.0*   ANION GAP 7.0 7.0 8.0 9.0   BUN 40* 46* 44* 48*   CREATININE 1.14 1.66* 1.67* 1.96*   EGFR 63.4 40.4* 40.1* 33.1*   GLUCOSE 92 111* 130* 130*   CALCIUM 8.2* 8.1* 7.9* 8.3*   MAGNESIUM  --   --   --  2.1         Lab 02/04/25  1236   TOTAL PROTEIN 4.7*   ALBUMIN 2.3*   GLOBULIN 2.4   ALT (SGPT) 16   AST (SGOT) 15   BILIRUBIN 1.0   ALK PHOS 112         Lab 02/04/25  1236   PROTIME 22.4*   INR 1.85*             Lab 02/04/25  1236   ABO TYPING O   RH TYPING Positive   ANTIBODY SCREEN Negative         Brief Urine Lab Results  (Last result in the past 365 days)        Color   Clarity   Blood   Leuk Est   Nitrite   Protein   CREAT   Urine HCG        02/04/25 1258 Red   Turbid   Small (1+)   Large (3+)   Positive   30 mg/dL (1+)                 Microbiology Results (last 10 days)       Procedure Component Value - Date/Time    Urine Culture - Urine, Straight Cath [[id number]]  (Abnormal)  (Susceptibility) Collected: 02/04/25 1258    Lab Status: Final result Specimen: Urine from Straight Cath Updated: 02/06/25 1015     Urine Culture >100,000 CFU/mL Enterococcus faecalis    Narrative:      Colonization of the urinary tract without infection is common. Treatment is discouraged unless the patient is  symptomatic, pregnant, or undergoing an invasive urologic procedure.    Susceptibility        Enterococcus faecalis      LEO      Ampicillin Susceptible      Levofloxacin Susceptible      Nitrofurantoin Susceptible      Vancomycin Susceptible                           Respiratory Panel PCR w/COVID-19(SARS-CoV-2) EDU/EDITH/YUDELKA/PAD/COR/DEJON In-House, NP Swab in UTM/VTM, 2 HR TAT - Swab, Nasopharynx [[id number]]  (Normal) Collected: 02/04/25 1248    Lab Status: Final result Specimen: Swab from Nasopharynx Updated: 02/04/25 1347     ADENOVIRUS, PCR Not Detected     Coronavirus 229E Not Detected     Coronavirus HKU1 Not Detected     Coronavirus NL63 Not Detected     Coronavirus OC43 Not Detected     COVID19 Not Detected     Human Metapneumovirus Not Detected     Human Rhinovirus/Enterovirus Not Detected     Influenza A PCR Not Detected     Influenza B PCR Not Detected     Parainfluenza Virus 1 Not Detected     Parainfluenza Virus 2 Not Detected     Parainfluenza Virus 3 Not Detected     Parainfluenza Virus 4 Not Detected     RSV, PCR Not Detected     Bordetella pertussis pcr Not Detected     Bordetella parapertussis PCR Not Detected     Chlamydophila pneumoniae PCR Not Detected     Mycoplasma pneumo by PCR Not Detected    Narrative:      In the setting of a positive respiratory panel with a viral infection PLUS a negative procalcitonin without other underlying concern for bacterial infection, consider observing off antibiotics or discontinuation of antibiotics and continue supportive care. If the respiratory panel is positive for atypical bacterial infection (Bordetella pertussis, Chlamydophila pneumoniae, or Mycoplasma pneumoniae), consider antibiotic de-escalation to target atypical bacterial infection.    Blood Culture - Blood, Arm, Right [[id number]]  (Normal) Collected: 02/04/25 1236    Lab Status: Preliminary result Specimen: Blood from Arm, Right Updated: 02/08/25 1315     Blood Culture No growth at 4 days    Blood  Culture - Blood, Hand, Left [[id number]]  (Normal) Collected: 02/04/25 1236    Lab Status: Preliminary result Specimen: Blood from Hand, Left Updated: 02/08/25 1315     Blood Culture No growth at 4 days            Hospital Course:   Patient is a 84-year-old chronically ill-appearing gentleman with PMH of hip  replacement, HTN, COPD on eliquis who came to the ED with concerns for bleeding from the rectum. As per the nursing staff the patient does usually tries to digitally disimpact himself. Also having hematuria. On review of the patient's charts patient was recently admitted to the medicine service for COVID and was discharged home he also has a history of urinary bladder stone which was not taken out since the patient recently had a hip fracture or a femoral fracture and it was deemed best for the patient to wait for 3 months and repeat the CT at that time. He has been having ongoing hematuria since then. Today came to the ED for above complaints. He is not hypoxic but blood pressure on the lower side for which she was given some fluids lab workup was initiated. I had extensive discussion with the patient's daughter who is his care provider and decision maker. She does not want the patient to be resuscitated given CPR placed on mechanical ventilator.   In ER lactic acidosis procalcitonin elevated, CT abdomen pelvis hydro, bladder stone old, BP was soft, got better with IVF, case was discussed with urology who did not think he needs any intervention, de inserted, hold eliquis, BC , UC hold bp meds , scds for DVT prophylaxis identify reconcile restart home meds once reconciled   2/5  As before lactic acidosis has resolved renal failure is getting better will Hep-Lock his IV fluid as the patient has been having crackles, remain on Invanz for acute pyelonephritis with hematuria continue to hold blood pressure medications hold Eliquis, culture showing gram-positive cocci blood culture is negative awaiting urology  "see  2/6  As before creatinine has normalized showing bronchitis remains on Invanz with IV lock urology been seeing him  Patient and his daughter according to urology are not very interested in surgical options Perkins catheter placed for urinary retention he needs to follow-up again as outpatient for his bladder calculus urine culture showing Enterococcus susceptible to penicillin but he is allergic to penicillin but we could use Vanco or Levaquin we will DC Invanz and switch him to Levaquin blood culture has been unremarkable  2/7  As before remained afebrile white count unremarkable tolerating Levaquin patient is to return back to [facility]  2/8  Before tolerating Levaquin well no fever no new issues  2/9  As before responding to treatment no white count no fever will continue with Levaquin will discharge back to SNF keep the Perkins catheter and then follow-up with urology as outpatient    Physical Exam on Discharge:  /63 (BP Location: Left arm, Patient Position: Lying)   Pulse 78   Temp 97.7 °F (36.5 °C) (Oral)   Resp 16   Ht 180.3 cm (70.98\")   Wt 93.4 kg (206 lb)   SpO2 99%   BMI 28.75 kg/m²   Physical Exam  HENT:      Head: Normocephalic.      Nose: Nose normal.   Eyes:      Pupils: Pupils are equal, round, and reactive to light.   Cardiovascular:      Rate and Rhythm: Normal rate.   Pulmonary:      Breath sounds: Wheezing present.   Abdominal:      General: Abdomen is flat.   Genitourinary:     Comments: Perkins in  Musculoskeletal:      Cervical back: Normal range of motion.   Neurological:      Mental Status: He is alert.           Condition on Discharge: stable    Discharge Disposition:  Skilled Nursing Facility (DC - External)    Discharge Medications:     Discharge Medications        New Medications        Instructions Start Date   levoFLOXacin 750 MG tablet  Commonly known as: LEVAQUIN   750 mg, Oral, Every 24 Hours             Continue These Medications        Instructions " Start Date   acetaminophen 325 MG tablet  Commonly known as: TYLENOL   650 mg, Oral, Every 4 Hours PRN      albuterol sulfate  (90 Base) MCG/ACT inhaler  Commonly known as: PROVENTIL HFA;VENTOLIN HFA;PROAIR HFA   2 puffs, Inhalation, 4 Times Daily - RT      apixaban 2.5 MG tablet tablet  Commonly known as: ELIQUIS   2.5 mg, 2 Times Daily      bisacodyl 5 MG EC tablet  Commonly known as: DULCOLAX   5 mg, Oral, Daily PRN      bisacodyl 10 MG suppository  Commonly known as: DULCOLAX   10 mg, Rectal, Daily PRN      budesonide-formoterol 160-4.5 MCG/ACT inhaler  Commonly known as: SYMBICORT   2 puffs, Inhalation, 2 Times Daily - RT      carvedilol 3.125 MG tablet  Commonly known as: COREG   3.125 mg, Oral, 2 Times Daily With Meals      dexAMETHasone 4 MG tablet  Commonly known as: DECADRON   4 mg, Oral, Daily With Breakfast      famotidine 20 MG tablet  Commonly known as: PEPCID   20 mg, Oral, Daily      HYDROcodone-acetaminophen 5-325 MG per tablet  Commonly known as: NORCO   1 tablet, Oral, Every 4 Hours PRN      ipratropium 17 MCG/ACT inhaler  Commonly known as: ATROVENT HFA   2 puffs, Inhalation, 4 Times Daily - RT      ipratropium-albuterol 0.5-2.5 mg/3 ml nebulizer  Commonly known as: DUO-NEB   3 mL, Nebulization, Every 6 Hours PRN      nystatin 664949 UNIT/GM powder  Commonly known as: MYCOSTATIN   1 Application, Topical, 3 Times Daily      ondansetron ODT 4 MG disintegrating tablet  Commonly known as: ZOFRAN-ODT   4 mg, Translingual, Every 4 Hours PRN      tamsulosin 0.4 MG capsule 24 hr capsule  Commonly known as: FLOMAX   0.4 mg, Oral, Nightly                 Discharge Diet:     Activity at Discharge:     Follow-up Appointments:   Future Appointments   Date Time Provider Department Center   4/18/2025  1:10 PM Abelardo Panchal PA [facility]       Test Results Pending at Discharge: no    Electronically signed by Arpan Jerez MD, 02/09/25, 12:27 CST.    Time: 45 minutes.

## 2025-02-09 NOTE — PLAN OF CARE
Goal Outcome Evaluation:  Plan of Care Reviewed With: patient        Progress: no change     VSS.  RA.  Turn q2.  Up in chair part of shift.  Denies pain.  SCDs.  Perkins with tea color urine.  Plans for discharge return to [facility] when able to accept patient.

## 2025-02-09 NOTE — CASE MANAGEMENT/SOCIAL WORK
Continued Stay Note  [facility]     Patient Name: Manohar Garcia  MRN: [id number]  Today's Date: 2/9/2025    Admit Date: 2/4/2025    Plan: [facility]   Discharge Plan       Row Name 02/09/25 0833       Plan    Plan [facility]    Patient/Family in Agreement with Plan yes    Plan Comments Pt has a bed hold at [facility]. Will follow for d/c.                   Discharge Codes    No documentation.                       WESLEY BostonW

## 2025-02-10 VITALS
OXYGEN SATURATION: 95 % | TEMPERATURE: 98.7 F | BODY MASS INDEX: 28.84 KG/M2 | RESPIRATION RATE: 16 BRPM | HEART RATE: 90 BPM | SYSTOLIC BLOOD PRESSURE: 113 MMHG | DIASTOLIC BLOOD PRESSURE: 63 MMHG | WEIGHT: 206 LBS | HEIGHT: 71 IN

## 2025-02-10 PROCEDURE — 97535 SELF CARE MNGMENT TRAINING: CPT | Performed by: OCCUPATIONAL THERAPIST

## 2025-02-10 NOTE — PLAN OF CARE
Goal Outcome Evaluation:  Plan of Care Reviewed With: patient        Progress: no change  Outcome Evaluation: Pt fowlers with staff after changing his brief.  Mr. Garcia agreeable to OOB activity.  CGA/S to come to EOB & demo'd good sitting balance.  Pt. stood CGA and took steps to chair for grooming routine & UBD. Pt had no LOB & required cues for posture and safe sequecing with rwx. CNA entered to complete pt.'d CHG bath.  Mr Garcia would benefit from cont'd OT tx to improve his indep.  Per chart pt to DC.  Agree with DC to SNF    Anticipated Discharge Disposition (OT): skilled nursing facility

## 2025-02-10 NOTE — PLAN OF CARE
Goal Outcome Evaluation:  Plan of Care Reviewed With: patient      Progress: no change     Pt A&O x4. No c/o pain this shift. Voiding per de; tea-colored urine. Turn q2. Rm air. Bed check on. VSS. Safety maintained.

## 2025-02-10 NOTE — THERAPY DISCHARGE NOTE
Acute Care - Physical Therapy Discharge Summary  [facility]       Patient Name: Manohar Garcia  : 1940  MRN: [id number]    Today's Date: 2/10/2025                 Admit Date: 2025      PT Recommendation and Plan    Visit Dx:    ICD-10-CM ICD-9-CM   1. Pyelonephritis  N12 590.80   2. Bladder stone  N21.0 594.1   3. Acute renal failure, unspecified acute renal failure type  N17.9 584.9   4. Impaired mobility [Z74.09]  Z74.09 799.89   5. Acute pyelonephritis  N10 590.10                PT Rehab Goals       Row Name 02/10/25 1400             Transfer Goal 1 (PT)    Activity/Assistive Device (Transfer Goal 1, PT) sit-to-stand/stand-to-sit;bed-to-chair/chair-to-bed  -AB      [place] Level/Cues Needed (Transfer Goal 1, PT) independent  -AB      Time Frame (Transfer Goal 1, PT) long term goal (LTG);by discharge  -AB      Progress/Outcome (Transfer Goal 1, PT) goal not met  -AB         Gait Training Goal 1 (PT)    Activity/Assistive Device (Gait Training Goal 1, PT) gait (walking locomotion);assistive device use;walker, rolling;decrease fall risk;diminish gait deviation;improve balance and speed;increase endurance/gait distance  -AB      [place] Level (Gait Training Goal 1, PT) supervision required  -AB      Distance (Gait Training Goal 1, PT) 50  -AB      Time Frame (Gait Training Goal 1, PT) long term goal (LTG);by discharge  -AB      Strategies/Barriers (Gait Training Goal 1, PT) Improve endurance, decrease fall risk and gait deviations  -AB      Progress/Outcome (Gait Training Goal 1, PT) goal not met  -AB         Balance Goal 1 (PT)    Activity/Assistive Device (Balance Goal) standing static balance;standing dynamic balance;supported;walker, rolling;with functional mobility activities  -AB      [place] Level/Cues Needed (Balance Goal 1, PT) supervision required  -AB      Time Frame (Balance Goal 1, PT) long-term goal (LTG);by discharge  -AB      Progress/Outcomes (Balance Goal 1, PT) goal  not met  -AB                User Key  (r) = Recorded By, (t) = Taken By, (c) = Cosigned By      Initials Name Provider Type Discipline    Cait Villalba, PTA Physical Therapist Assistant PT                        PT Discharge Summary  Anticipated Discharge Disposition (PT): skilled nursing facility  Reason for Discharge: Discharge from facility  Outcomes Achieved: Refer to plan of care for updates on goals achieved  Discharge Destination: SNF      Cait Montenegro PTA   2/10/2025

## 2025-02-10 NOTE — DISCHARGE SUMMARY
[facility] Medicine Services  DISCHARGE SUMMARY       Date of Admission: 2/4/2025  Date of Discharge:  2/10/2025  Primary Care Physician: Dani Farfan MD    Presenting Problem/History of Present Illness:    Final Discharge Diagnoses:  Active Hospital Problems    Diagnosis     **Acute pyelonephritis    In addition to above problem list by Dr. Jerez, I included this.  Hematuria  Thrombocytopenia  History of dementia as recorded in past medical history  History of gastroesophageal reflux disease  History of COVID-19 infection and influenza from hospitalization dated January 22 to January 28.  Displaced intertrochanteric fracture of right femur with past visit from Dr. Chandra dated October 29, 2024 for follow-up  On anticoagulation for an unclear reason to me at present time.  No evidence of A-fib on EKG February 4.  No reported history of PE or DVT.  Monitor and we risk benefit in the setting of thrombocytopenia and recent history of hematuria will be deferred to primary care provider at the skilled nursing facility.  Limb swelling lower extremities-consider diuretic at skilled nursing facility  Probably underlying acute kidney injury when compared to creatinine of 1.96 on February 4 and 1.14 creatinine on February 6 unclear to me whether this is related to an obstructive process but patient has a Perkins catheter.    Consults: Urology    Procedures Performed: Perkins catheter    Pertinent Test Results:       Imaging Results (All)       Procedure Component Value Units Date/Time    XR Chest 1 View [[id number]] Collected: 02/05/25 1219     Updated: 02/05/25 1225    Narrative:      EXAMINATION:  XR CHEST 1 VW-  2/5/2025 10:11 AM     HISTORY: Coughing. L31-Xsunfb-htfoofqmaznh nephritis, not specified as  acute or chronic; N21.0-Calculus in bladder; N17.9-Acute kidney failure,  unspecified.     COMPARISON: 2/4/2025. Chest CT on 1/23/2025.     TECHNIQUE: Single view AP image.      FINDINGS: There is stable elevation/eventration of the right  hemidiaphragm. There is atelectasis in both lung bases. There is no  dense infiltrate. There is mild bronchial wall thickening, stable. Heart  size is prominent. There is fullness of the mediastinum. Recent chest CT  does not demonstrate any significant mediastinal lymphadenopathy. No  mediastinal mass is seen. The thoracic aorta appears to be tortuous. No  acute appearing bony abnormality is seen.          Impression:      1. Stable elevation/eventration of the right hemidiaphragm.  2. Atelectasis in both lung bases.  3. Stable mild bronchial wall thickening.  4. Mediastinal widening likely related to ectatic vascularity. No  lymphadenopathy or masses seen on recent CT. There is cardiomegaly.           This report was signed and finalized on 2/5/2025 12:22 PM by Dr. Tao Navas MD.       CT Abdomen Pelvis Without Contrast [[id number]] Collected: 02/04/25 1455     Updated: 02/04/25 1511    Narrative:      EXAMINATION: CT ABDOMEN PELVIS WO CONTRAST-      2/4/2025 1:32 PM     HISTORY: Patient with hematuria history of bladder  stone     In order to have a CT radiation dose as low as reasonably achievable  Automated Exposure Control was utilized for adjustment of the mA and/or  KV according to patient size.     Total DLP = 1113.64 mGy.cm     The CT scan of the abdomen and pelvis is performed without intravenous  contrast enhancement.     Images are acquired in axial plane and subsequent reconstruction in  coronal and sagittal planes.     Comparison is made with the previous study dated 1/16/2025.     Limited included lungs show consolidation/atelectasis in the right lower  lung adjacent to a significant elevated right diaphragm. Atelectatic  changes to a lesser extent are seen in the left lower lung.     Unenhanced liver show moderate lobulation and nodularity suggesting  hepatic cirrhosis. Spleen is significantly enlarged similar to the  previous  study. It measures 17 cm in craniocaudal dimension.     No radiopaque gallstones.     The unenhanced pancreas appears unremarkable.     Adrenal glands bilaterally are unremarkable.     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 There are  small radiopaque calculi in the mid to lower pole of the left unit.  There is a persistent moderate hydronephrosis of the right kidney. There  is persistent moderate dilatation of the right ureter down to the level  of the UV junction. There are no calculi in the right ureter. Urinary  bladder is poorly distended. There is mildly dilated left collecting  system. It was normal in the previous study. There is no calculus in the  left ureter. There is a large radiopaque calculus located posteriorly  and to the right at the floor of the urinary bladder measuring 3.3 cm in  diameter. This is similar to the previous study. The remaining urinary  bladder appears unremarkable.     Moderately enlarged prostate is similar to the previous study.     There are small fat-containing inguinal hernias bilaterally.     There is a small fat-containing umbilical hernia.     Stomach is full of fluid and gas. Duodenum is normal. Small bowel is  nondistended. Appendix is normal. Moderate gas and stool is seen in the  colon. There is diverticulosis of the distal colon. No evidence of  diverticulitis.     Atheromatous changes of the moderately tortuous abdominal aorta is  noted. No aneurysmal dilatation.     There are significantly dilated tortuous mesenteric veins and the left  upper abdominal veins between the spleen and the left kidney suggesting  portosystemic shunting. A significantly enlarged umbilical vein is seen  draining into the left hepatic vein. This is similar to the previous  study.     No evidence of abdominal or pelvic lymphadenopathy.     Images reviewed in bone window show chronic degenerative changes of the  lumbar and limited visualized thoracic spine. There is  S-shaped  scoliosis of thoracolumbar spine. Hardware internal fixation of the  right proximal femur is seen. Fracture line is visible. No bony union.       Impression:      1. A mild left and moderate right hydronephrosis and hydroureter.  Dilatation of the left collecting system is a new finding since the  previous study dated 1/16/2025. No calculi in either ureter. This is  probably due to the presence of a large calculus at the floor of the  urinary bladder which may be causing obstruction?.  2. Evidence of horseshoe shaped kidney. Small radiopaque calculi in the  left kidney similar to the previous study.  3. Hepatic cirrhosis. Splenomegaly. The size of the spleen appears  larger than the previous study.  4. Evidence of abdominal varices similar to the previous study.  Significantly enlarged umbilical vein draining into the left portal  vein.  5. Hardware internal fixation of the intertrochanteric fracture of the  right proximal femur. Fracture line is visible with no bony union at  this time. This is similar to the previous study.  6. Right lower lobar consolidation/atelectasis and significantly  elevated right diaphragm similar to the previous study.                                               This report was signed and finalized on 2/4/2025 3:08 PM by Dr. Shahram Patel MD.       XR Chest 1 View [[id number]] Collected: 02/04/25 1245     Updated: 02/04/25 1251    Narrative:      EXAM: XR CHEST 1 VW-      DATE: 2/4/2025 11:34 AM     HISTORY: soa       COMPARISON: 1/22/2025.     TECHNIQUE:  Single view. Frontal view of the chest. 1 images.       FINDINGS:    Similar RIGHT hemidiaphragm elevation. Similar round opacity at the  RIGHT lung base, which appears to correlate with a combination of  compressive atelectasis and liver density on prior CT 1/23/2025.     No pneumothorax or pleural effusion. Streaky bibasilar opacities.  Cardiac mediastinal silhouette similar to prior. No acute bony finding.           Impression:      1. Similar elevation the RIGHT hemidiaphragm. Similar streaky bibasilar  opacities.     This report was signed and finalized on 2/4/2025 12:47 PM by Dr Erika Moore MD.             LAB RESULTS:      Lab 02/06/25 0346 02/05/25  0500 02/04/25  2226 02/04/25  1926 02/04/25  1550 02/04/25  1236   WBC 8.91 9.95  --  9.99  --  9.75   HEMOGLOBIN 9.6* 9.0*  --  8.5*  --  9.7*   HEMATOCRIT 30.4* 28.1*  --  26.4*  --  30.5*   PLATELETS 70* 63*  --  56*  --  62*   NEUTROS ABS 7.27* 8.89*  --  9.15*  --  9.46*   IMMATURE GRANS (ABS) 0.04 0.12*  --  0.10*  --   --    LYMPHS ABS 1.06 0.59*  --  0.30*  --   --    MONOS ABS 0.46 0.34  --  0.43  --   --    EOS ABS 0.07 0.00  --  0.00  --  0.00   MCV 91.6 90.9  --  90.1  --  92.4   CRP  --   --   --   --   --  4.22*   PROCALCITONIN  --   --   --   --   --  15.72*   LACTATE  --   --  2.0 2.6* 3.9* 4.7*   PROTIME  --   --   --   --   --  22.4*         Lab 02/06/25 0346 02/05/25  0500 02/04/25  1926 02/04/25  1236   SODIUM 141 141 138 138   POTASSIUM 4.1 4.2 4.5 4.2   CHLORIDE 112* 112* 110* 109*   CO2 22.0 22.0 20.0* 20.0*   ANION GAP 7.0 7.0 8.0 9.0   BUN 40* 46* 44* 48*   CREATININE 1.14 1.66* 1.67* 1.96*   EGFR 63.4 40.4* 40.1* 33.1*   GLUCOSE 92 111* 130* 130*   CALCIUM 8.2* 8.1* 7.9* 8.3*   MAGNESIUM  --   --   --  2.1         Lab 02/04/25  1236   TOTAL PROTEIN 4.7*   ALBUMIN 2.3*   GLOBULIN 2.4   ALT (SGPT) 16   AST (SGOT) 15   BILIRUBIN 1.0   ALK PHOS 112         Lab 02/04/25  1236   PROTIME 22.4*   INR 1.85*             Lab 02/04/25  1236   ABO TYPING O   RH TYPING Positive   ANTIBODY SCREEN Negative         Brief Urine Lab Results  (Last result in the past 365 days)        Color   Clarity   Blood   Leuk Est   Nitrite   Protein   CREAT   Urine HCG        02/04/25 1258 Red   Turbid   Small (1+)   Large (3+)   Positive   30 mg/dL (1+)                 Microbiology Results (last 10 days)       Procedure Component Value - Date/Time    Urine Culture - Urine,  Straight Cath [[id number]]  (Abnormal)  (Susceptibility) Collected: 02/04/25 1258    Lab Status: Final result Specimen: Urine from Straight Cath Updated: 02/06/25 1015     Urine Culture >100,000 CFU/mL Enterococcus faecalis    Narrative:      Colonization of the urinary tract without infection is common. Treatment is discouraged unless the patient is symptomatic, pregnant, or undergoing an invasive urologic procedure.    Susceptibility        Enterococcus faecalis      LEO      Ampicillin Susceptible      Levofloxacin Susceptible      Nitrofurantoin Susceptible      Vancomycin Susceptible                           Respiratory Panel PCR w/COVID-19(SARS-CoV-2) EDU/EDITH/YUDELKA/PAD/COR/DEJON In-House, NP Swab in UTM/VTM, 2 HR TAT - Swab, Nasopharynx [[id number]]  (Normal) Collected: 02/04/25 1248    Lab Status: Final result Specimen: Swab from Nasopharynx Updated: 02/04/25 1347     ADENOVIRUS, PCR Not Detected     Coronavirus 229E Not Detected     Coronavirus HKU1 Not Detected     Coronavirus NL63 Not Detected     Coronavirus OC43 Not Detected     COVID19 Not Detected     Human Metapneumovirus Not Detected     Human Rhinovirus/Enterovirus Not Detected     Influenza A PCR Not Detected     Influenza B PCR Not Detected     Parainfluenza Virus 1 Not Detected     Parainfluenza Virus 2 Not Detected     Parainfluenza Virus 3 Not Detected     Parainfluenza Virus 4 Not Detected     RSV, PCR Not Detected     Bordetella pertussis pcr Not Detected     Bordetella parapertussis PCR Not Detected     Chlamydophila pneumoniae PCR Not Detected     Mycoplasma pneumo by PCR Not Detected    Narrative:      In the setting of a positive respiratory panel with a viral infection PLUS a negative procalcitonin without other underlying concern for bacterial infection, consider observing off antibiotics or discontinuation of antibiotics and continue supportive care. If the respiratory panel is positive for atypical bacterial infection (Bordetella  pertussis, Chlamydophila pneumoniae, or Mycoplasma pneumoniae), consider antibiotic de-escalation to target atypical bacterial infection.    Blood Culture - Blood, Arm, Right [[id number]]  (Normal) Collected: 02/04/25 1236    Lab Status: Final result Specimen: Blood from Arm, Right Updated: 02/09/25 1315     Blood Culture No growth at 5 days    Blood Culture - Blood, Hand, Left [[id number]]  (Normal) Collected: 02/04/25 1236    Lab Status: Final result Specimen: Blood from Hand, Left Updated: 02/09/25 1315     Blood Culture No growth at 5 days            Hospital Course:   Patient is a 84-year-old chronically ill-appearing gentleman with PMH of hip  replacement, HTN, COPD on eliquis who came to the ED with concerns for bleeding from the rectum. As per the nursing staff the patient does usually tries to digitally disimpact himself. Also having hematuria. On review of the patient's charts patient was recently admitted to the medicine service for COVID and was discharged home he also has a history of urinary bladder stone which was not taken out since the patient recently had a hip fracture or a femoral fracture and it was deemed best for the patient to wait for 3 months and repeat the CT at that time. He has been having ongoing hematuria since then (see hospitalization dating back to January 22 to January 28, 2025 for details of hospitalization in reference to this-had COVID-19 via RUE send influenza A per discharge diagnoses). Today came to the ED for above complaints. He is not hypoxic but blood pressure on the lower side for which she was given some fluids lab workup was initiated. I had extensive discussion with the patient's daughter who is his care provider and decision maker. She does not want the patient to be resuscitated given CPR placed on mechanical ventilator.   In ER lactic acidosis procalcitonin elevated, CT abdomen pelvis hydro, bladder stone old, BP was soft, got better with IVF, case was discussed with  urology who did not think he needs any intervention, de inserted, hold eliquis, BC , UC hold bp meds , scds for DVT prophylaxis identify reconcile restart home meds once reconciled   2/5  As before lactic acidosis has resolved renal failure is getting better will Hep-Lock his IV fluid as the patient has been having crackles, remain on Invanz for acute pyelonephritis with hematuria continue to hold blood pressure medications hold Eliquis, culture showing gram-positive cocci blood culture is negative awaiting urology see  2/6  As before creatinine has normalized showing bronchitis remains on Invanz with IV lock urology been seeing him  Patient and his daughter according to urology are not very interested in surgical options De catheter placed for urinary retention he needs to follow-up again as outpatient for his bladder calculus urine culture showing Enterococcus susceptible to penicillin but he is allergic to penicillin but we could use Vanco or Levaquin we will DC Invanz and switch him to Levaquin blood culture has been unremarkable  2/7  As before remained afebrile white count unremarkable tolerating Levaquin patient is to return back to [facility] (I got informed  by social service that patient can return to [facility] today)  2/8  Before tolerating Levaquin (Enterococcus in urine straight cath urine sample on February 4) well no fever no new issues  2/9  As before responding to treatment no white count no fever will continue with Levaquin will discharge back to SNF keep the De catheter and then follow-up with urology as outpatient      Patient is hemodynamically stable  Blood pressure 113/63, heart rate 90, temperature 98.7 and O2 saturation in the mid 90s  Labs from February 6 noted and reviewed.  There has not been any new laboratory information since then.  Has been stable at 1.6 the past couple of days from February 5.  But improved compared to February 4 (1.96)  That also he had  "thrombocytopenia (62).  Prior to discharge and on most recent CBC dated February 6 it has improved to 70.  Nurses note had not indicated any concern for bleeding or episodes of bleeding.  PT note as of February 9 indicates that he is able to stand with contact-guard assist and ambulated 20 feet only with rolling walker.    Noted patient from January 28, 2025 was discharged on apixaban.  I am not quite sure the indication for its placement but it did not mention anything pertaining to blood clot or A-fib.  Neither did I see a recent hip surgery.  I noticed that he must have had hip surgery several months ago as per last note by Dr. Chandra sometime October 2024.    At any rate, patient is believed to be medically stable and appropriate for discharge.    Noted the patient at this time may not need dexamethasone.  This may have been.  Then when he had COVID from last hospitalization.  Levaquin should be continued as per outlined plan Dr. Jerez.      Physical Exam on Discharge:  /63   Pulse 90   Temp 98.7 °F (37.1 °C) (Oral)   Resp 16   Ht 180.3 cm (70.98\")   Wt 93.4 kg (206 lb)   SpO2 95%   BMI 28.75 kg/m²   Seated comfortably on recliner with feet propped up  He is lower extremities is exposed showing of a lump just above the left knee.  It is soft nonfluctuant and has similar consistency it will like a lipoma.  There is no redness, tenderness or differential warmth.  He is hard of hearing.  He does not appear to have any dentures  GEN: Awake, alert, interactive, in NAD  HEENT: Atraumatic, PERRLA, EOMI, Anicteric, Trachea midline  Lungs: CTAB, no wheezing/rales/rhonchi  Heart: RRR, +S1/s2, no rub  ABD: soft, nt/nd, +BS, no guarding/rebound  Extremities: atraumatic, no cyanosis, I also noticed that patient has edema of bilateral distal lower extremities  He has a Perkins catheter with yellow-colored urine.  Skin: no rashes or lesions  Neuro: Awake alert, conversant no focal deficits  Psych: normal mood & " affect      Condition on Discharge: stable    Discharge Disposition:  Skilled Nursing Facility (DC - External)    Discharge Medications:     Discharge Medications        New Medications        Instructions Start Date   levoFLOXacin 750 MG tablet  Commonly known as: LEVAQUIN   750 mg, Oral, Every 24 Hours             Continue These Medications        Instructions Start Date   acetaminophen 325 MG tablet  Commonly known as: TYLENOL   650 mg, Oral, Every 4 Hours PRN      albuterol sulfate  (90 Base) MCG/ACT inhaler  Commonly known as: PROVENTIL HFA;VENTOLIN HFA;PROAIR HFA   2 puffs, Inhalation, 4 Times Daily - RT      apixaban 2.5 MG tablet tablet  Commonly known as: ELIQUIS   2.5 mg, 2 Times Daily      bisacodyl 5 MG EC tablet  Commonly known as: DULCOLAX   5 mg, Oral, Daily PRN      bisacodyl 10 MG suppository  Commonly known as: DULCOLAX   10 mg, Rectal, Daily PRN      budesonide-formoterol 160-4.5 MCG/ACT inhaler  Commonly known as: SYMBICORT   2 puffs, Inhalation, 2 Times Daily - RT      carvedilol 3.125 MG tablet  Commonly known as: COREG   3.125 mg, Oral, 2 Times Daily With Meals      famotidine 20 MG tablet  Commonly known as: PEPCID   20 mg, Oral, Daily      HYDROcodone-acetaminophen 5-325 MG per tablet  Commonly known as: NORCO   1 tablet, Oral, Every 4 Hours PRN      ipratropium 17 MCG/ACT inhaler  Commonly known as: ATROVENT HFA   2 puffs, Inhalation, 4 Times Daily - RT      ipratropium-albuterol 0.5-2.5 mg/3 ml nebulizer  Commonly known as: DUO-NEB   3 mL, Nebulization, Every 6 Hours PRN      nystatin [id number] UNIT/GM powder  Commonly known as: MYCOSTATIN   1 Application, Topical, 3 Times Daily      ondansetron ODT 4 MG disintegrating tablet  Commonly known as: ZOFRAN-ODT   4 mg, Translingual, Every 4 Hours PRN      tamsulosin 0.4 MG capsule 24 hr capsule  Commonly known as: FLOMAX   0.4 mg, Oral, Nightly             Stop These Medications      dexAMETHasone 4 MG tablet  Commonly known as: DECADRON                 Discharge Diet:   Diet Instructions       Diet: Cardiac Diets, Renal Diets; Healthy Heart (2-3 Na+); Thin (IDDSI 0); Low Sodium (2-3g)      Discharge Diet:  Cardiac Diets  Renal Diets       Cardiac Diet: Healthy Heart (2-3 Na+)    Fluid Consistency: Thin (IDDSI 0)    Renal Diet: Low Sodium (2-3g)            Activity at Discharge:   Activity Instructions       Gradually Increase Activity Until at Pre-Hospitalization Level              Follow-up Appointments:   Future Appointments   Date Time Provider Department Center   4/18/2025  1:10 PM Abelardo Panchal PA [facility]       Test Results Pending at Discharge: no      Electronically signed by Nicolas Brooke MD, 02/10/25, 10:25 AM CST.                Time: Greater than 30 minutes

## 2025-02-10 NOTE — THERAPY DISCHARGE NOTE
Acute Care - Occupational Therapy Discharge  [facility]    Patient Name: Manohar Garcia  : 1940    MRN: [id number]                              Today's Date: 2/10/2025       Admit Date: 2025    Visit Dx:     ICD-10-CM ICD-9-CM   1. Pyelonephritis  N12 590.80   2. Bladder stone  N21.0 594.1   3. Acute renal failure, unspecified acute renal failure type  N17.9 584.9   4. Impaired mobility [Z74.09]  Z74.09 799.89   5. 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    Thrombocytopenia    Hyperlactatemia    Hip fracture    Closed 2-part intertrochanteric fracture of proximal end of right femur    Acute blood loss anemia    Stage 3b chronic kidney disease    COVID-19 virus detected    Influenza A    Hypoxia    Cytokine release syndrome, grade 1    Acute pyelonephritis     Past Medical History:   Diagnosis Date    Dementia     GERD (gastroesophageal reflux disease)     Liver disorder      Past Surgical History:   Procedure Laterality Date    HEMORRHOIDECTOMY      HERNIA REPAIR      HIP TROCHANTERIC NAILING WITH INTRAMEDULLARY HIP SCREW Right 2024    Procedure: HIP TROCHANTERIC NAILING SHORT WITH INTRAMEDULLARY HIP SCREW;  Surgeon: Arcenio Chandra MD;  Location: [facility];  Service: Orthopedics;  Laterality: Right;      General Information       Row Name 02/10/25 1011          OT Time and Intention    Document Type therapy note (daily note)  -CH     Mode of Treatment occupational therapy  -       Row Name 02/10/25 1011          General Information    Patient Profile Reviewed yes  -     Existing Precautions/Restrictions fall  -     Barriers to Rehab medically complex  -       Row Name 02/10/25 1011          Living Environment    People in Home  facility resident  -       Row Name 02/10/25 1011          Cognition    Orientation Status (Cognition) oriented to;person;place  -       Row Name 02/10/25 1011          Safety Issues/Impairments Affecting Functional Mobility    Impairments Affecting Function (Mobility) balance;endurance/activity tolerance;strength;cognition  -               User Key  (r) = Recorded By, (t) = Taken By, (c) = Cosigned By      Initials Name Provider Type     Erika Mann, OTR/L Occupational Therapist                   Mobility/ADL's       Row Name 02/10/25 1120          Bed Mobility    Bed Mobility supine-sit  -     Supine-Sit [place] (Bed Mobility) supervision  -     Assistive Device (Bed Mobility) bed rails;head of bed elevated  -       Row Name 02/10/25 1120          Transfers    Transfers stand-sit transfer;sit-stand transfer;bed-chair transfer  -[facility] Name 02/10/25 1120          Bed-Chair Transfer    Bed-Chair [place] (Transfers) contact guard;verbal cues  -     Assistive Device (Bed-Chair Transfers) walker, front-wheeled  -       Row Name 02/10/25 1120          Sit-Stand Transfer    Sit-Stand [place] (Transfers) verbal cues;contact guard  -     Assistive Device (Sit-Stand Transfers) walker, front-wheeled  -[facility] Name 02/10/25 1120          Stand-Sit Transfer    Stand-Sit [place] (Transfers) contact guard;verbal cues  -     Assistive Device (Stand-Sit Transfers) walker, front-wheeled  -[facility] Name 02/10/25 1120          Functional Mobility    Functional Mobility- Ind. Level contact guard assist;verbal cues required  -     Functional Mobility- Device walker, front-wheeled  -       Row Name 02/10/25 1120          Activities of Daily Living    BADL Assessment/Intervention upper body dressing;grooming  -[facility] Name 02/10/25 1120          Upper Body Dressing Assessment/Training    [place] Level (Upper Body Dressing) minimum assist (75% patient  effort);don;doff  -     Position (Upper Body Dressing) supported sitting  -     Comment, (Upper Body Dressing) [facility]       Row Name 02/10/25 1120          Grooming Assessment/Training    [place] Level (Grooming) wash face, hands;supervision;set up;verbal cues  -     Position (Grooming) supported sitting  -               User Key  (r) = Recorded By, (t) = Taken By, (c) = Cosigned By      Initials Name Provider Type    Erika Garvin, OTR/L Occupational Therapist                   Obj/Interventions       Row Name 02/10/25 1011          Balance    Balance Interventions sitting;standing;sit to stand;supported;static;dynamic;occupation based/functional task  -               User Key  (r) = Recorded By, (t) = Taken By, (c) = Cosigned By      Initials Name Provider Type    Erika Garvin, OTR/L Occupational Therapist                   Goals/Plan       Row Name 02/10/25 1011          Transfer Goal 1 (OT)    Activity/Assistive Device (Transfer Goal 1, OT) toilet;bed-to-chair/chair-to-bed;walker, rolling  -     [place] Level/Cues Needed (Transfer Goal 1, OT) standby assist  -     Time Frame (Transfer Goal 1, OT) long term goal (LTG);by discharge  -     Progress/Outcome (Transfer Goal 1, OT) goal not met  -[facility] Name 02/10/25 1011          Dressing Goal 1 (OT)    Activity/Device (Dressing Goal 1, OT) dressing skills, all  -CH     Time Frame (Dressing Goal 1, OT) long term goal (LTG);by discharge  -     Progress/Outcome (Dressing Goal 1, OT) goal partially met  [facility] Name 02/10/25 1011          Toileting Goal 1 (OT)    Activity/Device (Toileting Goal 1, OT) toileting skills, all  -     [place] Level/Cues Needed (Toileting Goal 1, OT) minimum assist (75% or more patient effort);set-up required;verbal cues required  [facility]     Time Frame (Toileting Goal 1, OT) long term goal (LTG);by discharge  -     Progress/Outcome (Toileting Goal 1, OT) goal not met  -                User Key  (r) = Recorded By, (t) = Taken By, (c) = Cosigned By      Initials Name Provider Type    Erika Garivn, OTR/L Occupational Therapist                   Clinical Impression       Row Name 02/10/25 1011          Pain Assessment    Pretreatment Pain Rating 0/10 - no pain  -     Posttreatment Pain Rating 0/10 - no pain  -       Row Name 02/10/25 1011          Pain Scale: FACES Pre/Post-Treatment    Pain: FACES Scale, Pretreatment 0-->no hurt  -CH     Posttreatment Pain Rating 0-->no hurt  -       Row Name 02/10/25 1011          Plan of Care Review    Plan of Care Reviewed With patient  -CH     Progress no change  -     Outcome Evaluation Pt fowlers with staff after changing his brief.  Mr. Garcia agreeable to OOB activity.  CGA/S to come to EOB & demo'd good sitting balance.  Pt. stood CGA and took steps to chair for grooming routine & UBD. Pt had no LOB & required cues for posture and safe sequecing with rwx. CNA entered to complete pt.'d CHG bath.  Mr Garcia would benefit from cont'd OT tx to improve his indep.  Per chart pt to DC.  Agree with DC to SNF  -       Row Name 02/10/25 1011          Therapy Assessment/Plan (OT)    Rehab Potential (OT) good  -CH       Row Name 02/10/25 1011          Therapy Plan Review/Discharge Plan (OT)    Anticipated Discharge Disposition (OT) skilled nursing facility  -       Row Name 02/10/25 1011          Positioning and Restraints    Pre-Treatment Position in bed  -     Post Treatment Position chair  -     In Chair sitting;call light within reach;encouraged to call for assist;exit alarm on;with nsg  -               User Key  (r) = Recorded By, (t) = Taken By, (c) = Cosigned By      Initials Name Provider Type    Erika Garvin, OTR/L Occupational Therapist                   Outcome Measures       Row Name 02/10/25 1011          How much help from another is currently needed...    Putting on and taking off regular lower body clothing? 2  -CH      Bathing (including washing, rinsing, and drying) 2  -CH     Toileting (which includes using toilet bed pan or urinal) 2  -CH     Putting on and taking off regular upper body clothing 3  -CH     Taking care of personal grooming (such as brushing teeth) 4  -CH     Eating meals 4  -CH     AM-PAC 6 Clicks Score (OT) 17  -       Row Name 02/10/25 0800          How much help from another person do you currently need...    Turning from your back to your side while in flat bed without using bedrails? 3  -MC     Moving from lying on back to sitting on the side of a flat bed without bedrails? 3  -MC     Moving to and from a bed to a chair (including a wheelchair)? 3  -MC     Standing up from a chair using your arms (e.g., wheelchair, bedside chair)? 3  -MC     Climbing 3-5 steps with a railing? 1  -MC     To walk in hospital room? 2  -     AM-PAC 6 Clicks Score (PT) 15  -MC     Highest Level of Mobility Goal 4 --> Transfer to chair/commode  -       Row Name 02/10/25 1011          Functional Assessment    Outcome Measure Options AM-PAC 6 Clicks Daily Activity (OT)  -               User Key  (r) = Recorded By, (t) = Taken By, (c) = Cosigned By      Initials Name Provider Type     Erika Mann, OTR/L Occupational Therapist     Unique Dhillon, RN Registered Nurse                  Occupational Therapy Education       Title: PT OT SLP Therapies (In Progress)       Topic: Occupational Therapy (Resolved)       Point: ADL training (Resolved)       Description:   Instruct learner(s) on proper safety adaptation and remediation techniques during self care or transfers.   Instruct in proper use of assistive devices.                  Learning Progress Summary            Patient Acceptance, E, NR by MM at 2/6/2025 1315                      Point: Home exercise program (Resolved)       Description:   Instruct learner(s) on appropriate technique for monitoring, assisting and/or progressing therapeutic exercises/activities.                   Learner Progress:  Not documented in this visit.              Point: Precautions (Resolved)       Description:   Instruct learner(s) on prescribed precautions during self-care and functional transfers.                  Learning Progress Summary            Patient Acceptance, E, NR by MM at 2/6/2025 1315                      Point: Body mechanics (Resolved)       Description:   Instruct learner(s) on proper positioning and spine alignment during self-care, functional mobility activities and/or exercises.                  Learning Progress Summary            Patient Acceptance, E, NR by MM at 2/6/2025 1315                                      User Key       Initials Effective Dates Name Provider Type Discipline    DEMETRIUS 07/11/23 -  Edis Quintanilla, OTR/L Occupational Therapist OT                  OT Recommendation and Plan     Plan of Care Review  Plan of Care Reviewed With: patient  Progress: no change  Outcome Evaluation: Pt fowlers with staff after changing his brief.  Mr. Garcia agreeable to OOB activity.  CGA/S to come to EOB & demo'd good sitting balance.  Pt. stood CGA and took steps to chair for grooming routine & UBD. Pt had no LOB & required cues for posture and safe sequecing with rwx. CNA entered to complete pt.'d CHG bath.  Mr Garcia would benefit from cont'd OT tx to improve his indep.  Per chart pt to DC.  Agree with DC to SNF  Plan of Care Reviewed With: patient  Outcome Evaluation: Pt fowlers with staff after changing his brief.  Mr. Garcia agreeable to OOB activity.  CGA/S to come to EOB & demo'd good sitting balance.  Pt. stood CGA and took steps to chair for grooming routine & UBD. Pt had no LOB & required cues for posture and safe sequecing with rwx. CNA entered to complete pt.'d CHG bath.  Mr Garcia would benefit from cont'd OT tx to improve his indep.  Per chart pt to DC.  Agree with DC to SNF     Time Calculation:         Time Calculation- OT       Row Name 02/10/25 1011              Time Calculation- OT    OT Start Time 1011  -CH      OT Stop Time 1034  -CH      OT Time Calculation (min) 23 min  -CH      Total Timed Code Minutes- OT 23 minute(s)  -CH      OT Received On 02/10/25  -         Timed Charges    73483 - OT Self Care/Mgmt Minutes 23  -CH         Total Minutes    Timed Charges Total Minutes 23  -CH       Total Minutes 23  -CH                User Key  (r) = Recorded By, (t) = Taken By, (c) = Cosigned By      Initials Name Provider Type     Erika Mann OTR/L Occupational Therapist                  Therapy Charges for Today       Code Description Service Date Service Provider Modifiers Qty    [id number] HC OT SELF CARE/MGMT/TRAIN EA 15 MIN 2/10/2025 Erika Mann OTR/L GO 2               OT Discharge Summary  Anticipated Discharge Disposition (OT): skilled nursing facility  Reason for Discharge: Discharge from facility  Outcomes Achieved: Refer to plan of care for updates on goals achieved  Discharge Destination: SNF    LINUS Cordova/TUCKER  2/10/2025

## 2025-02-10 NOTE — PROGRESS NOTES
Subjective    Mr. Garcia is 84 y.o. male    Chief Complaint: ER Follow Up    History of Present Illness  Patient I had seen previously is a resident of nursing home.  He had had recurrent urinary tract infections.  And difficulty emptying his bladder.  Get a CT scan that was done 01/16/2025 that revealed a large 3.3 cm bladder calculus as well as moderate right-sided hydro extending to the ureterovesical junction.  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  Due to the fact patient was close to having femur surgery it was felt it would not be advisable for him to undergo a procedure that could compromise his healing.    Patient and was admitted to the hospital and Dr. Mcguire was consulted patient was having weakness and confusion and there was a concern for urinary tract infection.  He was found to have a UTI was treated with the antibiotics CT of the abdomen and pelvis showed a bladder stone and some worsening of hydro on both sides.  Also intermittent retention so catheter was placed he still has this in place. Dr. Mcguire said keeping the catheter in place decision discussed today.    Patient came from the nursing home was accompanied by caregiver who did not have much history or knowledge of the patient symptoms patient was not a great historian and seemed aware of having a bladder stone.            The following portions of the patient's history were reviewed and updated as appropriate: allergies, current medications, past family history, past medical history, past social history, past surgical history and problem list.    Review of Systems      Current Outpatient Medications:     acetaminophen (TYLENOL) 325 MG tablet, Take 2 tablets by mouth Every 4 (Four) Hours As Needed for Mild Pain., Disp: , Rfl:     albuterol sulfate  (90 Base) MCG/ACT inhaler, Inhale 2 puffs 4 (Four) Times a Day., Disp: , Rfl:     apixaban (ELIQUIS) 2.5 MG tablet tablet, Take 1 tablet by  mouth 2 (Two) Times a Day., Disp: , Rfl:     bisacodyl (DULCOLAX) 10 MG suppository, Insert 1 suppository into the rectum Daily As Needed for Constipation., Disp: , Rfl:     bisacodyl (DULCOLAX) 5 MG EC tablet, Take 1 tablet by mouth Daily As Needed for Constipation., Disp: , Rfl:     budesonide-formoterol (SYMBICORT) 160-4.5 MCG/ACT inhaler, Inhale 2 puffs 2 (Two) Times a Day., Disp: , Rfl:     carvedilol (COREG) 3.125 MG tablet, Take 1 tablet by mouth 2 (Two) Times a Day With Meals., Disp: , Rfl:     dexAMETHasone (DECADRON) 4 MG tablet, Take  by mouth Daily., Disp: , Rfl:     famotidine (PEPCID) 20 MG tablet, Take 1 tablet by mouth Daily., Disp: , Rfl:     guaiFENesin (MUCINEX) 600 MG 12 hr tablet, Take 2 tablets by mouth 2 (Two) Times a Day., Disp: , Rfl:     HYDROcodone-acetaminophen (NORCO) 5-325 MG per tablet, Take 1 tablet by mouth Every 4 (Four) Hours As Needed for Mild Pain or Moderate Pain., Disp: 14 tablet, Rfl: 0    ipratropium (ATROVENT HFA) 17 MCG/ACT inhaler, Inhale 2 puffs 4 (Four) Times a Day., Disp: , Rfl:     ipratropium-albuterol (DUO-NEB) 0.5-2.5 mg/3 ml nebulizer, Take 3 mL by nebulization Every 6 (Six) Hours As Needed for Wheezing., Disp: , Rfl:     levoFLOXacin (LEVAQUIN) 750 MG tablet, Take 1 tablet by mouth Daily for 5 doses. Indications: Urinary Tract Infection, Disp: 5 tablet, Rfl: 0    nystatin (MYCOSTATIN) 853542 UNIT/GM powder, Apply 1 Application topically to the appropriate area as directed 3 (Three) Times a Day., Disp: , Rfl:     ondansetron ODT (ZOFRAN-ODT) 4 MG disintegrating tablet, Place 1 tablet on the tongue Every 4 (Four) Hours As Needed for Nausea or Vomiting., Disp: , Rfl:     tamsulosin (FLOMAX) 0.4 MG capsule 24 hr capsule, Take 1 capsule by mouth Every Night., Disp: , Rfl:     Past Medical History:   Diagnosis Date    Dementia     GERD (gastroesophageal reflux disease)     Liver disorder        Past Surgical History:   Procedure Laterality Date    HEMORRHOIDECTOMY       "HERNIA REPAIR      HIP TROCHANTERIC NAILING WITH INTRAMEDULLARY HIP SCREW Right 8/30/2024    Procedure: HIP TROCHANTERIC NAILING SHORT WITH INTRAMEDULLARY HIP SCREW;  Surgeon: Arcenio Chandra MD;  Location: [facility] OR;  Service: Orthopedics;  Laterality: Right;       Social History     Socioeconomic History    Marital status:    Tobacco Use    Smoking status: Former     Types: Cigarettes, Pipe    Smokeless tobacco: Never   Vaping Use    Vaping status: Never Used   Substance and Sexual Activity    Alcohol use: No    Drug use: Not Currently     Types: Amphetamines     Comment: Quit 40 years ago    Sexual activity: Defer       History reviewed. No pertinent family history.    Objective    Temp 97.8 °F (36.6 °C) (Infrared)   Ht 180.3 cm (71\")   Wt 95.7 kg (211 lb)   BMI 29.43 kg/m²     Physical Exam  Vitals reviewed.   Constitutional:       Appearance: Normal appearance.   HENT:      Ears:      Comments: Has difficulty hearing  Pulmonary:      Effort: Pulmonary effort is normal.   Neurological:      Mental Status: Mental status is at baseline.             Assessment and Plan    Diagnoses and all orders for this visit:    1. Bladder stone (Primary)    2. Recurrent UTI    3. Urinary retention      I would probably wait for longer period to fully recover from the right femur fracture and repair due to the fact would be in the lithotomy position for bladder surgery and could compromise healing cause complications.    I think I will maximize his drainage with catheter I would leave the catheter in place also due to the fact.  Been hydronephrosis seen on imaging both when I saw him and in the hospital.    Recommend he return in approximately 2 months for reevaluation at that time could discuss treating the stone in the bladder.          "

## 2025-02-10 NOTE — CASE MANAGEMENT/SOCIAL WORK
Continued Stay Note  [facility]     Patient Name: Manohar Garcia  MRN: [id number]  Today's Date: 2/10/2025    Admit Date: 2/4/2025    Plan: [facility]   Discharge Plan       Row Name 02/10/25 0842       Plan    Plan [facility]    Patient/Family in Agreement with Plan yes    Final Discharge Disposition Code 03 - skilled nursing facility (SNF)    Final Note Pt is being discharged to [facility] today, informed Veronika from facility [phone]. Spoke with Miriam Lyle Daughter   [phone] and she will transport via private vehicle.    [facility] [phone]                   Discharge Codes    No documentation.                 Expected Discharge Date and Time       Expected Discharge Date Expected Discharge Time    Feb 9, 2025               CHANDAN [place]

## 2025-02-10 NOTE — THERAPY TREATMENT NOTE
Patient Name: Manohar Garcia  : 1940    MRN: [id number]                              Today's Date: 2/10/2025       Admit Date: 2025    Visit Dx:     ICD-10-CM ICD-9-CM   1. Pyelonephritis  N12 590.80   2. Bladder stone  N21.0 594.1   3. Acute renal failure, unspecified acute renal failure type  N17.9 584.9   4. Impaired mobility [Z74.09]  Z74.09 799.89   5. 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    Thrombocytopenia    Hyperlactatemia    Hip fracture    Closed 2-part intertrochanteric fracture of proximal end of right femur    Acute blood loss anemia    Stage 3b chronic kidney disease    COVID-19 virus detected    Influenza A    Hypoxia    Cytokine release syndrome, grade 1    Acute pyelonephritis     Past Medical History:   Diagnosis Date    Dementia     GERD (gastroesophageal reflux disease)     Liver disorder      Past Surgical History:   Procedure Laterality Date    HEMORRHOIDECTOMY      HERNIA REPAIR      HIP TROCHANTERIC NAILING WITH INTRAMEDULLARY HIP SCREW Right 2024    Procedure: HIP TROCHANTERIC NAILING SHORT WITH INTRAMEDULLARY HIP SCREW;  Surgeon: Arcenio Chandra MD;  Location: [facility];  Service: Orthopedics;  Laterality: Right;      General Information       Row Name 02/10/25 1011          OT Time and Intention    Document Type therapy note (daily note)  -CH     Mode of Treatment occupational therapy  -       Row Name 02/10/25 1011          General Information    Patient Profile Reviewed yes  -     Existing Precautions/Restrictions fall  -     Barriers to Rehab medically complex  -       Row Name 02/10/25 1011          Living Environment    People in Home facility resident  -       Row Name 02/10/25 1011           Cognition    Orientation Status (Cognition) oriented to;person;place  -       Row Name 02/10/25 1011          Safety Issues/Impairments Affecting Functional Mobility    Impairments Affecting Function (Mobility) balance;endurance/activity tolerance;strength;cognition  -               User Key  (r) = Recorded By, (t) = Taken By, (c) = Cosigned By      Initials Name Provider Type     Erika Mann, OTR/L Occupational Therapist                     Mobility/ADL's       Row Name 02/10/25 1120          Bed Mobility    Bed Mobility supine-sit  -     Supine-Sit [place] (Bed Mobility) supervision  -     Assistive Device (Bed Mobility) bed rails;head of bed elevated  -       Row Name 02/10/25 1120          Transfers    Transfers stand-sit transfer;sit-stand transfer;bed-chair transfer  -       Row Name 02/10/25 1120          Bed-Chair Transfer    Bed-Chair [place] (Transfers) contact guard;verbal cues  -     Assistive Device (Bed-Chair Transfers) walker, front-wheeled  -       Row Name 02/10/25 1120          Sit-Stand Transfer    Sit-Stand [place] (Transfers) verbal cues;contact guard  -     Assistive Device (Sit-Stand Transfers) walker, front-wheeled  -       Row Name 02/10/25 1120          Stand-Sit Transfer    Stand-Sit [place] (Transfers) contact guard;verbal cues  -     Assistive Device (Stand-Sit Transfers) walker, front-wheeled  -       Row Name 02/10/25 1120          Functional Mobility    Functional Mobility- Ind. Level contact guard assist;verbal cues required  -     Functional Mobility- Device walker, front-wheeled  -       Row Name 02/10/25 1120          Activities of Daily Living    BADL Assessment/Intervention upper body dressing;grooming  -       Row Name 02/10/25 1120          Upper Body Dressing Assessment/Training    [place] Level (Upper Body Dressing) minimum assist (75% patient effort);don;doff  -     Position (Upper Body Dressing) supported  sitting  -CH     Comment, (Upper Body Dressing) hospitals  -       Row Name 02/10/25 1120          Grooming Assessment/Training    [place] Level (Grooming) wash face, hands;supervision;set up;verbal cues  -     Position (Grooming) supported sitting  -               User Key  (r) = Recorded By, (t) = Taken By, (c) = Cosigned By      Initials Name Provider Type    Erika Garvin, OTR/L Occupational Therapist                   Obj/Interventions       Row Name 02/10/25 1011          Balance    Balance Interventions sitting;standing;sit to stand;supported;static;dynamic;occupation based/functional task  -               User Key  (r) = Recorded By, (t) = Taken By, (c) = Cosigned By      Initials Name Provider Type    Erika Garvin, OTR/L Occupational Therapist                   Goals/Plan       Row Name 02/10/25 1011          Transfer Goal 1 (OT)    Activity/Assistive Device (Transfer Goal 1, OT) toilet;bed-to-chair/chair-to-bed;walker, rolling  -     [place] Level/Cues Needed (Transfer Goal 1, OT) standby assist  -     Time Frame (Transfer Goal 1, OT) long term goal (LTG);by discharge  -     Progress/Outcome (Transfer Goal 1, OT) goal not met  -       Row Name 02/10/25 1011          Dressing Goal 1 (OT)    Activity/Device (Dressing Goal 1, OT) dressing skills, all  -     Time Frame (Dressing Goal 1, OT) long term goal (LTG);by discharge  -     Progress/Outcome (Dressing Goal 1, OT) goal partially met  -       Row Name 02/10/25 1011          Toileting Goal 1 (OT)    Activity/Device (Toileting Goal 1, OT) toileting skills, all  -     [place] Level/Cues Needed (Toileting Goal 1, OT) minimum assist (75% or more patient effort);set-up required;verbal cues required  -     Time Frame (Toileting Goal 1, OT) long term goal (LTG);by discharge  -     Progress/Outcome (Toileting Goal 1, OT) goal not met  -               User Key  (r) = Recorded By, (t) = Taken By, (c) =  Cosigned By      Initials Name Provider Type     Erika Mann, OTR/L Occupational Therapist                   Clinical Impression       Row Name 02/10/25 1011          Pain Assessment    Pretreatment Pain Rating 0/10 - no pain  -     Posttreatment Pain Rating 0/10 - no pain  -       Row Name 02/10/25 1011          Pain Scale: FACES Pre/Post-Treatment    Pain: FACES Scale, Pretreatment 0-->no hurt  -CH     Posttreatment Pain Rating 0-->no hurt  -       Row Name 02/10/25 1011          Plan of Care Review    Plan of Care Reviewed With patient  -CH     Progress no change  -     Outcome Evaluation Pt fowlers with staff after changing his brief.  Mr. Garcia agreeable to OOB activity.  CGA/S to come to EOB & demo'd good sitting balance.  Pt. stood CGA and took steps to chair for grooming routine & UBD. Pt had no LOB & required cues for posture and safe sequecing with rwx. CNA entered to complete pt.'d CHG bath.  Mr Garcia would benefit from cont'd OT tx to improve his indep.  Per chart pt to DC.  Agree with DC to SNF  -       Row Name 02/10/25 1011          Therapy Assessment/Plan (OT)    Rehab Potential (OT) good  -       Row Name 02/10/25 1011          Therapy Plan Review/Discharge Plan (OT)    Anticipated Discharge Disposition (OT) skilled nursing facility  -       Row Name 02/10/25 1011          Positioning and Restraints    Pre-Treatment Position in bed  -     Post Treatment Position chair  -     In Chair sitting;call light within reach;encouraged to call for assist;exit alarm on;with nsg  -               User Key  (r) = Recorded By, (t) = Taken By, (c) = Cosigned By      Initials Name Provider Type     Erika Mann, OTR/L Occupational Therapist                   Outcome Measures       Row Name 02/10/25 1011          How much help from another is currently needed...    Putting on and taking off regular lower body clothing? 2  -CH     Bathing (including washing, rinsing, and drying) 2  -CH      Toileting (which includes using toilet bed pan or urinal) 2  -CH     Putting on and taking off regular upper body clothing 3  -CH     Taking care of personal grooming (such as brushing teeth) 4  -CH     Eating meals 4  -     AM-PAC 6 Clicks Score (OT) 17  -       Row Name 02/10/25 0800          How much help from another person do you currently need...    Turning from your back to your side while in flat bed without using bedrails? 3  -MC     Moving from lying on back to sitting on the side of a flat bed without bedrails? 3  -MC     Moving to and from a bed to a chair (including a wheelchair)? 3  -MC     Standing up from a chair using your arms (e.g., wheelchair, bedside chair)? 3  -MC     Climbing 3-5 steps with a railing? 1  -MC     To walk in hospital room? 2  -     AM-PAC 6 Clicks Score (PT) 15  -     Highest Level of Mobility Goal 4 --> Transfer to chair/commode  -       Row Name 02/10/25 1011          Functional Assessment    Outcome Measure Options AM-[facility] 6 Clicks Daily Activity (OT)  -               User Key  (r) = Recorded By, (t) = Taken By, (c) = Cosigned By      Initials Name Provider Type     Erika Mann, OTR/L Occupational Therapist    Unique Cabrera, RN Registered Nurse                    Occupational Therapy Education       Title: PT OT SLP Therapies (In Progress)       Topic: Occupational Therapy (Resolved)       Point: ADL training (Resolved)       Description:   Instruct learner(s) on proper safety adaptation and remediation techniques during self care or transfers.   Instruct in proper use of assistive devices.                  Learning Progress Summary            Patient Acceptance, E, NR by MM at 2/6/2025 1315                      Point: Home exercise program (Resolved)       Description:   Instruct learner(s) on appropriate technique for monitoring, assisting and/or progressing therapeutic exercises/activities.                  Learner Progress:  Not documented in this  visit.              Point: Precautions (Resolved)       Description:   Instruct learner(s) on prescribed precautions during self-care and functional transfers.                  Learning Progress Summary            Patient Acceptance, E, NR by  at 2/6/2025 1315                      Point: Body mechanics (Resolved)       Description:   Instruct learner(s) on proper positioning and spine alignment during self-care, functional mobility activities and/or exercises.                  Learning Progress Summary            Patient Acceptance, E, NR by  at 2/6/2025 1315                                      User Key       Initials Effective Dates Name Provider Type Discipline     07/11/23 -  Edis Quintanilla, OTR/L Occupational Therapist OT                  OT Recommendation and Plan     Plan of Care Review  Plan of Care Reviewed With: patient  Progress: no change  Outcome Evaluation: Pt fowlers with staff after changing his brief.  Mr. Garcia agreeable to OOB activity.  CGA/S to come to EOB & demo'd good sitting balance.  Pt. stood CGA and took steps to chair for grooming routine & UBD. Pt had no LOB & required cues for posture and safe sequecing with rwx. CNA entered to complete pt.'d CHG bath.  Mr Garcia would benefit from cont'd OT tx to improve his indep.  Per chart pt to DC.  Agree with DC to SNF     Time Calculation:         Time Calculation- OT       Row Name 02/10/25 1011             Time Calculation- OT    OT Start Time 1011  -CH      OT Stop Time 1034  -CH      OT Time Calculation (min) 23 min  -CH      Total Timed Code Minutes- OT 23 minute(s)  -CH      OT Received On 02/10/25  -CH         Timed Charges    20062 - OT Self Care/Mgmt Minutes 23  -CH         Total Minutes    Timed Charges Total Minutes 23  -CH       Total Minutes 23  -CH                User Key  (r) = Recorded By, (t) = Taken By, (c) = Cosigned By      Initials Name Provider Type    CH Erika Mann, OTR/L Occupational Therapist                   Therapy Charges for Today       Code Description Service Date Service Provider Modifiers Qty    [id number] HC OT SELF CARE/MGMT/TRAIN EA 15 MIN 2/10/2025 Erika Mann OTR/L GO 2                 Erika Mann OTR/L  2/10/2025

## 2025-02-14 ENCOUNTER — OFFICE VISIT (OUTPATIENT)
Dept: UROLOGY | Facility: CLINIC | Age: 85
End: 2025-02-14
Payer: MEDICARE

## 2025-02-14 VITALS — WEIGHT: 211 LBS | BODY MASS INDEX: 29.54 KG/M2 | TEMPERATURE: 97.8 F | HEIGHT: 71 IN

## 2025-02-14 DIAGNOSIS — R33.9 URINARY RETENTION: ICD-10-CM

## 2025-02-14 DIAGNOSIS — N21.0 BLADDER STONE: Primary | ICD-10-CM

## 2025-02-14 DIAGNOSIS — N39.0 RECURRENT UTI: ICD-10-CM

## 2025-02-14 RX ORDER — GUAIFENESIN 600 MG/1
1200 TABLET, EXTENDED RELEASE ORAL 2 TIMES DAILY
COMMUNITY

## 2025-02-14 RX ORDER — DEXAMETHASONE 4 MG/1
TABLET ORAL DAILY
COMMUNITY
Start: 2025-02-01

## 2025-02-18 ENCOUNTER — APPOINTMENT (OUTPATIENT)
Dept: CARDIOLOGY | Facility: HOSPITAL | Age: 85
End: 2025-02-18
Payer: MEDICARE

## 2025-02-18 ENCOUNTER — APPOINTMENT (OUTPATIENT)
Dept: ULTRASOUND IMAGING | Facility: HOSPITAL | Age: 85
End: 2025-02-18
Payer: MEDICARE

## 2025-02-18 ENCOUNTER — ANESTHESIA (OUTPATIENT)
Dept: PERIOP | Facility: HOSPITAL | Age: 85
End: 2025-02-18
Payer: MEDICARE

## 2025-02-18 ENCOUNTER — APPOINTMENT (OUTPATIENT)
Dept: GENERAL RADIOLOGY | Facility: HOSPITAL | Age: 85
End: 2025-02-18
Payer: MEDICARE

## 2025-02-18 ENCOUNTER — APPOINTMENT (OUTPATIENT)
Dept: CT IMAGING | Facility: HOSPITAL | Age: 85
End: 2025-02-18
Payer: MEDICARE

## 2025-02-18 ENCOUNTER — HOSPITAL ENCOUNTER (INPATIENT)
Facility: HOSPITAL | Age: 85
LOS: 10 days | Discharge: NURSING, REHAB OR LONG-TERM CARE | End: 2025-02-28
Attending: FAMILY MEDICINE | Admitting: FAMILY MEDICINE
Payer: MEDICARE

## 2025-02-18 ENCOUNTER — ANESTHESIA EVENT (OUTPATIENT)
Dept: PERIOP | Facility: HOSPITAL | Age: 85
End: 2025-02-18
Payer: MEDICARE

## 2025-02-18 DIAGNOSIS — Z79.01 CHRONIC ANTICOAGULATION: ICD-10-CM

## 2025-02-18 DIAGNOSIS — K76.9 CHRONIC LIVER DISEASE: ICD-10-CM

## 2025-02-18 DIAGNOSIS — N39.0 ACUTE UTI (URINARY TRACT INFECTION): ICD-10-CM

## 2025-02-18 DIAGNOSIS — S37.30XA INJURY OF URETHRA, INITIAL ENCOUNTER: ICD-10-CM

## 2025-02-18 DIAGNOSIS — N21.0 BLADDER STONE: ICD-10-CM

## 2025-02-18 DIAGNOSIS — R31.9 HEMATURIA: ICD-10-CM

## 2025-02-18 DIAGNOSIS — R31.0 GROSS HEMATURIA: Primary | ICD-10-CM

## 2025-02-18 DIAGNOSIS — D69.6 THROMBOCYTOPENIA: ICD-10-CM

## 2025-02-18 DIAGNOSIS — N32.89 BLADDER MASS: ICD-10-CM

## 2025-02-18 DIAGNOSIS — N10 ACUTE PYELONEPHRITIS: ICD-10-CM

## 2025-02-18 DIAGNOSIS — Z74.09 IMPAIRED MOBILITY: ICD-10-CM

## 2025-02-18 DIAGNOSIS — N17.9 ACUTE KIDNEY INJURY: ICD-10-CM

## 2025-02-18 PROBLEM — N18.32 ACUTE KIDNEY INJURY SUPERIMPOSED ON STAGE 3B CHRONIC KIDNEY DISEASE: Status: ACTIVE | Noted: 2023-03-10

## 2025-02-18 PROBLEM — I89.0 LYMPHEDEMA OF ARM: Status: ACTIVE | Noted: 2025-02-18

## 2025-02-18 PROBLEM — K59.00 CONSTIPATION: Status: ACTIVE | Noted: 2025-02-18

## 2025-02-18 LAB
ALBUMIN SERPL-MCNC: 2.1 G/DL (ref 3.5–5.2)
ALBUMIN/GLOB SERPL: 0.8 G/DL
ALP SERPL-CCNC: 105 U/L (ref 39–117)
ALT SERPL W P-5'-P-CCNC: 17 U/L (ref 1–41)
ANION GAP SERPL CALCULATED.3IONS-SCNC: 10 MMOL/L (ref 5–15)
ANION GAP SERPL CALCULATED.3IONS-SCNC: 9 MMOL/L (ref 5–15)
APTT PPP: 29.1 SECONDS (ref 24.5–36)
AST SERPL-CCNC: 18 U/L (ref 1–40)
B PARAPERT DNA SPEC QL NAA+PROBE: NOT DETECTED
B PERT DNA SPEC QL NAA+PROBE: NOT DETECTED
BACTERIA UR QL AUTO: ABNORMAL /HPF
BASOPHILS # BLD AUTO: 0 10*3/MM3 (ref 0–0.2)
BASOPHILS # BLD AUTO: 0.01 10*3/MM3 (ref 0–0.2)
BASOPHILS NFR BLD AUTO: 0 % (ref 0–1.5)
BASOPHILS NFR BLD AUTO: 0.1 % (ref 0–1.5)
BILIRUB SERPL-MCNC: 1.7 MG/DL (ref 0–1.2)
BILIRUB UR QL STRIP: ABNORMAL
BUN SERPL-MCNC: 37 MG/DL (ref 8–23)
BUN SERPL-MCNC: 40 MG/DL (ref 8–23)
BUN/CREAT SERPL: 17.7 (ref 7–25)
BUN/CREAT SERPL: 19.6 (ref 7–25)
C PNEUM DNA NPH QL NAA+NON-PROBE: NOT DETECTED
CALCIUM SPEC-SCNC: 8 MG/DL (ref 8.6–10.5)
CALCIUM SPEC-SCNC: 8.4 MG/DL (ref 8.6–10.5)
CHLORIDE SERPL-SCNC: 110 MMOL/L (ref 98–107)
CHLORIDE SERPL-SCNC: 112 MMOL/L (ref 98–107)
CLARITY UR: ABNORMAL
CO2 SERPL-SCNC: 20 MMOL/L (ref 22–29)
CO2 SERPL-SCNC: 20 MMOL/L (ref 22–29)
COLOR UR: ABNORMAL
CREAT SERPL-MCNC: 2.04 MG/DL (ref 0.76–1.27)
CREAT SERPL-MCNC: 2.09 MG/DL (ref 0.76–1.27)
D-LACTATE SERPL-SCNC: 3.2 MMOL/L (ref 0.5–2)
D-LACTATE SERPL-SCNC: 3.2 MMOL/L (ref 0.5–2)
D-LACTATE SERPL-SCNC: 3.3 MMOL/L (ref 0.5–2)
D-LACTATE SERPL-SCNC: 3.3 MMOL/L (ref 0.5–2)
DEPRECATED RDW RBC AUTO: 55.9 FL (ref 37–54)
DEPRECATED RDW RBC AUTO: 56.3 FL (ref 37–54)
EGFRCR SERPLBLD CKD-EPI 2021: 30.6 ML/MIN/1.73
EGFRCR SERPLBLD CKD-EPI 2021: 31.5 ML/MIN/1.73
EOSINOPHIL # BLD AUTO: 0.01 10*3/MM3 (ref 0–0.4)
EOSINOPHIL # BLD AUTO: 0.01 10*3/MM3 (ref 0–0.4)
EOSINOPHIL NFR BLD AUTO: 0.1 % (ref 0.3–6.2)
EOSINOPHIL NFR BLD AUTO: 0.3 % (ref 0.3–6.2)
ERYTHROCYTE [DISTWIDTH] IN BLOOD BY AUTOMATED COUNT: 16.8 % (ref 12.3–15.4)
ERYTHROCYTE [DISTWIDTH] IN BLOOD BY AUTOMATED COUNT: 16.9 % (ref 12.3–15.4)
FLUAV SUBTYP SPEC NAA+PROBE: NOT DETECTED
FLUBV RNA ISLT QL NAA+PROBE: NOT DETECTED
GLOBULIN UR ELPH-MCNC: 2.8 GM/DL
GLUCOSE SERPL-MCNC: 123 MG/DL (ref 65–99)
GLUCOSE SERPL-MCNC: 143 MG/DL (ref 65–99)
GLUCOSE UR STRIP-MCNC: ABNORMAL MG/DL
HADV DNA SPEC NAA+PROBE: NOT DETECTED
HCOV 229E RNA SPEC QL NAA+PROBE: NOT DETECTED
HCOV HKU1 RNA SPEC QL NAA+PROBE: NOT DETECTED
HCOV NL63 RNA SPEC QL NAA+PROBE: NOT DETECTED
HCOV OC43 RNA SPEC QL NAA+PROBE: NOT DETECTED
HCT VFR BLD AUTO: 27.4 % (ref 37.5–51)
HCT VFR BLD AUTO: 34.9 % (ref 37.5–51)
HGB BLD-MCNC: 10.8 G/DL (ref 13–17.7)
HGB BLD-MCNC: 8.7 G/DL (ref 13–17.7)
HGB UR QL STRIP.AUTO: ABNORMAL
HMPV RNA NPH QL NAA+NON-PROBE: NOT DETECTED
HPIV1 RNA ISLT QL NAA+PROBE: NOT DETECTED
HPIV2 RNA SPEC QL NAA+PROBE: NOT DETECTED
HPIV3 RNA NPH QL NAA+PROBE: NOT DETECTED
HPIV4 P GENE NPH QL NAA+PROBE: NOT DETECTED
HYALINE CASTS UR QL AUTO: ABNORMAL /LPF
IMM GRANULOCYTES # BLD AUTO: 0.02 10*3/MM3 (ref 0–0.05)
IMM GRANULOCYTES # BLD AUTO: 0.03 10*3/MM3 (ref 0–0.05)
IMM GRANULOCYTES NFR BLD AUTO: 0.4 % (ref 0–0.5)
IMM GRANULOCYTES NFR BLD AUTO: 0.5 % (ref 0–0.5)
INR PPP: 1.62 (ref 0.91–1.09)
KETONES UR QL STRIP: NEGATIVE
LEUKOCYTE ESTERASE UR QL STRIP.AUTO: NEGATIVE
LYMPHOCYTES # BLD AUTO: 0.93 10*3/MM3 (ref 0.7–3.1)
LYMPHOCYTES # BLD AUTO: 1.14 10*3/MM3 (ref 0.7–3.1)
LYMPHOCYTES NFR BLD AUTO: 17.1 % (ref 19.6–45.3)
LYMPHOCYTES NFR BLD AUTO: 23.8 % (ref 19.6–45.3)
M PNEUMO IGG SER IA-ACNC: NOT DETECTED
MAGNESIUM SERPL-MCNC: 2.1 MG/DL (ref 1.6–2.4)
MCH RBC QN AUTO: 28.6 PG (ref 26.6–33)
MCH RBC QN AUTO: 29.7 PG (ref 26.6–33)
MCHC RBC AUTO-ENTMCNC: 30.9 G/DL (ref 31.5–35.7)
MCHC RBC AUTO-ENTMCNC: 31.8 G/DL (ref 31.5–35.7)
MCV RBC AUTO: 92.6 FL (ref 79–97)
MCV RBC AUTO: 93.5 FL (ref 79–97)
MONOCYTES # BLD AUTO: 0.2 10*3/MM3 (ref 0.1–0.9)
MONOCYTES # BLD AUTO: 0.54 10*3/MM3 (ref 0.1–0.9)
MONOCYTES NFR BLD AUTO: 5.1 % (ref 5–12)
MONOCYTES NFR BLD AUTO: 8.1 % (ref 5–12)
NEUTROPHILS NFR BLD AUTO: 2.74 10*3/MM3 (ref 1.7–7)
NEUTROPHILS NFR BLD AUTO: 4.95 10*3/MM3 (ref 1.7–7)
NEUTROPHILS NFR BLD AUTO: 70.3 % (ref 42.7–76)
NEUTROPHILS NFR BLD AUTO: 74.2 % (ref 42.7–76)
NITRITE UR QL STRIP: NEGATIVE
NRBC BLD AUTO-RTO: 0 /100 WBC (ref 0–0.2)
NT-PROBNP SERPL-MCNC: 937.6 PG/ML (ref 0–1800)
PH UR STRIP.AUTO: 7 [PH] (ref 5–8)
PLATELET # BLD AUTO: 47 10*3/MM3 (ref 140–450)
PLATELET # BLD AUTO: 74 10*3/MM3 (ref 140–450)
PMV BLD AUTO: 11.4 FL (ref 6–12)
PMV BLD AUTO: 11.5 FL (ref 6–12)
POTASSIUM SERPL-SCNC: 4.8 MMOL/L (ref 3.5–5.2)
POTASSIUM SERPL-SCNC: 4.9 MMOL/L (ref 3.5–5.2)
PROCALCITONIN SERPL-MCNC: 1.69 NG/ML (ref 0–0.25)
PROT SERPL-MCNC: 4.9 G/DL (ref 6–8.5)
PROT UR QL STRIP: ABNORMAL
PROTHROMBIN TIME: 20.2 SECONDS (ref 11.8–14.8)
RBC # BLD AUTO: 2.93 10*6/MM3 (ref 4.14–5.8)
RBC # BLD AUTO: 3.77 10*6/MM3 (ref 4.14–5.8)
RBC # UR STRIP: ABNORMAL /HPF
REF LAB TEST METHOD: ABNORMAL
RHINOVIRUS RNA SPEC NAA+PROBE: NOT DETECTED
RSV RNA NPH QL NAA+NON-PROBE: NOT DETECTED
SARS-COV-2 RNA RESP QL NAA+PROBE: DETECTED
SODIUM SERPL-SCNC: 140 MMOL/L (ref 136–145)
SODIUM SERPL-SCNC: 141 MMOL/L (ref 136–145)
SP GR UR STRIP: 1.02 (ref 1–1.03)
SQUAMOUS #/AREA URNS HPF: ABNORMAL /HPF
UROBILINOGEN UR QL STRIP: ABNORMAL
WBC # UR STRIP: ABNORMAL /HPF
WBC NRBC COR # BLD AUTO: 3.9 10*3/MM3 (ref 3.4–10.8)
WBC NRBC COR # BLD AUTO: 6.68 10*3/MM3 (ref 3.4–10.8)

## 2025-02-18 PROCEDURE — 99221 1ST HOSP IP/OBS SF/LOW 40: CPT | Performed by: UROLOGY

## 2025-02-18 PROCEDURE — 93010 ELECTROCARDIOGRAM REPORT: CPT | Performed by: EMERGENCY MEDICINE

## 2025-02-18 PROCEDURE — 94664 DEMO&/EVAL PT USE INHALER: CPT

## 2025-02-18 PROCEDURE — 0TCB8ZZ EXTIRPATION OF MATTER FROM BLADDER, VIA NATURAL OR ARTIFICIAL OPENING ENDOSCOPIC: ICD-10-PCS | Performed by: UROLOGY

## 2025-02-18 PROCEDURE — 25010000002 GLYCOPYRROLATE 0.4 MG/2ML SOLUTION

## 2025-02-18 PROCEDURE — 25010000002 DEXAMETHASONE PER 1 MG

## 2025-02-18 PROCEDURE — 93005 ELECTROCARDIOGRAM TRACING: CPT | Performed by: EMERGENCY MEDICINE

## 2025-02-18 PROCEDURE — 87086 URINE CULTURE/COLONY COUNT: CPT | Performed by: FAMILY MEDICINE

## 2025-02-18 PROCEDURE — 25810000003 LACTATED RINGERS PER 1000 ML

## 2025-02-18 PROCEDURE — 83605 ASSAY OF LACTIC ACID: CPT | Performed by: EMERGENCY MEDICINE

## 2025-02-18 PROCEDURE — 85025 COMPLETE CBC W/AUTO DIFF WBC: CPT | Performed by: FAMILY MEDICINE

## 2025-02-18 PROCEDURE — C1726 CATH, BAL DIL, NON-VASCULAR: HCPCS | Performed by: UROLOGY

## 2025-02-18 PROCEDURE — 88305 TISSUE EXAM BY PATHOLOGIST: CPT | Performed by: UROLOGY

## 2025-02-18 PROCEDURE — 25010000002 LIDOCAINE PF 2% 2 % SOLUTION

## 2025-02-18 PROCEDURE — 25010000002 VASOPRESSIN 20 UNIT/ML SOLUTION

## 2025-02-18 PROCEDURE — 72131 CT LUMBAR SPINE W/O DYE: CPT

## 2025-02-18 PROCEDURE — 87040 BLOOD CULTURE FOR BACTERIA: CPT | Performed by: EMERGENCY MEDICINE

## 2025-02-18 PROCEDURE — 94640 AIRWAY INHALATION TREATMENT: CPT

## 2025-02-18 PROCEDURE — 0202U NFCT DS 22 TRGT SARS-COV-2: CPT | Performed by: FAMILY MEDICINE

## 2025-02-18 PROCEDURE — 25010000002 PROPOFOL 10 MG/ML EMULSION

## 2025-02-18 PROCEDURE — 25810000003 LACTATED RINGERS SOLUTION: Performed by: EMERGENCY MEDICINE

## 2025-02-18 PROCEDURE — 52001 CYSTO W/IRRG&EVAC MLT CLOTS: CPT | Performed by: UROLOGY

## 2025-02-18 PROCEDURE — 25810000003 SODIUM CHLORIDE 0.9 % SOLUTION: Performed by: FAMILY MEDICINE

## 2025-02-18 PROCEDURE — 87186 SC STD MICRODIL/AGAR DIL: CPT | Performed by: FAMILY MEDICINE

## 2025-02-18 PROCEDURE — 52214 CYSTOSCOPY AND TREATMENT: CPT | Performed by: UROLOGY

## 2025-02-18 PROCEDURE — 83735 ASSAY OF MAGNESIUM: CPT | Performed by: FAMILY MEDICINE

## 2025-02-18 PROCEDURE — 71045 X-RAY EXAM CHEST 1 VIEW: CPT

## 2025-02-18 PROCEDURE — 74176 CT ABD & PELVIS W/O CONTRAST: CPT

## 2025-02-18 PROCEDURE — 83880 ASSAY OF NATRIURETIC PEPTIDE: CPT

## 2025-02-18 PROCEDURE — 85730 THROMBOPLASTIN TIME PARTIAL: CPT | Performed by: FAMILY MEDICINE

## 2025-02-18 PROCEDURE — 0T5C8ZZ DESTRUCTION OF BLADDER NECK, VIA NATURAL OR ARTIFICIAL OPENING ENDOSCOPIC: ICD-10-PCS | Performed by: UROLOGY

## 2025-02-18 PROCEDURE — 25810000003 SODIUM CHLORIDE 0.9 % SOLUTION: Performed by: UROLOGY

## 2025-02-18 PROCEDURE — 80053 COMPREHEN METABOLIC PANEL: CPT | Performed by: FAMILY MEDICINE

## 2025-02-18 PROCEDURE — 36415 COLL VENOUS BLD VENIPUNCTURE: CPT | Performed by: EMERGENCY MEDICINE

## 2025-02-18 PROCEDURE — 70450 CT HEAD/BRAIN W/O DYE: CPT

## 2025-02-18 PROCEDURE — 84145 PROCALCITONIN (PCT): CPT

## 2025-02-18 PROCEDURE — 73523 X-RAY EXAM HIPS BI 5/> VIEWS: CPT

## 2025-02-18 PROCEDURE — 93306 TTE W/DOPPLER COMPLETE: CPT

## 2025-02-18 PROCEDURE — 25810000003 SODIUM CHLORIDE 0.9 % SOLUTION

## 2025-02-18 PROCEDURE — 0T7D8ZZ DILATION OF URETHRA, VIA NATURAL OR ARTIFICIAL OPENING ENDOSCOPIC: ICD-10-PCS | Performed by: UROLOGY

## 2025-02-18 PROCEDURE — 25010000002 ERTAPENEM PER 500 MG: Performed by: EMERGENCY MEDICINE

## 2025-02-18 PROCEDURE — 87077 CULTURE AEROBIC IDENTIFY: CPT | Performed by: FAMILY MEDICINE

## 2025-02-18 PROCEDURE — 81001 URINALYSIS AUTO W/SCOPE: CPT | Performed by: FAMILY MEDICINE

## 2025-02-18 PROCEDURE — 85025 COMPLETE CBC W/AUTO DIFF WBC: CPT

## 2025-02-18 PROCEDURE — 25010000002 ONDANSETRON PER 1 MG

## 2025-02-18 PROCEDURE — 93306 TTE W/DOPPLER COMPLETE: CPT | Performed by: INTERNAL MEDICINE

## 2025-02-18 PROCEDURE — C1769 GUIDE WIRE: HCPCS | Performed by: UROLOGY

## 2025-02-18 PROCEDURE — 85610 PROTHROMBIN TIME: CPT | Performed by: FAMILY MEDICINE

## 2025-02-18 PROCEDURE — 25010000002 SUGAMMADEX 200 MG/2ML SOLUTION

## 2025-02-18 PROCEDURE — P9612 CATHETERIZE FOR URINE SPEC: HCPCS

## 2025-02-18 PROCEDURE — 25010000002 LEVOFLOXACIN PER 250 MG

## 2025-02-18 PROCEDURE — 99285 EMERGENCY DEPT VISIT HI MDM: CPT

## 2025-02-18 RX ORDER — BISACODYL 10 MG
10 SUPPOSITORY, RECTAL RECTAL DAILY PRN
Status: DISCONTINUED | OUTPATIENT
Start: 2025-02-18 | End: 2025-02-28 | Stop reason: HOSPADM

## 2025-02-18 RX ORDER — OXYCODONE AND ACETAMINOPHEN 10; 325 MG/1; MG/1
1 TABLET ORAL EVERY 4 HOURS PRN
Status: DISCONTINUED | OUTPATIENT
Start: 2025-02-18 | End: 2025-02-18 | Stop reason: HOSPADM

## 2025-02-18 RX ORDER — AMOXICILLIN 250 MG
2 CAPSULE ORAL 2 TIMES DAILY
Status: DISCONTINUED | OUTPATIENT
Start: 2025-02-18 | End: 2025-02-28 | Stop reason: HOSPADM

## 2025-02-18 RX ORDER — GUAIFENESIN 600 MG/1
1200 TABLET, EXTENDED RELEASE ORAL 2 TIMES DAILY
Status: DISCONTINUED | OUTPATIENT
Start: 2025-02-18 | End: 2025-02-28 | Stop reason: HOSPADM

## 2025-02-18 RX ORDER — HYDROCODONE BITARTRATE AND ACETAMINOPHEN 5; 325 MG/1; MG/1
1 TABLET ORAL EVERY 4 HOURS PRN
Status: DISCONTINUED | OUTPATIENT
Start: 2025-02-18 | End: 2025-02-28 | Stop reason: HOSPADM

## 2025-02-18 RX ORDER — BISACODYL 5 MG/1
5 TABLET, DELAYED RELEASE ORAL DAILY PRN
Status: DISCONTINUED | OUTPATIENT
Start: 2025-02-18 | End: 2025-02-28 | Stop reason: HOSPADM

## 2025-02-18 RX ORDER — ONDANSETRON 4 MG/1
4 TABLET, ORALLY DISINTEGRATING ORAL EVERY 6 HOURS PRN
Status: DISCONTINUED | OUTPATIENT
Start: 2025-02-18 | End: 2025-02-28 | Stop reason: HOSPADM

## 2025-02-18 RX ORDER — BUDESONIDE AND FORMOTEROL FUMARATE DIHYDRATE 160; 4.5 UG/1; UG/1
2 AEROSOL RESPIRATORY (INHALATION)
Status: DISCONTINUED | OUTPATIENT
Start: 2025-02-18 | End: 2025-02-28 | Stop reason: HOSPADM

## 2025-02-18 RX ORDER — LIDOCAINE HYDROCHLORIDE 20 MG/ML
INJECTION, SOLUTION EPIDURAL; INFILTRATION; INTRACAUDAL; PERINEURAL AS NEEDED
Status: DISCONTINUED | OUTPATIENT
Start: 2025-02-18 | End: 2025-02-18 | Stop reason: SURG

## 2025-02-18 RX ORDER — LEVOFLOXACIN 5 MG/ML
250 INJECTION, SOLUTION INTRAVENOUS EVERY 24 HOURS
Status: DISCONTINUED | OUTPATIENT
Start: 2025-02-18 | End: 2025-02-20

## 2025-02-18 RX ORDER — TAMSULOSIN HYDROCHLORIDE 0.4 MG/1
0.4 CAPSULE ORAL NIGHTLY
Status: DISCONTINUED | OUTPATIENT
Start: 2025-02-18 | End: 2025-02-28 | Stop reason: HOSPADM

## 2025-02-18 RX ORDER — SODIUM CHLORIDE, SODIUM LACTATE, POTASSIUM CHLORIDE, CALCIUM CHLORIDE 600; 310; 30; 20 MG/100ML; MG/100ML; MG/100ML; MG/100ML
75 INJECTION, SOLUTION INTRAVENOUS CONTINUOUS
Status: DISCONTINUED | OUTPATIENT
Start: 2025-02-18 | End: 2025-02-18

## 2025-02-18 RX ORDER — PHENYLEPHRINE HCL IN 0.9% NACL 1 MG/10 ML
SYRINGE (ML) INTRAVENOUS AS NEEDED
Status: DISCONTINUED | OUTPATIENT
Start: 2025-02-18 | End: 2025-02-18 | Stop reason: SURG

## 2025-02-18 RX ORDER — ALBUTEROL SULFATE 90 UG/1
2 INHALANT RESPIRATORY (INHALATION)
Status: DISCONTINUED | OUTPATIENT
Start: 2025-02-18 | End: 2025-02-18 | Stop reason: SDUPTHER

## 2025-02-18 RX ORDER — LIDOCAINE HYDROCHLORIDE 20 MG/ML
JELLY TOPICAL ONCE
Status: DISCONTINUED | OUTPATIENT
Start: 2025-02-18 | End: 2025-02-28 | Stop reason: HOSPADM

## 2025-02-18 RX ORDER — IPRATROPIUM BROMIDE AND ALBUTEROL SULFATE 2.5; .5 MG/3ML; MG/3ML
3 SOLUTION RESPIRATORY (INHALATION) EVERY 6 HOURS PRN
Status: DISCONTINUED | OUTPATIENT
Start: 2025-02-18 | End: 2025-02-28 | Stop reason: HOSPADM

## 2025-02-18 RX ORDER — SODIUM CHLORIDE 0.9 % (FLUSH) 0.9 %
10 SYRINGE (ML) INJECTION AS NEEDED
Status: DISCONTINUED | OUTPATIENT
Start: 2025-02-18 | End: 2025-02-28 | Stop reason: HOSPADM

## 2025-02-18 RX ORDER — PROPOFOL 10 MG/ML
VIAL (ML) INTRAVENOUS AS NEEDED
Status: DISCONTINUED | OUTPATIENT
Start: 2025-02-18 | End: 2025-02-18 | Stop reason: SURG

## 2025-02-18 RX ORDER — SODIUM CHLORIDE, SODIUM LACTATE, POTASSIUM CHLORIDE, CALCIUM CHLORIDE 600; 310; 30; 20 MG/100ML; MG/100ML; MG/100ML; MG/100ML
INJECTION, SOLUTION INTRAVENOUS CONTINUOUS PRN
Status: DISCONTINUED | OUTPATIENT
Start: 2025-02-18 | End: 2025-02-18 | Stop reason: SURG

## 2025-02-18 RX ORDER — SUCCINYLCHOLINE/SOD CL,ISO/PF 200MG/10ML
SYRINGE (ML) INTRAVENOUS AS NEEDED
Status: DISCONTINUED | OUTPATIENT
Start: 2025-02-18 | End: 2025-02-18 | Stop reason: SURG

## 2025-02-18 RX ORDER — SODIUM CHLORIDE 9 MG/ML
75 INJECTION, SOLUTION INTRAVENOUS CONTINUOUS
Status: DISPENSED | OUTPATIENT
Start: 2025-02-18 | End: 2025-02-19

## 2025-02-18 RX ORDER — ONDANSETRON 2 MG/ML
4 INJECTION INTRAMUSCULAR; INTRAVENOUS EVERY 6 HOURS PRN
Status: DISCONTINUED | OUTPATIENT
Start: 2025-02-18 | End: 2025-02-28 | Stop reason: HOSPADM

## 2025-02-18 RX ORDER — DEXAMETHASONE SODIUM PHOSPHATE 4 MG/ML
INJECTION, SOLUTION INTRA-ARTICULAR; INTRALESIONAL; INTRAMUSCULAR; INTRAVENOUS; SOFT TISSUE AS NEEDED
Status: DISCONTINUED | OUTPATIENT
Start: 2025-02-18 | End: 2025-02-18 | Stop reason: SURG

## 2025-02-18 RX ORDER — ALBUTEROL SULFATE 90 UG/1
2 INHALANT RESPIRATORY (INHALATION)
Status: DISCONTINUED | OUTPATIENT
Start: 2025-02-18 | End: 2025-02-28 | Stop reason: HOSPADM

## 2025-02-18 RX ORDER — LABETALOL HYDROCHLORIDE 5 MG/ML
5 INJECTION, SOLUTION INTRAVENOUS
Status: DISCONTINUED | OUTPATIENT
Start: 2025-02-18 | End: 2025-02-18 | Stop reason: HOSPADM

## 2025-02-18 RX ORDER — CARVEDILOL 3.12 MG/1
3.12 TABLET ORAL 2 TIMES DAILY WITH MEALS
Status: DISCONTINUED | OUTPATIENT
Start: 2025-02-18 | End: 2025-02-28 | Stop reason: HOSPADM

## 2025-02-18 RX ORDER — ROCURONIUM BROMIDE 10 MG/ML
INJECTION, SOLUTION INTRAVENOUS AS NEEDED
Status: DISCONTINUED | OUTPATIENT
Start: 2025-02-18 | End: 2025-02-18 | Stop reason: SURG

## 2025-02-18 RX ORDER — NALOXONE HCL 0.4 MG/ML
0.04 VIAL (ML) INJECTION AS NEEDED
Status: DISCONTINUED | OUTPATIENT
Start: 2025-02-18 | End: 2025-02-18 | Stop reason: HOSPADM

## 2025-02-18 RX ORDER — GLYCOPYRROLATE 0.2 MG/ML
INJECTION INTRAMUSCULAR; INTRAVENOUS AS NEEDED
Status: DISCONTINUED | OUTPATIENT
Start: 2025-02-18 | End: 2025-02-18 | Stop reason: SURG

## 2025-02-18 RX ORDER — VASOPRESSIN 20 [USP'U]/ML
INJECTION, SOLUTION INTRAVENOUS AS NEEDED
Status: DISCONTINUED | OUTPATIENT
Start: 2025-02-18 | End: 2025-02-18 | Stop reason: SURG

## 2025-02-18 RX ORDER — FLUMAZENIL 0.1 MG/ML
0.2 INJECTION INTRAVENOUS AS NEEDED
Status: DISCONTINUED | OUTPATIENT
Start: 2025-02-18 | End: 2025-02-18 | Stop reason: HOSPADM

## 2025-02-18 RX ORDER — ONDANSETRON 2 MG/ML
INJECTION INTRAMUSCULAR; INTRAVENOUS AS NEEDED
Status: DISCONTINUED | OUTPATIENT
Start: 2025-02-18 | End: 2025-02-18 | Stop reason: SURG

## 2025-02-18 RX ORDER — ONDANSETRON 2 MG/ML
4 INJECTION INTRAMUSCULAR; INTRAVENOUS
Status: DISCONTINUED | OUTPATIENT
Start: 2025-02-18 | End: 2025-02-18 | Stop reason: HOSPADM

## 2025-02-18 RX ORDER — SODIUM CHLORIDE 0.9 % (FLUSH) 0.9 %
10 SYRINGE (ML) INJECTION EVERY 12 HOURS SCHEDULED
Status: DISCONTINUED | OUTPATIENT
Start: 2025-02-18 | End: 2025-02-28 | Stop reason: HOSPADM

## 2025-02-18 RX ORDER — SODIUM CHLORIDE 9 MG/ML
40 INJECTION, SOLUTION INTRAVENOUS AS NEEDED
Status: DISCONTINUED | OUTPATIENT
Start: 2025-02-18 | End: 2025-02-28 | Stop reason: HOSPADM

## 2025-02-18 RX ORDER — MAGNESIUM HYDROXIDE 1200 MG/15ML
LIQUID ORAL AS NEEDED
Status: DISCONTINUED | OUTPATIENT
Start: 2025-02-18 | End: 2025-02-18 | Stop reason: HOSPADM

## 2025-02-18 RX ORDER — FENTANYL CITRATE 50 UG/ML
50 INJECTION, SOLUTION INTRAMUSCULAR; INTRAVENOUS
Status: DISCONTINUED | OUTPATIENT
Start: 2025-02-18 | End: 2025-02-18 | Stop reason: HOSPADM

## 2025-02-18 RX ORDER — HYDROMORPHONE HYDROCHLORIDE 1 MG/ML
0.5 INJECTION, SOLUTION INTRAMUSCULAR; INTRAVENOUS; SUBCUTANEOUS
Status: DISCONTINUED | OUTPATIENT
Start: 2025-02-18 | End: 2025-02-18 | Stop reason: HOSPADM

## 2025-02-18 RX ORDER — POLYETHYLENE GLYCOL 3350 17 G/17G
17 POWDER, FOR SOLUTION ORAL DAILY PRN
Status: DISCONTINUED | OUTPATIENT
Start: 2025-02-18 | End: 2025-02-28 | Stop reason: HOSPADM

## 2025-02-18 RX ADMIN — Medication 120 MG: at 13:34

## 2025-02-18 RX ADMIN — ERTAPENEM 1000 MG: 1 INJECTION INTRAMUSCULAR; INTRAVENOUS at 12:54

## 2025-02-18 RX ADMIN — GUAIFENESIN 1200 MG: 600 TABLET ORAL at 20:41

## 2025-02-18 RX ADMIN — PROPOFOL 50 MG: 10 INJECTION, EMULSION INTRAVENOUS at 14:03

## 2025-02-18 RX ADMIN — ONDANSETRON 4 MG: 2 INJECTION INTRAMUSCULAR; INTRAVENOUS at 13:42

## 2025-02-18 RX ADMIN — ROCURONIUM BROMIDE 30 MG: 10 INJECTION, SOLUTION INTRAVENOUS at 14:15

## 2025-02-18 RX ADMIN — SUGAMMADEX 200 MG: 100 INJECTION, SOLUTION INTRAVENOUS at 14:21

## 2025-02-18 RX ADMIN — VASOPRESSIN 1 UNITS: 20 INJECTION, SOLUTION INTRAVENOUS at 13:45

## 2025-02-18 RX ADMIN — Medication 100 MCG: at 14:03

## 2025-02-18 RX ADMIN — BUDESONIDE AND FORMOTEROL FUMARATE DIHYDRATE 2 PUFF: 160; 4.5 AEROSOL RESPIRATORY (INHALATION) at 19:35

## 2025-02-18 RX ADMIN — GLYCOPYRROLATE 0.2 MG: 0.2 INJECTION INTRAMUSCULAR; INTRAVENOUS at 14:04

## 2025-02-18 RX ADMIN — DOCUSATE SODIUM 50 MG AND SENNOSIDES 8.6 MG 2 TABLET: 8.6; 5 TABLET, FILM COATED ORAL at 20:41

## 2025-02-18 RX ADMIN — LIDOCAINE HYDROCHLORIDE 100 MG: 20 INJECTION, SOLUTION EPIDURAL; INFILTRATION; INTRACAUDAL; PERINEURAL at 13:34

## 2025-02-18 RX ADMIN — VASOPRESSIN 1 UNITS: 20 INJECTION, SOLUTION INTRAVENOUS at 13:42

## 2025-02-18 RX ADMIN — SODIUM CHLORIDE, POTASSIUM CHLORIDE, SODIUM LACTATE AND CALCIUM CHLORIDE: 600; 310; 30; 20 INJECTION, SOLUTION INTRAVENOUS at 13:31

## 2025-02-18 RX ADMIN — SODIUM CHLORIDE 1000 ML: 9 INJECTION, SOLUTION INTRAVENOUS at 07:39

## 2025-02-18 RX ADMIN — SODIUM CHLORIDE, POTASSIUM CHLORIDE, SODIUM LACTATE AND CALCIUM CHLORIDE: 600; 310; 30; 20 INJECTION, SOLUTION INTRAVENOUS at 14:06

## 2025-02-18 RX ADMIN — SODIUM CHLORIDE 75 ML/HR: 9 INJECTION, SOLUTION INTRAVENOUS at 21:51

## 2025-02-18 RX ADMIN — LEVOFLOXACIN 250 MG: 250 INJECTION, SOLUTION INTRAVENOUS at 18:45

## 2025-02-18 RX ADMIN — Medication 100 MCG: at 14:07

## 2025-02-18 RX ADMIN — VASOPRESSIN 1 UNITS: 20 INJECTION, SOLUTION INTRAVENOUS at 14:10

## 2025-02-18 RX ADMIN — Medication 200 MCG: at 14:10

## 2025-02-18 RX ADMIN — CARVEDILOL 3.12 MG: 3.12 TABLET, FILM COATED ORAL at 18:45

## 2025-02-18 RX ADMIN — ALBUTEROL SULFATE 2 PUFF: 108 INHALANT RESPIRATORY (INHALATION) at 19:34

## 2025-02-18 RX ADMIN — Medication 200 MCG: at 14:13

## 2025-02-18 RX ADMIN — DEXAMETHASONE SODIUM PHOSPHATE 4 MG: 4 INJECTION INTRA-ARTICULAR; INTRALESIONAL; INTRAMUSCULAR; INTRAVENOUS; SOFT TISSUE at 13:42

## 2025-02-18 RX ADMIN — VASOPRESSIN 1 UNITS: 20 INJECTION, SOLUTION INTRAVENOUS at 14:13

## 2025-02-18 RX ADMIN — SODIUM CHLORIDE 500 ML: 9 INJECTION, SOLUTION INTRAVENOUS at 20:41

## 2025-02-18 RX ADMIN — VASOPRESSIN 1 UNITS: 20 INJECTION, SOLUTION INTRAVENOUS at 13:51

## 2025-02-18 RX ADMIN — SODIUM CHLORIDE 75 ML/HR: 9 INJECTION, SOLUTION INTRAVENOUS at 16:06

## 2025-02-18 RX ADMIN — VASOPRESSIN 1 UNITS: 20 INJECTION, SOLUTION INTRAVENOUS at 14:03

## 2025-02-18 RX ADMIN — Medication 100 MCG: at 13:55

## 2025-02-18 RX ADMIN — IPRATROPIUM BROMIDE 2 PUFF: 17 AEROSOL, METERED RESPIRATORY (INHALATION) at 19:34

## 2025-02-18 RX ADMIN — SODIUM CHLORIDE, POTASSIUM CHLORIDE, SODIUM LACTATE AND CALCIUM CHLORIDE 1000 ML: 600; 310; 30; 20 INJECTION, SOLUTION INTRAVENOUS at 12:50

## 2025-02-18 RX ADMIN — VASOPRESSIN 1 UNITS: 20 INJECTION, SOLUTION INTRAVENOUS at 13:47

## 2025-02-18 RX ADMIN — TAMSULOSIN HYDROCHLORIDE 0.4 MG: 0.4 CAPSULE ORAL at 20:41

## 2025-02-18 RX ADMIN — VASOPRESSIN 1 UNITS: 20 INJECTION, SOLUTION INTRAVENOUS at 13:34

## 2025-02-18 RX ADMIN — PROPOFOL 70 MG: 10 INJECTION, EMULSION INTRAVENOUS at 13:34

## 2025-02-18 NOTE — H&P (VIEW-ONLY)
Urology    Mr. Garcia is 84 y.o. male    REASON FOR CONSULT/CHIEF COMPLAINT: Gross hematuria    HPI  84-year-old male on chronic anticoagulation with Eliquis with known recent major bladder stone per consult earlier this month by Dr. 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  He was brought in the ER today for gross hematuria after reportedly having his catheter pulled on.  He was identified on CT abdomen pelvis without contrast today to have dislodgment of his Perkins catheter with the balloon inflated in the prostatic urethra with a moderate amount of clot within the bladder also with his known bladder stone.  No hydronephrosis on my review.  He does have history of Enterococcus on urine culture 2/4/2025.  It appears he was discharged 2/10/25 on Levaquin.  He is currently receiving a dose of Invanz.  ER physician removed previous catheter and placed a new three-way catheter but urine has remained grossly bloody despite bladder irrigation and urology consultation was requested.  Patient denies current abdominal pain.    I independently visualized and reviewed the patient's prior imaging studies today in clinic and discussed the imaging findings with the patient.      The following portions of the patient's history were reviewed and updated as appropriate: allergies, current medications, past family history, past medical history, past social history, past surgical history and problem list.    Review of Systems   Unable to perform ROS: Acuity of condition       (Not in a hospital admission)        Current Facility-Administered Medications:     dexAMETHasone (DECADRON) tablet 6 mg, 6 mg, Oral, Daily With Breakfast, , Alba, APRN    ertapenem (INVanz) 1,000 mg in sodium chloride 0.9 % 100 mL MBP, 1,000 mg, Intravenous, Once, Kvng Emanuel MD, Last Rate: 200 mL/hr at 02/18/25 1254, 1,000 mg at 02/18/25 1254    lactated ringers bolus 1,000 mL, 1,000 mL, Intravenous, Once,  Kvng Emanuel MD, Last Rate: 2,000 mL/hr at 02/18/25 1250, 1,000 mL at 02/18/25 1250    Lidocaine HCl gel (XYLOCAINE) urethral/mucosal syringe, , Topical, Once, Kvng Emanuel MD    sodium chloride (NS) irrigation solution, , , PRN, Abdias Henley MD, 3,000 mL at 02/18/25 1316    sodium chloride 0.9 % infusion, 75 mL/hr, Intravenous, Continuous, , ANAT Willis    sterile water irrigation solution, , , PRN, Abdias Henley MD, 1,000 mL at 02/18/25 1317    Current Outpatient Medications:     acetaminophen (TYLENOL) 325 MG tablet, Take 2 tablets by mouth Every 4 (Four) Hours As Needed for Mild Pain., Disp: , Rfl:     albuterol sulfate  (90 Base) MCG/ACT inhaler, Inhale 2 puffs 4 (Four) Times a Day., Disp: , Rfl:     apixaban (ELIQUIS) 2.5 MG tablet tablet, Take 1 tablet by mouth 2 (Two) Times a Day., Disp: , Rfl:     bisacodyl (DULCOLAX) 10 MG suppository, Insert 1 suppository into the rectum Daily As Needed for Constipation., Disp: , Rfl:     bisacodyl (DULCOLAX) 5 MG EC tablet, Take 1 tablet by mouth Daily As Needed for Constipation., Disp: , Rfl:     budesonide-formoterol (SYMBICORT) 160-4.5 MCG/ACT inhaler, Inhale 2 puffs 2 (Two) Times a Day., Disp: , Rfl:     carvedilol (COREG) 3.125 MG tablet, Take 1 tablet by mouth 2 (Two) Times a Day With Meals., Disp: , Rfl:     dexAMETHasone (DECADRON) 4 MG tablet, Take  by mouth Daily., Disp: , Rfl:     famotidine (PEPCID) 20 MG tablet, Take 1 tablet by mouth Daily., Disp: , Rfl:     guaiFENesin (MUCINEX) 600 MG 12 hr tablet, Take 2 tablets by mouth 2 (Two) Times a Day., Disp: , Rfl:     HYDROcodone-acetaminophen (NORCO) 5-325 MG per tablet, Take 1 tablet by mouth Every 4 (Four) Hours As Needed for Mild Pain or Moderate Pain., Disp: 14 tablet, Rfl: 0    ipratropium (ATROVENT HFA) 17 MCG/ACT inhaler, Inhale 2 puffs 4 (Four) Times a Day., Disp: , Rfl:     ipratropium-albuterol (DUO-NEB) 0.5-2.5 mg/3 ml nebulizer, Take 3 mL by nebulization Every 6 (Six)  "Hours As Needed for Wheezing., Disp: , Rfl:     nystatin (MYCOSTATIN) 475675 UNIT/GM powder, Apply 1 Application topically to the appropriate area as directed 3 (Three) Times a Day., Disp: , Rfl:     ondansetron ODT (ZOFRAN-ODT) 4 MG disintegrating tablet, Place 1 tablet on the tongue Every 4 (Four) Hours As Needed for Nausea or Vomiting., Disp: , Rfl:     tamsulosin (FLOMAX) 0.4 MG capsule 24 hr capsule, Take 1 capsule by mouth Every Night., Disp: , Rfl:     Past Medical History:   Diagnosis Date    Dementia     GERD (gastroesophageal reflux disease)     Liver disorder        Past Surgical History:   Procedure Laterality Date    HEMORRHOIDECTOMY      HERNIA REPAIR      HIP TROCHANTERIC NAILING WITH INTRAMEDULLARY HIP SCREW Right 8/30/2024    Procedure: HIP TROCHANTERIC NAILING SHORT WITH INTRAMEDULLARY HIP SCREW;  Surgeon: Arcenio Chandra MD;  Location: [facility] OR;  Service: Orthopedics;  Laterality: Right;       Social History     Socioeconomic History    Marital status:    Tobacco Use    Smoking status: Former     Types: Cigarettes, Pipe    Smokeless tobacco: Never   Vaping Use    Vaping status: Never Used   Substance and Sexual Activity    Alcohol use: No    Drug use: Not Currently     Types: Amphetamines     Comment: Quit 40 years ago    Sexual activity: Defer       History reviewed. No pertinent family history.    BP 98/73 (BP Location: Right arm, Patient Position: Sitting)   Pulse 78   Temp 97.6 °F (36.4 °C) (Oral)   Resp 18   Ht 180.3 cm (71\")   Wt 95.1 kg (209 lb 11.2 oz)   SpO2 99%   BMI 29.25 kg/m²     Physical Exam  Patient lying in bed in no apparent distress.  Abdomen obese soft and nontender.  Three-way Perkins catheter in place with dark red urine    Lab Results   Component Value Date    GLUCOSE 143 (H) 02/18/2025    BUN 37 (H) 02/18/2025    CREATININE 2.09 (H) 02/18/2025    EGFRIFNONA 56 (L) 11/04/2017    BCR 17.7 02/18/2025    CO2 20.0 (L) 02/18/2025    CALCIUM 8.4 (L) 02/18/2025    " "ALBUMIN 2.1 (L) 02/18/2025    AST 18 02/18/2025    ALT 17 02/18/2025     Lab Results   Component Value Date    GLUCOSE 143 (H) 02/18/2025    CALCIUM 8.4 (L) 02/18/2025     02/18/2025    K 4.9 02/18/2025    CO2 20.0 (L) 02/18/2025     (H) 02/18/2025    BUN 37 (H) 02/18/2025    CREATININE 2.09 (H) 02/18/2025    EGFRIFNONA 56 (L) 11/04/2017    BCR 17.7 02/18/2025    ANIONGAP 10.0 02/18/2025     Lab Results   Component Value Date    WBC 6.68 02/18/2025    HGB 10.8 (L) 02/18/2025    HCT 34.9 (L) 02/18/2025    MCV 92.6 02/18/2025    PLT 74 (L) 02/18/2025     No results found for: \"PSA\"  No results found for: \"URINECX\"  Brief Urine Lab Results  (Last result in the past 365 days)        Color   Clarity   Blood   Leuk Est   Nitrite   Protein   CREAT   Urine HCG        02/18/25 0740 Red   Cloudy   Large (3+)   Negative   Negative   >=300 mg/dL (3+)                   Imaging Results (Last 7 Days)       Procedure Component Value Units Date/Time    US Venous Doppler Upper Extremity Left (duplex) [[id number]] Resulted: 02/18/25 1239     Updated: 02/18/25 1301    CT Lumbar Spine Without Contrast [[id number]] Collected: 02/18/25 0759     Updated: 02/18/25 0809    Narrative:      EXAMINATION: CT LUMBAR SPINE WO CONTRAST-  2/18/2025 7:59 AM     HISTORY: Fall injury low back pain     COMPARISON: 8/29/2024     DLP: 2068.45 mGy.cm     In order to have a CT radiation dose as low as reasonably achievable,  Automated Exposure Control was utilized for adjustment of the mA and/or  KV according to patient size.     TECHNIQUE: Serial helical tomographic images of the lumbar spine were  obtained without the use of intravenous contrast. Additionally, sagittal  and coronal reformatted images were provided for review.     FINDINGS:  Small ribs at what is presumed to be L1. I don't see any definite  evidence of an acute fracture deformity. Mild degenerative change at the  SI joints, RIGHT greater than LEFT. Multilevel facet hypertrophy.   "   Multilevel degenerative endplate osteophytes with marginal osteophytes  throughout the visualized portion of the lumbar spine. I don't see a  definite discrete fracture deformity. Levoconvex curvature in the lumbar  spine is noted. Asymmetric to space height loss on the RIGHT at L3-L4.     There is some vacuum disc phenomenon at L5-S1 with moderate disc space  height loss and a diffuse disc bulge with disc osteophyte complexes  extending into the inferior aspect of the bilateral foramina  contributing to at least moderate RIGHT and likely severe LEFT foraminal  narrowing. Moderate facet hypertrophy at L4-L5 with mild diffuse disc  bulge contributing to at least mild and likely moderate canal narrowing  as well as mild LEFT and moderate RIGHT foraminal narrowing. Diffuse  disc bulge at L3-L4 with posterior element hypertrophy and findings  contribute to at least mild thecal sac narrowing. Moderate RIGHT  foraminal narrowing at L2-L3.     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  Probable nonobstructing nephroliths in the LEFT portion of the horseshoe  kidney. In addition there is high density seen dependently within the  urinary bladder, suggestive of blood products. Stranding in the  retroperitoneum on the LEFT is also noted. Please see the CT abdomen  pelvis from today for further details.       Impression:         1.  Multilevel degenerative disc disease and facet arthropathy as  discussed above but no definite acute osseous abnormality.     2.  Please see the CT abdomen and pelvis report for details regarding  the kidneys and urinary bladder as well as retroperitoneal stranding.        This report was signed and finalized on 2/18/2025 8:06 AM by Dr. Alex Mckeon MD.       CT Abdomen Pelvis Without Contrast [[id number]] Collected: 02/18/25 0742     Updated: 02/18/25 0802    Narrative:      CT ABDOMEN PELVIS WO CONTRAST- 2/18/2025 6:04 AM     HISTORY: Fall with fall with abdominal pain  please also comment on hips  and sacrum      COMPARISON: 2/4/2025     DLP: 2068.45 mGy.cm . All CT scans are performed using dose optimization  techniques as appropriate to the performed exam and including at least  one of the following: Automated exposure control, adjustment of the mA  and/or kV according to size, and the use of the iterative reconstruction  technique.     TECHNIQUE: Noncontrast enhanced images of the abdomen and pelvis  obtained without oral contrast.     FINDINGS:  There is a chronically elevated right hemidiaphragm with right basilar  atelectasis. Trace left effusion is present. Lung bases are otherwise  clear. The base of the heart is remarkable for coronary calcifications.  No pericardial effusion. Bilateral gynecomastia is present..     LIVER: The liver is cirrhotic in morphology. No discrete hepatic mass.  There is free fluid in the subdiaphragmatic space on the right..     BILIARY SYSTEM: The gallbladder is unremarkable. No intrahepatic or  extrahepatic ductal dilatation.     PANCREAS: No focal pancreatic lesion.     SPLEEN: No evidence of splenic enlargement. There is a suspected  splenule in the subdiaphragmatic space on the left. There is a small  amount of free fluid in the left subdiaphragmatic space. Varicosities  noted in the left upper quadrant..     KIDNEYS AND ADRENALS: The adrenals are unremarkable. There is a  horseshoe type kidney with partial fusion along the lower pole of the  kidneys and abnormal orientation of both kidneys. There is moderate  dilatation of the upper tracts of both kidneys with moderate dilatation  of the proximal and mid segment of both ureters. Mild distal left  periureteral stranding is present. No evidence of ureteral stone.. The  urinary bladder is abnormal in appearance with a large bladder stone  dependently within the bladder. There is also increased density  dependently within the bladder consistent with blood products. There is  focal nodular  bladder wall thickening along the left anterolateral  aspect of the bladder either representing a small neoplasm or adherent  clot. There is thickening of the urinary bladder wall with moderate  distention and with perivesical stranding suggesting inflammation. A  Perkins catheter is in place but has been retracted into the prostatic  urethra with the balloon dilated within the prostatic segment of the  urethra..     RETROPERITONEUM: No mass, lymphadenopathy or hemorrhage.     GI TRACT: Moderate constipation is present with a large volume of stool  throughout the colon. The small bowel is normal in distribution and  appearance. No gastric wall thickening or gastric outlet obstruction..  The appendix is visualized and unremarkable.     OTHER: There is no mesenteric mass, lymphadenopathy or fluid collection.  The osseous structures and soft tissues demonstrate no worrisome  lesions. Mild third spacing of fluid is noted with stranding within the  more dependent right-sided panniculus and skin thickening.     PELVIS: The prostate gland is enlarged. There is no free fluid in the  pelvis.. A bladder stone is present. There is air within the urinary  bladder. There is stranding within the perivesical space..       Impression:      1. The urinary bladder is abnormal in appearance with moderate  distention and stranding in the perivesical space as well as an  air-fluid level. There is heterogeneous increased density dependently  within the bladder consistent with blood products/clot. Along the left  anterolateral aspect of the bladder there is a focus of hyperdensity  either representing a adherent clot versus a small neoplasm. This is not  definitely appreciated on previous CT of 2 weeks earlier favoring  adherent clot. The Perkins catheter has been displaced/retracted into the  prostatic urethra with the balloon inflated within the prostatic  urethra.  2. 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  of  both kidneys and both ureters dilated in the proximal and mid segment  with mild periureteral stranding. I suspect this may represent some back  pressure related to the urinary bladder distention and displacement of  the Perkins catheter. No perinephric fluid collection present.  3. Chronically elevated right diaphragm with right basilar atelectasis.  4. There is a small amount of free fluid in the subdiaphragmatic space  bilaterally.  5. Liver cirrhotic in morphology. Normal appearance of the gallbladder.  6. Moderate constipation. Otherwise normal bowel gas pattern.  7. Mild third spacing of fluid.  8. A bladder stone is noted dependently within the urinary bladder..           This report was signed and finalized on 2/18/2025 7:58 AM by Dr. Deonte Olivo MD.       CT Head Without Contrast [[id number]] Collected: 02/18/25 0724     Updated: 02/18/25 0728    Narrative:      EXAM: CT HEAD WO CONTRAST-      DATE: 2/18/2025 6:04 AM     HISTORY: Altered mental state       COMPARISON: 8/29/2021.     DOSE LENGTH PRODUCT: 802.1 mGy.cm  Automated exposure control was also  utilized to decrease patient radiation dose.     TECHNIQUE: Unenhanced CT images obtained from vertex to skull base with  multiplanar reformats.     FINDINGS:  There is no acute intracranial hemorrhage, midline shift, mass effect,  or hydrocephalus. There is no CT evidence for acute infarct.     There are chronic changes with volume loss and chronic small vessel  ischemic change of the periventricular white matter.      SOFT TISSUES: The scalp soft tissues are unremarkable.        SINUS:The visualized paranasal sinuses and mastoid air cells are clear.      ORBITS: The visualized orbits and globes are unremarkable. There is  bilateral lens extraction.          Impression:      1. Chronic changes and no acute intracranial findings.      This report was signed and finalized on 2/18/2025 7:25 AM by Nima Villatoro.       XR Chest 1 View [[id number]]  Collected: 02/18/25 0656     Updated: 02/18/25 0701    Narrative:      EXAMINATION: XR CHEST 1 VW-     2/18/2025 5:30 AM     HISTORY: Cough     A frontal projection of the chest is compared with the previous study  dated 2/5/2025.     The lungs are poorly expanded, worse on the right side.     There is marked elevation right diaphragm similar to the previous study.  There is underlying hepatic flexure of the colon.     There are coarse interstitial changes in the lungs bilaterally which  probably represent a chronic process/fibrosis.     There is no pleural effusion, pulmonary congestion or pneumothorax.     The heart size is not optimally evaluated due to the AP projection.  Atheromatous change of thoracic aorta are noted.     There is no acute bony abnormality.       Impression:      1. No change since the previous study. No active cardiopulmonary  disease. Persistent chronic interstitial changes/fibrosis.        This report was signed and finalized on 2/18/2025 6:58 AM by Dr. Shahram Patel MD.       XR Hips Bilateral With or Without Pelvis 5 View [[id number]] Collected: 02/18/25 0651     Updated: 02/18/25 0659    Narrative:      EXAMINATION: XR HIPS BILATERAL W OR WO PELVIS 5 VIEW-     2/18/2025 5:20 AM     HISTORY: Fall injury     A frontal projection of the pelvis and frontal and lateral views of the  right and left hip are obtained. Comparison is made with the previous  study dated 8/29/2024.     There is no evidence of an acute fracture or dislocation.     Hardware internal fixation of the intertrochanteric fracture of the  right proximal femur is identified. Fracture lines are partially visible  with some callus formation. No hardware complication.     There is moderate diffuse osteopenia which may obscure a subtle  nondisplaced fracture.     A rounded radiopaque foreign body in the pelvis is reidentified. This  may represent a urinary bladder stone?.       Impression:      1. No acute fracture or  dislocation.  2. Hardware internal fixation of the right proximal femur. No hardware  complication.                    This report was signed and finalized on 2/18/2025 6:56 AM by Dr. Shahram Patel MD.                 Assessment and Plan  Gross hematuria with clot retention, previous dislodgment of Perkins catheter into prostatic urethra on chronic anticoagulation, moderate amount of clot within bladder with known bladder stone.  Patient tested positive for COVID-19 in the ER this morning.  Given large amount of clot within the bladder and his anticoagulation and history of Perkins catheter trauma, I recommended proceeding emergently to the operating room for cystoscopy under anesthesia, clot evacuation, fulguration of bleeding as indicated.  I discussed with the patient and his family that I did not recommend proceeding with elective cystolitholapaxy at this time in the presence of infection.  He will need deferred management of the bladder stone until his infection is resolved and his hematuria has improved.  Hold anticoagulation as medically able.  We discussed risks of the procedure including but not limited to infection, bleeding, need for additional procedures, injury to urethra into bladder, recurrent/persistent hematuria, inability to treat the stone today due to history of UTI, complications of anesthesia.  He and family voiced understanding and provided informed consent to proceed.      (Please note that portions of this note were completed with a voice recognition program.)  Abdias Henley MD  02/18/25  13:18 CST

## 2025-02-18 NOTE — NURSING NOTE
CRITICAL VALUE FOR LACTIC ACID OF 3.3 RECEIVED BY THIS NURSE FROM  AND VALUE WAS IMMEDIATELY VERBALLY FORWARDED TO RONNI THE CRNA AND DR YA

## 2025-02-18 NOTE — ANESTHESIA PROCEDURE NOTES
Airway  Urgency: elective    Date/Time: 2/18/2025 1:35 PM  Airway not difficult    General Information and Staff    Patient location during procedure: OR  CRNA/CAA: Carlos Olivo CRNA    Indications and Patient Condition    Preoxygenated: yes  Mask difficulty assessment: 1 - vent by mask    Final Airway Details  Final airway type: endotracheal airway      Successful airway: ETT  Cuffed: yes   Successful intubation technique: video laryngoscopy  Endotracheal tube insertion site: oral  Blade: Bourne  Blade size: 4  ETT size (mm): 7.5  Cormack-Lehane Classification: grade I - full view of glottis  Placement verified by: chest auscultation   Cuff volume (mL): 7  Measured from: lips  ETT/EBT  to lips (cm): 21  Number of attempts at approach: 1  Assessment: lips, teeth, and gum same as pre-op and atraumatic intubation

## 2025-02-18 NOTE — ANESTHESIA PREPROCEDURE EVALUATION
Anesthesia Evaluation     Patient summary reviewed and Nursing notes reviewed                Airway   Dental    (+) edentulous    Pulmonary    (+) a smoker Former,  Cardiovascular   Exercise tolerance: poor (<4 METS)    PT is on anticoagulation therapy        Neuro/Psych  GI/Hepatic/Renal/Endo    (+) obesity, GERD, liver disease cirrhosis, renal disease- CRI and ARF    Musculoskeletal     Abdominal    Substance History   (+) drug use     OB/GYN          Other                          Anesthesia Plan    ASA 4 - emergent     general     (From nursing home; COVID positive--increases risk for this surgery and hospitalization. AMS over baseline dementia. Blood after de trauma. Note lactate elevated and acute renal insufficiency. Anemia, tcp     Discussed with son (daughter was not available). Isidra witness )  intravenous induction           CODE STATUS:

## 2025-02-18 NOTE — CONSULTS
Urology    Mr. Garcia is 84 y.o. male    REASON FOR CONSULT/CHIEF COMPLAINT: Gross hematuria    HPI  84-year-old male on chronic anticoagulation with Eliquis with known recent major bladder stone per consult earlier this month by Dr. 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  He was brought in the ER today for gross hematuria after reportedly having his catheter pulled on.  He was identified on CT abdomen pelvis without contrast today to have dislodgment of his Perkins catheter with the balloon inflated in the prostatic urethra with a moderate amount of clot within the bladder also with his known bladder stone.  No hydronephrosis on my review.  He does have history of Enterococcus on urine culture 2/4/2025.  It appears he was discharged 2/10/25 on Levaquin.  He is currently receiving a dose of Invanz.  ER physician removed previous catheter and placed a new three-way catheter but urine has remained grossly bloody despite bladder irrigation and urology consultation was requested.  Patient denies current abdominal pain.    I independently visualized and reviewed the patient's prior imaging studies today in clinic and discussed the imaging findings with the patient.      The following portions of the patient's history were reviewed and updated as appropriate: allergies, current medications, past family history, past medical history, past social history, past surgical history and problem list.    Review of Systems   Unable to perform ROS: Acuity of condition       (Not in a hospital admission)        Current Facility-Administered Medications:     dexAMETHasone (DECADRON) tablet 6 mg, 6 mg, Oral, Daily With Breakfast, , Alba, APRN    ertapenem (INVanz) 1,000 mg in sodium chloride 0.9 % 100 mL MBP, 1,000 mg, Intravenous, Once, Kvng Emanuel MD, Last Rate: 200 mL/hr at 02/18/25 1254, 1,000 mg at 02/18/25 1254    lactated ringers bolus 1,000 mL, 1,000 mL, Intravenous, Once,  Kvng Emanuel MD, Last Rate: 2,000 mL/hr at 02/18/25 1250, 1,000 mL at 02/18/25 1250    Lidocaine HCl gel (XYLOCAINE) urethral/mucosal syringe, , Topical, Once, Kvng Emanuel MD    sodium chloride (NS) irrigation solution, , , PRN, Abdias Henley MD, 3,000 mL at 02/18/25 1316    sodium chloride 0.9 % infusion, 75 mL/hr, Intravenous, Continuous, , ANAT Willis    sterile water irrigation solution, , , PRN, Abdias Henley MD, 1,000 mL at 02/18/25 1317    Current Outpatient Medications:     acetaminophen (TYLENOL) 325 MG tablet, Take 2 tablets by mouth Every 4 (Four) Hours As Needed for Mild Pain., Disp: , Rfl:     albuterol sulfate  (90 Base) MCG/ACT inhaler, Inhale 2 puffs 4 (Four) Times a Day., Disp: , Rfl:     apixaban (ELIQUIS) 2.5 MG tablet tablet, Take 1 tablet by mouth 2 (Two) Times a Day., Disp: , Rfl:     bisacodyl (DULCOLAX) 10 MG suppository, Insert 1 suppository into the rectum Daily As Needed for Constipation., Disp: , Rfl:     bisacodyl (DULCOLAX) 5 MG EC tablet, Take 1 tablet by mouth Daily As Needed for Constipation., Disp: , Rfl:     budesonide-formoterol (SYMBICORT) 160-4.5 MCG/ACT inhaler, Inhale 2 puffs 2 (Two) Times a Day., Disp: , Rfl:     carvedilol (COREG) 3.125 MG tablet, Take 1 tablet by mouth 2 (Two) Times a Day With Meals., Disp: , Rfl:     dexAMETHasone (DECADRON) 4 MG tablet, Take  by mouth Daily., Disp: , Rfl:     famotidine (PEPCID) 20 MG tablet, Take 1 tablet by mouth Daily., Disp: , Rfl:     guaiFENesin (MUCINEX) 600 MG 12 hr tablet, Take 2 tablets by mouth 2 (Two) Times a Day., Disp: , Rfl:     HYDROcodone-acetaminophen (NORCO) 5-325 MG per tablet, Take 1 tablet by mouth Every 4 (Four) Hours As Needed for Mild Pain or Moderate Pain., Disp: 14 tablet, Rfl: 0    ipratropium (ATROVENT HFA) 17 MCG/ACT inhaler, Inhale 2 puffs 4 (Four) Times a Day., Disp: , Rfl:     ipratropium-albuterol (DUO-NEB) 0.5-2.5 mg/3 ml nebulizer, Take 3 mL by nebulization Every 6 (Six)  "Hours As Needed for Wheezing., Disp: , Rfl:     nystatin (MYCOSTATIN) 831328 UNIT/GM powder, Apply 1 Application topically to the appropriate area as directed 3 (Three) Times a Day., Disp: , Rfl:     ondansetron ODT (ZOFRAN-ODT) 4 MG disintegrating tablet, Place 1 tablet on the tongue Every 4 (Four) Hours As Needed for Nausea or Vomiting., Disp: , Rfl:     tamsulosin (FLOMAX) 0.4 MG capsule 24 hr capsule, Take 1 capsule by mouth Every Night., Disp: , Rfl:     Past Medical History:   Diagnosis Date    Dementia     GERD (gastroesophageal reflux disease)     Liver disorder        Past Surgical History:   Procedure Laterality Date    HEMORRHOIDECTOMY      HERNIA REPAIR      HIP TROCHANTERIC NAILING WITH INTRAMEDULLARY HIP SCREW Right 8/30/2024    Procedure: HIP TROCHANTERIC NAILING SHORT WITH INTRAMEDULLARY HIP SCREW;  Surgeon: Arcenio Chandra MD;  Location: [facility];  Service: Orthopedics;  Laterality: Right;       Social History     Socioeconomic History    Marital status:    Tobacco Use    Smoking status: Former     Types: Cigarettes, Pipe    Smokeless tobacco: Never   Vaping Use    Vaping status: Never Used   Substance and Sexual Activity    Alcohol use: No    Drug use: Not Currently     Types: Amphetamines     Comment: Quit 40 years ago    Sexual activity: Defer       History reviewed. No pertinent family history.    BP 98/73 (BP Location: Right arm, Patient Position: Sitting)   Pulse 78   Temp 97.6 °F (36.4 °C) (Oral)   Resp 18   Ht 180.3 cm (71\")   Wt 95.1 kg (209 lb 11.2 oz)   SpO2 99%   BMI 29.25 kg/m²     Physical Exam  Patient lying in bed in no apparent distress.  Abdomen obese soft and nontender.  Three-way Perkins catheter in place with dark red urine    Lab Results   Component Value Date    GLUCOSE 143 (H) 02/18/2025    BUN 37 (H) 02/18/2025    CREATININE 2.09 (H) 02/18/2025    EGFRIFNONA 56 (L) 11/04/2017    BCR 17.7 02/18/2025    CO2 20.0 (L) 02/18/2025    CALCIUM 8.4 (L) 02/18/2025    " "ALBUMIN 2.1 (L) 02/18/2025    AST 18 02/18/2025    ALT 17 02/18/2025     Lab Results   Component Value Date    GLUCOSE 143 (H) 02/18/2025    CALCIUM 8.4 (L) 02/18/2025     02/18/2025    K 4.9 02/18/2025    CO2 20.0 (L) 02/18/2025     (H) 02/18/2025    BUN 37 (H) 02/18/2025    CREATININE 2.09 (H) 02/18/2025    EGFRIFNONA 56 (L) 11/04/2017    BCR 17.7 02/18/2025    ANIONGAP 10.0 02/18/2025     Lab Results   Component Value Date    WBC 6.68 02/18/2025    HGB 10.8 (L) 02/18/2025    HCT 34.9 (L) 02/18/2025    MCV 92.6 02/18/2025    PLT 74 (L) 02/18/2025     No results found for: \"PSA\"  No results found for: \"URINECX\"  Brief Urine Lab Results  (Last result in the past 365 days)        Color   Clarity   Blood   Leuk Est   Nitrite   Protein   CREAT   Urine HCG        02/18/25 0740 Red   Cloudy   Large (3+)   Negative   Negative   >=300 mg/dL (3+)                   Imaging Results (Last 7 Days)       Procedure Component Value Units Date/Time    US Venous Doppler Upper Extremity Left (duplex) [[id number]] Resulted: 02/18/25 1239     Updated: 02/18/25 1301    CT Lumbar Spine Without Contrast [[id number]] Collected: 02/18/25 0759     Updated: 02/18/25 0809    Narrative:      EXAMINATION: CT LUMBAR SPINE WO CONTRAST-  2/18/2025 7:59 AM     HISTORY: Fall injury low back pain     COMPARISON: 8/29/2024     DLP: 2068.45 mGy.cm     In order to have a CT radiation dose as low as reasonably achievable,  Automated Exposure Control was utilized for adjustment of the mA and/or  KV according to patient size.     TECHNIQUE: Serial helical tomographic images of the lumbar spine were  obtained without the use of intravenous contrast. Additionally, sagittal  and coronal reformatted images were provided for review.     FINDINGS:  Small ribs at what is presumed to be L1. I don't see any definite  evidence of an acute fracture deformity. Mild degenerative change at the  SI joints, RIGHT greater than LEFT. Multilevel facet hypertrophy.   "   Multilevel degenerative endplate osteophytes with marginal osteophytes  throughout the visualized portion of the lumbar spine. I don't see a  definite discrete fracture deformity. Levoconvex curvature in the lumbar  spine is noted. Asymmetric to space height loss on the RIGHT at L3-L4.     There is some vacuum disc phenomenon at L5-S1 with moderate disc space  height loss and a diffuse disc bulge with disc osteophyte complexes  extending into the inferior aspect of the bilateral foramina  contributing to at least moderate RIGHT and likely severe LEFT foraminal  narrowing. Moderate facet hypertrophy at L4-L5 with mild diffuse disc  bulge contributing to at least mild and likely moderate canal narrowing  as well as mild LEFT and moderate RIGHT foraminal narrowing. Diffuse  disc bulge at L3-L4 with posterior element hypertrophy and findings  contribute to at least mild thecal sac narrowing. Moderate RIGHT  foraminal narrowing at L2-L3.     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  Probable nonobstructing nephroliths in the LEFT portion of the horseshoe  kidney. In addition there is high density seen dependently within the  urinary bladder, suggestive of blood products. Stranding in the  retroperitoneum on the LEFT is also noted. Please see the CT abdomen  pelvis from today for further details.       Impression:         1.  Multilevel degenerative disc disease and facet arthropathy as  discussed above but no definite acute osseous abnormality.     2.  Please see the CT abdomen and pelvis report for details regarding  the kidneys and urinary bladder as well as retroperitoneal stranding.        This report was signed and finalized on 2/18/2025 8:06 AM by Dr. Alex Mckeon MD.       CT Abdomen Pelvis Without Contrast [[id number]] Collected: 02/18/25 0742     Updated: 02/18/25 0802    Narrative:      CT ABDOMEN PELVIS WO CONTRAST- 2/18/2025 6:04 AM     HISTORY: Fall with fall with abdominal pain  please also comment on hips  and sacrum      COMPARISON: 2/4/2025     DLP: 2068.45 mGy.cm . All CT scans are performed using dose optimization  techniques as appropriate to the performed exam and including at least  one of the following: Automated exposure control, adjustment of the mA  and/or kV according to size, and the use of the iterative reconstruction  technique.     TECHNIQUE: Noncontrast enhanced images of the abdomen and pelvis  obtained without oral contrast.     FINDINGS:  There is a chronically elevated right hemidiaphragm with right basilar  atelectasis. Trace left effusion is present. Lung bases are otherwise  clear. The base of the heart is remarkable for coronary calcifications.  No pericardial effusion. Bilateral gynecomastia is present..     LIVER: The liver is cirrhotic in morphology. No discrete hepatic mass.  There is free fluid in the subdiaphragmatic space on the right..     BILIARY SYSTEM: The gallbladder is unremarkable. No intrahepatic or  extrahepatic ductal dilatation.     PANCREAS: No focal pancreatic lesion.     SPLEEN: No evidence of splenic enlargement. There is a suspected  splenule in the subdiaphragmatic space on the left. There is a small  amount of free fluid in the left subdiaphragmatic space. Varicosities  noted in the left upper quadrant..     KIDNEYS AND ADRENALS: The adrenals are unremarkable. There is a  horseshoe type kidney with partial fusion along the lower pole of the  kidneys and abnormal orientation of both kidneys. There is moderate  dilatation of the upper tracts of both kidneys with moderate dilatation  of the proximal and mid segment of both ureters. Mild distal left  periureteral stranding is present. No evidence of ureteral stone.. The  urinary bladder is abnormal in appearance with a large bladder stone  dependently within the bladder. There is also increased density  dependently within the bladder consistent with blood products. There is  focal nodular  bladder wall thickening along the left anterolateral  aspect of the bladder either representing a small neoplasm or adherent  clot. There is thickening of the urinary bladder wall with moderate  distention and with perivesical stranding suggesting inflammation. A  Perkins catheter is in place but has been retracted into the prostatic  urethra with the balloon dilated within the prostatic segment of the  urethra..     RETROPERITONEUM: No mass, lymphadenopathy or hemorrhage.     GI TRACT: Moderate constipation is present with a large volume of stool  throughout the colon. The small bowel is normal in distribution and  appearance. No gastric wall thickening or gastric outlet obstruction..  The appendix is visualized and unremarkable.     OTHER: There is no mesenteric mass, lymphadenopathy or fluid collection.  The osseous structures and soft tissues demonstrate no worrisome  lesions. Mild third spacing of fluid is noted with stranding within the  more dependent right-sided panniculus and skin thickening.     PELVIS: The prostate gland is enlarged. There is no free fluid in the  pelvis.. A bladder stone is present. There is air within the urinary  bladder. There is stranding within the perivesical space..       Impression:      1. The urinary bladder is abnormal in appearance with moderate  distention and stranding in the perivesical space as well as an  air-fluid level. There is heterogeneous increased density dependently  within the bladder consistent with blood products/clot. Along the left  anterolateral aspect of the bladder there is a focus of hyperdensity  either representing a adherent clot versus a small neoplasm. This is not  definitely appreciated on previous CT of 2 weeks earlier favoring  adherent clot. The Perkins catheter has been displaced/retracted into the  prostatic urethra with the balloon inflated within the prostatic  urethra.  2. 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  of  both kidneys and both ureters dilated in the proximal and mid segment  with mild periureteral stranding. I suspect this may represent some back  pressure related to the urinary bladder distention and displacement of  the Perkins catheter. No perinephric fluid collection present.  3. Chronically elevated right diaphragm with right basilar atelectasis.  4. There is a small amount of free fluid in the subdiaphragmatic space  bilaterally.  5. Liver cirrhotic in morphology. Normal appearance of the gallbladder.  6. Moderate constipation. Otherwise normal bowel gas pattern.  7. Mild third spacing of fluid.  8. A bladder stone is noted dependently within the urinary bladder..           This report was signed and finalized on 2/18/2025 7:58 AM by Dr. Deonte Olivo MD.       CT Head Without Contrast [[id number]] Collected: 02/18/25 0724     Updated: 02/18/25 0728    Narrative:      EXAM: CT HEAD WO CONTRAST-      DATE: 2/18/2025 6:04 AM     HISTORY: Altered mental state       COMPARISON: 8/29/2021.     DOSE LENGTH PRODUCT: 802.1 mGy.cm  Automated exposure control was also  utilized to decrease patient radiation dose.     TECHNIQUE: Unenhanced CT images obtained from vertex to skull base with  multiplanar reformats.     FINDINGS:  There is no acute intracranial hemorrhage, midline shift, mass effect,  or hydrocephalus. There is no CT evidence for acute infarct.     There are chronic changes with volume loss and chronic small vessel  ischemic change of the periventricular white matter.      SOFT TISSUES: The scalp soft tissues are unremarkable.        SINUS:The visualized paranasal sinuses and mastoid air cells are clear.      ORBITS: The visualized orbits and globes are unremarkable. There is  bilateral lens extraction.          Impression:      1. Chronic changes and no acute intracranial findings.      This report was signed and finalized on 2/18/2025 7:25 AM by Nima Villatoro.       XR Chest 1 View [[id number]]  Collected: 02/18/25 0656     Updated: 02/18/25 0701    Narrative:      EXAMINATION: XR CHEST 1 VW-     2/18/2025 5:30 AM     HISTORY: Cough     A frontal projection of the chest is compared with the previous study  dated 2/5/2025.     The lungs are poorly expanded, worse on the right side.     There is marked elevation right diaphragm similar to the previous study.  There is underlying hepatic flexure of the colon.     There are coarse interstitial changes in the lungs bilaterally which  probably represent a chronic process/fibrosis.     There is no pleural effusion, pulmonary congestion or pneumothorax.     The heart size is not optimally evaluated due to the AP projection.  Atheromatous change of thoracic aorta are noted.     There is no acute bony abnormality.       Impression:      1. No change since the previous study. No active cardiopulmonary  disease. Persistent chronic interstitial changes/fibrosis.        This report was signed and finalized on 2/18/2025 6:58 AM by Dr. Shahram Patel MD.       XR Hips Bilateral With or Without Pelvis 5 View [[id number]] Collected: 02/18/25 0651     Updated: 02/18/25 0659    Narrative:      EXAMINATION: XR HIPS BILATERAL W OR WO PELVIS 5 VIEW-     2/18/2025 5:20 AM     HISTORY: Fall injury     A frontal projection of the pelvis and frontal and lateral views of the  right and left hip are obtained. Comparison is made with the previous  study dated 8/29/2024.     There is no evidence of an acute fracture or dislocation.     Hardware internal fixation of the intertrochanteric fracture of the  right proximal femur is identified. Fracture lines are partially visible  with some callus formation. No hardware complication.     There is moderate diffuse osteopenia which may obscure a subtle  nondisplaced fracture.     A rounded radiopaque foreign body in the pelvis is reidentified. This  may represent a urinary bladder stone?.       Impression:      1. No acute fracture or  dislocation.  2. Hardware internal fixation of the right proximal femur. No hardware  complication.                    This report was signed and finalized on 2/18/2025 6:56 AM by Dr. Shahram Patel MD.                 Assessment and Plan  Gross hematuria with clot retention, previous dislodgment of Perkins catheter into prostatic urethra on chronic anticoagulation, moderate amount of clot within bladder with known bladder stone.  Patient tested positive for COVID-19 in the ER this morning.  Given large amount of clot within the bladder and his anticoagulation and history of Perkins catheter trauma, I recommended proceeding emergently to the operating room for cystoscopy under anesthesia, clot evacuation, fulguration of bleeding as indicated.  I discussed with the patient and his family that I did not recommend proceeding with elective cystolitholapaxy at this time in the presence of infection.  He will need deferred management of the bladder stone until his infection is resolved and his hematuria has improved.  Hold anticoagulation as medically able.  We discussed risks of the procedure including but not limited to infection, bleeding, need for additional procedures, injury to urethra into bladder, recurrent/persistent hematuria, inability to treat the stone today due to history of UTI, complications of anesthesia.  He and family voiced understanding and provided informed consent to proceed.      (Please note that portions of this note were completed with a voice recognition program.)  Abdias Henley MD  02/18/25  13:18 CST

## 2025-02-18 NOTE — ANESTHESIA POSTPROCEDURE EVALUATION
"Patient: Manohar Garcia    Procedure Summary       Date: 02/18/25 Room / Location:  [facility] /  [facility]    Anesthesia Start: 1329 Anesthesia Stop: 1435    Procedure: CYSTOSCOPY WITH CLOT EVACUATION, FULGURATION (Bladder) Diagnosis:     Surgeons: Abdias Henley MD Provider: Carlos Olivo CRNA    Anesthesia Type: general ASA Status: 4 - Emergent            Anesthesia Type: general    Vitals  Vitals Value Taken Time   /101 02/18/25 1450   Temp 97.6 °F (36.4 °C) 02/18/25 1437   Pulse 82 02/18/25 1457   Resp 15 02/18/25 1450   SpO2 94 % 02/18/25 1457   Vitals shown include unfiled device data.        Post Anesthesia Care and Evaluation    Patient location during evaluation: PACU  Patient participation: complete - patient participated  Level of consciousness: awake and alert  Pain management: adequate    Airway patency: patent  Anesthetic complications: No anesthetic complications    Cardiovascular status: acceptable  Respiratory status: acceptable  Hydration status: acceptable    Comments: Blood pressure (!) 124/101, pulse 84, temperature 97.6 °F (36.4 °C), temperature source Temporal, resp. rate 15, height 180.3 cm (71\"), weight 95.1 kg (209 lb 11.2 oz), SpO2 95%.    Pt discharged from PACU based on fredy score >8    "

## 2025-02-18 NOTE — OP NOTE
CYSTOSCOPY WITH CLOT EVACUATION  Procedure Note    Manohar Garcia  Date of Procedure: 2/18/2025    Pre-op Diagnosis:   * No pre-op diagnosis entered *  Gross hematuria  Bladder stone    Post-op Diagnosis:     Same  Urethral stricture    Procedure/CPT® Codes:  [place] CYSTO W/IRRIG & EVAC MULTPLE OBSTRUCTING CLOTS [85835]    Procedure(s):  CYSTOSCOPY WITH CLOT EVACUATION, FULGURATION OF BLEEDING, BALLOON DILATION OF PENILE URETHRAL STRICTURE    Surgeon(s):  Abdias Henley MD    Anesthesia: General    Staff:   Circulator: Rai Montemayor RN; Zoya Hawk RN  Scrub Person: Rhonda Lino; Sohail Matt; Lottie Bowen    Indications for procedure:  84-year-old male on chronic anticoagulation with known history of large bladder stone presented to the ER this morning with gross hematuria and clot retention with dislodgment of previous indwelling Perkins catheter into the prostatic urethra with refractory gross hematuria despite repositioning Perkins catheter in the bladder.  CT showed moderate amount of clot within the bladder.     Findings:   Approximately 200 cc of old dark clot within the bladder.  Large bladder stone present.  Chronic inflammation throughout posterior and lateral bladder walls with  appearance of leukoplakia with friable bleeding mucosa with adherent clot.  Enlarged prostate with friable prostatic urethra.  Dense tight mid penile urethral stricture barely passable 23 Green[place]ic rigid cystoscope requiring balloon dilation with 30 Greenlandic balloon dilating catheter in order to get the resectoscope to go into the bladder.    Procedure details:  The patient is identified in the preoperative holding room.  The rationale including the benefits, alternatives and risks of this procedure were already discussed and informed consent has been obtained from patient and family.  He had been receiving IV Invanz.    The patient was taken to the operating room and placed upon the operative table.  After the adequate  induction of general anesthesia, the patient was placed into the lithotomy position.  Indwelling Perkins catheter was removed. His genitalia were prepped and draped in usual sterile fashion.  A final timeout was performed.  A 23 Irish cystoscope was placed into the urethra.  A very tight dense stricture in the mid penile urethra was passed with the rigid cystoscope with mild difficulty.  Prostate was noted to be enlarged with elevated bladder neck with friable prostatic urethra.  Upon entering the bladder, I was able to evacuate approximately 200 cc of old dark clot using a Tj syringe.  Known large bladder stones visible.  There was more organized clot adherent to the posterior and lateral wall.  There appeared to be chronic inflammatory changes of the bladder wall possibly from irritation from the stone.  I then needed to remove the cystoscope and get the resectoscope in order to remove the adherent clot.  The resectoscope would not pass due to very dense stricture in the mid penile urethra.  I therefore passed a 0.035 sensor guidewire across the strictured area under cystoscopic guidance over which I placed a 30 Irish balloon dilating catheter and inflated the balloon to a pressure of 12 marian for 1 minute.  I then removed the cystoscope and I was then able to place the 26 Irish continuous-flow resectoscope with visual obturator with significant resistance across the stricture into the bladder.  I then used the cold loop in order to carefully pick away the adherent clot from the posterior bladder wall.  Although there was significant chronic inflammation, I cannot see anything that look like obvious cancer.  There was friable oozing from this area of inflammatory mucosa underneath the clot.  I used a combination of the hot resecting loop and rollerball electrode in order to fulgurate this area to gain hemostasis.  During evacuation of clot, the fleshy tissue appeared to have sloughed off the bladder and this  was sent for pathology.  After all the clot had been washed out, and hemostasis from the bladder wall appeared satisfactory.  I also fulgurated friable prostatic vessels at the bladder neck.  Given his possible infection and anticoagulation with limited visibility, I felt it was safest to not proceed with cystolitholapaxy at this time.  I removed the resectoscope and placed a 22 [place] Irish three-way Perkins catheter bladder with 20 cc of sterile water in the balloon.  Continuous bladder irrigation was initiated with return of clear drainage.           Estimated Blood Loss: Approximately 200 cc of old dark clot    Specimens:                Specimens       ID Source Type Tests Collected By Collected At Frozen?    A Urinary Bladder Tissue TISSUE PATHOLOGY EXAM   Abdias Henley MD 2/18/25 1413 No    Description: BLADDER TISSUE              Drains: 22 [place] Irish three-way Perkins catheter to gravity with CBI  Continuous Bladder Irrigation Triple-lumen 22 Fr (Active)   Daily Indications Selected surgeries ( tract, abdomen) 02/18/25 1437   Site Assessment Clean 02/18/25 1437   Collection Container Standard drainage bag 02/18/25 1437   Securement Method Securing device 02/18/25 1437       [REMOVED] Urethral Catheter 16 Fr. (Removed)       [REMOVED] Urethral Catheter Latex 16 Fr. (Removed)   Daily Indications Placement by  physician 02/10/25 0800   Site Assessment Clean;Skin intact 02/10/25 0800   Collection Container Standard drainage bag 02/10/25 0800   Securement Method Securing device 02/10/25 0800   Catheter care complete Yes 02/10/25 1038   Output (mL) 200 mL 02/10/25 1038       [REMOVED] Urethral Catheter Silicone;Straight-tip 20 Fr. (Removed)   Site Assessment Clean;Skin intact 02/18/25 1023   Collection Container Standard drainage bag 02/18/25 1023   Securement Method Securing device 02/18/25 1023   Catheter care complete Yes 02/18/25 1023       Complications: none    Abdias Henley MD     Date: 2/18/2025  Time:  14:47 CST

## 2025-02-18 NOTE — H&P
[facility] Emergency Room Medicine Services  HISTORY AND PHYSICAL    Date of Admission: 2/18/2025  Primary Care Physician: Dani Farfan MD    Subjective   Primary Historian: Patient and his son Omer    Chief Complaint: Dysuria    History of Present Illness  Manohar Garcia is an 84-year-old male with a past medical history of GERD, dementia, thrombocytopenia, closed 2 part intertrochanteric fracture of the proximal end of the right femur, repaired by Dr. Bacon in October 2024, stage IIIb chronic kidney disease, previous COVID-19 x 1 month ago again positive today, previous hypoxia, pyelonephritis, previous metabolic encephalopathy.  Patient is followed by KEARA Cavazos, [facility] urology for known bladder stones.  Most recently 1/16/2025 revealed a large 3.3 cm bladder calculus as well as moderate right-sided hydro, Perkins catheter was placed due to patient's inability to tolerate position needed for cystoscopy due to hip fracture repair patient presented to [facility] emergency department on 2/18/2025 per [facility] after nursing staff evaluated the patient this morning and noticed very dark sissy red blood per catheter.  Patient upon assessment states that his roommate at the nursing home catches his leg on it all the time and he feels like he did that last night.  Patient currently has no complaints of chest pain, shortness of breath, COVID-19 positive again.  Additional complaint that patient has is left arm soreness when evaluated patient has significant fluid retention along the posterior aspect, venous Doppler ordered to evaluate.  Orders placed for patient to keep arm above his heart at all times.  Urinalysis revealed large blood, negative nitrates and leukocytes, 11-20 WBC improved from previously urinalysis.  Blood and urine cultures are pending.    Case was discussed with Dr. Henley, urology with recommendations for patient to remain n.p.o. for surgical intervention  this afternoon.  2/18/2025 cystoscopy with clot evacuation per Dr. Henley, per Dr. Henley patient needs to not be on anticoagulation lifelong, he will bring patient back to the OR for additional surgical intervention possibly on Friday or Monday of next week.    Patient and his son were made aware he will be admitted for urology evaluation possible cystoscopy, treatment of dehydration and acute kidney injury.  PT and OT will be initiated for his return back to [place] we will continue to follow.  All patient and son's questions were answered to the best my ability and they are in agreement for admission and treatment.    ED workup revealed , CO2 20.0, BUN 37, CR 2.09 (baseline approximately 1.67), initial lactic of 3.2, subsequent of 3.3 reflex cannot be drawn as patient was taken to the OR.  Procalcitonin 1.69, Hb 10.8, HCT 34.9, PLT 74 (chronic thrombocytopenia ferritin baseline approximately 64)    Hip x-ray was performed in preparation for possible surgical intervention revealed no acute fracture or dislocation and hardware internal fixation of the right proximal femur with no complication.    The abdomen pelvis revealed an abnormal appearance of the urinary bladder with moderate distention and stranding in the perivesicular space as well as air-fluid level.  There is a heterogeneous increased density dependent within the bladder consistent with blood products/clot lung with a hyperdensity focus representing a adherent clot versus a small neoplasm.  This is not appreciated on previous CT 2 weeks ago.        Review of Systems   Constitutional:  Positive for fatigue.   Genitourinary:  Positive for difficulty urinating and hematuria.   Musculoskeletal:  Positive for myalgias.   Skin:  Positive for pallor.   Neurological:  Positive for weakness.      Otherwise complete ROS reviewed and negative except as mentioned in the HPI.    Past Medical History:   Past Medical History:   Diagnosis Date    Dementia      GERD (gastroesophageal reflux disease)     Liver disorder      Past Surgical History:  Past Surgical History:   Procedure Laterality Date    HEMORRHOIDECTOMY      HERNIA REPAIR      HIP TROCHANTERIC NAILING WITH INTRAMEDULLARY HIP SCREW Right 8/30/2024    Procedure: HIP TROCHANTERIC NAILING SHORT WITH INTRAMEDULLARY HIP SCREW;  Surgeon: Arcenio Chandra MD;  Location: [facility];  Service: Orthopedics;  Laterality: Right;     Social History:  reports that he has quit smoking. His smoking use included cigarettes and pipe. He has never used smokeless tobacco. He reports that he does not currently use drugs after having used the following drugs: Amphetamines. He reports that he does not drink alcohol.    Family History: family history is not on file.     Allergies:  Allergies   Allergen Reactions    Penicillins Anaphylaxis    Contrast Dye (Echo Or Unknown Ct/Mr) Hives       Medications:  Prior to Admission medications    Medication Sig Start Date End Date Taking? Authorizing Provider   acetaminophen (TYLENOL) 325 MG tablet Take 2 tablets by mouth Every 4 (Four) Hours As Needed for Mild Pain. 9/4/24   Mirta Branch DO   albuterol sulfate  (90 Base) MCG/ACT inhaler Inhale 2 puffs 4 (Four) Times a Day. 1/28/25   James Donovan MD   apixaban (ELIQUIS) 2.5 MG tablet tablet Take 1 tablet by mouth 2 (Two) Times a Day.    ProviderShu MD   bisacodyl (DULCOLAX) 10 MG suppository Insert 1 suppository into the rectum Daily As Needed for Constipation. 9/4/24   Mirta Branch DO   bisacodyl (DULCOLAX) 5 MG EC tablet Take 1 tablet by mouth Daily As Needed for Constipation.    Provider, MD Shu   budesonide-formoterol (SYMBICORT) 160-4.5 MCG/ACT inhaler Inhale 2 puffs 2 (Two) Times a Day. 1/28/25   James Donovan MD   carvedilol (COREG) 3.125 MG tablet Take 1 tablet by mouth 2 (Two) Times a Day With Meals. 1/28/25   James Donovan MD   dexAMETHasone (DECADRON) 4 MG tablet Take  by mouth Daily.  "2/1/25   Shu Correa MD   famotidine (PEPCID) 20 MG tablet Take 1 tablet by mouth Daily. 9/5/24   Mirta Branch DO   guaiFENesin (MUCINEX) 600 MG 12 hr tablet Take 2 tablets by mouth 2 (Two) Times a Day.    Shu Correa MD   HYDROcodone-acetaminophen (NORCO) 5-325 MG per tablet Take 1 tablet by mouth Every 4 (Four) Hours As Needed for Mild Pain or Moderate Pain. 2/9/25   Arpan Jerez MD   ipratropium (ATROVENT HFA) 17 MCG/ACT inhaler Inhale 2 puffs 4 (Four) Times a Day. 1/28/25   James Donovan MD   ipratropium-albuterol (DUO-NEB) 0.5-2.5 mg/3 ml nebulizer Take 3 mL by nebulization Every 6 (Six) Hours As Needed for Wheezing.    Shu Correa MD   nystatin (MYCOSTATIN) 565667 UNIT/GM powder Apply 1 Application topically to the appropriate area as directed 3 (Three) Times a Day.    Shu Correa MD   ondansetron ODT (ZOFRAN-ODT) 4 MG disintegrating tablet Place 1 tablet on the tongue Every 4 (Four) Hours As Needed for Nausea or Vomiting.    Shu Correa MD   tamsulosin (FLOMAX) 0.4 MG capsule 24 hr capsule Take 1 capsule by mouth Every Night. 1/28/25   James Donovan MD     I have utilized all available immediate resources to obtain, update, or review the patient's current medications (including all prescriptions, over-the-counter products, herbals, cannabis/cannabidiol products, and vitamin/mineral/dietary (nutritional) supplements).    Objective     Vital Signs: /66   Pulse 86   Temp 97 °F (36.1 °C) (Temporal)   Resp 15   Ht 180.3 cm (71\")   Wt 95.1 kg (209 lb 11.2 oz)   SpO2 94%   BMI 29.25 kg/m²   Physical Exam  Vitals and nursing note reviewed.   Constitutional:       General: He is not in acute distress.     Appearance: He is morbidly obese. He is ill-appearing. He is not toxic-appearing.      Comments: Room air   HENT:      Right Ear: Decreased hearing noted. There is impacted cerumen.      Left Ear: Decreased hearing noted.      Nose: " Congestion present.      Mouth/Throat:      Mouth: Mucous membranes are moist.   Eyes:      Pupils: Pupils are equal, round, and reactive to light.   Cardiovascular:      Rate and Rhythm: Normal rate and regular rhythm.      Pulses: Normal pulses.      Heart sounds: Normal heart sounds.   Pulmonary:      Effort: No tachypnea, accessory muscle usage or respiratory distress.      Breath sounds: Decreased breath sounds present.   Abdominal:      General: Abdomen is protuberant. Bowel sounds are normal.      Palpations: Abdomen is soft.      Tenderness: There is no abdominal tenderness.   Genitourinary:     Testes: Normal.         Right: Swelling not present.         Left: Swelling not present.      Comments: Voiding per three-way Perkins catheter with dark sissy red urine  Musculoskeletal:      Cervical back: Normal range of motion and neck supple. No rigidity or tenderness.      Right lower leg: No edema.      Left lower leg: No edema.   Lymphadenopathy:      Upper Body:      Left upper body: Axillary adenopathy present.      Comments: Significant retention left posterior upper arm   Psychiatric:         Attention and Perception: He is inattentive.         Mood and Affect: Mood is anxious.         Speech: Speech normal.         Behavior: Behavior is cooperative.         Cognition and Memory: Cognition is impaired. Memory is impaired.        Results Reviewed:  Lab Results (last 24 hours)       Procedure Component Value Units Date/Time    Manual Differential [[id number]] Collected: 02/18/25 1530    Specimen: Blood Updated: 02/18/25 1537    CBC & Differential [[id number]] Collected: 02/18/25 1530    Specimen: Blood Updated: 02/18/25 1533    Narrative:      The following orders were created for panel order CBC & Differential.  Procedure                               Abnormality         Status                     ---------                               -----------         ------                     CBC Auto  Differential[[id number]]                            In process                   Please view results for these tests on the individual orders.    CBC Auto Differential [[id number]] Collected: 02/18/25 1530    Specimen: Blood Updated: 02/18/25 1533    Basic Metabolic Panel [[id number]] Collected: 02/18/25 1530    Specimen: Blood Updated: 02/18/25 1533    STAT Lactic Acid, Reflex [[id number]] Collected: 02/18/25 1530    Specimen: Blood Updated: 02/18/25 1533    STAT Lactic Acid, Reflex [[id number]]  (Abnormal) Collected: 02/18/25 1250    Specimen: Blood from Arm, Right Updated: 02/18/25 1334     Lactate 3.3 mmol/L     Blood Culture - Blood, Arm, Right [[id number]] Collected: 02/18/25 1249    Specimen: Blood from Arm, Right Updated: 02/18/25 1329    BNP [[id number]]  (Normal) Collected: 02/18/25 0651    Specimen: Blood Updated: 02/18/25 1215     proBNP 937.6 pg/mL     Narrative:      This assay is used as an aid in the diagnosis of individuals suspected of having heart failure. It can be used as an aid in the diagnosis of acute decompensated heart failure (ADHF) in patients presenting with signs and symptoms of ADHF to the emergency department (ED). In addition, NT-proBNP of <300 pg/mL indicates ADHF is not likely.    Age Range Result Interpretation  NT-proBNP Concentration (pg/mL:      <50             Positive            >450                   Gray                 300-450                    Negative             <300    50-75           Positive            >900                  Gray                300-900                  Negative            <300      >75             Positive            >1800                  Gray                300-1800                  Negative            <300    Procalcitonin [[id number]]  (Abnormal) Collected: 02/18/25 0651    Specimen: Blood Updated: 02/18/25 1202     Procalcitonin 1.69 ng/mL     Narrative:      As a Marker for Sepsis (Non-Neonates):    1. <0.5 ng/mL represents a low risk of severe sepsis  "and/or septic shock.  2. >2 ng/mL represents a high risk of severe sepsis and/or septic shock.    As a Marker for Lower Respiratory Tract Infections that require antibiotic therapy:    PCT on Admission    Antibiotic Therapy       6-12 Hrs later    >0.5                Strongly Recommended  >0.25 - <0.5        Recommended   0.1 - 0.25          Discouraged              Remeasure/reassess PCT  <0.1                Strongly Discouraged     Remeasure/reassess PCT    As 28 day mortality risk marker: \"Change in Procalcitonin Result\" (>80% or <=80%) if Day 0 (or Day 1) and Day 4 values are available. Refer to http://www.[facility]-pct-calculator.com    Change in PCT <=80%  A decrease of PCT levels below or equal to 80% defines a positive change in PCT test result representing a higher risk for 28-day all-cause mortality of patients diagnosed with severe sepsis for septic shock.    Change in PCT >80%  A decrease of PCT levels of more than 80% defines a negative change in PCT result representing a lower risk for 28-day all-cause mortality of patients diagnosed with severe sepsis or septic shock.       Blood Culture - Blood, Arm, Left [[id number]] Collected: 02/18/25 1001    Specimen: Blood from Arm, Left Updated: 02/18/25 1046    Lactic Acid, Plasma [[id number]]  (Abnormal) Collected: 02/18/25 1001    Specimen: Blood from Arm, Left Updated: 02/18/25 1037     Lactate 3.2 mmol/L     Urinalysis With Culture If Indicated - Indwelling Urethral Catheter [[id number]]  (Abnormal) Collected: 02/18/25 0740    Specimen: Urine from Indwelling Urethral Catheter Updated: 02/18/25 0941     Color, UA Red     Appearance, UA Cloudy     pH, UA 7.0     Specific Gravity, UA 1.025     Glucose,  mg/dL (Trace)     Ketones, UA Negative     Bilirubin, UA Small (1+)     Blood, UA Large (3+)     Protein, UA >=300 mg/dL (3+)     Leuk Esterase, UA Negative     Nitrite, UA Negative     Urobilinogen, UA 0.2 E.U./dL    Narrative:      In absence of clinical " symptoms, the presence of pyuria, bacteria, and/or nitrites on the urinalysis result does not correlate with infection.    Urinalysis, Microscopic Only - Indwelling Urethral Catheter [[id number]]  (Abnormal) Collected: 02/18/25 0740    Specimen: Urine from Indwelling Urethral Catheter Updated: 02/18/25 0941     RBC, UA Too Numerous to Count /HPF      WBC, UA 11-20 /HPF      Bacteria, UA Trace /HPF      Squamous Epithelial Cells, UA 0-2 /HPF      Hyaline Casts, UA None Seen /LPF      Methodology Automated Microscopy    Urine Culture - Urine, Indwelling Urethral Catheter [[id number]] Collected: 02/18/25 0740    Specimen: Urine from Indwelling Urethral Catheter Updated: 02/18/25 0941    Respiratory Panel PCR w/COVID-19(SARS-CoV-2) EDU/EDITH/YUDELKA/PAD/COR/DEJON In-House, NP Swab in UTM/VTM, 2 HR TAT - Swab, Nasopharynx [[id number]]  (Abnormal) Collected: 02/18/25 0652    Specimen: Swab from Nasopharynx Updated: 02/18/25 0748     ADENOVIRUS, PCR Not Detected     Coronavirus 229E Not Detected     Coronavirus HKU1 Not Detected     Coronavirus NL63 Not Detected     Coronavirus OC43 Not Detected     COVID19 Detected     Human Metapneumovirus Not Detected     Human Rhinovirus/Enterovirus Not Detected     Influenza A PCR Not Detected     Influenza B PCR Not Detected     Parainfluenza Virus 1 Not Detected     Parainfluenza Virus 2 Not Detected     Parainfluenza Virus 3 Not Detected     Parainfluenza Virus 4 Not Detected     RSV, PCR Not Detected     Bordetella pertussis pcr Not Detected     Bordetella parapertussis PCR Not Detected     Chlamydophila pneumoniae PCR Not Detected     Mycoplasma pneumo by PCR Not Detected    Narrative:      In the setting of a positive respiratory panel with a viral infection PLUS a negative procalcitonin without other underlying concern for bacterial infection, consider observing off antibiotics or discontinuation of antibiotics and continue supportive care. If the respiratory panel is positive for  atypical bacterial infection (Bordetella pertussis, Chlamydophila pneumoniae, or Mycoplasma pneumoniae), consider antibiotic de-escalation to target atypical bacterial infection.    Comprehensive Metabolic Panel [[id number]]  (Abnormal) Collected: 02/18/25 0651    Specimen: Blood Updated: 02/18/25 0724     Glucose 143 mg/dL      BUN 37 mg/dL      Creatinine 2.09 mg/dL      Sodium 140 mmol/L      Potassium 4.9 mmol/L      Chloride 110 mmol/L      CO2 20.0 mmol/L      Calcium 8.4 mg/dL      Total Protein 4.9 g/dL      Albumin 2.1 g/dL      ALT (SGPT) 17 U/L      AST (SGOT) 18 U/L      Alkaline Phosphatase 105 U/L      Total Bilirubin 1.7 mg/dL      Globulin 2.8 gm/dL      A/G Ratio 0.8 g/dL      BUN/Creatinine Ratio 17.7     Anion Gap 10.0 mmol/L      eGFR 30.6 mL/min/1.73     Narrative:      GFR Categories in Chronic Kidney Disease (CKD)      GFR Category          GFR (mL/min/1.73)    Interpretation  G1                     90 or greater         Normal or high (1)  G2                      60-89                Mild decrease (1)  G3a                   45-59                Mild to moderate decrease  G3b                   30-44                Moderate to severe decrease  G4                    15-29                Severe decrease  G5                    14 or less           Kidney failure          (1)In the absence of evidence of kidney disease, neither GFR category G1 or G2 fulfill the criteria for CKD.    eGFR calculation 2021 CKD-EPI creatinine equation, which does not include race as a factor    Magnesium [[id number]]  (Normal) Collected: 02/18/25 0651    Specimen: Blood Updated: 02/18/25 0724     Magnesium 2.1 mg/dL     Protime-INR [[id number]]  (Abnormal) Collected: 02/18/25 0651    Specimen: Blood Updated: 02/18/25 0722     Protime 20.2 Seconds      INR 1.62    aPTT [[id number]]  (Normal) Collected: 02/18/25 0651    Specimen: Blood Updated: 02/18/25 0722     PTT 29.1 seconds     CBC & Differential [[id number]]  (Abnormal)  Collected: 02/18/25 0651    Specimen: Blood Updated: 02/18/25 0711    Narrative:      The following orders were created for panel order CBC & Differential.  Procedure                               Abnormality         Status                     ---------                               -----------         ------                     CBC Auto Differential[[id number]]        Abnormal            Final result                 Please view results for these tests on the individual orders.    CBC Auto Differential [[id number]]  (Abnormal) Collected: 02/18/25 0651    Specimen: Blood Updated: 02/18/25 0711     WBC 6.68 10*3/mm3      RBC 3.77 10*6/mm3      Hemoglobin 10.8 g/dL      Hematocrit 34.9 %      MCV 92.6 fL      MCH 28.6 pg      MCHC 30.9 g/dL      RDW 16.9 %      RDW-SD 55.9 fl      MPV 11.5 fL      Platelets 74 10*3/mm3      Neutrophil % 74.2 %      Lymphocyte % 17.1 %      Monocyte % 8.1 %      Eosinophil % 0.1 %      Basophil % 0.1 %      Immature Grans % 0.4 %      Neutrophils, Absolute 4.95 10*3/mm3      Lymphocytes, Absolute 1.14 10*3/mm3      Monocytes, Absolute 0.54 10*3/mm3      Eosinophils, Absolute 0.01 10*3/mm3      Basophils, Absolute 0.01 10*3/mm3      Immature Grans, Absolute 0.03 10*3/mm3      nRBC 0.0 /100 WBC           Imaging Results (Last 24 Hours)       Procedure Component Value Units Date/Time    US Venous Doppler Upper Extremity Left (duplex) [[id number]] Resulted: 02/18/25 1239     Updated: 02/18/25 1301    CT Lumbar Spine Without Contrast [[id number]] Collected: 02/18/25 0759     Updated: 02/18/25 0809    Narrative:      EXAMINATION: CT LUMBAR SPINE WO CONTRAST-  2/18/2025 7:59 AM     HISTORY: Fall injury low back pain     COMPARISON: 8/29/2024     DLP: 2068.45 mGy.cm     In order to have a CT radiation dose as low as reasonably achievable,  Automated Exposure Control was utilized for adjustment of the mA and/or  KV according to patient size.     TECHNIQUE: Serial helical tomographic images of the  lumbar spine were  obtained without the use of intravenous contrast. Additionally, sagittal  and coronal reformatted images were provided for review.     FINDINGS:  Small ribs at what is presumed to be L1. I don't see any definite  evidence of an acute fracture deformity. Mild degenerative change at the  SI joints, RIGHT greater than LEFT. Multilevel facet hypertrophy.     Multilevel degenerative endplate osteophytes with marginal osteophytes  throughout the visualized portion of the lumbar spine. I don't see a  definite discrete fracture deformity. Levoconvex curvature in the lumbar  spine is noted. Asymmetric to space height loss on the RIGHT at L3-L4.     There is some vacuum disc phenomenon at L5-S1 with moderate disc space  height loss and a diffuse disc bulge with disc osteophyte complexes  extending into the inferior aspect of the bilateral foramina  contributing to at least moderate RIGHT and likely severe LEFT foraminal  narrowing. Moderate facet hypertrophy at L4-L5 with mild diffuse disc  bulge contributing to at least mild and likely moderate canal narrowing  as well as mild LEFT and moderate RIGHT foraminal narrowing. Diffuse  disc bulge at L3-L4 with posterior element hypertrophy and findings  contribute to at least mild thecal sac narrowing. Moderate RIGHT  foraminal narrowing at L2-L3.     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  Probable nonobstructing nephroliths in the LEFT portion of the horseshoe  kidney. In addition there is high density seen dependently within the  urinary bladder, suggestive of blood products. Stranding in the  retroperitoneum on the LEFT is also noted. Please see the CT abdomen  pelvis from today for further details.       Impression:         1.  Multilevel degenerative disc disease and facet arthropathy as  discussed above but no definite acute osseous abnormality.     2.  Please see the CT abdomen and pelvis report for details regarding  the  kidneys and urinary bladder as well as retroperitoneal stranding.        This report was signed and finalized on 2/18/2025 8:06 AM by Dr. Alex Mckeon MD.       CT Abdomen Pelvis Without Contrast [[id number]] Collected: 02/18/25 0742     Updated: 02/18/25 0802    Narrative:      CT ABDOMEN PELVIS WO CONTRAST- 2/18/2025 6:04 AM     HISTORY: Fall with fall with abdominal pain please also comment on hips  and sacrum      COMPARISON: 2/4/2025     DLP: 2068.45 mGy.cm . All CT scans are performed using dose optimization  techniques as appropriate to the performed exam and including at least  one of the following: Automated exposure control, adjustment of the mA  and/or kV according to size, and the use of the iterative reconstruction  technique.     TECHNIQUE: Noncontrast enhanced images of the abdomen and pelvis  obtained without oral contrast.     FINDINGS:  There is a chronically elevated right hemidiaphragm with right basilar  atelectasis. Trace left effusion is present. Lung bases are otherwise  clear. The base of the heart is remarkable for coronary calcifications.  No pericardial effusion. Bilateral gynecomastia is present..     LIVER: The liver is cirrhotic in morphology. No discrete hepatic mass.  There is free fluid in the subdiaphragmatic space on the right..     BILIARY SYSTEM: The gallbladder is unremarkable. No intrahepatic or  extrahepatic ductal dilatation.     PANCREAS: No focal pancreatic lesion.     SPLEEN: No evidence of splenic enlargement. There is a suspected  splenule in the subdiaphragmatic space on the left. There is a small  amount of free fluid in the left subdiaphragmatic space. Varicosities  noted in the left upper quadrant..     KIDNEYS AND ADRENALS: The adrenals are unremarkable. There is a  horseshoe type kidney with partial fusion along the lower pole of the  kidneys and abnormal orientation of both kidneys. There is moderate  dilatation of the upper tracts of both kidneys with  moderate dilatation  of the proximal and mid segment of both ureters. Mild distal left  periureteral stranding is present. No evidence of ureteral stone.. The  urinary bladder is abnormal in appearance with a large bladder stone  dependently within the bladder. There is also increased density  dependently within the bladder consistent with blood products. There is  focal nodular bladder wall thickening along the left anterolateral  aspect of the bladder either representing a small neoplasm or adherent  clot. There is thickening of the urinary bladder wall with moderate  distention and with perivesical stranding suggesting inflammation. A  Perkins catheter is in place but has been retracted into the prostatic  urethra with the balloon dilated within the prostatic segment of the  urethra..     RETROPERITONEUM: No mass, lymphadenopathy or hemorrhage.     GI TRACT: Moderate constipation is present with a large volume of stool  throughout the colon. The small bowel is normal in distribution and  appearance. No gastric wall thickening or gastric outlet obstruction..  The appendix is visualized and unremarkable.     OTHER: There is no mesenteric mass, lymphadenopathy or fluid collection.  The osseous structures and soft tissues demonstrate no worrisome  lesions. Mild third spacing of fluid is noted with stranding within the  more dependent right-sided panniculus and skin thickening.     PELVIS: The prostate gland is enlarged. There is no free fluid in the  pelvis.. A bladder stone is present. There is air within the urinary  bladder. There is stranding within the perivesical space..       Impression:      1. The urinary bladder is abnormal in appearance with moderate  distention and stranding in the perivesical space as well as an  air-fluid level. There is heterogeneous increased density dependently  within the bladder consistent with blood products/clot. Along the left  anterolateral aspect of the bladder there is a focus  of hyperdensity  either representing a adherent clot versus a small neoplasm. This is not  definitely appreciated on previous CT of 2 weeks earlier favoring  adherent clot. The Perkins catheter has been displaced/retracted into the  prostatic urethra with the balloon inflated within the prostatic  urethra.  2. 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 I suspect this may represent some back  pressure related to the urinary bladder distention and displacement of  the Perkins catheter. No perinephric fluid collection present.  3. Chronically elevated right diaphragm with right basilar atelectasis.  4. There is a small amount of free fluid in the subdiaphragmatic space  bilaterally.  5. Liver cirrhotic in morphology. Normal appearance of the gallbladder.  6. Moderate constipation. Otherwise normal bowel gas pattern.  7. Mild third spacing of fluid.  8. A bladder stone is noted dependently within the urinary bladder..           This report was signed and finalized on 2/18/2025 7:58 AM by Dr. Deonte Olivo MD.       CT Head Without Contrast [[id number]] Collected: 02/18/25 0724     Updated: 02/18/25 0728    Narrative:      EXAM: CT HEAD WO CONTRAST-      DATE: 2/18/2025 6:04 AM     HISTORY: Altered mental state       COMPARISON: 8/29/2021.     DOSE LENGTH PRODUCT: 802.1 mGy.cm  Automated exposure control was also  utilized to decrease patient radiation dose.     TECHNIQUE: Unenhanced CT images obtained from vertex to skull base with  multiplanar reformats.     FINDINGS:  There is no acute intracranial hemorrhage, midline shift, mass effect,  or hydrocephalus. There is no CT evidence for acute infarct.     There are chronic changes with volume loss and chronic small vessel  ischemic change of the periventricular white matter.      SOFT TISSUES: The scalp soft tissues are unremarkable.         SINUS:The visualized paranasal sinuses and mastoid air cells are clear.      ORBITS: The visualized orbits and globes are unremarkable. There is  bilateral lens extraction.          Impression:      1. Chronic changes and no acute intracranial findings.      This report was signed and finalized on 2/18/2025 7:25 AM by Nima Villatoro.       XR Chest 1 View [[id number]] Collected: 02/18/25 0656     Updated: 02/18/25 0701    Narrative:      EXAMINATION: XR CHEST 1 VW-     2/18/2025 5:30 AM     HISTORY: Cough     A frontal projection of the chest is compared with the previous study  dated 2/5/2025.     The lungs are poorly expanded, worse on the right side.     There is marked elevation right diaphragm similar to the previous study.  There is underlying hepatic flexure of the colon.     There are coarse interstitial changes in the lungs bilaterally which  probably represent a chronic process/fibrosis.     There is no pleural effusion, pulmonary congestion or pneumothorax.     The heart size is not optimally evaluated due to the AP projection.  Atheromatous change of thoracic aorta are noted.     There is no acute bony abnormality.       Impression:      1. No change since the previous study. No active cardiopulmonary  disease. Persistent chronic interstitial changes/fibrosis.        This report was signed and finalized on 2/18/2025 6:58 AM by Dr. Shahram Patel MD.       XR Hips Bilateral With or Without Pelvis 5 View [[id number]] Collected: 02/18/25 0651     Updated: 02/18/25 0659    Narrative:      EXAMINATION: XR HIPS BILATERAL W OR WO PELVIS 5 VIEW-     2/18/2025 5:20 AM     HISTORY: Fall injury     A frontal projection of the pelvis and frontal and lateral views of the  right and left hip are obtained. Comparison is made with the previous  study dated 8/29/2024.     There is no evidence of an acute fracture or dislocation.     Hardware internal fixation of the intertrochanteric fracture of the  right  proximal femur is identified. Fracture lines are partially visible  with some callus formation. No hardware complication.     There is moderate diffuse osteopenia which may obscure a subtle  nondisplaced fracture.     A rounded radiopaque foreign body in the pelvis is reidentified. This  may represent a urinary bladder stone?.       Impression:      1. No acute fracture or dislocation.  2. Hardware internal fixation of the right proximal femur. No hardware  complication.                    This report was signed and finalized on 2/18/2025 6:56 AM by Dr. Shahram Patel MD.               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 Caballero's service here at [facility].    1.  Acute kidney injury superimposed on stage IIIb CKD-initial creatinine upon admission was 2.09 with a GFR of 30.6.  Baseline creatinine approximately 1.66 with a GFR of 40.1.  Likely secondary to bladder stone retention and dehydration.  Will continue IV fluids post surgical recommendations from Dr. Henley, repeat BMP in the AM.    2.  Hematuria-UA Perkins catheter placed with CBI, urology consultation with planned cystoscopy this afternoon, will continue to follow.    Awaiting new urine cultures however patient will be treated with IV Levaquin per susceptibility from previous culture 2//2025    3.  Lymphedema of the left arm-nursing order placed to keep patient's left arm above heart at all times, venous Doppler pending.    4.  COVID-19 virus detected-patient has no reported respiratory complaints at this time other than a congested cough however given patient's extreme comorbidities, drawn 6 mg p.o. daily x 10 days was initiated.  Will continue to  follow closely continue supplemental oxygen, incentive spirometry and home nebulizers.    5.  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    6.  Chronic constipation-appreciated on CT of the abdomen pelvis.patient states last bowel movement was yesterday, placed on scheduled bowel regimen.    7.  Reviewed and restarted home medications as appropriate.    VTE prophylaxis with SCDs  Labs in a.m.    Medical Decision Making  Acute on chronic, high complexity, unchanged  1 acute on chronic, moderate complexity, worsening  1 acute, moderate complexity, unchanged  1 chronic, moderate complexity, stable  Number and Complexity of problems: 5  Differential Diagnosis: None    Conditions and Status        Condition is unchanged.     [facility] Data  External documents reviewed: Review of previous hospitalization and urology office notes and procedures.  Cardiac tracing (EKG, telemetry) interpretation:/18/2025 EKG personal review, normal sinus rhythm RBBB, ventricular rate of 79, atrial rate of 79, QTc 467.  Radiology interpretation:2/18/2025 CT of the lumbar spine, abdomen, head, chest x-ray per radiology reviewed  Labs reviewed: 2/18/2025 CBC, CMP, PT/INR, PTT, magnesium, procalcitonin, BNP, respiratory PCR, urinalysis, lactic acid reviewed, repeat labs in a.m.  Any tests that were considered but not ordered: None     Decision rules/scores evaluated (example [id number], Wells, etc): None     Discussed with: Dr. Caballero, patient and his son Omer     Care Planning  Shared decision making: Dr. Caballero, patient and his son Omer  Code status and discussions: NR/DNI per patient .    Disposition  Social Determinants of Health that impact treatment or disposition: Patient will return to [facility] at discharge case management consult placed  Estimated length of stay is term and at this time.     I confirmed that the patient's advanced care plan is present, code status is documented,  and a surrogate decision maker is listed in the patient's medical record.     The patient's surrogate decision maker is paul Tello.     The patient was seen and examined by me on/18/2025 at 1128.    Electronically signed by ANAT Ahumada, 02/18/25, 15:37 CST.

## 2025-02-18 NOTE — ED PROVIDER NOTES
HPI:    Patient is a 84-year-old white male who presents from [facility] with blood in urine.  There was increased confusion.  Patient apparently pulls on his Perkins catheter from time to time it causes bleeding.  However there is a report of a potential fall.  Patient is stating that he is having some pain in his low back and his buttocks area.  When asked if he felt he states yes.  Pain 5 out of 10.  Patient with known history of dementia    REVIEW OF SYSTEMS  CONSTITUTIONAL:  No complaints of fever, chills,or weakness  EYES:  No complaints of discharge   ENT: No complaints of sore throat or ear pain  CARDIOVASCULAR:  No complaints of chest pain, palpitations, or swelling  RESPIRATORY:  No complaints of cough or shortness of breath  GI:  No complaints of abdominal pain, nausea, vomiting, or diarrhea  MUSCULOSKELETAL: Positive for low back pain and buttocks pain   SKIN:  No complaints of rash  NEUROLOGIC:  No complaints of headache, focal weakness, or sensory changes  ENDOCRINE:  No complaints of polyuria or polydipsia  LYMPHATIC:  No complaints of swollen glands  GENITOURINARY: Positive for blood in the urine       PAST MEDICAL HISTORY    Past Medical History:   Diagnosis Date    Dementia     GERD (gastroesophageal reflux disease)     Liver disorder        FAMILY HISTORY  History reviewed. No pertinent family history.    SOCIAL HISTORY  Social History     Socioeconomic History    Marital status:    Tobacco Use    Smoking status: Former     Types: Cigarettes, Pipe    Smokeless tobacco: Never   Vaping Use    Vaping status: Never Used   Substance and Sexual Activity    Alcohol use: No    Drug use: Not Currently     Types: Amphetamines     Comment: Quit 40 years ago    Sexual activity: Defer       IMMUNIZATION HISTORY  Deferred to primary care physician.    SURGICAL HISTORY  Past Surgical History:   Procedure Laterality Date    HEMORRHOIDECTOMY      HERNIA REPAIR      HIP TROCHANTERIC NAILING WITH  INTRAMEDULLARY HIP SCREW Right 8/30/2024    Procedure: HIP TROCHANTERIC NAILING SHORT WITH INTRAMEDULLARY HIP SCREW;  Surgeon: Arcenio Chandra MD;  Location: [facility];  Service: Orthopedics;  Laterality: Right;       CURRENT MEDICATIONS    Current Facility-Administered Medications:     ertapenem (INVanz) 1,000 mg in sodium chloride 0.9 % 100 mL MBP, 1,000 mg, Intravenous, Once, Kvng Emanuel MD    lactated ringers bolus 1,000 mL, 1,000 mL, Intravenous, Once, Kvng Emanuel MD    Lidocaine HCl gel (XYLOCAINE) urethral/mucosal syringe, , Topical, Once, Kvng Emanuel MD    sodium chloride 0.9 % infusion, 75 mL/hr, Intravenous, Continuous, , ANAT Willis    Current Outpatient Medications:     acetaminophen (TYLENOL) 325 MG tablet, Take 2 tablets by mouth Every 4 (Four) Hours As Needed for Mild Pain., Disp: , Rfl:     albuterol sulfate  (90 Base) MCG/ACT inhaler, Inhale 2 puffs 4 (Four) Times a Day., Disp: , Rfl:     apixaban (ELIQUIS) 2.5 MG tablet tablet, Take 1 tablet by mouth 2 (Two) Times a Day., Disp: , Rfl:     bisacodyl (DULCOLAX) 10 MG suppository, Insert 1 suppository into the rectum Daily As Needed for Constipation., Disp: , Rfl:     bisacodyl (DULCOLAX) 5 MG EC tablet, Take 1 tablet by mouth Daily As Needed for Constipation., Disp: , Rfl:     budesonide-formoterol (SYMBICORT) 160-4.5 MCG/ACT inhaler, Inhale 2 puffs 2 (Two) Times a Day., Disp: , Rfl:     carvedilol (COREG) 3.125 MG tablet, Take 1 tablet by mouth 2 (Two) Times a Day With Meals., Disp: , Rfl:     dexAMETHasone (DECADRON) 4 MG tablet, Take  by mouth Daily., Disp: , Rfl:     famotidine (PEPCID) 20 MG tablet, Take 1 tablet by mouth Daily., Disp: , Rfl:     guaiFENesin (MUCINEX) 600 MG 12 hr tablet, Take 2 tablets by mouth 2 (Two) Times a Day., Disp: , Rfl:     HYDROcodone-acetaminophen (NORCO) 5-325 MG per tablet, Take 1 tablet by mouth Every 4 (Four) Hours As Needed for Mild Pain or Moderate Pain., Disp: 14 tablet, Rfl: 0     "ipratropium (ATROVENT HFA) 17 MCG/ACT inhaler, Inhale 2 puffs 4 (Four) Times a Day., Disp: , Rfl:     ipratropium-albuterol (DUO-NEB) 0.5-2.5 mg/3 ml nebulizer, Take 3 mL by nebulization Every 6 (Six) Hours As Needed for Wheezing., Disp: , Rfl:     nystatin (MYCOSTATIN) 037956 UNIT/GM powder, Apply 1 Application topically to the appropriate area as directed 3 (Three) Times a Day., Disp: , Rfl:     ondansetron ODT (ZOFRAN-ODT) 4 MG disintegrating tablet, Place 1 tablet on the tongue Every 4 (Four) Hours As Needed for Nausea or Vomiting., Disp: , Rfl:     tamsulosin (FLOMAX) 0.4 MG capsule 24 hr capsule, Take 1 capsule by mouth Every Night., Disp: , Rfl:     ALLERGIES  Allergies   Allergen Reactions    Penicillins Anaphylaxis    Contrast Dye (Echo Or Unknown Ct/Mr) Hives       Genitourinary EXAM    VITAL SIGNS:   BP 98/73 (BP Location: Right arm, Patient Position: Sitting)   Pulse 77   Temp 97.6 °F (36.4 °C) (Oral)   Resp 20   Ht 180.3 cm (71\")   Wt 95.1 kg (209 lb 11.2 oz)   SpO2 97%   BMI 29.25 kg/m²     Constitutional: Patient is alert and in no moderate low back distress.  Patient with a urinary discomfort.    ENT: There is a normal pharynx with no acute erythema or exudate and oral mucosa is moist.  Nose is clear with no drainage.  Tympanic membranes intact and nonerythemic    Cardiovascular: S1-S2 regular rate and rhythm.  No murmurs, rubs, or gallops.  Pulses are equal bilaterally and there is no pitting edema    Respiratory: Patient is clear to auscultation bilaterally with no wheezing or rhonchi.  Chest wall is nontender.  There are no external lesions on the chest.  There is no crepitance    Gastrointestinal: Normal bowel sounds x4.  No rebound or guarding.  No abdominal distention or fluid wave    Genitourinary: Gross blood noted in the Perkins catheter.  Suprapubic tenderness to palpation is noted.  A little bleeding at the patient's penile meatus.    Integument: No acute lesions noted.  Color appears " to be normal.    Fulda Coma Scale: Total score 15    Neurological: Patient is alert and oriented x2 and no acute findings noted.  Speech is slow but appropriate.  No evidence of acute CVA.  Cranial nerves II through XII intact.  Patient moves all 4 extremities.    Psychiatric: Normal affect and mood      RADIOLOGY/PROCEDURES        CT Lumbar Spine Without Contrast   Final Result       1.  Multilevel degenerative disc disease and facet arthropathy as   discussed above but no definite acute osseous abnormality.       2.  Please see the CT abdomen and pelvis report for details regarding   the kidneys and urinary bladder as well as retroperitoneal stranding.           This report was signed and finalized on 2/18/2025 8:06 AM by Dr. Alex Mckeon MD.          CT Abdomen Pelvis Without Contrast   Final Result   1. The urinary bladder is abnormal in appearance with moderate   distention and stranding in the perivesical space as well as an   air-fluid level. There is heterogeneous increased density dependently   within the bladder consistent with blood products/clot. Along the left   anterolateral aspect of the bladder there is a focus of hyperdensity   either representing a adherent clot versus a small neoplasm. This is not   definitely appreciated on previous CT of 2 weeks earlier favoring   adherent clot. The Perkins catheter has been displaced/retracted into the   prostatic urethra with the balloon inflated within the prostatic   urethra.   2. 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 I suspect this may represent some back   pressure related to the urinary bladder distention and displacement of   the Perkins catheter. No perinephric fluid collection present.   3. Chronically elevated right diaphragm with right basilar atelectasis.   4. There is a small amount of free fluid in the  subdiaphragmatic space   bilaterally.   5. Liver cirrhotic in morphology. Normal appearance of the gallbladder.   6. Moderate constipation. Otherwise normal bowel gas pattern.   7. Mild third spacing of fluid.   8. A bladder stone is noted dependently within the urinary bladder..               This report was signed and finalized on 2/18/2025 7:58 AM by Dr. Deonte Olivo MD.          CT Head Without Contrast   Final Result   1. Chronic changes and no acute intracranial findings.        This report was signed and finalized on 2/18/2025 7:25 AM by Nima Villatoro.          XR Hips Bilateral With or Without Pelvis 5 View   Final Result   1. No acute fracture or dislocation.   2. Hardware internal fixation of the right proximal femur. No hardware   complication.                           This report was signed and finalized on 2/18/2025 6:56 AM by Dr. Shahram Patel MD.          XR Chest 1 View   Final Result   1. No change since the previous study. No active cardiopulmonary   disease. Persistent chronic interstitial changes/fibrosis.           This report was signed and finalized on 2/18/2025 6:58 AM by Dr. Shahram Patel MD.                 FUTURE APPOINTMENTS     Future Appointments   Date Time Provider Department Center   4/18/2025  1:10 PM Abelardo Panchal PA [facility]             Recent Results (from the past 24 hours)   Comprehensive Metabolic Panel    Collection Time: 02/18/25  6:51 AM    Specimen: Blood   Result Value Ref Range    Glucose 143 (H) 65 - 99 mg/dL    BUN 37 (H) 8 - 23 mg/dL    Creatinine 2.09 (H) 0.76 - 1.27 mg/dL    Sodium 140 136 - 145 mmol/L    Potassium 4.9 3.5 - 5.2 mmol/L    Chloride 110 (H) 98 - 107 mmol/L    CO2 20.0 (L) 22.0 - 29.0 mmol/L    Calcium 8.4 (L) 8.6 - 10.5 mg/dL    Total Protein 4.9 (L) 6.0 - 8.5 g/dL    Albumin 2.1 (L) 3.5 - 5.2 g/dL    ALT (SGPT) 17 1 - 41 U/L    AST (SGOT) 18 1 - 40 U/L    Alkaline Phosphatase 105 39 - 117 U/L    Total Bilirubin 1.7  (H) 0.0 - 1.2 mg/dL    Globulin 2.8 gm/dL    A/G Ratio 0.8 g/dL    BUN/Creatinine Ratio 17.7 7.0 - 25.0    Anion Gap 10.0 5.0 - 15.0 mmol/L    eGFR 30.6 (L) >60.0 mL/min/1.73   Protime-INR    Collection Time: 02/18/25  6:51 AM    Specimen: Blood   Result Value Ref Range    Protime 20.2 (H) 11.8 - 14.8 Seconds    INR 1.62 (H) 0.91 - 1.09   aPTT    Collection Time: 02/18/25  6:51 AM    Specimen: Blood   Result Value Ref Range    PTT 29.1 24.5 - 36.0 seconds   CBC Auto Differential    Collection Time: 02/18/25  6:51 AM    Specimen: Blood   Result Value Ref Range    WBC 6.68 3.40 - 10.80 10*3/mm3    RBC 3.77 (L) 4.14 - 5.80 10*6/mm3    Hemoglobin 10.8 (L) 13.0 - 17.7 g/dL    Hematocrit 34.9 (L) 37.5 - 51.0 %    MCV 92.6 79.0 - 97.0 fL    MCH 28.6 26.6 - 33.0 pg    MCHC 30.9 (L) 31.5 - 35.7 g/dL    RDW 16.9 (H) 12.3 - 15.4 %    RDW-SD 55.9 (H) 37.0 - 54.0 fl    MPV 11.5 6.0 - 12.0 fL    Platelets 74 (L) 140 - 450 10*3/mm3    Neutrophil % 74.2 42.7 - 76.0 %    Lymphocyte % 17.1 (L) 19.6 - 45.3 %    Monocyte % 8.1 5.0 - 12.0 %    Eosinophil % 0.1 (L) 0.3 - 6.2 %    Basophil % 0.1 0.0 - 1.5 %    Immature Grans % 0.4 0.0 - 0.5 %    Neutrophils, Absolute 4.95 1.70 - 7.00 10*3/mm3    Lymphocytes, Absolute 1.14 0.70 - 3.10 10*3/mm3    Monocytes, Absolute 0.54 0.10 - 0.90 10*3/mm3    Eosinophils, Absolute 0.01 0.00 - 0.40 10*3/mm3    Basophils, Absolute 0.01 0.00 - 0.20 10*3/mm3    Immature Grans, Absolute 0.03 0.00 - 0.05 10*3/mm3    nRBC 0.0 0.0 - 0.2 /100 WBC   Magnesium    Collection Time: 02/18/25  6:51 AM    Specimen: Blood   Result Value Ref Range    Magnesium 2.1 1.6 - 2.4 mg/dL   Respiratory Panel PCR w/COVID-19(SARS-CoV-2) EDU/EDITH/YUDELKA/PAD/COR/DEJON In-House, NP Swab in [facility], 2 HR TAT - Swab, Nasopharynx    Collection Time: 02/18/25  6:52 AM    Specimen: Nasopharynx; Swab   Result Value Ref Range    ADENOVIRUS, PCR Not Detected Not Detected    Coronavirus 229E Not Detected Not Detected    Coronavirus HKU1 Not Detected  Not Detected    Coronavirus NL63 Not Detected Not Detected    Coronavirus OC43 Not Detected Not Detected    COVID19 Detected (C) Not Detected - Ref. Range    Human Metapneumovirus Not Detected Not Detected    Human Rhinovirus/Enterovirus Not Detected Not Detected    Influenza A PCR Not Detected Not Detected    Influenza B PCR Not Detected Not Detected    Parainfluenza Virus 1 Not Detected Not Detected    Parainfluenza Virus 2 Not Detected Not Detected    Parainfluenza Virus 3 Not Detected Not Detected    Parainfluenza Virus 4 Not Detected Not Detected    RSV, PCR Not Detected Not Detected    Bordetella pertussis pcr Not Detected Not Detected    Bordetella parapertussis PCR Not Detected Not Detected    Chlamydophila pneumoniae PCR Not Detected Not Detected    Mycoplasma pneumo by PCR Not Detected Not Detected   ECG 12 Lead Tachycardia    Collection Time: 02/18/25  6:58 AM   Result Value Ref Range    QT Interval 408 ms    QTC Interval 467 ms   Urinalysis With Culture If Indicated - Indwelling Urethral Catheter    Collection Time: 02/18/25  7:40 AM    Specimen: Indwelling Urethral Catheter; Urine   Result Value Ref Range    Color, UA Red (A) Yellow, Straw    Appearance, UA Cloudy (A) Clear    pH, UA 7.0 5.0 - 8.0    Specific Gravity, UA 1.025 1.005 - 1.030    Glucose,  mg/dL (Trace) (A) Negative    Ketones, UA Negative Negative    Bilirubin, UA Small (1+) (A) Negative    Blood, UA Large (3+) (A) Negative    Protein, UA >=300 mg/dL (3+) (A) Negative    Leuk Esterase, UA Negative Negative    Nitrite, UA Negative Negative    Urobilinogen, UA 0.2 E.U./dL 0.2 - 1.0 E.U./dL   Urinalysis, Microscopic Only - Indwelling Urethral Catheter    Collection Time: 02/18/25  7:40 AM    Specimen: Indwelling Urethral Catheter; Urine   Result Value Ref Range    RBC, UA Too Numerous to Count (A) None Seen, 0-2 /HPF    WBC, UA 11-20 (A) None Seen, 0-2 /HPF    Bacteria, UA Trace (A) None Seen /HPF    Squamous Epithelial Cells, UA 0-2  None Seen, 0-2 /HPF    Hyaline Casts, UA None Seen None Seen /LPF    Methodology Automated Microscopy    Lactic Acid, Plasma    Collection Time: 02/18/25 10:01 AM    Specimen: Arm, Left; Blood   Result Value Ref Range    Lactate 3.2 (C) 0.5 - 2.0 mmol/L         EKG (reviewed and interpreted by me):  Normal sinus rhythm. No acute ischemia. Heart rate       Radiological test (reviewed and interpreted by me):         COURSE & MEDICAL DECISION MAKING     Patient's partial differential diagnosis can include:    Lumbar fracture, lumbar sprain, hip fracture, hip contusion, sacral fracture, kidney stone, bladder cancer, penile Perkins trauma, and electrolyte abnormalities, and others    Final disposition is patient will be turned over to Dr. Emanuel      Patient's level of risk: Moderate        CRITICAL CARE    CRITICAL CARE: No    CRITICAL CARE TIME: None      Also Old charts were reviewed per [facility] EMR.  Pertinent details are summarized above.  All laboratory, radiologic, and EKG studies that were performed in the Emergency Department were a necessary part of the evaluation needed to exclude unstable or  emergent medical conditions:       Patient was hemodynamically and neurologically stable in the ED.   Pertinent studies were reviewed as above.     The patient received:  Medications   lactated ringers bolus 1,000 mL (has no administration in time range)   ertapenem (INVanz) 1,000 mg in sodium chloride 0.9 % 100 mL MBP (has no administration in time range)   Lidocaine HCl gel (XYLOCAINE) urethral/mucosal syringe (has no administration in time range)   sodium chloride 0.9 % infusion (has no administration in time range)   sodium chloride 0.9 % bolus 1,000 mL (1,000 mL Intravenous New Bag 2/18/25 0739)       ED Course as of 02/18/25 1138 Tue Feb 18, 2025   0700 Nsr rbbb [TS]   0947 Recommended 30 mL/kg bolus not received because it would be harmful or detrimental to the patient.  Reason: Renal Failure.   Ordered 1000 mL of IV  Fluid as bolus.  [TS]   0947 This patient was taken by me at the change of shift she he is got acute renal insufficiency with a UTI displaced Perkins catheter will place a three-way Perkins catheter and the gentleman admitted to the medicine service. [TS]   1133 Placed a call for Dr. Henley has not received a call back using surgery will admit the patient medicine service since you are short of breath. [TS]   1134 These refer to Dr. Cleveland's report for the details the patient was turned over to me pending workup i.e. the CAT scan.  A CT scan was reviewed looks like his catheter is in his prostate.  The patient had pulled his catheter as per the EMS and nursing staff advised the nursing staff to put a three-way catheter which was irrigated and is on continuous irrigation.  Also has a UTI for which IV antibiotics were given IV fluids were given to the patient.  The patient will be admitted to the medicine service. [TS]      ED Course User Index  [TS] Kvng Emanuel MD         ED Disposition       ED Disposition   Decision to Admit    Condition   --    Comment   Level of Care: Telemetry [5]   Diagnosis: FERNANDO (acute kidney injury) [700365]   Admitting Physician: ELI ALEMAN [[id number]]   Attending Physician: ELI ALEMAN [[id number]]   Certification: I Certify That Inpatient Hospital Services Are Medically Necessary For Greater Than 2 Midnights                   Dragon disclaimer:  Part of this note may be an electronic transcription/translation of spoken language to printed text using the Dragon Dictation System.     I have reviewed the patient’s prescription history via a prescription monitoring program.  This information is consistent with my knowledge of the patient’s controlled substance use history.      FINAL IMPRESSION   Diagnosis Plan   1. Gross hematuria        2. Injury of urethra, initial encounter        3. Chronic anticoagulation        4. Bladder mass        5. Acute UTI (urinary tract infection)         6. Acute kidney injury        7. Chronic liver disease        8. Thrombocytopenia              MD Jenae Diaz Tariq, MD  02/18/25 8262

## 2025-02-19 ENCOUNTER — APPOINTMENT (OUTPATIENT)
Dept: ULTRASOUND IMAGING | Facility: HOSPITAL | Age: 85
End: 2025-02-19
Payer: MEDICARE

## 2025-02-19 PROBLEM — D69.6 THROMBOCYTOPENIA: Status: ACTIVE | Noted: 2025-02-19

## 2025-02-19 PROBLEM — N21.0 BLADDER STONE: Status: ACTIVE | Noted: 2025-02-19

## 2025-02-19 LAB
ALBUMIN SERPL-MCNC: 1.7 G/DL (ref 3.5–5.2)
ALBUMIN/GLOB SERPL: 0.7 G/DL
ALP SERPL-CCNC: 73 U/L (ref 39–117)
ALT SERPL W P-5'-P-CCNC: 12 U/L (ref 1–41)
AMMONIA BLD-SCNC: 27 UMOL/L (ref 16–60)
ANION GAP SERPL CALCULATED.3IONS-SCNC: 10 MMOL/L (ref 5–15)
ANION GAP SERPL CALCULATED.3IONS-SCNC: 7 MMOL/L (ref 5–15)
APTT PPP: 34.7 SECONDS (ref 24.5–36)
AST SERPL-CCNC: 16 U/L (ref 1–40)
BASOPHILS # BLD AUTO: 0 10*3/MM3 (ref 0–0.2)
BASOPHILS NFR BLD AUTO: 0 % (ref 0–1.5)
BILIRUB SERPL-MCNC: 1.1 MG/DL (ref 0–1.2)
BUN SERPL-MCNC: 45 MG/DL (ref 8–23)
BUN SERPL-MCNC: 51 MG/DL (ref 8–23)
BUN/CREAT SERPL: 21 (ref 7–25)
BUN/CREAT SERPL: 23.2 (ref 7–25)
CALCIUM SPEC-SCNC: 7.9 MG/DL (ref 8.6–10.5)
CALCIUM SPEC-SCNC: 7.9 MG/DL (ref 8.6–10.5)
CHLORIDE SERPL-SCNC: 113 MMOL/L (ref 98–107)
CHLORIDE SERPL-SCNC: 113 MMOL/L (ref 98–107)
CO2 SERPL-SCNC: 19 MMOL/L (ref 22–29)
CO2 SERPL-SCNC: 19 MMOL/L (ref 22–29)
CREAT SERPL-MCNC: 2.14 MG/DL (ref 0.76–1.27)
CREAT SERPL-MCNC: 2.2 MG/DL (ref 0.76–1.27)
CYTOLOGIST CVX/VAG CYTO: NORMAL
D-LACTATE SERPL-SCNC: 1.9 MMOL/L (ref 0.5–2)
D-LACTATE SERPL-SCNC: 2.6 MMOL/L (ref 0.5–2)
DEPRECATED RDW RBC AUTO: 54.2 FL (ref 37–54)
DEPRECATED RDW RBC AUTO: 56.6 FL (ref 37–54)
EGFRCR SERPLBLD CKD-EPI 2021: 28.8 ML/MIN/1.73
EGFRCR SERPLBLD CKD-EPI 2021: 29.8 ML/MIN/1.73
EOSINOPHIL # BLD AUTO: 0 10*3/MM3 (ref 0–0.4)
EOSINOPHIL NFR BLD AUTO: 0 % (ref 0.3–6.2)
ERYTHROCYTE [DISTWIDTH] IN BLOOD BY AUTOMATED COUNT: 16.9 % (ref 12.3–15.4)
ERYTHROCYTE [DISTWIDTH] IN BLOOD BY AUTOMATED COUNT: 16.9 % (ref 12.3–15.4)
FIBRINOGEN PPP-MCNC: 307 MG/DL (ref 240–460)
GLOBULIN UR ELPH-MCNC: 2.4 GM/DL
GLUCOSE SERPL-MCNC: 105 MG/DL (ref 65–99)
GLUCOSE SERPL-MCNC: 94 MG/DL (ref 65–99)
HCT VFR BLD AUTO: 22.9 % (ref 37.5–51)
HCT VFR BLD AUTO: 25.3 % (ref 37.5–51)
HGB BLD-MCNC: 7.2 G/DL (ref 13–17.7)
HGB BLD-MCNC: 8.3 G/DL (ref 13–17.7)
IMM GRANULOCYTES # BLD AUTO: 0.02 10*3/MM3 (ref 0–0.05)
IMM GRANULOCYTES NFR BLD AUTO: 0.5 % (ref 0–0.5)
INR PPP: 1.79 (ref 0.91–1.09)
IRON 24H UR-MRATE: 32 MCG/DL (ref 59–158)
IRON SATN MFR SERPL: 20 % (ref 20–50)
LYMPHOCYTES # BLD AUTO: 0.73 10*3/MM3 (ref 0.7–3.1)
LYMPHOCYTES NFR BLD AUTO: 18.7 % (ref 19.6–45.3)
MCH RBC QN AUTO: 28.9 PG (ref 26.6–33)
MCH RBC QN AUTO: 28.9 PG (ref 26.6–33)
MCHC RBC AUTO-ENTMCNC: 31.4 G/DL (ref 31.5–35.7)
MCHC RBC AUTO-ENTMCNC: 32.8 G/DL (ref 31.5–35.7)
MCV RBC AUTO: 88.2 FL (ref 79–97)
MCV RBC AUTO: 92 FL (ref 79–97)
MONOCYTES # BLD AUTO: 0.26 10*3/MM3 (ref 0.1–0.9)
MONOCYTES NFR BLD AUTO: 6.7 % (ref 5–12)
MRSA DNA SPEC QL NAA+PROBE: NORMAL
NEUTROPHILS NFR BLD AUTO: 2.89 10*3/MM3 (ref 1.7–7)
NEUTROPHILS NFR BLD AUTO: 74.1 % (ref 42.7–76)
PATH INTERP BLD-IMP: NORMAL
PLATELET # BLD AUTO: 43 10*3/MM3 (ref 140–450)
PLATELET # BLD AUTO: 51 10*3/MM3 (ref 140–450)
PMV BLD AUTO: 10.2 FL (ref 6–12)
PMV BLD AUTO: 11.7 FL (ref 6–12)
POTASSIUM SERPL-SCNC: 4.7 MMOL/L (ref 3.5–5.2)
POTASSIUM SERPL-SCNC: 4.9 MMOL/L (ref 3.5–5.2)
PROT SERPL-MCNC: 4.1 G/DL (ref 6–8.5)
PROTHROMBIN TIME: 21.8 SECONDS (ref 11.8–14.8)
RBC # BLD AUTO: 2.49 10*6/MM3 (ref 4.14–5.8)
RBC # BLD AUTO: 2.87 10*6/MM3 (ref 4.14–5.8)
SARS-COV-2 AG RESP QL IA.RAPID: NORMAL
SODIUM SERPL-SCNC: 139 MMOL/L (ref 136–145)
SODIUM SERPL-SCNC: 142 MMOL/L (ref 136–145)
TIBC SERPL-MCNC: 164 MCG/DL (ref 298–536)
TRANSFERRIN SERPL-MCNC: 110 MG/DL (ref 200–360)
WBC NRBC COR # BLD AUTO: 3.03 10*3/MM3 (ref 3.4–10.8)
WBC NRBC COR # BLD AUTO: 3.9 10*3/MM3 (ref 3.4–10.8)

## 2025-02-19 PROCEDURE — 97162 PT EVAL MOD COMPLEX 30 MIN: CPT | Performed by: PHYSICAL THERAPIST

## 2025-02-19 PROCEDURE — 85610 PROTHROMBIN TIME: CPT | Performed by: INTERNAL MEDICINE

## 2025-02-19 PROCEDURE — 99231 SBSQ HOSP IP/OBS SF/LOW 25: CPT | Performed by: UROLOGY

## 2025-02-19 PROCEDURE — 85060 BLOOD SMEAR INTERPRETATION: CPT | Performed by: INTERNAL MEDICINE

## 2025-02-19 PROCEDURE — 93971 EXTREMITY STUDY: CPT

## 2025-02-19 PROCEDURE — 94799 UNLISTED PULMONARY SVC/PX: CPT

## 2025-02-19 PROCEDURE — 82140 ASSAY OF AMMONIA: CPT | Performed by: INTERNAL MEDICINE

## 2025-02-19 PROCEDURE — 85384 FIBRINOGEN ACTIVITY: CPT | Performed by: INTERNAL MEDICINE

## 2025-02-19 PROCEDURE — P9035 PLATELET PHERES LEUKOREDUCED: HCPCS

## 2025-02-19 PROCEDURE — 85730 THROMBOPLASTIN TIME PARTIAL: CPT | Performed by: INTERNAL MEDICINE

## 2025-02-19 PROCEDURE — 25010000002 DEXAMETHASONE PER 1 MG: Performed by: INTERNAL MEDICINE

## 2025-02-19 PROCEDURE — 94664 DEMO&/EVAL PT USE INHALER: CPT

## 2025-02-19 PROCEDURE — 83605 ASSAY OF LACTIC ACID: CPT | Performed by: EMERGENCY MEDICINE

## 2025-02-19 PROCEDURE — 93971 EXTREMITY STUDY: CPT | Performed by: STUDENT IN AN ORGANIZED HEALTH CARE EDUCATION/TRAINING PROGRAM

## 2025-02-19 PROCEDURE — 36430 TRANSFUSION BLD/BLD COMPNT: CPT

## 2025-02-19 PROCEDURE — 87641 MR-STAPH DNA AMP PROBE: CPT | Performed by: INTERNAL MEDICINE

## 2025-02-19 PROCEDURE — 25010000002 LEVOFLOXACIN PER 250 MG

## 2025-02-19 PROCEDURE — 97166 OT EVAL MOD COMPLEX 45 MIN: CPT | Performed by: OCCUPATIONAL THERAPIST

## 2025-02-19 PROCEDURE — 83540 ASSAY OF IRON: CPT | Performed by: INTERNAL MEDICINE

## 2025-02-19 PROCEDURE — P9100 PATHOGEN TEST FOR PLATELETS: HCPCS

## 2025-02-19 PROCEDURE — 94760 N-INVAS EAR/PLS OXIMETRY 1: CPT

## 2025-02-19 PROCEDURE — 25010000002 NA FERRIC GLUC CPLX PER 12.5 MG: Performed by: INTERNAL MEDICINE

## 2025-02-19 PROCEDURE — 85025 COMPLETE CBC W/AUTO DIFF WBC: CPT

## 2025-02-19 PROCEDURE — 80053 COMPREHEN METABOLIC PANEL: CPT

## 2025-02-19 PROCEDURE — 85027 COMPLETE CBC AUTOMATED: CPT | Performed by: INTERNAL MEDICINE

## 2025-02-19 PROCEDURE — 25810000003 LACTATED RINGERS SOLUTION: Performed by: INTERNAL MEDICINE

## 2025-02-19 PROCEDURE — 25810000003 SODIUM CHLORIDE 0.9 % SOLUTION: Performed by: INTERNAL MEDICINE

## 2025-02-19 PROCEDURE — 87426 SARSCOV CORONAVIRUS AG IA: CPT | Performed by: INTERNAL MEDICINE

## 2025-02-19 PROCEDURE — 84466 ASSAY OF TRANSFERRIN: CPT | Performed by: INTERNAL MEDICINE

## 2025-02-19 RX ORDER — SODIUM CHLORIDE 9 MG/ML
75 INJECTION, SOLUTION INTRAVENOUS CONTINUOUS
Status: DISPENSED | OUTPATIENT
Start: 2025-02-19 | End: 2025-02-20

## 2025-02-19 RX ORDER — DEXAMETHASONE SODIUM PHOSPHATE 10 MG/ML
6 INJECTION, SOLUTION INTRA-ARTICULAR; INTRALESIONAL; INTRAMUSCULAR; INTRAVENOUS; SOFT TISSUE DAILY
Status: DISCONTINUED | OUTPATIENT
Start: 2025-02-19 | End: 2025-02-21

## 2025-02-19 RX ORDER — PANTOPRAZOLE SODIUM 40 MG/10ML
40 INJECTION, POWDER, LYOPHILIZED, FOR SOLUTION INTRAVENOUS EVERY 12 HOURS SCHEDULED
Status: DISCONTINUED | OUTPATIENT
Start: 2025-02-19 | End: 2025-02-22

## 2025-02-19 RX ADMIN — ALBUTEROL SULFATE 2 PUFF: 108 INHALANT RESPIRATORY (INHALATION) at 10:33

## 2025-02-19 RX ADMIN — SODIUM CHLORIDE 250 MG: 9 INJECTION, SOLUTION INTRAVENOUS at 16:40

## 2025-02-19 RX ADMIN — LEVOFLOXACIN 250 MG: 250 INJECTION, SOLUTION INTRAVENOUS at 21:02

## 2025-02-19 RX ADMIN — GUAIFENESIN 1200 MG: 600 TABLET ORAL at 21:02

## 2025-02-19 RX ADMIN — SODIUM CHLORIDE 75 ML/HR: 9 INJECTION, SOLUTION INTRAVENOUS at 09:25

## 2025-02-19 RX ADMIN — BUDESONIDE AND FORMOTEROL FUMARATE DIHYDRATE 2 PUFF: 160; 4.5 AEROSOL RESPIRATORY (INHALATION) at 07:10

## 2025-02-19 RX ADMIN — TAMSULOSIN HYDROCHLORIDE 0.4 MG: 0.4 CAPSULE ORAL at 21:02

## 2025-02-19 RX ADMIN — ALBUTEROL SULFATE 2 PUFF: 108 INHALANT RESPIRATORY (INHALATION) at 07:08

## 2025-02-19 RX ADMIN — IPRATROPIUM BROMIDE 2 PUFF: 17 AEROSOL, METERED RESPIRATORY (INHALATION) at 20:49

## 2025-02-19 RX ADMIN — IPRATROPIUM BROMIDE 2 PUFF: 17 AEROSOL, METERED RESPIRATORY (INHALATION) at 14:38

## 2025-02-19 RX ADMIN — Medication 10 ML: at 21:02

## 2025-02-19 RX ADMIN — BUDESONIDE AND FORMOTEROL FUMARATE DIHYDRATE 2 PUFF: 160; 4.5 AEROSOL RESPIRATORY (INHALATION) at 20:49

## 2025-02-19 RX ADMIN — ALBUTEROL SULFATE 2 PUFF: 108 INHALANT RESPIRATORY (INHALATION) at 20:49

## 2025-02-19 RX ADMIN — GUAIFENESIN 1200 MG: 600 TABLET ORAL at 09:26

## 2025-02-19 RX ADMIN — IPRATROPIUM BROMIDE 2 PUFF: 17 AEROSOL, METERED RESPIRATORY (INHALATION) at 10:33

## 2025-02-19 RX ADMIN — PANTOPRAZOLE SODIUM 40 MG: 40 INJECTION, POWDER, FOR SOLUTION INTRAVENOUS at 21:02

## 2025-02-19 RX ADMIN — IPRATROPIUM BROMIDE 2 PUFF: 17 AEROSOL, METERED RESPIRATORY (INHALATION) at 07:09

## 2025-02-19 RX ADMIN — PANTOPRAZOLE SODIUM 40 MG: 40 INJECTION, POWDER, FOR SOLUTION INTRAVENOUS at 09:26

## 2025-02-19 RX ADMIN — DOCUSATE SODIUM 50 MG AND SENNOSIDES 8.6 MG 2 TABLET: 8.6; 5 TABLET, FILM COATED ORAL at 09:26

## 2025-02-19 RX ADMIN — SODIUM CHLORIDE, SODIUM LACTATE, POTASSIUM CHLORIDE, CALCIUM CHLORIDE 1000 ML: 20; 30; 600; 310 INJECTION, SOLUTION INTRAVENOUS at 09:25

## 2025-02-19 RX ADMIN — DOCUSATE SODIUM 50 MG AND SENNOSIDES 8.6 MG 2 TABLET: 8.6; 5 TABLET, FILM COATED ORAL at 21:02

## 2025-02-19 RX ADMIN — ALBUTEROL SULFATE 2 PUFF: 108 INHALANT RESPIRATORY (INHALATION) at 14:38

## 2025-02-19 RX ADMIN — DEXAMETHASONE SODIUM PHOSPHATE 6 MG: 10 INJECTION INTRAMUSCULAR; INTRAVENOUS at 09:26

## 2025-02-19 NOTE — PLAN OF CARE
"Goal Outcome Evaluation:  Plan of Care Reviewed With: patient        Progress: no change  Outcome Evaluation: OT evaluation completed. Pt is drowsy with difficulty maintaining alertness throughout session. Pt alerts to voice, but is [place]. Pt was oriented to his name, current year and hospital. Pt is conversationally confused and difficult to understand at times. Pt reports no complaints. When attempted light touch sensation testing, pt reports \"no\" when asked if he can feel the light touch. Unsure of accuracy 2' pt's cognition. Pt was max A for rolling right with verbal cues. Pt denies further functional mobility. Expected level of assist for all ADLs this date is max-dependent. Skilled OT recommended to address adls, functional mobility and command following. Recommended d/c SNF.    Anticipated Discharge Disposition (OT): skilled nursing facility                        "

## 2025-02-19 NOTE — THERAPY EVALUATION
Patient Name: Manohar Garcia  : 1940    MRN: [id number]                              Today's Date: 2025       Admit Date: 2025    Visit Dx:     ICD-10-CM ICD-9-CM   1. Gross hematuria  R31.0 599.71   2. Injury of urethra, initial encounter  S37.30XA 867.0   3. Chronic anticoagulation  Z79.01 V58.61   4. Bladder mass  N32.89 596.89   5. Acute UTI (urinary tract infection)  N39.0 599.0   6. Acute kidney injury  N17.9 584.9   7. Chronic liver disease  K76.9 571.9   8. Thrombocytopenia  D69.6 287.5   9. Hematuria  R31.9 599.70   10. Bladder stone  N21.0 594.1   11. 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    Thrombocytopenia    Hyperlactatemia    Hip fracture    Closed 2-part intertrochanteric fracture of proximal end of right femur    Acute blood loss anemia    Stage 3b chronic kidney disease    COVID-19 virus detected    Influenza A    Hypoxia    Cytokine release syndrome, grade 1    Acute pyelonephritis    Gross Hematuria    Lymphedema of left arm    Constipation    Thrombocytopenia    Bladder stone     Past Medical History:   Diagnosis Date    Dementia     GERD (gastroesophageal reflux disease)     Liver disorder      Past Surgical History:   Procedure Laterality Date    CYSTOSCOPY WITH CLOT EVACUATION N/A 2025    Procedure: CYSTOSCOPY WITH CLOT EVACUATION, FULGURATION;  Surgeon: Abdias Henley MD;  Location:  [facility];  Service: Urology;  Laterality: N/A;    HEMORRHOIDECTOMY      HERNIA REPAIR      HIP TROCHANTERIC NAILING WITH INTRAMEDULLARY HIP SCREW Right 2024    Procedure: HIP TROCHANTERIC NAILING SHORT WITH INTRAMEDULLARY HIP SCREW;  Surgeon: Arcenio Chandra MD;  Location:  [facility];  Service:  Orthopedics;  Laterality: Right;      General Information       Row Name 02/19/25 1022          OT Time and Intention    Subjective Information no complaints  -MM     Document Type evaluation  pt admitted with blood in urine and confusion. Dx: FERNANDO, hematuria, lymphedema LUE, COVID 19, cirrohosis 2' BRUNSON. 2/18/2025 cystoscopy with clot evacuation.  -MM     Mode of Treatment occupational therapy  -MM       Row Name 02/19/25 1022          General Information    Patient Profile Reviewed yes  -MM     Prior Level of Function --  unsure of PLOF 2' pt's cognition.  -MM     Existing Precautions/Restrictions fall  -MM     Barriers to Rehab medically complex;previous functional deficit;cognitive status;hearing deficit  -MM       Row Name 02/19/25 1022          Living Environment    People in Home facility resident  -MM       Row Name 02/19/25 1022          Cognition    Orientation Status (Cognition) oriented to;person;place;disoriented to;situation;time  pt knew his name, the current year and hospital.  -MM       Row Name 02/19/25 1022          Safety Issues/Impairments Affecting Functional Mobility    Safety Issues Affecting Function (Mobility) ability to follow commands;awareness of need for assistance;friction/shear risk;insight into deficits/self-awareness;safety precaution awareness;safety precautions follow-through/compliance  -MM     Impairments Affecting Function (Mobility) endurance/activity tolerance;cognition;range of motion (ROM);sensation/sensory awareness;strength  -MM     Cognitive Impairments, Mobility Safety/Performance attention;awareness, need for assistance;insight into deficits/self-awareness;problem-solving/reasoning;safety precaution awareness;safety precaution follow-through  -MM               User Key  (r) = Recorded By, (t) = Taken By, (c) = Cosigned By      Initials Name Provider Type    MM Edis Quintanilla, OTR/L Occupational Therapist                     Mobility/ADL's       Row Name 02/19/25 1022  "         Bed Mobility    Bed Mobility rolling right  -MM     Rolling Left [place] (Bed Mobility) --  -MM     Rolling Right [place] (Bed Mobility) maximum assist (25% patient effort);verbal cues;nonverbal cues (demo/gesture)  -MM     Assistive Device (Bed Mobility) bed rails;head of bed elevated;repositioning sheet  -MM     Comment, (Bed Mobility) pt refused further functional mobility  -MM       Row Name 02/19/25 1022          Activities of Daily Living    BADL Assessment/Intervention other (see comments)  expected level of assist for all ADLs this date max-dependent.  -MM               User Key  (r) = Recorded By, (t) = Taken By, (c) = Cosigned By      Initials Name Provider Type    Edis Villafuerte, OTR/L Occupational Therapist                   Obj/Interventions       Row Name 02/19/25 1022          Sensory Assessment (Somatosensory)    Sensory Assessment (Somatosensory) other (see comments)  when attempted light touch sensation testing, pt reports \"no\" when asked if he can feel the light touch. Unsure of accuracy 2' pt's cognition.  -MM       Row Name 02/19/25 1022          Range of Motion Comprehensive    General Range of Motion bilateral upper extremity ROM WNL  -MM     Comment, General Range of Motion except bilateral shoulders 25-50% impaired.  -MM       Row Name 02/19/25 1022          Strength Comprehensive (MMT)    Comment, General Manual Muscle Testing (MMT) Assessment bilateral , bicep and tricep 4+/5.  -MM               User Key  (r) = Recorded By, (t) = Taken By, (c) = Cosigned By      Initials Name Provider Type    Edis Villafuerte, OTR/L Occupational Therapist                   Goals/Plan       Row Name 02/19/25 1022          Dressing Goal 1 (OT)    Activity/Device (Dressing Goal 1, OT) upper body dressing  -MM     [place]/Cues Needed (Dressing Goal 1, OT) minimum assist (75% or more patient effort);verbal cues required;set-up required  -MM     Time Frame (Dressing Goal 1, " OT) long term goal (LTG);by discharge  -MM     Progress/Outcome (Dressing Goal 1, OT) new goal  -MM       Row Name 02/19/25 1022          Grooming Goal 1 (OT)    Activity/Device (Grooming Goal 1, OT) grooming skills, all  -MM     [place] (Grooming Goal 1, OT) minimum assist (75% or more patient effort);verbal cues required;set-up required  -MM     Time Frame (Grooming Goal 1, OT) long term goal (LTG);by discharge  -MM     Progress/Outcome (Grooming Goal 1, OT) new goal  -MM       Row Name 02/19/25 1022          Problem Specific Goal 1 (OT)    Problem Specific Goal 1 (OT) Pt will follow 75% of 1 step commands with minimal verbal cues.  -MM     Time Frame (Problem Specific Goal 1, OT) long term goal (LTG);by discharge  -MM     Progress/Outcome (Problem Specific Goal 1, OT) new goal  -MM       Row Name 02/19/25 1022          Therapy Assessment/Plan (OT)    Planned Therapy Interventions (OT) activity tolerance training;BADL retraining;functional balance retraining;occupation/activity based interventions;patient/caregiver education/training;ROM/therapeutic exercise;strengthening exercise;transfer/mobility retraining;adaptive equipment training;cognitive/visual perception retraining;neuromuscular control/coordination retraining;passive ROM/stretching  -MM               User Key  (r) = Recorded By, (t) = Taken By, (c) = Cosigned By      Initials Name Provider Type    MM Edis Quintanilla, OTR/L Occupational Therapist                   Clinical Impression       Row Name 02/19/25 1022          Pain Assessment    Pain Management Interventions activity modification encouraged;exercise or physical activity utilized;movement retraining implemented;positioning techniques utilized  -MM     Additional Documentation Pain Scale: FACES Pre/Post-Treatment (Group)  -MM       [facility] Name 02/19/25 1022          Pain Scale: FACES Pre/Post-Treatment    Pain: FACES Scale, Pretreatment 0-->no hurt  -MM     Posttreatment Pain Rating 0-->no  "hurt  -       Row Name 02/19/25 1022          Plan of Care Review    Plan of Care Reviewed With patient  -MM     Progress no change  -     Outcome Evaluation OT evaluation completed. Pt is drowsy with difficulty maintaining alertness throughout session. Pt alerts to voice, but is [place]. Pt was oriented to his name, current year and hospital. Pt is conversationally confused and difficult to understand at times. Pt reports no complaints. When attempted light touch sensation testing, pt reports \"no\" when asked if he can feel the light touch. Unsure of accuracy 2' pt's cognition. Pt was max A for rolling right with verbal cues. Pt denies further functional mobility. Expected level of assist for all ADLs this date is max-dependent. Skilled OT recommended to address adls, functional mobility and command following. Recommended d/c SNF.  -       Row Name 02/19/25 1022          Therapy Assessment/Plan (OT)    Patient/Family Therapy Goal Statement (OT) to go home  -     Rehab Potential (OT) fair  -MM     Criteria for Skilled Therapeutic Interventions Met (OT) yes;meets criteria;skilled treatment is necessary  -     Therapy Frequency (OT) 3 times/wk  -MM     Predicted Duration of Therapy Intervention (OT) until hospital discharge  -       Row Name 02/19/25 1022          Therapy Plan Review/Discharge Plan (OT)    Anticipated Discharge Disposition (OT) skilled nursing facility  -       Row Name 02/19/25 1022          Positioning and Restraints    Pre-Treatment Position in bed  -MM     Post Treatment Position bed  -MM     In Bed notified nsg;side lying left;call light within reach;encouraged to call for assist;side rails up x3  -MM               User Key  (r) = Recorded By, (t) = Taken By, (c) = Cosigned By      Initials Name Provider Type    MM Edis Quintanilla, OTR/L Occupational Therapist                   Outcome Measures       Row Name 02/19/25 1022          How much help from another is currently needed...    " Putting on and taking off regular lower body clothing? 1  -MM     Bathing (including washing, rinsing, and drying) 1  -MM     Toileting (which includes using toilet bed pan or urinal) 1  -MM     Putting on and taking off regular upper body clothing 1  -MM     Taking care of personal grooming (such as brushing teeth) 2  -MM     Eating meals 2  -MM     AM-PAC 6 Clicks Score (OT) 8  -MM       Row Name 02/19/25 1030 02/19/25 0800       How much help from another person do you currently need...    Turning from your back to your side while in flat bed without using bedrails? 2  -SB 2  -TB    Moving from lying on back to sitting on the side of a flat bed without bedrails? 2  -SB 2  -TB    Moving to and from a bed to a chair (including a wheelchair)? 1  -SB 1  -TB    Standing up from a chair using your arms (e.g., wheelchair, bedside chair)? 1  -SB 1  -TB    Climbing 3-5 steps with a railing? 1  -SB 1  -TB    To walk in hospital room? 1  -SB 1  -TB    AM-PAC 6 Clicks Score (PT) 8  -SB 8  -TB    Highest Level of Mobility Goal 3 --> Sit at edge of bed  -SB 3 --> Sit at edge of bed  -TB      Row Name 02/19/25 1030 02/19/25 1022       Functional Assessment    Outcome Measure Options AM-PAC 6 Clicks Basic Mobility (PT)  -SB AM-PAC 6 Clicks Daily Activity (OT)  -MM              User Key  (r) = Recorded By, (t) = Taken By, (c) = Cosigned By      Initials Name Provider Type    TB Vicki Kolb LPN Licensed Nurse    MM Edis Quintanilla, OTR/L Occupational Therapist    Agustina Wilson, PT DPT Physical Therapist                    Occupational Therapy Education       Title: PT OT SLP Therapies (In Progress)       Topic: Occupational Therapy (In Progress)       Point: ADL training (In Progress)       Description:   Instruct learner(s) on proper safety adaptation and remediation techniques during self care or transfers.   Instruct in proper use of assistive devices.                  Learning Progress Summary            Patient  Acceptance, E, NR by MM at 2/19/2025 1631                      Point: Home exercise program (Not Started)       Description:   Instruct learner(s) on appropriate technique for monitoring, assisting and/or progressing therapeutic exercises/activities.                  Learner Progress:  Not documented in this visit.              Point: Precautions (In Progress)       Description:   Instruct learner(s) on prescribed precautions during self-care and functional transfers.                  Learning Progress Summary            Patient Acceptance, E, NR by MM at 2/19/2025 1631                      Point: Body mechanics (In Progress)       Description:   Instruct learner(s) on proper positioning and spine alignment during self-care, functional mobility activities and/or exercises.                  Learning Progress Summary            Patient Acceptance, E, NR by MM at 2/19/2025 1631                                      User Key       Initials Effective Dates Name Provider Type Discipline     07/11/23 -  Edis Quintanilla, OTR/L Occupational Therapist OT                  OT Recommendation and Plan  Planned Therapy Interventions (OT): activity tolerance training, BADL retraining, functional balance retraining, occupation/activity based interventions, patient/caregiver education/training, ROM/therapeutic exercise, strengthening exercise, transfer/mobility retraining, adaptive equipment training, cognitive/visual perception retraining, neuromuscular control/coordination retraining, passive ROM/stretching  Therapy Frequency (OT): 3 times/wk  Plan of Care Review  Plan of Care Reviewed With: patient  Progress: no change  Outcome Evaluation: OT evaluation completed. Pt is drowsy with difficulty maintaining alertness throughout session. Pt alerts to voice, but is [place]. Pt was oriented to his name, current year and hospital. Pt is conversationally confused and difficult to understand at times. Pt reports no complaints. When  "attempted light touch sensation testing, pt reports \"no\" when asked if he can feel the light touch. Unsure of accuracy 2' pt's cognition. Pt was max A for rolling right with verbal cues. Pt denies further functional mobility. Expected level of assist for all ADLs this date is max-dependent. Skilled OT recommended to address adls, functional mobility and command following. Recommended d/c SNF.     Time Calculation:         Time Calculation- OT       Row Name 02/19/25 1022             Time Calculation- OT    OT Start Time 1022  +7 minutes chart review  -MM      OT Stop Time 1108  -MM      OT Time Calculation (min) 46 min  -MM      OT Received On 02/19/25  -MM      OT Goal Re-Cert Due Date 03/01/25  -MM                User Key  (r) = Recorded By, (t) = Taken By, (c) = Cosigned By      Initials Name Provider Type     Edis Quintanilla, OTR/L Occupational Therapist                  Therapy Charges for Today       Code Description Service Date Service Provider Modifiers Qty    [id number]  OT EVAL MOD COMPLEXITY 4 2/19/2025 Edis Quintanilla OTR/L GO 1                 Edis Quintanilla OTR/TUCKER  2/19/2025  "

## 2025-02-19 NOTE — THERAPY EVALUATION
Patient Name: Manohar Garcia  : 1940    MRN: [id number]                              Today's Date: 2025       Admit Date: 2025    Visit Dx:     ICD-10-CM ICD-9-CM   1. Gross hematuria  R31.0 599.71   2. Injury of urethra, initial encounter  S37.30XA 867.0   3. Chronic anticoagulation  Z79.01 V58.61   4. Bladder mass  N32.89 596.89   5. Acute UTI (urinary tract infection)  N39.0 599.0   6. Acute kidney injury  N17.9 584.9   7. Chronic liver disease  K76.9 571.9   8. Thrombocytopenia  D69.6 287.5   9. Hematuria  R31.9 599.70   10. Bladder stone  N21.0 594.1   11. 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    Thrombocytopenia    Hyperlactatemia    Hip fracture    Closed 2-part intertrochanteric fracture of proximal end of right femur    Acute blood loss anemia    Stage 3b chronic kidney disease    COVID-19 virus detected    Influenza A    Hypoxia    Cytokine release syndrome, grade 1    Acute pyelonephritis    Gross Hematuria    Lymphedema of left arm    Constipation    Thrombocytopenia    Bladder stone     Past Medical History:   Diagnosis Date    Dementia     GERD (gastroesophageal reflux disease)     Liver disorder      Past Surgical History:   Procedure Laterality Date    CYSTOSCOPY WITH CLOT EVACUATION N/A 2025    Procedure: CYSTOSCOPY WITH CLOT EVACUATION, FULGURATION;  Surgeon: Abdias Henley MD;  Location:  [facility];  Service: Urology;  Laterality: N/A;    HEMORRHOIDECTOMY      HERNIA REPAIR      HIP TROCHANTERIC NAILING WITH INTRAMEDULLARY HIP SCREW Right 2024    Procedure: HIP TROCHANTERIC NAILING SHORT WITH INTRAMEDULLARY HIP SCREW;  Surgeon: Arcenio Chandra MD;  Location:  [facility];  Service:  Orthopedics;  Laterality: Right;      General Information       Row Name 02/19/25 1030          Physical Therapy Time and Intention    Document Type evaluation  -SB     Mode of Treatment physical therapy  -SB       Row Name 02/19/25 1030          General Information    Patient Profile Reviewed yes  -SB     Prior Level of Function --  unsure of PLOF  -SB     Existing Precautions/Restrictions fall  -SB     Barriers to Rehab cognitive status;hearing deficit;previous functional deficit;medically complex  -SB       Row Name 02/19/25 1030          Living Environment    People in Home facility resident  -SB       Row Name 02/19/25 1030          Cognition    Orientation Status (Cognition) oriented to;person;place;disoriented to;situation;time  knew name, year and hospital  -SB       Row Name 02/19/25 1030          Safety Issues/Impairments Affecting Functional Mobility    Safety Issues Affecting Function (Mobility) ability to follow commands;safety precaution awareness;safety precautions follow-through/compliance;friction/shear risk  -SB     Impairments Affecting Function (Mobility) endurance/activity tolerance;strength;cognition;range of motion (ROM)  -SB               User Key  (r) = Recorded By, (t) = Taken By, (c) = Cosigned By      Initials Name Provider Type    Agustina Wilson PT DPT Physical Therapist                   Mobility       Row Name 02/19/25 1030          Bed Mobility    Bed Mobility rolling left  -SB     Rolling Left [place] (Bed Mobility) maximum assist (25% patient effort);verbal cues;nonverbal cues (demo/gesture)  -SB     Assistive Device (Bed Mobility) bed rails;head of bed elevated  -SB     Comment, (Bed Mobility) refused further mobility  -SB               User Key  (r) = Recorded By, (t) = Taken By, (c) = Cosigned By      Initials Name Provider Type    Agustina Wilson PT DPT Physical Therapist                   Obj/Interventions       Row Name 02/19/25 1030          Range of Motion  Comprehensive    General Range of Motion lower extremity range of motion deficits identified  -SB     Comment, General Range of Motion BLE imp 25%  -SB       Row Name 02/19/25 1030          Strength Comprehensive (MMT)    General Manual Muscle Testing (MMT) Assessment lower extremity strength deficits identified  -SB     Comment, General Manual Muscle Testing (MMT) Assessment BLE 3-/5  -SB       Row Name 02/19/25 1030          Balance    Balance Assessment --  -SB       Row Name 02/19/25 1030          Sensory Assessment (Somatosensory)    Sensory Assessment (Somatosensory) LE sensation intact  -SB               User Key  (r) = Recorded By, (t) = Taken By, (c) = Cosigned By      Initials Name Provider Type    SB Agustina Glover, PT DPT Physical Therapist                   Goals/Plan       Row Name 02/19/25 1030          Bed Mobility Goal 1 (PT)    Activity/Assistive Device (Bed Mobility Goal 1, PT) sit to supine;supine to sit;rolling to left;rolling to right  -SB     [place] Level/Cues Needed (Bed Mobility Goal 1, PT) minimum assist (75% or more patient effort)  -SB     Time Frame (Bed Mobility Goal 1, PT) long term goal (LTG)  -SB     Progress/Outcomes (Bed Mobility Goal 1, PT) new goal  -SB       Row Name 02/19/25 1030          Transfer Goal 1 (PT)    Activity/Assistive Device (Transfer Goal 1, PT) sit-to-stand/stand-to-sit;bed-to-chair/chair-to-bed;walker, rolling  -SB     [place] Level/Cues Needed (Transfer Goal 1, PT) moderate assist (50-74% patient effort)  -SB     Time Frame (Transfer Goal 1, PT) long term goal (LTG)  -SB     Progress/Outcome (Transfer Goal 1, PT) new goal  -SB       Row Name 02/19/25 1030          Gait Training Goal 1 (PT)    Activity/Assistive Device (Gait Training Goal 1, PT) gait (walking locomotion);increase endurance/gait distance;increase energy conservation;decrease fall risk;walker, rolling  -SB     [place] Level (Gait Training Goal 1, PT) minimum assist (75% or more  patient effort)  -SB     Distance (Gait Training Goal 1, PT) 10  -SB     Time Frame (Gait Training Goal 1, PT) long term goal (LTG)  -SB     Progress/Outcome (Gait Training Goal 1, PT) new goal  -SB       Row Name 02/19/25 1030          Therapy Assessment/Plan (PT)    Planned Therapy Interventions (PT) balance training;strengthening;bed mobility training;gait training;patient/family education;transfer training;postural re-education;ROM (range of motion)  -SB               User Key  (r) = Recorded By, (t) = Taken By, (c) = Cosigned By      Initials Name Provider Type    SB Agustina Glover, PT DPT Physical Therapist                   Clinical Impression       Row Name 02/19/25 1030          Pain    Pretreatment Pain Rating 0/10 - no pain  -SB     Posttreatment Pain Rating 0/10 - no pain  -SB       Row Name 02/19/25 1030          Plan of Care Review    Plan of Care Reviewed With patient  -SB     Progress no change  -SB     Outcome Evaluation PT eval completed. Pt alert and oriented to person and place but lethargic through session. Pt from facility and unsure of PLOF, but did ambulate last hospital admission. Today, pt performs rolling with max A and demos sig decreased in LE strength. Pt strongly refused attempt at further mobility. Pt will benefit from skilled PT to improve fxl mob and strength. Rec d/c SNF.  -SB       Row Name 02/19/25 1030          Therapy Assessment/Plan (PT)    Patient/Family Therapy Goals Statement (PT) improve function  -SB     Rehab Potential (PT) fair  -SB     Criteria for Skilled Interventions Met (PT) meets criteria;skilled treatment is necessary;yes  -SB     Therapy Frequency (PT) daily  -SB     Predicted Duration of Therapy Intervention (PT) until d/c or goals met  -SB       Row Name 02/19/25 1030          Positioning and Restraints    Pre-Treatment Position in bed  -SB     Post Treatment Position bed  -SB     In Bed side lying left;call light within reach;encouraged to call for  assist;exit alarm on;side rails up x3  -SB               User Key  (r) = Recorded By, (t) = Taken By, (c) = Cosigned By      Initials Name Provider Type    Agustina Wilson PT DPRENU Physical Therapist                   Outcome Measures       Row Name 02/19/25 1030          How much help from another person do you currently need...    Turning from your back to your side while in flat bed without using bedrails? 2  -SB     Moving from lying on back to sitting on the side of a flat bed without bedrails? 2  -SB     Moving to and from a bed to a chair (including a wheelchair)? 1  -SB     Standing up from a chair using your arms (e.g., wheelchair, bedside chair)? 1  -SB     Climbing 3-5 steps with a railing? 1  -SB     To walk in hospital room? 1  -SB     AM-PAC 6 Clicks Score (PT) 8  -SB     Highest Level of Mobility Goal 3 --> Sit at edge of bed  -SB       Row Name 02/19/25 1030          Functional Assessment    Outcome Measure Options AM-PAC 6 Clicks Basic Mobility (PT)  -SB               User Key  (r) = Recorded By, (t) = Taken By, (c) = Cosigned By      Initials Name Provider Type    Agustina Wilson PT DPT Physical Therapist                                 Physical Therapy Education       Title: PT OT SLP Therapies (In Progress)       Topic: Physical Therapy (In Progress)       Point: Mobility training (In Progress)       Learning Progress Summary            Patient Acceptance, E, NR by SB at 2/19/2025 1449    Comment: pt edu on POC, benefits of act and d/c plans                      Point: Home exercise program (Not Started)       Learner Progress:  Not documented in this visit.              Point: Body mechanics (Not Started)       Learner Progress:  Not documented in this visit.              Point: Precautions (In Progress)       Learning Progress Summary            Patient Acceptance, E, NR by SB at 2/19/2025 1449    Comment: pt edu on POC, benefits of act and d/c plans                                       User Key       Initials Effective Dates Name Provider Type Discipline    SB 07/11/23 -  Agustina Glover PT DPT Physical Therapist PT                  PT Recommendation and Plan  Planned Therapy Interventions (PT): balance training, strengthening, bed mobility training, gait training, patient/family education, transfer training, postural re-education, ROM (range of motion)  Progress: no change  Outcome Evaluation: PT eval completed. Pt alert and oriented to person and place but lethargic through session. Pt from facility and unsure of PLOF, but did ambulate last hospital admission. Today, pt performs rolling with max A and demos sig decreased in LE strength. Pt strongly refused attempt at further mobility. Pt will benefit from skilled PT to improve fxl mob and strength. Rec d/c SNF.     Time Calculation:         PT Charges       Row Name 02/19/25 1449             Time Calculation    Start Time 1020  -SB      Stop Time 1107  -SB      Time Calculation (min) 47 min  -SB      PT Received On 02/19/25  -SB      PT Goal Re-Cert Due Date 03/01/25  -SB         Untimed Charges    PT Eval/Re-eval Minutes 47  -SB         Total Minutes    Untimed Charges Total Minutes 47  -SB       Total Minutes 47  -SB                User Key  (r) = Recorded By, (t) = Taken By, (c) = Cosigned By      Initials Name Provider Type    Agustina Wilson PT DPT Physical Therapist                  Therapy Charges for Today       Code Description Service Date Service Provider Modifiers Qty    97653587604 HC PT EVAL MOD COMPLEXITY 3 2/19/2025 Agustina Glover PT DPT GP 1            PT G-Codes  Outcome Measure Options: AM-PAC 6 Clicks Basic Mobility (PT)  AM-PAC 6 Clicks Score (PT): 8  PT Discharge Summary  Anticipated Discharge Disposition (PT): skilled nursing facility    Agustina Glover PT DPT  2/19/2025

## 2025-02-19 NOTE — PLAN OF CARE
Goal Outcome Evaluation:  Plan of Care Reviewed With: patient        Progress: no change  Outcome Evaluation: PT eval completed. Pt alert and oriented to person and place but lethargic through session. Pt from facility and unsure of PLOF, but did ambulate last hospital admission. Today, pt performs rolling with max A and demos sig decreased in LE strength. Pt strongly refused attempt at further mobility. Pt will benefit from skilled PT to improve fxl mob and strength. Rec d/c SNF.    Anticipated Discharge Disposition (PT): skilled nursing facility

## 2025-02-19 NOTE — PROGRESS NOTES
Urology  Length of Stay: 1  Patient Care Team:  Dani Farfan MD as PCP - General (Family Medicine)    Chief Complaint:  POD 1    Subjective     Interval History:   Patient sleeping this morning.  No acute  events.  Nursing reported yesterday seeing bloody stool coming out of the rectum during his penoscrotal prep for surgery.  On my examination it appeared more to be maroon stool stain at the rectum    Review of Systems:   Review of Systems   Unable to perform ROS: Other       Objective       Intake/Output Summary (Last 24 hours) at 2/19/2025 1012  Last data filed at 2/19/2025 0654  Gross per 24 hour   Intake 600 ml   Output 1650 ml   Net -1050 ml       Vital Signs  Temp:  [96.2 °F (35.7 °C)-98.6 °F (37 °C)] 98.6 °F (37 °C)  Heart Rate:  [70-89] 75  Resp:  [15-18] 16  BP: ()/() 88/44  FiO2 (%):  [100 %] 100 %    Physical Exam:  Physical Exam  Perkins catheter in place draining clear urine on very slow CBI drip.     Results Review:       I reviewed the patient's new clinical results.  Lab Results (last 24 hours)       Procedure Component Value Units Date/Time    COVID-19 RAPID AG,VERITOR,COR/PAD/EDITH/EDU/LAG/DEJON/ IN-HOUSE,DRY SWAB, 1 HR TAT - Swab, Nasal Cavity [[id number]]  (Normal) Collected: 02/19/25 0943    Specimen: Swab from Nasal Cavity Updated: 02/19/25 1009     COVID19 Presumptive Negative    Narrative:      Fact sheets for providers: https://www.fda.gov/media/314290/download    Fact sheets for patients: https://www.fda.gov/media/868561/download    STAT Lactic Acid, Reflex [[id number]]  (Normal) Collected: 02/19/25 0852    Specimen: Blood Updated: 02/19/25 0951     Lactate 1.9 mmol/L     MRSA Screen, PCR (Inpatient) - Swab, Nares [[id number]] Collected: 02/19/25 0943    Specimen: Swab from Nares Updated: 02/19/25 0950    Tissue Pathology Exam [[id number]] Collected: 02/18/25 1413    Specimen: Tissue from Urinary Bladder Updated: 02/19/25 0947    Urine Culture - Urine, Indwelling Urethral Catheter  [[id number]]  (Abnormal) Collected: 02/18/25 0740    Specimen: Urine from Indwelling Urethral Catheter Updated: 02/19/25 0930     Urine Culture >100,000 CFU/mL Gram Positive Cocci    Narrative:      Colonization of the urinary tract without infection is common. Treatment is discouraged unless the patient is symptomatic, pregnant, or undergoing an invasive urologic procedure.    Peripheral Blood Smear [[id number]] Collected: 02/19/25 0101    Specimen: Blood Updated: 02/19/25 0907    CBC & Differential [[id number]]  (Abnormal) Collected: 02/19/25 0101    Specimen: Blood Updated: 02/19/25 0139    Narrative:      The following orders were created for panel order CBC & Differential.  Procedure                               Abnormality         Status                     ---------                               -----------         ------                     CBC Auto Differential[[id number]]        Abnormal            Final result                 Please view results for these tests on the individual orders.    CBC Auto Differential [[id number]]  (Abnormal) Collected: 02/19/25 0101    Specimen: Blood Updated: 02/19/25 0139     WBC 3.90 10*3/mm3      RBC 2.87 10*6/mm3      Hemoglobin 8.3 g/dL      Hematocrit 25.3 %      MCV 88.2 fL      MCH 28.9 pg      MCHC 32.8 g/dL      RDW 16.9 %      RDW-SD 54.2 fl      MPV 11.7 fL      Platelets 43 10*3/mm3      Neutrophil % 74.1 %      Lymphocyte % 18.7 %      Monocyte % 6.7 %      Eosinophil % 0.0 %      Basophil % 0.0 %      Immature Grans % 0.5 %      Neutrophils, Absolute 2.89 10*3/mm3      Lymphocytes, Absolute 0.73 10*3/mm3      Monocytes, Absolute 0.26 10*3/mm3      Eosinophils, Absolute 0.00 10*3/mm3      Basophils, Absolute 0.00 10*3/mm3      Immature Grans, Absolute 0.02 10*3/mm3     STAT Lactic Acid, Reflex [[id number]]  (Abnormal) Collected: 02/19/25 0101    Specimen: Blood Updated: 02/19/25 0137     Lactate 2.6 mmol/L     Comprehensive Metabolic Panel [[id number]]  (Abnormal)  Collected: 02/19/25 0101    Specimen: Blood Updated: 02/19/25 0137     Glucose 105 mg/dL      BUN 45 mg/dL      Creatinine 2.14 mg/dL      Sodium 142 mmol/L      Potassium 4.9 mmol/L      Comment: Slight hemolysis detected by analyzer. Result may be falsely elevated.        Chloride 113 mmol/L      CO2 19.0 mmol/L      Calcium 7.9 mg/dL      Total Protein 4.1 g/dL      Albumin 1.7 g/dL      ALT (SGPT) 12 U/L      AST (SGOT) 16 U/L      Alkaline Phosphatase 73 U/L      Total Bilirubin 1.1 mg/dL      Globulin 2.4 gm/dL      A/G Ratio 0.7 g/dL      BUN/Creatinine Ratio 21.0     Anion Gap 10.0 mmol/L      eGFR 29.8 mL/min/1.73     Narrative:      GFR Categories in Chronic Kidney Disease (CKD)      GFR Category          GFR (mL/min/1.73)    Interpretation  G1                     90 or greater         Normal or high (1)  G2                      60-89                Mild decrease (1)  G3a                   45-59                Mild to moderate decrease  G3b                   30-44                Moderate to severe decrease  G4                    15-29                Severe decrease  G5                    14 or less           Kidney failure          (1)In the absence of evidence of kidney disease, neither GFR category G1 or G2 fulfill the criteria for CKD.    eGFR calculation 2021 CKD-EPI creatinine equation, which does not include race as a factor    STAT Lactic Acid, Reflex [[id number]]  (Abnormal) Collected: 02/18/25 1849    Specimen: Blood Updated: 02/18/25 1948     Lactate 3.2 mmol/L     STAT Lactic Acid, Reflex [[id number]]  (Abnormal) Collected: 02/18/25 1530    Specimen: Blood Updated: 02/18/25 1555     Lactate 3.3 mmol/L     Basic Metabolic Panel [[id number]]  (Abnormal) Collected: 02/18/25 1530    Specimen: Blood Updated: 02/18/25 1555     Glucose 123 mg/dL      BUN 40 mg/dL      Creatinine 2.04 mg/dL      Sodium 141 mmol/L      Potassium 4.8 mmol/L      Comment: Slight hemolysis detected by analyzer. Result may be  falsely elevated.        Chloride 112 mmol/L      CO2 20.0 mmol/L      Calcium 8.0 mg/dL      BUN/Creatinine Ratio 19.6     Anion Gap 9.0 mmol/L      eGFR 31.5 mL/min/1.73     Narrative:      GFR Categories in Chronic Kidney Disease (CKD)      GFR Category          GFR (mL/min/1.73)    Interpretation  G1                     90 or greater         Normal or high (1)  G2                      60-89                Mild decrease (1)  G3a                   45-59                Mild to moderate decrease  G3b                   30-44                Moderate to severe decrease  G4                    15-29                Severe decrease  G5                    14 or less           Kidney failure          (1)In the absence of evidence of kidney disease, neither GFR category G1 or G2 fulfill the criteria for CKD.    eGFR calculation 2021 CKD-EPI creatinine equation, which does not include race as a factor    CBC & Differential [[id number]]  (Abnormal) Collected: 02/18/25 1530    Specimen: Blood Updated: 02/18/25 1545    Narrative:      The following orders were created for panel order CBC & Differential.  Procedure                               Abnormality         Status                     ---------                               -----------         ------                     CBC Auto Differential[[id number]]        Abnormal            Final result                 Please view results for these tests on the individual orders.    CBC Auto Differential [[id number]]  (Abnormal) Collected: 02/18/25 1530    Specimen: Blood Updated: 02/18/25 1545     WBC 3.90 10*3/mm3      RBC 2.93 10*6/mm3      Hemoglobin 8.7 g/dL      Hematocrit 27.4 %      MCV 93.5 fL      MCH 29.7 pg      MCHC 31.8 g/dL      RDW 16.8 %      RDW-SD 56.3 fl      MPV 11.4 fL      Platelets 47 10*3/mm3      Neutrophil % 70.3 %      Lymphocyte % 23.8 %      Monocyte % 5.1 %      Eosinophil % 0.3 %      Basophil % 0.0 %      Immature Grans % 0.5 %      Neutrophils, Absolute  2.74 10*3/mm3      Lymphocytes, Absolute 0.93 10*3/mm3      Monocytes, Absolute 0.20 10*3/mm3      Eosinophils, Absolute 0.01 10*3/mm3      Basophils, Absolute 0.00 10*3/mm3      Immature Grans, Absolute 0.02 10*3/mm3     STAT Lactic Acid, Reflex [[id number]]  (Abnormal) Collected: 02/18/25 1250    Specimen: Blood from Arm, Right Updated: 02/18/25 1334     Lactate 3.3 mmol/L     Blood Culture - Blood, Arm, Right [[id number]] Collected: 02/18/25 1249    Specimen: Blood from Arm, Right Updated: 02/18/25 1329    BNP [[id number]]  (Normal) Collected: 02/18/25 0651    Specimen: Blood Updated: 02/18/25 1215     proBNP 937.6 pg/mL     Narrative:      This assay is used as an aid in the diagnosis of individuals suspected of having heart failure. It can be used as an aid in the diagnosis of acute decompensated heart failure (ADHF) in patients presenting with signs and symptoms of ADHF to the emergency department (ED). In addition, NT-proBNP of <300 pg/mL indicates ADHF is not likely.    Age Range Result Interpretation  NT-proBNP Concentration (pg/mL:      <50             Positive            >450                   Gray                 300-450                    Negative             <300    50-75           Positive            >900                  Gray                300-900                  Negative            <300      >75             Positive            >1800                  Gray                300-1800                  Negative            <300    Procalcitonin [[id number]]  (Abnormal) Collected: 02/18/25 0651    Specimen: Blood Updated: 02/18/25 1202     Procalcitonin 1.69 ng/mL     Narrative:      As a Marker for Sepsis (Non-Neonates):    1. <0.5 ng/mL represents a low risk of severe sepsis and/or septic shock.  2. >2 ng/mL represents a high risk of severe sepsis and/or septic shock.    As a Marker for Lower Respiratory Tract Infections that require antibiotic therapy:    PCT on Admission    Antibiotic Therapy       6-12 Hrs  "later    >0.5                Strongly Recommended  >0.25 - <0.5        Recommended   0.1 - 0.25          Discouraged              Remeasure/reassess PCT  <0.1                Strongly Discouraged     Remeasure/reassess PCT    As 28 day mortality risk marker: \"Change in Procalcitonin Result\" (>80% or <=80%) if Day 0 (or Day 1) and Day 4 values are available. Refer to http://[email or web address]-pct-calculator.com    Change in PCT <=80%  A decrease of PCT levels below or equal to 80% defines a positive change in PCT test result representing a higher risk for 28-day all-cause mortality of patients diagnosed with severe sepsis for septic shock.    Change in PCT >80%  A decrease of PCT levels of more than 80% defines a negative change in PCT result representing a lower risk for 28-day all-cause mortality of patients diagnosed with severe sepsis or septic shock.       Blood Culture - Blood, Arm, Left [[id number]] Collected: 02/18/25 1001    Specimen: Blood from Arm, Left Updated: 02/18/25 1046    Lactic Acid, Plasma [[id number]]  (Abnormal) Collected: 02/18/25 1001    Specimen: Blood from Arm, Left Updated: 02/18/25 1037     Lactate 3.2 mmol/L           Imaging Results (Last 24 Hours)       Procedure Component Value Units Date/Time    US Venous Doppler Upper Extremity Left (duplex) [[id number]] Resulted: 02/18/25 1239     Updated: 02/19/25 0946            Medication Review:     Current Facility-Administered Medications:     albuterol sulfate HFA (PROVENTIL HFA;VENTOLIN HFA;PROAIR HFA) inhaler 2 puff, 2 puff, Inhalation, 4x Daily - RT, Alba APRN, 2 puff at 02/19/25 0708    sennosides-docusate (PERICOLACE) 8.6-50 MG per tablet 2 tablet, 2 tablet, Oral, BID, 2 tablet at 02/19/25 0926 **AND** polyethylene glycol (MIRALAX) packet 17 g, 17 g, Oral, Daily PRN **AND** bisacodyl (DULCOLAX) EC tablet 5 mg, 5 mg, Oral, Daily PRN **AND** bisacodyl (DULCOLAX) suppository 10 mg, 10 mg, Rectal, Daily PRN, , ANAT Willis    " budesonide-formoterol (SYMBICORT) 160-4.5 MCG/ACT inhaler 2 puff, 2 puff, Inhalation, BID - RT, Alba Rachel APRN, 2 puff at 02/19/25 0710    [Held by provider] carvedilol (COREG) tablet 3.125 mg, 3.125 mg, Oral, BID With Meals, Alba Rachel APRN, 3.125 mg at 02/18/25 1845    dexAMETHasone (DECADRON) injection 6 mg, 6 mg, Intravenous, Daily, Arnulfo Caballero MD, 6 mg at 02/19/25 0926    guaiFENesin (MUCINEX) 12 hr tablet 1,200 mg, 1,200 mg, Oral, BID, Alba Rachel APRN, 1,200 mg at 02/19/25 0926    HYDROcodone-acetaminophen (NORCO) 5-325 MG per tablet 1 tablet, 1 tablet, Oral, Q4H PRN, Arnulfo Caballero MD    ipratropium (ATROVENT HFA) inhaler 2 puff, 2 puff, Inhalation, 4x Daily - RT, Alba Rachel APRN, 2 puff at 02/19/25 0709    ipratropium-albuterol (DUO-NEB) nebulizer solution 3 mL, 3 mL, Nebulization, Q6H PRN, Alba Rachel APRN    lactated ringers bolus 1,000 mL, 1,000 mL, Intravenous, Once, Arnulfo Caballero MD, Last Rate: 1,000 mL/hr at 02/19/25 0925, 1,000 mL at 02/19/25 0925    levoFLOXacin (LEVAQUIN) 250 mg/50 mL D5W (premix) 250 mg, 250 mg, Intravenous, Q24H, Alba Rachel APRN, Last Rate: 50 mL/hr at 02/18/25 1845, 250 mg at 02/18/25 1845    Lidocaine HCl gel (XYLOCAINE) urethral/mucosal syringe, , Topical, Once, Abdias Henley MD    ondansetron ODT (ZOFRAN-ODT) disintegrating tablet 4 mg, 4 mg, Oral, Q6H PRN **OR** ondansetron (ZOFRAN) injection 4 mg, 4 mg, Intravenous, Q6H PRN, , Alba, APRN    pantoprazole (PROTONIX) injection 40 mg, 40 mg, Intravenous, Q12H, Arnulfo Caballero MD, 40 mg at 02/19/25 0926    sodium chloride 0.9 % flush 10 mL, 10 mL, Intravenous, Q12H, , Alba, APRN    sodium chloride 0.9 % flush 10 mL, 10 mL, Intravenous, PRN, , Alba, APRN    sodium chloride 0.9 % infusion 40 mL, 40 mL, Intravenous, PRN, , Alba, APRN    sodium chloride 0.9 % infusion, 75 mL/hr, Intravenous, Continuous, Abdias Henley  MD Maykel, Last Rate: 75 mL/hr at 02/18/25 2151, 75 mL/hr at 02/18/25 2151    sodium chloride 0.9 % infusion, 75 mL/hr, Intravenous, Continuous, Arnulfo Caballero MD, Last Rate: 75 mL/hr at 02/19/25 0925, 75 mL/hr at 02/19/25 0925    tamsulosin (FLOMAX) 24 hr capsule 0.4 mg, 0.4 mg, Oral, Nightly, , ANAT Willis, 0.4 mg at 02/18/25 2041    Assessment/Plan:   POD 1 s/p cystoscopy for clot evacuation and fulguration, 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  Thrombocytopenia noted.  Bleeding appeared mostly to be medical bleeding from his inflammatory bladder wall urothelium from his very large bladder stone.  Continue to hold anticoagulation.  Continue antibiotics.  Patient will need cystolitholapaxy at some point timing based upon his urine culture results and his medical status to include improvement in his platelet count              (Please note that portions of this note were completed with a voice recognition program.)  Abdias Henley MD  02/19/25  10:12 CST

## 2025-02-19 NOTE — PROGRESS NOTES
[facility] Medicine Services  INPATIENT PROGRESS NOTE    Patient Name: Manohar Garcia  Date of Admission: 2/18/2025  Today's Date: 02/19/25  Length of Stay: 1  Primary Care Physician: Dani Farfan MD    Subjective   Chief Complaint: follow-up gross hematuria  HPI   Patient was sleeping when I arrived.  He is lethargic this morning.  He denies any pain.  Continuous bladder irrigation ongoing.  I did ask him if he has been experiencing any blood in his stool recently, and he indicated yes.  He seemed to indicate it was more bright red in nature.  He has not been experiencing any abdominal pain.  Reports that his appetite has been poor.    Review of Systems   All pertinent negatives and positives are as above. All other systems have been reviewed and are negative unless otherwise stated.     Objective    Temp:  [96.2 °F (35.7 °C)-98.6 °F (37 °C)] 98.6 °F (37 °C)  Heart Rate:  [70-89] 75  Resp:  [15-18] 16  BP: ()/() 88/44  FiO2 (%):  [100 %] 100 %  Physical Exam  Vitals reviewed.   Constitutional:       Appearance: He is ill-appearing.   HENT:      Head: Normocephalic.      Mouth/Throat:      Mouth: Mucous membranes are dry.   Eyes:      Pupils: Pupils are equal, round, and reactive to light.   Cardiovascular:      Rate and Rhythm: Normal rate.   Pulmonary:      Effort: Pulmonary effort is normal. No respiratory distress.   Abdominal:      General: There is no distension.      Palpations: Abdomen is soft.      Tenderness: There is no abdominal tenderness.   Genitourinary:     Comments: Perkins in place with CBI ongoing  Musculoskeletal:         General: Swelling (most prominent LUE) present.   Skin:     Coloration: Skin is pale.   Neurological:      Motor: Weakness present.      Comments: Generalized weakness and lethargy noted         Results Review:  I have reviewed the labs, radiology results, and diagnostic studies.    Laboratory Data:   Results from last 7  "days   Lab Units 02/19/25  0101 02/18/25  1530 02/18/25  0651   WBC 10*3/mm3 3.90 3.90 6.68   HEMOGLOBIN g/dL 8.3* 8.7* 10.8*   HEMATOCRIT % 25.3* 27.4* 34.9*   PLATELETS 10*3/mm3 43* 47* 74*        Results from last 7 days   Lab Units 02/19/25  0101 02/18/25  1530 02/18/25  0651   SODIUM mmol/L 142 141 140   POTASSIUM mmol/L 4.9 4.8 4.9   CHLORIDE mmol/L 113* 112* 110*   CO2 mmol/L 19.0* 20.0* 20.0*   BUN mg/dL 45* 40* 37*   CREATININE mg/dL 2.14* 2.04* 2.09*   CALCIUM mg/dL 7.9* 8.0* 8.4*   BILIRUBIN mg/dL 1.1  --  1.7*   ALK PHOS U/L 73  --  105   ALT (SGPT) U/L 12  --  17   AST (SGOT) U/L 16  --  18   GLUCOSE mg/dL 105* 123* 143*       Culture Data:   No results found for: \"BLOODCX\", \"URINECX\", \"WOUNDCX\", \"MRSACX\", \"RESPCX\", \"STOOLCX\"    Radiology Data:   Imaging Results (Last 24 Hours)       Procedure Component Value Units Date/Time    US Venous Doppler Upper Extremity Left (duplex) [[id number]] Resulted: 02/18/25 1239     Updated: 02/18/25 1301            I have reviewed the patient's current medications.     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 Kale Mathis anticoagulation  Patient does have a component of chronic thrombocytopenia, previous platelet trend reviewed, but now trending down and at 43 this morning  Given his recent active bleeding and platelet count less than 50, plan to transfuse platelets this morning  Give 1 liter bolus of LR now  Will also check peripheral smear, repeat coags, check fibrinogen  7.  Add iron profile  8.  BPs remain soft; hold outpatient anti-HTN meds including Coreg  9.  Continuous bladder irrigation  10.  IV Levaquin for now -patient has penicillin allergy.  " Received IV Ertapenem in ED. Recent urine culture on 2//2025 was positive for Enterococcus faecalis.  Follow-up new urine culture on 2/18/2025 closely.  Follow-up blood cultures.  Records from March 2023 also reviewed and he had blood cultures positive for Pseudomonas at that time which was also susceptible to levofloxacin.  11.  Patient has tested positive again for COVID-19.  Previous respiratory PCR panel from 1/22/2025 also reviewed and patient tested positive for COVID 19 at that time as well.  12.  IV Dexamethasone  13.  Duplex LUE today  14.  Patient describes passing some blood in stool recently as well - seems to indicate it has been more bright red in nature.  In the interim, will start treatment with IV PPI.  Transfuse platelets as noted above.  Will repeat CBC this afternoon.  15.  Echo results as follows:  Results for orders placed during the hospital encounter of 02/18/25    Adult Transthoracic Echo Complete w/ Color, Spectral and Contrast if Necessary Per Protocol    Interpretation Summary    Technically difficult and suboptimal study.    Grossly normal left and right ventricular size and function.    Valves are not well-visualized.    16.  Guarded prognosis.  Patient is very ill.        Medical Decision Making  Number and Complexity of problems: 4 acute; high complexity      Conditions and Status        Condition is worsening.     [facility] Data  External documents reviewed: none  Cardiac tracing (EKG, telemetry) interpretation: no new EKGs  Radiology interpretation: no new radiology studies  Labs reviewed: as above  Any tests that were considered but not ordered: none     Decision rules/scores evaluated (example [id number], Wells, etc): none     Discussed with: patient, bedside nurse (Vicki); Dr. Henley     Care Planning  Shared decision making: discussed with patient this morning  Code status and discussions: DO NOT RESUSCITATE    Disposition  Social Determinants of Health that impact treatment or  disposition: Patient has been a resident at [facility]  I expect the patient to be discharged to: [facility]        Electronically signed by Arnulfo Caballero MD, 02/19/25, 08:48 CST.

## 2025-02-19 NOTE — CASE MANAGEMENT/SOCIAL WORK
Discharge Planning Assessment  [facility]     Patient Name: Manohar Garcia  MRN: [id number]  Today's Date: 2/19/2025    Admit Date: 2/18/2025        Discharge Needs Assessment       Row Name 02/19/25 0953       Living Environment    People in Home facility resident    Current Living Arrangements extended care facility    Potentially Unsafe Housing Conditions none    In the past 12 months has the electric, gas, oil, or water company threatened to shut off services in your home? No    Primary Care Provided by other (see comments)    Provides Primary Care For no one       Resource/Environmental Concerns    Resource/Environmental Concerns none    Transportation Concerns none       Transportation Needs    In the past 12 months, has lack of transportation kept you from medical appointments or from getting medications? no    In the past 12 months, has lack of transportation kept you from meetings, work, or from getting things needed for daily living? No       Food Insecurity    Within the past 12 months, you worried that your food would run out before you got the money to buy more. Never true    Within the past 12 months, the food you bought just didn't last and you didn't have money to get more. Never true       Transition Planning    Patient/Family Anticipates Transition to long-term care facility    Patient/Family Anticipated Services at Transition skilled nursing       Discharge Needs Assessment    Concerns to be Addressed discharge planning    Do you want help finding or keeping work or a job? I do not need or want help    Do you want help with school or training? For example, starting or completing job training or getting a high school diploma, GED or equivalent No    Anticipated Changes Related to Illness none    Equipment Needed After Discharge none    Discharge Coordination/Progress Patient is a resident at [facility] / [place], [place]. [phone].  I called and verified that he does have a bed  hold and can return there at time of discharge.                   Discharge Plan    No documentation.                 Continued Care and Services - Admitted Since 2/18/2025    No active coordination exists for this encounter.       Selected Continued Care - Prior Encounters Includes continued care and service providers with selected services from prior encounters from 11/20/2024 to 2/19/2025      Discharged on 2/10/2025 Admission date: 2/4/2025 - Discharge disposition: Skilled Nursing Facility (DC - External)      Destination       Service Provider Services Address Phone Fax Patient Preferred    [facility] [address], [facility] [address] [phone] --                      Discharged on 1/28/2025 Admission date: 1/22/2025 - Discharge disposition: Skilled Nursing Facility (DC - External)      Destination       Service Provider Services Address Phone Fax Patient Preferred    [facility] [address], [facility] [address] [phone] --                             Demographic Summary    No documentation.                  Functional Status    No documentation.                  Psychosocial    No documentation.                  Abuse/Neglect    No documentation.                  Legal    No documentation.                  Substance Abuse    No documentation.                  Patient Forms    No documentation.                     Mariluz Pulido, RN

## 2025-02-19 NOTE — PLAN OF CARE
Goal Outcome Evaluation:                 CBI continues at slow rate. Patient's lactic is 2.6 after NS bolus. MD aware of lab results. NS at 75 ml/hr. Safety maintained. No falls or injuries this shift. Call light within reach. Denies needs.

## 2025-02-19 NOTE — PLAN OF CARE
Goal Outcome Evaluation:  Plan of Care Reviewed With: patient        Progress: no change  Outcome Evaluation: Perkins with CBI, urine clear pink, pt tolerating well. Room air. Silicone border drsngs applied to bilat heels. IVF. Safety maintained.

## 2025-02-19 NOTE — THERAPY EVALUATION
Patient Name: Manohar Garcia  : 1940    MRN: [id number]                              Today's Date: 2025       Admit Date: 2025    Visit Dx:     ICD-10-CM ICD-9-CM   1. Gross hematuria  R31.0 599.71   2. Injury of urethra, initial encounter  S37.30XA 867.0   3. Chronic anticoagulation  Z79.01 V58.61   4. Bladder mass  N32.89 596.89   5. Acute UTI (urinary tract infection)  N39.0 599.0   6. Acute kidney injury  N17.9 584.9   7. Chronic liver disease  K76.9 571.9   8. Thrombocytopenia  D69.6 287.5   9. Hematuria  R31.9 599.70   10. Bladder stone  N21.0 594.1   11. 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    Thrombocytopenia    Hyperlactatemia    Hip fracture    Closed 2-part intertrochanteric fracture of proximal end of right femur    Acute blood loss anemia    Stage 3b chronic kidney disease    COVID-19 virus detected    Influenza A    Hypoxia    Cytokine release syndrome, grade 1    Acute pyelonephritis    Gross Hematuria    Lymphedema of left arm    Constipation    Thrombocytopenia    Bladder stone     Past Medical History:   Diagnosis Date    Dementia     GERD (gastroesophageal reflux disease)     Liver disorder      Past Surgical History:   Procedure Laterality Date    CYSTOSCOPY WITH CLOT EVACUATION N/A 2025    Procedure: CYSTOSCOPY WITH CLOT EVACUATION, FULGURATION;  Surgeon: Abdias Henley MD;  Location:  [facility] OR;  Service: Urology;  Laterality: N/A;    HEMORRHOIDECTOMY      HERNIA REPAIR      HIP TROCHANTERIC NAILING WITH INTRAMEDULLARY HIP SCREW Right 2024    Procedure: HIP TROCHANTERIC NAILING SHORT WITH INTRAMEDULLARY HIP SCREW;  Surgeon: Arcenio Chandra MD;  Location:  [facility] OR;  Service:  Orthopedics;  Laterality: Right;      General Information       Row Name 02/19/25 1022          OT Time and Intention    Subjective Information no complaints  -MM     Document Type evaluation  pt admitted with blood in urine and confusion. Dx: FERNANDO, hematuria, lymphedema LUE, COVID 19, cirrohosis 2' BRUNSON. 2/18/2025 cystoscopy with clot evacuation.  -MM     Mode of Treatment occupational therapy  -MM       Row Name 02/19/25 1022          General Information    Patient Profile Reviewed yes  -MM     Prior Level of Function --  unsure of PLOF 2' pt's cognition.  -MM     Existing Precautions/Restrictions fall  -MM     Barriers to Rehab medically complex;previous functional deficit;cognitive status;hearing deficit  -MM       Row Name 02/19/25 1022          Living Environment    People in Home facility resident  -MM       Row Name 02/19/25 1022          Cognition    Orientation Status (Cognition) oriented to;person;place;disoriented to;situation;time  pt knew his name, the current year and hospital.  -MM       Row Name 02/19/25 1022          Safety Issues/Impairments Affecting Functional Mobility    Safety Issues Affecting Function (Mobility) ability to follow commands;awareness of need for assistance;friction/shear risk;insight into deficits/self-awareness;safety precaution awareness;safety precautions follow-through/compliance  -MM     Impairments Affecting Function (Mobility) endurance/activity tolerance;cognition;range of motion (ROM);sensation/sensory awareness;strength  -MM     Cognitive Impairments, Mobility Safety/Performance attention;awareness, need for assistance;insight into deficits/self-awareness;problem-solving/reasoning;safety precaution awareness;safety precaution follow-through  -MM               User Key  (r) = Recorded By, (t) = Taken By, (c) = Cosigned By      Initials Name Provider Type    MM Edis Quintanilla, OTR/L Occupational Therapist                     Mobility/ADL's       Row Name 02/19/25 1022  "         Bed Mobility    Bed Mobility rolling right  -MM     Rolling Left [place] (Bed Mobility) --  -MM     Rolling Right [place] (Bed Mobility) maximum assist (25% patient effort);verbal cues;nonverbal cues (demo/gesture)  -MM     Assistive Device (Bed Mobility) bed rails;head of bed elevated;repositioning sheet  -MM     Comment, (Bed Mobility) pt refused further functional mobility  -MM       Row Name 02/19/25 1022          Activities of Daily Living    BADL Assessment/Intervention other (see comments)  expected level of assist for all ADLs this date max-dependent.  -MM               User Key  (r) = Recorded By, (t) = Taken By, (c) = Cosigned By      Initials Name Provider Type    Edis Villafuerte, OTR/L Occupational Therapist                   Obj/Interventions       Row Name 02/19/25 1022          Sensory Assessment (Somatosensory)    Sensory Assessment (Somatosensory) other (see comments)  when attempted light touch sensation testing, pt reports \"no\" when asked if he can feel the light touch. Unsure of accuracy 2' pt's cognition.  -MM       Row Name 02/19/25 1022          Range of Motion Comprehensive    General Range of Motion bilateral upper extremity ROM WNL  -MM     Comment, General Range of Motion except bilateral shoulders 25-50% impaired.  -MM       Row Name 02/19/25 1022          Strength Comprehensive (MMT)    Comment, General Manual Muscle Testing (MMT) Assessment bilateral , bicep and tricep 4+/5.  -MM               User Key  (r) = Recorded By, (t) = Taken By, (c) = Cosigned By      Initials Name Provider Type    Edis Villafuerte, OTR/L Occupational Therapist                   Goals/Plan       Row Name 02/19/25 1022          Dressing Goal 1 (OT)    Activity/Device (Dressing Goal 1, OT) upper body dressing  -MM     [place]/Cues Needed (Dressing Goal 1, OT) minimum assist (75% or more patient effort);verbal cues required;set-up required  -MM     Time Frame (Dressing Goal 1, " OT) long term goal (LTG);by discharge  -MM     Progress/Outcome (Dressing Goal 1, OT) new goal  -MM       Row Name 02/19/25 1022          Grooming Goal 1 (OT)    Activity/Device (Grooming Goal 1, OT) grooming skills, all  -MM     [place] (Grooming Goal 1, OT) minimum assist (75% or more patient effort);verbal cues required;set-up required  -MM     Time Frame (Grooming Goal 1, OT) long term goal (LTG);by discharge  -MM     Progress/Outcome (Grooming Goal 1, OT) new goal  -MM       Row Name 02/19/25 1022          Problem Specific Goal 1 (OT)    Problem Specific Goal 1 (OT) Pt will follow 75% of 1 step commands with minimal verbal cues.  -MM     Time Frame (Problem Specific Goal 1, OT) long term goal (LTG);by discharge  -MM     Progress/Outcome (Problem Specific Goal 1, OT) new goal  -MM       Row Name 02/19/25 1022          Therapy Assessment/Plan (OT)    Planned Therapy Interventions (OT) activity tolerance training;BADL retraining;functional balance retraining;occupation/activity based interventions;patient/caregiver education/training;ROM/therapeutic exercise;strengthening exercise;transfer/mobility retraining;adaptive equipment training;cognitive/visual perception retraining;neuromuscular control/coordination retraining;passive ROM/stretching  -MM               User Key  (r) = Recorded By, (t) = Taken By, (c) = Cosigned By      Initials Name Provider Type    MM Edis Quintanilla, OTR/L Occupational Therapist                   Clinical Impression       Row Name 02/19/25 1022          Pain Assessment    Pain Management Interventions activity modification encouraged;exercise or physical activity utilized;movement retraining implemented;positioning techniques utilized  -MM     Additional Documentation Pain Scale: FACES Pre/Post-Treatment (Group)  -MM       [facility] [place] Name 02/19/25 1022          Pain Scale: FACES Pre/Post-Treatment    Pain: FACES Scale, Pretreatment 0-->no hurt  -MM     Posttreatment Pain Rating 0-->no  "hurt  -       Row Name 02/19/25 1022          Plan of Care Review    Plan of Care Reviewed With patient  -MM     Progress no change  -     Outcome Evaluation OT evaluation completed. Pt is drowsy with difficulty maintaining alertness throughout session. Pt alerts to voice, but is [place]. Pt was oriented to his name, current year and hospital. Pt is conversationally confused and difficult to understand at times. Pt reports no complaints. When attempted light touch sensation testing, pt reports \"no\" when asked if he can feel the light touch. Unsure of accuracy 2' pt's cognition. Pt was max A for rolling right with verbal cues. Pt denies further functional mobility. Expected level of assist for all ADLs this date is max-dependent. Skilled OT recommended to address adls, functional mobility and command following. Recommended d/c SNF.  -       Row Name 02/19/25 1022          Therapy Assessment/Plan (OT)    Patient/Family Therapy Goal Statement (OT) to go home  -     Rehab Potential (OT) fair  -MM     Criteria for Skilled Therapeutic Interventions Met (OT) yes;meets criteria;skilled treatment is necessary  -     Therapy Frequency (OT) 3 times/wk  -MM     Predicted Duration of Therapy Intervention (OT) until hospital discharge  -       Row Name 02/19/25 1022          Therapy Plan Review/Discharge Plan (OT)    Anticipated Discharge Disposition (OT) skilled nursing facility  -       Row Name 02/19/25 1022          Positioning and Restraints    Pre-Treatment Position in bed  -MM     Post Treatment Position bed  -MM     In Bed notified nsg;side lying left;call light within reach;encouraged to call for assist;side rails up x3  -MM               User Key  (r) = Recorded By, (t) = Taken By, (c) = Cosigned By      Initials Name Provider Type    MM Edis Quintanilla, OTR/L Occupational Therapist                   Outcome Measures       Row Name 02/19/25 1022          How much help from another is currently needed...    " Putting on and taking off regular lower body clothing? 1  -MM     Bathing (including washing, rinsing, and drying) 1  -MM     Toileting (which includes using toilet bed pan or urinal) 1  -MM     Putting on and taking off regular upper body clothing 1  -MM     Taking care of personal grooming (such as brushing teeth) 2  -MM     Eating meals 2  -MM     AM-PAC 6 Clicks Score (OT) 8  -MM       Row Name 02/19/25 1030 02/19/25 0800       How much help from another person do you currently need...    Turning from your back to your side while in flat bed without using bedrails? 2  -SB 2  -TB    Moving from lying on back to sitting on the side of a flat bed without bedrails? 2  -SB 2  -TB    Moving to and from a bed to a chair (including a wheelchair)? 1  -SB 1  -TB    Standing up from a chair using your arms (e.g., wheelchair, bedside chair)? 1  -SB 1  -TB    Climbing 3-5 steps with a railing? 1  -SB 1  -TB    To walk in hospital room? 1  -SB 1  -TB    AM-PAC 6 Clicks Score (PT) 8  -SB 8  -TB    Highest Level of Mobility Goal 3 --> Sit at edge of bed  -SB 3 --> Sit at edge of bed  -TB      Row Name 02/19/25 1030 02/19/25 1022       Functional Assessment    Outcome Measure Options AM-PAC 6 Clicks Basic Mobility (PT)  -SB AM-PAC 6 Clicks Daily Activity (OT)  -MM              User Key  (r) = Recorded By, (t) = Taken By, (c) = Cosigned By      Initials Name Provider Type    TB Vicki Kolb LPN Licensed Nurse    MM Edis Quintanilla, OTR/L Occupational Therapist    Agustina Wilson, PT DPT Physical Therapist                    Occupational Therapy Education       Title: PT OT SLP Therapies (In Progress)       Topic: Occupational Therapy (In Progress)       Point: ADL training (In Progress)       Description:   Instruct learner(s) on proper safety adaptation and remediation techniques during self care or transfers.   Instruct in proper use of assistive devices.                  Learning Progress Summary            Patient  Acceptance, E, NR by MM at 2/19/2025 1631                      Point: Home exercise program (Not Started)       Description:   Instruct learner(s) on appropriate technique for monitoring, assisting and/or progressing therapeutic exercises/activities.                  Learner Progress:  Not documented in this visit.              Point: Precautions (In Progress)       Description:   Instruct learner(s) on prescribed precautions during self-care and functional transfers.                  Learning Progress Summary            Patient Acceptance, E, NR by MM at 2/19/2025 1631                      Point: Body mechanics (In Progress)       Description:   Instruct learner(s) on proper positioning and spine alignment during self-care, functional mobility activities and/or exercises.                  Learning Progress Summary            Patient Acceptance, E, NR by MM at 2/19/2025 1631                                      User Key       Initials Effective Dates Name Provider Type Discipline     07/11/23 -  Edis Quintanilla, OTR/L Occupational Therapist OT                  OT Recommendation and Plan  Planned Therapy Interventions (OT): activity tolerance training, BADL retraining, functional balance retraining, occupation/activity based interventions, patient/caregiver education/training, ROM/therapeutic exercise, strengthening exercise, transfer/mobility retraining, adaptive equipment training, cognitive/visual perception retraining, neuromuscular control/coordination retraining, passive ROM/stretching  Therapy Frequency (OT): 3 times/wk  Plan of Care Review  Plan of Care Reviewed With: patient  Progress: no change  Outcome Evaluation: OT evaluation completed. Pt is drowsy with difficulty maintaining alertness throughout session. Pt alerts to voice, but is [place]. Pt was oriented to his name, current year and hospital. Pt is conversationally confused and difficult to understand at times. Pt reports no complaints. When  "attempted light touch sensation testing, pt reports \"no\" when asked if he can feel the light touch. Unsure of accuracy 2' pt's cognition. Pt was max A for rolling right with verbal cues. Pt denies further functional mobility. Expected level of assist for all ADLs this date is max-dependent. Skilled OT recommended to address adls, functional mobility and command following. Recommended d/c SNF.     Time Calculation:         Time Calculation- OT       Row Name 02/19/25 1022             Time Calculation- OT    OT Start Time 1022  +7 minutes chart review  -MM      OT Stop Time 1108  -MM      OT Time Calculation (min) 46 min  -MM      OT Received On 02/19/25  -MM      OT Goal Re-Cert Due Date 03/01/25  -MM                User Key  (r) = Recorded By, (t) = Taken By, (c) = Cosigned By      Initials Name Provider Type     Edis Quintanilla, OTR/L Occupational Therapist                  Therapy Charges for Today       Code Description Service Date Service Provider Modifiers Qty    [id number]  OT EVAL MOD COMPLEXITY 4 2/19/2025 Edis Quintanilla OTR/L GO 1                 Edis Quintanilla OTR/TUCKER  2/19/2025  "

## 2025-02-20 LAB
ALBUMIN SERPL-MCNC: 1.9 G/DL (ref 3.5–5.2)
ALBUMIN/GLOB SERPL: 0.7 G/DL
ALP SERPL-CCNC: 71 U/L (ref 39–117)
ALT SERPL W P-5'-P-CCNC: 11 U/L (ref 1–41)
ANION GAP SERPL CALCULATED.3IONS-SCNC: 6 MMOL/L (ref 5–15)
AST SERPL-CCNC: 12 U/L (ref 1–40)
BACTERIA SPEC AEROBE CULT: ABNORMAL
BASOPHILS # BLD AUTO: 0 10*3/MM3 (ref 0–0.2)
BASOPHILS NFR BLD AUTO: 0 % (ref 0–1.5)
BH BB BLOOD EXPIRATION DATE: NORMAL
BH BB BLOOD TYPE BARCODE: 7300
BH BB DISPENSE STATUS: NORMAL
BH BB PRODUCT CODE: NORMAL
BH BB UNIT NUMBER: NORMAL
BILIRUB SERPL-MCNC: 0.7 MG/DL (ref 0–1.2)
BUN SERPL-MCNC: 56 MG/DL (ref 8–23)
BUN/CREAT SERPL: 25 (ref 7–25)
CALCIUM SPEC-SCNC: 8 MG/DL (ref 8.6–10.5)
CHLORIDE SERPL-SCNC: 114 MMOL/L (ref 98–107)
CO2 SERPL-SCNC: 20 MMOL/L (ref 22–29)
CREAT SERPL-MCNC: 2.24 MG/DL (ref 0.76–1.27)
CYTO UR: NORMAL
DEPRECATED RDW RBC AUTO: 55.6 FL (ref 37–54)
EGFRCR SERPLBLD CKD-EPI 2021: 28.2 ML/MIN/1.73
EOSINOPHIL # BLD AUTO: 0 10*3/MM3 (ref 0–0.4)
EOSINOPHIL NFR BLD AUTO: 0 % (ref 0.3–6.2)
ERYTHROCYTE [DISTWIDTH] IN BLOOD BY AUTOMATED COUNT: 16.8 % (ref 12.3–15.4)
GLOBULIN UR ELPH-MCNC: 2.6 GM/DL
GLUCOSE SERPL-MCNC: 105 MG/DL (ref 65–99)
HCT VFR BLD AUTO: 24.9 % (ref 37.5–51)
HGB BLD-MCNC: 7.7 G/DL (ref 13–17.7)
IMM GRANULOCYTES # BLD AUTO: 0.01 10*3/MM3 (ref 0–0.05)
IMM GRANULOCYTES NFR BLD AUTO: 0.3 % (ref 0–0.5)
LAB AP CASE REPORT: NORMAL
LAB AP INTRADEPARTMENTAL CONSULT: NORMAL
LYMPHOCYTES # BLD AUTO: 0.53 10*3/MM3 (ref 0.7–3.1)
LYMPHOCYTES NFR BLD AUTO: 15.5 % (ref 19.6–45.3)
Lab: NORMAL
MCH RBC QN AUTO: 28.7 PG (ref 26.6–33)
MCHC RBC AUTO-ENTMCNC: 30.9 G/DL (ref 31.5–35.7)
MCV RBC AUTO: 92.9 FL (ref 79–97)
MONOCYTES # BLD AUTO: 0.2 10*3/MM3 (ref 0.1–0.9)
MONOCYTES NFR BLD AUTO: 5.8 % (ref 5–12)
NEUTROPHILS NFR BLD AUTO: 2.68 10*3/MM3 (ref 1.7–7)
NEUTROPHILS NFR BLD AUTO: 78.4 % (ref 42.7–76)
NRBC BLD AUTO-RTO: 0 /100 WBC (ref 0–0.2)
PATH REPORT.FINAL DX SPEC: NORMAL
PATH REPORT.GROSS SPEC: NORMAL
PLATELET # BLD AUTO: 51 10*3/MM3 (ref 140–450)
PMV BLD AUTO: 11 FL (ref 6–12)
POTASSIUM SERPL-SCNC: 4.6 MMOL/L (ref 3.5–5.2)
PROT SERPL-MCNC: 4.5 G/DL (ref 6–8.5)
QT INTERVAL: 408 MS
QTC INTERVAL: 467 MS
RBC # BLD AUTO: 2.68 10*6/MM3 (ref 4.14–5.8)
SODIUM SERPL-SCNC: 140 MMOL/L (ref 136–145)
UNIT  ABO: NORMAL
UNIT  RH: NORMAL
WBC NRBC COR # BLD AUTO: 3.42 10*3/MM3 (ref 3.4–10.8)

## 2025-02-20 PROCEDURE — 94760 N-INVAS EAR/PLS OXIMETRY 1: CPT

## 2025-02-20 PROCEDURE — 25810000003 SODIUM CHLORIDE 0.9 % SOLUTION: Performed by: INTERNAL MEDICINE

## 2025-02-20 PROCEDURE — 94664 DEMO&/EVAL PT USE INHALER: CPT

## 2025-02-20 PROCEDURE — 25010000002 NA FERRIC GLUC CPLX PER 12.5 MG: Performed by: INTERNAL MEDICINE

## 2025-02-20 PROCEDURE — 25010000002 DEXAMETHASONE PER 1 MG: Performed by: INTERNAL MEDICINE

## 2025-02-20 PROCEDURE — 85025 COMPLETE CBC W/AUTO DIFF WBC: CPT | Performed by: INTERNAL MEDICINE

## 2025-02-20 PROCEDURE — 80053 COMPREHEN METABOLIC PANEL: CPT | Performed by: INTERNAL MEDICINE

## 2025-02-20 PROCEDURE — 99231 SBSQ HOSP IP/OBS SF/LOW 25: CPT | Performed by: UROLOGY

## 2025-02-20 PROCEDURE — 25010000002 VANCOMYCIN 1.5-0.9 GM/500ML-% SOLUTION: Performed by: INTERNAL MEDICINE

## 2025-02-20 PROCEDURE — 94799 UNLISTED PULMONARY SVC/PX: CPT

## 2025-02-20 PROCEDURE — 97110 THERAPEUTIC EXERCISES: CPT

## 2025-02-20 RX ORDER — VANCOMYCIN/0.9 % SOD CHLORIDE 750 MG/250
750 PLASTIC BAG, INJECTION (ML) INTRAVENOUS EVERY 24 HOURS
Status: DISCONTINUED | OUTPATIENT
Start: 2025-02-21 | End: 2025-02-21

## 2025-02-20 RX ORDER — SODIUM CHLORIDE 9 MG/ML
100 INJECTION, SOLUTION INTRAVENOUS CONTINUOUS
Status: DISPENSED | OUTPATIENT
Start: 2025-02-20 | End: 2025-02-21

## 2025-02-20 RX ORDER — VANCOMYCIN/0.9 % SOD CHLORIDE 1.5G/250ML
15 PLASTIC BAG, INJECTION (ML) INTRAVENOUS ONCE
Status: COMPLETED | OUTPATIENT
Start: 2025-02-20 | End: 2025-02-20

## 2025-02-20 RX ADMIN — ALBUTEROL SULFATE 2 PUFF: 108 INHALANT RESPIRATORY (INHALATION) at 14:42

## 2025-02-20 RX ADMIN — DOCUSATE SODIUM 50 MG AND SENNOSIDES 8.6 MG 2 TABLET: 8.6; 5 TABLET, FILM COATED ORAL at 09:12

## 2025-02-20 RX ADMIN — BUDESONIDE AND FORMOTEROL FUMARATE DIHYDRATE 2 PUFF: 160; 4.5 AEROSOL RESPIRATORY (INHALATION) at 07:22

## 2025-02-20 RX ADMIN — BUDESONIDE AND FORMOTEROL FUMARATE DIHYDRATE 2 PUFF: 160; 4.5 AEROSOL RESPIRATORY (INHALATION) at 20:18

## 2025-02-20 RX ADMIN — Medication 1500 MG: at 15:19

## 2025-02-20 RX ADMIN — IPRATROPIUM BROMIDE 2 PUFF: 17 AEROSOL, METERED RESPIRATORY (INHALATION) at 14:42

## 2025-02-20 RX ADMIN — GUAIFENESIN 1200 MG: 600 TABLET ORAL at 09:12

## 2025-02-20 RX ADMIN — SODIUM CHLORIDE 75 ML/HR: 9 INJECTION, SOLUTION INTRAVENOUS at 06:08

## 2025-02-20 RX ADMIN — IPRATROPIUM BROMIDE 2 PUFF: 17 AEROSOL, METERED RESPIRATORY (INHALATION) at 07:22

## 2025-02-20 RX ADMIN — Medication 10 ML: at 09:13

## 2025-02-20 RX ADMIN — SODIUM CHLORIDE 250 MG: 9 INJECTION, SOLUTION INTRAVENOUS at 13:09

## 2025-02-20 RX ADMIN — DEXAMETHASONE SODIUM PHOSPHATE 6 MG: 10 INJECTION INTRAMUSCULAR; INTRAVENOUS at 09:12

## 2025-02-20 RX ADMIN — Medication 10 ML: at 20:14

## 2025-02-20 RX ADMIN — GUAIFENESIN 1200 MG: 600 TABLET ORAL at 20:09

## 2025-02-20 RX ADMIN — PANTOPRAZOLE SODIUM 40 MG: 40 INJECTION, POWDER, FOR SOLUTION INTRAVENOUS at 20:09

## 2025-02-20 RX ADMIN — IPRATROPIUM BROMIDE 2 PUFF: 17 AEROSOL, METERED RESPIRATORY (INHALATION) at 10:46

## 2025-02-20 RX ADMIN — DOCUSATE SODIUM 50 MG AND SENNOSIDES 8.6 MG 2 TABLET: 8.6; 5 TABLET, FILM COATED ORAL at 20:09

## 2025-02-20 RX ADMIN — IPRATROPIUM BROMIDE 2 PUFF: 17 AEROSOL, METERED RESPIRATORY (INHALATION) at 20:18

## 2025-02-20 RX ADMIN — ALBUTEROL SULFATE 2 PUFF: 108 INHALANT RESPIRATORY (INHALATION) at 20:18

## 2025-02-20 RX ADMIN — TAMSULOSIN HYDROCHLORIDE 0.4 MG: 0.4 CAPSULE ORAL at 20:09

## 2025-02-20 RX ADMIN — SODIUM CHLORIDE 100 ML/HR: 9 INJECTION, SOLUTION INTRAVENOUS at 10:44

## 2025-02-20 RX ADMIN — PANTOPRAZOLE SODIUM 40 MG: 40 INJECTION, POWDER, FOR SOLUTION INTRAVENOUS at 09:12

## 2025-02-20 RX ADMIN — ALBUTEROL SULFATE 2 PUFF: 108 INHALANT RESPIRATORY (INHALATION) at 10:46

## 2025-02-20 RX ADMIN — ALBUTEROL SULFATE 2 PUFF: 108 INHALANT RESPIRATORY (INHALATION) at 07:22

## 2025-02-20 NOTE — PROGRESS NOTES
[facility] Medicine Services  INPATIENT PROGRESS NOTE    Patient Name: Manohar Garcia  Date of Admission: 2/18/2025  Today's Date: 02/20/25  Length of Stay: 2  Primary Care Physician: Dani Farfan MD    Subjective   Chief Complaint: follow-up gross hematuria  HPI   Patient was resting in bed.  He indicated that he had some ice cream earlier in the day, and wanted more ice cream now if possible.  Denied any pain symptoms.  Remains very weak, fatigued, and lethargic.  Remains on continuous bladder irrigation.    Review of Systems   All pertinent negatives and positives are as above. All other systems have been reviewed and are negative unless otherwise stated.     Objective    Temp:  [96.7 °F (35.9 °C)-97.7 °F (36.5 °C)] 97.5 °F (36.4 °C)  Heart Rate:  [61-82] 67  Resp:  [16-18] 18  BP: ()/(43-58) 97/45  Physical Exam  Vitals reviewed.   Constitutional:       Appearance: He is ill-appearing.   HENT:      Head: Normocephalic.      Mouth/Throat:      Mouth: Mucous membranes are dry.   Eyes:      Pupils: Pupils are equal, round, and reactive to light.   Cardiovascular:      Rate and Rhythm: Normal rate.   Pulmonary:      Effort: Pulmonary effort is normal. No respiratory distress.   Abdominal:      General: There is no distension.      Palpations: Abdomen is soft.      Tenderness: There is no abdominal tenderness.   Genitourinary:     Comments: Perkins in place with CBI ongoing; urine pale pink in color  Musculoskeletal:         General: Swelling (most prominent LUE) present.   Skin:     Coloration: Skin is pale.   Neurological:      Motor: Weakness present.         Results Review:  I have reviewed the labs, radiology results, and diagnostic studies.    Laboratory Data:   Results from last 7 days   Lab Units 02/20/25  0554 02/19/25  1434 02/19/25  0101   WBC 10*3/mm3 3.42 3.03* 3.90   HEMOGLOBIN g/dL 7.7* 7.2* 8.3*   HEMATOCRIT % 24.9* 22.9* 25.3*   PLATELETS 10*3/mm3 51*  "51* 43*        Results from last 7 days   Lab Units 02/20/25  0554 02/19/25  1434 02/19/25  0101 02/18/25  1530 02/18/25  0651   SODIUM mmol/L 140 139 142   < > 140   POTASSIUM mmol/L 4.6 4.7 4.9   < > 4.9   CHLORIDE mmol/L 114* 113* 113*   < > 110*   CO2 mmol/L 20.0* 19.0* 19.0*   < > 20.0*   BUN mg/dL 56* 51* 45*   < > 37*   CREATININE mg/dL 2.24* 2.20* 2.14*   < > 2.09*   CALCIUM mg/dL 8.0* 7.9* 7.9*   < > 8.4*   BILIRUBIN mg/dL 0.7  --  1.1  --  1.7*   ALK PHOS U/L 71  --  73  --  105   ALT (SGPT) U/L 11  --  12  --  17   AST (SGOT) U/L 12  --  16  --  18   GLUCOSE mg/dL 105* 94 105*   < > 143*    < > = values in this interval not displayed.       Culture Data:   No results found for: \"BLOODCX\", \"URINECX\", \"WOUNDCX\", \"MRSACX\", \"RESPCX\", \"STOOLCX\"    Radiology Data:   Imaging Results (Last 24 Hours)       Procedure Component Value Units Date/Time    US Venous Doppler Upper Extremity Left (duplex) [[id number]] Collected: 02/19/25 1245     Updated: 02/19/25 1249    Narrative:      History: Left arm swelling       Impression:      Impression: There is no evidence of deep venous thrombosis or  superficial thrombophlebitis of the left upper extremity.     Comments: Left upper extremity venous duplex exam was performed using  color Doppler flow, Doppler wave form analysis, and grayscale imaging,  with and without compression. There is no evidence of deep venous  thrombosis of the internal jugular, subclavian, axillary, brachial,  radial, and ulnar veins. There is no evidence of superficial  thrombophlebitis involving the cephalic or basilic veins.         This report was signed and finalized on 2/19/2025 12:46 PM by Samuel Yee.               I have reviewed the patient's current medications.     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  It is now resistant.    Stop Levaquin  Start IV Vancomycin (patient has allergy to PCN)   Hold anticoagulation  Continue CBI  Patient does have a component of chronic thrombocytopenia, but given his recent active bleeding and platelet count less than 50, patient was transfused with platelets on 2/19  Continue IVFs given BUN and Cr trend  Repeat dose of IV ferric gluconate today  9.   BPs soft and holding outpatient anti-HTN meds including Coreg  10.  Patient has tested positive again for COVID-19.  Previous respiratory PCR panel from 1/22/2025 also reviewed and patient tested positive for COVID 19 at that time as well.  11.  IV Dexamethasone  12.  Remains on room air  13.  Duplex LUE negative for DVT  14.  Patient describes passing some blood in stool recently as well - he describes more bright red in nature.  In the interim, will start treatment with IV PPI.  Recent transfusion of platelets as noted above.  Monitor closely for any s/sxs of GI bleeding  15.  Guarded prognosis.  Patient is very ill.        Medical Decision Making  Number and Complexity of problems: 4 acute; high complexity      Conditions and Status        Condition is largely unchanged; guarded     MDM Data  External documents reviewed: none  Cardiac tracing (EKG, telemetry) interpretation: no new EKGs  Radiology interpretation: no new radiology studies  Labs reviewed: as above  Any tests that were considered but not ordered: none     Decision rules/scores evaluated (example [id number], Wells, etc): none     Discussed with: patient, Dr. Henley     Care Planning  Shared decision making: discussed with patient this morning  Code status and discussions: DO NOT RESUSCITATE    Disposition  Social Determinants of Health that  impact treatment or disposition: Patient has been a resident at [facility]  I expect the patient to be discharged to: D.  Dr. Henley planning on removal of large bladder stone when medically optimized, likely on Monday, 2/24, pending his medical status.      Electronically signed by Arnulfo Caballero MD, 02/20/25, 10:32 CST.

## 2025-02-20 NOTE — PLAN OF CARE
Goal Outcome Evaluation:            CBI continues, urine dark pink/light red. No clots noted at this time. NS at 75ml/hr. Safety maintained. No falls or injuries this shift. Call light within reach. Bed alarm on. Denies needs at this time.

## 2025-02-20 NOTE — PLAN OF CARE
Goal Outcome Evaluation:  Plan of Care Reviewed With: patient        Progress: improving  Outcome Evaluation: COVID ISOLATION DC'D THIS SHIFT. MRSA SWAB NEGATIVE.  PLATELETS = 43  PLATELETS GIVEN PER ORDER. 3 WAY WALLIS PATENT WITH CBI SLOWLY INFUSING - URINE PINK TINGED. ROOM AIR. MAINTENANCE IV FLUIDS PER ORDER. LR BOLUS GIVEN PER ORDER. IRON INFUSION X 1 PER ORDER. LUE REMAINS EDEMATOUS. LUE ELEVATED ON PILLOW. VENOUS ULTRASOUND LUE NEGATIVE. FULL LIQUIDS STARTED. PT. TOLERATED LIQUIDS OK. NO DISTRESS NOTED.

## 2025-02-20 NOTE — THERAPY TREATMENT NOTE
Acute Care - Physical Therapy Treatment Note  [facility]     Patient Name: Manohar Garcia  : 1940  MRN: [id number]  Today's Date: 2025      Visit Dx:     ICD-10-CM ICD-9-CM   1. Gross hematuria  R31.0 599.71   2. Injury of urethra, initial encounter  S37.30XA 867.0   3. Chronic anticoagulation  Z79.01 V58.61   4. Bladder mass  N32.89 596.89   5. Acute UTI (urinary tract infection)  N39.0 599.0   6. Acute kidney injury  N17.9 584.9   7. Chronic liver disease  K76.9 571.9   8. Thrombocytopenia  D69.6 287.5   9. Hematuria  R31.9 599.70   10. Bladder stone  N21.0 594.1   11. 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    Thrombocytopenia    Hyperlactatemia    Hip fracture    Closed 2-part intertrochanteric fracture of proximal end of right femur    Acute blood loss anemia    Stage 3b chronic kidney disease    COVID-19 virus detected    Influenza A    Hypoxia    Cytokine release syndrome, grade 1    Acute pyelonephritis    Gross Hematuria    Lymphedema of left arm    Constipation    Thrombocytopenia    Bladder stone     Past Medical History:   Diagnosis Date    Dementia     GERD (gastroesophageal reflux disease)     Liver disorder      Past Surgical History:   Procedure Laterality Date    CYSTOSCOPY WITH CLOT EVACUATION N/A 2025    Procedure: CYSTOSCOPY WITH CLOT EVACUATION, FULGURATION;  Surgeon: Abdias Henley MD;  Location: [facility] OR;  Service: Urology;  Laterality: N/A;    HEMORRHOIDECTOMY      HERNIA REPAIR      HIP TROCHANTERIC NAILING WITH INTRAMEDULLARY HIP SCREW Right 2024    Procedure: HIP TROCHANTERIC NAILING SHORT WITH INTRAMEDULLARY HIP SCREW;  Surgeon: Arcenio Chandra MD;  Location: [facility] OR;  Service:  Orthopedics;  Laterality: Right;     PT Assessment (Last 12 Hours)       PT Evaluation and Treatment       Row Name 02/20/25 1500          Physical Therapy Time and Intention    Document Type therapy note (daily note)  -TB     Mode of Treatment physical therapy  -TB       Row Name 02/20/25 1500          General Information    Existing Precautions/Restrictions fall  -TB       Row Name 02/20/25 1500          Bed Mobility    Bed Mobility rolling left;rolling right;scooting/bridging  -TB     Rolling Left [place] (Bed Mobility) dependent (less than 25% patient effort);2 person assist  -TB     Rolling Right [place] (Bed Mobility) dependent (less than 25% patient effort);2 person assist  -TB     Scooting/Bridging [place] (Bed Mobility) dependent (less than 25% patient effort);2 person assist  -TB     Assistive Device (Bed Mobility) repositioning sheet  -TB       Row Name 02/20/25 1500          Motor Skills    Therapeutic Exercise --  AAROM BLE exs  -TB       Row Name 02/20/25 1500          Plan of Care Review    Plan of Care Reviewed With patient  -TB     Progress no change  -TB     Outcome Evaluation PT tx complete. Increased time for pt to wake up. Performed AAROM BLE exs with increased time and rest breaks. Pt has nore pain and is more resistive in his RLE. Rolled Dep x2 for clean up. Dep x2 to scoot up in bed and position.  -TB       Row Name 02/20/25 1500          Positioning and Restraints    Pre-Treatment Position in bed  -TB     Post Treatment Position bed  -TB     In Bed side lying right;call light within reach;encouraged to call for assist;exit alarm on;with nsg;side rails up x3;RUE elevated;LUE elevated;heels elevated  -TB               User Key  (r) = Recorded By, (t) = Taken By, (c) = Cosigned By      Initials Name Provider Type    TB Tommy Guadarrama, PTA Physical Therapist Assistant                    Physical Therapy Education       Title: PT OT SLP Therapies (In Progress)       Topic:  Physical Therapy (In Progress)       Point: Mobility training (In Progress)       Learning Progress Summary            Patient Acceptance, E, NR by SB at 2/19/2025 1449    Comment: pt edu on POC, benefits of act and d/c plans                      Point: Home exercise program (Not Started)       Learner Progress:  Not documented in this visit.              Point: Body mechanics (Not Started)       Learner Progress:  Not documented in this visit.              Point: Precautions (In Progress)       Learning Progress Summary            Patient Acceptance, E, NR by SB at 2/19/2025 1449    Comment: pt edu on POC, benefits of act and d/c plans                                      User Key       Initials Effective Dates Name Provider Type Discipline    SB 07/11/23 -  Agustina Glover, PT DPT Physical Therapist PT                  PT Recommendation and Plan     Plan of Care Reviewed With: patient  Progress: no change  Outcome Evaluation: PT tx complete. Increased time for pt to wake up. Performed AAROM BLE exs with increased time and rest breaks. Pt has nore pain and is more resistive in his RLE. Rolled Dep x2 for clean up. Dep x2 to scoot up in bed and position.       Time Calculation:    PT Charges       Row Name 02/20/25 1543             Time Calculation    Start Time 1500  -TB      Stop Time 1525  -TB      Time Calculation (min) 25 min  -TB      PT Received On 02/20/25  -TB         Time Calculation- PT    Total Timed Code Minutes- PT 25 minute(s)  -TB                User Key  (r) = Recorded By, (t) = Taken By, (c) = Cosigned By      Initials Name Provider Type    TB Tommy Guadarrama PTA Physical Therapist Assistant                  Therapy Charges for Today       Code Description Service Date Service Provider Modifiers Qty    [id number] HC PT THER PROC EA 15 MIN 2/20/2025 Tommy Guadarrama PTA GP 2            PT G-Codes  Outcome Measure Options: AM-PAC 6 Clicks Basic Mobility (PT)  AM-PAC 6 Clicks Score (PT):  8  AM-PAC 6 Clicks Score (OT): 8    Tommy Guadarrama, PTA  2/20/2025

## 2025-02-20 NOTE — PROGRESS NOTES
Urology  Length of Stay: 2  Patient Care Team:  Dani Farfan MD as PCP - General (Family Medicine)    Chief Complaint: Gross hematuria    Subjective     Interval History:   No acute  events.  CBI had to be turned up this morning.  His platelet count remains low but improved after platelets yesterday.  Hemoglobin stable at 7.7 from 7.2 yesterday.  Urine culture from 2/18 with greater than 100,000 Enterococcus resistant to levofloxacin.    Review of Systems:   Review of Systems    Objective       Intake/Output Summary (Last 24 hours) at 2/20/2025 0935  Last data filed at 2/20/2025 0932  Gross per 24 hour   Intake 612 ml   Output 1350 ml   Net -738 ml       Vital Signs  Temp:  [96.7 °F (35.9 °C)-97.7 °F (36.5 °C)] 97.5 °F (36.4 °C)  Heart Rate:  [61-82] 67  Resp:  [16-18] 18  BP: ()/(43-58) 97/45    Physical Exam:  Physical Exam  Patient in bed eating.  Perkins catheter with clear drainage on slow CBI drip.     Results Review:       I reviewed the patient's new clinical results.  Lab Results (last 24 hours)       Procedure Component Value Units Date/Time    Urine Culture - Urine, Indwelling Urethral Catheter [[id number]]  (Abnormal)  (Susceptibility) Collected: 02/18/25 0740    Specimen: Urine from Indwelling Urethral Catheter Updated: 02/20/25 0916     Urine Culture >100,000 CFU/mL Enterococcus faecalis    Narrative:      Colonization of the urinary tract without infection is common. Treatment is discouraged unless the patient is symptomatic, pregnant, or undergoing an invasive urologic procedure.    Susceptibility        Enterococcus faecalis      LEO      Ampicillin Susceptible      Levofloxacin Resistant      Nitrofurantoin Susceptible      Vancomycin Susceptible                           Tissue Pathology Exam [[id number]] Collected: 02/18/25 1413    Specimen: Tissue from Urinary Bladder Updated: 02/20/25 0841     Note to Patients --     This report may contain a detailed description of human tissue sent by  "a health care provider to the laboratory for pathologic evaluation. The content of this report is essential for diagnosis and may provide important critical findings. This information may be unfamiliar to patients to review without a medical professional present. It is advised that the patient review this report in the presence of a health care provider who can answer questions and explain the results.       Case Report --     Surgical Pathology Report                         Case: [id number]                                  Authorizing Provider:  Abdias Henley MD      Collected:           02/18/2025 02:13 PM          Ordering Location:     [facility] OR  Received:            02/19/2025 09:47 AM          Pathologist:           Mike Bhatt MD                                                      Specimen:    Urinary Bladder, BLADDER TISSUE                                                             Final Diagnosis --     Bladder tissue, transurethral resection:  No malignancy identified.  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           Intradepartmental Consult --     Dr. Gilmar Jarquin has reviewed this case and concurs with above diagnosis.       Gross Description --     1. Urinary Bladder.  Received in a formalin filled container labeled with the patient's name, medical record number and \"bladder tissue\" the single fragment of tan-white soft tissue measuring 0.7 cm in greatest dimension.  The specimen is entirely submitted in a cassette labeled 1A.           Microscopic Description --     Sections show a papillary lesion covered by transitional mucosa with focal areas of squamous metaplasia.  Cytologic atypia is not identified.  The lamina propria has edema, and occasional lymphocytes.  No malignancy is identified.      Comprehensive Metabolic Panel [[id number]]  (Abnormal) Collected: 02/20/25 0554    Specimen: Blood Updated: 02/20/25 0637     Glucose 105 mg/dL      BUN 56 " mg/dL      Creatinine 2.24 mg/dL      Sodium 140 mmol/L      Potassium 4.6 mmol/L      Chloride 114 mmol/L      CO2 20.0 mmol/L      Calcium 8.0 mg/dL      Total Protein 4.5 g/dL      Albumin 1.9 g/dL      ALT (SGPT) 11 U/L      AST (SGOT) 12 U/L      Alkaline Phosphatase 71 U/L      Total Bilirubin 0.7 mg/dL      Globulin 2.6 gm/dL      A/G Ratio 0.7 g/dL      BUN/Creatinine Ratio 25.0     Anion Gap 6.0 mmol/L      eGFR 28.2 mL/min/1.73     Narrative:      GFR Categories in Chronic Kidney Disease (CKD)      GFR Category          GFR (mL/min/1.73)    Interpretation  G1                     90 or greater         Normal or high (1)  G2                      60-89                Mild decrease (1)  G3a                   45-59                Mild to moderate decrease  G3b                   30-44                Moderate to severe decrease  G4                    15-29                Severe decrease  G5                    14 or less           Kidney failure          (1)In the absence of evidence of kidney disease, neither GFR category G1 or G2 fulfill the criteria for CKD.    eGFR calculation 2021 CKD-EPI creatinine equation, which does not include race as a factor    CBC & Differential [[id number]]  (Abnormal) Collected: 02/20/25 0554    Specimen: Blood Updated: 02/20/25 0614    Narrative:      The following orders were created for panel order CBC & Differential.  Procedure                               Abnormality         Status                     ---------                               -----------         ------                     CBC Auto Differential[[id number]]        Abnormal            Final result                 Please view results for these tests on the individual orders.    CBC Auto Differential [[id number]]  (Abnormal) Collected: 02/20/25 0554    Specimen: Blood Updated: 02/20/25 0614     WBC 3.42 10*3/mm3      RBC 2.68 10*6/mm3      Hemoglobin 7.7 g/dL      Hematocrit 24.9 %      MCV 92.9 fL      MCH 28.7 pg       MCHC 30.9 g/dL      RDW 16.8 %      RDW-SD 55.6 fl      MPV 11.0 fL      Platelets 51 10*3/mm3      Neutrophil % 78.4 %      Lymphocyte % 15.5 %      Monocyte % 5.8 %      Eosinophil % 0.0 %      Basophil % 0.0 %      Immature Grans % 0.3 %      Neutrophils, Absolute 2.68 10*3/mm3      Lymphocytes, Absolute 0.53 10*3/mm3      Monocytes, Absolute 0.20 10*3/mm3      Eosinophils, Absolute 0.00 10*3/mm3      Basophils, Absolute 0.00 10*3/mm3      Immature Grans, Absolute 0.01 10*3/mm3      nRBC 0.0 /100 WBC     Basic Metabolic Panel [[id number]]  (Abnormal) Collected: 02/19/25 1434    Specimen: Blood Updated: 02/19/25 1507     Glucose 94 mg/dL      BUN 51 mg/dL      Creatinine 2.20 mg/dL      Sodium 139 mmol/L      Potassium 4.7 mmol/L      Chloride 113 mmol/L      CO2 19.0 mmol/L      Calcium 7.9 mg/dL      BUN/Creatinine Ratio 23.2     Anion Gap 7.0 mmol/L      eGFR 28.8 mL/min/1.73     Narrative:      GFR Categories in Chronic Kidney Disease (CKD)      GFR Category          GFR (mL/min/1.73)    Interpretation  G1                     90 or greater         Normal or high (1)  G2                      60-89                Mild decrease (1)  G3a                   45-59                Mild to moderate decrease  G3b                   30-44                Moderate to severe decrease  G4                    15-29                Severe decrease  G5                    14 or less           Kidney failure          (1)In the absence of evidence of kidney disease, neither GFR category G1 or G2 fulfill the criteria for CKD.    eGFR calculation 2021 CKD-EPI creatinine equation, which does not include race as a factor    CBC (No Diff) [[id number]]  (Abnormal) Collected: 02/19/25 1434    Specimen: Blood Updated: 02/19/25 1455     WBC 3.03 10*3/mm3      RBC 2.49 10*6/mm3      Hemoglobin 7.2 g/dL      Hematocrit 22.9 %      MCV 92.0 fL      MCH 28.9 pg      MCHC 31.4 g/dL      RDW 16.9 %      RDW-SD 56.6 fl      MPV 10.2 fL      Platelets  51 10*3/mm3     Blood Culture - Blood, Arm, Right [[id number]]  (Normal) Collected: 02/18/25 1249    Specimen: Blood from Arm, Right Updated: 02/19/25 1330     Blood Culture No growth at 24 hours    Peripheral Blood Smear [[id number]] Collected: 02/19/25 0101    Specimen: Blood Updated: 02/19/25 1314     Performed by: Gilmar Jarquin MD,AP     Pathologist Interpretation CBC and peripheral blood smear review:  - Normal white count, mild normochromic normocytic anemia, marked thrombocytopenia (43,000).  - Smear review shows marked decrease of platelets without platelet clumping.    Iron Profile [[id number]]  (Abnormal) Collected: 02/19/25 1039    Specimen: Blood Updated: 02/19/25 1113     Iron 32 mcg/dL      Iron Saturation (TSAT) 20 %      Transferrin 110 mg/dL      TIBC 164 mcg/dL     Ammonia [[id number]]  (Normal) Collected: 02/19/25 1039    Specimen: Blood Updated: 02/19/25 1113     Ammonia 27 umol/L     Protime-INR [[id number]]  (Abnormal) Collected: 02/19/25 1039    Specimen: Blood Updated: 02/19/25 1106     Protime 21.8 Seconds      INR 1.79    aPTT [[id number]]  (Normal) Collected: 02/19/25 1039    Specimen: Blood Updated: 02/19/25 1106     PTT 34.7 seconds     Fibrinogen [[id number]]  (Normal) Collected: 02/19/25 1039    Specimen: Blood Updated: 02/19/25 1106     Fibrinogen 307 mg/dL     MRSA Screen, PCR (Inpatient) - Swab, Nares [[id number]]  (Normal) Collected: 02/19/25 0943    Specimen: Swab from Nares Updated: 02/19/25 1103     MRSA PCR No MRSA Detected    Narrative:      The negative predictive value of this diagnostic test is high and should only be used to consider de-escalating anti-MRSA therapy. A positive result may indicate colonization with MRSA and must be correlated clinically.    Blood Culture - Blood, Arm, Left [[id number]]  (Normal) Collected: 02/18/25 1001    Specimen: Blood from Arm, Left Updated: 02/19/25 1101     Blood Culture No growth at 24 hours    COVID-19 RAPID  AG,VERITOR,COR/PAD/EDITH/EDU/LAG/DEJON/ IN-HOUSE,DRY SWAB, 1 HR TAT - Swab, Nasal Cavity [[id number]]  (Normal) Collected: 02/19/25 0943    Specimen: Swab from Nasal Cavity Updated: 02/19/25 1009     COVID19 Presumptive Negative    Narrative:      Fact sheets for providers: https://www.fda.gov/media/518399/download    Fact sheets for patients: https://www.fda.gov/media/158732/download    STAT Lactic Acid, Reflex [[id number]]  (Normal) Collected: 02/19/25 0852    Specimen: Blood Updated: 02/19/25 0951     Lactate 1.9 mmol/L           Imaging Results (Last 24 Hours)       Procedure Component Value Units Date/Time    US Venous Doppler Upper Extremity Left (duplex) [[id number]] Collected: 02/19/25 1245     Updated: 02/19/25 1249    Narrative:      History: Left arm swelling       Impression:      Impression: There is no evidence of deep venous thrombosis or  superficial thrombophlebitis of the left upper extremity.     Comments: Left upper extremity venous duplex exam was performed using  color Doppler flow, Doppler wave form analysis, and grayscale imaging,  with and without compression. There is no evidence of deep venous  thrombosis of the internal jugular, subclavian, axillary, brachial,  radial, and ulnar veins. There is no evidence of superficial  thrombophlebitis involving the cephalic or basilic veins.         This report was signed and finalized on 2/19/2025 12:46 PM by Samuel Yee.               Medication Review:     Current Facility-Administered Medications:     albuterol sulfate HFA (PROVENTIL HFA;VENTOLIN HFA;PROAIR HFA) inhaler 2 puff, 2 puff, Inhalation, 4x Daily - RT, , Alba, APRN, 2 puff at 02/20/25 0722    sennosides-docusate (PERICOLACE) 8.6-50 MG per tablet 2 tablet, 2 tablet, Oral, BID, 2 tablet at 02/20/25 0912 **AND** polyethylene glycol (MIRALAX) packet 17 g, 17 g, Oral, Daily PRN **AND** bisacodyl (DULCOLAX) EC tablet 5 mg, 5 mg, Oral, Daily PRN **AND** bisacodyl (DULCOLAX) suppository  10 mg, 10 mg, Rectal, Daily PRN, Alba Rachel APRN    budesonide-formoterol (SYMBICORT) 160-4.5 MCG/ACT inhaler 2 puff, 2 puff, Inhalation, BID - RT, Alba Rachel APRN, 2 puff at 02/20/25 0722    [Held by provider] carvedilol (COREG) tablet 3.125 mg, 3.125 mg, Oral, BID With Meals, Alba Rachel APRN, 3.125 mg at 02/18/25 1845    dexAMETHasone (DECADRON) injection 6 mg, 6 mg, Intravenous, Daily, Arnulfo Caballero MD, 6 mg at 02/20/25 0912    guaiFENesin (MUCINEX) 12 hr tablet 1,200 mg, 1,200 mg, Oral, BID, Alba Rachel APRN, 1,200 mg at 02/20/25 0912    HYDROcodone-acetaminophen (NORCO) 5-325 MG per tablet 1 tablet, 1 tablet, Oral, Q4H PRN, Arnulfo Caballero MD    ipratropium (ATROVENT HFA) inhaler 2 puff, 2 puff, Inhalation, 4x Daily - RT, Alba Rachel APRN, 2 puff at 02/20/25 0722    ipratropium-albuterol (DUO-NEB) nebulizer solution 3 mL, 3 mL, Nebulization, Q6H PRN, Alba Rachel APRN    levoFLOXacin (LEVAQUIN) 250 mg/50 mL D5W (premix) 250 mg, 250 mg, Intravenous, Q24H, Alba Rachel APRN, Last Rate: 50 mL/hr at 02/19/25 2102, 250 mg at 02/19/25 2102    Lidocaine HCl gel (XYLOCAINE) urethral/mucosal syringe, , Topical, Once, Abdias Henley MD    ondansetron ODT (ZOFRAN-ODT) disintegrating tablet 4 mg, 4 mg, Oral, Q6H PRN **OR** ondansetron (ZOFRAN) injection 4 mg, 4 mg, Intravenous, Q6H PRN, , Alba, APRN    pantoprazole (PROTONIX) injection 40 mg, 40 mg, Intravenous, Q12H, Arnulfo Caballero MD, 40 mg at 02/20/25 0912    sodium chloride 0.9 % flush 10 mL, 10 mL, Intravenous, Q12H, , KANA WillisN, 10 mL at 02/20/25 0913    sodium chloride 0.9 % flush 10 mL, 10 mL, Intravenous, PRN, , Alba, APRN    sodium chloride 0.9 % infusion 40 mL, 40 mL, Intravenous, PRN, , Alba, APRN    tamsulosin (FLOMAX) 24 hr capsule 0.4 mg, 0.4 mg, Oral, Nightly, Virginie, ANAT Willis, 0.4 mg at 02/19/25 2102    Assessment/Plan:   Gross hematuria  likely due to chronic bladder irritation from large bladder stone in the setting of thrombocytopenia, UTI.  Recommend adjusting antibiotics for the Enterococcus resistant to Levaquin.  Cystolitholapaxy can be performed after patient is medically optimized and has received a few days of culture appropriate antibiotics, possibly Monday 2/24 based on his medical status.  Continue to hold his anticoagulation.  Continue CBI.          (Please note that portions of this note were completed with a voice recognition program.)  Abdias Henley MD  02/20/25  09:35 CST

## 2025-02-20 NOTE — PLAN OF CARE
Goal Outcome Evaluation:  Plan of Care Reviewed With: patient        Progress: no change  Outcome Evaluation: PT tx complete. Increased time for pt to wake up. Performed AAROM BLE exs with increased time and rest breaks. Pt has nore pain and is more resistive in his RLE. Rolled Dep x2 for clean up. Dep x2 to scoot up in bed and position.

## 2025-02-20 NOTE — CONSULTS
"Pharmacy Dosing Service  Pharmacokinetics  Vancomycin Initial Evaluation  Assessment/Action/Plan:  Acute kidney injury superimposed on stage 3b chronic kidney disease     UTI    Loading dose: 1500 mg  Current Order: Vancomycin 750 mg IVPB every 24 hours  Current end date:2/26/25  Levels: deferred    Vancomycin dosage initiated based on population pharmacokinetic parameters. Pharmacy will continue to follow daily and adjust dose accordingly.     Subjective:  Manohar Garcia is a 84 y.o. male with a Vancomycin \"Pharmacy to Dose\" consult for the treatment of UTI , day 1 of 7 of treatment.    AUC Model Data:  Loading dose: 1500 mg  Regimen: 750 mg IV every 24 hours.  Exposure target: AUC24 (range)400-600 mg/L.hr   AUC24,ss: 436 mg/L.hr  Probability of AUC24 > 400: 60 %  Ctrough,ss: 15.1 mg/L  Probability of Ctrough,ss > 20: 20 %  Probability of nephrotoxicity (Lodise CHINO 2009): 10 %      Objective:  Ht: 180.3 cm (71\"); Wt: 95.1 kg (209 lb 11.2 oz)  Estimated Creatinine Clearance: 28.9 mL/min (A) (by C-G formula based on SCr of 2.24 mg/dL (H)).   Creatinine   Date Value Ref Range Status   02/20/2025 2.24 (H) 0.76 - 1.27 mg/dL Final   02/19/2025 2.20 (H) 0.76 - 1.27 mg/dL Final   02/19/2025 2.14 (H) 0.76 - 1.27 mg/dL Final      Lab Results   Component Value Date    WBC 3.42 02/20/2025    WBC 3.03 (L) 02/19/2025    WBC 3.90 02/19/2025      Baseline culture results:  Microbiology Results (last 10 days)       Procedure Component Value - Date/Time    COVID-19 RAPID AG,VERITOR,COR/PAD/EDITH/EDU/LAG/DEJON/ IN-HOUSE,DRY SWAB, 1 HR TAT - Swab, Nasal Cavity [[id number]]  (Normal) Collected: 02/19/25 0943    Lab Status: Final result Specimen: Swab from Nasal Cavity Updated: 02/19/25 1009     COVID19 Presumptive Negative    Narrative:      Fact sheets for providers: https://www.fda.gov/media/486239/download    Fact sheets for patients: https://www.fda.gov/media/466827/download    MRSA Screen, PCR (Inpatient) - Swab, Nares " [[id number]]  (Normal) Collected: 02/19/25 0943    Lab Status: Final result Specimen: Swab from Nares Updated: 02/19/25 1103     MRSA PCR No MRSA Detected    Narrative:      The negative predictive value of this diagnostic test is high and should only be used to consider de-escalating anti-MRSA therapy. A positive result may indicate colonization with MRSA and must be correlated clinically.    Blood Culture - Blood, Arm, Right [[id number]]  (Normal) Collected: 02/18/25 1249    Lab Status: Preliminary result Specimen: Blood from Arm, Right Updated: 02/19/25 1330     Blood Culture No growth at 24 hours    Blood Culture - Blood, Arm, Left [[id number]]  (Normal) Collected: 02/18/25 1001    Lab Status: Preliminary result Specimen: Blood from Arm, Left Updated: 02/19/25 1101     Blood Culture No growth at 24 hours    Urine Culture - Urine, Indwelling Urethral Catheter [[id number]]  (Abnormal)  (Susceptibility) Collected: 02/18/25 0740    Lab Status: Final result Specimen: Urine from Indwelling Urethral Catheter Updated: 02/20/25 0916     Urine Culture >100,000 CFU/mL Enterococcus faecalis    Narrative:      Colonization of the urinary tract without infection is common. Treatment is discouraged unless the patient is symptomatic, pregnant, or undergoing an invasive urologic procedure.    Susceptibility        Enterococcus faecalis      LEO      Ampicillin <=2 ug/ml Susceptible      Levofloxacin >=8 ug/ml Resistant      Nitrofurantoin <=16 ug/ml Susceptible      Vancomycin 1 ug/ml Susceptible                           Respiratory Panel PCR w/COVID-19(SARS-CoV-2) EDU/EDITH/YUDELKA/PAD/COR/DEJON In-House, NP Swab in UT/VTM, 2 HR TAT - Swab, Nasopharynx [[id number]]  (Abnormal) Collected: 02/18/25 0652    Lab Status: Final result Specimen: Swab from Nasopharynx Updated: 02/18/25 0748     ADENOVIRUS, PCR Not Detected     Coronavirus 229E Not Detected     Coronavirus HKU1 Not Detected     Coronavirus NL63 Not Detected     Coronavirus OC43  Not Detected     COVID19 Detected     Human Metapneumovirus Not Detected     Human Rhinovirus/Enterovirus Not Detected     Influenza A PCR Not Detected     Influenza B PCR Not Detected     Parainfluenza Virus 1 Not Detected     Parainfluenza Virus 2 Not Detected     Parainfluenza Virus 3 Not Detected     Parainfluenza Virus 4 Not Detected     RSV, PCR Not Detected     Bordetella pertussis pcr Not Detected     Bordetella parapertussis PCR Not Detected     Chlamydophila pneumoniae PCR Not Detected     Mycoplasma pneumo by PCR Not Detected    Narrative:      In the setting of a positive respiratory panel with a viral infection PLUS a negative procalcitonin without other underlying concern for bacterial infection, consider observing off antibiotics or discontinuation of antibiotics and continue supportive care. If the respiratory panel is positive for atypical bacterial infection (Bordetella pertussis, Chlamydophila pneumoniae, or Mycoplasma pneumoniae), consider antibiotic de-escalation to target atypical bacterial infection.            Quintin Beyer, PharmD  02/20/25 10:34 CST

## 2025-02-20 NOTE — PLAN OF CARE
Goal Outcome Evaluation:  Plan of Care Reviewed With: patient        Progress: no change  Outcome Evaluation: ivf/ iv abx cont. drowsy and sleeping at intervals. worked with phys tx today. color pale. denies pain. cont to monitor.

## 2025-02-21 PROBLEM — D64.9 ANEMIA: Status: ACTIVE | Noted: 2025-02-21

## 2025-02-21 LAB
ABO GROUP BLD: NORMAL
ALBUMIN SERPL-MCNC: 1.7 G/DL (ref 3.5–5.2)
ALBUMIN/GLOB SERPL: 0.7 G/DL
ALP SERPL-CCNC: 64 U/L (ref 39–117)
ALT SERPL W P-5'-P-CCNC: 9 U/L (ref 1–41)
ANION GAP SERPL CALCULATED.3IONS-SCNC: 7 MMOL/L (ref 5–15)
AST SERPL-CCNC: 10 U/L (ref 1–40)
BASOPHILS # BLD AUTO: 0 10*3/MM3 (ref 0–0.2)
BASOPHILS NFR BLD AUTO: 0 % (ref 0–1.5)
BILIRUB SERPL-MCNC: 0.5 MG/DL (ref 0–1.2)
BLD GP AB SCN SERPL QL: NEGATIVE
BUN SERPL-MCNC: 57 MG/DL (ref 8–23)
BUN/CREAT SERPL: 28.4 (ref 7–25)
CALCIUM SPEC-SCNC: 8.1 MG/DL (ref 8.6–10.5)
CHLORIDE SERPL-SCNC: 117 MMOL/L (ref 98–107)
CO2 SERPL-SCNC: 19 MMOL/L (ref 22–29)
CREAT SERPL-MCNC: 2.01 MG/DL (ref 0.76–1.27)
DEPRECATED RDW RBC AUTO: 54.3 FL (ref 37–54)
DEPRECATED RDW RBC AUTO: 56.4 FL (ref 37–54)
EGFRCR SERPLBLD CKD-EPI 2021: 32.1 ML/MIN/1.73
EOSINOPHIL # BLD AUTO: 0 10*3/MM3 (ref 0–0.4)
EOSINOPHIL NFR BLD AUTO: 0 % (ref 0.3–6.2)
ERYTHROCYTE [DISTWIDTH] IN BLOOD BY AUTOMATED COUNT: 16.1 % (ref 12.3–15.4)
ERYTHROCYTE [DISTWIDTH] IN BLOOD BY AUTOMATED COUNT: 16.7 % (ref 12.3–15.4)
GLOBULIN UR ELPH-MCNC: 2.3 GM/DL
GLUCOSE SERPL-MCNC: 107 MG/DL (ref 65–99)
HCT VFR BLD AUTO: 21.8 % (ref 37.5–51)
HCT VFR BLD AUTO: 28.3 % (ref 37.5–51)
HGB BLD-MCNC: 6.7 G/DL (ref 13–17.7)
HGB BLD-MCNC: 8.7 G/DL (ref 13–17.7)
IMM GRANULOCYTES # BLD AUTO: 0.01 10*3/MM3 (ref 0–0.05)
IMM GRANULOCYTES NFR BLD AUTO: 0.3 % (ref 0–0.5)
LYMPHOCYTES # BLD AUTO: 0.52 10*3/MM3 (ref 0.7–3.1)
LYMPHOCYTES NFR BLD AUTO: 17 % (ref 19.6–45.3)
MCH RBC QN AUTO: 28.7 PG (ref 26.6–33)
MCH RBC QN AUTO: 28.8 PG (ref 26.6–33)
MCHC RBC AUTO-ENTMCNC: 30.7 G/DL (ref 31.5–35.7)
MCHC RBC AUTO-ENTMCNC: 30.7 G/DL (ref 31.5–35.7)
MCV RBC AUTO: 93.4 FL (ref 79–97)
MCV RBC AUTO: 93.6 FL (ref 79–97)
MONOCYTES # BLD AUTO: 0.19 10*3/MM3 (ref 0.1–0.9)
MONOCYTES NFR BLD AUTO: 6.2 % (ref 5–12)
NEUTROPHILS NFR BLD AUTO: 2.33 10*3/MM3 (ref 1.7–7)
NEUTROPHILS NFR BLD AUTO: 76.5 % (ref 42.7–76)
PLATELET # BLD AUTO: 48 10*3/MM3 (ref 140–450)
PLATELET # BLD AUTO: 50 10*3/MM3 (ref 140–450)
PMV BLD AUTO: 10.9 FL (ref 6–12)
PMV BLD AUTO: 11 FL (ref 6–12)
POTASSIUM SERPL-SCNC: 4.7 MMOL/L (ref 3.5–5.2)
PROT SERPL-MCNC: 4 G/DL (ref 6–8.5)
RBC # BLD AUTO: 2.33 10*6/MM3 (ref 4.14–5.8)
RBC # BLD AUTO: 3.03 10*6/MM3 (ref 4.14–5.8)
RH BLD: POSITIVE
SODIUM SERPL-SCNC: 143 MMOL/L (ref 136–145)
T&S EXPIRATION DATE: NORMAL
WBC NRBC COR # BLD AUTO: 3.05 10*3/MM3 (ref 3.4–10.8)
WBC NRBC COR # BLD AUTO: 3.77 10*3/MM3 (ref 3.4–10.8)

## 2025-02-21 PROCEDURE — 85025 COMPLETE CBC W/AUTO DIFF WBC: CPT | Performed by: INTERNAL MEDICINE

## 2025-02-21 PROCEDURE — 86900 BLOOD TYPING SEROLOGIC ABO: CPT

## 2025-02-21 PROCEDURE — 86923 COMPATIBILITY TEST ELECTRIC: CPT

## 2025-02-21 PROCEDURE — 25010000002 NA FERRIC GLUC CPLX PER 12.5 MG: Performed by: INTERNAL MEDICINE

## 2025-02-21 PROCEDURE — 97110 THERAPEUTIC EXERCISES: CPT

## 2025-02-21 PROCEDURE — 97110 THERAPEUTIC EXERCISES: CPT | Performed by: OCCUPATIONAL THERAPIST

## 2025-02-21 PROCEDURE — 25810000003 SODIUM CHLORIDE 0.9 % SOLUTION: Performed by: INTERNAL MEDICINE

## 2025-02-21 PROCEDURE — P9016 RBC LEUKOCYTES REDUCED: HCPCS

## 2025-02-21 PROCEDURE — 94799 UNLISTED PULMONARY SVC/PX: CPT

## 2025-02-21 PROCEDURE — 36430 TRANSFUSION BLD/BLD COMPNT: CPT

## 2025-02-21 PROCEDURE — 80053 COMPREHEN METABOLIC PANEL: CPT | Performed by: INTERNAL MEDICINE

## 2025-02-21 PROCEDURE — 85027 COMPLETE CBC AUTOMATED: CPT | Performed by: INTERNAL MEDICINE

## 2025-02-21 PROCEDURE — 86850 RBC ANTIBODY SCREEN: CPT

## 2025-02-21 PROCEDURE — 25010000002 DEXAMETHASONE PER 1 MG: Performed by: INTERNAL MEDICINE

## 2025-02-21 PROCEDURE — 94664 DEMO&/EVAL PT USE INHALER: CPT

## 2025-02-21 PROCEDURE — 86901 BLOOD TYPING SEROLOGIC RH(D): CPT

## 2025-02-21 RX ORDER — VANCOMYCIN/0.9 % SOD CHLORIDE 1 G/100 ML
1000 PLASTIC BAG, INJECTION (ML) INTRAVENOUS
Status: DISCONTINUED | OUTPATIENT
Start: 2025-02-21 | End: 2025-02-22

## 2025-02-21 RX ORDER — VANCOMYCIN/0.9 % SOD CHLORIDE 1 G/100 ML
1000 PLASTIC BAG, INJECTION (ML) INTRAVENOUS
Status: DISCONTINUED | OUTPATIENT
Start: 2025-02-21 | End: 2025-02-21 | Stop reason: SDUPTHER

## 2025-02-21 RX ORDER — SODIUM CHLORIDE 9 MG/ML
75 INJECTION, SOLUTION INTRAVENOUS CONTINUOUS
Status: DISPENSED | OUTPATIENT
Start: 2025-02-21 | End: 2025-02-22

## 2025-02-21 RX ADMIN — GUAIFENESIN 1200 MG: 600 TABLET ORAL at 08:37

## 2025-02-21 RX ADMIN — ALBUTEROL SULFATE 2 PUFF: 108 INHALANT RESPIRATORY (INHALATION) at 11:14

## 2025-02-21 RX ADMIN — IPRATROPIUM BROMIDE 2 PUFF: 17 AEROSOL, METERED RESPIRATORY (INHALATION) at 11:14

## 2025-02-21 RX ADMIN — SODIUM CHLORIDE 250 MG: 9 INJECTION, SOLUTION INTRAVENOUS at 16:31

## 2025-02-21 RX ADMIN — DOCUSATE SODIUM 50 MG AND SENNOSIDES 8.6 MG 2 TABLET: 8.6; 5 TABLET, FILM COATED ORAL at 21:57

## 2025-02-21 RX ADMIN — GUAIFENESIN 1200 MG: 600 TABLET ORAL at 21:57

## 2025-02-21 RX ADMIN — IPRATROPIUM BROMIDE 2 PUFF: 17 AEROSOL, METERED RESPIRATORY (INHALATION) at 20:05

## 2025-02-21 RX ADMIN — TAMSULOSIN HYDROCHLORIDE 0.4 MG: 0.4 CAPSULE ORAL at 21:57

## 2025-02-21 RX ADMIN — BUDESONIDE AND FORMOTEROL FUMARATE DIHYDRATE 2 PUFF: 160; 4.5 AEROSOL RESPIRATORY (INHALATION) at 20:04

## 2025-02-21 RX ADMIN — BUDESONIDE AND FORMOTEROL FUMARATE DIHYDRATE 2 PUFF: 160; 4.5 AEROSOL RESPIRATORY (INHALATION) at 06:49

## 2025-02-21 RX ADMIN — ALBUTEROL SULFATE 2 PUFF: 108 INHALANT RESPIRATORY (INHALATION) at 06:44

## 2025-02-21 RX ADMIN — DEXAMETHASONE SODIUM PHOSPHATE 6 MG: 10 INJECTION INTRAMUSCULAR; INTRAVENOUS at 08:38

## 2025-02-21 RX ADMIN — Medication 1000 MG: at 15:15

## 2025-02-21 RX ADMIN — PANTOPRAZOLE SODIUM 40 MG: 40 INJECTION, POWDER, FOR SOLUTION INTRAVENOUS at 08:37

## 2025-02-21 RX ADMIN — ALBUTEROL SULFATE 2 PUFF: 108 INHALANT RESPIRATORY (INHALATION) at 15:37

## 2025-02-21 RX ADMIN — SODIUM CHLORIDE 75 ML/HR: 9 INJECTION, SOLUTION INTRAVENOUS at 18:29

## 2025-02-21 RX ADMIN — Medication 10 ML: at 22:01

## 2025-02-21 RX ADMIN — DOCUSATE SODIUM 50 MG AND SENNOSIDES 8.6 MG 2 TABLET: 8.6; 5 TABLET, FILM COATED ORAL at 08:38

## 2025-02-21 RX ADMIN — PANTOPRAZOLE SODIUM 40 MG: 40 INJECTION, POWDER, FOR SOLUTION INTRAVENOUS at 21:57

## 2025-02-21 RX ADMIN — IPRATROPIUM BROMIDE 2 PUFF: 17 AEROSOL, METERED RESPIRATORY (INHALATION) at 15:37

## 2025-02-21 RX ADMIN — Medication 10 ML: at 08:38

## 2025-02-21 RX ADMIN — IPRATROPIUM BROMIDE 2 PUFF: 17 AEROSOL, METERED RESPIRATORY (INHALATION) at 06:44

## 2025-02-21 RX ADMIN — ALBUTEROL SULFATE 2 PUFF: 108 INHALANT RESPIRATORY (INHALATION) at 20:05

## 2025-02-21 NOTE — PROGRESS NOTES
[facility] Medicine Services  INPATIENT PROGRESS NOTE    Patient Name: Manohar Garcia  Date of Admission: 2/18/2025  Today's Date: 02/21/25  Length of Stay: 3  Primary Care Physician: Dani Farfan MD    Subjective   Chief Complaint: follow-up gross hematuria  HPI   Patient was resting in bed.  He is much more alert today.  He reports having a good breakfast this morning.  He denies any pain symptoms.  Discussed with nursing and he has not had any recent bowel movements.  Specifically, no episodes of blood in stool.  He has been on continuous bladder irrigation.  No new, or gross hematuria.    Review of Systems   All pertinent negatives and positives are as above. All other systems have been reviewed and are negative unless otherwise stated.     Objective    Temp:  [97.2 °F (36.2 °C)-97.8 °F (36.6 °C)] 97.8 °F (36.6 °C)  Heart Rate:  [65-79] 79  Resp:  [14-16] 16  BP: ()/(44-62) 92/53  Physical Exam  Vitals reviewed.   Constitutional:       Appearance: He is ill-appearing.   HENT:      Head: Normocephalic.   Eyes:      Pupils: Pupils are equal, round, and reactive to light.   Cardiovascular:      Rate and Rhythm: Normal rate.   Pulmonary:      Effort: Pulmonary effort is normal. No respiratory distress.   Abdominal:      General: There is no distension.      Palpations: Abdomen is soft.      Tenderness: There is no abdominal tenderness.   Genitourinary:     Comments: Perkins in place with no gross hematuria present  Musculoskeletal:         General: Swelling present.   Skin:     Coloration: Skin is pale.      Findings: Bruising present.   Neurological:      Motor: Weakness present.         Results Review:  I have reviewed the labs, radiology results, and diagnostic studies.    Laboratory Data:   Results from last 7 days   Lab Units 02/21/25  0418 02/20/25  0554 02/19/25  1434   WBC 10*3/mm3 3.05* 3.42 3.03*   HEMOGLOBIN g/dL 6.7* 7.7* 7.2*   HEMATOCRIT % 21.8* 24.9*  "22.9*   PLATELETS 10*3/mm3 48* 51* 51*        Results from last 7 days   Lab Units 02/21/25  0418 02/20/25  0554 02/19/25  1434 02/19/25  0101   SODIUM mmol/L 143 140 139 142   POTASSIUM mmol/L 4.7 4.6 4.7 4.9   CHLORIDE mmol/L 117* 114* 113* 113*   CO2 mmol/L 19.0* 20.0* 19.0* 19.0*   BUN mg/dL 57* 56* 51* 45*   CREATININE mg/dL 2.01* 2.24* 2.20* 2.14*   CALCIUM mg/dL 8.1* 8.0* 7.9* 7.9*   BILIRUBIN mg/dL 0.5 0.7  --  1.1   ALK PHOS U/L 64 71  --  73   ALT (SGPT) U/L 9 11  --  12   AST (SGOT) U/L 10 12  --  16   GLUCOSE mg/dL 107* 105* 94 105*       Culture Data:   No results found for: \"BLOODCX\", \"URINECX\", \"WOUNDCX\", \"MRSACX\", \"RESPCX\", \"STOOLCX\"    Radiology Data:   Imaging Results (Last 24 Hours)       ** No results found for the last 24 hours. **            I have reviewed the patient's current medications.     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  It is now resistant.    Levaquin transitioned to IV Vancomycin on 2/20/2025 (patient has allergy to PCN)  Hold anticoagulation  Continue CBI  Patient does have a component of chronic thrombocytopenia, but given his recent active bleeding and platelet count less than 50, patient was transfused with platelets on 2/19.  Will follow-up repeat CBC this afternoon and monitor terra any bleeding symptoms  Continue IVFs given BUN and Cr trend  Repeat dose of IV ferric gluconate today - day 3  11.   BPs soft and holding outpatient anti-HTN meds " including Coreg  12.  Patient has tested positive again for COVID-19.  Previous respiratory PCR panel from 1/22/2025 also reviewed and patient tested positive for COVID 19 at that time as well.  13.  Plan to hold additional IV Dexamethasone  14.  Remains on room air  15.  Duplex LUE negative for DVT  16.  Continue IV PPI; monitor for any s/sxs of GI bleeding.  He does describe having episodes of bright red blood in stool recently. Monitor closely for any s/sxs of GI bleeding.  No recent bowel movements or blood in stool (discussed with nursing)  17.  Guarded prognosis.  Patient is very ill.        Medical Decision Making  Number and Complexity of problems: 4 acute; high complexity      Conditions and Status        Condition is slowly improving     MDM Data  External documents reviewed: none  Cardiac tracing (EKG, telemetry) interpretation: no new EKGs  Radiology interpretation: no new radiology studies  Labs reviewed: as above  Any tests that were considered but not ordered: none     Decision rules/scores evaluated (example [id number], Wells, etc): none     Discussed with: patient, bedside nurse (Kristi)     Care Planning  Shared decision making: discussed with patient this morning  Code status and discussions: DO NOT RESUSCITATE    Disposition  Social Determinants of Health that impact treatment or disposition: Patient has been a resident at [facility]  I expect the patient to be discharged to: [facility].  Dr. Henley planning on removal of large bladder stone when medically optimized, likely on Monday, 2/24, pending his medical status.      Electronically signed by Arnulfo Caballero MD, 02/21/25, 10:57 CST.

## 2025-02-21 NOTE — THERAPY TREATMENT NOTE
Acute Care - Physical Therapy Treatment Note  [facility]     Patient Name: Manohar Garcia  : 1940  MRN: [id number]  Today's Date: 2025      Visit Dx:     ICD-10-CM ICD-9-CM   1. Gross hematuria  R31.0 599.71   2. Injury of urethra, initial encounter  S37.30XA 867.0   3. Chronic anticoagulation  Z79.01 V58.61   4. Bladder mass  N32.89 596.89   5. Acute UTI (urinary tract infection)  N39.0 599.0   6. Acute kidney injury  N17.9 584.9   7. Chronic liver disease  K76.9 571.9   8. Thrombocytopenia  D69.6 287.5   9. Hematuria  R31.9 599.70   10. Bladder stone  N21.0 594.1   11. 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    Thrombocytopenia    Hyperlactatemia    Hip fracture    Closed 2-part intertrochanteric fracture of proximal end of right femur    Acute blood loss anemia    Stage 3b chronic kidney disease    COVID-19 virus detected    Influenza A    Hypoxia    Cytokine release syndrome, grade 1    Acute pyelonephritis    Gross Hematuria    Lymphedema of left arm    Constipation    Thrombocytopenia    Bladder stone    Anemia     Past Medical History:   Diagnosis Date    Dementia     GERD (gastroesophageal reflux disease)     Liver disorder      Past Surgical History:   Procedure Laterality Date    CYSTOSCOPY WITH CLOT EVACUATION N/A 2025    Procedure: CYSTOSCOPY WITH CLOT EVACUATION, FULGURATION;  Surgeon: Abdias Henley MD;  Location: [facility];  Service: Urology;  Laterality: N/A;    HEMORRHOIDECTOMY      HERNIA REPAIR      HIP TROCHANTERIC NAILING WITH INTRAMEDULLARY HIP SCREW Right 2024    Procedure: HIP TROCHANTERIC NAILING SHORT WITH INTRAMEDULLARY HIP SCREW;  Surgeon: Arcenio Chandra MD;  Location: [facility];   Service: Orthopedics;  Laterality: Right;     PT Assessment (Last 12 Hours)       PT Evaluation and Treatment       Row Name 02/21/25 1117          Physical Therapy Time and Intention    Subjective Information no complaints  -     Document Type therapy note (daily note)  -     Mode of Treatment physical therapy  -       Row Name 02/21/25 1117          General Information    Existing Precautions/Restrictions fall  -       Row Name 02/21/25 1117          Pain    Pretreatment Pain Rating 0/10 - no pain  -HUGO     Posttreatment Pain Rating 0/10 - no pain  -       Row Name 02/21/25 1117          Bed Mobility    Comment, (Bed Mobility) pt with low H&H, receiving blood,  low BP, performed in bed exercises  -       Row Name 02/21/25 1117          Motor Skills    Comments, Therapeutic Exercise in fowlers, AAROM BLE X 20, AAROM LUE X 15  -     Additional Documentation Comments, Therapeutic Exercise (Row)  -       Row Name 02/21/25 1117          Positioning and Restraints    Pre-Treatment Position in bed  -HUGO     Post Treatment Position bed  -HUGO     In Bed supine;call light within reach;encouraged to call for assist;side rails up x2  -HUGO               User Key  (r) = Recorded By, (t) = Taken By, (c) = Cosigned By      Initials Name Provider Type    HUGO Jeremie Stanford PTA Physical Therapist Assistant                    Physical Therapy Education       Title: PT OT SLP Therapies (In Progress)       Topic: Physical Therapy (In Progress)       Point: Mobility training (In Progress)       Learning Progress Summary            Patient Acceptance, E, NR by SB at 2/19/2025 8227    Comment: pt edu on POC, benefits of act and d/c plans                      Point: Home exercise program (Not Started)       Learner Progress:  Not documented in this visit.              Point: Body mechanics (Not Started)       Learner Progress:  Not documented in this visit.              Point: Precautions (In Progress)       Learning  Progress Summary            Patient Acceptance, E, NR by WALLACE at 2/19/2025 9150    Comment: pt edu on POC, benefits of act and d/c plans                                      User Key       Initials Effective Dates Name Provider Type Discipline    WALLACE 07/11/23 -  Agustina Glover, PT DPT Physical Therapist PT                  PT Recommendation and Plan         Outcome Measures       Row Name 02/21/25 1117             How much help from another person do you currently need...    Turning from your back to your side while in flat bed without using bedrails? 2  -HUGO      Moving from lying on back to sitting on the side of a flat bed without bedrails? 2  -HUGO      Moving to and from a bed to a chair (including a wheelchair)? 1  -HUGO      Standing up from a chair using your arms (e.g., wheelchair, bedside chair)? 1  -HUGO      Climbing 3-5 steps with a railing? 1  -HUGO      To walk in hospital room? 1  -HUGO      AM-PAC 6 Clicks Score (PT) 8  -HUGO         Functional Assessment    Outcome Measure Options AM-PAC 6 Clicks Basic Mobility (PT)  -HUGO                User Key  (r) = Recorded By, (t) = Taken By, (c) = Cosigned By      Initials Name Provider Type    Jeremie Crow PTA Physical Therapist Assistant                     Time Calculation:    PT Charges       Row Name 02/21/25 1117             Time Calculation    Start Time 1117  -HUGO      Stop Time 1140  -HUGO      Time Calculation (min) 23 min  -HUGO      PT Received On 02/21/25  -HUGO         Time Calculation- PT    Total Timed Code Minutes- PT 23 minute(s)  -HUGO         Timed Charges    81935 - PT Therapeutic Exercise Minutes 23  -HUGO         Total Minutes    Timed Charges Total Minutes 23  -HUGO       Total Minutes 23  -HUGO                User Key  (r) = Recorded By, (t) = Taken By, (c) = Cosigned By      Initials Name Provider Type    Jeremie Crow PTA Physical Therapist Assistant                  Therapy Charges for Today       Code Description Service Date Service Provider  Modifiers Qty    46628839156 HC PT THER PROC EA 15 MIN 2/21/2025 Jeremie Stanford, PTA GP 2            PT G-Codes  Outcome Measure Options: AM-PAC 6 Clicks Basic Mobility (PT)  AM-PAC 6 Clicks Score (PT): 8  AM-PAC 6 Clicks Score (OT): 12    Jeremie Stanford PTA  2/21/2025

## 2025-02-21 NOTE — PLAN OF CARE
Problem: Adult Inpatient Plan of Care  Goal: Plan of Care Review  Recent Flowsheet Documentation  Taken 2/21/2025 1006 by Edis Quintanilla, OTR/L  Progress: no change  Outcome Evaluation: OT tx completed. Pt is asleep but easy to awaken and agreeable to therapy. Pt reports no complaints. Pt completes BUE AROM HEP in all planes. 10-30 reps x1 set. 1lb dowel bar utilized as able. Pt with decreased endurance noted, requiring rest breaks between tasks. Pt with decreased insight noted. Continue OT POC.

## 2025-02-21 NOTE — PLAN OF CARE
Goal Outcome Evaluation:         PT drowsy but easily arouses. IVF/ABX infusing per orders. CBI running at slow rate, pale yellow. Turn q2. Safety maintained.

## 2025-02-21 NOTE — THERAPY TREATMENT NOTE
Patient Name: Manohar Garcia  : 1940    MRN: [id number]                              Today's Date: 2025       Admit Date: 2025    Visit Dx:     ICD-10-CM ICD-9-CM   1. Gross hematuria  R31.0 599.71   2. Injury of urethra, initial encounter  S37.30XA 867.0   3. Chronic anticoagulation  Z79.01 V58.61   4. Bladder mass  N32.89 596.89   5. Acute UTI (urinary tract infection)  N39.0 599.0   6. Acute kidney injury  N17.9 584.9   7. Chronic liver disease  K76.9 571.9   8. Thrombocytopenia  D69.6 287.5   9. Hematuria  R31.9 599.70   10. Bladder stone  N21.0 594.1   11. 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    Thrombocytopenia    Hyperlactatemia    Hip fracture    Closed 2-part intertrochanteric fracture of proximal end of right femur    Acute blood loss anemia    Stage 3b chronic kidney disease    COVID-19 virus detected    Influenza A    Hypoxia    Cytokine release syndrome, grade 1    Acute pyelonephritis    Gross Hematuria    Lymphedema of left arm    Constipation    Thrombocytopenia    Bladder stone     Past Medical History:   Diagnosis Date    Dementia     GERD (gastroesophageal reflux disease)     Liver disorder      Past Surgical History:   Procedure Laterality Date    CYSTOSCOPY WITH CLOT EVACUATION N/A 2025    Procedure: CYSTOSCOPY WITH CLOT EVACUATION, FULGURATION;  Surgeon: Abdias Henley MD;  Location:  [facility];  Service: Urology;  Laterality: N/A;    HEMORRHOIDECTOMY      HERNIA REPAIR      HIP TROCHANTERIC NAILING WITH INTRAMEDULLARY HIP SCREW Right 2024    Procedure: HIP TROCHANTERIC NAILING SHORT WITH INTRAMEDULLARY HIP SCREW;  Surgeon: Arcenio Chandra MD;  Location:  [facility];  Service:  Orthopedics;  Laterality: Right;      General Information       Row Name 02/21/25 0955          OT Time and Intention    Subjective Information no complaints  -MM     Document Type therapy note (daily note)  -MM     Mode of Treatment occupational therapy  -MM       Row Name 02/21/25 0955          General Information    Patient Profile Reviewed yes  -MM     Existing Precautions/Restrictions fall  -MM     Barriers to Rehab medically complex;previous functional deficit;cognitive status;hearing deficit  -MM       Row Name 02/21/25 0955          Safety Issues/Impairments Affecting Functional Mobility    Safety Issues Affecting Function (Mobility) awareness of need for assistance;insight into deficits/self-awareness;safety precaution awareness;safety precautions follow-through/compliance  -MM     Impairments Affecting Function (Mobility) endurance/activity tolerance;strength;cognition;range of motion (ROM)  -MM     Cognitive Impairments, Mobility Safety/Performance awareness, need for assistance;insight into deficits/self-awareness;safety precaution awareness;safety precaution follow-through  -MM               User Key  (r) = Recorded By, (t) = Taken By, (c) = Cosigned By      Initials Name Provider Type    Edis Villafuerte OTR/L Occupational Therapist                     Mobility/ADL's       Row Name 02/21/25 0955          Bed Mobility    Bed Mobility rolling left  -MM     Rolling Left [place] (Bed Mobility) dependent (less than 25% patient effort);verbal cues  -MM     Assistive Device (Bed Mobility) repositioning sheet  -MM               User Key  (r) = Recorded By, (t) = Taken By, (c) = Cosigned By      Initials Name Provider Type    Edis Villafuerte OTR/L Occupational Therapist                   Obj/Interventions       Row Name 02/21/25 0955          Shoulder (Therapeutic Exercise)    Shoulder (Therapeutic Exercise) AROM (active range of motion)  -MM     Shoulder AROM (Therapeutic Exercise)  bilateral;flexion;extension;aBduction;aDduction;external rotation;internal rotation;horizontal aBduction/aDduction;scapular elevation;scapular protraction;scapular retraction;10 repetitions;20 repititions  -MM       Row Name 02/21/25 0955          Elbow/Forearm (Therapeutic Exercise)    Elbow/Forearm (Therapeutic Exercise) AROM (active range of motion)  -MM     Elbow/Forearm AROM (Therapeutic Exercise) bilateral;flexion;extension;supination;pronation;20 repititions;30 repititions  -MM       Row Name 02/21/25 0955          Wrist (Therapeutic Exercise)    Wrist (Therapeutic Exercise) AROM (active range of motion)  -MM     Wrist AROM (Therapeutic Exercise) bilateral;flexion;extension;ulnar deviation;radial deviation;20 repititions;30 repititions  -MM       Row Name 02/21/25 0955          Hand (Therapeutic Exercise)    Hand (Therapeutic Exercise) AROM (active range of motion)  -MM     Hand AROM/AAROM (Therapeutic Exercise) bilateral;AROM (active range of motion);finger flexion;finger extension;20 repititions  -MM       Row Name 02/21/25 0955          Motor Skills    Therapeutic Exercise shoulder;elbow/forearm;wrist;hand  -MM               User Key  (r) = Recorded By, (t) = Taken By, (c) = Cosigned By      Initials Name Provider Type    MM Edis Quintanilla, OTR/L Occupational Therapist                   Goals/Plan    No documentation.                  Clinical Impression       Row Name 02/21/25 1006          Pain Assessment    Pretreatment Pain Rating 0/10 - no pain  -MM     Posttreatment Pain Rating 0/10 - no pain  -MM       Row Name 02/21/25 1006          Plan of Care Review    Plan of Care Reviewed With patient  -MM     Progress no change  -MM     Outcome Evaluation OT tx completed. Pt is asleep but easy to awaken and agreeable to therapy. Pt reports no complaints. Pt completes BUE AROM HEP in all planes. 10-30 reps x1 set. 1lb dowel bar utilized as able. Pt with decreased endurance noted, requiring rest breaks  between tasks. Pt with decreased insight noted. Continue OT POC.  -MM       Row Name 02/21/25 1006          Positioning and Restraints    Pre-Treatment Position in bed  -MM     Post Treatment Position bed  -MM     In Bed notified nsg;side lying left;call light within reach;encouraged to call for assist;side rails up x3;RUE elevated;LUE elevated  -MM               User Key  (r) = Recorded By, (t) = Taken By, (c) = Cosigned By      Initials Name Provider Type    Edis Villafuerte, OTR/L Occupational Therapist                   Outcome Measures       Row Name 02/21/25 0955          How much help from another is currently needed...    Putting on and taking off regular lower body clothing? 1  -MM     Bathing (including washing, rinsing, and drying) 2  -MM     Toileting (which includes using toilet bed pan or urinal) 1  -MM     Putting on and taking off regular upper body clothing 2  -MM     Taking care of personal grooming (such as brushing teeth) 3  -MM     Eating meals 3  -MM     AM-PAC 6 Clicks Score (OT) 12  -MM       Row Name 02/21/25 0815          How much help from another person do you currently need...    Turning from your back to your side while in flat bed without using bedrails? 2  -PS     Moving from lying on back to sitting on the side of a flat bed without bedrails? 2  -PS     Moving to and from a bed to a chair (including a wheelchair)? 1  -PS     Standing up from a chair using your arms (e.g., wheelchair, bedside chair)? 1  -PS     Climbing 3-5 steps with a railing? 1  -PS     To walk in hospital room? 1  -PS     AM-PAC 6 Clicks Score (PT) 8  -PS     Highest Level of Mobility Goal 3 --> Sit at edge of bed  -PS       Row Name 02/21/25 0955          Functional Assessment    Outcome Measure Options AM-PAC 6 Clicks Daily Activity (OT)  -MM               User Key  (r) = Recorded By, (t) = Taken By, (c) = Cosigned By      Initials Name Provider Type    Edis Villafuerte, OTR/L Occupational Therapist     Kristi Weinberg, RN Registered Nurse                    Occupational Therapy Education       Title: PT OT SLP Therapies (In Progress)       Topic: Occupational Therapy (In Progress)       Point: ADL training (In Progress)       Description:   Instruct learner(s) on proper safety adaptation and remediation techniques during self care or transfers.   Instruct in proper use of assistive devices.                  Learning Progress Summary            Patient Acceptance, E, NR by MM at 2/19/2025 1631                      Point: Home exercise program (In Progress)       Description:   Instruct learner(s) on appropriate technique for monitoring, assisting and/or progressing therapeutic exercises/activities.                  Learning Progress Summary            Patient Acceptance, E, NR by MM at 2/21/2025 1008                      Point: Precautions (In Progress)       Description:   Instruct learner(s) on prescribed precautions during self-care and functional transfers.                  Learning Progress Summary            Patient Acceptance, E, NR by MM at 2/21/2025 1008    Acceptance, E, NR by MM at 2/19/2025 1631                      Point: Body mechanics (In Progress)       Description:   Instruct learner(s) on proper positioning and spine alignment during self-care, functional mobility activities and/or exercises.                  Learning Progress Summary            Patient Acceptance, E, NR by MM at 2/21/2025 1008    Acceptance, E, NR by MM at 2/19/2025 1631                                      User Key       Initials Effective Dates Name Provider Type Discipline     07/11/23 -  Edis Quintanilla, OTR/L Occupational Therapist OT                  OT Recommendation and Plan  Planned Therapy Interventions (OT): activity tolerance training, BADL retraining, functional balance retraining, occupation/activity based interventions, patient/caregiver education/training, ROM/therapeutic exercise, strengthening exercise,  transfer/mobility retraining, adaptive equipment training, cognitive/visual perception retraining, neuromuscular control/coordination retraining, passive ROM/stretching  Therapy Frequency (OT): 3 times/wk  Plan of Care Review  Plan of Care Reviewed With: patient  Progress: no change  Outcome Evaluation: OT tx completed. Pt is asleep but easy to awaken and agreeable to therapy. Pt reports no complaints. Pt completes BUE AROM HEP in all planes. 10-30 reps x1 set. 1lb dowel bar utilized as able. Pt with decreased endurance noted, requiring rest breaks between tasks. Pt with decreased insight noted. Continue OT POC.     Time Calculation:         Time Calculation- OT       Row Name 02/21/25 0953             Time Calculation- OT    OT Start Time 0953  -MM      OT Stop Time 1023  -MM      OT Time Calculation (min) 30 min  -MM      Total Timed Code Minutes- OT 30 minute(s)  -MM      OT Received On 02/21/25  -MM         Timed Charges    70997 - OT Therapeutic Exercise Minutes 30  -MM         Total Minutes    Timed Charges Total Minutes 30  -MM       Total Minutes 30  -MM                User Key  (r) = Recorded By, (t) = Taken By, (c) = Cosigned By      Initials Name Provider Type    Edis Villafuerte OTR/L Occupational Therapist                  Therapy Charges for Today       Code Description Service Date Service Provider Modifiers Qty    27978658328 HC OT THER PROC EA 15 MIN 2/21/2025 Edis Quintanilla OTR/L GO 2                 LINUS Serrano/TUCKER  2/21/2025

## 2025-02-21 NOTE — PROGRESS NOTES
"Pharmacy Dosing Service  Pharmacokinetics  Vancomycin Follow-up Evaluation    Assessment/Action/Plan:  Current Order: Vancomycin 750 mg IVPB every 24 hours  Current end date:2/26/2025  Levels: Vancomycin trough ordered before dose due on 2/22/2025 at 1000  Additional antimicrobial agent(s): none    Vancomycin adjusted to 1000 mg iv q24h. Pharmacy will continue to follow daily and adjust dose accordingly.     Subjective:  Manohar Garcia is a 84 y.o. male currently on Vancomycin for the treatment of UTI (E faecalis), day 2 of 7 of treatment.    AUC Model Data:  Regimen: 1000 mg IV every 24 hours.  Start time: 10:00 on 02/21/2025  Exposure target: AUC24 (range)400-600 mg/L.hr   AUC24,ss: 552 mg/L.hr  Probability of AUC24 > 400: 83 %  Ctrough,ss: 18.8 mg/L  Probability of Ctrough,ss > 20: 44 %  Probability of nephrotoxicity (Lodise CHINO 2009): 15 %    Objective:  Ht: 180.3 cm (71\"); Wt: 95.1 kg (209 lb 11.2 oz)  Estimated Creatinine Clearance: 32.2 mL/min (A) (by C-G formula based on SCr of 2.01 mg/dL (H)).   Creatinine   Date Value Ref Range Status   02/21/2025 2.01 (H) 0.76 - 1.27 mg/dL Final   02/20/2025 2.24 (H) 0.76 - 1.27 mg/dL Final   02/19/2025 2.20 (H) 0.76 - 1.27 mg/dL Final   04/22/2019 1.00 0.60 - 1.30 mg/dL Final     Comment:     Serial Number: [id number]:  [id number]      Lab Results   Component Value Date    WBC 3.05 (L) 02/21/2025    WBC 3.42 02/20/2025    WBC 3.03 (L) 02/19/2025       No results found for: \"VANCOPEAK\", \"VANCOTROUGH\", \"VANCORANDOM\"    Culture Results:  Microbiology Results (last 10 days)       Procedure Component Value - Date/Time    COVID-19 RAPID AG,VERITOR,COR/PAD/EDITH/EDU/LAG/DEJON/ IN-HOUSE,DRY SWAB, 1 HR TAT - Swab, Nasal Cavity [[id number]]  (Normal) Collected: 02/19/25 0943    Lab Status: Final result Specimen: Swab from Nasal Cavity Updated: 02/19/25 1009     COVID19 Presumptive Negative    Narrative:      Fact sheets for providers: " https://www.fda.gov/media/273824/download    Fact sheets for patients: https://www.fda.gov/media/418249/download    MRSA Screen, PCR (Inpatient) - Swab, Nares [[id number]]  (Normal) Collected: 02/19/25 0943    Lab Status: Final result Specimen: Swab from Nares Updated: 02/19/25 1103     MRSA PCR No MRSA Detected    Narrative:      The negative predictive value of this diagnostic test is high and should only be used to consider de-escalating anti-MRSA therapy. A positive result may indicate colonization with MRSA and must be correlated clinically.    Blood Culture - Blood, Arm, Right [[id number]]  (Normal) Collected: 02/18/25 1249    Lab Status: Preliminary result Specimen: Blood from Arm, Right Updated: 02/20/25 1330     Blood Culture No growth at 2 days    Blood Culture - Blood, Arm, Left [[id number]]  (Normal) Collected: 02/18/25 1001    Lab Status: Preliminary result Specimen: Blood from Arm, Left Updated: 02/20/25 1100     Blood Culture No growth at 2 days    Urine Culture - Urine, Indwelling Urethral Catheter [[id number]]  (Abnormal)  (Susceptibility) Collected: 02/18/25 0740    Lab Status: Final result Specimen: Urine from Indwelling Urethral Catheter Updated: 02/20/25 0916     Urine Culture >100,000 CFU/mL Enterococcus faecalis    Narrative:      Colonization of the urinary tract without infection is common. Treatment is discouraged unless the patient is symptomatic, pregnant, or undergoing an invasive urologic procedure.    Susceptibility        Enterococcus faecalis      LEO      Ampicillin <=2 ug/ml Susceptible      Levofloxacin >=8 ug/ml Resistant      Nitrofurantoin <=16 ug/ml Susceptible      Vancomycin 1 ug/ml Susceptible                           Respiratory Panel PCR w/COVID-19(SARS-CoV-2) EDU/EDITH/YUDELKA/PAD/COR/DEJON In-House, NP Swab in UTM/VTM, 2 HR TAT - Swab, Nasopharynx [[id number]]  (Abnormal) Collected: 02/18/25 0652    Lab Status: Final result Specimen: Swab from Nasopharynx Updated: 02/18/25 0748      ADENOVIRUS, PCR Not Detected     Coronavirus 229E Not Detected     Coronavirus HKU1 Not Detected     Coronavirus NL63 Not Detected     Coronavirus OC43 Not Detected     COVID19 Detected     Human Metapneumovirus Not Detected     Human Rhinovirus/Enterovirus Not Detected     Influenza A PCR Not Detected     Influenza B PCR Not Detected     Parainfluenza Virus 1 Not Detected     Parainfluenza Virus 2 Not Detected     Parainfluenza Virus 3 Not Detected     Parainfluenza Virus 4 Not Detected     RSV, PCR Not Detected     Bordetella pertussis pcr Not Detected     Bordetella parapertussis PCR Not Detected     Chlamydophila pneumoniae PCR Not Detected     Mycoplasma pneumo by PCR Not Detected    Narrative:      In the setting of a positive respiratory panel with a viral infection PLUS a negative procalcitonin without other underlying concern for bacterial infection, consider observing off antibiotics or discontinuation of antibiotics and continue supportive care. If the respiratory panel is positive for atypical bacterial infection (Bordetella pertussis, Chlamydophila pneumoniae, or Mycoplasma pneumoniae), consider antibiotic de-escalation to target atypical bacterial infection.            Armando Tomas, PharmD   02/21/25 09:25 CST

## 2025-02-21 NOTE — SIGNIFICANT NOTE
I was called on this patient for having hemoglobin of 6.7 this morning.  I placed an order for transfusion of 1 unit of packed RBC.  Day team to follow.

## 2025-02-21 NOTE — PLAN OF CARE
Goal Outcome Evaluation:           Progress: no change        VSS, RA, a/o x 4.  Pt [place] baseline.  1 unit of blood given.  IV abx given, per order.  Turn q 2.  All preventative foam dressings changed.  Thoracic spine, potential pressure injury, picture in chart.  Perkins catheter care complete.  New IV in right forearm, placed by vats.  Safety maintained.

## 2025-02-21 NOTE — CASE MANAGEMENT/SOCIAL WORK
Continued Stay Note   [place]     Patient Name: Manohar Garcia  MRN: [id number]  Today's Date: 2/21/2025    Admit Date: 2/18/2025    Plan: [facility]   Discharge Plan       Row Name 02/21/25 1603       Plan    Plan [facility]    Patient/Family in Agreement with Plan yes    Plan Comments Pt going to OR on Monday. Plan is [facility] at d/c. Will follow.                   Discharge Codes    No documentation.                       DIANNA Lr

## 2025-02-22 LAB
ANION GAP SERPL CALCULATED.3IONS-SCNC: 5 MMOL/L (ref 5–15)
BASOPHILS # BLD AUTO: 0 10*3/MM3 (ref 0–0.2)
BASOPHILS NFR BLD AUTO: 0 % (ref 0–1.5)
BH BB BLOOD EXPIRATION DATE: NORMAL
BH BB BLOOD TYPE BARCODE: 5100
BH BB DISPENSE STATUS: NORMAL
BH BB PRODUCT CODE: NORMAL
BH BB UNIT NUMBER: NORMAL
BUN SERPL-MCNC: 51 MG/DL (ref 8–23)
BUN/CREAT SERPL: 32.5 (ref 7–25)
CALCIUM SPEC-SCNC: 7.9 MG/DL (ref 8.6–10.5)
CHLORIDE SERPL-SCNC: 117 MMOL/L (ref 98–107)
CO2 SERPL-SCNC: 19 MMOL/L (ref 22–29)
CREAT SERPL-MCNC: 1.57 MG/DL (ref 0.76–1.27)
CROSSMATCH INTERPRETATION: NORMAL
DEPRECATED RDW RBC AUTO: 55.7 FL (ref 37–54)
EGFRCR SERPLBLD CKD-EPI 2021: 43.2 ML/MIN/1.73
EOSINOPHIL # BLD AUTO: 0 10*3/MM3 (ref 0–0.4)
EOSINOPHIL NFR BLD AUTO: 0 % (ref 0.3–6.2)
ERYTHROCYTE [DISTWIDTH] IN BLOOD BY AUTOMATED COUNT: 16.9 % (ref 12.3–15.4)
GLUCOSE SERPL-MCNC: 106 MG/DL (ref 65–99)
HCT VFR BLD AUTO: 26.7 % (ref 37.5–51)
HGB BLD-MCNC: 8.3 G/DL (ref 13–17.7)
IMM GRANULOCYTES # BLD AUTO: 0.03 10*3/MM3 (ref 0–0.05)
IMM GRANULOCYTES NFR BLD AUTO: 1.2 % (ref 0–0.5)
LYMPHOCYTES # BLD AUTO: 0.57 10*3/MM3 (ref 0.7–3.1)
LYMPHOCYTES NFR BLD AUTO: 22.7 % (ref 19.6–45.3)
MCH RBC QN AUTO: 28.7 PG (ref 26.6–33)
MCHC RBC AUTO-ENTMCNC: 31.1 G/DL (ref 31.5–35.7)
MCV RBC AUTO: 92.4 FL (ref 79–97)
MONOCYTES # BLD AUTO: 0.3 10*3/MM3 (ref 0.1–0.9)
MONOCYTES NFR BLD AUTO: 12 % (ref 5–12)
NEUTROPHILS NFR BLD AUTO: 1.61 10*3/MM3 (ref 1.7–7)
NEUTROPHILS NFR BLD AUTO: 64.1 % (ref 42.7–76)
PLATELET # BLD AUTO: 50 10*3/MM3 (ref 140–450)
PMV BLD AUTO: 10.9 FL (ref 6–12)
POTASSIUM SERPL-SCNC: 4.5 MMOL/L (ref 3.5–5.2)
RBC # BLD AUTO: 2.89 10*6/MM3 (ref 4.14–5.8)
SODIUM SERPL-SCNC: 141 MMOL/L (ref 136–145)
UNIT  ABO: NORMAL
UNIT  RH: NORMAL
VANCOMYCIN TROUGH SERPL-MCNC: 11.2 MCG/ML (ref 5–20)
WBC NRBC COR # BLD AUTO: 2.51 10*3/MM3 (ref 3.4–10.8)

## 2025-02-22 PROCEDURE — 85025 COMPLETE CBC W/AUTO DIFF WBC: CPT | Performed by: INTERNAL MEDICINE

## 2025-02-22 PROCEDURE — 25010000002 VANCOMYCIN 10 G RECONSTITUTED SOLUTION: Performed by: FAMILY MEDICINE

## 2025-02-22 PROCEDURE — 94799 UNLISTED PULMONARY SVC/PX: CPT

## 2025-02-22 PROCEDURE — 80048 BASIC METABOLIC PNL TOTAL CA: CPT | Performed by: INTERNAL MEDICINE

## 2025-02-22 PROCEDURE — 25810000003 SODIUM CHLORIDE 0.9 % SOLUTION: Performed by: FAMILY MEDICINE

## 2025-02-22 PROCEDURE — 94664 DEMO&/EVAL PT USE INHALER: CPT

## 2025-02-22 PROCEDURE — 25810000003 SODIUM CHLORIDE 0.9 % SOLUTION: Performed by: INTERNAL MEDICINE

## 2025-02-22 PROCEDURE — 80202 ASSAY OF VANCOMYCIN: CPT | Performed by: INTERNAL MEDICINE

## 2025-02-22 PROCEDURE — 94760 N-INVAS EAR/PLS OXIMETRY 1: CPT

## 2025-02-22 RX ORDER — PANTOPRAZOLE SODIUM 40 MG/1
40 TABLET, DELAYED RELEASE ORAL
Status: DISCONTINUED | OUTPATIENT
Start: 2025-02-22 | End: 2025-02-23

## 2025-02-22 RX ADMIN — PANTOPRAZOLE SODIUM 40 MG: 40 TABLET, DELAYED RELEASE ORAL at 17:52

## 2025-02-22 RX ADMIN — DOCUSATE SODIUM 50 MG AND SENNOSIDES 8.6 MG 2 TABLET: 8.6; 5 TABLET, FILM COATED ORAL at 20:20

## 2025-02-22 RX ADMIN — DOCUSATE SODIUM 50 MG AND SENNOSIDES 8.6 MG 2 TABLET: 8.6; 5 TABLET, FILM COATED ORAL at 08:48

## 2025-02-22 RX ADMIN — ALBUTEROL SULFATE 2 PUFF: 108 INHALANT RESPIRATORY (INHALATION) at 10:20

## 2025-02-22 RX ADMIN — SODIUM CHLORIDE 75 ML/HR: 9 INJECTION, SOLUTION INTRAVENOUS at 07:01

## 2025-02-22 RX ADMIN — Medication 10 ML: at 08:48

## 2025-02-22 RX ADMIN — GUAIFENESIN 1200 MG: 600 TABLET ORAL at 08:48

## 2025-02-22 RX ADMIN — GUAIFENESIN 1200 MG: 600 TABLET ORAL at 20:20

## 2025-02-22 RX ADMIN — ALBUTEROL SULFATE 2 PUFF: 108 INHALANT RESPIRATORY (INHALATION) at 07:06

## 2025-02-22 RX ADMIN — IPRATROPIUM BROMIDE 2 PUFF: 17 AEROSOL, METERED RESPIRATORY (INHALATION) at 10:20

## 2025-02-22 RX ADMIN — ALBUTEROL SULFATE 2 PUFF: 108 INHALANT RESPIRATORY (INHALATION) at 14:40

## 2025-02-22 RX ADMIN — IPRATROPIUM BROMIDE 2 PUFF: 17 AEROSOL, METERED RESPIRATORY (INHALATION) at 14:40

## 2025-02-22 RX ADMIN — BUDESONIDE AND FORMOTEROL FUMARATE DIHYDRATE 2 PUFF: 160; 4.5 AEROSOL RESPIRATORY (INHALATION) at 07:05

## 2025-02-22 RX ADMIN — BUDESONIDE AND FORMOTEROL FUMARATE DIHYDRATE 2 PUFF: 160; 4.5 AEROSOL RESPIRATORY (INHALATION) at 19:35

## 2025-02-22 RX ADMIN — Medication 10 ML: at 20:20

## 2025-02-22 RX ADMIN — ALBUTEROL SULFATE 2 PUFF: 108 INHALANT RESPIRATORY (INHALATION) at 19:35

## 2025-02-22 RX ADMIN — PANTOPRAZOLE SODIUM 40 MG: 40 INJECTION, POWDER, FOR SOLUTION INTRAVENOUS at 08:48

## 2025-02-22 RX ADMIN — VANCOMYCIN HYDROCHLORIDE 1250 MG: 10 INJECTION, POWDER, LYOPHILIZED, FOR SOLUTION INTRAVENOUS at 16:27

## 2025-02-22 RX ADMIN — IPRATROPIUM BROMIDE 2 PUFF: 17 AEROSOL, METERED RESPIRATORY (INHALATION) at 07:06

## 2025-02-22 RX ADMIN — TAMSULOSIN HYDROCHLORIDE 0.4 MG: 0.4 CAPSULE ORAL at 20:20

## 2025-02-22 RX ADMIN — IPRATROPIUM BROMIDE 2 PUFF: 17 AEROSOL, METERED RESPIRATORY (INHALATION) at 19:35

## 2025-02-22 NOTE — PROGRESS NOTES
[facility] Medicine Services  INPATIENT PROGRESS NOTE    Patient Name: Manohar Garcia  Date of Admission: 2/18/2025  Today's Date: 02/22/25  Length of Stay: 4  Primary Care Physician: Dani Farfan MD    Subjective   Chief Complaint: Hematuria/acute renal failure/metabolic acidosis/neutropenia/anemia/thrombocytopenia  HPI   Blood pressure is low but stable, afebrile.  Creatinine is improving.  Patient is on room air.Decreasing white blood cells.  Hemoglobin slight decrease.  Platelet count stable.  Patient is on room air.    Review of Systems   Constitutional:  Positive for activity change, appetite change and fatigue. Negative for chills and fever.   HENT:  Negative for hearing loss, nosebleeds, tinnitus and trouble swallowing.    Eyes:  Negative for visual disturbance.   Respiratory:  Negative for cough, chest tightness, shortness of breath and wheezing.    Cardiovascular:  Negative for chest pain, palpitations and leg swelling.   Gastrointestinal:  Negative for abdominal distention, abdominal pain, blood in stool, constipation, diarrhea, nausea and vomiting.   Endocrine: Negative for cold intolerance, heat intolerance, polydipsia, polyphagia and polyuria.   Genitourinary:  Negative for decreased urine volume, difficulty urinating, dysuria, flank pain, frequency and hematuria.   Musculoskeletal:  Positive for arthralgias, gait problem and myalgias. Negative for joint swelling.   Skin:  Negative for rash.   Allergic/Immunologic: Negative for immunocompromised state.   Neurological:  Positive for weakness. Negative for dizziness, syncope, light-headedness and headaches.   Hematological:  Negative for adenopathy. Does not bruise/bleed easily.   Psychiatric/Behavioral:  Negative for confusion and sleep disturbance. The patient is not nervous/anxious.         All pertinent negatives and positives are as above. All other systems have been reviewed and are negative unless  otherwise stated.     Objective    Temp:  [97.4 °F (36.3 °C)-98 °F (36.7 °C)] 98 °F (36.7 °C)  Heart Rate:  [62-79] 69  Resp:  [16] 16  BP: ()/(41-59) 102/57  Physical Exam  Vitals and nursing note reviewed.   Constitutional:       Appearance: He is well-developed.      Comments: Advanced age, chronically ill.   HENT:      Head: Normocephalic.   Eyes:      General: No scleral icterus.     Pupils: Pupils are equal, round, and reactive to light.   Neck:      Thyroid: No thyromegaly.      Vascular: No carotid bruit or JVD.      Trachea: No tracheal deviation.   Cardiovascular:      Rate and Rhythm: Normal rate and regular rhythm.      Heart sounds: No murmur heard.     No friction rub. No gallop.   Pulmonary:      Effort: Pulmonary effort is normal. No respiratory distress.      Breath sounds: Normal breath sounds. No wheezing or rales.      Comments: Patient is on room air.  Chest:      Chest wall: No tenderness.   Abdominal:      General: Bowel sounds are normal. There is no distension.      Palpations: Abdomen is soft.      Tenderness: There is no abdominal tenderness.   Genitourinary:     Comments: Perkins cath in place.  Since CDI in place.  Musculoskeletal:      Cervical back: Normal range of motion and neck supple.   Skin:     General: Skin is warm and dry.      Capillary Refill: Capillary refill takes 2 to 3 seconds.      Findings: No rash.   Neurological:      Cranial Nerves: No cranial nerve deficit.      Motor: Weakness present.      Coordination: Coordination abnormal.      Gait: Gait abnormal.   Psychiatric:         Mood and Affect: Mood normal.         Behavior: Behavior normal.             Results Review:  I have reviewed the labs, radiology results, and diagnostic studies.    Laboratory Data:   Results from last 7 days   Lab Units 02/22/25  0802 02/21/25  1603 02/21/25  0418   WBC 10*3/mm3 2.51* 3.77 3.05*   HEMOGLOBIN g/dL 8.3* 8.7* 6.7*   HEMATOCRIT % 26.7* 28.3* 21.8*   PLATELETS 10*3/mm3 50* 50*  48*        Results from last 7 days   Lab Units 02/22/25  0802 02/21/25  0418 02/20/25  0554 02/19/25  1434 02/19/25  0101   SODIUM mmol/L 141 143 140   < > 142   POTASSIUM mmol/L 4.5 4.7 4.6   < > 4.9   CHLORIDE mmol/L 117* 117* 114*   < > 113*   CO2 mmol/L 19.0* 19.0* 20.0*   < > 19.0*   BUN mg/dL 51* 57* 56*   < > 45*   CREATININE mg/dL 1.57* 2.01* 2.24*   < > 2.14*   CALCIUM mg/dL 7.9* 8.1* 8.0*   < > 7.9*   BILIRUBIN mg/dL  --  0.5 0.7  --  1.1   ALK PHOS U/L  --  64 71  --  73   ALT (SGPT) U/L  --  9 11  --  12   AST (SGOT) U/L  --  10 12  --  16   GLUCOSE mg/dL 106* 107* 105*   < > 105*    < > = values in this interval not displayed.       Culture Data:   Blood Culture   Date Value Ref Range Status   02/18/2025 No growth at 3 days  Preliminary   02/18/2025 No growth at 3 days  Preliminary     Urine Culture   Date Value Ref Range Status   02/18/2025 >100,000 CFU/mL Enterococcus faecalis (A)  Final       Radiology Data:   Imaging Results (Last 24 Hours)       ** No results found for the last 24 hours. **            I have reviewed the patient's current medications.     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 Patient is 84-year-old  male presented from nursing home with gross hematuria - sounds like another NH resident accidentally got leg tangled in catheter and pulled it out with bulb up.  He's also chronically anticoagulated with Eliquis. He has a known history of a large bladder stone and is followed by Urology in the outpatient setting.  Found to also have FERNANDO, worsening anemia and thrombocytopenia, COVID +, soft BPs.  Urology following.  Currently on CBI.  Anticoagulation on hold.  I've given transfusion of  both platelets (2 days ago) and blood (today).  Enterococcus noted in his urine which is susceptible to Vancomycin.  Dr. Henley with Urology plans to take him to OR on Monday for management of large bladder stone if his clinical status is improving/stable.    Treatment Plan  Hematuria/Chronic catheter/UTI/bladder stone.  History of accidentally got tangled with catheter and pull out with a bulb up.  History of large bladder stone being followed by urology in outpatient setting.  Urology consult.  Vancomycin . Flomax.  CT scan abdomen pelvic-urinary bladder is abnormal in appearance with moderate distention and stranding in the perivesical space as well as an air-fluid level- heterogeneous increased density dependently within the bladder consistent with blood products/clot- Along the left anterolateral aspect of the bladder there is a focus of hyperdensity either representing a adherent clot versus a small neoplasm-not definitely appreciated on previous CT of 2 weeks earlier favoring adherent clot, Perkins catheter has been displaced/retracted into the  prostatic urethra with the balloon inflated within the prostatic urethra, 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  Status post cystoscopy with irrigation and evacuation of multiple obstructing clots 2/18/2025  Perkins cath in place.  CBI.  Plan to  take to surgery Monday to manage large bladder stone if his clinical status improved.    Soft blood pressure/on chronic anticoagulation.  Hold Eliquis due to hematuria.  Hold Coreg.  Doppler ultrasound left upper extremity- no evidence of deep venous thrombosis or  superficial thrombophlebitis of the left upper extremity.    Acute on chronic kidney injury 3B/metabolic acidosis.  Creatinine is improving.  Creatinine 9/2/2024 1.85.  Normal saline.    Anemia.  Patient received blood transfusion 2/21/2025.  Ferric gluconate times 12/21/25.  Slight decrease hemoglobin.    Thrombocytopenia.  Patient received platelets transfusion 2/19/2025.  Platelet count stable.    Neutropenia.  Decrease in white blood cells.    COVID-positive/pulm fibrosis.  Albuterol inhaler.  Symbicort.  Guaifenesin.  Atrovent.  Chest x-ray-No change since the previous study, No active cardiopulmonary disease, Persistent chronic interstitial changes/fibrosis.  CT scan head-Chronic changes and no acute intracranial findings.   Echocardiogram-Grossly normal left and right ventricular size and function,  Valves are not well-visualized.  Patient is on room air.    Pain . Norco as needed.    Reflux.  Protonix.  Zofran as needed.    Constipation.  Michelle-Colace.    Diet . regular diet    Deconditioning.  PT and OT consult.  CT scan lumbar spine- Multilevel degenerative disc disease and facet arthropathy as discussed above but no definite acute osseous abnormality,  Please see the CT abdomen and pelvis report for details regarding the kidneys and urinary bladder as well as retroperitoneal stranding.         Patient is from nursing home.    Urine culture shows enterococci faecalis susceptible vancomycin.  Blood culture shows no growth in 3 days.  MRSA screen-negative.  Respiratory panel-COVID-positive.  Pathology-no malignant cells.    Medical Decision Making  Number and Complexity of problems: Hematuria/chronic cath/UTI/bladder stone  Differential Diagnosis:  None    Conditions and Status        Condition is unchanged.     [facility] Data  External documents reviewed: Previous note.  Cardiac tracing (EKG, telemetry) interpretation: Sinus .  Radiology interpretation: CT scan/x-ray/echo  Labs reviewed: Laboratory  Any tests that were considered but not ordered: Laboratory in AM     Decision rules/scores evaluated (example [id number], [place], etc): None     Discussed with: Patient     Care Planning  Shared decision making: Patient  Code status and discussions: DNR . DNI    Disposition  Social Determinants of Health that impact treatment or disposition: From nursing home.  2 to 4 days.      Electronically signed by James Donovan MD, 02/22/25, 09:42 CST.

## 2025-02-22 NOTE — PLAN OF CARE
Goal Outcome Evaluation:              Outcome Evaluation: Pt on CBI, pink urine, no clots. IVF infusing. No distress noted.

## 2025-02-22 NOTE — PROGRESS NOTES
Automatic IV to PO Pharmacy Note - General    Manohar Garcia is a 84 y.o. male who meets the following criteria for IV to PO therapy conversion :     Tolerating oral fluids or 40ml/hour of enteral nutrition and oral route not otherwise compromised  Receiving other oral medications on a scheduled basis    Assessment/Plan  Based on this criteria, Protonix 40 mg IV Q12H has been changed to Protonix 40 mg PO BIDac per the directives and guidelines established by [facility] and Therapeutics Committee and [facility] Medical Executive Committee .       Armando Tomas, PharmD  02/22/2513:07 CST

## 2025-02-22 NOTE — PROGRESS NOTES
"Pharmacy Dosing Service  Pharmacokinetics  Vancomycin Follow-up Evaluation    Assessment/Action/Plan:  Current Order: Vancomycin 1000 mg IVPB every 24 hours  Current end date:2/26/2024   Levels: 2/22/2025 0902 Vancomycin trough = 11.2 (16.75 hours post 1000 mg dose)   Additional antimicrobial agent(s): none    Vancomycin dose adjusted to 1250 mg iv q24h. Pharmacy will continue to follow daily and adjust dose accordingly.     Subjective:  Manohar Garcia is a 84 y.o. male currently on Vancomycin  for the treatment of UTI (E faecalis), day 3 of 7 of treatment.     AUC Model Data:  Regimen: 1250 mg IV every 24 hours.  Start time: 14:00 on 02/22/2025  Exposure target: AUC24 (range)400-600 mg/L.hr   AUC24,ss: 543 mg/L.hr  Probability of AUC24 > 400: 93 %  Ctrough,ss: 18.1 mg/L  Probability of Ctrough,ss > 20: 37 %  Probability of nephrotoxicity (Lodise CHINO 2009): 14 %    Objective:  Ht: 180.3 cm (71\"); Wt: 95.1 kg (209 lb 11.2 oz)  Estimated Creatinine Clearance: 41.2 mL/min (A) (by C-G formula based on SCr of 1.57 mg/dL (H)).   Creatinine   Date Value Ref Range Status   02/22/2025 1.57 (H) 0.76 - 1.27 mg/dL Final   02/21/2025 2.01 (H) 0.76 - 1.27 mg/dL Final   02/20/2025 2.24 (H) 0.76 - 1.27 mg/dL Final   04/22/2019 1.00 0.60 - 1.30 mg/dL Final     Comment:     Serial Number: [id number]:  [id number]      Lab Results   Component Value Date    WBC 2.51 (L) 02/22/2025    WBC 3.77 02/21/2025    WBC 3.05 (L) 02/21/2025         Lab Results   Component Value Date    VANCOTROUGH 11.20 02/22/2025       Culture Results:  Microbiology Results (last 10 days)       Procedure Component Value - Date/Time    COVID-19 RAPID AG,VERITOR,COR/PAD/EDITH/EDU/LAG/DEJON/ IN-HOUSE,DRY SWAB, 1 HR TAT - Swab, Nasal Cavity [[id number]]  (Normal) Collected: 02/19/25 0943    Lab Status: Final result Specimen: Swab from Nasal Cavity Updated: 02/19/25 1009     COVID19 Presumptive Negative    Narrative:      Fact sheets for providers: " https://www.fda.gov/media/089359/download    Fact sheets for patients: https://www.fda.gov/media/672455/download    MRSA Screen, PCR (Inpatient) - Swab, Nares [[id number]]  (Normal) Collected: 02/19/25 0943    Lab Status: Final result Specimen: Swab from Nares Updated: 02/19/25 1103     MRSA PCR No MRSA Detected    Narrative:      The negative predictive value of this diagnostic test is high and should only be used to consider de-escalating anti-MRSA therapy. A positive result may indicate colonization with MRSA and must be correlated clinically.    Blood Culture - Blood, Arm, Right [[id number]]  (Normal) Collected: 02/18/25 1249    Lab Status: Preliminary result Specimen: Blood from Arm, Right Updated: 02/21/25 1330     Blood Culture No growth at 3 days    Blood Culture - Blood, Arm, Left [[id number]]  (Normal) Collected: 02/18/25 1001    Lab Status: Preliminary result Specimen: Blood from Arm, Left Updated: 02/22/25 1100     Blood Culture No growth at 4 days    Urine Culture - Urine, Indwelling Urethral Catheter [[id number]]  (Abnormal)  (Susceptibility) Collected: 02/18/25 0740    Lab Status: Final result Specimen: Urine from Indwelling Urethral Catheter Updated: 02/20/25 0916     Urine Culture >100,000 CFU/mL Enterococcus faecalis    Narrative:      Colonization of the urinary tract without infection is common. Treatment is discouraged unless the patient is symptomatic, pregnant, or undergoing an invasive urologic procedure.    Susceptibility        Enterococcus faecalis      LEO      Ampicillin <=2 ug/ml Susceptible      Levofloxacin >=8 ug/ml Resistant      Nitrofurantoin <=16 ug/ml Susceptible      Vancomycin 1 ug/ml Susceptible                           Respiratory Panel PCR w/COVID-19(SARS-CoV-2) EDU/EDITH/YUDELKA/PAD/COR/DEJON In-House, NP Swab in UTM/VTM, 2 HR TAT - Swab, Nasopharynx [[id number]]  (Abnormal) Collected: 02/18/25 0652    Lab Status: Final result Specimen: Swab from Nasopharynx Updated: 02/18/25 0748      ADENOVIRUS, PCR Not Detected     Coronavirus 229E Not Detected     Coronavirus HKU1 Not Detected     Coronavirus NL63 Not Detected     Coronavirus OC43 Not Detected     COVID19 Detected     Human Metapneumovirus Not Detected     Human Rhinovirus/Enterovirus Not Detected     Influenza A PCR Not Detected     Influenza B PCR Not Detected     Parainfluenza Virus 1 Not Detected     Parainfluenza Virus 2 Not Detected     Parainfluenza Virus 3 Not Detected     Parainfluenza Virus 4 Not Detected     RSV, PCR Not Detected     Bordetella pertussis pcr Not Detected     Bordetella parapertussis PCR Not Detected     Chlamydophila pneumoniae PCR Not Detected     Mycoplasma pneumo by PCR Not Detected    Narrative:      In the setting of a positive respiratory panel with a viral infection PLUS a negative procalcitonin without other underlying concern for bacterial infection, consider observing off antibiotics or discontinuation of antibiotics and continue supportive care. If the respiratory panel is positive for atypical bacterial infection (Bordetella pertussis, Chlamydophila pneumoniae, or Mycoplasma pneumoniae), consider antibiotic de-escalation to target atypical bacterial infection.            Armando Tomas, PharmD   02/22/25 12:59 CST

## 2025-02-23 LAB
ALBUMIN SERPL-MCNC: 1.6 G/DL (ref 3.5–5.2)
ALBUMIN/GLOB SERPL: 0.7 G/DL
ALP SERPL-CCNC: 80 U/L (ref 39–117)
ALT SERPL W P-5'-P-CCNC: 10 U/L (ref 1–41)
ANION GAP SERPL CALCULATED.3IONS-SCNC: 6 MMOL/L (ref 5–15)
AST SERPL-CCNC: 17 U/L (ref 1–40)
BACTERIA SPEC AEROBE CULT: NORMAL
BACTERIA SPEC AEROBE CULT: NORMAL
BASOPHILS # BLD AUTO: 0 10*3/MM3 (ref 0–0.2)
BASOPHILS NFR BLD AUTO: 0 % (ref 0–1.5)
BILIRUB SERPL-MCNC: 0.8 MG/DL (ref 0–1.2)
BUN SERPL-MCNC: 44 MG/DL (ref 8–23)
BUN/CREAT SERPL: 33.6 (ref 7–25)
CALCIUM SPEC-SCNC: 8.2 MG/DL (ref 8.6–10.5)
CHLORIDE SERPL-SCNC: 117 MMOL/L (ref 98–107)
CHOLEST SERPL-MCNC: 73 MG/DL (ref 0–200)
CO2 SERPL-SCNC: 19 MMOL/L (ref 22–29)
CREAT SERPL-MCNC: 1.31 MG/DL (ref 0.76–1.27)
DEPRECATED RDW RBC AUTO: 55.4 FL (ref 37–54)
DEPRECATED RDW RBC AUTO: 56.8 FL (ref 37–54)
EGFRCR SERPLBLD CKD-EPI 2021: 53.7 ML/MIN/1.73
EOSINOPHIL # BLD AUTO: 0.04 10*3/MM3 (ref 0–0.4)
EOSINOPHIL NFR BLD AUTO: 1.7 % (ref 0.3–6.2)
ERYTHROCYTE [DISTWIDTH] IN BLOOD BY AUTOMATED COUNT: 17.2 % (ref 12.3–15.4)
ERYTHROCYTE [DISTWIDTH] IN BLOOD BY AUTOMATED COUNT: 18 % (ref 12.3–15.4)
GLOBULIN UR ELPH-MCNC: 2.4 GM/DL
GLUCOSE SERPL-MCNC: 89 MG/DL (ref 65–99)
HBA1C MFR BLD: 5 % (ref 4.8–5.6)
HCT VFR BLD AUTO: 25.3 % (ref 37.5–51)
HCT VFR BLD AUTO: 27.8 % (ref 37.5–51)
HDLC SERPL-MCNC: 19 MG/DL (ref 40–60)
HEMOCCULT STL QL: POSITIVE
HGB BLD-MCNC: 8.2 G/DL (ref 13–17.7)
HGB BLD-MCNC: 8.8 G/DL (ref 13–17.7)
IMM GRANULOCYTES # BLD AUTO: 0.04 10*3/MM3 (ref 0–0.05)
IMM GRANULOCYTES NFR BLD AUTO: 1.7 % (ref 0–0.5)
LDLC SERPL CALC-MCNC: 38 MG/DL (ref 0–100)
LDLC/HDLC SERPL: 2.04 {RATIO}
LYMPHOCYTES # BLD AUTO: 0.59 10*3/MM3 (ref 0.7–3.1)
LYMPHOCYTES NFR BLD AUTO: 25.1 % (ref 19.6–45.3)
MCH RBC QN AUTO: 29.2 PG (ref 26.6–33)
MCH RBC QN AUTO: 29.7 PG (ref 26.6–33)
MCHC RBC AUTO-ENTMCNC: 31.7 G/DL (ref 31.5–35.7)
MCHC RBC AUTO-ENTMCNC: 32.4 G/DL (ref 31.5–35.7)
MCV RBC AUTO: 91.7 FL (ref 79–97)
MCV RBC AUTO: 92.4 FL (ref 79–97)
MONOCYTES # BLD AUTO: 0.29 10*3/MM3 (ref 0.1–0.9)
MONOCYTES NFR BLD AUTO: 12.3 % (ref 5–12)
NEUTROPHILS NFR BLD AUTO: 1.39 10*3/MM3 (ref 1.7–7)
NEUTROPHILS NFR BLD AUTO: 59.2 % (ref 42.7–76)
NRBC BLD AUTO-RTO: 0 /100 WBC (ref 0–0.2)
PLATELET # BLD AUTO: 42 10*3/MM3 (ref 140–450)
PLATELET # BLD AUTO: 44 10*3/MM3 (ref 140–450)
PMV BLD AUTO: 10.7 FL (ref 6–12)
PMV BLD AUTO: 11.5 FL (ref 6–12)
POTASSIUM SERPL-SCNC: 4.3 MMOL/L (ref 3.5–5.2)
PROT SERPL-MCNC: 4 G/DL (ref 6–8.5)
RBC # BLD AUTO: 2.76 10*6/MM3 (ref 4.14–5.8)
RBC # BLD AUTO: 3.01 10*6/MM3 (ref 4.14–5.8)
SODIUM SERPL-SCNC: 142 MMOL/L (ref 136–145)
TRIGL SERPL-MCNC: 76 MG/DL (ref 0–150)
TSH SERPL DL<=0.05 MIU/L-ACNC: 2.63 UIU/ML (ref 0.27–4.2)
VLDLC SERPL-MCNC: 16 MG/DL (ref 5–40)
WBC NRBC COR # BLD AUTO: 2.35 10*3/MM3 (ref 3.4–10.8)
WBC NRBC COR # BLD AUTO: 3.31 10*3/MM3 (ref 3.4–10.8)

## 2025-02-23 PROCEDURE — 84443 ASSAY THYROID STIM HORMONE: CPT | Performed by: FAMILY MEDICINE

## 2025-02-23 PROCEDURE — 25010000002 VANCOMYCIN 1.5-0.9 GM/500ML-% SOLUTION: Performed by: FAMILY MEDICINE

## 2025-02-23 PROCEDURE — 85025 COMPLETE CBC W/AUTO DIFF WBC: CPT | Performed by: FAMILY MEDICINE

## 2025-02-23 PROCEDURE — 25810000003 LACTATED RINGERS SOLUTION: Performed by: FAMILY MEDICINE

## 2025-02-23 PROCEDURE — 97530 THERAPEUTIC ACTIVITIES: CPT

## 2025-02-23 PROCEDURE — 99231 SBSQ HOSP IP/OBS SF/LOW 25: CPT | Performed by: UROLOGY

## 2025-02-23 PROCEDURE — 83036 HEMOGLOBIN GLYCOSYLATED A1C: CPT | Performed by: FAMILY MEDICINE

## 2025-02-23 PROCEDURE — 80061 LIPID PANEL: CPT | Performed by: FAMILY MEDICINE

## 2025-02-23 PROCEDURE — 97110 THERAPEUTIC EXERCISES: CPT

## 2025-02-23 PROCEDURE — 85027 COMPLETE CBC AUTOMATED: CPT

## 2025-02-23 PROCEDURE — 80053 COMPREHEN METABOLIC PANEL: CPT | Performed by: FAMILY MEDICINE

## 2025-02-23 PROCEDURE — 94799 UNLISTED PULMONARY SVC/PX: CPT

## 2025-02-23 PROCEDURE — 82272 OCCULT BLD FECES 1-3 TESTS: CPT | Performed by: FAMILY MEDICINE

## 2025-02-23 RX ORDER — SODIUM CHLORIDE, SODIUM LACTATE, POTASSIUM CHLORIDE, CALCIUM CHLORIDE 600; 310; 30; 20 MG/100ML; MG/100ML; MG/100ML; MG/100ML
50 INJECTION, SOLUTION INTRAVENOUS CONTINUOUS
Status: DISPENSED | OUTPATIENT
Start: 2025-02-23 | End: 2025-02-25

## 2025-02-23 RX ORDER — PANTOPRAZOLE SODIUM 40 MG/10ML
80 INJECTION, POWDER, LYOPHILIZED, FOR SOLUTION INTRAVENOUS ONCE
Status: COMPLETED | OUTPATIENT
Start: 2025-02-23 | End: 2025-02-23

## 2025-02-23 RX ORDER — PANTOPRAZOLE SODIUM 40 MG/10ML
40 INJECTION, POWDER, LYOPHILIZED, FOR SOLUTION INTRAVENOUS EVERY 12 HOURS SCHEDULED
Status: DISCONTINUED | OUTPATIENT
Start: 2025-02-24 | End: 2025-02-28 | Stop reason: HOSPADM

## 2025-02-23 RX ORDER — SODIUM BICARBONATE 650 MG/1
650 TABLET ORAL 2 TIMES DAILY
Status: DISCONTINUED | OUTPATIENT
Start: 2025-02-23 | End: 2025-02-28 | Stop reason: HOSPADM

## 2025-02-23 RX ORDER — VANCOMYCIN/0.9 % SOD CHLORIDE 1.5G/250ML
1500 PLASTIC BAG, INJECTION (ML) INTRAVENOUS
Status: DISCONTINUED | OUTPATIENT
Start: 2025-02-23 | End: 2025-02-25

## 2025-02-23 RX ADMIN — Medication 10 ML: at 22:03

## 2025-02-23 RX ADMIN — ALBUTEROL SULFATE 2 PUFF: 108 INHALANT RESPIRATORY (INHALATION) at 07:28

## 2025-02-23 RX ADMIN — SODIUM CHLORIDE, SODIUM LACTATE, POTASSIUM CHLORIDE, CALCIUM CHLORIDE 250 ML: 20; 30; 600; 310 INJECTION, SOLUTION INTRAVENOUS at 13:25

## 2025-02-23 RX ADMIN — Medication 1500 MG: at 16:00

## 2025-02-23 RX ADMIN — IPRATROPIUM BROMIDE 2 PUFF: 17 AEROSOL, METERED RESPIRATORY (INHALATION) at 14:47

## 2025-02-23 RX ADMIN — ALBUTEROL SULFATE 2 PUFF: 108 INHALANT RESPIRATORY (INHALATION) at 19:43

## 2025-02-23 RX ADMIN — SODIUM BICARBONATE 650 MG TABLET 650 MG: at 22:02

## 2025-02-23 RX ADMIN — ALBUTEROL SULFATE 2 PUFF: 108 INHALANT RESPIRATORY (INHALATION) at 11:52

## 2025-02-23 RX ADMIN — PANTOPRAZOLE SODIUM 40 MG: 40 TABLET, DELAYED RELEASE ORAL at 08:23

## 2025-02-23 RX ADMIN — PANTOPRAZOLE SODIUM 40 MG: 40 TABLET, DELAYED RELEASE ORAL at 17:28

## 2025-02-23 RX ADMIN — ALBUTEROL SULFATE 2 PUFF: 108 INHALANT RESPIRATORY (INHALATION) at 14:47

## 2025-02-23 RX ADMIN — IPRATROPIUM BROMIDE 2 PUFF: 17 AEROSOL, METERED RESPIRATORY (INHALATION) at 07:28

## 2025-02-23 RX ADMIN — IPRATROPIUM BROMIDE 2 PUFF: 17 AEROSOL, METERED RESPIRATORY (INHALATION) at 11:52

## 2025-02-23 RX ADMIN — GUAIFENESIN 1200 MG: 600 TABLET ORAL at 08:23

## 2025-02-23 RX ADMIN — Medication 10 ML: at 08:24

## 2025-02-23 RX ADMIN — BUDESONIDE AND FORMOTEROL FUMARATE DIHYDRATE 2 PUFF: 160; 4.5 AEROSOL RESPIRATORY (INHALATION) at 07:28

## 2025-02-23 RX ADMIN — TAMSULOSIN HYDROCHLORIDE 0.4 MG: 0.4 CAPSULE ORAL at 22:03

## 2025-02-23 RX ADMIN — BUDESONIDE AND FORMOTEROL FUMARATE DIHYDRATE 2 PUFF: 160; 4.5 AEROSOL RESPIRATORY (INHALATION) at 19:43

## 2025-02-23 RX ADMIN — IPRATROPIUM BROMIDE 2 PUFF: 17 AEROSOL, METERED RESPIRATORY (INHALATION) at 19:44

## 2025-02-23 RX ADMIN — GUAIFENESIN 1200 MG: 600 TABLET ORAL at 22:03

## 2025-02-23 RX ADMIN — PANTOPRAZOLE SODIUM 80 MG: 40 INJECTION, POWDER, FOR SOLUTION INTRAVENOUS at 23:34

## 2025-02-23 NOTE — THERAPY TREATMENT NOTE
Acute Care - Physical Therapy Treatment Note  [facility]     Patient Name: Manohar Garcia  : 1940  MRN: [id number]  Today's Date: 2025      Visit Dx:     ICD-10-CM ICD-9-CM   1. Gross hematuria  R31.0 599.71   2. Injury of urethra, initial encounter  S37.30XA 867.0   3. Chronic anticoagulation  Z79.01 V58.61   4. Bladder mass  N32.89 596.89   5. Acute UTI (urinary tract infection)  N39.0 599.0   6. Acute kidney injury  N17.9 584.9   7. Chronic liver disease  K76.9 571.9   8. Thrombocytopenia  D69.6 287.5   9. Hematuria  R31.9 599.70   10. Bladder stone  N21.0 594.1   11. 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    Thrombocytopenia    Hyperlactatemia    Hip fracture    Closed 2-part intertrochanteric fracture of proximal end of right femur    Acute blood loss anemia    Stage 3b chronic kidney disease    COVID-19 virus detected    Influenza A    Hypoxia    Cytokine release syndrome, grade 1    Acute pyelonephritis    Gross Hematuria    Lymphedema of left arm    Constipation    Thrombocytopenia    Bladder stone    Anemia     Past Medical History:   Diagnosis Date    Dementia     GERD (gastroesophageal reflux disease)     Liver disorder      Past Surgical History:   Procedure Laterality Date    CYSTOSCOPY WITH CLOT EVACUATION N/A 2025    Procedure: CYSTOSCOPY WITH CLOT EVACUATION, FULGURATION;  Surgeon: Abdias Henley MD;  Location: [facility];  Service: Urology;  Laterality: N/A;    HEMORRHOIDECTOMY      HERNIA REPAIR      HIP TROCHANTERIC NAILING WITH INTRAMEDULLARY HIP SCREW Right 2024    Procedure: HIP TROCHANTERIC NAILING SHORT WITH INTRAMEDULLARY HIP SCREW;  Surgeon: Arcenio Chandra MD;  Location: [facility];   Service: Orthopedics;  Laterality: Right;     PT Assessment (Last 12 Hours)       PT Evaluation and Treatment       Row Name 02/23/25 0910          Physical Therapy Time and Intention    Subjective Information complains of;weakness;fatigue;pain  -HUGO     Document Type therapy note (daily note)  -     Mode of Treatment physical therapy  -       Row Name 02/23/25 0910          General Information    Existing Precautions/Restrictions fall  -HUGO       Row Name 02/23/25 0910          Pain    Pretreatment Pain Rating 4/10  -HUGO     Posttreatment Pain Rating 4/10  -HUGO     Pain Location back;buttock;hip  -HUGO     Pain Side/Orientation generalized;right  -HUGO     Pain Management Interventions exercise or physical activity utilized  -     Response to Pain Interventions activity participation with increased pain  -HUGO       Row Name 02/23/25 0910          Bed Mobility    Bed Mobility supine-sit;sit-supine  -HUGO     Supine-Sit [place] (Bed Mobility) verbal cues;moderate assist (50% patient effort);maximum assist (25% patient effort)  -     Sit-Supine [place] (Bed Mobility) verbal cues;maximum assist (25% patient effort)  -     Assistive Device (Bed Mobility) bed rails;head of bed elevated  -HUGO       Row Name 02/23/25 0910          Balance    Comment, Balance sitting EOB, pt required CGA/min assist, posterior lean.  Focused on core strengthening, neck ROM  -HUGO       Row Name 02/23/25 0910          Motor Skills    Comments, Therapeutic Exercise in sitting AROM/AAROM BLE X 10  -HUGO       Row Name             Wound 02/21/25 1617 thoracic spine    Wound - Properties Group Placement Date: 02/21/25  -PS Placement Time: 1617 -PS Location: thoracic spine  -PS    Retired Wound - Properties Group Placement Date: 02/21/25  -PS Placement Time: 1617 -PS Location: thoracic spine  -PS    Retired Wound - Properties Group Placement Date: 02/21/25  -PS Placement Time: 1617 -PS Location: thoracic spine  -PS    Retired Wound -  Properties Group Date first assessed: 02/21/25  -PS Time first assessed: 1617  -PS Location: thoracic spine  -PS      Row Name             Wound 02/23/25 0608 Left upper back    Wound - Properties Group Placement Date: 02/23/25  -EY Placement Time: 0608  -EY Side: Left  -EY Orientation: upper  -EY Location: back  -EY    Retired Wound - Properties Group Placement Date: 02/23/25  -EY Placement Time: 0608  -EY Side: Left  -EY Orientation: upper  -EY Location: back  -EY    Retired Wound - Properties Group Placement Date: 02/23/25  -EY Placement Time: 0608  -EY Side: Left  -EY Orientation: upper  -EY Location: back  -EY    Retired Wound - Properties Group Date first assessed: 02/23/25  -EY Time first assessed: 0608  -EY Side: Left  -EY Location: back  -EY      Row Name             Wound 02/23/25 0610 Left hip    Wound - Properties Group Placement Date: 02/23/25  -EY Placement Time: 0610  -EY Side: Left  -EY Location: hip  -EY Primary Wound Type: Abrasion  -EY    Retired Wound - Properties Group Placement Date: 02/23/25  -EY Placement Time: 0610  -EY Side: Left  -EY Location: hip  -EY Primary Wound Type: Abrasion  -EY    Retired Wound - Properties Group Placement Date: 02/23/25  -EY Placement Time: 0610  -EY Side: Left  -EY Location: hip  -EY Primary Wound Type: Abrasion  -EY    Retired Wound - Properties Group Date first assessed: 02/23/25  -EY Time first assessed: 0610  -EY Side: Left  -EY Location: hip  -EY Primary Wound Type: Abrasion  -EY      Row Name             Wound 02/23/25 0617 Left shoulder    Wound - Properties Group Placement Date: 02/23/25  -EY Placement Time: 0617  -EY Side: Left  -EY Location: shoulder  -EY    Retired Wound - Properties Group Placement Date: 02/23/25  -EY Placement Time: 0617  -EY Side: Left  -EY Location: shoulder  -EY    Retired Wound - Properties Group Placement Date: 02/23/25  -EY Placement Time: 0617  -EY Side: Left  -EY Location: shoulder  -EY    Retired Wound - Properties Group  Date first assessed: 02/23/25  -EY Time first assessed: 0617 -EY Side: Left  -EY Location: shoulder  -EY      Row Name 02/23/25 0910          Positioning and Restraints    Pre-Treatment Position in bed  -HUGO     Post Treatment Position bed  -HUGO     In Bed supine;call light within reach;encouraged to call for assist;side rails up x2  -HUGO               User Key  (r) = Recorded By, (t) = Taken By, (c) = Cosigned By      Initials Name Provider Type    HUGO Jeremie Stanford PTA Physical Therapist Assistant    Kristi Weinberg, RN Registered Nurse    Mala Tolliver RN Registered Nurse                    Physical Therapy Education       Title: PT OT SLP Therapies (In Progress)       Topic: Physical Therapy (In Progress)       Point: Mobility training (Done)       Learning Progress Summary            Patient Acceptance, E, VU,NR by HUGO at 2/23/2025 0910    Comment: bed mobility, benefits of activity    Acceptance, E, NR by SB at 2/19/2025 1449    Comment: pt edu on POC, benefits of act and d/c plans                      Point: Home exercise program (Not Started)       Learner Progress:  Not documented in this visit.              Point: Body mechanics (Not Started)       Learner Progress:  Not documented in this visit.              Point: Precautions (In Progress)       Learning Progress Summary            Patient Acceptance, E, NR by SB at 2/19/2025 1449    Comment: pt edu on POC, benefits of act and d/c plans                                      User Key       Initials Effective Dates Name Provider Type Discipline    HUGO 02/03/23 -  Jeremie Stanford PTA Physical Therapist Assistant PT    SB 07/11/23 -  Agustina Glover PT DPT Physical Therapist PT                  PT Recommendation and Plan     Progress: improving  Outcome Evaluation: Pt was in bed, agreed to therapy.  Required mod/max assist to transfer supine to sitting.  Sitting EOB, pt required CGA/min assist to maintain sitting balance, with posterior lean.   Performed LE exercises in sitting, posture exercises, sitting balance exercise and neck ROM.  Required max assist for sit to supine.,  Will continue to work with pt to increase strength and progress mobility as pt is able.   Outcome Measures       Row Name 02/23/25 0910 02/21/25 1117          How much help from another person do you currently need...    Turning from your back to your side while in flat bed without using bedrails? 2  -HUGO 2  -HUGO     Moving from lying on back to sitting on the side of a flat bed without bedrails? 2  -HUGO 2  -HUGO     Moving to and from a bed to a chair (including a wheelchair)? 1  -HUGO 1  -HUGO     Standing up from a chair using your arms (e.g., wheelchair, bedside chair)? 1  -HUGO 1  -HUGO     Climbing 3-5 steps with a railing? 1  -HUGO 1  -HUGO     To walk in hospital room? 1  -HUGO 1  -HUGO     AM-PAC 6 Clicks Score (PT) 8  -HUGO 8  -HUGO        Functional Assessment    Outcome Measure Options AM-PAC 6 Clicks Basic Mobility (PT)  -HUGO AM-PAC 6 Clicks Basic Mobility (PT)  -HUGO               User Key  (r) = Recorded By, (t) = Taken By, (c) = Cosigned By      Initials Name Provider Type    Jeremie Crow PTA Physical Therapist Assistant                     Time Calculation:    PT Charges       Row Name 02/23/25 0910             Time Calculation    Start Time 0910  -HUGO      Stop Time 0951  -HUGO      Time Calculation (min) 41 min  -HUGO      PT Received On 02/23/25  -HUGO         Time Calculation- PT    Total Timed Code Minutes- PT 41 minute(s)  -HUGO         Timed Charges    89625 - PT Therapeutic Exercise Minutes 15  -HUGO      33189 - PT Therapeutic Activity Minutes 26  -HUGO         Total Minutes    Timed Charges Total Minutes 41  -HUGO       Total Minutes 41  -HUGO                User Key  (r) = Recorded By, (t) = Taken By, (c) = Cosigned By      Initials Name Provider Type    Jeremie Crow PTA Physical Therapist Assistant                  Therapy Charges for Today       Code Description Service Date  Service Provider Modifiers Qty    47352574730 HC PT THERAPEUTIC ACT EA 15 MIN 2/23/2025 Jeremie Stanford, PTA GP 2    66522226328 HC PT THER PROC EA 15 MIN 2/23/2025 Jeremie Stanford, [place] GP 1            PT G-Codes  Outcome Measure Options: AM-PAC 6 Clicks Basic Mobility (PT)  AM-PAC 6 Clicks Score (PT): 8  AM-PAC 6 Clicks Score (OT): 12    Jeremie Stanford PTA  2/23/2025

## 2025-02-23 NOTE — PROGRESS NOTES
"Pharmacy Dosing Service  Pharmacokinetics  Vancomycin Follow-up Evaluation    Assessment/Action/Plan:  Current Order: Vancomycin 1250 mg IVPB every 24 hours  Current end date:2/26/2025  Levels: 2/22/2025 0902 Vancomycin trough = 11.2 (16.75 hours post 1000 mg dose)   Additional antimicrobial agent(s): none    Vancomycin adjusted to 1500 mg iv q24h. Pharmacy will continue to follow daily and adjust dose accordingly.     Subjective:  Manohar Garcia is a 84 y.o. male currently on Vancomycin for the treatment of UTI (E faecalis), day 4 of 7 of treatment.     AUC Model Data:  Loading dose: N/A  Regimen: 1500 mg IV every 24 hours.  Start time: 14:00 on 02/23/2025  Exposure target: AUC24 (range)400-600 mg/L.hr   AUC24,ss: 567 mg/L.hr  Probability of AUC24 > 400: 95 %  Ctrough,ss: 18.3 mg/L  Probability of Ctrough,ss > 20: 39 %  Probability of nephrotoxicity (Lodise CHINO 2009): 15 %      Objective:  Ht: 180.3 cm (71\"); Wt: 95.1 kg (209 lb 11.2 oz)  Estimated Creatinine Clearance: 49.4 mL/min (A) (by C-G formula based on SCr of 1.31 mg/dL (H)).   Creatinine   Date Value Ref Range Status   02/23/2025 1.31 (H) 0.76 - 1.27 mg/dL Final   02/22/2025 1.57 (H) 0.76 - 1.27 mg/dL Final   02/21/2025 2.01 (H) 0.76 - 1.27 mg/dL Final   04/22/2019 1.00 0.60 - 1.30 mg/dL Final     Comment:     Serial Number: [id number]:  [id number]      Lab Results   Component Value Date    WBC 2.35 (L) 02/23/2025    WBC 2.51 (L) 02/22/2025    WBC 3.77 02/21/2025         Lab Results   Component Value Date    VANCOTROUGH 11.20 02/22/2025       Culture Results:  Microbiology Results (last 10 days)       Procedure Component Value - Date/Time    COVID-19 RAPID AG,VERITOR,COR/PAD/EDITH/EDU/LAG/DEJON/ IN-HOUSE,DRY SWAB, 1 HR TAT - Swab, Nasal Cavity [[id number]]  (Normal) Collected: 02/19/25 0943    Lab Status: Final result Specimen: Swab from Nasal Cavity Updated: 02/19/25 1009     COVID19 Presumptive Negative    Narrative:      Fact sheets for providers: " https://www.fda.gov/media/987236/download    Fact sheets for patients: https://www.fda.gov/media/889485/download    MRSA Screen, PCR (Inpatient) - Swab, Nares [[id number]]  (Normal) Collected: 02/19/25 0943    Lab Status: Final result Specimen: Swab from Nares Updated: 02/19/25 1103     MRSA PCR No MRSA Detected    Narrative:      The negative predictive value of this diagnostic test is high and should only be used to consider de-escalating anti-MRSA therapy. A positive result may indicate colonization with MRSA and must be correlated clinically.    Blood Culture - Blood, Arm, Right [[id number]]  (Normal) Collected: 02/18/25 1249    Lab Status: Final result Specimen: Blood from Arm, Right Updated: 02/23/25 1330     Blood Culture No growth at 5 days    Blood Culture - Blood, Arm, Left [[id number]]  (Normal) Collected: 02/18/25 1001    Lab Status: Final result Specimen: Blood from Arm, Left Updated: 02/23/25 1100     Blood Culture No growth at 5 days    Urine Culture - Urine, Indwelling Urethral Catheter [[id number]]  (Abnormal)  (Susceptibility) Collected: 02/18/25 0740    Lab Status: Final result Specimen: Urine from Indwelling Urethral Catheter Updated: 02/20/25 0916     Urine Culture >100,000 CFU/mL Enterococcus faecalis    Narrative:      Colonization of the urinary tract without infection is common. Treatment is discouraged unless the patient is symptomatic, pregnant, or undergoing an invasive urologic procedure.    Susceptibility        Enterococcus faecalis      LEO      Ampicillin <=2 ug/ml Susceptible      Levofloxacin >=8 ug/ml Resistant      Nitrofurantoin <=16 ug/ml Susceptible      Vancomycin 1 ug/ml Susceptible                           Respiratory Panel PCR w/COVID-19(SARS-CoV-2) EDU/EDITH/YUDELKA/PAD/COR/DEJON In-House, NP Swab in UTM/VTM, 2 HR TAT - Swab, Nasopharynx [[id number]]  (Abnormal) Collected: 02/18/25 0652    Lab Status: Final result Specimen: Swab from Nasopharynx Updated: 02/18/25 0748     ADENOVIRUS,  PCR Not Detected     Coronavirus 229E Not Detected     Coronavirus HKU1 Not Detected     Coronavirus NL63 Not Detected     Coronavirus OC43 Not Detected     COVID19 Detected     Human Metapneumovirus Not Detected     Human Rhinovirus/Enterovirus Not Detected     Influenza A PCR Not Detected     Influenza B PCR Not Detected     Parainfluenza Virus 1 Not Detected     Parainfluenza Virus 2 Not Detected     Parainfluenza Virus 3 Not Detected     Parainfluenza Virus 4 Not Detected     RSV, PCR Not Detected     Bordetella pertussis pcr Not Detected     Bordetella parapertussis PCR Not Detected     Chlamydophila pneumoniae PCR Not Detected     Mycoplasma pneumo by PCR Not Detected    Narrative:      In the setting of a positive respiratory panel with a viral infection PLUS a negative procalcitonin without other underlying concern for bacterial infection, consider observing off antibiotics or discontinuation of antibiotics and continue supportive care. If the respiratory panel is positive for atypical bacterial infection (Bordetella pertussis, Chlamydophila pneumoniae, or Mycoplasma pneumoniae), consider antibiotic de-escalation to target atypical bacterial infection.            Armando Tomas, PharmD   02/23/25 13:54 CST

## 2025-02-23 NOTE — PLAN OF CARE
Goal Outcome Evaluation:           Progress: no change     Pt on CBI, yellow urine, no clots. Denies pain. Turn q 2. Safety maintained.

## 2025-02-23 NOTE — PROGRESS NOTES
[facility] Medicine Services  INPATIENT PROGRESS NOTE    Patient Name: Manohar Garcia  Date of Admission: 2/18/2025  Today's Date: 02/23/25  Length of Stay: 5  Primary Care Physician: Dani Farfan MD    Subjective   Chief Complaint: Hematuria/acute renal failure/metabolic acidosis/neutropenia/anemia/thrombocytopenia     HPI   Blood pressure is low side, start back on IV fluids.  250 cc bolus now.  Metabolic acidosis, bicarb p.o. twice daily.  Creatinine is improving.  Slight decrease in neutrophil.  Hemoglobin stable.  Decrease in platelets.    Review of Systems   Constitutional:  Positive for activity change, appetite change and fatigue. Negative for chills and fever.   HENT:  Negative for hearing loss, nosebleeds, tinnitus and trouble swallowing.    Eyes:  Negative for visual disturbance.   Respiratory:  Negative for cough, chest tightness, shortness of breath and wheezing.    Cardiovascular:  Negative for chest pain, palpitations and leg swelling.   Gastrointestinal:  Negative for abdominal distention, abdominal pain, blood in stool, constipation, diarrhea, nausea and vomiting.   Endocrine: Negative for cold intolerance, heat intolerance, polydipsia, polyphagia and polyuria.   Genitourinary:  Negative for decreased urine volume, difficulty urinating, dysuria, flank pain, frequency and hematuria.   Musculoskeletal:  Positive for arthralgias, gait problem and myalgias. Negative for joint swelling.   Skin:  Negative for rash.   Allergic/Immunologic: Negative for immunocompromised state.   Neurological:  Positive for weakness. Negative for dizziness, syncope, light-headedness and headaches.   Hematological:  Negative for adenopathy. Does not bruise/bleed easily.   Psychiatric/Behavioral:  Negative for confusion and sleep disturbance. The patient is not nervous/anxious.   All pertinent negatives and positives are as above. All other systems have been reviewed and are  negative unless otherwise stated.     Objective    Temp:  [97.4 °F (36.3 °C)-99 °F (37.2 °C)] 98.4 °F (36.9 °C)  Heart Rate:  [69-88] 80  Resp:  [16-18] 16  BP: ()/(37-56) 95/37  Physical Exam  Vitals and nursing note reviewed.   Constitutional:       Appearance: He is well-developed.      Comments: Advanced age, chronically ill.   HENT:      Head: Normocephalic.   Eyes:      General: No scleral icterus.     Pupils: Pupils are equal, round, and reactive to light.   Neck:      Thyroid: No thyromegaly.      Vascular: No carotid bruit or JVD.      Trachea: No tracheal deviation.   Cardiovascular:      Rate and Rhythm: Normal rate and regular rhythm.      Heart sounds: No murmur heard.     No friction rub. No gallop.   Pulmonary:      Effort: Pulmonary effort is normal. No respiratory distress.      Breath sounds: Normal breath sounds. No wheezing or rales.      Comments: Patient is on room air.  Chest:      Chest wall: No tenderness.   Abdominal:      General: Bowel sounds are normal. There is no distension.      Palpations: Abdomen is soft.      Tenderness: There is no abdominal tenderness.   Genitourinary:     Comments: Perkins cath in place.  Since CDI in place.  Musculoskeletal:      Cervical back: Normal range of motion and neck supple.   Skin:     General: Skin is warm and dry.      Capillary Refill: Capillary refill takes 2 to 3 seconds.      Findings: No rash.   Neurological:      Cranial Nerves: No cranial nerve deficit.      Motor: Weakness present.      Coordination: Coordination abnormal.      Gait: Gait abnormal.   Psychiatric:         Mood and Affect: Mood normal.         Behavior: Behavior normal.          Results Review:  I have reviewed the labs, radiology results, and diagnostic studies.    Laboratory Data:   Results from last 7 days   Lab Units 02/23/25  0346 02/22/25  0802 02/21/25  1603   WBC 10*3/mm3 2.35* 2.51* 3.77   HEMOGLOBIN g/dL 8.2* 8.3* 8.7*   HEMATOCRIT % 25.3* 26.7* 28.3*   PLATELETS  10*3/mm3 42* 50* 50*        Results from last 7 days   Lab Units 02/23/25  0346 02/22/25  0802 02/21/25  0418 02/20/25  0554   SODIUM mmol/L 142 141 143 140   POTASSIUM mmol/L 4.3 4.5 4.7 4.6   CHLORIDE mmol/L 117* 117* 117* 114*   CO2 mmol/L 19.0* 19.0* 19.0* 20.0*   BUN mg/dL 44* 51* 57* 56*   CREATININE mg/dL 1.31* 1.57* 2.01* 2.24*   CALCIUM mg/dL 8.2* 7.9* 8.1* 8.0*   BILIRUBIN mg/dL 0.8  --  0.5 0.7   ALK PHOS U/L 80  --  64 71   ALT (SGPT) U/L 10  --  9 11   AST (SGOT) U/L 17  --  10 12   GLUCOSE mg/dL 89 106* 107* 105*       Culture Data:   Blood Culture   Date Value Ref Range Status   02/18/2025 No growth at 4 days  Preliminary   02/18/2025 No growth at 5 days  Final     Urine Culture   Date Value Ref Range Status   02/18/2025 >100,000 CFU/mL Enterococcus faecalis (A)  Final       Radiology Data:   Imaging Results (Last 24 Hours)       ** No results found for the last 24 hours. **            I have reviewed the patient's current medications.     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 Patient is 84-year-old  male presented from nursing home with gross hematuria - sounds like another NH resident accidentally got leg tangled in catheter and pulled it out with bulb up.  He's also chronically anticoagulated with Eliquis. He has a known history of a large bladder stone and is followed by Urology in the outpatient setting.  Found to also have FERNANDO, worsening anemia and thrombocytopenia, COVID +, soft BPs.  Urology following.  Currently on CBI.  Anticoagulation on hold.  I've given transfusion of both platelets (2 days ago) and blood (today).  Enterococcus noted in his urine which is susceptible to Vancomycin.    Kale with Urology plans to take him to OR on Monday for management of large bladder stone if his clinical status is improving/stable.     Treatment Plan  Hematuria/Chronic catheter/UTI/bladder stone.  History of accidentally got tangled with catheter and pull out with a bulb up.  History of large bladder stone being followed by urology in outpatient setting.  Urology consult.  Vancomycin . Flomax.  CT scan abdomen pelvic-urinary bladder is abnormal in appearance with moderate distention and stranding in the perivesical space as well as an air-fluid level- heterogeneous increased density dependently within the bladder consistent with blood products/clot- Along the left anterolateral aspect of the bladder there is a focus of hyperdensity either representing a adherent clot versus a small neoplasm-not definitely appreciated on previous CT of 2 weeks earlier favoring adherent clot, Perkins catheter has been displaced/retracted into the  prostatic urethra with the balloon inflated within the prostatic urethra, 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  Status post cystoscopy with irrigation and evacuation of multiple obstructing clots 2/18/2025  Perkins cath in place.  CBI.  Plan to take to surgery Monday to manage large bladder stone if his clinical status improved.     Soft blood pressure/on chronic  anticoagulation.  Hold Eliquis due to hematuria.  Hold Coreg.  250 cc bolus now.  Doppler ultrasound left upper extremity- no evidence of deep venous thrombosis or  superficial thrombophlebitis of the left upper extremity.     Acute on chronic kidney injury 3B/metabolic acidosis.  Creatinine is improving.  Creatinine 9/2/2024 1.85.  Lactated ringer..  Metabolic acidosis stable.  Bicarb p.o .     Anemia.  Patient received blood transfusion 2/21/2025.  Ferric gluconate times 12/21/25.  Hemoglobin stable.  No sign of acute bleed.     Thrombocytopenia.  Patient received platelets transfusion 2/19/2025.  Platelet count decreased.     Neutropenia.  Slight decrease in white blood cells.     COVID-positive/pulm fibrosis.  Albuterol inhaler.  Symbicort.  Guaifenesin.  Atrovent.  Chest x-ray-No change since the previous study, No active cardiopulmonary disease, Persistent chronic interstitial changes/fibrosis.  CT scan head-Chronic changes and no acute intracranial findings.   Echocardiogram-Grossly normal left and right ventricular size and function,  Valves are not well-visualized.  Patient is on room air.     Pain . Norco as needed.     Reflux.  Protonix.  Zofran as needed.     Constipation.  Michelle-Colace.     Diet . regular diet     Deconditioning.  PT and OT consult.  CT scan lumbar spine- Multilevel degenerative disc disease and facet arthropathy as discussed above but no definite acute osseous abnormality,  Please see the CT abdomen and pelvis report for details regarding the kidneys and urinary bladder as well as retroperitoneal stranding.          Patient is from nursing home.     Urine culture shows enterococci faecalis susceptible vancomycin.  Blood culture shows no growth in 3 days.  MRSA screen-negative.  Respiratory panel-COVID-positive.  Pathology-no malignant cells.     Medical Decision Making  Number and Complexity of problems: Hematuria/chronic cath/UTI/bladder stone  Differential Diagnosis: None      Conditions and Status        Condition is unchanged.     [facility] Data  External documents reviewed: Previous note.  Cardiac tracing (EKG, telemetry) interpretation: Sinus .  Radiology interpretation: CT scan/x-ray/echo  Labs reviewed: Laboratory  Any tests that were considered but not ordered: Laboratory in AM     Decision rules/scores evaluated (example [id number], Wells, etc): None     Discussed with: Patient     Care Planning  Shared decision making: Patient  Code status and discussions: DNR . DNI     Disposition  Social Determinants of Health that impact treatment or disposition: From nursing home.  2 to 4 days.         Electronically signed by James Donovan MD, 02/23/25, 13:02 CST.

## 2025-02-23 NOTE — PLAN OF CARE
Goal Outcome Evaluation:  Plan of Care Reviewed With: patient        Progress: improving  Outcome Evaluation: Pt was in bed, agreed to therapy.  Required mod/max assist to transfer supine to sitting.  Sitting EOB, pt required CGA/min assist to maintain sitting balance, with posterior lean.  Performed LE exercises in sitting, posture exercises, sitting balance exercise and neck ROM.  Required max assist for sit to supine.,  Will continue to work with pt to increase strength and progress mobility as pt is able.

## 2025-02-23 NOTE — PLAN OF CARE
Goal Outcome Evaluation:           Progress: no change     VSS, on room air, a/ox 4.  IV abx, per infused per order.  Perkins catheter maintained. Urine Clear tan yellow.  Safety maintained.

## 2025-02-23 NOTE — PLAN OF CARE
Goal Outcome Evaluation:           Progress: improving        A/O X 4, vss, on room air.  Turn q 2.  Perkins catheter and CBI maintained.  CHG completed.  Pt will be NPO after midnight for urology procedure 2/24/25.  IV abx infused.  250 LR bolus given, per order.  LR infusing at 75 ml/hr.  Preventative coccyx dressing changed, medium BM this shift.  Safety maintained.

## 2025-02-24 ENCOUNTER — ANESTHESIA EVENT (OUTPATIENT)
Dept: PERIOP | Facility: HOSPITAL | Age: 85
End: 2025-02-24
Payer: MEDICARE

## 2025-02-24 ENCOUNTER — ANESTHESIA (OUTPATIENT)
Dept: PERIOP | Facility: HOSPITAL | Age: 85
End: 2025-02-24
Payer: MEDICARE

## 2025-02-24 PROBLEM — E44.0 MODERATE PROTEIN-CALORIE MALNUTRITION: Status: ACTIVE | Noted: 2025-02-24

## 2025-02-24 LAB
ANION GAP SERPL CALCULATED.3IONS-SCNC: 7 MMOL/L (ref 5–15)
BASOPHILS # BLD AUTO: 0 10*3/MM3 (ref 0–0.2)
BASOPHILS NFR BLD AUTO: 0 % (ref 0–1.5)
BUN SERPL-MCNC: 39 MG/DL (ref 8–23)
BUN/CREAT SERPL: 30.2 (ref 7–25)
CALCIUM SPEC-SCNC: 7.7 MG/DL (ref 8.6–10.5)
CHLORIDE SERPL-SCNC: 117 MMOL/L (ref 98–107)
CO2 SERPL-SCNC: 19 MMOL/L (ref 22–29)
CREAT SERPL-MCNC: 1.29 MG/DL (ref 0.76–1.27)
DEPRECATED RDW RBC AUTO: 57.6 FL (ref 37–54)
EGFRCR SERPLBLD CKD-EPI 2021: 54.7 ML/MIN/1.73
EOSINOPHIL # BLD AUTO: 0.06 10*3/MM3 (ref 0–0.4)
EOSINOPHIL NFR BLD AUTO: 2.3 % (ref 0.3–6.2)
ERYTHROCYTE [DISTWIDTH] IN BLOOD BY AUTOMATED COUNT: 17.6 % (ref 12.3–15.4)
GLUCOSE SERPL-MCNC: 104 MG/DL (ref 65–99)
HCT VFR BLD AUTO: 27 % (ref 37.5–51)
HGB BLD-MCNC: 8.5 G/DL (ref 13–17.7)
IMM GRANULOCYTES # BLD AUTO: 0.04 10*3/MM3 (ref 0–0.05)
IMM GRANULOCYTES NFR BLD AUTO: 1.5 % (ref 0–0.5)
LYMPHOCYTES # BLD AUTO: 0.79 10*3/MM3 (ref 0.7–3.1)
LYMPHOCYTES NFR BLD AUTO: 30.4 % (ref 19.6–45.3)
MCH RBC QN AUTO: 29.2 PG (ref 26.6–33)
MCHC RBC AUTO-ENTMCNC: 31.5 G/DL (ref 31.5–35.7)
MCV RBC AUTO: 92.8 FL (ref 79–97)
MONOCYTES # BLD AUTO: 0.27 10*3/MM3 (ref 0.1–0.9)
MONOCYTES NFR BLD AUTO: 10.4 % (ref 5–12)
NEUTROPHILS NFR BLD AUTO: 1.44 10*3/MM3 (ref 1.7–7)
NEUTROPHILS NFR BLD AUTO: 55.4 % (ref 42.7–76)
PLATELET # BLD AUTO: 40 10*3/MM3 (ref 140–450)
PMV BLD AUTO: 9.7 FL (ref 6–12)
POTASSIUM SERPL-SCNC: 4.2 MMOL/L (ref 3.5–5.2)
RBC # BLD AUTO: 2.91 10*6/MM3 (ref 4.14–5.8)
SODIUM SERPL-SCNC: 143 MMOL/L (ref 136–145)
WBC NRBC COR # BLD AUTO: 2.6 10*3/MM3 (ref 3.4–10.8)

## 2025-02-24 PROCEDURE — 88300 SURGICAL PATH GROSS: CPT | Performed by: UROLOGY

## 2025-02-24 PROCEDURE — 25010000002 VANCOMYCIN 1.5-0.9 GM/500ML-% SOLUTION: Performed by: UROLOGY

## 2025-02-24 PROCEDURE — 36430 TRANSFUSION BLD/BLD COMPNT: CPT

## 2025-02-24 PROCEDURE — 0TCB8ZZ EXTIRPATION OF MATTER FROM BLADDER, VIA NATURAL OR ARTIFICIAL OPENING ENDOSCOPIC: ICD-10-PCS | Performed by: UROLOGY

## 2025-02-24 PROCEDURE — 94799 UNLISTED PULMONARY SVC/PX: CPT

## 2025-02-24 PROCEDURE — 25010000002 ALBUMIN HUMAN 5% PER 50 ML: Performed by: NURSE ANESTHETIST, CERTIFIED REGISTERED

## 2025-02-24 PROCEDURE — 94664 DEMO&/EVAL PT USE INHALER: CPT

## 2025-02-24 PROCEDURE — 82360 CALCULUS ASSAY QUANT: CPT | Performed by: UROLOGY

## 2025-02-24 PROCEDURE — P9041 ALBUMIN (HUMAN),5%, 50ML: HCPCS | Performed by: NURSE ANESTHETIST, CERTIFIED REGISTERED

## 2025-02-24 PROCEDURE — 52214 CYSTOSCOPY AND TREATMENT: CPT | Performed by: UROLOGY

## 2025-02-24 PROCEDURE — P9035 PLATELET PHERES LEUKOREDUCED: HCPCS

## 2025-02-24 PROCEDURE — 25010000002 LIDOCAINE PF 2% 2 % SOLUTION: Performed by: NURSE ANESTHETIST, CERTIFIED REGISTERED

## 2025-02-24 PROCEDURE — 94761 N-INVAS EAR/PLS OXIMETRY MLT: CPT

## 2025-02-24 PROCEDURE — 25010000002 VASOPRESSIN 20 UNIT/ML SOLUTION: Performed by: NURSE ANESTHETIST, CERTIFIED REGISTERED

## 2025-02-24 PROCEDURE — 0T5C8ZZ DESTRUCTION OF BLADDER NECK, VIA NATURAL OR ARTIFICIAL OPENING ENDOSCOPIC: ICD-10-PCS | Performed by: UROLOGY

## 2025-02-24 PROCEDURE — 25810000003 LACTATED RINGERS PER 1000 ML: Performed by: FAMILY MEDICINE

## 2025-02-24 PROCEDURE — 25010000002 PROPOFOL 10 MG/ML EMULSION: Performed by: NURSE ANESTHETIST, CERTIFIED REGISTERED

## 2025-02-24 PROCEDURE — 99222 1ST HOSP IP/OBS MODERATE 55: CPT | Performed by: INTERNAL MEDICINE

## 2025-02-24 PROCEDURE — 85025 COMPLETE CBC W/AUTO DIFF WBC: CPT | Performed by: FAMILY MEDICINE

## 2025-02-24 PROCEDURE — C1758 CATHETER, URETERAL: HCPCS | Performed by: UROLOGY

## 2025-02-24 PROCEDURE — 25010000002 ONDANSETRON PER 1 MG: Performed by: NURSE ANESTHETIST, CERTIFIED REGISTERED

## 2025-02-24 PROCEDURE — 52318 REMOVE BLADDER STONE: CPT | Performed by: UROLOGY

## 2025-02-24 PROCEDURE — 25810000003 LACTATED RINGERS PER 1000 ML: Performed by: ANESTHESIOLOGY

## 2025-02-24 PROCEDURE — 80048 BASIC METABOLIC PNL TOTAL CA: CPT | Performed by: FAMILY MEDICINE

## 2025-02-24 PROCEDURE — P9100 PATHOGEN TEST FOR PLATELETS: HCPCS

## 2025-02-24 RX ORDER — PROPOFOL 10 MG/ML
VIAL (ML) INTRAVENOUS AS NEEDED
Status: DISCONTINUED | OUTPATIENT
Start: 2025-02-24 | End: 2025-02-24 | Stop reason: SURG

## 2025-02-24 RX ORDER — ROCURONIUM BROMIDE 10 MG/ML
INJECTION, SOLUTION INTRAVENOUS AS NEEDED
Status: DISCONTINUED | OUTPATIENT
Start: 2025-02-24 | End: 2025-02-24 | Stop reason: SURG

## 2025-02-24 RX ORDER — PHENYLEPHRINE HCL IN 0.9% NACL 1 MG/10 ML
SYRINGE (ML) INTRAVENOUS AS NEEDED
Status: DISCONTINUED | OUTPATIENT
Start: 2025-02-24 | End: 2025-02-24 | Stop reason: SURG

## 2025-02-24 RX ORDER — SODIUM CHLORIDE, SODIUM LACTATE, POTASSIUM CHLORIDE, CALCIUM CHLORIDE 600; 310; 30; 20 MG/100ML; MG/100ML; MG/100ML; MG/100ML
100 INJECTION, SOLUTION INTRAVENOUS CONTINUOUS
Status: DISCONTINUED | OUTPATIENT
Start: 2025-02-24 | End: 2025-02-24

## 2025-02-24 RX ORDER — ONDANSETRON 2 MG/ML
INJECTION INTRAMUSCULAR; INTRAVENOUS AS NEEDED
Status: DISCONTINUED | OUTPATIENT
Start: 2025-02-24 | End: 2025-02-24 | Stop reason: SURG

## 2025-02-24 RX ORDER — MULTIPLE VITAMINS W/ MINERALS TAB 9MG-400MCG
1 TAB ORAL DAILY
Status: DISCONTINUED | OUTPATIENT
Start: 2025-02-25 | End: 2025-02-28 | Stop reason: HOSPADM

## 2025-02-24 RX ORDER — FLUMAZENIL 0.1 MG/ML
0.2 INJECTION INTRAVENOUS AS NEEDED
Status: DISCONTINUED | OUTPATIENT
Start: 2025-02-24 | End: 2025-02-24 | Stop reason: HOSPADM

## 2025-02-24 RX ORDER — SODIUM CHLORIDE 9 MG/ML
40 INJECTION, SOLUTION INTRAVENOUS AS NEEDED
Status: DISCONTINUED | OUTPATIENT
Start: 2025-02-24 | End: 2025-02-24 | Stop reason: HOSPADM

## 2025-02-24 RX ORDER — FENTANYL CITRATE 50 UG/ML
50 INJECTION, SOLUTION INTRAMUSCULAR; INTRAVENOUS
Status: DISCONTINUED | OUTPATIENT
Start: 2025-02-24 | End: 2025-02-24 | Stop reason: HOSPADM

## 2025-02-24 RX ORDER — SODIUM CHLORIDE 0.9 % (FLUSH) 0.9 %
3-10 SYRINGE (ML) INJECTION AS NEEDED
Status: DISCONTINUED | OUTPATIENT
Start: 2025-02-24 | End: 2025-02-24 | Stop reason: HOSPADM

## 2025-02-24 RX ORDER — HYDROCODONE BITARTRATE AND ACETAMINOPHEN 10; 325 MG/1; MG/1
1 TABLET ORAL EVERY 4 HOURS PRN
Status: DISCONTINUED | OUTPATIENT
Start: 2025-02-24 | End: 2025-02-24 | Stop reason: HOSPADM

## 2025-02-24 RX ORDER — MAGNESIUM HYDROXIDE 1200 MG/15ML
LIQUID ORAL AS NEEDED
Status: DISCONTINUED | OUTPATIENT
Start: 2025-02-24 | End: 2025-02-24 | Stop reason: HOSPADM

## 2025-02-24 RX ORDER — CALCIUM CHLORIDE 100 MG/ML
INJECTION INTRAVENOUS; INTRAVENTRICULAR AS NEEDED
Status: DISCONTINUED | OUTPATIENT
Start: 2025-02-24 | End: 2025-02-24 | Stop reason: SURG

## 2025-02-24 RX ORDER — LIDOCAINE HYDROCHLORIDE 20 MG/ML
INJECTION, SOLUTION EPIDURAL; INFILTRATION; INTRACAUDAL; PERINEURAL AS NEEDED
Status: DISCONTINUED | OUTPATIENT
Start: 2025-02-24 | End: 2025-02-24 | Stop reason: SURG

## 2025-02-24 RX ORDER — NALOXONE HCL 0.4 MG/ML
0.4 VIAL (ML) INJECTION AS NEEDED
Status: DISCONTINUED | OUTPATIENT
Start: 2025-02-24 | End: 2025-02-24 | Stop reason: HOSPADM

## 2025-02-24 RX ORDER — ALBUMIN HUMAN 50 G/1000ML
SOLUTION INTRAVENOUS CONTINUOUS PRN
Status: DISCONTINUED | OUTPATIENT
Start: 2025-02-24 | End: 2025-02-24 | Stop reason: SURG

## 2025-02-24 RX ORDER — SODIUM CHLORIDE 0.9 % (FLUSH) 0.9 %
3 SYRINGE (ML) INJECTION EVERY 12 HOURS SCHEDULED
Status: DISCONTINUED | OUTPATIENT
Start: 2025-02-24 | End: 2025-02-24 | Stop reason: HOSPADM

## 2025-02-24 RX ORDER — ONDANSETRON 2 MG/ML
4 INJECTION INTRAMUSCULAR; INTRAVENOUS ONCE AS NEEDED
Status: DISCONTINUED | OUTPATIENT
Start: 2025-02-24 | End: 2025-02-24 | Stop reason: HOSPADM

## 2025-02-24 RX ORDER — HYDROCODONE BITARTRATE AND ACETAMINOPHEN 5; 325 MG/1; MG/1
1 TABLET ORAL EVERY 4 HOURS PRN
Status: DISCONTINUED | OUTPATIENT
Start: 2025-02-24 | End: 2025-02-24 | Stop reason: HOSPADM

## 2025-02-24 RX ORDER — LABETALOL HYDROCHLORIDE 5 MG/ML
5 INJECTION, SOLUTION INTRAVENOUS
Status: DISCONTINUED | OUTPATIENT
Start: 2025-02-24 | End: 2025-02-24 | Stop reason: HOSPADM

## 2025-02-24 RX ADMIN — ALBUTEROL SULFATE 2 PUFF: 108 INHALANT RESPIRATORY (INHALATION) at 14:52

## 2025-02-24 RX ADMIN — ONDANSETRON 4 MG: 2 INJECTION INTRAMUSCULAR; INTRAVENOUS at 11:36

## 2025-02-24 RX ADMIN — SODIUM CHLORIDE, SODIUM LACTATE, POTASSIUM CHLORIDE, CALCIUM CHLORIDE 75 ML/HR: 20; 30; 600; 310 INJECTION, SOLUTION INTRAVENOUS at 05:58

## 2025-02-24 RX ADMIN — GUAIFENESIN 1200 MG: 600 TABLET ORAL at 22:38

## 2025-02-24 RX ADMIN — CALCIUM CHLORIDE 0.5 G: 100 INJECTION INTRAVENOUS; INTRAVENTRICULAR at 12:42

## 2025-02-24 RX ADMIN — ROCURONIUM 20 MG: 50 INJECTION, SOLUTION INTRAVENOUS at 11:16

## 2025-02-24 RX ADMIN — TAMSULOSIN HYDROCHLORIDE 0.4 MG: 0.4 CAPSULE ORAL at 22:39

## 2025-02-24 RX ADMIN — IPRATROPIUM BROMIDE 2 PUFF: 17 AEROSOL, METERED RESPIRATORY (INHALATION) at 18:27

## 2025-02-24 RX ADMIN — ALBUTEROL SULFATE 2 PUFF: 108 INHALANT RESPIRATORY (INHALATION) at 06:56

## 2025-02-24 RX ADMIN — ALBUTEROL SULFATE 2 PUFF: 108 INHALANT RESPIRATORY (INHALATION) at 18:27

## 2025-02-24 RX ADMIN — Medication 10 ML: at 09:44

## 2025-02-24 RX ADMIN — ALBUMIN (HUMAN): 12.5 INJECTION, SOLUTION INTRAVENOUS at 12:38

## 2025-02-24 RX ADMIN — SODIUM BICARBONATE 650 MG TABLET 650 MG: at 22:38

## 2025-02-24 RX ADMIN — Medication 100 MCG: at 11:54

## 2025-02-24 RX ADMIN — IPRATROPIUM BROMIDE 2 PUFF: 17 AEROSOL, METERED RESPIRATORY (INHALATION) at 14:52

## 2025-02-24 RX ADMIN — PANTOPRAZOLE SODIUM 40 MG: 40 INJECTION, POWDER, FOR SOLUTION INTRAVENOUS at 22:39

## 2025-02-24 RX ADMIN — PROPOFOL 80 MG: 10 INJECTION, EMULSION INTRAVENOUS at 11:16

## 2025-02-24 RX ADMIN — Medication 100 MCG: at 13:38

## 2025-02-24 RX ADMIN — Medication 200 MCG: at 12:35

## 2025-02-24 RX ADMIN — Medication 1500 MG: at 18:03

## 2025-02-24 RX ADMIN — Medication 10 ML: at 22:39

## 2025-02-24 RX ADMIN — Medication 100 MCG: at 13:25

## 2025-02-24 RX ADMIN — PANTOPRAZOLE SODIUM 40 MG: 40 INJECTION, POWDER, FOR SOLUTION INTRAVENOUS at 09:43

## 2025-02-24 RX ADMIN — BUDESONIDE AND FORMOTEROL FUMARATE DIHYDRATE 2 PUFF: 160; 4.5 AEROSOL RESPIRATORY (INHALATION) at 18:27

## 2025-02-24 RX ADMIN — IPRATROPIUM BROMIDE 2 PUFF: 17 AEROSOL, METERED RESPIRATORY (INHALATION) at 06:58

## 2025-02-24 RX ADMIN — BUDESONIDE AND FORMOTEROL FUMARATE DIHYDRATE 2 PUFF: 160; 4.5 AEROSOL RESPIRATORY (INHALATION) at 06:57

## 2025-02-24 RX ADMIN — SODIUM CHLORIDE, SODIUM LACTATE, POTASSIUM CHLORIDE, CALCIUM CHLORIDE 100 ML/HR: 20; 30; 600; 310 INJECTION, SOLUTION INTRAVENOUS at 10:37

## 2025-02-24 RX ADMIN — CALCIUM CHLORIDE 0.5 G: 100 INJECTION INTRAVENOUS; INTRAVENTRICULAR at 12:38

## 2025-02-24 RX ADMIN — LIDOCAINE HYDROCHLORIDE 60 MG: 20 INJECTION, SOLUTION EPIDURAL; INFILTRATION; INTRACAUDAL; PERINEURAL at 11:16

## 2025-02-24 NOTE — SIGNIFICANT NOTE
I was called on this patient for having a bloody bowel movement.  I placed an order for CBC stat.  I also placed a consult for GI in the morning.  I kept her n.p.o. for now.  Will continue to follow.  If hemoglobin is below 7 we will give transfusion.

## 2025-02-24 NOTE — ANESTHESIA POSTPROCEDURE EVALUATION
"Patient: Manohar Garcia    Procedure Summary       Date: 02/24/25 Room / Location:  [facility] OR 01 / [facility] OR    Anesthesia Start: 1112 Anesthesia Stop: 1358    Procedure: CYSTOLITHOLAPAXY WITH LASER, FULGURATION OF PROSTATE (Ureter) Diagnosis:       Bladder stone      (Bladder stone [N21.0])    Surgeons: Abdias Henley MD Provider: Dav Esposito CRNA    Anesthesia Type: general ASA Status: 4 - Emergent            Anesthesia Type: general    Vitals  Vitals Value Taken Time   /66 02/24/25 1425   Temp 96.5 °F (35.8 °C) 02/24/25 1425   Pulse 87 02/24/25 1425   Resp 18 02/24/25 1415   SpO2 94 % 02/24/25 1425           Post Anesthesia Care and Evaluation    Patient location during evaluation: PACU  Patient participation: complete - patient participated  Level of consciousness: awake and alert  Pain management: adequate    Airway patency: patent  Anesthetic complications: No anesthetic complications    Cardiovascular status: acceptable  Respiratory status: acceptable  Hydration status: acceptable    Comments: Blood pressure 107/66, pulse 89, temperature 96.5 °F (35.8 °C), temperature source Axillary, resp. rate 18, height 180.3 cm (71\"), weight 95.1 kg (209 lb 11.2 oz), SpO2 95%.    Pt discharged from PACU based on fredy score >8  No anesthesia care post op    "

## 2025-02-24 NOTE — PROGRESS NOTES
Nutrition Assessment  Nutrition PES/Intervention Note  Malnutrition Severity Assessment    Patient Name:  Manohar Garcia  YOB: 1940  MRN: [id number]  Admit Date:  2/18/2025    Date:  2/24/2025    Comments:  Initial nutrition assessment secondary to wounds per LDA report. Pt has been residing at [facility] since September 2024. He is admitted with FERNANDO, bladder stone, gross hematuria, lymphedema of the L arm, constipation, and anemia. He is s/p cystolitholapaxy with laser of bladder stone today. He has been receiving CBI. He had a GI consult for a bloody BM and anemia/thrombocytopenia. He reports poor appetite recently with current illnesses. He had a recent hospital stay for COVID-19 and the flu. He has a PMH of cirrhosis secondary to BRUNSON, CKD, dementia, and GERD. He has lost 26# since living at the SNF and hospital stay 6 months ago. He was previously on a GI, low irritant diet prior to NPO status today. He had consumed 45% of the previous 5 meals. He is now ordered clear liquids post surgery. He does not have any teeth. He has DTI's to thoracic spine, L hip, and L shoulder. He is W/C bound at the SNF and has a chronic catheter. He has increased risk for further SBD with a erika score of 15.Will start Boost Breeze while on clear liquids and transition to Boost Original when diet allows. Also recommend to start a MVI w/minerals daily for skin health.     Malnutrition Severity Assessment      Patient meets criteria for : Moderate (non-severe) Malnutrition  Malnutrition Type (Last 8 Hours)       Malnutrition Severity Assessment       Row Name 02/24/25 1636       Malnutrition Severity Assessment    Malnutrition Type Acute Disease or Injury - Related Malnutrition      Row Name 02/24/25 1635       Insufficient Energy Intake     Insufficient Energy Intake Findings Severe    Insufficient Energy Intake  < or equal to 50% of est. energy requirement for > or equal to 5d)      Row Name 02/24/25 2278        Unintentional Weight Loss     Unintentional Weight Loss Findings Moderate    Unintentional Weight Loss  Weight loss of 10% in six months  10-11% loss in the last 6 months      Row Name 02/24/25 1636       Muscle Loss    Loss of Muscle Mass Findings Moderate    [place] Region Moderate - slight depression    Clavicle Bone Region Moderate - some protrusion in females, visible in males    Acromion Bone Region Moderate - acromion may slightly protrude    Scapular Bone Region Moderate - mild depression, bones may show slightly      Row Name 02/24/25 1636       Fat Loss    Subcutaneous Fat Loss Findings Moderate    Orbital Region  Moderate -  somewhat hollowness, slightly dark circles    Upper Arm Region Moderate - some fat tissue, not ample      Row Name 02/24/25 1636       Criteria Met (Must meet criteria for severity in at least 2 of these categories: M Wasting, Fat Loss, Fluid, Secondary Signs, Wt. Status, Intake)    Patient meets criteria for  Moderate (non-severe) Malnutrition                         Problem 1       Row Name 02/24/25 1637          Nutrition Diagnoses Problem 1    Problem 1 Malnutrition  Moderate malnutrition in the context of acute on chronic disease     Etiology (related to) Factors Affecting Nutrition;Medical Diagnosis     Nutrition related Increased nutrition needs     Gastrointestinal Gastrointestinal bleed  suspected recent GIB with blood noted in BM     Hematological Acute blood loss;Anemia     Hepatic Cirrhosis;NAFLD     Infectious Disease Other (comment)  recent COVID and flu     Neurological Dementia     Renal FERNANDO;CKD;Urological surgery  cystolitholapaxy with laser for bladder stone, hematuria     Skin Skin breakdown     Appetite Poor at this Time     Signs/Symptoms (evidenced by) Clear Liquid Diet;PO Intake;Unintended Weight Change;Report/Observation     Percent (%) intake recorded 45 %     Over number of meals 5     Unintended Weight Change Loss     Number of Pounds Lost 26     Weight loss  time period 6 months     Other Comment muscle/fat wasting per NFPE, DTI's to thoracic spine, L hip, L shoulder                            Intervention Goal       Row Name 02/24/25 1642          Intervention Goal    General Reduce/improve symptoms;Meet nutritional needs for age/condition;Disease management/therapy     PO Increase intake;Meet estimated needs;Advance diet     Weight No significant weight loss                    Nutrition Intervention       Row Name 02/24/25 1642          Nutrition Intervention    RD/Tech Action Follow Tx progress;Care plan reviewd;Encourage intake;Advise alternate selection;Recommend/ordered     Recommended/Ordered Supplement                    Nutrition Prescription       Row Name 02/24/25 1642          Nutrition Prescription PO    PO Prescription Begin/change supplement     Supplement Boost Breeze  transition to Boost Original when diet allows     Supplement Frequency 3 times a day     New PO Prescription Ordered? Yes                    Education/Evaluation       Row Name 02/24/25 1643          Education    Education No discharge needs identified at this time  Will return to [facility] when medically ready        Monitor/Evaluation    Monitor Per protocol                     Electronically signed by:  Kylie Duque RDN, LD  02/24/25 16:44 CST

## 2025-02-24 NOTE — OP NOTE
CYSTOLITHOLAPAXY WITH LASER  Procedure Note    Manohar Garcia  Date of Procedure: 2/24/2025    Pre-op Diagnosis:   Bladder stone [N21.0]    Post-op Diagnosis:     Post-Op Diagnosis Codes:     * Bladder stone [N21.0]    Procedure/CPT® Codes:  No CPT Code Applied in Case Entry    Procedure(s):  1) CYSTOLITHOLAPAXY WITH LASER (>3.5cm bladder stone)  2)  FULGURATION OF PROSTATE    Surgeon(s):  Abdias Henley MD    Anesthesia: General    Staff:   Circulator: Maria Esther Nails RN  Scrub Person: Sohail Matt; Tete Guo    Indications for procedure:  84-year-old with history of gross hematuria, currently on IV antibiotics for UTI, with very large bladder stone that appeared to be causing significant bladder wall irritation leading to previous hematuria    Findings:   Greater than 3.5 cm very hard bladder stone broken with holmium laser and removed.  Friable prostate at bladder neck required bipolar fulguration at conclusion of case using loop and button electrode.    Procedure details:  The patient is identified in the preoperative holding room.  The rationale including the benefits, alternatives and risks of this procedure were already discussed and informed consent has been obtained.  The patient demonstrates good understanding of this procedure.      The patient had previously been receiving IV antibiotics.  He was taken to the operating room and placed upon the operative table.  After the adequate induction of general anesthesia, his Perkins catheter was removed and the patient was placed into the lithotomy position and his genitalia were prepped and draped in usual sterile fashion.  I placed a 22 Wolof rigid cystoscope into the urethra.  I could see evidence for previous urethral dilation of his mid penile urethral stricture.  I was able to pass the scope easily beyond this area into the bladder.  The prostate was markedly enlarged with elevated bladder neck.  Upon entering the bladder, very large  bladder stone greater than 3.5 cm was visualized.  I used the holmium laser 500 µm fiber in order to fragment the stone into multiple smaller pieces.  Is required 2 different fibers and multiple stripping of the FiberWire in order to completely fragment the stone.  Total cystolitholapaxy time was over 2 hours due to the very large and hard nature of the stone.  After the stone was adequately fragmented, I removed the cystoscope and placed a 26 [place]er Stortz resectoscope sheath with visual obturator into the bladder.  I used the loop electrode in order to remove many of the remaining larger stone fragments.  Additional laser lithotripsy was performed for a couple of the fragments.  Also removed some debris from the bladder wall leftover from fulguration last week.  Hemostasis of this area of previous fulguration of the bladder wall appeared intact.  After all the stone was removed, there was friable tissue at the bladder neck along 3-9 o'clock that was fulgurated using a combination of the bipolar loop and button electrode.  Hemostasis appeared satisfactory.  I removed the resectoscope sheath and placed a 22 [place]er three-way Perkins catheter bladder with 30 cc of sterile water in balloon.  Bladder was manually irrigated to clear.  Continuous bladder irrigation was initiated.  The patient was awakened from anesthesia and taken to the cover him in stable condition.        Estimated Blood Loss: <30 mLs    Specimens:                Specimens       ID Source Type Tests Collected By Collected At Frozen?    A Urinary Bladder Calculus TISSUE PATHOLOGY EXAM   Abdias Henley MD 2/24/25 1020     Description: BLADDER STONE              Drains: 22 [place]er three-way Perkins catheter to gravity with CBI  Continuous Bladder Irrigation Triple-lumen 22 Fr (Active)       [REMOVED] Urethral Catheter 16 Fr. (Removed)       [REMOVED] Urethral Catheter Latex 16 Fr. (Removed)   Daily Indications Placement by  physician 02/10/25 0800   Site  Assessment Clean;Skin intact 02/10/25 0800   Collection Container Standard drainage bag 02/10/25 0800   Securement Method Securing device 02/10/25 0800   Catheter care complete Yes 02/10/25 1038   Output (mL) 200 mL 02/10/25 1038       [REMOVED] Urethral Catheter Silicone;Straight-tip 20 Fr. (Removed)   Site Assessment Clean;Skin intact 02/18/25 1023   Collection Container Standard drainage bag 02/18/25 1023   Securement Method Securing device 02/18/25 1023   Catheter care complete Yes 02/18/25 1023       [REMOVED] Continuous Bladder Irrigation Triple-lumen 22 Fr (Removed)   Daily Indications Selected surgeries ( tract, abdomen) 02/24/25 0738   Site Assessment Clean;Skin intact 02/24/25 0738   Collection Container Standard drainage bag 02/24/25 0738   Securement Method Securing device 02/24/25 0738   Catheter care complete Yes 02/23/25 2200   Patient Tolerance of Continuous Bladder Irrigation Tolerating well 02/24/25 0738   CBI Urine Appearance yellow 02/24/25 0738   Rate Slow 02/24/25 0738   Irrigant Normal saline 02/24/25 0738   CBI Irrigation Intake (mL) 800 mL 02/24/25 0558   CBI Perkins Output (mL) 1200 mL 02/24/25 0558   CBI Net Output (mL) 400 mL 02/24/25 0558   Intake Running Total 46036 mL 02/24/25 0558   Output Running Total 88254 mL 02/24/25 0558       Complications: none    Abdias Henley MD     Date: 2/24/2025  Time: 14:03 CST

## 2025-02-24 NOTE — CONSULTS
[facility] Gastroenterology  Inpatient Consult Note  Today's date:  02/24/25    Manohar Garcia  1940       Referring Provider: No ref. provider found  Primary Physician: Dani Farfan MD     Date of Admission: 2/18/2025  Date of Service:  02/24/25    Reason for Consultation/Chief Complaint:   GI bleed    History of present illness:    Manohar is a 83 y/o male admitted to the hospital on 2/18/25. He presented to the hospital from facility with reported hematuria. He has been evaluated by urology. He underwent cystoscopy earlier this admission and plan to go back to OR today for further treatment of bladder stone. GI was consulted today due to reported bloody bowel movement early this morning. He has not had any further episodes reported. Hgb this morning was 8.5, which is stable over the past 3 days. He was anemic with hgb on admission and hgb has been as low as 6.7. He has required transfusion. He also has thrombocytopenia with plt count of 40 and is receiving plt transfusion this morning. He is not a great historian. History mostly obtained from chart. Unsure of prior EGD or colonoscopy. Per chart, has had prior hemorrhoidectomy.     Past Medical History:   Diagnosis Date    Dementia     GERD (gastroesophageal reflux disease)     Liver disorder          Past Surgical History:   Procedure Laterality Date    CYSTOSCOPY WITH CLOT EVACUATION N/A 2/18/2025    Procedure: CYSTOSCOPY WITH CLOT EVACUATION, FULGURATION;  Surgeon: Abdias Henley MD;  Location: [facility] OR;  Service: Urology;  Laterality: N/A;    HEMORRHOIDECTOMY      HERNIA REPAIR      HIP TROCHANTERIC NAILING WITH INTRAMEDULLARY HIP SCREW Right 8/30/2024    Procedure: HIP TROCHANTERIC NAILING SHORT WITH INTRAMEDULLARY HIP SCREW;  Surgeon: Arcenio Chandra MD;  Location: [facility] OR;  Service: Orthopedics;  Laterality: Right;          Allergies   Allergen Reactions    Penicillins Anaphylaxis    Contrast Dye (Echo Or Unknown Ct/Mr)  Hives         Social History     Tobacco Use    Smoking status: Former     Types: Cigarettes, Pipe    Smokeless tobacco: Never   Substance Use Topics    Alcohol use: No          History reviewed. No pertinent family history.      Medications Prior to Admission   Medication Sig Dispense Refill Last Dose/Taking    apixaban (ELIQUIS) 2.5 MG tablet tablet Take 1 tablet by mouth 2 (Two) Times a Day.   Taking    budesonide-formoterol (SYMBICORT) 160-4.5 MCG/ACT inhaler Inhale 2 puffs 2 (Two) Times a Day.   Taking    carvedilol (COREG) 3.125 MG tablet Take 1 tablet by mouth 2 (Two) Times a Day With Meals.   Taking    HYDROcodone-acetaminophen (NORCO) 5-325 MG per tablet Take 1 tablet by mouth Every 4 (Four) Hours As Needed for Mild Pain or Moderate Pain. 14 tablet 0 Taking As Needed    acetaminophen (TYLENOL) 325 MG tablet Take 2 tablets by mouth Every 4 (Four) Hours As Needed for Mild Pain. (Patient taking differently: Take 2 tablets by mouth Every 4 (Four) Hours As Needed for Mild Pain or Fever.)       albuterol sulfate  (90 Base) MCG/ACT inhaler Inhale 2 puffs 4 (Four) Times a Day.       bisacodyl (DULCOLAX) 10 MG suppository Insert 1 suppository into the rectum Daily As Needed for Constipation.       bisacodyl (DULCOLAX) 5 MG EC tablet Take 1 tablet by mouth Daily As Needed for Constipation.       famotidine (PEPCID) 20 MG tablet Take 1 tablet by mouth Daily.       guaiFENesin (MUCINEX) 600 MG 12 hr tablet Take 2 tablets by mouth 2 (Two) Times a Day.       ipratropium (ATROVENT HFA) 17 MCG/ACT inhaler Inhale 2 puffs 4 (Four) Times a Day.       ipratropium-albuterol (DUO-NEB) 0.5-2.5 mg/3 ml nebulizer Take 3 mL by nebulization Every 6 (Six) Hours As Needed for Wheezing.       nystatin (MYCOSTATIN) 409638 UNIT/GM powder Apply 1 Application topically to the appropriate area as directed 3 (Three) Times a Day.       ondansetron ODT (ZOFRAN-ODT) 4 MG disintegrating tablet Place 1 tablet on the tongue Every 4 (Four)  Hours As Needed for Nausea or Vomiting.       tamsulosin (FLOMAX) 0.4 MG capsule 24 hr capsule Take 1 capsule by mouth Every Night.                Review of Systems:  Constitutional: No unexpected weight change, no fatigue, no unexplained fever, no sweats or chills.   HEENT: No icteric sclera.  No hearing or visual deficits.  No sore throat.  No chronic nasal discharge.  Pulmonary: No chronic cough.  No hemoptysis.  No shortness of breath.  Cardiovascular: No chest pain.  No palpitations.  No shortness of breath.  Gastrointestinal: As above.  Musculoskeletal/extremities: No peripheral edema.  No cyanosis.  No claudications.  No back pain.  Genitourinary: No dysuria.  No blood in stool.  No urethral discharges.  Neurologic: No seizures.  No headaches.  No dizziness.  No gait problems.  Skin: No rash.  No icterus.  Mental: No psychosis.  No confusions.  No hallucinations.      Physical Exam:  Temp:  [96.5 °F (35.8 °C)-98.7 °F (37.1 °C)] 97 °F (36.1 °C)  Heart Rate:  [71-90] 90  Resp:  [16-19] 18  BP: ()/(43-78) 110/62    General:   HEENT: Nonicteric sclerae.  Moist oral mucosa.  PERRLA.  EOMI.  Clear pharynx.  Lungs: Clear to auscultation bilaterally.  No wheezing, rales or rhonchi.  Heart: Regular rate and rhythm.  Normal S1 and S2, no S3, S4 or murmur.  Abdomen: Soft, nondistended, nontender to palpation, with normoactive bowel sounds, no hepatosplenomegaly, no palpable masses.  Extremities: No cyanosis, edema or pulse deficits.  Skin: No rash or jaundice.      Results Review:  Lab Results (last 24 hours)       Procedure Component Value Units Date/Time    Basic Metabolic Panel [[id number]]  (Abnormal) Collected: 02/24/25 0336    Specimen: Blood Updated: 02/24/25 0411     Glucose 104 mg/dL      BUN 39 mg/dL      Creatinine 1.29 mg/dL      Sodium 143 mmol/L      Potassium 4.2 mmol/L      Comment: Slight hemolysis detected by analyzer. Result may be falsely elevated.        Chloride 117 mmol/L      CO2 19.0 mmol/L       Calcium 7.7 mg/dL      BUN/Creatinine Ratio 30.2     Anion Gap 7.0 mmol/L      eGFR 54.7 mL/min/1.73     Narrative:      GFR Categories in Chronic Kidney Disease (CKD)      GFR Category          GFR (mL/min/1.73)    Interpretation  G1                     90 or greater         Normal or high (1)  G2                      60-89                Mild decrease (1)  G3a                   45-59                Mild to moderate decrease  G3b                   30-44                Moderate to severe decrease  G4                    15-29                Severe decrease  G5                    14 or less           Kidney failure          (1)In the absence of evidence of kidney disease, neither GFR category G1 or G2 fulfill the criteria for CKD.    eGFR calculation 2021 CKD-EPI creatinine equation, which does not include race as a factor    CBC & Differential [[id number]]  (Abnormal) Collected: 02/24/25 0335    Specimen: Blood Updated: 02/24/25 0400    Narrative:      The following orders were created for panel order CBC & Differential.  Procedure                               Abnormality         Status                     ---------                               -----------         ------                     CBC Auto Differential[[id number]]        Abnormal            Final result                 Please view results for these tests on the individual orders.    CBC Auto Differential [[id number]]  (Abnormal) Collected: 02/24/25 0335    Specimen: Blood Updated: 02/24/25 0400     WBC 2.60 10*3/mm3      RBC 2.91 10*6/mm3      Hemoglobin 8.5 g/dL      Hematocrit 27.0 %      MCV 92.8 fL      MCH 29.2 pg      MCHC 31.5 g/dL      RDW 17.6 %      RDW-SD 57.6 fl      MPV 9.7 fL      Platelets 40 10*3/mm3      Neutrophil % 55.4 %      Lymphocyte % 30.4 %      Monocyte % 10.4 %      Eosinophil % 2.3 %      Basophil % 0.0 %      Immature Grans % 1.5 %      Neutrophils, Absolute 1.44 10*3/mm3      Lymphocytes, Absolute 0.79 10*3/mm3       Monocytes, Absolute 0.27 10*3/mm3      Eosinophils, Absolute 0.06 10*3/mm3      Basophils, Absolute 0.00 10*3/mm3      Immature Grans, Absolute 0.04 10*3/mm3     CBC (No Diff) [[id number]]  (Abnormal) Collected: 02/23/25 2204    Specimen: Blood Updated: 02/23/25 2235     WBC 3.31 10*3/mm3      RBC 3.01 10*6/mm3      Hemoglobin 8.8 g/dL      Hematocrit 27.8 %      MCV 92.4 fL      MCH 29.2 pg      MCHC 31.7 g/dL      RDW 18.0 %      RDW-SD 56.8 fl      MPV 10.7 fL      Platelets 44 10*3/mm3     Occult Blood X 1, Stool - Stool, Per Rectum [[id number]]  (Abnormal) Collected: 02/23/25 2202    Specimen: Stool from Per Rectum Updated: 02/23/25 2208     Fecal Occult Blood Positive              Radiology Review:  Imaging Results (Last 72 Hours)       ** No results found for the last 72 hours. **            Impression:  Blood in stool.   Hematuria.   Anemia.  Thrombocytopenia.    Plan:  Patient to OR today with urology. No plan for endoscopic evaluation at this time unless he continues to have rectal bleeding. Agree with platelet transfusion. Monitor hgb/hct and transfuse as needed.       Naif Clifford MD  02/24/25   15:35 CST

## 2025-02-24 NOTE — PROGRESS NOTES
[facility] Medicine Services  INPATIENT PROGRESS NOTE    Patient Name: Manohar Garcia  Date of Admission: 2/18/2025  Today's Date: 02/24/25  Length of Stay: 6  Primary Care Physician: Dani Farfan MD    Subjective   Chief Complaint: Hematuria/acute renal failure/metabolic acidosis/neutropenia/anemia/thrombocytopenia   HPI   No GI bleed last night with positive Hemoccult, GIs been consulted.  Patient is he has came back from surgery for bladder stone removal.  CO2 stable.  Creatinine is improving.  Decrease in white blood cells.  Hemoglobin stable.  Slight decrease in platelet counts.  Plan for 1 unit of platelets transfusion today.    Review of Systems   Constitutional:  Positive for activity change, appetite change and fatigue. Negative for chills and fever.   HENT:  Negative for hearing loss, nosebleeds, tinnitus and trouble swallowing.    Eyes:  Negative for visual disturbance.   Respiratory:  Negative for cough, chest tightness, shortness of breath and wheezing.    Cardiovascular:  Negative for chest pain, palpitations and leg swelling.   Gastrointestinal:  Negative for abdominal distention, abdominal pain, blood in stool, constipation, diarrhea, nausea and vomiting.   Endocrine: Negative for cold intolerance, heat intolerance, polydipsia, polyphagia and polyuria.   Genitourinary:  Negative for decreased urine volume, difficulty urinating, dysuria, flank pain, frequency and hematuria.   Musculoskeletal:  Positive for arthralgias, gait problem and myalgias. Negative for joint swelling.   Skin:  Negative for rash.   Allergic/Immunologic: Negative for immunocompromised state.   Neurological:  Positive for weakness. Negative for dizziness, syncope, light-headedness and headaches.   Hematological:  Negative for adenopathy. Does not bruise/bleed easily.   Psychiatric/Behavioral:  Negative for confusion and sleep disturbance. The patient is not nervous/anxious.   All  pertinent negatives and positives are as above. All other systems have been reviewed and are negative unless otherwise stated.     Objective    Temp:  [96.5 °F (35.8 °C)-98.2 °F (36.8 °C)] 97.4 °F (36.3 °C)  Heart Rate:  [71-96] 96  Resp:  [16-19] 18  BP: ()/(43-78) 117/54  Physical Exam  Vitals and nursing note reviewed.   Constitutional:       Appearance: He is well-developed.      Comments: Advanced age, chronically ill.   HENT:      Head: Normocephalic.   Eyes:      General: No scleral icterus.     Pupils: Pupils are equal, round, and reactive to light.   Neck:      Thyroid: No thyromegaly.      Vascular: No carotid bruit or JVD.      Trachea: No tracheal deviation.   Cardiovascular:      Rate and Rhythm: Normal rate and regular rhythm.      Heart sounds: No murmur heard.     No friction rub. No gallop.   Pulmonary:      Effort: Pulmonary effort is normal. No respiratory distress.      Breath sounds: Normal breath sounds. No wheezing or rales.      Comments: Patient is on room air.  Chest:      Chest wall: No tenderness.   Abdominal:      General: Bowel sounds are normal. There is no distension.      Palpations: Abdomen is soft.      Tenderness: There is no abdominal tenderness.   Genitourinary:     Comments: Perkins cath in place.  Since CDI in place.  Musculoskeletal:      Cervical back: Normal range of motion and neck supple.   Skin:     General: Skin is warm and dry.      Capillary Refill: Capillary refill takes 2 to 3 seconds.      Findings: No rash.   Neurological:      Cranial Nerves: No cranial nerve deficit.      Motor: Weakness present.      Coordination: Coordination abnormal.      Gait: Gait abnormal.   Psychiatric:         Mood and Affect: Mood normal.         Behavior: Behavior normal.       Results Review:  I have reviewed the labs, radiology results, and diagnostic studies.    Laboratory Data:   Results from last 7 days   Lab Units 02/24/25  0335 02/23/25  2204 02/23/25  0346   WBC 10*3/mm3  2.60* 3.31* 2.35*   HEMOGLOBIN g/dL 8.5* 8.8* 8.2*   HEMATOCRIT % 27.0* 27.8* 25.3*   PLATELETS 10*3/mm3 40* 44* 42*        Results from last 7 days   Lab Units 02/24/25  0336 02/23/25  0346 02/22/25  0802 02/21/25  0418 02/20/25  0554   SODIUM mmol/L 143 142 141 143 140   POTASSIUM mmol/L 4.2 4.3 4.5 4.7 4.6   CHLORIDE mmol/L 117* 117* 117* 117* 114*   CO2 mmol/L 19.0* 19.0* 19.0* 19.0* 20.0*   BUN mg/dL 39* 44* 51* 57* 56*   CREATININE mg/dL 1.29* 1.31* 1.57* 2.01* 2.24*   CALCIUM mg/dL 7.7* 8.2* 7.9* 8.1* 8.0*   BILIRUBIN mg/dL  --  0.8  --  0.5 0.7   ALK PHOS U/L  --  80  --  64 71   ALT (SGPT) U/L  --  10  --  9 11   AST (SGOT) U/L  --  17  --  10 12   GLUCOSE mg/dL 104* 89 106* 107* 105*       Culture Data:   Blood Culture   Date Value Ref Range Status   02/18/2025 No growth at 5 days  Final   02/18/2025 No growth at 5 days  Final     Urine Culture   Date Value Ref Range Status   02/18/2025 >100,000 CFU/mL Enterococcus faecalis (A)  Final       Radiology Data:   Imaging Results (Last 24 Hours)       ** No results found for the last 24 hours. **            I have reviewed the patient's current medications.     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 Patient is 84-year-old  male presented from nursing home with gross hematuria - sounds like another NH resident accidentally got leg tangled in catheter and pulled it out with bulb up.  He's also chronically anticoagulated with Eliquis. He has a known history of a large bladder stone and is followed by Urology in the outpatient setting.  Found to also have FERNANDO,  worsening anemia and thrombocytopenia, COVID +, soft BPs.  Urology following.  Currently on CBI.  Anticoagulation on hold.  I've given transfusion of both platelets (2 days ago) and blood (today).  Enterococcus noted in his urine which is susceptible to Vancomycin.  Dr. Henley with Urology plans to take him to OR on Monday for management of large bladder stone if his clinical status is improving/stable.     Treatment Plan  Hematuria/Chronic catheter/UTI/bladder stone.  History of accidentally got tangled with catheter and pull out with a bulb up.  History of large bladder stone being followed by urology in outpatient setting.  Urology consult.  Vancomycin . Flomax.  CT scan abdomen pelvic-urinary bladder is abnormal in appearance with moderate distention and stranding in the perivesical space as well as an air-fluid level- heterogeneous increased density dependently within the bladder consistent with blood products/clot- Along the left anterolateral aspect of the bladder there is a focus of hyperdensity either representing a adherent clot versus a small neoplasm-not definitely appreciated on previous CT of 2 weeks earlier favoring adherent clot, Perkins catheter has been displaced/retracted into the  prostatic urethra with the balloon inflated within the prostatic urethra, 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  Status post cystoscopy with irrigation and evacuation of multiple obstructing clots 2/18/2025  Status post 2/20/2025- CYSTOLITHOLAPAXY WITH LASER (>3.5cm bladder stone), FULGURATION OF PROSTATE  Perkins cath in place.  CBI.      GI bleed.  Hemoccult positive.  GI consult.    Soft blood pressure/on chronic anticoagulation.  Hold Eliquis due to hematuria.  Hold Coreg.    Doppler ultrasound left upper extremity- no evidence of deep venous thrombosis or  superficial thrombophlebitis of the left upper extremity.     Acute on chronic kidney injury 3B/metabolic acidosis.  Creatinine is improving.  Creatinine 9/2/2024 1.85.  Lactated ringer..  Metabolic acidosis stable.  Bicarb p.o .     Anemia.  Patient received blood transfusion 2/21/2025.  Ferric gluconate times 12/21/25.  Hemoglobin stable.  No sign of acute bleed.     Thrombocytopenia.  Patient received platelets transfusion 2/19/2025.  Platelet count slight decrease.  Plan for 1 unit transfusion 2/24/2025.     Neutropenia.  Slight decrease in white blood cells.     COVID-positive/pulm fibrosis.  Albuterol inhaler.  Symbicort.  Guaifenesin.  Atrovent.  Chest x-ray-No change since the previous study, No active cardiopulmonary disease, Persistent chronic interstitial changes/fibrosis.  CT scan head-Chronic changes and no acute intracranial findings.   Echocardiogram-Grossly normal left and right ventricular size and function,  Valves are not well-visualized.  Patient is on room air.     Pain . Norco as needed.     Reflux.  Protonix.  Zofran as needed.     Constipation.  Michelle-Colace.     Diet . regular diet     Deconditioning.  PT and OT consult.  CT scan lumbar spine- Multilevel degenerative disc disease and facet arthropathy as discussed above but no definite acute osseous abnormality,  Please see the CT abdomen and pelvis report for details regarding the kidneys and urinary bladder as well as retroperitoneal stranding.       Patient is from nursing  home.     Urine culture shows enterococci faecalis susceptible vancomycin.  Blood culture shows no growth in 5 days.  MRSA screen-negative.  Respiratory panel-COVID-positive.  Pathology-no malignant cells.     Medical Decision Making  Number and Complexity of problems: Hematuria/chronic cath/UTI/bladder stone  Differential Diagnosis: None     Conditions and Status        Condition is unchanged.     [facility] Data  External documents reviewed: Previous note.  Cardiac tracing (EKG, telemetry) interpretation: Sinus .  Radiology interpretation: CT scan/x-ray/echo  Labs reviewed: Laboratory  Any tests that were considered but not ordered: Laboratory in AM     Decision rules/scores evaluated (example [id number], Wells, etc): None     Discussed with: Patient     Care Planning  Shared decision making: Patient  Code status and discussions: DNR . DNI     Disposition  Social Determinants of Health that impact treatment or disposition: From nursing home.  2 to 4 days.      Electronically signed by James Donovan MD, 02/24/25, 16:24 CST.

## 2025-02-24 NOTE — PLAN OF CARE
Goal Outcome Evaluation:           Progress: declining     A/O x 4. CBI Perkins. slow rate- yellow urine. Dr. Sky contacted for Bloody BM. Occult Sample positive. Orders placed. GI consulted. IVF per orders. Denies pain. Turn q2. RA. SCDs. Denies pain. Safety maintained.

## 2025-02-24 NOTE — ANESTHESIA PREPROCEDURE EVALUATION
Anesthesia Evaluation     Patient summary reviewed and Nursing notes reviewed   no history of anesthetic complications:   NPO Solid Status: > 8 hours  NPO Liquid Status: > 8 hours           Airway   Dental    (+) edentulous    Pulmonary    (+) a smoker Former,  (-) asthma, sleep apnea    ROS comment: COVID/Flu  Cardiovascular   Exercise tolerance: poor (<4 METS)    PT is on anticoagulation therapy    (-) hypertension, hyperlipidemia      Neuro/Psych  (+) dementia, poor historian.  (-) seizures, TIA, CVA  GI/Hepatic/Renal/Endo    (+) obesity, GERD, liver disease cirrhosis, renal disease- CRI and ARF    Musculoskeletal     Abdominal    Substance History   (+) drug use     OB/GYN          Other   blood dyscrasia anemia thrombocytopenia,                   Anesthesia Plan    ASA 4 - emergent     general     (Patient with multiorgan dysfunction and elevated risk. Underlying dementia and at risk for worsened delirium and confusion discussed with step son at bedside. )  intravenous induction         CODE STATUS:    While under anesthesia and immediate perioperative period:  Code Status: CPR - Full Support

## 2025-02-24 NOTE — PROGRESS NOTES
Urology  Length of Stay: 5  Patient Care Team:  Dani Farfan MD as PCP - General (Family Medicine)    Chief Complaint: Bladder stone    Subjective     Interval History:   No acute  events.  Urine has remained clear.  He has been receiving culture appropriate antibiotics.  Platelet count remains low.  H&H stable.    Review of Systems:   Review of Systems    Objective       Intake/Output Summary (Last 24 hours) at 2/23/2025 1831  Last data filed at 2/23/2025 1729  Gross per 24 hour   Intake 714.91 ml   Output 1500 ml   Net -785.09 ml       Vital Signs  Temp:  [97.5 °F (36.4 °C)-99 °F (37.2 °C)] 98.7 °F (37.1 °C)  Heart Rate:  [72-90] 90  Resp:  [16-18] 16  BP: ()/(37-56) 97/51    Physical Exam:  Physical Exam  Perkins catheter with clear urine.     Results Review:       I reviewed the patient's new clinical results.  Lab Results (last 24 hours)       Procedure Component Value Units Date/Time    Blood Culture - Blood, Arm, Right [[id number]]  (Normal) Collected: 02/18/25 1249    Specimen: Blood from Arm, Right Updated: 02/23/25 1330     Blood Culture No growth at 5 days    Blood Culture - Blood, Arm, Left [[id number]]  (Normal) Collected: 02/18/25 1001    Specimen: Blood from Arm, Left Updated: 02/23/25 1100     Blood Culture No growth at 5 days    Hemoglobin A1c [[id number]]  (Normal) Collected: 02/23/25 0346    Specimen: Blood Updated: 02/23/25 0510     Hemoglobin A1C 5.00 %     Narrative:      Hemoglobin A1C Ranges:    Increased Risk for Diabetes  5.7% to 6.4%  Diabetes                     >= 6.5%  Diabetic Goal                < 7.0%    Comprehensive Metabolic Panel [[id number]]  (Abnormal) Collected: 02/23/25 0346    Specimen: Blood Updated: 02/23/25 0438     Glucose 89 mg/dL      BUN 44 mg/dL      Creatinine 1.31 mg/dL      Sodium 142 mmol/L      Potassium 4.3 mmol/L      Comment: Slight hemolysis detected by analyzer. Result may be falsely elevated.        Chloride 117 mmol/L      CO2 19.0 mmol/L       Calcium 8.2 mg/dL      Total Protein 4.0 g/dL      Albumin 1.6 g/dL      ALT (SGPT) 10 U/L      AST (SGOT) 17 U/L      Comment: Slight hemolysis detected by analyzer. Result may be falsely elevated.        Alkaline Phosphatase 80 U/L      Total Bilirubin 0.8 mg/dL      Globulin 2.4 gm/dL      A/G Ratio 0.7 g/dL      BUN/Creatinine Ratio 33.6     Anion Gap 6.0 mmol/L      eGFR 53.7 mL/min/1.73     Narrative:      GFR Categories in Chronic Kidney Disease (CKD)      GFR Category          GFR (mL/min/1.73)    Interpretation  G1                     90 or greater         Normal or high (1)  G2                      60-89                Mild decrease (1)  G3a                   45-59                Mild to moderate decrease  G3b                   30-44                Moderate to severe decrease  G4                    15-29                Severe decrease  G5                    14 or less           Kidney failure          (1)In the absence of evidence of kidney disease, neither GFR category G1 or G2 fulfill the criteria for CKD.    eGFR calculation 2021 CKD-EPI creatinine equation, which does not include race as a factor    TSH [[id number]]  (Normal) Collected: 02/23/25 0346    Specimen: Blood Updated: 02/23/25 0436     TSH 2.630 uIU/mL     Lipid Panel [[id number]]  (Abnormal) Collected: 02/23/25 0346    Specimen: Blood Updated: 02/23/25 0436     Total Cholesterol 73 mg/dL      Triglycerides 76 mg/dL      HDL Cholesterol 19 mg/dL      LDL Cholesterol  38 mg/dL      VLDL Cholesterol 16 mg/dL      LDL/HDL Ratio 2.04    Narrative:      Cholesterol Reference Ranges  (U.S. Department of Health and Human Services ATP III Classifications)    Desirable          <200 mg/dL  Borderline High    200-239 mg/dL  High Risk          >240 mg/dL      Triglyceride Reference Ranges  (U.S. Department of Health and Human Services ATP III Classifications)    Normal           <150 mg/dL  Borderline High  150-199 mg/dL  High             200-499  mg/dL  Very High        >500 mg/dL    HDL Reference Ranges  (U.S. Department of Health and Human Services ATP III Classifications)    Low     <40 mg/dl (major risk factor for CHD)  High    >60 mg/dl ('negative' risk factor for CHD)        LDL Reference Ranges  (U.S. Department of Health and Human Services ATP III Classifications)    Optimal          <100 mg/dL  Near Optimal     100-129 mg/dL  Borderline High  130-159 mg/dL  High             160-189 mg/dL  Very High        >189 mg/dL    LDL is calculated using the NIH LDL-C calculation.      CBC & Differential [[id number]]  (Abnormal) Collected: 02/23/25 0346    Specimen: Blood Updated: 02/23/25 0412    Narrative:      The following orders were created for panel order CBC & Differential.  Procedure                               Abnormality         Status                     ---------                               -----------         ------                     CBC Auto Differential[[id number]]        Abnormal            Final result                 Please view results for these tests on the individual orders.    CBC Auto Differential [[id number]]  (Abnormal) Collected: 02/23/25 0346    Specimen: Blood Updated: 02/23/25 0412     WBC 2.35 10*3/mm3      RBC 2.76 10*6/mm3      Hemoglobin 8.2 g/dL      Hematocrit 25.3 %      MCV 91.7 fL      MCH 29.7 pg      MCHC 32.4 g/dL      RDW 17.2 %      RDW-SD 55.4 fl      MPV 11.5 fL      Platelets 42 10*3/mm3      Neutrophil % 59.2 %      Lymphocyte % 25.1 %      Monocyte % 12.3 %      Eosinophil % 1.7 %      Basophil % 0.0 %      Immature Grans % 1.7 %      Neutrophils, Absolute 1.39 10*3/mm3      Lymphocytes, Absolute 0.59 10*3/mm3      Monocytes, Absolute 0.29 10*3/mm3      Eosinophils, Absolute 0.04 10*3/mm3      Basophils, Absolute 0.00 10*3/mm3      Immature Grans, Absolute 0.04 10*3/mm3      nRBC 0.0 /100 WBC           Imaging Results (Last 24 Hours)       ** No results found for the last 24 hours. **            Medication  Review:     Current Facility-Administered Medications:     albuterol sulfate HFA (PROVENTIL HFA;VENTOLIN HFA;PROAIR HFA) inhaler 2 puff, 2 puff, Inhalation, 4x Daily - RT, Alba Rachel APRN, 2 puff at 02/23/25 1447    sennosides-docusate (PERICOLACE) 8.6-50 MG per tablet 2 tablet, 2 tablet, Oral, BID, 2 tablet at 02/22/25 2020 **AND** polyethylene glycol (MIRALAX) packet 17 g, 17 g, Oral, Daily PRN **AND** bisacodyl (DULCOLAX) EC tablet 5 mg, 5 mg, Oral, Daily PRN **AND** bisacodyl (DULCOLAX) suppository 10 mg, 10 mg, Rectal, Daily PRN, Alba Rachel APRN    budesonide-formoterol (SYMBICORT) 160-4.5 MCG/ACT inhaler 2 puff, 2 puff, Inhalation, BID - RT, Alba Rachel APRN, 2 puff at 02/23/25 0728    [Held by provider] carvedilol (COREG) tablet 3.125 mg, 3.125 mg, Oral, BID With Meals, Alba Rachel APRN, 3.125 mg at 02/18/25 1845    guaiFENesin (MUCINEX) 12 hr tablet 1,200 mg, 1,200 mg, Oral, BID, Alba Rachel APRN, 1,200 mg at 02/23/25 0823    HYDROcodone-acetaminophen (NORCO) 5-325 MG per tablet 1 tablet, 1 tablet, Oral, Q4H PRN, Arnulfo Caballero MD    ipratropium (ATROVENT HFA) inhaler 2 puff, 2 puff, Inhalation, 4x Daily - RT, Alba Rachel APRN, 2 puff at 02/23/25 1447    ipratropium-albuterol (DUO-NEB) nebulizer solution 3 mL, 3 mL, Nebulization, Q6H PRN, Alba Rachel APRN    lactated ringers infusion, 75 mL/hr, Intravenous, Continuous, James Donovan MD, Last Rate: 75 mL/hr at 02/23/25 1729, 75 mL/hr at 02/23/25 1729    Lidocaine HCl gel (XYLOCAINE) urethral/mucosal syringe, , Topical, Once, Abdias Henley MD    ondansetron ODT (ZOFRAN-ODT) disintegrating tablet 4 mg, 4 mg, Oral, Q6H PRN **OR** ondansetron (ZOFRAN) injection 4 mg, 4 mg, Intravenous, Q6H PRN, Alba Rachel APRN    pantoprazole (PROTONIX) EC tablet 40 mg, 40 mg, Oral, BID AC, James Donovan MD, 40 mg at 02/23/25 1728    sodium bicarbonate tablet 650 mg, 650 mg, Oral, BID, James Donovan MD    sodium  chloride 0.9 % flush 10 mL, 10 mL, Intravenous, Q12H, , Alba, APRN, 10 mL at 02/23/25 0824    sodium chloride 0.9 % flush 10 mL, 10 mL, Intravenous, PRN, , Alba, APRN    sodium chloride 0.9 % infusion 40 mL, 40 mL, Intravenous, PRN, , Alba, APRN    tamsulosin (FLOMAX) 24 hr capsule 0.4 mg, 0.4 mg, Oral, Nightly, , Alba, APRN, 0.4 mg at 02/22/25 2020    vancomycin IVPB 1500 mg in 0.9% NaCl (Premix) 500 mL, 1,500 mg, Intravenous, Q24H, James Donovan MD, Last Rate: 333.3 mL/hr at 02/23/25 1600, 1,500 mg at 02/23/25 1600    Assessment/Plan:   History of hematuria, now resolved.  Very large bladder stone for which I discussed management with this Omer renae.  Recommend treating this tomorrow in OR if possible in order to prevent recurrent future problems from bladder bleeding and/or obstruction.  N.p.o. after midnight tonight.  Continue IV antibiotics.  Continue to hold anticoagulation as medically possible.  He will need postoperative urethral catheter placement for dense urethral stricture noted intraoperatively last week.  Check CBC in the morning, may require platelets preop      This document has been signed by DUSTIN Henley MD on February 23, 2025 18:33 CST            (Please note that portions of this note were completed with a voice recognition program.)  Abdias Henley MD  02/23/25  18:31 CST

## 2025-02-24 NOTE — ANESTHESIA PROCEDURE NOTES
Airway  Urgency: elective    Date/Time: 2/24/2025 11:16 AM  Airway not difficult    General Information and Staff    Patient location during procedure: OR  CRNA/CAA: Dav Esposito CRNA    Indications and Patient Condition  Indications for airway management: airway protection    Preoxygenated: yes  Mask difficulty assessment: 1 - vent by mask    Final Airway Details  Final airway type: endotracheal airway      Successful airway: ETT  Cuffed: yes   Successful intubation technique: direct laryngoscopy  Endotracheal tube insertion site: oral  Blade: Prieto  Blade size: 3.5  ETT size (mm): 7.5  Cormack-Lehane Classification: grade I - full view of glottis  Placement verified by: chest auscultation and capnometry   Cuff volume (mL): 8  Measured from: lips  ETT/EBT  to lips (cm): 23  Number of attempts at approach: 1  Assessment: lips, teeth, and gum same as pre-op and atraumatic intubation

## 2025-02-24 NOTE — CASE MANAGEMENT/SOCIAL WORK
Continued Stay Note   Andrew     Patient Name: Manohar Garcia  MRN: [id number]  Today's Date: 2/24/2025    Admit Date: 2/18/2025    Plan: [facility]   Discharge Plan       Row Name 02/24/25 1105       Plan    Plan [facility]    Patient/Family in Agreement with Plan yes    Plan Comments Pt is currently in OR. He has a bed at [facility]. Will follow for d/c.                   Discharge Codes    No documentation.                       DIANNA Lr

## 2025-02-25 LAB
ANION GAP SERPL CALCULATED.3IONS-SCNC: 6 MMOL/L (ref 5–15)
BASOPHILS # BLD AUTO: 0.01 10*3/MM3 (ref 0–0.2)
BASOPHILS NFR BLD AUTO: 0.3 % (ref 0–1.5)
BH BB BLOOD EXPIRATION DATE: NORMAL
BH BB BLOOD TYPE BARCODE: 7300
BH BB DISPENSE STATUS: NORMAL
BH BB PRODUCT CODE: NORMAL
BH BB UNIT NUMBER: NORMAL
BUN SERPL-MCNC: 33 MG/DL (ref 8–23)
BUN/CREAT SERPL: 26 (ref 7–25)
CALCIUM SPEC-SCNC: 8 MG/DL (ref 8.6–10.5)
CHLORIDE SERPL-SCNC: 116 MMOL/L (ref 98–107)
CO2 SERPL-SCNC: 19 MMOL/L (ref 22–29)
CREAT SERPL-MCNC: 1.27 MG/DL (ref 0.76–1.27)
DEPRECATED RDW RBC AUTO: 58.4 FL (ref 37–54)
EGFRCR SERPLBLD CKD-EPI 2021: 55.7 ML/MIN/1.73
EOSINOPHIL # BLD AUTO: 0.04 10*3/MM3 (ref 0–0.4)
EOSINOPHIL NFR BLD AUTO: 1.1 % (ref 0.3–6.2)
ERYTHROCYTE [DISTWIDTH] IN BLOOD BY AUTOMATED COUNT: 18 % (ref 12.3–15.4)
GLUCOSE SERPL-MCNC: 102 MG/DL (ref 65–99)
HCT VFR BLD AUTO: 25.1 % (ref 37.5–51)
HGB BLD-MCNC: 7.9 G/DL (ref 13–17.7)
IMM GRANULOCYTES # BLD AUTO: 0.04 10*3/MM3 (ref 0–0.05)
IMM GRANULOCYTES NFR BLD AUTO: 1.1 % (ref 0–0.5)
LYMPHOCYTES # BLD AUTO: 0.71 10*3/MM3 (ref 0.7–3.1)
LYMPHOCYTES NFR BLD AUTO: 19 % (ref 19.6–45.3)
MCH RBC QN AUTO: 29.5 PG (ref 26.6–33)
MCHC RBC AUTO-ENTMCNC: 31.5 G/DL (ref 31.5–35.7)
MCV RBC AUTO: 93.7 FL (ref 79–97)
MONOCYTES # BLD AUTO: 0.29 10*3/MM3 (ref 0.1–0.9)
MONOCYTES NFR BLD AUTO: 7.8 % (ref 5–12)
NEUTROPHILS NFR BLD AUTO: 2.64 10*3/MM3 (ref 1.7–7)
NEUTROPHILS NFR BLD AUTO: 70.7 % (ref 42.7–76)
PLATELET # BLD AUTO: 45 10*3/MM3 (ref 140–450)
PMV BLD AUTO: 11.2 FL (ref 6–12)
POTASSIUM SERPL-SCNC: 4 MMOL/L (ref 3.5–5.2)
RBC # BLD AUTO: 2.68 10*6/MM3 (ref 4.14–5.8)
SODIUM SERPL-SCNC: 141 MMOL/L (ref 136–145)
UNIT  ABO: NORMAL
UNIT  RH: NORMAL
WBC NRBC COR # BLD AUTO: 3.73 10*3/MM3 (ref 3.4–10.8)

## 2025-02-25 PROCEDURE — 99232 SBSQ HOSP IP/OBS MODERATE 35: CPT | Performed by: INTERNAL MEDICINE

## 2025-02-25 PROCEDURE — 94664 DEMO&/EVAL PT USE INHALER: CPT

## 2025-02-25 PROCEDURE — 94799 UNLISTED PULMONARY SVC/PX: CPT

## 2025-02-25 PROCEDURE — 85025 COMPLETE CBC W/AUTO DIFF WBC: CPT | Performed by: UROLOGY

## 2025-02-25 PROCEDURE — 97530 THERAPEUTIC ACTIVITIES: CPT

## 2025-02-25 PROCEDURE — 94761 N-INVAS EAR/PLS OXIMETRY MLT: CPT

## 2025-02-25 PROCEDURE — 99232 SBSQ HOSP IP/OBS MODERATE 35: CPT | Performed by: UROLOGY

## 2025-02-25 PROCEDURE — 80048 BASIC METABOLIC PNL TOTAL CA: CPT | Performed by: UROLOGY

## 2025-02-25 PROCEDURE — 99223 1ST HOSP IP/OBS HIGH 75: CPT | Performed by: CLINICAL NURSE SPECIALIST

## 2025-02-25 PROCEDURE — 25810000003 LACTATED RINGERS PER 1000 ML: Performed by: UROLOGY

## 2025-02-25 PROCEDURE — 25010000002 VANCOMYCIN 1.5-0.9 GM/500ML-% SOLUTION: Performed by: FAMILY MEDICINE

## 2025-02-25 RX ORDER — VANCOMYCIN/0.9 % SOD CHLORIDE 1.5G/250ML
1500 PLASTIC BAG, INJECTION (ML) INTRAVENOUS EVERY 24 HOURS
Status: COMPLETED | OUTPATIENT
Start: 2025-02-25 | End: 2025-02-26

## 2025-02-25 RX ADMIN — BUDESONIDE AND FORMOTEROL FUMARATE DIHYDRATE 2 PUFF: 160; 4.5 AEROSOL RESPIRATORY (INHALATION) at 07:06

## 2025-02-25 RX ADMIN — Medication 1 TABLET: at 09:07

## 2025-02-25 RX ADMIN — GUAIFENESIN 1200 MG: 600 TABLET ORAL at 20:44

## 2025-02-25 RX ADMIN — Medication 10 ML: at 20:44

## 2025-02-25 RX ADMIN — IPRATROPIUM BROMIDE 2 PUFF: 17 AEROSOL, METERED RESPIRATORY (INHALATION) at 18:05

## 2025-02-25 RX ADMIN — IPRATROPIUM BROMIDE 2 PUFF: 17 AEROSOL, METERED RESPIRATORY (INHALATION) at 07:06

## 2025-02-25 RX ADMIN — ALBUTEROL SULFATE 2 PUFF: 108 INHALANT RESPIRATORY (INHALATION) at 10:52

## 2025-02-25 RX ADMIN — BUDESONIDE AND FORMOTEROL FUMARATE DIHYDRATE 2 PUFF: 160; 4.5 AEROSOL RESPIRATORY (INHALATION) at 18:05

## 2025-02-25 RX ADMIN — SODIUM BICARBONATE 650 MG TABLET 650 MG: at 09:07

## 2025-02-25 RX ADMIN — ALBUTEROL SULFATE 2 PUFF: 108 INHALANT RESPIRATORY (INHALATION) at 18:05

## 2025-02-25 RX ADMIN — TAMSULOSIN HYDROCHLORIDE 0.4 MG: 0.4 CAPSULE ORAL at 20:44

## 2025-02-25 RX ADMIN — IPRATROPIUM BROMIDE 2 PUFF: 17 AEROSOL, METERED RESPIRATORY (INHALATION) at 10:52

## 2025-02-25 RX ADMIN — SODIUM CHLORIDE, SODIUM LACTATE, POTASSIUM CHLORIDE, CALCIUM CHLORIDE 75 ML/HR: 20; 30; 600; 310 INJECTION, SOLUTION INTRAVENOUS at 06:19

## 2025-02-25 RX ADMIN — PANTOPRAZOLE SODIUM 40 MG: 40 INJECTION, POWDER, FOR SOLUTION INTRAVENOUS at 09:08

## 2025-02-25 RX ADMIN — ALBUTEROL SULFATE 2 PUFF: 108 INHALANT RESPIRATORY (INHALATION) at 07:06

## 2025-02-25 RX ADMIN — GUAIFENESIN 1200 MG: 600 TABLET ORAL at 09:07

## 2025-02-25 RX ADMIN — Medication 10 ML: at 09:08

## 2025-02-25 RX ADMIN — SODIUM BICARBONATE 650 MG TABLET 650 MG: at 20:44

## 2025-02-25 RX ADMIN — PANTOPRAZOLE SODIUM 40 MG: 40 INJECTION, POWDER, FOR SOLUTION INTRAVENOUS at 20:44

## 2025-02-25 RX ADMIN — Medication 1500 MG: at 18:32

## 2025-02-25 NOTE — PROGRESS NOTES
"      [facility]  Inpatient Progress Note  Today's date:  02/25/25    Manohar Garcia  1940       Reason for Follow Up:   Rectal bleeding    Subjective:   No new issues overnight. Had some \"rust colored\" stool overnight. No sissy hematochezia or melena. Slight drop in hgb down to 7.9 from 8.5 over the past 24 hours.       Current Facility-Administered Medications:     albuterol sulfate HFA (PROVENTIL HFA;VENTOLIN HFA;PROAIR HFA) inhaler 2 puff, 2 puff, Inhalation, 4x Daily - RT, Abdias Henley MD, 2 puff at 02/25/25 1052    sennosides-docusate (PERICOLACE) 8.6-50 MG per tablet 2 tablet, 2 tablet, Oral, BID, 2 tablet at 02/22/25 2020 **AND** polyethylene glycol (MIRALAX) packet 17 g, 17 g, Oral, Daily PRN **AND** bisacodyl (DULCOLAX) EC tablet 5 mg, 5 mg, Oral, Daily PRN **AND** bisacodyl (DULCOLAX) suppository 10 mg, 10 mg, Rectal, Daily PRN, Abdias Henley MD    budesonide-formoterol (SYMBICORT) 160-4.5 MCG/ACT inhaler 2 puff, 2 puff, Inhalation, BID - RT, Abdias Henley MD, 2 puff at 02/25/25 0706    [Held by provider] carvedilol (COREG) tablet 3.125 mg, 3.125 mg, Oral, BID With Meals, Abdias Henley MD, 3.125 mg at 02/18/25 1845    guaiFENesin (MUCINEX) 12 hr tablet 1,200 mg, 1,200 mg, Oral, BID, Abdias Henley MD, 1,200 mg at 02/25/25 0907    HYDROcodone-acetaminophen (NORCO) 5-325 MG per tablet 1 tablet, 1 tablet, Oral, Q4H PRN, Abdias Henley MD    ipratropium (ATROVENT HFA) inhaler 2 puff, 2 puff, Inhalation, 4x Daily - RT, Abdias Henley MD, 2 puff at 02/25/25 1052    ipratropium-albuterol (DUO-NEB) nebulizer solution 3 mL, 3 mL, Nebulization, Q6H PRN, Abdias Henley MD    lactated ringers infusion, 50 mL/hr, Intravenous, Continuous, James Donovan MD, Last Rate: 50 mL/hr at 02/25/25 1003, 50 mL/hr at 02/25/25 1003    Lidocaine HCl gel (XYLOCAINE) urethral/mucosal syringe, , Topical, Once, Abdias Henley MD    multivitamin with minerals 1 " tablet, 1 tablet, Oral, Daily, James Donovan MD, 1 tablet at 02/25/25 0907    ondansetron ODT (ZOFRAN-ODT) disintegrating tablet 4 mg, 4 mg, Oral, Q6H PRN **OR** ondansetron (ZOFRAN) injection 4 mg, 4 mg, Intravenous, Q6H PRN, Abdias Henley MD    pantoprazole (PROTONIX) injection 40 mg, 40 mg, Intravenous, Q12H, Abdias Henley MD, 40 mg at 02/25/25 0908    sodium bicarbonate tablet 650 mg, 650 mg, Oral, BID, Abdias Henley MD, 650 mg at 02/25/25 0907    sodium chloride 0.9 % flush 10 mL, 10 mL, Intravenous, Q12H, Abdias Henley MD, 10 mL at 02/25/25 0908    sodium chloride 0.9 % flush 10 mL, 10 mL, Intravenous, PRN, Abdias Henley MD    sodium chloride 0.9 % infusion 40 mL, 40 mL, Intravenous, PRN, Abdias Henley MD    tamsulosin (FLOMAX) 24 hr capsule 0.4 mg, 0.4 mg, Oral, Nightly, Abdias Henley MD, 0.4 mg at 02/24/25 2239    vancomycin IVPB 1500 mg in 0.9% NaCl (Premix) 500 mL, 1,500 mg, Intravenous, Q24H, Abdias Henley MD, Last Rate: 333.3 mL/hr at 02/24/25 1803, 1,500 mg at 02/24/25 1803      Vital Signs:  Temp:  [96.5 °F (35.8 °C)-98.4 °F (36.9 °C)] 98.4 °F (36.9 °C)  Heart Rate:  [] 88  Resp:  [16-19] 18  BP: ()/(41-78) 97/41    Physical Exam:  General: no acute distress, alert and oriented  HEENT: perrl, no sclera icterus  CV: rrr, no murmur  Resp: lungs clear to auscultation, no wheeze or rhonchi  Abd: soft, nontender, nondistended, no rebound our guarding  Skin: no rash, no jaundice     Results Review:   I have reviewed all of the patient's current test results    Results from last 7 days   Lab Units 02/25/25  0318 02/24/25  0335 02/23/25  2204   WBC 10*3/mm3 3.73 2.60* 3.31*   HEMOGLOBIN g/dL 7.9* 8.5* 8.8*   HEMATOCRIT % 25.1* 27.0* 27.8*   PLATELETS 10*3/mm3 45* 40* 44*       Results from last 7 days   Lab Units 02/25/25  0318 02/24/25  0336 02/23/25  0346 02/22/25  0802 02/21/25  0418 02/20/25  0554   SODIUM mmol/L 141 143 142   < > 143 140   POTASSIUM  mmol/L 4.0 4.2 4.3   < > 4.7 4.6   CHLORIDE mmol/L 116* 117* 117*   < > 117* 114*   CO2 mmol/L 19.0* 19.0* 19.0*   < > 19.0* 20.0*   BUN mg/dL 33* 39* 44*   < > 57* 56*   CREATININE mg/dL 1.27 1.29* 1.31*   < > 2.01* 2.24*   CALCIUM mg/dL 8.0* 7.7* 8.2*   < > 8.1* 8.0*   BILIRUBIN mg/dL  --   --  0.8  --  0.5 0.7   ALK PHOS U/L  --   --  80  --  64 71   ALT (SGPT) U/L  --   --  10  --  9 11   AST (SGOT) U/L  --   --  17  --  10 12   GLUCOSE mg/dL 102* 104* 89   < > 107* 105*    < > = values in this interval not displayed.       Results from last 7 days   Lab Units 02/19/25  1039   INR  1.79*       Impression:  Rectal bleeding.  Hematuria.  Anemia.  Thrombocytopenia.  Bladder stones.  Acute on chronic kidney injury.     Plan:  Continue supportive care. Monitor hgb/hct and transfuse as needed. Monitor plts and transfuse as needed. Holding anticoagulation. No plan for colonoscopy at this time. Discussed with medicine. Nothing further to add from GI at this time. If he has significant bleeding with hematochezia or melena, please reconsult. Will sign off.     Naif Clifford MD  02/25/25  11:07 CST

## 2025-02-25 NOTE — CONSULTS
[facility] Palliative Care Services    Palliative Care Initial Consult   Attending Physician: James Donovan MD  Referring Provider: James Donovan MD     Reason for Referral: assistance with clarification of goals of care  Family/Support: Children    Code Status and Medical Interventions: No CPR (Do Not Attempt to Resuscitate); Limited Support; No intubation (DNI)   Ordered at: 02/18/25 1302     Medical Intervention Limits:    No intubation (DNI)     Code Status (Patient has no pulse and is not breathing):    No CPR (Do Not Attempt to Resuscitate)     Medical Interventions (Patient has pulse or is breathing):    Limited Support     Goals of Care: TBD.    HPI:   84 y.o. male has a past medical history of Dementia, GERD, and Liver disorder, thrombocytopenia, and trochanteric fracture of proximal end of right femur s/p repair 10/2024 by Dr. Bacon, stage III chronic kidney disease, recent COVID-19 virus, bladder stones followed by [facility], [place], operated by [facility] urology 1/16/2025 found to have moderate right-sided hydronephrosis and Perkins catheter replaced, inability to tolerate position needed for cystoscopy due to hip fracture.  Hospitalization 2/4-2/10 due to acute pyelonephritis; 1/22-1/28 due to hypoxia influenza A, and COVID-19 virus; 8/29-9/4 due to enterococci and intertrochanteric fracture of proximal end of right femur. Patient presented to [facility] on 2/18/2025 related to dysuria after his Perkins catheter was pulled on.  ED workup shows elevated lactate, elevated procalcitonin, COVID-19 virus detected, creatinine 2.09, albumin 2.1, total albumin 1.7, anemia, thrombocytopenia.  CT imaging lumbar spine shows multilevel degenerative disc disease without definite acute osseous abnormality.  CT abdomen pelvis shows abnormal appearance of urinary bladder with moderate distention and stranding in the perivesical space.  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  Liver cirrhotic morphology.  Moderate constipation.  Mild third spacing of fluid.  A bladder stone is noted dependently within the urinary bladder.  CT of the head shows chronic changes with volume loss and cerebral microvascular disease.  Radiology imaging of bilateral hips shows no acute fracture or dislocation.  No hardware complication of right proximal femur.  Anticoagulation placed on hold. Underwent cystoscopy 2/18/2025 with clot evacuation, fulguration of bleeding, balloon dilation of penile urethral stricture by Dr. Henley.  Perkins catheter with CBI postprocedure.  IV antibiotics.  Therapy recommends back to SNF.  Urine culture 2/18 positive for Enterococcus faecalis.  Received 1 unit PRBC 2/21.Underwent cystolitholapaxy of >3.5 cm bladder stone and fulguration of prostate by Dr. Henley on 2/24.  GI consulted due to GI bleed, no plans for endoscopic evaluation unless continues to have rectal bleeding, signed off.  Received a unit of platelets 2/24.  Urology recommends titrating CBI off.  Sitting up in bed request to be lay down as his back is hurting.  He is a poor historian, he does not recall events that have transpired this hospitalization.  He is extremely hard of hearing. No family at bedside.    Palliative Care Spoke With: family reports significant decline in quality of life and functional status since the end of December hospitalization due to COVID-19 and influenza A.  Reports progressive decline in dementia and ambulatory status.  Has resided in nursing facility since September 2024. Due to the Palliative Care Topics Discussed: palliative care, goals of care, care options, resuscitation status, [facility], and discharge options we will establish an advance care plan.   Advance Care Planning   Advance Care Planning Discussion: Spoke with patient's daughter, Miriam via telephone with regard to events leading to current  hospitalization.  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  Reports patient has had significant decline since hospitalization end of January and prior was ambulatory.  We explored poor to guarded long-term prognosis secondary to cirrhosis of the liver, dementia, chronic kidney disease, multiple comorbidities, and advanced age.  We explored medical priorities including CODE STATUS and medical interventions.  Daughter is elected to de-escalate CODE STATUS to no CPR/limited support interventions, no intubation, no dialysis, no vasopressors, and no permanent feeding tube.  States she feels that her father is suffering and she does not wish to pursue aggressive care interventions given his poor quality of life and decline most recently.  We discussed high risk for rehospitalization's and decline given multiple comorbid factors.  We also explored best supportive care directed by hospice when rehospitalization's and aggressive care interventions no longer desired. Family appears to have good prognostic awareness.      Review of Systems   Constitutional: Positive for malaise/fatigue.   Cardiovascular:  Positive for leg swelling.   Respiratory:  Negative for shortness of breath.    Musculoskeletal:  Positive for back pain.   Genitourinary:  Positive for hematuria.   Psychiatric/Behavioral:  Positive for memory loss.      1- Pain Assessment  Nonverbal Indicators of Pain: nonverbal indicators absent  CPOT Facial Expression: 0-->relaxed, neutral  CPOT Body Movements: 0-->absence of movements  CPOT Muscle Tension: 0-->relaxed  Ventilator Compliance/Vocalization: 0-->talking in normal tone or no sound  CPOT Score: 0  PAINAD Breathin-->normal  PAINAD Negative Vocalization: 0-->none  PAINAD Facial Expression: 0-->smiling or inexpressive  PAINAD Body Language: 0-->relaxed  PAINAD Consolability: 0-->no need to console  PAINAD Score: 0  Pain Location: abdomen  Pain  Description: intermittent    Past Medical History:   Diagnosis Date    Dementia     GERD (gastroesophageal reflux disease)     Liver disorder      Past Surgical History:   Procedure Laterality Date    CYSTOLITHALOPAXY PERCUTANEOUS N/A 2/24/2025    Procedure: CYSTOLITHOLAPAXY WITH LASER, FULGURATION OF PROSTATE;  Surgeon: Abdias Henley MD;  Location:  [facility] OR;  Service: Urology;  Laterality: N/A;    CYSTOSCOPY WITH CLOT EVACUATION N/A 2/18/2025    Procedure: CYSTOSCOPY WITH CLOT EVACUATION, FULGURATION;  Surgeon: Abdias Henley MD;  Location:  [facility] OR;  Service: Urology;  Laterality: N/A;    HEMORRHOIDECTOMY      HERNIA REPAIR      HIP TROCHANTERIC NAILING WITH INTRAMEDULLARY HIP SCREW Right 8/30/2024    Procedure: HIP TROCHANTERIC NAILING SHORT WITH INTRAMEDULLARY HIP SCREW;  Surgeon: Arcenio Chandra MD;  Location:  [facility] OR;  Service: Orthopedics;  Laterality: Right;     Social History     Socioeconomic History    Marital status:    Tobacco Use    Smoking status: Former     Types: Cigarettes, Pipe    Smokeless tobacco: Never   Vaping Use    Vaping status: Never Used   Substance and Sexual Activity    Alcohol use: No    Drug use: Not Currently     Types: Amphetamines     Comment: Quit 40 years ago    Sexual activity: Defer         Current Facility-Administered Medications:     albuterol sulfate HFA (PROVENTIL HFA;VENTOLIN HFA;PROAIR HFA) inhaler 2 puff, 2 puff, Inhalation, 4x Daily - RT, Abdias Henley MD, 2 puff at 02/25/25 1052    sennosides-docusate (PERICOLACE) 8.6-50 MG per tablet 2 tablet, 2 tablet, Oral, BID, 2 tablet at 02/22/25 2020 **AND** polyethylene glycol (MIRALAX) packet 17 g, 17 g, Oral, Daily PRN **AND** bisacodyl (DULCOLAX) EC tablet 5 mg, 5 mg, Oral, Daily PRN **AND** bisacodyl (DULCOLAX) suppository 10 mg, 10 mg, Rectal, Daily PRN, Abdias Henley MD    budesonide-formoterol (SYMBICORT) 160-4.5 MCG/ACT inhaler 2 puff, 2 puff, Inhalation, BID - RT, Abdias Henley MD,  2 puff at 02/25/25 0706    [Held by provider] carvedilol (COREG) tablet 3.125 mg, 3.125 mg, Oral, BID With Meals, Abdias Henley MD, 3.125 mg at 02/18/25 1845    guaiFENesin (MUCINEX) 12 hr tablet 1,200 mg, 1,200 mg, Oral, BID, Abdias Henley MD, 1,200 mg at 02/25/25 0907    HYDROcodone-acetaminophen (NORCO) 5-325 MG per tablet 1 tablet, 1 tablet, Oral, Q4H PRN, Abdias Henley MD    ipratropium (ATROVENT HFA) inhaler 2 puff, 2 puff, Inhalation, 4x Daily - RT, Abdias Henley MD, 2 puff at 02/25/25 1052    ipratropium-albuterol (DUO-NEB) nebulizer solution 3 mL, 3 mL, Nebulization, Q6H PRN, Abdias Henley MD    lactated ringers infusion, 50 mL/hr, Intravenous, Continuous, James Donovan MD, Last Rate: 50 mL/hr at 02/25/25 1003, 50 mL/hr at 02/25/25 1003    Lidocaine HCl gel (XYLOCAINE) urethral/mucosal syringe, , Topical, Once, Abdias Henley MD    multivitamin with minerals 1 tablet, 1 tablet, Oral, Daily, James Donovan MD, 1 tablet at 02/25/25 0907    ondansetron ODT (ZOFRAN-ODT) disintegrating tablet 4 mg, 4 mg, Oral, Q6H PRN **OR** ondansetron (ZOFRAN) injection 4 mg, 4 mg, Intravenous, Q6H PRN, Abdias Henley MD    pantoprazole (PROTONIX) injection 40 mg, 40 mg, Intravenous, Q12H, Abdias Henley MD, 40 mg at 02/25/25 0908    sodium bicarbonate tablet 650 mg, 650 mg, Oral, BID, Abdias Henley MD, 650 mg at 02/25/25 0907    sodium chloride 0.9 % flush 10 mL, 10 mL, Intravenous, Q12H, Abdias Henley MD, 10 mL at 02/25/25 0908    sodium chloride 0.9 % flush 10 mL, 10 mL, Intravenous, PRN, Abdias Henley MD    sodium chloride 0.9 % infusion 40 mL, 40 mL, Intravenous, PRN, Abdias Henley MD    tamsulosin (FLOMAX) 24 hr capsule 0.4 mg, 0.4 mg, Oral, Nightly, Abdias Henley MD, 0.4 mg at 02/24/25 2239    vancomycin IVPB 1500 mg in 0.9% NaCl (Premix) 500 mL, 1,500 mg, Intravenous, Q24H, Abdias Henley MD, Last Rate: 333.3 mL/hr at 02/24/25 1803, 1,500 mg at  "02/24/25 1803    Allergies   Allergen Reactions    Penicillins Anaphylaxis    Contrast Dye (Echo Or Unknown Ct/Mr) Hives     I have utilized all available immediate resources to obtain, update, or review the patient's current medications (including all prescriptions, over-the-counter products, herbals, cannabis/cannabidiol products, and vitamin/mineral/dietary (nutritional) supplements) for name, route of administration, type, dose and frequency.      Intake/Output Summary (Last 24 hours) at 2/25/2025 1122  Last data filed at 2/25/2025 1000  Gross per 24 hour   Intake 2890 ml   Output 1850 ml   Net 1040 ml       Physical Exam:    Diagnostics: Reviewed  BP 97/41 (BP Location: Right arm, Patient Position: Lying)   Pulse 88   Temp 98.4 °F (36.9 °C) (Axillary)   Resp 18   Ht 180.3 cm (71\")   Wt 95.1 kg (209 lb 11.2 oz)   SpO2 96%   BMI 29.25 kg/m²     Vitals and nursing note reviewed.   Constitutional:       Appearance: Not in distress. Ill-appearing and chronically ill-appearing.   Eyes:      Pupils: Pupils are equal, round, and reactive to light.   HENT:      Right Ear: Decreased hearing noted.   Pulmonary:      Effort: Pulmonary effort is normal.      Breath sounds: Decreased breath sounds present.   Cardiovascular:      Normal rate.   Edema:     Peripheral edema present.  Abdominal:      Palpations: Abdomen is soft.   Musculoskeletal:      Cervical back: Neck supple. Skin:     General: Skin is warm.   Genitourinary:     Comments: Perkins catheter in place.  Hematuria.  Neurological:      Mental Status: Alert.      Comments: Dementia at baseline.  Confused to situation   Psychiatric:         Mood and Affect: Mood normal.     Patient status: Disease state: Controlled with current treatments.  Current Functional status: Palliative Performance Scale Score: Performance 30% based on the following measures: Ambulation: Totally bed bound, Activity and Evidence of Disease: Unable to do any work, extensive evidence of " disease, Self-Care: Total care required,  Intake: Reduced, LOC: Full, drowsy or confusion   Baseline Functional status: Palliative Performance Scale Score: Performance 30% based on the following measures: Ambulation: Totally bed bound, Activity and Evidence of Disease: Unable to do any work, extensive evidence of disease, Self-Care: Total care required,  Intake: Reduced, LOC: Full, drowsy or confusion   Nutritional status: Albumin 2.1 Body mass index is 29.25 kg/m².         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 Bacon  Degenerative disc disease  Impaired mobility  Advanced age     Recommendations/Plan:  1. plan: Goals of care include CODE STATUS no CPR/limited support interventions.    Family support: The patient receives support from his children..  Advance Directives: Advance Directive Status: Patient does not have advance directive   POA/Healthcare surrogate-daughter, Miriam.    2.  Palliative care encounter  - Prognosis is poor to guarded long-term secondary to cirrhosis of the liver, dementia, chronic kidney disease, multiple comorbidities, and advanced  age.  -Family appears to have good prognostic awareness.     -Anticoagulation placed on hold. Per Dr. Chandra note 8/30/2024-Eliquis 2.5 mg p.o. twice daily x 6 weeks postoperatively for DVT prophylaxis   -Underwent cystoscopy 2/18/2025 with clot evacuation, fulguration of bleeding, balloon dilation of penile urethral stricture by Dr. Henley.  Perkins catheter with CBI postprocedure.  IV antibiotics.    -Therapy recommends back to SNF.  Urine culture 2/18 positive for 2/21-Received 1 unit PRBC.  2/24-Underwent cystolitholapaxy of >3.5 cm bladder stone and fulguration of prostate by Dr. Henley on 2/24.    -GI consulted due to GI bleed, no plans for endoscopic evaluation unless continues to have rectal bleeding, signed off.    -Received a unit of platelets.    2/25-Urology recommends titrating CBI off.       2/25-CODE STATUS changes no CPR/limited support interventions, no intubation, no dialysis, no vasopressors, and no permanent feeding tube  -Will plan to complete a MOST document  -High risk for rehospitalization's and decline given multiple comorbid factors  -Explored best supportive care directed by hospice when rehospitalization's and aggressive care interventions no longer desired      Thank you for this consult and allowing us to participate in patient's plan of care. Palliative Care Team will continue to follow patient.     Barb Pandya, ANAT  2/25/2025  11:22 CST

## 2025-02-25 NOTE — PROGRESS NOTES
[facility]  INPATIENT PROGRESS NOTE    Patient Name: Manohar Garcia  Date of Admission: 2/18/2025  Today's Date: 02/25/25  Length of Stay: 7  Primary Care Physician: Dani Farfan MD    Subjective   Chief Complaint: Hematuria/acute renal failure/metabolic acidosis/neutropenia/anemia/thrombocytopenia   HPI   Metabolic acidosis, continue bicarb.  Blood pressure is low, ongoing fluid hydration.  Creatinine is improving.  Slight decrease in hemoglobin.  White blood cells normalized.    Review of Systems   Constitutional:  Positive for activity change, appetite change and fatigue. Negative for chills and fever.   HENT:  Negative for hearing loss, nosebleeds, tinnitus and trouble swallowing.    Eyes:  Negative for visual disturbance.   Respiratory:  Negative for cough, chest tightness, shortness of breath and wheezing.    Cardiovascular:  Negative for chest pain, palpitations and leg swelling.   Gastrointestinal:  Negative for abdominal distention, abdominal pain, blood in stool, constipation, diarrhea, nausea and vomiting.   Endocrine: Negative for cold intolerance, heat intolerance, polydipsia, polyphagia and polyuria.   Genitourinary:  Negative for decreased urine volume, difficulty urinating, dysuria, flank pain, frequency and hematuria.   Musculoskeletal:  Positive for arthralgias, gait problem and myalgias. Negative for joint swelling.   Skin:  Negative for rash.   Allergic/Immunologic: Negative for immunocompromised state.   Neurological:  Positive for weakness. Negative for dizziness, syncope, light-headedness and headaches.   Hematological:  Negative for adenopathy. Does not bruise/bleed easily.   Psychiatric/Behavioral:  Negative for confusion and sleep disturbance. The patient is not nervous/anxious.   All pertinent negatives and positives are as above. All other systems have been reviewed and are negative unless otherwise stated.     Objective     Temp:  [96.5 °F (35.8 °C)-98.4 °F (36.9 °C)] 98.4 °F (36.9 °C)  Heart Rate:  [] 84  Resp:  [16-19] 16  BP: ()/(41-78) 97/41  Physical Exam  Vitals and nursing note reviewed.   Constitutional:       Appearance: He is well-developed.      Comments: Advanced age, chronically ill.   HENT:      Head: Normocephalic.   Eyes:      General: No scleral icterus.     Pupils: Pupils are equal, round, and reactive to light.   Neck:      Thyroid: No thyromegaly.      Vascular: No carotid bruit or JVD.      Trachea: No tracheal deviation.   Cardiovascular:      Rate and Rhythm: Normal rate and regular rhythm.      Heart sounds: No murmur heard.     No friction rub. No gallop.   Pulmonary:      Effort: Pulmonary effort is normal. No respiratory distress.      Breath sounds: Normal breath sounds. No wheezing or rales.      Comments: Patient is on room air.  Chest:      Chest wall: No tenderness.   Abdominal:      General: Bowel sounds are normal. There is no distension.      Palpations: Abdomen is soft.      Tenderness: There is no abdominal tenderness.   Genitourinary:     Comments: Perkins cath in place.  Since CDI in place.  Musculoskeletal:      Cervical back: Normal range of motion and neck supple.   Skin:     General: Skin is warm and dry.      Capillary Refill: Capillary refill takes 2 to 3 seconds.      Findings: No rash.   Neurological:      Cranial Nerves: No cranial nerve deficit.      Motor: Weakness present.      Coordination: Coordination abnormal.      Gait: Gait abnormal.   Psychiatric:         Mood and Affect: Mood normal.         Behavior: Behavior normal.          Results Review:  I have reviewed the labs, radiology results, and diagnostic studies.    Laboratory Data:   Results from last 7 days   Lab Units 02/25/25  0318 02/24/25  0335 02/23/25  2204   WBC 10*3/mm3 3.73 2.60* 3.31*   HEMOGLOBIN g/dL 7.9* 8.5* 8.8*   HEMATOCRIT % 25.1* 27.0* 27.8*   PLATELETS 10*3/mm3 45* 40* 44*        Results from last  7 days   Lab Units 02/25/25  0318 02/24/25  0336 02/23/25  0346 02/22/25  0802 02/21/25  0418 02/20/25  0554   SODIUM mmol/L 141 143 142   < > 143 140   POTASSIUM mmol/L 4.0 4.2 4.3   < > 4.7 4.6   CHLORIDE mmol/L 116* 117* 117*   < > 117* 114*   CO2 mmol/L 19.0* 19.0* 19.0*   < > 19.0* 20.0*   BUN mg/dL 33* 39* 44*   < > 57* 56*   CREATININE mg/dL 1.27 1.29* 1.31*   < > 2.01* 2.24*   CALCIUM mg/dL 8.0* 7.7* 8.2*   < > 8.1* 8.0*   BILIRUBIN mg/dL  --   --  0.8  --  0.5 0.7   ALK PHOS U/L  --   --  80  --  64 71   ALT (SGPT) U/L  --   --  10  --  9 11   AST (SGOT) U/L  --   --  17  --  10 12   GLUCOSE mg/dL 102* 104* 89   < > 107* 105*    < > = values in this interval not displayed.       Culture Data:   Blood Culture   Date Value Ref Range Status   02/18/2025 No growth at 5 days  Final       Radiology Data:   Imaging Results (Last 24 Hours)       ** No results found for the last 24 hours. **            I have reviewed the patient's current medications.     Assessment/Plan   Assessment  Active Hospital Problems    Diagnosis     **Acute kidney injury superimposed on stage 3b chronic kidney disease     Moderate protein-calorie malnutrition     Anemia     Thrombocytopenia     Bladder stone     Gross Hematuria     Lymphedema of left arm     Constipation     COVID-19 virus detected     Stage 3b chronic kidney disease     Acute blood loss anemia     Liver cirrhosis secondary to BRUNSON     Thrombocytopenia        Treatment Plan  HPI . Patient is 84-year-old  male presented from nursing home with gross hematuria - sounds like another NH resident accidentally got leg tangled in catheter and pulled it out with bulb up.  He's also chronically anticoagulated with Eliquis. He has a known history of a large bladder stone and is followed by Urology in the outpatient setting.  Found to also have FERNANDO, worsening anemia and thrombocytopenia, COVID +, soft BPs.  Urology following.  Currently on CBI.  Anticoagulation on hold.   I've given transfusion of both platelets (2 days ago) and blood (today).  Enterococcus noted in his urine which is susceptible to Vancomycin.  Dr. Henley with Urology plans to take him to OR on Monday for management of large bladder stone if his clinical status is improving/stable.     Treatment Plan  Hematuria/Chronic catheter/UTI/bladder stone.  History of accidentally got tangled with catheter and pull out with a bulb up.  History of large bladder stone being followed by urology in outpatient setting.  Urology consult.  Vancomycin . Flomax.  CT scan abdomen pelvic-urinary bladder is abnormal in appearance with moderate distention and stranding in the perivesical space as well as an air-fluid level- heterogeneous increased density dependently within the bladder consistent with blood products/clot- Along the left anterolateral aspect of the bladder there is a focus of hyperdensity either representing a adherent clot versus a small neoplasm-not definitely appreciated on previous CT of 2 weeks earlier favoring adherent clot, Perkins catheter has been displaced/retracted into the  prostatic urethra with the balloon inflated within the prostatic urethra, 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  Status post cystoscopy with irrigation and evacuation of multiple obstructing clots 2/18/2025  Status post  2/20/2025- CYSTOLITHOLAPAXY WITH LASER (>3.5cm bladder stone), FULGURATION OF PROSTATE.  Tissue pathology pending.  Perkins cath in place.  CBI.       GI bleed.  Hemoccult positive.  GI consult, conservative management due to risk factor.     Soft blood pressure/on chronic anticoagulation.  Hold Eliquis due to hematuria.  Hold Coreg.    Doppler ultrasound left upper extremity- no evidence of deep venous thrombosis or  superficial thrombophlebitis of the left upper extremity.     Acute on chronic kidney injury 3B/metabolic acidosis.  Creatinine is improving.  Creatinine 9/2/2024 1.85.  Lactated ringer..  Metabolic acidosis stable.  Bicarb p.o .     Anemia.  Patient received blood transfusion 2/21/2025.  Ferric gluconate times 12/21/25.  Hemoglobin slight decrease ..  No sign of acute bleed.     Thrombocytopenia.  Patient received platelets transfusion 2/19/2025.  Platelet count slight increase.  1 unit transfusion 2/24/2025.     Neutropenia.  Slight decrease in white blood cells.     COVID-positive/pulm fibrosis.  Albuterol inhaler.  Symbicort.  Guaifenesin.  Atrovent.  Chest x-ray-No change since the previous study, No active cardiopulmonary disease, Persistent chronic interstitial changes/fibrosis.  CT scan head-Chronic changes and no acute intracranial findings.   Echocardiogram-Grossly normal left and right ventricular size and function,  Valves are not well-visualized.  Patient is on room air.     Pain . Norco as needed.     Reflux.  Protonix.  Zofran as needed.     Constipation.  Michelle-Colace.     Diet . regular diet     Deconditioning.  PT and OT consult.  CT scan lumbar spine- Multilevel degenerative disc disease and facet arthropathy as discussed above but no definite acute osseous abnormality,  Please see the CT abdomen and pelvis report for details regarding the kidneys and urinary bladder as well as retroperitoneal stranding.       Urine culture shows enterococci faecalis susceptible vancomycin.  Blood  culture shows no growth in 5 days.  MRSA screen-negative.  Respiratory panel-COVID-positive.  Pathology-no malignant cells.    Patient is from nursing home.  Palliative care failure to thrive.        Medical Decision Making  Number and Complexity of problems: Hematuria/chronic cath/UTI/bladder stone  Differential Diagnosis: None     Conditions and Status        Condition is unchanged.     MDM Data  External documents reviewed: Previous note.  Cardiac tracing (EKG, telemetry) interpretation: Sinus .  Radiology interpretation: CT scan/x-ray/echo  Labs reviewed: Laboratory  Any tests that were considered but not ordered: Laboratory in AM     Decision rules/scores evaluated (example [id number], Wells, etc): None     Discussed with: Patient     Care Planning  Shared decision making: Patient  Code status and discussions: DNR . DNI     Disposition  Social Determinants of Health that impact treatment or disposition: From nursing home.  2 to 4 days.           Electronically signed by James Donovan MD, 02/25/25, 10:49 CST.

## 2025-02-25 NOTE — PLAN OF CARE
Problem: Adult Inpatient Plan of Care  Goal: Plan of Care Review  Outcome: Progressing  Flowsheets (Taken 2/25/2025 2044)  Outcome Evaluation: Patient RA. IV CDI, IVF and IV abx infusing per order. Complains of pain, offered pain medication, refusing pain medication. Bedrest q2 turn Tolerating full liquid diet. Perkins cath, CBI clamped per urology. VSS, safety maintained.

## 2025-02-25 NOTE — PLAN OF CARE
Goal Outcome Evaluation:  Plan of Care Reviewed With: patient        Progress: no change  Outcome Evaluation: CBI slow clear light pink, moderate generalized edema, scrotum edematous, platelets 45, 2 rust colored BMs this shift, turn q2h, ABBE chow, rm air.

## 2025-02-25 NOTE — PROGRESS NOTES
LOS: 7 days   Patient Care Team:  Dani Farfan MD as PCP - General (Family Medicine)    Chief Complaint:  gross hematuria, bladder stone    Subjective     Interval History:     POD#1 s/p cystolithalopaxy of large stone and fulguration of prostate    Per nursing report, urine has been running clear. On minimal CBI drip.     Review of Systems  Pertinent items are noted in HPI, all other systems reviewed and negative     Objective     Vital Signs  Temp:  [96.5 °F (35.8 °C)-98.4 °F (36.9 °C)] 98.4 °F (36.9 °C)  Heart Rate:  [] 84  Resp:  [16-19] 16  BP: ()/(41-78) 97/41    Physical Exam:    Patient resting comfortably.     3-way Perkins in place, slow CBI drip, very light pink urine.      Data Review:       I have reviewed the following data:    Basic Metabolic Panel (02/25/2025 03:18)     CBC & Differential (02/25/2025 03:18)     Medication Review:     Current Facility-Administered Medications:     albuterol sulfate HFA (PROVENTIL HFA;VENTOLIN HFA;PROAIR HFA) inhaler 2 puff, 2 puff, Inhalation, 4x Daily - RT, Abdias Henley MD, 2 puff at 02/25/25 0706    sennosides-docusate (PERICOLACE) 8.6-50 MG per tablet 2 tablet, 2 tablet, Oral, BID, 2 tablet at 02/22/25 2020 **AND** polyethylene glycol (MIRALAX) packet 17 g, 17 g, Oral, Daily PRN **AND** bisacodyl (DULCOLAX) EC tablet 5 mg, 5 mg, Oral, Daily PRN **AND** bisacodyl (DULCOLAX) suppository 10 mg, 10 mg, Rectal, Daily PRN, Abdias Henley MD    budesonide-formoterol (SYMBICORT) 160-4.5 MCG/ACT inhaler 2 puff, 2 puff, Inhalation, BID - RT, Abdias Henley MD, 2 puff at 02/25/25 0706    [Held by provider] carvedilol (COREG) tablet 3.125 mg, 3.125 mg, Oral, BID With Meals, Abdias Henley MD, 3.125 mg at 02/18/25 1845    guaiFENesin (MUCINEX) 12 hr tablet 1,200 mg, 1,200 mg, Oral, BID, Abdias Henley MD, 1,200 mg at 02/25/25 0907    HYDROcodone-acetaminophen (NORCO) 5-325 MG per tablet 1 tablet, 1 tablet, Oral, Q4H PRN, Abdias Henley,  MD    ipratropium (ATROVENT HFA) inhaler 2 puff, 2 puff, Inhalation, 4x Daily - RT, Abdias Henley MD, 2 puff at 02/25/25 0706    ipratropium-albuterol (DUO-NEB) nebulizer solution 3 mL, 3 mL, Nebulization, Q6H PRN, Abdias Henley MD    lactated ringers infusion, 50 mL/hr, Intravenous, Continuous, James Donovan MD, Last Rate: 50 mL/hr at 02/25/25 1003, 50 mL/hr at 02/25/25 1003    Lidocaine HCl gel (XYLOCAINE) urethral/mucosal syringe, , Topical, Once, Abdias Henley MD    multivitamin with minerals 1 tablet, 1 tablet, Oral, Daily, James Donovan MD, 1 tablet at 02/25/25 0907    ondansetron ODT (ZOFRAN-ODT) disintegrating tablet 4 mg, 4 mg, Oral, Q6H PRN **OR** ondansetron (ZOFRAN) injection 4 mg, 4 mg, Intravenous, Q6H PRN, Abdias Henley MD    pantoprazole (PROTONIX) injection 40 mg, 40 mg, Intravenous, Q12H, Abdias Henley MD, 40 mg at 02/25/25 0908    sodium bicarbonate tablet 650 mg, 650 mg, Oral, BID, Abdias Henley MD, 650 mg at 02/25/25 0907    sodium chloride 0.9 % flush 10 mL, 10 mL, Intravenous, Q12H, Abdias Henley MD, 10 mL at 02/25/25 0908    sodium chloride 0.9 % flush 10 mL, 10 mL, Intravenous, PRN, Abdias Henley MD    sodium chloride 0.9 % infusion 40 mL, 40 mL, Intravenous, PRN, Abdias Henley MD    tamsulosin (FLOMAX) 24 hr capsule 0.4 mg, 0.4 mg, Oral, Nightly, Abdias Henley MD, 0.4 mg at 02/24/25 2239    vancomycin IVPB 1500 mg in 0.9% NaCl (Premix) 500 mL, 1,500 mg, Intravenous, Q24H, Abdias Henley MD, Last Rate: 333.3 mL/hr at 02/24/25 1803, 1,500 mg at 02/24/25 1803    Assessment and Plan:    Gross hematuria: Resolving. Continue Perkins for now. Titrate CBI to off. May plug inflow if remains clear or minimal hematuria.     Anemia: Monitor. Hematuria is minimal. Transfuse PRN; defer decision to transfuse to primary team.     URO DISPO: I will assess the patient again tomorrow.     I discussed the patient's findings and my recommendations with  patient and nursing staff    (Please note that portions of this note were completed with a voice recognition program.)    Demetrius Arias MD  02/25/25  10:26 CST    Time: Time spent: 15 minutes spent performing evaluation and management, chart review, and discussion with patient, > 50% of time spent in face-to-face encounter

## 2025-02-25 NOTE — THERAPY TREATMENT NOTE
Acute Care - Physical Therapy Treatment Note  [facility]     Patient Name: Manohar Garcia  : 1940  MRN: [id number]  Today's Date: 2025      Visit Dx:     ICD-10-CM ICD-9-CM   1. Gross hematuria  R31.0 599.71   2. Injury of urethra, initial encounter  S37.30XA 867.0   3. Chronic anticoagulation  Z79.01 V58.61   4. Bladder mass  N32.89 596.89   5. Acute UTI (urinary tract infection)  N39.0 599.0   6. Acute kidney injury  N17.9 584.9   7. Chronic liver disease  K76.9 571.9   8. Thrombocytopenia  D69.6 287.5   9. Hematuria  R31.9 599.70   10. Bladder stone  N21.0 594.1   11. 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    Thrombocytopenia    Hyperlactatemia    Hip fracture    Closed 2-part intertrochanteric fracture of proximal end of right femur    Acute blood loss anemia    Stage 3b chronic kidney disease    COVID-19 virus detected    Influenza A    Hypoxia    Cytokine release syndrome, grade 1    Acute pyelonephritis    Gross Hematuria    Lymphedema of left arm    Constipation    Thrombocytopenia    Bladder stone    Anemia    Moderate protein-calorie malnutrition     Past Medical History:   Diagnosis Date    Dementia     GERD (gastroesophageal reflux disease)     Liver disorder      Past Surgical History:   Procedure Laterality Date    CYSTOLITHALOPAXY PERCUTANEOUS N/A 2025    Procedure: CYSTOLITHOLAPAXY WITH LASER, FULGURATION OF PROSTATE;  Surgeon: Abdias Henley MD;  Location: [facility] OR;  Service: Urology;  Laterality: N/A;    CYSTOSCOPY WITH CLOT EVACUATION N/A 2025    Procedure: CYSTOSCOPY WITH CLOT EVACUATION, FULGURATION;  Surgeon: Abdias Henley MD;  Location: [facility] OR;  Service: Urology;  Laterality: N/A;     HEMORRHOIDECTOMY      HERNIA REPAIR      HIP TROCHANTERIC NAILING WITH INTRAMEDULLARY HIP SCREW Right 8/30/2024    Procedure: HIP TROCHANTERIC NAILING SHORT WITH INTRAMEDULLARY HIP SCREW;  Surgeon: Arcenio Chandra MD;  Location:  [facility] OR;  Service: Orthopedics;  Laterality: Right;     PT Assessment (Last 12 Hours)       PT Evaluation and Treatment       Row Name 02/25/25 0918          Physical Therapy Time and Intention    Subjective Information complains of;pain  -AE     Document Type therapy note (daily note)  -AE     Mode of Treatment physical therapy  -AE       Row Name 02/25/25 0918          General Information    Existing Precautions/Restrictions fall  -AE       Row Name 02/25/25 0918          Pain    Pretreatment Pain Rating 0/10 - no pain  -AE     Pain Location --  groin and generalized pain with movement  -AE       Row Name 02/25/25 0918          Pain Scale: FACES Pre/Post-Treatment    Pain: FACES Scale, Pretreatment 0-->no hurt  -AE     Posttreatment Pain Rating 8-->hurts whole lot  -AE       Row Name 02/25/25 0918          Bed Mobility    Scooting/Bridging [place] (Bed Mobility) dependent (less than 25% patient effort);2 person assist  -AE     Supine-Sit [place] (Bed Mobility) moderate assist (50% patient effort);verbal cues  -AE     Comment, (Bed Mobility) extra time  -AE       Row Name 02/25/25 0918          Transfers    Transfers --  weakness  -AE       Row Name             Wound 02/21/25 1617 thoracic spine    Wound - Properties Group Placement Date: 02/21/25  -PS Placement Time: 1617  -PS Location: thoracic spine  -PS    Retired Wound - Properties Group Placement Date: 02/21/25  -PS Placement Time: 1617  -PS Location: thoracic spine  -PS    Retired Wound - Properties Group Placement Date: 02/21/25  -PS Placement Time: 1617  -PS Location: thoracic spine  -PS    Retired Wound - Properties Group Date first assessed: 02/21/25  -PS Time first assessed: 1617  -PS Location: thoracic spine  -PS       Row Name             Wound 02/23/25 0608 Left upper back    Wound - Properties Group Placement Date: 02/23/25  -EY Placement Time: 0608  -EY Side: Left  -EY Orientation: upper  -EY Location: back  -EY    Retired Wound - Properties Group Placement Date: 02/23/25  -EY Placement Time: 0608  -EY Side: Left  -EY Orientation: upper  -EY Location: back  -EY    Retired Wound - Properties Group Placement Date: 02/23/25  -EY Placement Time: 0608  -EY Side: Left  -EY Orientation: upper  -EY Location: back  -EY    Retired Wound - Properties Group Date first assessed: 02/23/25  -EY Time first assessed: 0608  -EY Side: Left  -EY Location: back  -EY      Row Name             Wound 02/23/25 0610 Left hip    Wound - Properties Group Placement Date: 02/23/25  -EY Placement Time: 0610  -EY Side: Left  -EY Location: hip  -EY Primary Wound Type: Abrasion  -EY    Retired Wound - Properties Group Placement Date: 02/23/25  -EY Placement Time: 0610  -EY Side: Left  -EY Location: hip  -EY Primary Wound Type: Abrasion  -EY    Retired Wound - Properties Group Placement Date: 02/23/25  -EY Placement Time: 0610  -EY Side: Left  -EY Location: hip  -EY Primary Wound Type: Abrasion  -EY    Retired Wound - Properties Group Date first assessed: 02/23/25  -EY Time first assessed: 0610  -EY Side: Left  -EY Location: hip  -EY Primary Wound Type: Abrasion  -EY      Row Name             Wound 02/23/25 0617 Left shoulder    Wound - Properties Group Placement Date: 02/23/25  -EY Placement Time: 0617  -EY Side: Left  -EY Location: shoulder  -EY    Retired Wound - Properties Group Placement Date: 02/23/25  -EY Placement Time: 0617  -EY Side: Left  -EY Location: shoulder  -EY    Retired Wound - Properties Group Placement Date: 02/23/25  -EY Placement Time: 0617  -EY Side: Left  -EY Location: shoulder  -EY    Retired Wound - Properties Group Date first assessed: 02/23/25  -EY Time first assessed: 0617  -EY Side: Left  -EY Location: shoulder  -EY      Row  Name 02/25/25 0918          Positioning and Restraints    Pre-Treatment Position in bed  -AE     Post Treatment Position bed  -AE     In Bed fowlers;call light within reach;exit alarm on  -AE               User Key  (r) = Recorded By, (t) = Taken By, (c) = Cosigned By      Initials Name Provider Type    AE Kathia Hansen PTA Physical Therapist Assistant    Kristi Weinberg RN Registered Nurse    Mala Tolliver RN Registered Nurse                    Physical Therapy Education       Title: PT OT SLP Therapies (In Progress)       Topic: Physical Therapy (In Progress)       Point: Mobility training (In Progress)       Learning Progress Summary            Patient Nonacceptance, E, NR by AE at 2/25/2025 0949    Comment: ex's    Acceptance, E, VU,NR by HUGO at 2/23/2025 0910    Comment: bed mobility, benefits of activity    Acceptance, E, NR by SB at 2/19/2025 1449    Comment: pt edu on POC, benefits of act and d/c plans                      Point: Home exercise program (Not Started)       Learner Progress:  Not documented in this visit.              Point: Body mechanics (Not Started)       Learner Progress:  Not documented in this visit.              Point: Precautions (In Progress)       Learning Progress Summary            Patient Acceptance, E, NR by SB at 2/19/2025 1449    Comment: pt edu on POC, benefits of act and d/c plans                                      User Key       Initials Effective Dates Name Provider Type Discipline    AE 02/03/23 -  Kathia Hansen PTA Physical Therapist Assistant PT    HUGO 02/03/23 -  Jeremie Stanford PTA Physical Therapist Assistant PT    SB 07/11/23 -  Agustina Glover PT DPT Physical Therapist PT                  PT Recommendation and Plan     Progress: improving  Outcome Evaluation: Pt was in bed and agreed to therapy.Pt required max assist to transfer supine to sitting.  Sitting EOB, pt required CGA/min assist to maintain sitting balance, with posterior lean.Pt sat  for short period and cried out in pain and needed to lay down.Pt was unable to rate pain but stated that he had groin pain but refusesd pain meds RN aware. Required max assist for sit to supine.,  Will continue to work with pt to increase strength and progress mobility as pt is able.   Outcome Measures       Row Name 02/23/25 0910             How much help from another person do you currently need...    Turning from your back to your side while in flat bed without using bedrails? 2  -HUGO      Moving from lying on back to sitting on the side of a flat bed without bedrails? 2  -HUGO      Moving to and from a bed to a chair (including a wheelchair)? 1  -HUGO      Standing up from a chair using your arms (e.g., wheelchair, bedside chair)? 1  -HUGO      Climbing 3-5 steps with a railing? 1  -HUGO      To walk in hospital room? 1  -HUGO      AM-PAC 6 Clicks Score (PT) 8  -HUGO         Functional Assessment    Outcome Measure Options AM-PAC 6 Clicks Basic Mobility (PT)  -HUGO                User Key  (r) = Recorded By, (t) = Taken By, (c) = Cosigned By      Initials Name Provider Type    HUGO Jeremie Stanford PTA Physical Therapist Assistant                     Time Calculation:    PT Charges       Row Name 02/25/25 0949             Time Calculation    Start Time 0918  -AE      Stop Time 0945  -AE      Time Calculation (min) 27 min  -AE      PT Received On 02/25/25  -AE      PT Goal Re-Cert Due Date 03/01/25  -AE         Time Calculation- PT    Total Timed Code Minutes- PT 27 minute(s)  -AE         Timed Charges    72877 - PT Therapeutic Activity Minutes 27  -AE         Total Minutes    Timed Charges Total Minutes 27  -AE       Total Minutes 27  -AE                User Key  (r) = Recorded By, (t) = Taken By, (c) = Cosigned By      Initials Name Provider Type    AE Kathia Hansen PTA Physical Therapist Assistant                  Therapy Charges for Today       Code Description Service Date Service Provider Modifiers Qty    [id number]  HC PT THERAPEUTIC ACT EA 15 MIN 2/25/2025 Kathia Hansen, PTA GP 2            PT G-Codes  Outcome Measure Options: AM-PAC 6 Clicks Basic Mobility (PT)  AM-PAC 6 Clicks Score (PT): 8  AM-PAC 6 Clicks Score (OT): 12    Kathia Hansen PTA  2/25/2025

## 2025-02-25 NOTE — PLAN OF CARE
Goal Outcome Evaluation:  Plan of Care Reviewed With: patient        Progress: improving  Outcome Evaluation: Pt was in bed and agreed to therapy.Pt required max assist to transfer supine to sitting.  Sitting EOB, pt required CGA/min assist to maintain sitting balance, with posterior lean.Pt sat for short period and cried out in pain and needed to lay down.Pt was unable to rate pain but stated that he had groin pain but refusesd pain meds RN aware. Required max assist for sit to supine.,  Will continue to work with pt to increase strength and progress mobility as pt is able.

## 2025-02-26 LAB
ANION GAP SERPL CALCULATED.3IONS-SCNC: 6 MMOL/L (ref 5–15)
BASOPHILS # BLD AUTO: 0.01 10*3/MM3 (ref 0–0.2)
BASOPHILS NFR BLD AUTO: 0.3 % (ref 0–1.5)
BUN SERPL-MCNC: 30 MG/DL (ref 8–23)
BUN/CREAT SERPL: 24 (ref 7–25)
CALCIUM SPEC-SCNC: 7.9 MG/DL (ref 8.6–10.5)
CHLORIDE SERPL-SCNC: 117 MMOL/L (ref 98–107)
CO2 SERPL-SCNC: 19 MMOL/L (ref 22–29)
CREAT SERPL-MCNC: 1.25 MG/DL (ref 0.76–1.27)
DEPRECATED RDW RBC AUTO: 61.4 FL (ref 37–54)
EGFRCR SERPLBLD CKD-EPI 2021: 56.8 ML/MIN/1.73
EOSINOPHIL # BLD AUTO: 0.07 10*3/MM3 (ref 0–0.4)
EOSINOPHIL NFR BLD AUTO: 2 % (ref 0.3–6.2)
ERYTHROCYTE [DISTWIDTH] IN BLOOD BY AUTOMATED COUNT: 18.5 % (ref 12.3–15.4)
GLUCOSE SERPL-MCNC: 113 MG/DL (ref 65–99)
HCT VFR BLD AUTO: 25 % (ref 37.5–51)
HGB BLD-MCNC: 7.8 G/DL (ref 13–17.7)
IMM GRANULOCYTES # BLD AUTO: 0.02 10*3/MM3 (ref 0–0.05)
IMM GRANULOCYTES NFR BLD AUTO: 0.6 % (ref 0–0.5)
LAB AP CASE REPORT: NORMAL
LYMPHOCYTES # BLD AUTO: 0.87 10*3/MM3 (ref 0.7–3.1)
LYMPHOCYTES NFR BLD AUTO: 24.9 % (ref 19.6–45.3)
Lab: NORMAL
MCH RBC QN AUTO: 29.5 PG (ref 26.6–33)
MCHC RBC AUTO-ENTMCNC: 31.2 G/DL (ref 31.5–35.7)
MCV RBC AUTO: 94.7 FL (ref 79–97)
MONOCYTES # BLD AUTO: 0.26 10*3/MM3 (ref 0.1–0.9)
MONOCYTES NFR BLD AUTO: 7.4 % (ref 5–12)
NEUTROPHILS NFR BLD AUTO: 2.26 10*3/MM3 (ref 1.7–7)
NEUTROPHILS NFR BLD AUTO: 64.8 % (ref 42.7–76)
PATH REPORT.FINAL DX SPEC: NORMAL
PATH REPORT.GROSS SPEC: NORMAL
PLATELET # BLD AUTO: 47 10*3/MM3 (ref 140–450)
PMV BLD AUTO: 11.7 FL (ref 6–12)
POTASSIUM SERPL-SCNC: 3.8 MMOL/L (ref 3.5–5.2)
RBC # BLD AUTO: 2.64 10*6/MM3 (ref 4.14–5.8)
SODIUM SERPL-SCNC: 142 MMOL/L (ref 136–145)
WBC NRBC COR # BLD AUTO: 3.49 10*3/MM3 (ref 3.4–10.8)

## 2025-02-26 PROCEDURE — 97110 THERAPEUTIC EXERCISES: CPT

## 2025-02-26 PROCEDURE — 94799 UNLISTED PULMONARY SVC/PX: CPT

## 2025-02-26 PROCEDURE — 94761 N-INVAS EAR/PLS OXIMETRY MLT: CPT

## 2025-02-26 PROCEDURE — 85025 COMPLETE CBC W/AUTO DIFF WBC: CPT | Performed by: UROLOGY

## 2025-02-26 PROCEDURE — 25010000002 VANCOMYCIN 1.5-0.9 GM/500ML-% SOLUTION: Performed by: FAMILY MEDICINE

## 2025-02-26 PROCEDURE — 99232 SBSQ HOSP IP/OBS MODERATE 35: CPT | Performed by: CLINICAL NURSE SPECIALIST

## 2025-02-26 PROCEDURE — 99231 SBSQ HOSP IP/OBS SF/LOW 25: CPT | Performed by: UROLOGY

## 2025-02-26 PROCEDURE — 80048 BASIC METABOLIC PNL TOTAL CA: CPT | Performed by: UROLOGY

## 2025-02-26 RX ADMIN — ALBUTEROL SULFATE 2 PUFF: 108 INHALANT RESPIRATORY (INHALATION) at 07:01

## 2025-02-26 RX ADMIN — BUDESONIDE AND FORMOTEROL FUMARATE DIHYDRATE 2 PUFF: 160; 4.5 AEROSOL RESPIRATORY (INHALATION) at 07:00

## 2025-02-26 RX ADMIN — PANTOPRAZOLE SODIUM 40 MG: 40 INJECTION, POWDER, FOR SOLUTION INTRAVENOUS at 10:03

## 2025-02-26 RX ADMIN — Medication 1 TABLET: at 10:03

## 2025-02-26 RX ADMIN — BUDESONIDE AND FORMOTEROL FUMARATE DIHYDRATE 2 PUFF: 160; 4.5 AEROSOL RESPIRATORY (INHALATION) at 20:14

## 2025-02-26 RX ADMIN — HYDROCODONE BITARTRATE AND ACETAMINOPHEN 1 TABLET: 5; 325 TABLET ORAL at 11:25

## 2025-02-26 RX ADMIN — Medication 10 ML: at 10:03

## 2025-02-26 RX ADMIN — ALBUTEROL SULFATE 2 PUFF: 108 INHALANT RESPIRATORY (INHALATION) at 20:14

## 2025-02-26 RX ADMIN — TAMSULOSIN HYDROCHLORIDE 0.4 MG: 0.4 CAPSULE ORAL at 20:40

## 2025-02-26 RX ADMIN — IPRATROPIUM BROMIDE 2 PUFF: 17 AEROSOL, METERED RESPIRATORY (INHALATION) at 10:38

## 2025-02-26 RX ADMIN — SODIUM BICARBONATE 650 MG TABLET 650 MG: at 20:40

## 2025-02-26 RX ADMIN — SODIUM BICARBONATE 650 MG TABLET 650 MG: at 10:03

## 2025-02-26 RX ADMIN — GUAIFENESIN 1200 MG: 600 TABLET ORAL at 20:40

## 2025-02-26 RX ADMIN — ALBUTEROL SULFATE 2 PUFF: 108 INHALANT RESPIRATORY (INHALATION) at 10:38

## 2025-02-26 RX ADMIN — Medication 1500 MG: at 17:42

## 2025-02-26 RX ADMIN — DOCUSATE SODIUM 50 MG AND SENNOSIDES 8.6 MG 2 TABLET: 8.6; 5 TABLET, FILM COATED ORAL at 20:40

## 2025-02-26 RX ADMIN — Medication 10 ML: at 20:40

## 2025-02-26 RX ADMIN — IPRATROPIUM BROMIDE 2 PUFF: 17 AEROSOL, METERED RESPIRATORY (INHALATION) at 07:01

## 2025-02-26 RX ADMIN — GUAIFENESIN 1200 MG: 600 TABLET ORAL at 10:03

## 2025-02-26 RX ADMIN — IPRATROPIUM BROMIDE 2 PUFF: 17 AEROSOL, METERED RESPIRATORY (INHALATION) at 20:14

## 2025-02-26 RX ADMIN — PANTOPRAZOLE SODIUM 40 MG: 40 INJECTION, POWDER, FOR SOLUTION INTRAVENOUS at 20:40

## 2025-02-26 NOTE — PROGRESS NOTES
"            [facility] Palliative Care Services    Palliative Care Daily Progress Note   Chief complaint-follow up support for patient/family and hospice referral/discussion    Code Status:   Code Status and Medical Interventions: No CPR (Do Not Attempt to Resuscitate); Limited Support; No intubation (DNI), Other, No dialysis, No vasopressors; daughter-Miriam; no permament feeding tube   Ordered at: 02/25/25 5979     Medical Intervention Limits:    No intubation (DNI)    Other    No dialysis    No vasopressors     Level Of Support Discussed With:    Next of Kin (If No Surrogate)     Code Status (Patient has no pulse and is not breathing):    No CPR (Do Not Attempt to Resuscitate)     Medical Interventions (Patient has pulse or is breathing):    Limited Support     Comments:    no permament feeding tube     Additional Medical Interventions Limits:    daughter-Miriam      Advanced Directives: Advance Directive Status: Patient does not have advance directive   Goals of Care: Ongoing.     S: Medical record reviewed. Events noted.  Anemia and thrombocytopenia stable.  Neurology following plans for follow-up outpatient in 2 weeks with voiding trial.  Laying in bed with a grimace on his face.  States \"hurting all over\". Nursing notified for medication.  No family currently at bedside.  Due to the Palliative Care Topics Discussed: goals of care, [facility], and discharge options we will establish an advance care plan.   Advance Care Planning   Advance Care Planning Discussion: Spoke with patient's daughter, Miriam via telephone.  Updated on current status, lab findings, and plan for urology follow-up outpatient.  We again explored best supportive care directed by hospice when rehospitalization's and aggressive care interventions no longer desired.  States she has not had the opportunity to speak with family about hospice as of yet, encouraged conversation with family to further determine plans after discharge.  " Aware if they should elect to transition to hospice services after discharge this could be arranged by SNF. Will plan to follow-up Friday or sooner if needed.     Review of Systems   Constitutional: Positive for malaise/fatigue.   Cardiovascular:  Positive for leg swelling.   Respiratory:  Negative for shortness of breath.    Musculoskeletal:  Positive for back pain.   Genitourinary:  Positive for hematuria.   Psychiatric/Behavioral:  Positive for memory loss.    Pain Assessment  Nonverbal Indicators of Pain: nonverbal indicators absent  CPOT and PAINAD Scales: PAINAD (Pain Assessment in Advance Dementia Scale)  CPOT Facial Expression: 0-->relaxed, neutral  CPOT Body Movements: 0-->absence of movements  CPOT Muscle Tension: 0-->relaxed  Ventilator Compliance/Vocalization: 0-->talking in normal tone or no sound  CPOT Score: 0  PAINAD Breathin-->normal  PAINAD Negative Vocalization: 0-->none  PAINAD Facial Expression: 0-->smiling or inexpressive  PAINAD Body Language: 0-->relaxed  PAINAD Consolability: 0-->no need to console  PAINAD Score: 0  Pain Location: abdomen  Pain Description: intermittent    O:     Intake/Output Summary (Last 24 hours) at 2025 1038  Last data filed at 2025 0900  Gross per 24 hour   Intake 240 ml   Output 1750 ml   Net -1510 ml       Diagnostics and current medications: Reviewed.      Current Facility-Administered Medications:     albuterol sulfate HFA (PROVENTIL HFA;VENTOLIN HFA;PROAIR HFA) inhaler 2 puff, 2 puff, Inhalation, 4x Daily - RT, Abdias Henley MD, 2 puff at 25 0701    sennosides-docusate (PERICOLACE) 8.6-50 MG per tablet 2 tablet, 2 tablet, Oral, BID, 2 tablet at 25 2020 **AND** polyethylene glycol (MIRALAX) packet 17 g, 17 g, Oral, Daily PRN **AND** bisacodyl (DULCOLAX) EC tablet 5 mg, 5 mg, Oral, Daily PRN **AND** bisacodyl (DULCOLAX) suppository 10 mg, 10 mg, Rectal, Daily PRN, Abdias Henley MD    budesonide-formoterol (SYMBICORT) 160-4.5  MCG/ACT inhaler 2 puff, 2 puff, Inhalation, BID - RT, Abdias Henley MD, 2 puff at 02/26/25 0700    [Held by provider] carvedilol (COREG) tablet 3.125 mg, 3.125 mg, Oral, BID With Meals, James Donovan MD, 3.125 mg at 02/18/25 1845    guaiFENesin (MUCINEX) 12 hr tablet 1,200 mg, 1,200 mg, Oral, BID, Abdias Henley MD, 1,200 mg at 02/26/25 1003    HYDROcodone-acetaminophen (NORCO) 5-325 MG per tablet 1 tablet, 1 tablet, Oral, Q4H PRN, Abdias Henley MD    ipratropium (ATROVENT HFA) inhaler 2 puff, 2 puff, Inhalation, 4x Daily - RT, Abdias Henley MD, 2 puff at 02/26/25 1038    ipratropium-albuterol (DUO-NEB) nebulizer solution 3 mL, 3 mL, Nebulization, Q6H PRN, Abdias Henley MD    Lidocaine HCl gel (XYLOCAINE) urethral/mucosal syringe, , Topical, Once, Abdias Henley MD    multivitamin with minerals 1 tablet, 1 tablet, Oral, Daily, James Donovan MD, 1 tablet at 02/26/25 1003    ondansetron ODT (ZOFRAN-ODT) disintegrating tablet 4 mg, 4 mg, Oral, Q6H PRN **OR** ondansetron (ZOFRAN) injection 4 mg, 4 mg, Intravenous, Q6H PRN, Abdias Henley MD    pantoprazole (PROTONIX) injection 40 mg, 40 mg, Intravenous, Q12H, Abdias Henley MD, 40 mg at 02/26/25 1003    sodium bicarbonate tablet 650 mg, 650 mg, Oral, BID, Abdias Henley MD, 650 mg at 02/26/25 1003    sodium chloride 0.9 % flush 10 mL, 10 mL, Intravenous, Q12H, Abdias Henley MD, 10 mL at 02/26/25 1003    sodium chloride 0.9 % flush 10 mL, 10 mL, Intravenous, PRN, Abdias Henley MD    sodium chloride 0.9 % infusion 40 mL, 40 mL, Intravenous, PRN, Abdias Henley MD    tamsulosin (FLOMAX) 24 hr capsule 0.4 mg, 0.4 mg, Oral, Nightly, Abdias Henley MD, 0.4 mg at 02/25/25 2044    vancomycin IVPB 1500 mg in 0.9% NaCl (Premix) 500 mL, 1,500 mg, Intravenous, Q24H, James Donovan MD, Last Rate: 333.3 mL/hr at 02/25/25 1832, 1,500 mg at 02/25/25 1832    Allergies   Allergen Reactions    Penicillins Anaphylaxis     "Contrast Dye (Echo Or Unknown Ct/Mr) Hives     I have utilized all available immediate resources to obtain, update, or review the patient's current medications (including all prescriptions, over-the-counter products, herbals, cannabis/cannabidiol products, and vitamin/mineral/dietary (nutritional) supplements) for name, route of administration, type, dose and frequency.    A:    /58 (BP Location: Left leg, Patient Position: Lying)   Pulse 89   Temp 97.9 °F (36.6 °C) (Axillary)   Resp 18   Ht 180.3 cm (71\")   Wt 95.1 kg (209 lb 11.2 oz)   SpO2 91%   BMI 29.25 kg/m²     Vitals and nursing note reviewed.   Constitutional:       Appearance: Grimacing due to generalize pain reports. Ill-appearing and chronically ill-appearing.   Eyes:      Pupils: Pupils are equal, round, and reactive to light.   HENT:      Right Ear: Decreased hearing noted.   Pulmonary:      Effort: Pulmonary effort is normal.      Breath sounds: Decreased breath sounds present.   Cardiovascular:      Normal rate.   Edema:     Peripheral edema present.  Abdominal:      Palpations: Abdomen is soft.   Musculoskeletal:      Cervical back: Neck supple.   Skin:     General: Skin is warm.   Genitourinary:     Comments: Perkins catheter in place.  Lyssa colored urine.  Neurological:      Mental Status: Alert.      Comments: Dementia at baseline.  Confused to situation   Psychiatric:         Mood and Affect: Mood normal.      Patient status: Disease state: Controlled with current treatments.  Current Functional status: Palliative Performance Scale Score: Performance 30% based on the following measures: Ambulation: Totally bed bound, Activity and Evidence of Disease: Unable to do any work, extensive evidence of disease, Self-Care: Total care required,  Intake: Reduced, LOC: Full, drowsy or confusion   Baseline Functional status: Palliative Performance Scale Score: Performance 30% based on the following measures: Ambulation: Totally bed bound, " Activity and Evidence of Disease: Unable to do any work, extensive evidence of disease, Self-Care: Total care required,  Intake: Reduced, LOC: Full, drowsy or confusion   Nutritional status: Albumin 2.1 Body mass index is 29.25 kg/m².      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 Bacon  Degenerative disc disease  Impaired mobility  Advanced age      Recommendations/Plan:  1. plan: Goals of care include CODE STATUS no CPR/limited support interventions.     Family support: The patient receives support from his children..  Advance Directives: Advance Directive Status: Patient does not have advance directive   POA/Healthcare surrogate-daughter, Miriam.     2.  Palliative care encounter  - Prognosis is poor to guarded long-term secondary to cirrhosis of the liver, dementia, chronic kidney disease, multiple comorbidities, and advanced age.  -Family appears to have good prognostic awareness.      -Anticoagulation placed on hold. Per Dr. Chandra note 8/30/2024-Eliquis 2.5 mg p.o. twice daily x 6 weeks postoperatively for DVT prophylaxis   -Underwent cystoscopy  2/18/2025 with clot evacuation, fulguration of bleeding, balloon dilation of penile urethral stricture by Dr. Henley.  Perkins catheter with CBI postprocedure.  IV antibiotics.    -Therapy recommends back to SNF.  Urine culture 2/18 positive for 2/21-Received 1 unit PRBC.  2/24-Underwent cystolitholapaxy of >3.5 cm bladder stone and fulguration of prostate by Dr. Henley on 2/24.    -GI consulted due to GI bleed, no plans for endoscopic evaluation unless continues to have rectal bleeding, signed off.    -Received a unit of platelets.    2/25-Urology recommends titrating CBI off.        2/25-CODE STATUS changes no CPR/limited support interventions, no intubation, no dialysis, no vasopressors, and no permanent feeding tube    2/26-continue supportive measures.  -a MOST document completed  -High risk for rehospitalization's and decline given multiple comorbid factors  -Explored best supportive care directed by hospice when rehospitalization's and aggressive care interventions no longer desired 2/25 & 2/26, encouraged conversation with family to further determine plans after discharge.  Aware if they should elect to transition to hospice services after discharge this could be arranged by SNF.  -Will plan to follow-up Friday or sooner if needed      Thank you for allowing us to participate in patient's plan of care. Palliative Care Team will continue to follow patient.     Barb Pandya, ANAT  2/26/2025  10:38 CST

## 2025-02-26 NOTE — CASE MANAGEMENT/SOCIAL WORK
Continued Stay Note   [facility]     Patient Name: Manohar Garcia  MRN: [id number]  Today's Date: 2/26/2025    Admit Date: 2/18/2025    [facility]   Discharge Plan       Row Name 02/26/25 1505       Plan    Plan [facility]    Patient/Family in Agreement with Plan yes    Plan Comments Pt has a bed at [facility]. Will follow for d/c.                   Discharge Codes    No documentation.                       DIANNA Lr

## 2025-02-26 NOTE — THERAPY TREATMENT NOTE
Acute Care - Physical Therapy Treatment Note  [facility]     Patient Name: Manohar Garcia  : 1940  MRN: [id number]  Today's Date: 2025      Visit Dx:     ICD-10-CM ICD-9-CM   1. Gross hematuria  R31.0 599.71   2. Injury of urethra, initial encounter  S37.30XA 867.0   3. Chronic anticoagulation  Z79.01 V58.61   4. Bladder mass  N32.89 596.89   5. Acute UTI (urinary tract infection)  N39.0 599.0   6. Acute kidney injury  N17.9 584.9   7. Chronic liver disease  K76.9 571.9   8. Thrombocytopenia  D69.6 287.5   9. Hematuria  R31.9 599.70   10. Bladder stone  N21.0 594.1   11. 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    Thrombocytopenia    Hyperlactatemia    Hip fracture    Closed 2-part intertrochanteric fracture of proximal end of right femur    Acute blood loss anemia    Stage 3b chronic kidney disease    COVID-19 virus detected    Influenza A    Hypoxia    Cytokine release syndrome, grade 1    Acute pyelonephritis    Gross Hematuria    Lymphedema of left arm    Constipation    Thrombocytopenia    Bladder stone    Anemia    Moderate protein-calorie malnutrition     Past Medical History:   Diagnosis Date    Dementia     GERD (gastroesophageal reflux disease)     Liver disorder      Past Surgical History:   Procedure Laterality Date    CYSTOLITHALOPAXY PERCUTANEOUS N/A 2025    Procedure: CYSTOLITHOLAPAXY WITH LASER, FULGURATION OF PROSTATE;  Surgeon: Abdias Henley MD;  Location: [facility] OR;  Service: Urology;  Laterality: N/A;    CYSTOSCOPY WITH CLOT EVACUATION N/A 2025    Procedure: CYSTOSCOPY WITH CLOT EVACUATION, FULGURATION;  Surgeon: Abdias Henley MD;  Location: [facility] OR;  Service: Urology;  Laterality: N/A;     HEMORRHOIDECTOMY      HERNIA REPAIR      HIP TROCHANTERIC NAILING WITH INTRAMEDULLARY HIP SCREW Right 8/30/2024    Procedure: HIP TROCHANTERIC NAILING SHORT WITH INTRAMEDULLARY HIP SCREW;  Surgeon: Arcenio Chandra MD;  Location:  [facility] OR;  Service: Orthopedics;  Laterality: Right;     PT Assessment (Last 12 Hours)       PT Evaluation and Treatment       Row Name 02/26/25 1420          Physical Therapy Time and Intention    Subjective Information complains of;pain;swelling  -AE     Document Type therapy note (daily note)  -AE     Mode of Treatment physical therapy  -AE       Row Name 02/26/25 1420          General Information    Existing Precautions/Restrictions fall  -AE       Row Name 02/26/25 1420          Pain    Pretreatment Pain Rating 0/10 - no pain  -AE     Posttreatment Pain Rating 5/10  -AE     Pain Location hip  -AE     Pain Side/Orientation bilateral  -AE     Response to Pain Interventions activity participation with increased pain  -AE       Row Name 02/26/25 1420          Bed Mobility    Comment, (Bed Mobility) DECLINED  -AE       Row Name 02/26/25 1420          Hip (Therapeutic Exercise)    Hip (Therapeutic Exercise) PROM (passive range of motion)  -AE     Hip PROM (Therapeutic Exercise) aBduction;aDduction;10 repetitions  -AE       Row Name 02/26/25 1420          Knee (Therapeutic Exercise)    Knee (Therapeutic Exercise) AAROM (active assistive range of motion)  -AE     Knee AAROM (Therapeutic Exercise) flexion;10 repetitions;2 sets  -AE       Row Name 02/26/25 1420          Ankle (Therapeutic Exercise)    Ankle (Therapeutic Exercise) AROM (active range of motion)  -AE     Ankle AROM (Therapeutic Exercise) 20 repititions;dorsiflexion;plantarflexion  -AE       Row Name             Wound 02/21/25 1617 thoracic spine    Wound - Properties Group Placement Date: 02/21/25  -PS Placement Time: 1617 -PS Location: thoracic spine  -PS    Retired Wound - Properties Group Placement Date: 02/21/25  -PS  Placement Time: 1617  -PS Location: thoracic spine  -PS    Retired Wound - Properties Group Placement Date: 02/21/25  -PS Placement Time: 1617  -PS Location: thoracic spine  -PS    Retired Wound - Properties Group Date first assessed: 02/21/25  -PS Time first assessed: 1617  -PS Location: thoracic spine  -PS      Row Name             Wound 02/23/25 0608 Left upper back    Wound - Properties Group Placement Date: 02/23/25  -EY Placement Time: 0608  -EY Side: Left  -EY Orientation: upper  -EY Location: back  -EY    Retired Wound - Properties Group Placement Date: 02/23/25  -EY Placement Time: 0608  -EY Side: Left  -EY Orientation: upper  -EY Location: back  -EY    Retired Wound - Properties Group Placement Date: 02/23/25  -EY Placement Time: 0608  -EY Side: Left  -EY Orientation: upper  -EY Location: back  -EY    Retired Wound - Properties Group Date first assessed: 02/23/25  -EY Time first assessed: 0608  -EY Side: Left  -EY Location: back  -EY      Row Name             Wound 02/23/25 0610 Left hip    Wound - Properties Group Placement Date: 02/23/25  -EY Placement Time: 0610  -EY Side: Left  -EY Location: hip  -EY Primary Wound Type: Abrasion  -EY    Retired Wound - Properties Group Placement Date: 02/23/25  -EY Placement Time: 0610  -EY Side: Left  -EY Location: hip  -EY Primary Wound Type: Abrasion  -EY    Retired Wound - Properties Group Placement Date: 02/23/25  -EY Placement Time: 0610  -EY Side: Left  -EY Location: hip  -EY Primary Wound Type: Abrasion  -EY    Retired Wound - Properties Group Date first assessed: 02/23/25  -EY Time first assessed: 0610  -EY Side: Left  -EY Location: hip  -EY Primary Wound Type: Abrasion  -EY      Row Name             Wound 02/23/25 0617 Left shoulder    Wound - Properties Group Placement Date: 02/23/25  -EY Placement Time: 0617  -EY Side: Left  -EY Location: shoulder  -EY    Retired Wound - Properties Group Placement Date: 02/23/25  -EY Placement Time: 0617  -EY Side: Left   -EY Location: shoulder  -EY    Retired Wound - Properties Group Placement Date: 02/23/25  -EY Placement Time: 0617 -EY Side: Left  -EY Location: shoulder  -EY    Retired Wound - Properties Group Date first assessed: 02/23/25  -EY Time first assessed: 0617 -EY Side: Left  -EY Location: shoulder  -EY      Row Name 02/26/25 1420          Positioning and Restraints    Pre-Treatment Position in bed  -AE     Post Treatment Position bed  -AE     In Bed fowlers;call light within reach;exit alarm on  -AE               User Key  (r) = Recorded By, (t) = Taken By, (c) = Cosigned By      Initials Name Provider Type    AE Kathia Hansen, [place] Physical Therapist Assistant    Kristi Weinberg RN Registered Nurse    Mala Tolliver RN Registered Nurse                    Physical Therapy Education       Title: PT OT SLP Therapies (In Progress)       Topic: Physical Therapy (In Progress)       Point: Mobility training (In Progress)       Learning Progress Summary            Patient Nonacceptance, E, NR by AE at 2/25/2025 0949    Comment: ex's    Acceptance, E, VU,NR by HUGO at 2/23/2025 0910    Comment: bed mobility, benefits of activity    Acceptance, E, NR by SB at 2/19/2025 1449    Comment: pt edu on POC, benefits of act and d/c plans                      Point: Home exercise program (Not Started)       Learner Progress:  Not documented in this visit.              Point: Body mechanics (Not Started)       Learner Progress:  Not documented in this visit.              Point: Precautions (In Progress)       Learning Progress Summary            Patient Acceptance, E, NR by SB at 2/19/2025 1449    Comment: pt edu on POC, benefits of act and d/c plans                                      User Key       Initials Effective Dates Name Provider Type Discipline    AE 02/03/23 -  Kathia Hansen PTA Physical Therapist Assistant PT    HUGO 02/03/23 -  Jeremie Stanford PTA Physical Therapist Assistant PT    SB 07/11/23 -  Belen  DWIGHT Berrios DPT Physical Therapist PT                  PT Recommendation and Plan     Progress: declining  Outcome Evaluation: Pt c/o B hip pain post tx.Pt declined to sit EOB but agrees to ex's.Pt was able to hold drink with L hand and drink with straw when it was handed to him.Pt was PROM for hip abduction x 10 reps and AAROM for heel slides.He was AROM for 20 reps B dorsiflexion.PT will continue to try and progress patient.Pt may need pain meds before PT next tx.       Time Calculation:    PT Charges       Row Name 02/26/25 1451             Time Calculation    Start Time 1420  -AE      Stop Time 1445  -AE      Time Calculation (min) 25 min  -AE      PT Received On 02/26/25  -AE         Time Calculation- PT    Total Timed Code Minutes- PT 25 minute(s)  -AE         Timed Charges    81055 - PT Therapeutic Exercise Minutes 25  -AE         Total Minutes    Timed Charges Total Minutes 25  -AE       Total Minutes 25  -AE                User Key  (r) = Recorded By, (t) = Taken By, (c) = Cosigned By      Initials Name Provider Type    AE Kathia Hansen PTA Physical Therapist Assistant                  Therapy Charges for Today       Code Description Service Date Service Provider Modifiers Qty    13454581329 HC PT THERAPEUTIC ACT EA 15 MIN 2/25/2025 Kathia Hansen PTA GP 2    81161817499 HC PT THER PROC EA 15 MIN 2/26/2025 Kathia Hansen PTA GP 2            PT G-Codes  Outcome Measure Options: AM-PAC 6 Clicks Basic Mobility (PT)  AM-PAC 6 Clicks Score (PT): 8  AM-PAC 6 Clicks Score (OT): 12    Kathia Hansen PTA  2/26/2025

## 2025-02-26 NOTE — PROGRESS NOTES
LOS: 8 days   Patient Care Team:  Dani Farfan MD as PCP - General (Family Medicine)    Chief Complaint: Ateria, bladder stone    Subjective     Interval History:     POD #1 status post cystolitholapaxy of large stone.  Also fulguration of prostate.  Per nursing report, the urine is clear.  CBI was clamped yesterday.    Review of Systems  Pertinent items are noted in HPI, all other systems reviewed and negative     Objective     Vital Signs  Temp:  [97.1 °F (36.2 °C)-98.3 °F (36.8 °C)] 97.9 °F (36.6 °C)  Heart Rate:  [81-96] 89  Resp:  [18-20] 18  BP: ()/(40-70) 131/58    Physical Exam:  Three-way Perkins catheter in place.  No CBI running.  Concentrated yellow urine.  No hematuria noted.     Data Review:       I have reviewed the following data:    Creatinine 1.25 today.    Hemoglobin 7.8.  Relatively stable from yesterday.  White cell count 3.49.    Urine culture from 2/18/2025 growing out Enterococcus.  Patient currently on vancomycin.    Medication Review:     Current Facility-Administered Medications:     albuterol sulfate HFA (PROVENTIL HFA;VENTOLIN HFA;PROAIR HFA) inhaler 2 puff, 2 puff, Inhalation, 4x Daily - RT, Abdias Henley MD, 2 puff at 02/26/25 1038    sennosides-docusate (PERICOLACE) 8.6-50 MG per tablet 2 tablet, 2 tablet, Oral, BID, 2 tablet at 02/22/25 2020 **AND** polyethylene glycol (MIRALAX) packet 17 g, 17 g, Oral, Daily PRN **AND** bisacodyl (DULCOLAX) EC tablet 5 mg, 5 mg, Oral, Daily PRN **AND** bisacodyl (DULCOLAX) suppository 10 mg, 10 mg, Rectal, Daily PRN, Abdias Henley MD    budesonide-formoterol (SYMBICORT) 160-4.5 MCG/ACT inhaler 2 puff, 2 puff, Inhalation, BID - RT, Abdias Henley MD, 2 puff at 02/26/25 0700    [Held by provider] carvedilol (COREG) tablet 3.125 mg, 3.125 mg, Oral, BID With Meals, James Donovan MD, 3.125 mg at 02/18/25 1840    guaiFENesin (MUCINEX) 12 hr tablet 1,200 mg, 1,200 mg, Oral, BID, Abdias Henley MD, 1,200 mg at 02/26/25 1003     HYDROcodone-acetaminophen (NORCO) 5-325 MG per tablet 1 tablet, 1 tablet, Oral, Q4H PRN, Abdias Henley MD    ipratropium (ATROVENT HFA) inhaler 2 puff, 2 puff, Inhalation, 4x Daily - RT, Abdias Henley MD, 2 puff at 02/26/25 1038    ipratropium-albuterol (DUO-NEB) nebulizer solution 3 mL, 3 mL, Nebulization, Q6H PRN, Abdias Henley MD    Lidocaine HCl gel (XYLOCAINE) urethral/mucosal syringe, , Topical, Once, Abdias Henley MD    multivitamin with minerals 1 tablet, 1 tablet, Oral, Daily, James Donovan MD, 1 tablet at 02/26/25 1003    ondansetron ODT (ZOFRAN-ODT) disintegrating tablet 4 mg, 4 mg, Oral, Q6H PRN **OR** ondansetron (ZOFRAN) injection 4 mg, 4 mg, Intravenous, Q6H PRN, Abdias Henley MD    pantoprazole (PROTONIX) injection 40 mg, 40 mg, Intravenous, Q12H, Abdias Henley MD, 40 mg at 02/26/25 1003    sodium bicarbonate tablet 650 mg, 650 mg, Oral, BID, Abdias Henley MD, 650 mg at 02/26/25 1003    sodium chloride 0.9 % flush 10 mL, 10 mL, Intravenous, Q12H, Abdias Henley MD, 10 mL at 02/26/25 1003    sodium chloride 0.9 % flush 10 mL, 10 mL, Intravenous, PRN, Abdias Henley MD    sodium chloride 0.9 % infusion 40 mL, 40 mL, Intravenous, PRN, Abdias Henley MD    tamsulosin (FLOMAX) 24 hr capsule 0.4 mg, 0.4 mg, Oral, Nightly, Abdias Henley MD, 0.4 mg at 02/25/25 2044    vancomycin IVPB 1500 mg in 0.9% NaCl (Premix) 500 mL, 1,500 mg, Intravenous, Q24H, James Donovan MD, Last Rate: 333.3 mL/hr at 02/25/25 1832, 1,500 mg at 02/25/25 1832    Assessment and Plan:    Hematuria: Resolved.  Continue Perkins.  Discharge with Perkins.    Anemia: Hemoglobin stable.  Monitor.  Transfuse as needed, defer decision to transfuse to primary team.    URO DISPO: Follow-up in 2 weeks and urology outpatient.  Will need voiding trial at that time.  Sent to urology .    I discussed the patient's findings and my recommendations with patient and nursing staff    (Please note  that portions of this note were completed with a voice recognition program.)    Demetrius Arias MD  02/26/25  10:51 CST    Time: Time spent: 15 minutes spent performing evaluation and management, chart review, and discussion with patient, > 50% of time spent in face-to-face encounter

## 2025-02-26 NOTE — PLAN OF CARE
Goal Outcome Evaluation:  Plan of Care Reviewed With: patient        Progress: declining  Outcome Evaluation: Pt c/o B hip pain post tx.Pt declined to sit EOB but agrees to ex's.Pt was able to hold drink with L hand and drink with straw when it was handed to him.Pt was PROM for hip abduction x 10 reps and AAROM for heel slides.He was AROM for 20 reps B dorsiflexion.PT will continue to try and progress patient.Pt may need pain meds before PT next tx.

## 2025-02-26 NOTE — PLAN OF CARE
Goal Outcome Evaluation:  Plan of Care Reviewed With: patient        Progress: no change  Outcome Evaluation: Patient A&O, de to BSD, urine dark yellow cloudy concentrated, severe generalized pitting edema, 2 large rust colored BMs this shift, brownish red, incontinent, turn q2h, rm air, tolerating diet, IVF as ordered, bed check on.

## 2025-02-26 NOTE — PROGRESS NOTES
[facility] Medicine Services  INPATIENT PROGRESS NOTE    Patient Name: Manohar Garcia  Date of Admission: 2/18/2025  Today's Date: 02/26/25  Length of Stay: 8  Primary Care Physician: Dani Farfan MD    Subjective   Chief Complaint: Hematuria/acute renal failure/metabolic acidosis/neutropenia/anemia/thrombocytopenia   HPI   Blood pressure is much improved today, afebrile.  Patient seen to be more alert today.  Patient is on room air.  Metabolic acidosis stable.  Hemoglobin stable.  Platelet count stable.  White blood cells normal.  CBI DC'd by urology.    Review of Systems   Constitutional:  Positive for activity change, appetite change and fatigue. Negative for chills and fever.   HENT:  Negative for hearing loss, nosebleeds, tinnitus and trouble swallowing.    Eyes:  Negative for visual disturbance.   Respiratory:  Negative for cough, chest tightness, shortness of breath and wheezing.    Cardiovascular:  Negative for chest pain, palpitations and leg swelling.   Gastrointestinal:  Negative for abdominal distention, abdominal pain, blood in stool, constipation, diarrhea, nausea and vomiting.   Endocrine: Negative for cold intolerance, heat intolerance, polydipsia, polyphagia and polyuria.   Genitourinary:  Negative for decreased urine volume, difficulty urinating, dysuria, flank pain, frequency and hematuria.   Musculoskeletal:  Positive for arthralgias, gait problem and myalgias. Negative for joint swelling.   Skin:  Negative for rash.   Allergic/Immunologic: Negative for immunocompromised state.   Neurological:  Positive for weakness. Negative for dizziness, syncope, light-headedness and headaches.   Hematological:  Negative for adenopathy. Does not bruise/bleed easily.   Psychiatric/Behavioral:  Negative for confusion and sleep disturbance. The patient is not nervous/anxious.   All pertinent negatives and positives are as above. All other systems have been reviewed and  are negative unless otherwise stated.     Objective    Temp:  [97.1 °F (36.2 °C)-98.3 °F (36.8 °C)] 97.9 °F (36.6 °C)  Heart Rate:  [81-96] 89  Resp:  [18-20] 18  BP: ()/(40-70) 131/58  Physical Exam  Vitals and nursing note reviewed.   Constitutional:       Appearance: He is well-developed.      Comments: Advanced age, chronically ill.   HENT:      Head: Normocephalic.   Eyes:      General: No scleral icterus.     Pupils: Pupils are equal, round, and reactive to light.   Neck:      Thyroid: No thyromegaly.      Vascular: No carotid bruit or JVD.      Trachea: No tracheal deviation.   Cardiovascular:      Rate and Rhythm: Normal rate and regular rhythm.      Heart sounds: No murmur heard.     No friction rub. No gallop.   Pulmonary:      Effort: Pulmonary effort is normal. No respiratory distress.      Breath sounds: Normal breath sounds. No wheezing or rales.      Comments: Patient is on room air.  Chest:      Chest wall: No tenderness.   Abdominal:      General: Bowel sounds are normal. There is no distension.      Palpations: Abdomen is soft.      Tenderness: There is no abdominal tenderness.   Genitourinary:     Comments: Perkins cath in place.  CBI removed by urology today.  Musculoskeletal:      Cervical back: Normal range of motion and neck supple.   Skin:     General: Skin is warm and dry.      Capillary Refill: Capillary refill takes 2 to 3 seconds.      Findings: No rash.   Neurological:      Cranial Nerves: No cranial nerve deficit.      Motor: Weakness present.      Coordination: Coordination abnormal.      Gait: Gait abnormal.   Psychiatric:         Mood and Affect: Mood normal.         Behavior: Behavior normal.          Results Review:  I have reviewed the labs, radiology results, and diagnostic studies.    Laboratory Data:   Results from last 7 days   Lab Units 02/26/25  0658 02/25/25  0318 02/24/25  0335   WBC 10*3/mm3 3.49 3.73 2.60*   HEMOGLOBIN g/dL 7.8* 7.9* 8.5*   HEMATOCRIT % 25.0* 25.1*  "27.0*   PLATELETS 10*3/mm3 47* 45* 40*        Results from last 7 days   Lab Units 02/26/25  0540 02/25/25  0318 02/24/25  0336 02/23/25  0346 02/22/25  0802 02/21/25  0418 02/20/25  0554   SODIUM mmol/L 142 141 143 142   < > 143 140   POTASSIUM mmol/L 3.8 4.0 4.2 4.3   < > 4.7 4.6   CHLORIDE mmol/L 117* 116* 117* 117*   < > 117* 114*   CO2 mmol/L 19.0* 19.0* 19.0* 19.0*   < > 19.0* 20.0*   BUN mg/dL 30* 33* 39* 44*   < > 57* 56*   CREATININE mg/dL 1.25 1.27 1.29* 1.31*   < > 2.01* 2.24*   CALCIUM mg/dL 7.9* 8.0* 7.7* 8.2*   < > 8.1* 8.0*   BILIRUBIN mg/dL  --   --   --  0.8  --  0.5 0.7   ALK PHOS U/L  --   --   --  80  --  64 71   ALT (SGPT) U/L  --   --   --  10  --  9 11   AST (SGOT) U/L  --   --   --  17  --  10 12   GLUCOSE mg/dL 113* 102* 104* 89   < > 107* 105*    < > = values in this interval not displayed.       Culture Data:   No results found for: \"BLOODCX\", \"URINECX\", \"WOUNDCX\", \"MRSACX\", \"RESPCX\", \"STOOLCX\"    Radiology Data:   Imaging Results (Last 24 Hours)       ** No results found for the last 24 hours. **            I have reviewed the patient's current medications.     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 Patient is 84-year-old  male presented from nursing home with gross hematuria - sounds like another NH resident accidentally got leg tangled in catheter and pulled it out with bulb up.  He's also chronically anticoagulated with Eliquis. He has a known history of a large bladder stone and is followed by Urology in the outpatient setting.  Found to also have FERNANDO, " worsening anemia and thrombocytopenia, COVID +, soft BPs.  Urology following.  Currently on CBI.  Anticoagulation on hold.  I've given transfusion of both platelets (2 days ago) and blood (today).  Enterococcus noted in his urine which is susceptible to Vancomycin.  Dr. Henley with Urology plans to take him to OR on Monday for management of large bladder stone if his clinical status is improving/stable.     Treatment Plan  Hematuria/Chronic catheter/UTI/bladder stone.  History of accidentally got tangled with catheter and pull out with a bulb up.  History of large bladder stone being followed by urology in outpatient setting.  Urology consult.  Vancomycin . Flomax.  CT scan abdomen pelvic-urinary bladder is abnormal in appearance with moderate distention and stranding in the perivesical space as well as an air-fluid level- heterogeneous increased density dependently within the bladder consistent with blood products/clot- Along the left anterolateral aspect of the bladder there is a focus of hyperdensity either representing a adherent clot versus a small neoplasm-not definitely appreciated on previous CT of 2 weeks earlier favoring adherent clot, Perkins catheter has been displaced/retracted into the  prostatic urethra with the balloon inflated within the prostatic urethra, 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  Status post cystoscopy with irrigation and evacuation of multiple obstructing clots 2/18/2025  Status post 2/20/2025- CYSTOLITHOLAPAXY WITH LASER (>3.5cm bladder stone), FULGURATION OF PROSTATE.  Tissue pathology-calculus stone.  Perkins cath in place.       GI bleed/anemia.  Hemoccult positive.  GI consult, conservative management due to risk factor.  GI signed off today.  Hemoglobin stable.  No sign of acute bleed.    Anemia.  Patient received blood transfusion 2/21/2025.  Ferric gluconate times 12/21/25.  Hemoglobin stable..  No sign of acute bleed.     Thrombocytopenia.  Patient received platelets transfusion 2/19/2025.  Platelet count stable.  Status post 1 unit transfusion 2/24/2025.     Soft blood pressure/on chronic anticoagulation.  Hold Eliquis due to hematuria/GI bleed/anemia/thrombocytopenia.  Hold Coreg.    Doppler ultrasound left upper extremity- no evidence of deep venous thrombosis or  superficial thrombophlebitis of the left upper extremity.     Acute on chronic kidney injury 3B/metabolic acidosis.  Creatinine is improving.  Creatinine 9/2/2024 1.85.  Slowly lactated ringer..  Metabolic acidosis stable.  Bicarb p.o .     Neutropenia.  Resolved.     COVID-positive/pulm fibrosis.  Albuterol inhaler.  Symbicort.  Guaifenesin.  Atrovent.  Chest x-ray-No change since the previous study, No active cardiopulmonary disease, Persistent chronic interstitial changes/fibrosis.  CT scan head-Chronic changes and no acute intracranial findings.   Echocardiogram-Grossly normal left and right ventricular size and function,  Valves are not well-visualized.  Patient is on room air.     Pain . Norco as needed.     Reflux.  Protonix.  Zofran as needed.     Constipation.  Michelle-Colace.     Diet . regular diet     Deconditioning.  PT and OT consult.  CT scan lumbar spine- Multilevel degenerative disc disease and facet arthropathy as discussed above but no definite acute osseous abnormality,  Please see the  CT abdomen and pelvis report for details regarding the kidneys and urinary bladder as well as retroperitoneal stranding.       Urine culture shows enterococci faecalis susceptible vancomycin.  Blood culture shows no growth in 5 days.  MRSA screen-negative.  Respiratory panel-COVID-positive.  Pathology-no malignant cells.     Patient is from nursing home.  Palliative care failure to thrive.        Medical Decision Making  Number and Complexity of problems: Hematuria/chronic cath/UTI/bladder stone  Differential Diagnosis: None     Conditions and Status        Condition is unchanged.     MDM Data  External documents reviewed: Previous note.  Cardiac tracing (EKG, telemetry) interpretation: Sinus .  Radiology interpretation: CT scan/x-ray/echo  Labs reviewed: Laboratory  Any tests that were considered but not ordered: Laboratory in AM     Decision rules/scores evaluated (example [id number], Wells, etc): None     Discussed with: Patient     Care Planning  Shared decision making: Patient  Code status and discussions: DNR . DNI     Disposition  Social Determinants of Health that impact treatment or disposition: From nursing home.  1 to 3 days.        Electronically signed by James Donovan MD, 02/26/25, 10:31 CST.

## 2025-02-27 LAB
ANION GAP SERPL CALCULATED.3IONS-SCNC: 5 MMOL/L (ref 5–15)
BUN SERPL-MCNC: 29 MG/DL (ref 8–23)
BUN/CREAT SERPL: 22 (ref 7–25)
CALCIUM SPEC-SCNC: 7.5 MG/DL (ref 8.6–10.5)
CHLORIDE SERPL-SCNC: 117 MMOL/L (ref 98–107)
CO2 SERPL-SCNC: 20 MMOL/L (ref 22–29)
CREAT SERPL-MCNC: 1.32 MG/DL (ref 0.76–1.27)
DEPRECATED RDW RBC AUTO: 61.6 FL (ref 37–54)
EGFRCR SERPLBLD CKD-EPI 2021: 53.2 ML/MIN/1.73
ERYTHROCYTE [DISTWIDTH] IN BLOOD BY AUTOMATED COUNT: 18.5 % (ref 12.3–15.4)
GLUCOSE SERPL-MCNC: 106 MG/DL (ref 65–99)
HCT VFR BLD AUTO: 24.9 % (ref 37.5–51)
HGB BLD-MCNC: 7.8 G/DL (ref 13–17.7)
MCH RBC QN AUTO: 29.2 PG (ref 26.6–33)
MCHC RBC AUTO-ENTMCNC: 31.3 G/DL (ref 31.5–35.7)
MCV RBC AUTO: 93.3 FL (ref 79–97)
PLATELET # BLD AUTO: 46 10*3/MM3 (ref 140–450)
PMV BLD AUTO: 11.4 FL (ref 6–12)
POTASSIUM SERPL-SCNC: 3.5 MMOL/L (ref 3.5–5.2)
RBC # BLD AUTO: 2.67 10*6/MM3 (ref 4.14–5.8)
SODIUM SERPL-SCNC: 142 MMOL/L (ref 136–145)
WBC NRBC COR # BLD AUTO: 3.34 10*3/MM3 (ref 3.4–10.8)

## 2025-02-27 PROCEDURE — 94799 UNLISTED PULMONARY SVC/PX: CPT

## 2025-02-27 PROCEDURE — 94761 N-INVAS EAR/PLS OXIMETRY MLT: CPT

## 2025-02-27 PROCEDURE — 80048 BASIC METABOLIC PNL TOTAL CA: CPT | Performed by: UROLOGY

## 2025-02-27 PROCEDURE — 85027 COMPLETE CBC AUTOMATED: CPT | Performed by: FAMILY MEDICINE

## 2025-02-27 PROCEDURE — 97110 THERAPEUTIC EXERCISES: CPT | Performed by: OCCUPATIONAL THERAPIST

## 2025-02-27 PROCEDURE — 25810000003 SODIUM CHLORIDE 0.9 % SOLUTION: Performed by: FAMILY MEDICINE

## 2025-02-27 PROCEDURE — 97530 THERAPEUTIC ACTIVITIES: CPT | Performed by: OCCUPATIONAL THERAPIST

## 2025-02-27 PROCEDURE — 97110 THERAPEUTIC EXERCISES: CPT

## 2025-02-27 PROCEDURE — 94762 N-INVAS EAR/PLS OXIMTRY CONT: CPT

## 2025-02-27 RX ORDER — MIDODRINE HYDROCHLORIDE 10 MG/1
2.5 TABLET ORAL
Status: DISCONTINUED | OUTPATIENT
Start: 2025-02-27 | End: 2025-02-28 | Stop reason: HOSPADM

## 2025-02-27 RX ADMIN — SODIUM CHLORIDE 250 ML: 9 INJECTION, SOLUTION INTRAVENOUS at 09:06

## 2025-02-27 RX ADMIN — DOCUSATE SODIUM 50 MG AND SENNOSIDES 8.6 MG 2 TABLET: 8.6; 5 TABLET, FILM COATED ORAL at 09:04

## 2025-02-27 RX ADMIN — SODIUM BICARBONATE 650 MG TABLET 650 MG: at 09:04

## 2025-02-27 RX ADMIN — BUDESONIDE AND FORMOTEROL FUMARATE DIHYDRATE 2 PUFF: 160; 4.5 AEROSOL RESPIRATORY (INHALATION) at 18:41

## 2025-02-27 RX ADMIN — HYDROCODONE BITARTRATE AND ACETAMINOPHEN 1 TABLET: 5; 325 TABLET ORAL at 02:30

## 2025-02-27 RX ADMIN — ALBUTEROL SULFATE 2 PUFF: 108 INHALANT RESPIRATORY (INHALATION) at 10:12

## 2025-02-27 RX ADMIN — ALBUTEROL SULFATE 2 PUFF: 108 INHALANT RESPIRATORY (INHALATION) at 18:41

## 2025-02-27 RX ADMIN — BUDESONIDE AND FORMOTEROL FUMARATE DIHYDRATE 2 PUFF: 160; 4.5 AEROSOL RESPIRATORY (INHALATION) at 06:53

## 2025-02-27 RX ADMIN — Medication 1 TABLET: at 09:04

## 2025-02-27 RX ADMIN — HYDROCODONE BITARTRATE AND ACETAMINOPHEN 1 TABLET: 5; 325 TABLET ORAL at 21:29

## 2025-02-27 RX ADMIN — GUAIFENESIN 1200 MG: 600 TABLET ORAL at 21:02

## 2025-02-27 RX ADMIN — TAMSULOSIN HYDROCHLORIDE 0.4 MG: 0.4 CAPSULE ORAL at 20:59

## 2025-02-27 RX ADMIN — MIDODRINE HYDROCHLORIDE 2.5 MG: 10 TABLET ORAL at 11:32

## 2025-02-27 RX ADMIN — IPRATROPIUM BROMIDE 2 PUFF: 17 AEROSOL, METERED RESPIRATORY (INHALATION) at 06:53

## 2025-02-27 RX ADMIN — SODIUM BICARBONATE 650 MG TABLET 650 MG: at 20:59

## 2025-02-27 RX ADMIN — ALBUTEROL SULFATE 2 PUFF: 108 INHALANT RESPIRATORY (INHALATION) at 06:53

## 2025-02-27 RX ADMIN — Medication 10 ML: at 21:00

## 2025-02-27 RX ADMIN — IPRATROPIUM BROMIDE 2 PUFF: 17 AEROSOL, METERED RESPIRATORY (INHALATION) at 14:41

## 2025-02-27 RX ADMIN — GUAIFENESIN 1200 MG: 600 TABLET ORAL at 09:04

## 2025-02-27 RX ADMIN — IPRATROPIUM BROMIDE 2 PUFF: 17 AEROSOL, METERED RESPIRATORY (INHALATION) at 18:41

## 2025-02-27 RX ADMIN — MIDODRINE HYDROCHLORIDE 2.5 MG: 10 TABLET ORAL at 18:16

## 2025-02-27 RX ADMIN — IPRATROPIUM BROMIDE 2 PUFF: 17 AEROSOL, METERED RESPIRATORY (INHALATION) at 10:13

## 2025-02-27 RX ADMIN — PANTOPRAZOLE SODIUM 40 MG: 40 INJECTION, POWDER, FOR SOLUTION INTRAVENOUS at 21:00

## 2025-02-27 RX ADMIN — Medication 10 ML: at 09:13

## 2025-02-27 RX ADMIN — ALBUTEROL SULFATE 2 PUFF: 108 INHALANT RESPIRATORY (INHALATION) at 14:40

## 2025-02-27 RX ADMIN — PANTOPRAZOLE SODIUM 40 MG: 40 INJECTION, POWDER, FOR SOLUTION INTRAVENOUS at 09:05

## 2025-02-27 RX ADMIN — HYDROCODONE BITARTRATE AND ACETAMINOPHEN 1 TABLET: 5; 325 TABLET ORAL at 15:30

## 2025-02-27 NOTE — PROGRESS NOTES
[facility] Medicine Services  INPATIENT PROGRESS NOTE    Patient Name: Manohar Garcia  Date of Admission: 2/18/2025  Today's Date: 02/27/25  Length of Stay: 9  Primary Care Physician: Dani Farfan MD    Subjective   Chief Complaint: Hematuria/acute renal failure/metabolic acidosis/neutropenia/anemia/thrombocytopenia   HPI   Blood pressure is low this morning 250 cc bolus, start low-dose midodrine.  Metabolic acidosis improving.  Slight increase in creatinine.  Slight decrease in white blood cells.  Hemoglobin stable.  Platelet count stable.  Patient is on room air.    Review of Systems     All pertinConstitutional:  Positive for activity change, appetite change and fatigue. Negative for chills and fever.   HENT:  Negative for hearing loss, nosebleeds, tinnitus and trouble swallowing.    Eyes:  Negative for visual disturbance.   Respiratory:  Negative for cough, chest tightness, shortness of breath and wheezing.    Cardiovascular:  Negative for chest pain, palpitations and leg swelling.   Gastrointestinal:  Negative for abdominal distention, abdominal pain, blood in stool, constipation, diarrhea, nausea and vomiting.   Endocrine: Negative for cold intolerance, heat intolerance, polydipsia, polyphagia and polyuria.   Genitourinary:  Negative for decreased urine volume, difficulty urinating, dysuria, flank pain, frequency and hematuria.   Musculoskeletal:  Positive for arthralgias, gait problem and myalgias. Negative for joint swelling.   Skin:  Negative for rash.   Allergic/Immunologic: Negative for immunocompromised state.   Neurological:  Positive for weakness. Negative for dizziness, syncope, light-headedness and headaches.   Hematological:  Negative for adenopathy. Does not bruise/bleed easily.   Psychiatric/Behavioral:  Negative for confusion and sleep disturbance. The patient is not nervous/anxious. ent negatives and positives are as above. All other systems have been  reviewed and are negative unless otherwise stated.     Objective    Temp:  [97.6 °F (36.4 °C)-98.4 °F (36.9 °C)] 98.4 °F (36.9 °C)  Heart Rate:  [77-91] 86  Resp:  [16-18] 16  BP: ()/(44-71) 105/53  Physical Exam  Vitals and nursing note reviewed.   Constitutional:       Appearance: He is well-developed.      Comments: Advanced age, chronically ill.   HENT:      Head: Normocephalic.   Eyes:      General: No scleral icterus.     Pupils: Pupils are equal, round, and reactive to light.   Neck:      Thyroid: No thyromegaly.      Vascular: No carotid bruit or JVD.      Trachea: No tracheal deviation.   Cardiovascular:      Rate and Rhythm: Normal rate and regular rhythm.      Heart sounds: No murmur heard.     No friction rub. No gallop.   Pulmonary:      Effort: Pulmonary effort is normal. No respiratory distress.      Breath sounds: Normal breath sounds. No wheezing or rales.      Comments: Patient is on room air.  Chest:      Chest wall: No tenderness.   Abdominal:      General: Bowel sounds are normal. There is no distension.      Palpations: Abdomen is soft.      Tenderness: There is no abdominal tenderness.   Genitourinary:     Comments: Perkins cath in place.    Musculoskeletal:      Cervical back: Normal range of motion and neck supple.   Skin:     General: Skin is warm and dry.      Capillary Refill: Capillary refill takes 2 to 3 seconds.      Findings: No rash.   Neurological:      Cranial Nerves: No cranial nerve deficit.      Motor: Weakness present.      Coordination: Coordination abnormal.      Gait: Gait abnormal.   Psychiatric:         Mood and Affect: Mood normal.         Behavior: Behavior normal.          Results Review:  I have reviewed the labs, radiology results, and diagnostic studies.    Laboratory Data:   Results from last 7 days   Lab Units 02/27/25  0416 02/26/25  0658 02/25/25  0318   WBC 10*3/mm3 3.34* 3.49 3.73   HEMOGLOBIN g/dL 7.8* 7.8* 7.9*   HEMATOCRIT % 24.9* 25.0* 25.1*   PLATELETS  "10*3/mm3 46* 47* 45*        Results from last 7 days   Lab Units 02/27/25  0416 02/26/25  0540 02/25/25  0318 02/24/25  0336 02/23/25  0346 02/22/25  0802 02/21/25  0418   SODIUM mmol/L 142 142 141   < > 142   < > 143   POTASSIUM mmol/L 3.5 3.8 4.0   < > 4.3   < > 4.7   CHLORIDE mmol/L 117* 117* 116*   < > 117*   < > 117*   CO2 mmol/L 20.0* 19.0* 19.0*   < > 19.0*   < > 19.0*   BUN mg/dL 29* 30* 33*   < > 44*   < > 57*   CREATININE mg/dL 1.32* 1.25 1.27   < > 1.31*   < > 2.01*   CALCIUM mg/dL 7.5* 7.9* 8.0*   < > 8.2*   < > 8.1*   BILIRUBIN mg/dL  --   --   --   --  0.8  --  0.5   ALK PHOS U/L  --   --   --   --  80  --  64   ALT (SGPT) U/L  --   --   --   --  10  --  9   AST (SGOT) U/L  --   --   --   --  17  --  10   GLUCOSE mg/dL 106* 113* 102*   < > 89   < > 107*    < > = values in this interval not displayed.       Culture Data:   No results found for: \"BLOODCX\", \"URINECX\", \"WOUNDCX\", \"MRSACX\", \"RESPCX\", \"STOOLCX\"    Radiology Data:   Imaging Results (Last 24 Hours)       ** No results found for the last 24 hours. **            I have reviewed the patient's current medications.     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 Patient is 84-year-old  male presented from nursing home with gross hematuria - sounds like another NH resident accidentally got leg tangled in catheter and pulled it out with bulb up.  He's also chronically anticoagulated with Eliquis. He has a known history of a large bladder stone and is followed by Urology in the outpatient setting.  Found to also have FERNANDO, worsening " anemia and thrombocytopenia, COVID +, soft BPs.  Urology following.  Currently on CBI.  Anticoagulation on hold.  I've given transfusion of both platelets (2 days ago) and blood (today).  Enterococcus noted in his urine which is susceptible to Vancomycin.  Dr. Henley with Urology plans to take him to OR on Monday for management of large bladder stone if his clinical status is improving/stable.     Treatment Plan  Hematuria/Chronic catheter/UTI/bladder stone.  History of accidentally got tangled with catheter and pull out with a bulb up.  History of large bladder stone being followed by urology in outpatient setting.  Urology consult.  Vancomycin . Flomax.  CT scan abdomen pelvic-urinary bladder is abnormal in appearance with moderate distention and stranding in the perivesical space as well as an air-fluid level- heterogeneous increased density dependently within the bladder consistent with blood products/clot- Along the left anterolateral aspect of the bladder there is a focus of hyperdensity either representing a adherent clot versus a small neoplasm-not definitely appreciated on previous CT of 2 weeks earlier favoring adherent clot, Perkins catheter has been displaced/retracted into the  prostatic urethra with the balloon inflated within the prostatic urethra, 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 of both kidneys and both ureters dilated in the proximal and mid segment with mild periureteral stranding- may represent some back  pressure related to the urinary bladder distention and displacement of the Perkins catheter, No perinephric fluid collection, Chronically elevated right diaphragm with right basilar atelectasis, small amount of free fluid in the subdiaphragmatic space bilaterally, Liver cirrhotic in morphology- Normal appearance of the gallbladder,  Moderate constipation- normal bowel gas pattern, Mild third spacing of fluid, bladder stone is noted dependently within the urinary  bladder..  Status post cystoscopy with irrigation and evacuation of multiple obstructing clots 2/18/2025  Status post 2/20/2025- CYSTOLITHOLAPAXY WITH LASER (>3.5cm bladder stone), FULGURATION OF PROSTATE.  Tissue pathology-calculus stone.  Perkins cath in place.       GI bleed/anemia.  Hemoccult positive.  GI consult, conservative management due to risk factor.  GI signed off today.  Hemoglobin stable.  No sign of acute bleed.  Patient received blood transfusion 2/21/2025.  Ferric gluconate IV 12/21/25.       Anemia.  Hemoglobin stable..  No sign of acute bleed.     Thrombocytopenia.  Patient received platelets transfusion 2/19/2025.  Platelet count stable.  Status post 1 platelet unit transfusion 2/24/2025.    Neutropenia.  White blood cells slightly decreased.     Soft blood pressure/on chronic anticoagulation.  Hold Eliquis due to hematuria/GI bleed/anemia/thrombocytopenia.  Hold Coreg.  250 cc bolus today.  Start midodrine today.  Doppler ultrasound left upper extremity- no evidence of deep venous thrombosis or  superficial thrombophlebitis of the left upper extremity.  IV  Acute on chronic kidney injury 3B/metabolic acidosis.  Creatinine increased..  Creatinine 9/2/2024 1.85.  Slowly lactated ringer..  Metabolic acidosis improving.  Bicarb p.o .       COVID-positive/pulm fibrosis.  Albuterol inhaler.  Symbicort.  Guaifenesin.  Atrovent.  Chest x-ray-No change since the previous study, No active cardiopulmonary disease, Persistent chronic interstitial changes/fibrosis.  CT scan head-Chronic changes and no acute intracranial findings.   Echocardiogram-Grossly normal left and right ventricular size and function,  Valves are not well-visualized.  Patient is on room air.     Pain . Norco as needed.     Reflux.  Protonix.  Zofran as needed.     Constipation.  Michelle-Colace.     Diet . regular diet     Deconditioning.  PT and OT consult.  CT scan lumbar spine- Multilevel degenerative disc disease and facet arthropathy as  discussed above but no definite acute osseous abnormality,  Please see the CT abdomen and pelvis report for details regarding the kidneys and urinary bladder as well as retroperitoneal stranding.       Urine culture shows enterococci faecalis susceptible vancomycin.  Blood culture shows no growth in 5 days.  MRSA screen-negative.  Respiratory panel-COVID-positive.  Pathology-no malignant cells.     Patient is from nursing home.  Palliative care failure to thrive.        Medical Decision Making  Number and Complexity of problems: Hematuria/chronic cath/UTI/bladder stone  Differential Diagnosis: None     Conditions and Status        Condition is unchanged.     MDM Data  External documents reviewed: Previous note.  Cardiac tracing (EKG, telemetry) interpretation: Sinus .  Radiology interpretation: CT scan/x-ray/echo  Labs reviewed: Laboratory  Any tests that were considered but not ordered: Laboratory in AM     Decision rules/scores evaluated (example [id number], Wells, etc): None     Discussed with: Patient     Care Planning  Shared decision making: Patient  Code status and discussions: DNR . DNI     Disposition  Social Determinants of Health that impact treatment or disposition: From nursing home.  1 to 2 days.        Electronically signed by James Donovan MD, 02/27/25, 10:10 CST.

## 2025-02-27 NOTE — PLAN OF CARE
Goal Outcome Evaluation:  Plan of Care Reviewed With: patient        Progress: improving  Outcome Evaluation: Pt c/o pain with movement.Pt declined to sit EOB but agrees to ex's.Pt was PROM for hip abduction x 10 reps and PROM for heel slides.He was AROM for 20 reps B dorsiflexion.PT will continue to try and progress patient.Pt was max x1 to roll to R side.Pt may need pain meds before PT next tx.

## 2025-02-27 NOTE — PLAN OF CARE
Problem: Adult Inpatient Plan of Care  Goal: Plan of Care Review  Recent Flowsheet Documentation  Taken 2/27/2025 1116 by Edis Quintanilla, OTR/L  Progress: no change  Outcome Evaluation: OT tx completed. pt is alert and agreeable to therapy but easily fatigues. pt reports pain in back, doherty baker 2. Pt completes BUE AROM HEP but fatigues during shoulder portion of HEP and requests to stop. Pt completed 10-20 reps with verbal and tactile cues for technique. 1lb dowel bar utilized as tolerated by pt. Pt was dependent for rolling right and scooting with glide pad. Continue OT POC.

## 2025-02-27 NOTE — PLAN OF CARE
Problem: Palliative Care  Goal: Enhanced Quality of Life  Outcome: Progressing  Intervention: Optimize Psychosocial Wellbeing  Flowsheets (Taken 2/27/2025 1037)  Supportive Measures: active listening utilized    Pt is awake. Grimacing noted with complaints in his neck. He stated it just started hurting and he appeared uncomfortable. Notified nursing. Nursing repositioned and stated this provided relief.    Spoke with pt's daughter via phone and questions encouraged and support provided. She has not been able to speak with her brother as of yet.     Palliative will continue to follow.    Mala Braswell, JAM  2/27/2025

## 2025-02-27 NOTE — THERAPY TREATMENT NOTE
Patient Name: Manohar Garcia  : 1940    MRN: [id number]                              Today's Date: 2025       Admit Date: 2025    Visit Dx:     ICD-10-CM ICD-9-CM   1. Gross hematuria  R31.0 599.71   2. Injury of urethra, initial encounter  S37.30XA 867.0   3. Chronic anticoagulation  Z79.01 V58.61   4. Bladder mass  N32.89 596.89   5. Acute UTI (urinary tract infection)  N39.0 599.0   6. Acute kidney injury  N17.9 584.9   7. Chronic liver disease  K76.9 571.9   8. Thrombocytopenia  D69.6 287.5   9. Hematuria  R31.9 599.70   10. Bladder stone  N21.0 594.1   11. 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    Thrombocytopenia    Hyperlactatemia    Hip fracture    Closed 2-part intertrochanteric fracture of proximal end of right femur    Acute blood loss anemia    Stage 3b chronic kidney disease    COVID-19 virus detected    Influenza A    Hypoxia    Cytokine release syndrome, grade 1    Acute pyelonephritis    Gross Hematuria    Lymphedema of left arm    Constipation    Thrombocytopenia    Bladder stone    Anemia    Moderate protein-calorie malnutrition     Past Medical History:   Diagnosis Date    Dementia     GERD (gastroesophageal reflux disease)     Liver disorder      Past Surgical History:   Procedure Laterality Date    CYSTOLITHALOPAXY PERCUTANEOUS N/A 2025    Procedure: CYSTOLITHOLAPAXY WITH LASER, FULGURATION OF PROSTATE;  Surgeon: Abdias Henley MD;  Location: [facility] OR;  Service: Urology;  Laterality: N/A;    CYSTOSCOPY WITH CLOT EVACUATION N/A 2025    Procedure: CYSTOSCOPY WITH CLOT EVACUATION, FULGURATION;  Surgeon: Abdias Henley MD;  Location: [facility] OR;  Service: Urology;  Laterality: N/A;     HEMORRHOIDECTOMY      HERNIA REPAIR      HIP TROCHANTERIC NAILING WITH INTRAMEDULLARY HIP SCREW Right 8/30/2024    Procedure: HIP TROCHANTERIC NAILING SHORT WITH INTRAMEDULLARY HIP SCREW;  Surgeon: Arcenio Chandra MD;  Location: [facility];  Service: Orthopedics;  Laterality: Right;      General Information       Row Name 02/27/25 1116          OT Time and Intention    Subjective Information complains of;pain  -MM     Document Type therapy note (daily note)  -MM     Mode of Treatment occupational therapy  -MM       Row Name 02/27/25 1116          General Information    Patient Profile Reviewed yes  -MM     Existing Precautions/Restrictions fall  -MM     Barriers to Rehab medically complex;previous functional deficit;cognitive status;hearing deficit  -MM       Row Name 02/27/25 1116          Safety Issues/Impairments Affecting Functional Mobility    Impairments Affecting Function (Mobility) endurance/activity tolerance;strength;cognition;range of motion (ROM)  -MM               User Key  (r) = Recorded By, (t) = Taken By, (c) = Cosigned By      Initials Name Provider Type    Edis Villafuerte, OTR/L Occupational Therapist                     Mobility/ADL's       Row Name 02/27/25 1116          Bed Mobility    Bed Mobility rolling right;scooting/bridging  -MM     Rolling Right Reserve (Bed Mobility) dependent (less than 25% patient effort);2 person assist;verbal cues  -MM     Scooting/Bridging Reserve (Bed Mobility) dependent (less than 25% patient effort);2 person assist;verbal cues  -MM     Assistive Device (Bed Mobility) bed rails;head of bed elevated;repositioning sheet  -MM               User Key  (r) = Recorded By, (t) = Taken By, (c) = Cosigned By      Initials Name Provider Type    Edis Villafuerte OTR/L Occupational Therapist                   Obj/Interventions       Row Name 02/27/25 1116          Shoulder (Therapeutic Exercise)    Shoulder (Therapeutic Exercise) AROM (active range of  motion)  -MM     Shoulder AROM (Therapeutic Exercise) bilateral;flexion;extension;scapular protraction;scapular retraction;10 repetitions;20 repititions  -MM       Row Name 02/27/25 1116          Elbow/Forearm (Therapeutic Exercise)    Elbow/Forearm (Therapeutic Exercise) AROM (active range of motion)  -MM     Elbow/Forearm AROM (Therapeutic Exercise) bilateral;flexion;extension;supination;pronation;10 repetitions;20 repititions  -MM       Row Name 02/27/25 1116          Wrist (Therapeutic Exercise)    Wrist (Therapeutic Exercise) AROM (active range of motion)  -MM     Wrist AROM (Therapeutic Exercise) bilateral;flexion;extension;ulnar deviation;radial deviation;10 repetitions;20 repititions  -MM       Row Name 02/27/25 1116          Hand (Therapeutic Exercise)    Hand (Therapeutic Exercise) AROM (active range of motion)  -MM     Hand AROM/AAROM (Therapeutic Exercise) bilateral;AROM (active range of motion);finger flexion;finger extension;10 repetitions;20 repititions  -MM               User Key  (r) = Recorded By, (t) = Taken By, (c) = Cosigned By      Initials Name Provider Type    MM Edis Quintanilla, OTR/L Occupational Therapist                   Goals/Plan    No documentation.                  Clinical Impression       Row Name 02/27/25 1116          Pain Assessment    Pain Location back  -MM     Pain Management Interventions exercise or physical activity utilized  -MM     Response to Pain Interventions no change per patient report  -MM       Row Name 02/27/25 1116          Pain Scale: FACES Pre/Post-Treatment    Pain: FACES Scale, Pretreatment 2-->hurts little bit  -MM     Posttreatment Pain Rating 2-->hurts little bit  -MM       Row Name 02/27/25 1116          Plan of Care Review    Plan of Care Reviewed With patient  -MM     Progress no change  -MM     Outcome Evaluation OT tx completed. pt is alert and agreeable to therapy but easily fatigues. pt reports pain in back, doherty baker 2. Pt completes BUE AROM  HEP but fatigues during shoulder portion of HEP and requests to stop. Pt completed 10-20 reps with verbal and tactile cues for technique. 1lb dowel bar utilized as tolerated by pt. Pt was dependent for rolling right and scooting with glide pad. Continue OT POC.  -MM       Row Name 02/27/25 1116          Therapy Plan Review/Discharge Plan (OT)    Anticipated Discharge Disposition (OT) skilled nursing facility  -MM       Row Name 02/27/25 1116          Positioning and Restraints    Pre-Treatment Position in bed  -MM     Post Treatment Position bed  -MM     In Bed notified nsg;fowlers;side lying right;call light within reach;encouraged to call for assist;exit alarm on;side rails up x3;RUE elevated;pillow between legs;legs elevated;heels elevated  -MM               User Key  (r) = Recorded By, (t) = Taken By, (c) = Cosigned By      Initials Name Provider Type    MM Edis Quintanilla, OTR/L Occupational Therapist                   Outcome Measures       Row Name 02/27/25 1116          How much help from another is currently needed...    Putting on and taking off regular lower body clothing? 1  -MM     Bathing (including washing, rinsing, and drying) 2  -MM     Toileting (which includes using toilet bed pan or urinal) 1  -MM     Putting on and taking off regular upper body clothing 2  -MM     Taking care of personal grooming (such as brushing teeth) 3  -MM     Eating meals 3  -MM     AM-PAC 6 Clicks Score (OT) 12  -MM       Row Name 02/27/25 0800          How much help from another person do you currently need...    Turning from your back to your side while in flat bed without using bedrails? 2  -KJ     Moving from lying on back to sitting on the side of a flat bed without bedrails? 2  -KJ     Moving to and from a bed to a chair (including a wheelchair)? 2  -KJ     Standing up from a chair using your arms (e.g., wheelchair, bedside chair)? 1  -KJ     Climbing 3-5 steps with a railing? 1  -KJ     To walk in hospital room?  1  -KJ     AM-PAC 6 Clicks Score (PT) 9  -KJ     Highest Level of Mobility Goal 3 --> Sit at edge of bed  -KJ       Row Name 02/27/25 1116          Functional Assessment    Outcome Measure Options AM-PAC 6 Clicks Daily Activity (OT)  -MM               User Key  (r) = Recorded By, (t) = Taken By, (c) = Cosigned By      Initials Name Provider Type    Vivian Lewis, RN Registered Nurse    Edis Villafuerte, OTR/L Occupational Therapist                    Occupational Therapy Education       Title: PT OT SLP Therapies (In Progress)       Topic: Occupational Therapy (In Progress)       Point: ADL training (In Progress)       Description:   Instruct learner(s) on proper safety adaptation and remediation techniques during self care or transfers.   Instruct in proper use of assistive devices.                  Learning Progress Summary            Patient Acceptance, E, NR by MM at 2/19/2025 1631                      Point: Home exercise program (In Progress)       Description:   Instruct learner(s) on appropriate technique for monitoring, assisting and/or progressing therapeutic exercises/activities.                  Learning Progress Summary            Patient Acceptance, E, NR by MM at 2/27/2025 1408    Acceptance, E, NR by MM at 2/21/2025 1008                      Point: Precautions (In Progress)       Description:   Instruct learner(s) on prescribed precautions during self-care and functional transfers.                  Learning Progress Summary            Patient Acceptance, E, NR by MM at 2/21/2025 1008    Acceptance, E, NR by MM at 2/19/2025 1631                      Point: Body mechanics (In Progress)       Description:   Instruct learner(s) on proper positioning and spine alignment during self-care, functional mobility activities and/or exercises.                  Learning Progress Summary            Patient Acceptance, E, NR by MM at 2/27/2025 1408    Acceptance, E, NR by MM at 2/21/2025 1008     Acceptance, E, NR by  at 2/19/2025 1631                                      User Key       Initials Effective Dates Name Provider Type Discipline     07/11/23 -  Edis Quintanilla OTR/L Occupational Therapist OT                  OT Recommendation and Plan  Planned Therapy Interventions (OT): activity tolerance training, BADL retraining, functional balance retraining, occupation/activity based interventions, patient/caregiver education/training, ROM/therapeutic exercise, strengthening exercise, transfer/mobility retraining, adaptive equipment training, cognitive/visual perception retraining, neuromuscular control/coordination retraining, passive ROM/stretching  Therapy Frequency (OT): 3 times/wk  Plan of Care Review  Plan of Care Reviewed With: patient  Progress: no change  Outcome Evaluation: OT tx completed. pt is alert and agreeable to therapy but easily fatigues. pt reports pain in back, doherty baker 2. Pt completes BUE AROM HEP but fatigues during shoulder portion of HEP and requests to stop. Pt completed 10-20 reps with verbal and tactile cues for technique. 1lb dowel bar utilized as tolerated by pt. Pt was dependent for rolling right and scooting with glide pad. Continue OT POC.     Time Calculation:         Time Calculation- OT       Row Name 02/27/25 1116             Time Calculation- OT    OT Start Time 1116  -MM      OT Stop Time 1140  -MM      OT Time Calculation (min) 24 min  -MM      Total Timed Code Minutes- OT 24 minute(s)  -MM      OT Received On 02/27/25  -MM         Timed Charges    42322 - OT Therapeutic Exercise Minutes 14  -MM      21594 - OT Therapeutic Activity Minutes 10  -MM         Total Minutes    Timed Charges Total Minutes 24  -MM       Total Minutes 24  -MM                User Key  (r) = Recorded By, (t) = Taken By, (c) = Cosigned By      Initials Name Provider Type     Edis Quintanilla, OTR/L Occupational Therapist                  Therapy Charges for Today       Code  Description Service Date Service Provider Modifiers Qty    70363114947 HC OT THER PROC EA 15 MIN 2/27/2025 Edis Quintanilla, OTR/L GO 1    53265825971  OT THERAPEUTIC ACT EA 15 MIN 2/27/2025 Edis Quintanilla, OTR/L GO 1                 LINUS Serrano/TUCKER  2/27/2025

## 2025-02-27 NOTE — THERAPY TREATMENT NOTE
Acute Care - Physical Therapy Treatment Note  [facility]     Patient Name: Manohar Garcia  : 1940  MRN: [id number]  Today's Date: 2025      Visit Dx:     ICD-10-CM ICD-9-CM   1. Gross hematuria  R31.0 599.71   2. Injury of urethra, initial encounter  S37.30XA 867.0   3. Chronic anticoagulation  Z79.01 V58.61   4. Bladder mass  N32.89 596.89   5. Acute UTI (urinary tract infection)  N39.0 599.0   6. Acute kidney injury  N17.9 584.9   7. Chronic liver disease  K76.9 571.9   8. Thrombocytopenia  D69.6 287.5   9. Hematuria  R31.9 599.70   10. Bladder stone  N21.0 594.1   11. 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    Thrombocytopenia    Hyperlactatemia    Hip fracture    Closed 2-part intertrochanteric fracture of proximal end of right femur    Acute blood loss anemia    Stage 3b chronic kidney disease    COVID-19 virus detected    Influenza A    Hypoxia    Cytokine release syndrome, grade 1    Acute pyelonephritis    Gross Hematuria    Lymphedema of left arm    Constipation    Thrombocytopenia    Bladder stone    Anemia    Moderate protein-calorie malnutrition     Past Medical History:   Diagnosis Date    Dementia     GERD (gastroesophageal reflux disease)     Liver disorder      Past Surgical History:   Procedure Laterality Date    CYSTOLITHALOPAXY PERCUTANEOUS N/A 2025    Procedure: CYSTOLITHOLAPAXY WITH LASER, FULGURATION OF PROSTATE;  Surgeon: Abdias Henley MD;  Location: [facility] OR;  Service: Urology;  Laterality: N/A;    CYSTOSCOPY WITH CLOT EVACUATION N/A 2025    Procedure: CYSTOSCOPY WITH CLOT EVACUATION, FULGURATION;  Surgeon: Abdias Henley MD;  Location: [facility] OR;  Service: Urology;  Laterality: N/A;     HEMORRHOIDECTOMY      HERNIA REPAIR      HIP TROCHANTERIC NAILING WITH INTRAMEDULLARY HIP SCREW Right 8/30/2024    Procedure: HIP TROCHANTERIC NAILING SHORT WITH INTRAMEDULLARY HIP SCREW;  Surgeon: Arcenio Chandra MD;  Location: [facility];  Service: Orthopedics;  Laterality: Right;     PT Assessment (Last 12 Hours)       PT Evaluation and Treatment       Row Name 02/27/25 1504          Physical Therapy Time and Intention    Subjective Information complains of;pain  pt is refusing pain meds  -AE     Document Type therapy note (daily note)  -AE     Mode of Treatment physical therapy  -AE       Row Name 02/27/25 1504          General Information    Existing Precautions/Restrictions fall  -AE       Row Name 02/27/25 1504          Pain    Pretreatment Pain Rating 0/10 - no pain  -AE     Posttreatment Pain Rating 8/10  -AE     Pain Side/Orientation generalized  -AE       Row Name 02/27/25 1504          Bed Mobility    Rolling Right [place] (Bed Mobility) maximum assist (25% patient effort)  -AE     Comment, (Bed Mobility) refused  -AE       Row Name 02/27/25 1504          Shoulder (Therapeutic Exercise)    Shoulder (Therapeutic Exercise) AROM (active range of motion)  -AE     Shoulder AROM (Therapeutic Exercise) bilateral;3 repetitions;right  -AE       Row Name 02/27/25 1504          Hip (Therapeutic Exercise)    Hip (Therapeutic Exercise) PROM (passive range of motion)  -AE     Hip PROM (Therapeutic Exercise) aBduction;aDduction;10 repetitions  -AE       Row Name 02/27/25 1504          Knee (Therapeutic Exercise)    Knee (Therapeutic Exercise) PROM (passive range of motion)  -AE     Knee PROM (Therapeutic Exercise) flexion;10 repetitions  -AE       Row Name 02/27/25 1504          Ankle (Therapeutic Exercise)    Ankle (Therapeutic Exercise) AROM (active range of motion)  -AE     Ankle AROM (Therapeutic Exercise) 20 repititions;dorsiflexion;plantarflexion  -AE       Row Name             Wound 02/21/25 1617 thoracic  spine    Wound - Properties Group Placement Date: 02/21/25  -PS Placement Time: 1617  -PS Location: thoracic spine  -PS    Retired Wound - Properties Group Placement Date: 02/21/25  -PS Placement Time: 1617  -PS Location: thoracic spine  -PS    Retired Wound - Properties Group Placement Date: 02/21/25  -PS Placement Time: 1617  -PS Location: thoracic spine  -PS    Retired Wound - Properties Group Date first assessed: 02/21/25  -PS Time first assessed: 1617  -PS Location: thoracic spine  -PS      Row Name             Wound 02/23/25 0608 Left upper back    Wound - Properties Group Placement Date: 02/23/25  -EY Placement Time: 0608  -EY Side: Left  -EY Orientation: upper  -EY Location: back  -EY    Retired Wound - Properties Group Placement Date: 02/23/25  -EY Placement Time: 0608  -EY Side: Left  -EY Orientation: upper  -EY Location: back  -EY    Retired Wound - Properties Group Placement Date: 02/23/25  -EY Placement Time: 0608  -EY Side: Left  -EY Orientation: upper  -EY Location: back  -EY    Retired Wound - Properties Group Date first assessed: 02/23/25  -EY Time first assessed: 0608  -EY Side: Left  -EY Location: back  -EY      Row Name             Wound 02/23/25 0610 Left hip    Wound - Properties Group Placement Date: 02/23/25  -EY Placement Time: 0610  -EY Side: Left  -EY Location: hip  -EY Primary Wound Type: Abrasion  -EY    Retired Wound - Properties Group Placement Date: 02/23/25  -EY Placement Time: 0610  -EY Side: Left  -EY Location: hip  -EY Primary Wound Type: Abrasion  -EY    Retired Wound - Properties Group Placement Date: 02/23/25  -EY Placement Time: 0610  -EY Side: Left  -EY Location: hip  -EY Primary Wound Type: Abrasion  -EY    Retired Wound - Properties Group Date first assessed: 02/23/25  -EY Time first assessed: 0610  -EY Side: Left  -EY Location: hip  -EY Primary Wound Type: Abrasion  -EY      Row Name             Wound 02/23/25 0617 Left shoulder    Wound - Properties Group Placement Date:  02/23/25  -EY Placement Time: 0617 -EY Side: Left  -EY Location: shoulder  -EY    Retired Wound - Properties Group Placement Date: 02/23/25 -EY Placement Time: 0617 -EY Side: Left  -EY Location: shoulder  -EY    Retired Wound - Properties Group Placement Date: 02/23/25 -EY Placement Time: 0617 -EY Side: Left  -EY Location: shoulder  -EY    Retired Wound - Properties Group Date first assessed: 02/23/25 -EY Time first assessed: 0617 -EY Side: Left  -EY Location: shoulder  -EY      Row Name 02/27/25 1504          Positioning and Restraints    Pre-Treatment Position in bed  -AE     Post Treatment Position bed  -AE     In Bed side lying right;call light within reach  -AE               User Key  (r) = Recorded By, (t) = Taken By, (c) = Cosigned By      Initials Name Provider Type    AE aKthia Hansen, PTA Physical Therapist Assistant    Kristi Weinberg RN Registered Nurse    Mala Tolliver RN Registered Nurse                    Physical Therapy Education       Title: PT OT SLP Therapies (In Progress)       Topic: Physical Therapy (In Progress)       Point: Mobility training (In Progress)       Learning Progress Summary            Patient Nonacceptance, E, NR by AE at 2/25/2025 0949    Comment: ex's    Acceptance, E, VU,NR by HUGO at 2/23/2025 0910    Comment: bed mobility, benefits of activity    Acceptance, E, NR by SB at 2/19/2025 1449    Comment: pt edu on POC, benefits of act and d/c plans   Family Eager, E, VU by AE at 2/27/2025 1528    Comment: Spoke with Son about POC.Pt not wanted/able to participate more due to pain.                      Point: Home exercise program (Not Started)       Learner Progress:  Not documented in this visit.              Point: Body mechanics (Not Started)       Learner Progress:  Not documented in this visit.              Point: Precautions (In Progress)       Learning Progress Summary            Patient Acceptance, E, NR by SB at 2/19/2025 1449    Comment: pt edu on  POC, benefits of act and d/c plans                                      User Key       Initials Effective Dates Name Provider Type Discipline    AE 02/03/23 -  Kathia Hansen PTA Physical Therapist Assistant PT    HUGO 02/03/23 -  Jeremie Stanford PTA Physical Therapist Assistant PT    SB 07/11/23 -  Agustina Glover PT DPT Physical Therapist PT                  PT Recommendation and Plan     Progress: improving  Outcome Evaluation: Pt c/o pain with movement.Pt declined to sit EOB but agrees to ex's.Pt was PROM for hip abduction x 10 reps and PROM for heel slides.He was AROM for 20 reps B dorsiflexion.PT will continue to try and progress patient.Pt was max x1 to roll to R side.Pt may need pain meds before PT next tx.       Time Calculation:    PT Charges       Row Name 02/27/25 1534             Time Calculation    Start Time 1504  -AE      Stop Time 1528  -AE      Time Calculation (min) 24 min  -AE      PT Received On 02/27/25  -AE         Time Calculation- PT    Total Timed Code Minutes- PT 24 minute(s)  -AE                User Key  (r) = Recorded By, (t) = Taken By, (c) = Cosigned By      Initials Name Provider Type    AE Kathia Hansen PTA Physical Therapist Assistant                  Therapy Charges for Today       Code Description Service Date Service Provider Modifiers Qty    66640215566 HC PT THER PROC EA 15 MIN 2/26/2025 Kathia Hansen PTA GP 2    71228201411 HC PT THER PROC EA 15 MIN 2/27/2025 Kathia Hansen PTA GP 2            PT G-Codes  Outcome Measure Options: AM-PAC 6 Clicks Daily Activity (OT)  AM-PAC 6 Clicks Score (PT): 9  AM-PAC 6 Clicks Score (OT): 12    Kathia Hansen PTA  2/27/2025

## 2025-02-27 NOTE — PLAN OF CARE
Goal Outcome Evaluation:  Plan of Care Reviewed With: patient        Progress: no change  Outcome Evaluation: Patient stable overnight. Continues to have rust colored loose stools. 3 way de in place with concentrated marcelo urine. IV abx per orders. c/o neck pain, see mar. EMMAE elevated. Weeping from arms and abdomen observed. repositioned q2. call light within reach and safety maintained

## 2025-02-27 NOTE — PLAN OF CARE
Goal Outcome Evaluation:  Plan of Care Reviewed With: patient        Progress: no change  Outcome Evaluation: Pt A&O. Turn Q2. Incont stool, 3 way de in place with concentrated UOP, no blood noted. IV abx infusing per orders. LUE elevated per orders. VSS safety maintained.

## 2025-02-28 VITALS
TEMPERATURE: 97.7 F | SYSTOLIC BLOOD PRESSURE: 123 MMHG | HEIGHT: 71 IN | HEART RATE: 84 BPM | DIASTOLIC BLOOD PRESSURE: 60 MMHG | BODY MASS INDEX: 29.36 KG/M2 | WEIGHT: 209.7 LBS | OXYGEN SATURATION: 96 % | RESPIRATION RATE: 16 BRPM

## 2025-02-28 LAB
ANION GAP SERPL CALCULATED.3IONS-SCNC: 6 MMOL/L (ref 5–15)
BUN SERPL-MCNC: 28 MG/DL (ref 8–23)
BUN/CREAT SERPL: 21.7 (ref 7–25)
CALCIUM SPEC-SCNC: 7.5 MG/DL (ref 8.6–10.5)
CHLORIDE SERPL-SCNC: 115 MMOL/L (ref 98–107)
CO2 SERPL-SCNC: 20 MMOL/L (ref 22–29)
CREAT SERPL-MCNC: 1.29 MG/DL (ref 0.76–1.27)
DEPRECATED RDW RBC AUTO: 63.6 FL (ref 37–54)
EGFRCR SERPLBLD CKD-EPI 2021: 54.7 ML/MIN/1.73
ERYTHROCYTE [DISTWIDTH] IN BLOOD BY AUTOMATED COUNT: 18.6 % (ref 12.3–15.4)
GLUCOSE SERPL-MCNC: 102 MG/DL (ref 65–99)
HCT VFR BLD AUTO: 25.4 % (ref 37.5–51)
HGB BLD-MCNC: 8 G/DL (ref 13–17.7)
MCH RBC QN AUTO: 29.6 PG (ref 26.6–33)
MCHC RBC AUTO-ENTMCNC: 31.5 G/DL (ref 31.5–35.7)
MCV RBC AUTO: 94.1 FL (ref 79–97)
PLATELET # BLD AUTO: 47 10*3/MM3 (ref 140–450)
PMV BLD AUTO: 11.8 FL (ref 6–12)
POTASSIUM SERPL-SCNC: 3.6 MMOL/L (ref 3.5–5.2)
RBC # BLD AUTO: 2.7 10*6/MM3 (ref 4.14–5.8)
SODIUM SERPL-SCNC: 141 MMOL/L (ref 136–145)
WBC NRBC COR # BLD AUTO: 3.62 10*3/MM3 (ref 3.4–10.8)

## 2025-02-28 PROCEDURE — 85027 COMPLETE CBC AUTOMATED: CPT | Performed by: FAMILY MEDICINE

## 2025-02-28 PROCEDURE — 80048 BASIC METABOLIC PNL TOTAL CA: CPT | Performed by: UROLOGY

## 2025-02-28 PROCEDURE — 94760 N-INVAS EAR/PLS OXIMETRY 1: CPT

## 2025-02-28 PROCEDURE — 94799 UNLISTED PULMONARY SVC/PX: CPT

## 2025-02-28 RX ORDER — ACETAMINOPHEN 325 MG/1
650 TABLET ORAL EVERY 4 HOURS PRN
Start: 2025-02-28

## 2025-02-28 RX ORDER — MIDODRINE HYDROCHLORIDE 2.5 MG/1
2.5 TABLET ORAL
Start: 2025-02-28

## 2025-02-28 RX ORDER — PANTOPRAZOLE SODIUM 40 MG/1
40 TABLET, DELAYED RELEASE ORAL DAILY
Start: 2025-02-28

## 2025-02-28 RX ORDER — HYDROCODONE BITARTRATE AND ACETAMINOPHEN 5; 325 MG/1; MG/1
1 TABLET ORAL EVERY 4 HOURS PRN
Qty: 14 TABLET | Refills: 0 | Status: SHIPPED | OUTPATIENT
Start: 2025-02-28

## 2025-02-28 RX ORDER — SODIUM BICARBONATE 650 MG/1
650 TABLET ORAL 2 TIMES DAILY
Start: 2025-02-28

## 2025-02-28 RX ORDER — MULTIPLE VITAMINS W/ MINERALS TAB 9MG-400MCG
1 TAB ORAL DAILY
Start: 2025-03-01

## 2025-02-28 RX ADMIN — PANTOPRAZOLE SODIUM 40 MG: 40 INJECTION, POWDER, FOR SOLUTION INTRAVENOUS at 08:51

## 2025-02-28 RX ADMIN — IPRATROPIUM BROMIDE 2 PUFF: 17 AEROSOL, METERED RESPIRATORY (INHALATION) at 10:30

## 2025-02-28 RX ADMIN — SODIUM BICARBONATE 650 MG TABLET 650 MG: at 08:51

## 2025-02-28 RX ADMIN — Medication 1 TABLET: at 08:51

## 2025-02-28 RX ADMIN — BUDESONIDE AND FORMOTEROL FUMARATE DIHYDRATE 2 PUFF: 160; 4.5 AEROSOL RESPIRATORY (INHALATION) at 07:15

## 2025-02-28 RX ADMIN — HYDROCODONE BITARTRATE AND ACETAMINOPHEN 1 TABLET: 5; 325 TABLET ORAL at 12:38

## 2025-02-28 RX ADMIN — MIDODRINE HYDROCHLORIDE 2.5 MG: 10 TABLET ORAL at 17:33

## 2025-02-28 RX ADMIN — ALBUTEROL SULFATE 2 PUFF: 108 INHALANT RESPIRATORY (INHALATION) at 14:38

## 2025-02-28 RX ADMIN — MIDODRINE HYDROCHLORIDE 2.5 MG: 10 TABLET ORAL at 12:34

## 2025-02-28 RX ADMIN — IPRATROPIUM BROMIDE 2 PUFF: 17 AEROSOL, METERED RESPIRATORY (INHALATION) at 14:38

## 2025-02-28 RX ADMIN — MIDODRINE HYDROCHLORIDE 2.5 MG: 10 TABLET ORAL at 08:51

## 2025-02-28 RX ADMIN — ALBUTEROL SULFATE 2 PUFF: 108 INHALANT RESPIRATORY (INHALATION) at 10:30

## 2025-02-28 RX ADMIN — GUAIFENESIN 1200 MG: 600 TABLET ORAL at 08:51

## 2025-02-28 RX ADMIN — ALBUTEROL SULFATE 2 PUFF: 108 INHALANT RESPIRATORY (INHALATION) at 07:15

## 2025-02-28 RX ADMIN — IPRATROPIUM BROMIDE 2 PUFF: 17 AEROSOL, METERED RESPIRATORY (INHALATION) at 07:15

## 2025-02-28 RX ADMIN — Medication 10 ML: at 08:52

## 2025-02-28 NOTE — DISCHARGE SUMMARY
[facility] Medicine Services  DISCHARGE SUMMARY       Date of Admission: 2/18/2025  Date of Discharge:  2/28/2025  Primary Care Physician: Dani Farfan MD    Presenting Problem/History of Present Illness:      Final Discharge Diagnoses:  Active Hospital Problems    Diagnosis     **Acute kidney injury superimposed on stage 3b chronic kidney disease     Moderate protein-calorie malnutrition     Anemia     Thrombocytopenia     Bladder stone     Gross Hematuria     Lymphedema of left arm     Constipation     COVID-19 virus detected     Stage 3b chronic kidney disease     Acute blood loss anemia     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 GI . urology.    Procedures Performed: Procedure(s):  1) CYSTOLITHOLAPAXY WITH LASER (>3.5cm bladder stone)  2)  FULGURATION OF PROSTATE    Procedure(s):  CYSTOSCOPY WITH CLOT EVACUATION, FULGURATION OF BLEEDING, BALLOON DILATION OF PENILE URETHRAL STRICTURE    Pertinent Test Results:   Results for orders placed during the hospital encounter of 02/18/25    Adult Transthoracic Echo Complete w/ Color, Spectral and Contrast if Necessary Per Protocol    Interpretation Summary    Technically difficult and suboptimal study.    Grossly normal left and right ventricular size and function.    Valves are not well-visualized.      Imaging Results (All)       Procedure Component Value Units Date/Time    US Venous Doppler Upper Extremity Left (duplex) [[id number]] Collected: 02/19/25 1245     Updated: 02/19/25 1249    Narrative:      History: Left arm swelling       Impression:      Impression: There is no evidence of deep venous thrombosis or  superficial thrombophlebitis of the left upper extremity.     Comments: Left upper extremity venous duplex exam was performed using  color Doppler flow, Doppler wave form analysis, and grayscale imaging,  with and without compression. There is no evidence of deep  venous  thrombosis of the internal jugular, subclavian, axillary, brachial,  radial, and ulnar veins. There is no evidence of superficial  thrombophlebitis involving the cephalic or basilic veins.         This report was signed and finalized on 2/19/2025 12:46 PM by Samuel Yee.       CT Lumbar Spine Without Contrast [[id number]] Collected: 02/18/25 0759     Updated: 02/18/25 0809    Narrative:      EXAMINATION: CT LUMBAR SPINE WO CONTRAST-  2/18/2025 7:59 AM     HISTORY: Fall injury low back pain     COMPARISON: 8/29/2024     DLP: 2068.45 mGy.cm     In order to have a CT radiation dose as low as reasonably achievable,  Automated Exposure Control was utilized for adjustment of the mA and/or  KV according to patient size.     TECHNIQUE: Serial helical tomographic images of the lumbar spine were  obtained without the use of intravenous contrast. Additionally, sagittal  and coronal reformatted images were provided for review.     FINDINGS:  Small ribs at what is presumed to be L1. I don't see any definite  evidence of an acute fracture deformity. Mild degenerative change at the  SI joints, RIGHT greater than LEFT. Multilevel facet hypertrophy.     Multilevel degenerative endplate osteophytes with marginal osteophytes  throughout the visualized portion of the lumbar spine. I don't see a  definite discrete fracture deformity. Levoconvex curvature in the lumbar  spine is noted. Asymmetric to space height loss on the RIGHT at L3-L4.     There is some vacuum disc phenomenon at L5-S1 with moderate disc space  height loss and a diffuse disc bulge with disc osteophyte complexes  extending into the inferior aspect of the bilateral foramina  contributing to at least moderate RIGHT and likely severe LEFT foraminal  narrowing. Moderate facet hypertrophy at L4-L5 with mild diffuse disc  bulge contributing to at least mild and likely moderate canal narrowing  as well as mild LEFT and moderate RIGHT foraminal narrowing.  Diffuse  disc bulge at L3-L4 with posterior element hypertrophy and findings  contribute to at least mild thecal sac narrowing. Moderate RIGHT  foraminal narrowing at L2-L3.     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  Probable nonobstructing nephroliths in the LEFT portion of the horseshoe  kidney. In addition there is high density seen dependently within the  urinary bladder, suggestive of blood products. Stranding in the  retroperitoneum on the LEFT is also noted. Please see the CT abdomen  pelvis from today for further details.       Impression:         1.  Multilevel degenerative disc disease and facet arthropathy as  discussed above but no definite acute osseous abnormality.     2.  Please see the CT abdomen and pelvis report for details regarding  the kidneys and urinary bladder as well as retroperitoneal stranding.        This report was signed and finalized on 2/18/2025 8:06 AM by Dr. Alex Mckeon MD.       CT Abdomen Pelvis Without Contrast [[id number]] Collected: 02/18/25 0742     Updated: 02/18/25 0802    Narrative:      CT ABDOMEN PELVIS WO CONTRAST- 2/18/2025 6:04 AM     HISTORY: Fall with fall with abdominal pain please also comment on hips  and sacrum      COMPARISON: 2/4/2025     DLP: 2068.45 mGy.cm . All CT scans are performed using dose optimization  techniques as appropriate to the performed exam and including at least  one of the following: Automated exposure control, adjustment of the mA  and/or kV according to size, and the use of the iterative reconstruction  technique.     TECHNIQUE: Noncontrast enhanced images of the abdomen and pelvis  obtained without oral contrast.     FINDINGS:  There is a chronically elevated right hemidiaphragm with right basilar  atelectasis. Trace left effusion is present. Lung bases are otherwise  clear. The base of the heart is remarkable for coronary calcifications.  No pericardial effusion. Bilateral gynecomastia is present..      LIVER: The liver is cirrhotic in morphology. No discrete hepatic mass.  There is free fluid in the subdiaphragmatic space on the right..     BILIARY SYSTEM: The gallbladder is unremarkable. No intrahepatic or  extrahepatic ductal dilatation.     PANCREAS: No focal pancreatic lesion.     SPLEEN: No evidence of splenic enlargement. There is a suspected  splenule in the subdiaphragmatic space on the left. There is a small  amount of free fluid in the left subdiaphragmatic space. Varicosities  noted in the left upper quadrant..     KIDNEYS AND ADRENALS: The adrenals are unremarkable. There is a  horseshoe type kidney with partial fusion along the lower pole of the  kidneys and abnormal orientation of both kidneys. There is moderate  dilatation of the upper tracts of both kidneys with moderate dilatation  of the proximal and mid segment of both ureters. Mild distal left  periureteral stranding is present. No evidence of ureteral stone.. The  urinary bladder is abnormal in appearance with a large bladder stone  dependently within the bladder. There is also increased density  dependently within the bladder consistent with blood products. There is  focal nodular bladder wall thickening along the left anterolateral  aspect of the bladder either representing a small neoplasm or adherent  clot. There is thickening of the urinary bladder wall with moderate  distention and with perivesical stranding suggesting inflammation. A  Perkins catheter is in place but has been retracted into the prostatic  urethra with the balloon dilated within the prostatic segment of the  urethra..     RETROPERITONEUM: No mass, lymphadenopathy or hemorrhage.     GI TRACT: Moderate constipation is present with a large volume of stool  throughout the colon. The small bowel is normal in distribution and  appearance. No gastric wall thickening or gastric outlet obstruction..  The appendix is visualized and unremarkable.     OTHER: There is no mesenteric  mass, lymphadenopathy or fluid collection.  The osseous structures and soft tissues demonstrate no worrisome  lesions. Mild third spacing of fluid is noted with stranding within the  more dependent right-sided panniculus and skin thickening.     PELVIS: The prostate gland is enlarged. There is no free fluid in the  pelvis.. A bladder stone is present. There is air within the urinary  bladder. There is stranding within the perivesical space..       Impression:      1. The urinary bladder is abnormal in appearance with moderate  distention and stranding in the perivesical space as well as an  air-fluid level. There is heterogeneous increased density dependently  within the bladder consistent with blood products/clot. Along the left  anterolateral aspect of the bladder there is a focus of hyperdensity  either representing a adherent clot versus a small neoplasm. This is not  definitely appreciated on previous CT of 2 weeks earlier favoring  adherent clot. The Perkins catheter has been displaced/retracted into the  prostatic urethra with the balloon inflated within the prostatic  urethra.  2. 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 I suspect this may represent some back  pressure related to the urinary bladder distention and displacement of  the Perkins catheter. No perinephric fluid collection present.  3. Chronically elevated right diaphragm with right basilar atelectasis.  4. There is a small amount of free fluid in the subdiaphragmatic space  bilaterally.  5. Liver cirrhotic in morphology. Normal appearance of the gallbladder.  6. Moderate constipation. Otherwise normal bowel gas pattern.  7. Mild third spacing of fluid.  8. A bladder stone is noted dependently within the urinary bladder..           This report was signed and finalized on 2/18/2025 7:58 AM by Dr. Deonte Olivo MD.        CT Head Without Contrast [[id number]] Collected: 02/18/25 0724     Updated: 02/18/25 0728    Narrative:      EXAM: CT HEAD WO CONTRAST-      DATE: 2/18/2025 6:04 AM     HISTORY: Altered mental state       COMPARISON: 8/29/2021.     DOSE LENGTH PRODUCT: 802.1 mGy.cm  Automated exposure control was also  utilized to decrease patient radiation dose.     TECHNIQUE: Unenhanced CT images obtained from vertex to skull base with  multiplanar reformats.     FINDINGS:  There is no acute intracranial hemorrhage, midline shift, mass effect,  or hydrocephalus. There is no CT evidence for acute infarct.     There are chronic changes with volume loss and chronic small vessel  ischemic change of the periventricular white matter.      SOFT TISSUES: The scalp soft tissues are unremarkable.        SINUS:The visualized paranasal sinuses and mastoid air cells are clear.      ORBITS: The visualized orbits and globes are unremarkable. There is  bilateral lens extraction.          Impression:      1. Chronic changes and no acute intracranial findings.      This report was signed and finalized on 2/18/2025 7:25 AM by Nima Villatoro.       XR Chest 1 View [[id number]] Collected: 02/18/25 0656     Updated: 02/18/25 0701    Narrative:      EXAMINATION: XR CHEST 1 VW-     2/18/2025 5:30 AM     HISTORY: Cough     A frontal projection of the chest is compared with the previous study  dated 2/5/2025.     The lungs are poorly expanded, worse on the right side.     There is marked elevation right diaphragm similar to the previous study.  There is underlying hepatic flexure of the colon.     There are coarse interstitial changes in the lungs bilaterally which  probably represent a chronic process/fibrosis.     There is no pleural effusion, pulmonary congestion or pneumothorax.     The heart size is not optimally evaluated due to the AP projection.  Atheromatous change of thoracic aorta are noted.     There is no acute bony abnormality.        Impression:      1. No change since the previous study. No active cardiopulmonary  disease. Persistent chronic interstitial changes/fibrosis.        This report was signed and finalized on 2/18/2025 6:58 AM by Dr. Shahram Patel MD.       XR Hips Bilateral With or Without Pelvis 5 View [[id number]] Collected: 02/18/25 0651     Updated: 02/18/25 0659    Narrative:      EXAMINATION: XR HIPS BILATERAL W OR WO PELVIS 5 VIEW-     2/18/2025 5:20 AM     HISTORY: Fall injury     A frontal projection of the pelvis and frontal and lateral views of the  right and left hip are obtained. Comparison is made with the previous  study dated 8/29/2024.     There is no evidence of an acute fracture or dislocation.     Hardware internal fixation of the intertrochanteric fracture of the  right proximal femur is identified. Fracture lines are partially visible  with some callus formation. No hardware complication.     There is moderate diffuse osteopenia which may obscure a subtle  nondisplaced fracture.     A rounded radiopaque foreign body in the pelvis is reidentified. This  may represent a urinary bladder stone?.       Impression:      1. No acute fracture or dislocation.  2. Hardware internal fixation of the right proximal femur. No hardware  complication.                    This report was signed and finalized on 2/18/2025 6:56 AM by Dr. Shahram Patel MD.             LAB RESULTS:      Lab 02/28/25  0545 02/27/25  0416 02/26/25  0658 02/25/25  0318 02/24/25  0335 02/23/25  2204 02/23/25  0346 02/22/25  0802   WBC 3.62 3.34* 3.49 3.73 2.60*   < > 2.35* 2.51*   HEMOGLOBIN 8.0* 7.8* 7.8* 7.9* 8.5*   < > 8.2* 8.3*   HEMATOCRIT 25.4* 24.9* 25.0* 25.1* 27.0*   < > 25.3* 26.7*   PLATELETS 47* 46* 47* 45* 40*   < > 42* 50*   NEUTROS ABS  --   --  2.26 2.64 1.44*  --  1.39* 1.61*   IMMATURE GRANS (ABS)  --   --  0.02 0.04 0.04  --  0.04 0.03   LYMPHS ABS  --   --  0.87 0.71 0.79  --  0.59* 0.57*   MONOS ABS  --   --  0.26 0.29 0.27   --  0.29 0.30   EOS ABS  --   --  0.07 0.04 0.06  --  0.04 0.00   MCV 94.1 93.3 94.7 93.7 92.8   < > 91.7 92.4    < > = values in this interval not displayed.         Lab 02/28/25  0545 02/27/25  0416 02/26/25  0540 02/25/25  0318 02/24/25  0336 02/23/25  0346   SODIUM 141 142 142 141 143 142   POTASSIUM 3.6 3.5 3.8 4.0 4.2 4.3   CHLORIDE 115* 117* 117* 116* 117* 117*   CO2 20.0* 20.0* 19.0* 19.0* 19.0* 19.0*   ANION GAP 6.0 5.0 6.0 6.0 7.0 6.0   BUN 28* 29* 30* 33* 39* 44*   CREATININE 1.29* 1.32* 1.25 1.27 1.29* 1.31*   EGFR 54.7* 53.2* 56.8* 55.7* 54.7* 53.7*   GLUCOSE 102* 106* 113* 102* 104* 89   CALCIUM 7.5* 7.5* 7.9* 8.0* 7.7* 8.2*   HEMOGLOBIN A1C  --   --   --   --   --  5.00   TSH  --   --   --   --   --  2.630         Lab 02/23/25  0346   TOTAL PROTEIN 4.0*   ALBUMIN 1.6*   GLOBULIN 2.4   ALT (SGPT) 10   AST (SGOT) 17   BILIRUBIN 0.8   ALK PHOS 80             Lab 02/23/25  0346   CHOLESTEROL 73   LDL CHOL 38   HDL CHOL 19*   TRIGLYCERIDES 76             Brief Urine Lab Results  (Last result in the past 365 days)        Color   Clarity   Blood   Leuk Est   Nitrite   Protein   CREAT   Urine HCG        02/18/25 0740 Red   Cloudy   Large (3+)   Negative   Negative   >=300 mg/dL (3+)                 Microbiology Results (last 10 days)       Procedure Component Value - Date/Time    COVID-19 RAPID AG,VERITOR,COR/PAD/EDITH/EDU/LAG/DEJON/ IN-HOUSE,DRY SWAB, 1 HR TAT - Swab, Nasal Cavity [[id number]]  (Normal) Collected: 02/19/25 0943    Lab Status: Final result Specimen: Swab from Nasal Cavity Updated: 02/19/25 1009     COVID19 Presumptive Negative    Narrative:      Fact sheets for providers: https://www.fda.gov/media/626049/download    Fact sheets for patients: https://www.fda.gov/media/092410/download    MRSA Screen, PCR (Inpatient) - Swab, Nares [[id number]]  (Normal) Collected: 02/19/25 0943    Lab Status: Final result Specimen: Swab from Nares Updated: 02/19/25 1103     MRSA PCR No MRSA Detected    Narrative:       The negative predictive value of this diagnostic test is high and should only be used to consider de-escalating anti-MRSA therapy. A positive result may indicate colonization with MRSA and must be correlated clinically.    Blood Culture - Blood, Arm, Right [[id number]]  (Normal) Collected: 02/18/25 1249    Lab Status: Final result Specimen: Blood from Arm, Right Updated: 02/23/25 1330     Blood Culture No growth at 5 days        HPI . Patient is 84-year-old  male presented from nursing home with gross hematuria - sounds like another NH resident accidentally got leg tangled in catheter and pulled it out with bulb up.  He's also chronically anticoagulated with Eliquis. He has a known history of a large bladder stone and is followed by Urology in the outpatient setting.  Found to also have FERNANDO, worsening anemia and thrombocytopenia, COVID +, soft BPs.  Urology following.  Currently on CBI.  Anticoagulation on hold.  I've given transfusion of both platelets (2 days ago) and blood (today).  Enterococcus noted in his urine which is susceptible to Vancomycin.  Dr. Henley with Urology plans to take him to OR on Monday for management of large bladder stone if his clinical status is improving/stable.     Hospital Course:   Hematuria/Chronic catheter/UTI/bladder stone.  History of accidentally got tangled with catheter and pull out with a bulb up.  History of large bladder stone being followed by urology in outpatient setting.  Urology consult.  Patient finished vancomycin . Flomax.  CT scan abdomen pelvic-urinary bladder is abnormal in appearance with moderate distention and stranding in the perivesical space as well as an air-fluid level- heterogeneous increased density dependently within the bladder consistent with blood products/clot- Along the left anterolateral aspect of the bladder there is a focus of hyperdensity either representing a adherent clot versus a small neoplasm-not definitely appreciated on previous  CT of 2 weeks earlier favoring adherent clot, Perkins catheter has been displaced/retracted into the  prostatic urethra with the balloon inflated within the prostatic urethra, 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  Status post cystoscopy with irrigation and evacuation of multiple obstructing clots 2/18/2025  Status post 2/20/2025- CYSTOLITHOLAPAXY WITH LASER (>3.5cm bladder stone), FULGURATION OF PROSTATE.  Tissue pathology-calculus stone.  Perkins cath in place.       GI bleed/anemia.  Hemoccult positive.  GI consult, conservative management due to risk factor.  GI signed off today.  Hemoglobin stable.  No sign of acute bleed.     Anemia.  Patient received blood transfusion 2/21/2025.  Ferric gluconate times 12/21/25.  Hemoglobin stable..  No sign of acute bleed.     Thrombocytopenia.  Patient received platelets transfusion 2/19/2025.  Platelet count stable.  Status post 1 unit transfusion 2/24/2025.     Soft blood pressure/on chronic anticoagulation.  Hold Eliquis due to hematuria/GI bleed/anemia/thrombocytopenia.  Hold Coreg.    Doppler ultrasound left upper extremity- no evidence of deep venous thrombosis or  superficial thrombophlebitis of the left upper extremity.     Acute on chronic kidney injury 3B/metabolic acidosis.  Creatinine is improving.  Creatinine 9/2/2024 1.85.  Slowly lactated  "ringer..  Metabolic acidosis stable.  Bicarb p.o .     Neutropenia.  Resolved.     COVID-positive/pulm fibrosis.  Albuterol inhaler.  Symbicort.  Guaifenesin.  Atrovent.  Chest x-ray-No change since the previous study, No active cardiopulmonary disease, Persistent chronic interstitial changes/fibrosis.  CT scan head-Chronic changes and no acute intracranial findings.   Echocardiogram-Grossly normal left and right ventricular size and function,  Valves are not well-visualized.  Patient is on room air.     Pain . Norco as needed.     Reflux.  Protonix.  Zofran as needed.     Constipation.  Michelle-Colace.     Diet . regular diet     Deconditioning.  PT and OT consult.  CT scan lumbar spine- Multilevel degenerative disc disease and facet arthropathy as discussed above but no definite acute osseous abnormality,  Please see the CT abdomen and pelvis report for details regarding the kidneys and urinary bladder as well as retroperitoneal stranding.       Urine culture shows enterococci faecalis susceptible vancomycin.  Blood culture shows no growth in 5 days.  MRSA screen-negative.  Respiratory panel-COVID-positive.  Pathology-no malignant cells.     Patient is from nursing home.  Palliative care failure to thrive.    Vital signs stable, afebrile.  Plan to discharge patient back to nursing home today.  Follow-up with nursing home physician as soon as able.  Follow-up with urology in 2 weeks outpatient.    Physical Exam on Discharge:  /60 (BP Location: Right leg, Patient Position: Lying)   Pulse 88   Temp 97.7 °F (36.5 °C) (Oral)   Resp 16   Ht 180.3 cm (71\")   Wt 95.1 kg (209 lb 11.2 oz)   SpO2 96%   BMI 29.25 kg/m²   Physical Exam  Vitals and nursing note reviewed.   Constitutional:       Appearance: He is well-developed.      Comments: Advanced age, chronically ill.   HENT:      Head: Normocephalic.   Eyes:      General: No scleral icterus.     Pupils: Pupils are equal, round, and reactive to light.   Neck:     "  Thyroid: No thyromegaly.      Vascular: No carotid bruit or JVD.      Trachea: No tracheal deviation.   Cardiovascular:      Rate and Rhythm: Normal rate and regular rhythm.      Heart sounds: No murmur heard.     No friction rub. No gallop.   Pulmonary:      Effort: Pulmonary effort is normal. No respiratory distress.      Breath sounds: Normal breath sounds. No wheezing or rales.      Comments: Patient is on room air.  Chest:      Chest wall: No tenderness.   Abdominal:      General: Bowel sounds are normal. There is no distension.      Palpations: Abdomen is soft.      Tenderness: There is no abdominal tenderness.   Genitourinary:     Comments: Perkins cath in place.    Musculoskeletal:      Cervical back: Normal range of motion and neck supple.   Skin:     General: Skin is warm and dry.      Capillary Refill: Capillary refill takes 2 to 3 seconds.      Findings: No rash.   Neurological:      Cranial Nerves: No cranial nerve deficit.      Motor: Weakness present.      Coordination: Coordination abnormal.      Gait: Gait abnormal.   Psychiatric:         Mood and Affect: Mood normal.         Behavior: Behavior normal.        Condition on Discharge: Stable.    Discharge Disposition: Discharge back to nursing home.  Skilled Nursing Facility (DC - External)    Discharge Medications:     Discharge Medications        New Medications        Instructions Start Date   midodrine 2.5 MG tablet  Commonly known as: PROAMATINE   2.5 mg, Oral, 3 Times Daily Before Meals      multivitamin with minerals tablet tablet   1 tablet, Oral, Daily   Start Date: March 1, 2025     naloxone 4 MG/0.1ML nasal spray  Commonly known as: NARCAN   Call 911. Don't prime. Exchange in 1 nostril for overdose. Repeat in 2-3 minutes in other nostril if no or minimal breathing/responsiveness.      pantoprazole 40 MG EC tablet  Commonly known as: Protonix   40 mg, Oral, Daily      sodium bicarbonate 650 MG tablet   650 mg, Oral, 2 Times Daily              Continue These Medications        Instructions Start Date   acetaminophen 325 MG tablet  Commonly known as: TYLENOL   650 mg, Oral, Every 4 Hours PRN      albuterol sulfate  (90 Base) MCG/ACT inhaler  Commonly known as: PROVENTIL HFA;VENTOLIN HFA;PROAIR HFA   2 puffs, Inhalation, 4 Times Daily - RT      bisacodyl 5 MG EC tablet  Commonly known as: DULCOLAX   5 mg, Daily PRN      bisacodyl 10 MG suppository  Commonly known as: DULCOLAX   10 mg, Rectal, Daily PRN      budesonide-formoterol 160-4.5 MCG/ACT inhaler  Commonly known as: SYMBICORT   2 puffs, Inhalation, 2 Times Daily - RT      guaiFENesin 600 MG 12 hr tablet  Commonly known as: MUCINEX   1,200 mg, 2 Times Daily      HYDROcodone-acetaminophen 5-325 MG per tablet  Commonly known as: NORCO   1 tablet, Oral, Every 4 Hours PRN      ipratropium 17 MCG/ACT inhaler  Commonly known as: ATROVENT HFA   2 puffs, Inhalation, 4 Times Daily - RT      ipratropium-albuterol 0.5-2.5 mg/3 ml nebulizer  Commonly known as: DUO-NEB   3 mL, Every 6 Hours PRN      nystatin 602017 UNIT/GM powder  Commonly known as: MYCOSTATIN   1 Application, 3 Times Daily      ondansetron ODT 4 MG disintegrating tablet  Commonly known as: ZOFRAN-ODT   4 mg, Every 4 Hours PRN      tamsulosin 0.4 MG capsule 24 hr capsule  Commonly known as: FLOMAX   0.4 mg, Oral, Nightly             Stop These Medications      apixaban 2.5 MG tablet tablet  Commonly known as: ELIQUIS     carvedilol 3.125 MG tablet  Commonly known as: COREG     famotidine 20 MG tablet  Commonly known as: PEPCID                Discharge Diet:   Diet Instructions       Advance Diet As Tolerated -Target Diet: Regular .      Target Diet: Regular .            Activity at Discharge:   Activity Instructions       Activity as Tolerated              Follow-up Appointments:   Future Appointments   Date Time Provider Department Center   3/10/2025 10:50 AM Abelardo Panchal PA [facility]   4/18/2025  1:10 PM Abelardo Panchal PA  [facility]       Follow-up with nursing physician as soon as able.  Follow-up with urology in 2 weeks.    Electronically signed by James Donovan MD, 02/28/25, 10:52 CST.    Time: Greater than 30 minutes.

## 2025-02-28 NOTE — NURSING NOTE
Report called to nurse Suleiman at [facility]. Attempted to contact daughter for update, no answer at this time. Awaiting EMS

## 2025-02-28 NOTE — PLAN OF CARE
Goal Outcome Evaluation:  Plan of Care Reviewed With: patient  Progress: no change       Pt medicated with prn pain med x1 thus far this shift. IV saline locked and capped. Perkins cont in place to BSD; urine marcelo, concentrated. Turn Q2H. Incont of stool this shift. VSS. Safety maintained.

## 2025-02-28 NOTE — CASE MANAGEMENT/SOCIAL WORK
Continued Stay Note   [facility]     Patient Name: Manohar Garcia  MRN: [id number]  Today's Date: 2/28/2025    Admit Date: 2/18/2025    Plan: [facility]   Discharge Plan       Row Name 02/28/25 1153       Plan    Plan [facility]    Patient/Family in Agreement with Plan yes    Final Discharge Disposition Code 03 - skilled nursing facility (SNF)    Final Note Pt is being d/c'ed to [facility] [phone]. They have pulled the d/c summary from [facility]. He will go by [facility] [phone].                   Discharge Codes    No documentation.                 Expected Discharge Date and Time       Expected Discharge Date Expected Discharge Time    Feb 28, 2025               DIANNA Lr

## 2025-03-01 NOTE — THERAPY DISCHARGE NOTE
Acute Care - Occupational Therapy Discharge Summary  [facility]     Patient Name: Manohar Garcia  : 1940  MRN: [id number]    Today's Date: 3/1/2025                 Admit Date: 2025        OT Recommendation and Plan    Visit Dx:    ICD-10-CM ICD-9-CM   1. Gross hematuria  R31.0 599.71   2. Injury of urethra, initial encounter  S37.30XA 867.0   3. Chronic anticoagulation  Z79.01 V58.61   4. Bladder mass  N32.89 596.89   5. Acute UTI (urinary tract infection)  N39.0 599.0   6. Acute kidney injury  N17.9 584.9   7. Chronic liver disease  K76.9 571.9   8. Thrombocytopenia  D69.6 287.5   9. Hematuria  R31.9 599.70   10. Bladder stone  N21.0 594.1   11. Impaired mobility [Z74.09]  Z74.09 799.89   12. Acute pyelonephritis  N10 590.10                OT Rehab Goals       Row Name 25 0700             Dressing Goal 1 (OT)    Activity/Device (Dressing Goal 1, OT) upper body dressing  -      [place]/Cues Needed (Dressing Goal 1, OT) minimum assist (75% or more patient effort);verbal cues required;set-up required  -      Time Frame (Dressing Goal 1, OT) long term goal (LTG);by discharge  -      Progress/Outcome (Dressing Goal 1, OT) goal not met;discharged from facility  -         Grooming Goal 1 (OT)    Activity/Device (Grooming Goal 1, OT) grooming skills, all  -JW      Bedford (Grooming Goal 1, OT) minimum assist (75% or more patient effort);verbal cues required;set-up required  -      Time Frame (Grooming Goal 1, OT) long term goal (LTG);by discharge  -      Progress/Outcome (Grooming Goal 1, OT) goal not met;discharged from facility  -         Problem Specific Goal 1 (OT)    Problem Specific Goal 1 (OT) Pt will follow 75% of 1 step commands with minimal verbal cues.  -      Time Frame (Problem Specific Goal 1, OT) long term goal (LTG);by discharge  -      Progress/Outcome (Problem Specific Goal 1, OT) goal not met;discharged from facility  -                User Key  (r) =  Recorded By, (t) = Taken By, (c) = Cosigned By      Initials Name Provider Type Discipline    Hien Cristina OTR/L, CSRS Occupational Therapist OT                        Timed Therapy Charges  Total Units: 2      Suggested Charges  Total Units: 2      Procedure Name Documented Minutes Units Code    HC OT THER PROC EA 15 MIN 14 1   65779 (CPT®)      HC OT THERAPEUTIC ACT EA 15 MIN 10 1   38936 (CPT®)                 Documented Minutes  Total Minutes: 24      Therapy Provided Minutes    39448 - OT Therapeutic Exercise Minutes 14    05556 - OT Therapeutic Activity Minutes 10                        OT Discharge Summary  Anticipated Discharge Disposition (OT): skilled nursing facility  Reason for Discharge: Discharge from facility  Outcomes Achieved: Refer to plan of care for updates on goals achieved  Discharge Destination: SNF      DEEPIKA Garcia, MIREYA  3/1/2025

## 2025-03-02 NOTE — THERAPY DISCHARGE NOTE
Acute Care - Physical Therapy Discharge Summary  [facility]       Patient Name: Manohar Garcia  : 1940  MRN: [id number]    Today's Date: 3/2/2025                 Admit Date: 2025      PT Recommendation and Plan    Visit Dx:    ICD-10-CM ICD-9-CM   1. Gross hematuria  R31.0 599.71   2. Injury of urethra, initial encounter  S37.30XA 867.0   3. Chronic anticoagulation  Z79.01 V58.61   4. Bladder mass  N32.89 596.89   5. Acute UTI (urinary tract infection)  N39.0 599.0   6. Acute kidney injury  N17.9 584.9   7. Chronic liver disease  K76.9 571.9   8. Thrombocytopenia  D69.6 287.5   9. Hematuria  R31.9 599.70   10. Bladder stone  N21.0 594.1   11. Impaired mobility [Z74.09]  Z74.09 799.89   12. Acute pyelonephritis  N10 590.10                PT Rehab Goals       Row Name 25 0856             Bed Mobility Goal 1 (PT)    Activity/Assistive Device (Bed Mobility Goal 1, PT) sit to supine;supine to sit;rolling to left;rolling to right  -NW      Wilcox Level/Cues Needed (Bed Mobility Goal 1, PT) minimum assist (75% or more patient effort)  -NW      Time Frame (Bed Mobility Goal 1, PT) long term goal (LTG)  -NW      Progress/Outcomes (Bed Mobility Goal 1, PT) goal not met  -NW         Transfer Goal 1 (PT)    Activity/Assistive Device (Transfer Goal 1, PT) sit-to-stand/stand-to-sit;bed-to-chair/chair-to-bed;walker, rolling  -NW      Wilcox Level/Cues Needed (Transfer Goal 1, PT) moderate assist (50-74% patient effort)  -NW      Time Frame (Transfer Goal 1, PT) long term goal (LTG)  -NW      Progress/Outcome (Transfer Goal 1, PT) goal not met  -NW         Gait Training Goal 1 (PT)    Activity/Assistive Device (Gait Training Goal 1, PT) gait (walking locomotion);increase endurance/gait distance;increase energy conservation;decrease fall risk;walker, rolling  -NW      Wilcox Level (Gait Training Goal 1, PT) minimum assist (75% or more patient effort)  -NW      Distance (Gait Training Goal 1,  PT) 10  -NW      Time Frame (Gait Training Goal 1, PT) long term goal (LTG)  -NW      Progress/Outcome (Gait Training Goal 1, PT) goal not met  -NW                User Key  (r) = Recorded By, (t) = Taken By, (c) = Cosigned By      Initials Name Provider Type Discipline    Brandee Cancino PTA Physical Therapist Assistant PT                        PT Discharge Summary  Anticipated Discharge Disposition (PT): skilled nursing facility  Reason for Discharge: Discharge from facility  Outcomes Achieved: Refer to plan of care for updates on goals achieved  Discharge Destination: SNF      Brandee Campbell PTA   3/2/2025

## 2025-03-10 ENCOUNTER — TELEPHONE (OUTPATIENT)
Dept: UROLOGY | Facility: CLINIC | Age: 85
End: 2025-03-10
Payer: MEDICARE

## 2025-03-10 NOTE — TELEPHONE ENCOUNTER
Called [facility] and spoke with the patients' caregiver. He has been placed on Hospice care and will not need any further appointments.

## 2025-03-11 LAB
LAB AP CASE REPORT: NORMAL
Lab: NORMAL
PATH REPORT.FINAL DX SPEC: NORMAL
PATH REPORT.GROSS SPEC: NORMAL

## (undated) DEVICE — DRSNG BRDR MEPILEX FLX/LITE FM 4X5CM

## (undated) DEVICE — Device

## (undated) DEVICE — DOVER HYDROGEL COATED LATEX FOLEY CATHETER, 5 ML, 3-WAY 22 FR/CH (7.3 MM): Brand: DOVER

## (undated) DEVICE — GLV SURG BIOGEL M LTX PF 7 1/2

## (undated) DEVICE — BALLOON DILATATION CATHETER KIT: Brand: UROMAX ULTRA KIT

## (undated) DEVICE — SPK10183 ORTHOPEDIC FRACTURE AND TRAUMA KIT: Brand: SPK10183 ORTHOPEDIC FRACTURE AND TRAUMA KIT

## (undated) DEVICE — FIBR LASR MOSES 550 DFL 6J 80HZ 120W

## (undated) DEVICE — EVAC BLDR UROVAC W ADAPT

## (undated) DEVICE — CUTTING ELECTRODE MONO 24FR 12/30°  FOR RESECTOSCOPES, TELESCOPE Ø 4 MM, FOR SHEATHS, INTERMITTENT/CONTINUOUS IRRIGATION, 24/26 FR, LOOP: ROUND, WIRE Ø 0.25 MM, FORK COLOR YELLOW, STEM COLOR TRANSPARENT, PACK = 10 PCS, FOR SHARK RESECTOSCOPE, STERILE, FOR SINGLE USE: Brand: SHARK

## (undated) DEVICE — DRSNG WND SIL OPTIFOAM GNTL BRDR ADHS 1.6X2IN

## (undated) DEVICE — GW SENSR DUALFLEX NITNL STR .038IN 3X150CM

## (undated) DEVICE — CVR UNIV C/ARM

## (undated) DEVICE — ANTIBACTERIAL UNDYED BRAIDED (POLYGLACTIN 910), SYNTHETIC ABSORBABLE SUTURE: Brand: COATED VICRYL

## (undated) DEVICE — BIT DRL 3FLUT QC CALIB 4.2X330X100MM

## (undated) DEVICE — ADAPT CYSTO FOR SYR TO SHEATH

## (undated) DEVICE — BAG,DRAINAGE,4L,A/R TOWER,LL,SLIDE TAP: Brand: MEDLINE

## (undated) DEVICE — PK CYSTO 30

## (undated) DEVICE — TRAP FLD MINIVAC MEGADYNE 100ML

## (undated) DEVICE — GLV SURG DERMASSURE GRN LF PF 8.5

## (undated) DEVICE — GW FOR TROCH NAIL 3.2X400MM

## (undated) DEVICE — PISTON SYRINGE, IRRIGATION WITH PROTECTIVE CAP, 60 CC, INDIVIDUALLY WRAPPED: Brand: DOVER

## (undated) DEVICE — SHORT LENS-STERILE

## (undated) DEVICE — SYR LUERLOK 30CC

## (undated) DEVICE — DOVER HYDROGEL COATED LATEX FOLEY CATHETER, 30 ML, 3-WAY 22 FR/CH (7.3 MM): Brand: DOVER

## (undated) DEVICE — 4-PORT MANIFOLD: Brand: NEPTUNE 2

## (undated) DEVICE — 24/26FR BIPOLAR CUTTING LOOP: Brand: N.A.

## (undated) DEVICE — GW FNADVANCED 3.2X475MM STRL

## (undated) DEVICE — SYS CLS SKIN PREMIERPRO EXOFINFUSION 22CM

## (undated) DEVICE — 24FR BIPLR COAG ELECTRDE, STERILE: Brand: N.A.

## (undated) DEVICE — DRP SURG U/DRP INVISISHIELD PA 48X52IN

## (undated) DEVICE — DRAPE,U/ SHT,SPLIT,PLAS,STERIL: Brand: MEDLINE

## (undated) DEVICE — CATH URETRL OPN/END 5F70CM

## (undated) DEVICE — GLV SURG SENSICARE PI ORTHO SZ8.5 LF STRL

## (undated) DEVICE — PK HIP ORIF FX TABL 30